# Patient Record
Sex: MALE | Race: WHITE | NOT HISPANIC OR LATINO | Employment: UNEMPLOYED
[De-identification: names, ages, dates, MRNs, and addresses within clinical notes are randomized per-mention and may not be internally consistent; named-entity substitution may affect disease eponyms.]

---

## 2018-05-23 ENCOUNTER — SURGERY (OUTPATIENT)
Age: 63
End: 2018-05-23

## 2018-05-23 ENCOUNTER — HOSPITAL ENCOUNTER (OUTPATIENT)
Facility: OTHER | Age: 63
Discharge: HOME | End: 2018-05-23
Attending: INTERNAL MEDICINE | Admitting: INTERNAL MEDICINE
Payer: MEDICAID

## 2018-05-23 VITALS
OXYGEN SATURATION: 98 % | SYSTOLIC BLOOD PRESSURE: 138 MMHG | HEIGHT: 66 IN | HEART RATE: 81 BPM | BODY MASS INDEX: 23.17 KG/M2 | WEIGHT: 144.19 LBS | DIASTOLIC BLOOD PRESSURE: 84 MMHG | RESPIRATION RATE: 16 BRPM

## 2018-05-23 DIAGNOSIS — Z99.2 ESRD (END STAGE RENAL DISEASE) ON DIALYSIS: ICD-10-CM

## 2018-05-23 DIAGNOSIS — N18.6 ESRD (END STAGE RENAL DISEASE) ON DIALYSIS: ICD-10-CM

## 2018-05-23 PROCEDURE — 36589 REMOVAL TUNNELED CV CATH: CPT | Performed by: INTERNAL MEDICINE

## 2018-05-23 PROCEDURE — 25000003 PHARM REV CODE 250

## 2018-05-23 RX ORDER — AMLODIPINE BESYLATE 10 MG/1
TABLET ORAL DAILY
COMMUNITY

## 2018-05-23 RX ORDER — CINACALCET 30 MG/1
30 TABLET, FILM COATED ORAL
COMMUNITY

## 2018-05-23 RX ORDER — SEVELAMER CARBONATE 800 MG/1
800 TABLET, FILM COATED ORAL
COMMUNITY

## 2018-05-23 NOTE — DISCHARGE INSTRUCTIONS
Home Care Instructions :    1. DIET:   You may resume your normal diet today.   2. BATHING:   No shower for 48 hours.  3. DRESSING:   You may remove dressing in 48 hours  4. ACTIVITY LEVEL:   No strenuous lifting  5. MEDICATIONS:   You may resume your normal medications today.   6. SPECIAL INSTRUCTIONS:   PLEASE CALL YOUR DOCTOR IF:  1. Redness or swelling around the injection site.  2. Fever of 101 degrees  3. Drainage (pus) from the injection site.  4. For any continuous bleeding (some dried blood over the incision is normal.)    FOR EMERGENCIES:   If any unusual problems or difficulties occur during clinic hours, call [phone] or [phone].

## 2018-05-23 NOTE — H&P
[facility]  History & Physical    Subjective:      Chief Complaint/Reason for Admission: Here for TDC removal    Flakito Mcginnis is a 63 y.o. male with ESRD (TTS at [facility] with Dr. Causey) who presents for R IJ TDC removal.  He has had his LUE brachiocephalic AVF accessed on 2 occasions without issue.    Past Medical History:   Diagnosis Date    Crohn's disease 1998    ESRD (end stage renal disease) on dialysis 10/2017    Hypertension     Obstructive uropathy      Past Surgical History:   Procedure Laterality Date    COLOSTOMY  2006    DIALYSIS FISTULA CREATION Left 02/2018    NEPHRECTOMY Right      Family History   Problem Relation Age of Onset    Hypertension Mother     Emphysema Father      Social History   Substance Use Topics    Smoking status: Former Smoker    Smokeless tobacco: Not on file    Alcohol use Not on file       PTA Medications   Medication Sig    amLODIPine (NORVASC) 10 MG tablet Take by mouth once daily.    cinacalcet (SENSIPAR) 30 MG Tab Take 30 mg by mouth daily with breakfast.    sevelamer carbonate (RENVELA) 800 mg Tab Take 800 mg by mouth 3 (three) times daily with meals.     Review of patient's allergies indicates:   Allergen Reactions    Aspirin      Has crohn's disease        Review of Systems   Constitutional: Negative.    Respiratory: Negative.    Cardiovascular: Negative.        Objective:      Vital Signs (Most Recent)  Pulse: 84 (05/23/18 1050)  Resp: 16 (05/23/18 1050)  BP: 127/71 (05/23/18 1050)  SpO2: 97 % (05/23/18 1050)    Vital Signs Range (Last 24H):  Pulse:  [82-86]   Resp:  [16]   BP: (127-138)/(71-84)   SpO2:  [96 %-97 %]     Physical Exam   Constitutional: He is oriented to person, place, and time. He appears well-developed and well-nourished. No distress.   HENT:   Head: Normocephalic and atraumatic.   Eyes: EOM are normal.   Neck: Neck supple.   Cardiovascular:   R IJ TDC with no surrounding erythema or drainage.    Pulmonary/Chest: Effort normal. No respiratory distress.   Musculoskeletal:   LUE AVF with good thrill, non-pulsatile.   Neurological: He is alert and oriented to person, place, and time.   Skin: Skin is warm and dry. He is not diaphoretic.   Psychiatric: He has a normal mood and affect. His behavior is normal.       Assessment:      There are no hospital problems to display for this patient.      Plan:      TDC removal today.  Risks explained, consent signed.

## 2018-05-23 NOTE — BRIEF OP NOTE
[facility]  Brief Operative Note     SUMMARY     Surgery Date: 5/23/2018     Surgeon(s) and Role:     * Parveen Ray MD - Primary    Assisting Surgeon: Ketan Pemberton MD    Pre-op Diagnosis:  ESRD (end stage renal disease) on dialysis [N18.6, Z99.2]    Post-op Diagnosis:  Post-Op Diagnosis Codes:     * ESRD (end stage renal disease) on dialysis [N18.6, Z99.2]    Procedure(s) (LRB):  PERMCATH REMOVAL-TUNNELED CVC REMOVAL (N/A)    Anesthesia: 1% lidocaine    Description of the findings of the procedure: Patient was prepped and draped in a sterile fashion.  This was a successful removal of his R IJ tunneled dialysis catheter.  Patient tolerated the procedure well and no immediate complications noted.    Findings/Key Components:  Patient was prepped and draped in a sterile fashion.  This was a successful removal of his R IJ tunneled dialysis catheter.  Patient tolerated the procedure well and no immediate complications noted.      Estimated Blood Loss: < 10 mL         Specimens:   Specimen (12h ago through future)    None          Discharge Note    SUMMARY     Admit Date: 5/23/2018    Discharge Date and Time:  05/23/2018 11:22 AM    Hospital Course (synopsis of major diagnoses, care, treatment, and services provided during the course of the hospital stay):  Patient was prepped and draped in a sterile fashion.  This was a successful removal of his R IJ tunneled dialysis catheter.  Patient tolerated the procedure well and no immediate complications noted.       Final Diagnosis: Post-Op Diagnosis Codes:     * ESRD (end stage renal disease) on dialysis [N18.6, Z99.2]    Disposition: Home or Self Care    Follow Up/Patient Instructions:     Medications:  Reconciled Home Medications:      Medication List      CONTINUE taking these medications    amLODIPine 10 MG tablet  Commonly known as:  NORVASC  Take by mouth once daily.     cinacalcet 30 MG Tab  Commonly known as:  SENSIPAR  Take 30 mg by mouth daily  with breakfast.     sevelamer carbonate 800 mg Tab  Commonly known as:  RENVELA  Take 800 mg by mouth 3 (three) times daily with meals.            Discharge Procedure Orders  Activity as tolerated     Lifting restrictions   Order Comments: No heavy lifting for 24 hours.     Sponge bath only until clinic visit   Order Comments: Can take a shower after 48-72 hours, once the dressing is removed (at the dialysis unit) and the wound is closed.     Call MD for:  temperature >100.4     Call MD for:  severe uncontrolled pain     Call MD for:  redness, tenderness, or signs of infection (pain, swelling, redness, odor or green/yellow discharge around incision site)     Call MD for:   Order Comments: Bleeding from the access site.       Follow-up Information     [facility]. Go in 1 day.    Specialty:  Dialysis Center  Contact information:  [address] [place] [id number] [phone]             [facility] In 1 day.    Specialty:  Dialysis Center  Why:  for dialysis  Contact information:  [address] [facility] [address] [phone]

## 2018-05-23 NOTE — PROCEDURES
[facility]     Tunneled Dialysis Catheter Removal Procedure Note     Date of Procedure:  05/23/2018 11:23 AM    Primary Surgeon: Parveen Ray MD  Assist: Ketan Pemberton MD    Procedure Performed:  1. Tunneled Dialysis Catheter Removal    Indication: Catheter not required; functioning AV Access     Location of catheter: R internal jugular vein and R chest wall     Patient was prepped and draped in a sterile fashion.  1% lidocaine was used for local anesthesia.  Blunt hemostat was used to dissect the exit site and fibrin sheath from the catheter cuff.  Once the cuff was freed, catheter was pulled and pressure was applied to the venotomy site for at least 5 minutes.  Hemostasis was achieved, tegaderm dressing applied.      Blood Loss: < 10 mL  Discharge instructions given verbally.    Parveen Ray MD  [phone]

## 2021-07-11 ENCOUNTER — IMMUNIZATION (OUTPATIENT)
Dept: PRIMARY CARE CLINIC | Facility: CLINIC | Age: 66
End: 2021-07-11
Payer: MEDICAID

## 2021-07-11 DIAGNOSIS — Z23 NEED FOR VACCINATION: Primary | ICD-10-CM

## 2021-07-11 PROCEDURE — 91300 COVID-19, MRNA, LNP-S, PF, 30 MCG/0.3 ML DOSE VACCINE: ICD-10-PCS | Mod: S$GLB,,, | Performed by: INTERNAL MEDICINE

## 2021-07-11 PROCEDURE — 0001A COVID-19, MRNA, LNP-S, PF, 30 MCG/0.3 ML DOSE VACCINE: CPT | Mod: CV19,S$GLB,, | Performed by: INTERNAL MEDICINE

## 2021-07-11 PROCEDURE — 0001A COVID-19, MRNA, LNP-S, PF, 30 MCG/0.3 ML DOSE VACCINE: ICD-10-PCS | Mod: CV19,S$GLB,, | Performed by: INTERNAL MEDICINE

## 2021-07-11 PROCEDURE — 91300 COVID-19, MRNA, LNP-S, PF, 30 MCG/0.3 ML DOSE VACCINE: CPT | Mod: S$GLB,,, | Performed by: INTERNAL MEDICINE

## 2021-08-01 ENCOUNTER — IMMUNIZATION (OUTPATIENT)
Dept: PRIMARY CARE CLINIC | Facility: CLINIC | Age: 66
End: 2021-08-01
Payer: MEDICAID

## 2021-08-01 DIAGNOSIS — Z23 NEED FOR VACCINATION: Primary | ICD-10-CM

## 2021-08-01 PROCEDURE — 91300 COVID-19, MRNA, LNP-S, PF, 30 MCG/0.3 ML DOSE VACCINE: CPT | Mod: S$GLB,,, | Performed by: INTERNAL MEDICINE

## 2021-08-01 PROCEDURE — 91300 COVID-19, MRNA, LNP-S, PF, 30 MCG/0.3 ML DOSE VACCINE: ICD-10-PCS | Mod: S$GLB,,, | Performed by: INTERNAL MEDICINE

## 2021-08-01 PROCEDURE — 0002A COVID-19, MRNA, LNP-S, PF, 30 MCG/0.3 ML DOSE VACCINE: ICD-10-PCS | Mod: CV19,S$GLB,, | Performed by: INTERNAL MEDICINE

## 2021-08-01 PROCEDURE — 0002A COVID-19, MRNA, LNP-S, PF, 30 MCG/0.3 ML DOSE VACCINE: CPT | Mod: CV19,S$GLB,, | Performed by: INTERNAL MEDICINE

## 2021-11-01 ENCOUNTER — TELEPHONE (OUTPATIENT)
Dept: GASTROENTEROLOGY | Facility: CLINIC | Age: 66
End: 2021-11-01
Payer: MEDICAID

## 2023-04-13 ENCOUNTER — OFFICE VISIT (OUTPATIENT)
Dept: URGENT CARE | Facility: CLINIC | Age: 68
End: 2023-04-13
Payer: MEDICARE

## 2023-04-13 VITALS
TEMPERATURE: 98 F | HEART RATE: 89 BPM | RESPIRATION RATE: 18 BRPM | SYSTOLIC BLOOD PRESSURE: 156 MMHG | DIASTOLIC BLOOD PRESSURE: 86 MMHG | WEIGHT: 144 LBS | HEIGHT: 66 IN | BODY MASS INDEX: 23.14 KG/M2

## 2023-04-13 DIAGNOSIS — S22.42XA CLOSED FRACTURE OF MULTIPLE RIBS OF LEFT SIDE, INITIAL ENCOUNTER: Primary | ICD-10-CM

## 2023-04-13 DIAGNOSIS — R52 PAIN: ICD-10-CM

## 2023-04-13 DIAGNOSIS — R07.81 RIB PAIN ON LEFT SIDE: ICD-10-CM
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
## 2023-04-13 PROCEDURE — 71100 XR RIBS 2 VIEW LEFT: ICD-10-PCS | Mod: FY,LT,S$GLB, | Performed by: RADIOLOGY

## 2023-04-13 PROCEDURE — 73030 X-RAY EXAM OF SHOULDER: CPT | Mod: FY,LT,S$GLB, | Performed by: RADIOLOGY

## 2023-04-13 PROCEDURE — 99204 OFFICE O/P NEW MOD 45 MIN: CPT | Mod: S$GLB,,, | Performed by: NURSE PRACTITIONER

## 2023-04-13 PROCEDURE — 99204 PR OFFICE/OUTPT VISIT, NEW, LEVL IV, 45-59 MIN: ICD-10-PCS | Mod: S$GLB,,, | Performed by: NURSE PRACTITIONER

## 2023-04-13 PROCEDURE — 71100 X-RAY EXAM RIBS UNI 2 VIEWS: CPT | Mod: FY,LT,S$GLB, | Performed by: RADIOLOGY

## 2023-04-13 PROCEDURE — 73030 XR SHOULDER COMPLETE 2 OR MORE VIEWS LEFT: ICD-10-PCS | Mod: FY,LT,S$GLB, | Performed by: RADIOLOGY

## 2023-04-13 RX ORDER — LISINOPRIL 20 MG/1
20 TABLET ORAL
COMMUNITY

## 2023-04-13 RX ORDER — CARVEDILOL 12.5 MG/1
12.5 TABLET ORAL 2 TIMES DAILY
COMMUNITY
Start: 2023-01-09

## 2023-04-13 RX ORDER — OXYCODONE AND ACETAMINOPHEN 5; 325 MG/1; MG/1
1 TABLET ORAL EVERY 6 HOURS PRN
COMMUNITY
End: 2023-08-25

## 2023-04-13 RX ORDER — HYDROCODONE BITARTRATE AND ACETAMINOPHEN 5; 325 MG/1; MG/1
1 TABLET ORAL EVERY 8 HOURS PRN
Qty: 15 TABLET | Refills: 0 | Status: SHIPPED | OUTPATIENT
Start: 2023-04-13 | End: 2023-08-25

## 2023-04-13 NOTE — PROGRESS NOTES
"Subjective:      Patient ID: Flakito Mcginnis is a 68 y.o. male.    Vitals:  height is 5' 6" (1.676 m) and weight is 65.3 kg (144 lb). His temperature is 97.8 °F (36.6 °C). His blood pressure is 156/86 (abnormal) and his pulse is 89. His respiration is 18.     Chief Complaint: Fall    Pt is a 69 yo male w/ complaints of 7 days of left rib and left shoulder pain from a fall. Pt states he gets SOB in the middle of the night and has to sit up for a while. 7 days ago, he fell asleep while sitting on the side of his bed and fell on his L side. Pt c/o SOB, weakness, and fatigue due to the pain. It is keeping him up at night. Certain movements and laying down make the symptoms worse. Took tylenol with no relief.     Fall  The accident occurred 5 to 7 days ago. The fall occurred from a bed. He fell from a height of 3 to 5 ft. He landed on Hard floor. The point of impact was the left shoulder. The pain is present in the left shoulder. The pain is at a severity of 8/10. The pain is mild. Pertinent negatives include no abdominal pain, bowel incontinence, fever, headaches, hearing loss, hematuria, loss of consciousness, nausea, numbness, tingling, visual change or vomiting. He has tried acetaminophen for the symptoms. The treatment provided no relief.     Constitution: Negative for fever.   Gastrointestinal:  Negative for abdominal pain, nausea, vomiting and bowel incontinence.   Genitourinary:  Negative for hematuria.   Neurological:  Negative for headaches, loss of consciousness and numbness.    Objective:     Physical Exam   Constitutional: He is oriented to person, place, and time. He appears well-developed. He is cooperative.   HENT:   Head: Normocephalic and atraumatic.   Ears:   Right Ear: Hearing and external ear normal.   Left Ear: Hearing and external ear normal.   Nose: Nose normal. No mucosal edema or nasal deformity. No epistaxis. Right sinus exhibits no maxillary sinus tenderness and no frontal sinus tenderness. " Left sinus exhibits no maxillary sinus tenderness and no frontal sinus tenderness.   Mouth/Throat: Uvula is midline, oropharynx is clear and moist and mucous membranes are normal. No trismus in the jaw. Normal dentition. No uvula swelling.   Eyes: Conjunctivae and lids are normal.   Neck: Trachea normal and phonation normal. Neck supple.   Cardiovascular: Normal rate, regular rhythm, normal heart sounds and normal pulses.   Pulmonary/Chest: Effort normal and breath sounds normal. He has no decreased breath sounds. He has no wheezes. He has no rales. He exhibits tenderness. He exhibits no crepitus and no swelling.       Abdominal: Normal appearance.   Musculoskeletal:      Thoracic back: He exhibits decreased range of motion and tenderness.        Back:    Neurological: He is alert and oriented to person, place, and time. He exhibits normal muscle tone.   Skin: Skin is warm, dry and intact.   Psychiatric: His speech is normal and behavior is normal. Judgment and thought content normal.   Nursing note and vitals reviewed.    X-Ray Ribs 2 View Left    Result Date: 4/13/2023  EXAMINATION: XR RIBS 2 VIEW LEFT CLINICAL HISTORY: Pain, unspecified. TECHNIQUE: Frontal view of the chest and two views of the left ribs were performed. COMPARISON: None. FINDINGS: Exam quality is limited by motion blur and bony demineralization. Cardiomediastinal silhouette is enlarged.  Central vascular congestion with coarse interstitial lung markings and possible pleural thickening.  Increased ground-glass attenuation in the left lung base.  No large pleural effusion.  No distinct pneumothorax.  Right-sided central venous catheter overlies the SVC.  Surgical clips overlie the lower neck. Multiple minimally displaced age-indeterminate left upper rib fractures likely involving the 2nd through 5th ribs.  CT follow-up could be considered if it would alter management.     Minimally displaced fractures of left upper ribs, noting detailed evaluation  is limited by motion blur and suboptimal positioning. Enlarged cardiac silhouette and coarse interstitial lung markings with possible mild left pleural thickening. Electronically signed by: Lorenzo Brannon MD Date:    04/13/2023 Time:    13:36    XR SHOULDER COMPLETE 2 OR MORE VIEWS LEFT    Result Date: 4/13/2023  EXAMINATION: XR SHOULDER COMPLETE 2 OR MORE VIEWS LEFT CLINICAL HISTORY: Pain, unspecified TECHNIQUE: Two or three views of the left shoulder were performed. COMPARISON: None FINDINGS: Three views left shoulder. There is osteopenia.  Allowing for positioning, the left humeral head maintains appropriate relationship with the glenoid.  The acromioclavicular joint is intact noting degenerative change.  Coarse interstitial attenuation is noted throughout the visualized lung zones. There are fractures involving the posterolateral aspects of left ribs 2, 3, 4 and 6.  Please note, given positioning, evaluation is limited, dedicated left rib series is advised.     This report was flagged in Epic as abnormal. 1. No acute displaced fracture or dislocation of the left shoulder. 2. Several upper rib fractures as described, dedicated left rib series is recommended. Electronically signed by: Michael Cleveland MD Date:    04/13/2023 Time:    13:17       Assessment:     1. Closed fracture of multiple ribs of left side, initial encounter    2. Pain    3. Fall, initial encounter    4. Rib pain on left side        Plan:     HERBERT SpO2 after multiple attempts. Pt states he has poor circulation and clinicians often have trouble obtaining a pulse ox, typically will use his ear. This type of pulse oximeter is not available in clinic. Pt breathing even and unlabored, NAD, BBS CTA    Strict ER precautions for new or worsening symptoms    Closed fracture of multiple ribs of left side, initial encounter  -     HYDROcodone-acetaminophen (NORCO) 5-325 mg per tablet; Take 1 tablet by mouth every 8 (eight) hours as needed for Pain.   Dispense: 15 tablet; Refill: 0    Pain  -     X-Ray Ribs 2 View Left; Future; Expected date: 04/13/2023  -     XR SHOULDER COMPLETE 2 OR MORE VIEWS LEFT; Future; Expected date: 04/13/2023    Fall, initial encounter    Rib pain on left side      Patient Instructions   - You must understand that you have received an Urgent Care treatment only and that you may be released before all of your medical problems are known or treated.   - You, the patient, will arrange for follow up care as instructed.   - If your condition worsens or fails to improve we recommend that you receive another evaluation at the ER immediately or contact your PCP to discuss your concerns.   - You can call [phone] or [phone] to help schedule an appointment with the appropriate provider.    Take OTC ibuprofen 400-800 mg 3 times per day. Max dose 3200 mg from all sources per day. Or Tylenol 500-1000 mg 3 times per day. Max 4000 mg from all sources per day.   Rest as much as possible  Alternate heat and ice. Apply heat for 20-30 minutes, perform gentle range of motion exercises, and then apply ice for 15 minutes. Do this 3-4 times per day.    If you received an injection of toradol in the clinic today, DO NOT take any anti-inflammatory medications today. These include: Advil/Motrin (ibuprofen), Aleve (naproxen), Mobic (meloxicam).

## 2023-04-13 NOTE — PATIENT INSTRUCTIONS
- You must understand that you have received an Urgent Care treatment only and that you may be released before all of your medical problems are known or treated.   - You, the patient, will arrange for follow up care as instructed.   - If your condition worsens or fails to improve we recommend that you receive another evaluation at the ER immediately or contact your PCP to discuss your concerns.   - You can call [phone] or [phone] to help schedule an appointment with the appropriate provider.    Take OTC ibuprofen 400-800 mg 3 times per day. Max dose 3200 mg from all sources per day. Or Tylenol 500-1000 mg 3 times per day. Max 4000 mg from all sources per day.   Rest as much as possible  Alternate heat and ice. Apply heat for 20-30 minutes, perform gentle range of motion exercises, and then apply ice for 15 minutes. Do this 3-4 times per day.    If you received an injection of toradol in the clinic today, DO NOT take any anti-inflammatory medications today. These include: Advil/Motrin (ibuprofen), Aleve (naproxen), Mobic (meloxicam).

## 2023-04-17 ENCOUNTER — TELEPHONE (OUTPATIENT)
Dept: URGENT CARE | Facility: CLINIC | Age: 68
End: 2023-04-17
Payer: MEDICAID

## 2023-04-17 NOTE — TELEPHONE ENCOUNTER
4-17-23 Spoke to patient regarding rx. Asked him to get in touch with his pcp. Patient stated he doesn't have a pcp at this time. Patient hung up on me. kmrtr

## 2023-07-27 ENCOUNTER — OFFICE VISIT (OUTPATIENT)
Dept: URGENT CARE | Facility: CLINIC | Age: 68
End: 2023-07-27
Payer: MEDICARE

## 2023-07-27 VITALS
TEMPERATURE: 98 F | OXYGEN SATURATION: 95 % | HEIGHT: 66 IN | HEART RATE: 89 BPM | BODY MASS INDEX: 23.14 KG/M2 | DIASTOLIC BLOOD PRESSURE: 73 MMHG | SYSTOLIC BLOOD PRESSURE: 115 MMHG | WEIGHT: 144 LBS | RESPIRATION RATE: 18 BRPM

## 2023-07-27 DIAGNOSIS — M79.604 RIGHT LEG PAIN: ICD-10-CM

## 2023-07-27 DIAGNOSIS — S86.911A MUSCLE STRAIN OF RIGHT LOWER LEG, INITIAL ENCOUNTER: ICD-10-CM
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
## 2023-07-27 PROCEDURE — 73552 XR FEMUR 2 VIEW RIGHT: ICD-10-PCS | Mod: FY,RT,S$GLB, | Performed by: RADIOLOGY

## 2023-07-27 PROCEDURE — 73590 X-RAY EXAM OF LOWER LEG: CPT | Mod: FY,RT,S$GLB, | Performed by: RADIOLOGY

## 2023-07-27 PROCEDURE — 99214 PR OFFICE/OUTPT VISIT, EST, LEVL IV, 30-39 MIN: ICD-10-PCS | Mod: S$GLB,,, | Performed by: PHYSICIAN ASSISTANT

## 2023-07-27 PROCEDURE — 73552 X-RAY EXAM OF FEMUR 2/>: CPT | Mod: FY,RT,S$GLB, | Performed by: RADIOLOGY

## 2023-07-27 PROCEDURE — 73590 XR TIBIA FIBULA 2 VIEW RIGHT: ICD-10-PCS | Mod: FY,RT,S$GLB, | Performed by: RADIOLOGY

## 2023-07-27 PROCEDURE — 99214 OFFICE O/P EST MOD 30 MIN: CPT | Mod: S$GLB,,, | Performed by: PHYSICIAN ASSISTANT

## 2023-07-27 RX ORDER — OXYCODONE AND ACETAMINOPHEN 5; 325 MG/1; MG/1
1 TABLET ORAL EVERY 12 HOURS PRN
Qty: 10 TABLET | Refills: 0 | Status: SHIPPED | OUTPATIENT
Start: 2023-07-27 | End: 2023-08-25

## 2023-07-27 NOTE — PROGRESS NOTES
"Subjective:      Patient ID: Flakito Mcginnis is a 68 y.o. male.    Vitals:  height is 5' 6" (1.676 m) and weight is 65.3 kg (144 lb). His oral temperature is 98 °F (36.7 °C). His blood pressure is 115/73 and his pulse is 89. His respiration is 18 and oxygen saturation is 95%.     Chief Complaint: Leg Pain (Right leg)    This is a 68 y.o. male who presents today with a chief complaint of right leg pain. Pt state he fell out of bed 2 weeks ago onto left side/leg and has had persistent pain since then.  Patient says the pain is more severe with weight bearing, walking, and inversion of ankle. Pt states that pain is most notable of right lateral lower leg, also extends up laterally past knee to distal lateral thigh. No swelling, rash, or skin change. Pt took tylenol this past 2 weeks to help with some temporary relief. No back pain. No paresthesia. No posterior or medial leg pain.     Leg Pain   The incident occurred more than 1 week ago. The injury mechanism was a fall. The pain is present in the right leg. The quality of the pain is described as aching and stabbing. The pain is at a severity of 6/10. The pain is mild. The pain has been Fluctuating since onset. Pertinent negatives include no inability to bear weight, loss of motion, loss of sensation, muscle weakness, numbness or tingling. He reports no foreign bodies present. The symptoms are aggravated by movement and weight bearing. He has tried acetaminophen for the symptoms. The treatment provided no relief.     Past Medical History:   Diagnosis Date    Crohn's disease 1998    ESRD (end stage renal disease) on dialysis 10/2017    Hypertension     Obstructive uropathy      Constitution: Negative for chills, sweating, fatigue and fever.   HENT:  Negative for ear pain, congestion and sore throat.    Neck: Negative for neck pain and neck stiffness.   Cardiovascular:  Negative for chest pain, leg swelling, palpitations and sob on exertion.   Eyes:  Negative for eye " itching, eye pain and eye redness.   Respiratory:  Negative for cough, sputum production and shortness of breath.    Gastrointestinal:  Negative for abdominal pain, nausea, vomiting and diarrhea.   Genitourinary:  Negative for dysuria, frequency, urgency, flank pain and hematuria.   Musculoskeletal:  Positive for pain, trauma and pain with walking. Negative for joint pain and abnormal ROM of joint.   Skin:  Negative for color change, rash, wound, erythema and bruising.   Neurological:  Negative for dizziness, passing out, loss of balance, headaches, disorientation, altered mental status and numbness.   Psychiatric/Behavioral:  Negative for altered mental status, disorientation and confusion.     Objective:     Physical Exam   Constitutional: He is oriented to person, place, and time. He appears well-developed.  Non-toxic appearance. He does not appear ill. No distress.   HENT:   Head: Normocephalic and atraumatic.   Ears:   Right Ear: External ear normal.   Left Ear: External ear normal.   Eyes: Conjunctivae are normal. Right eye exhibits no discharge. Left eye exhibits no discharge. No scleral icterus.   Pulmonary/Chest: Effort normal. No stridor. No respiratory distress. He has no wheezes.   Musculoskeletal:         General: Tenderness present.      Right ankle: No tenderness.      Left ankle: Normal.      Right lower leg: He exhibits tenderness. He exhibits no swelling, no deformity and no laceration. No edema.      Left lower leg: Normal.        Legs:       Comments: -Pain on the R side of leg from below the hip to above the ankle. Notes having pain when foot is inverted.   -XRAY of fib/tib, ankle, and hip are WNL.  + Trendelenburg gait of R hip. Pt walking with cane.   Neurological: no focal deficit. He is alert, oriented to person, place, and time and at baseline.   Skin: Skin is warm, dry and not diaphoretic. not right lower legNo bruising and No erythema   Psychiatric: His behavior is normal. Judgment and  thought content normal.   Nursing note and vitals reviewed.    Assessment:     1. Fall, initial encounter    2. Right leg pain    3. Muscle strain of right lower leg, initial encounter        Plan:   Prior labs reviewed. Pt on hemodialysis.   - Discussed ddx, home care, tx options, and given follow up precautions.  I have reviewed the patient's chart to view previous visits, labs, and imaging to assess PMH and look for any trends or previous treatments.  I have interpreted x-rays and reviewed with patient.  Negative for acute findings.  As discussed with patient, I have considered multiple differentials, at this time it is most consistent with muscular etiology.  There is no evidence of wound, infection, or DVT based on exam.  It is unlikely to be referred pain from sciatica.  No red flag S/S at this time.  NSAIDs contraindicated, pain control p.r.n., has tolerated Percocet in the past well.  Instructed monitor closely, follow up closely with PCP, seek medical attention sooner p.r.n. for new or worsening symptoms.  Patient expressed understanding, agrees with plan of care.  Fall, initial encounter  -     XR FEMUR 2 VIEW RIGHT; Future; Expected date: 07/27/2023  -     XR TIBIA FIBULA 2 VIEW RIGHT; Future; Expected date: 07/27/2023    Right leg pain  -     oxyCODONE-acetaminophen (PERCOCET) 5-325 mg per tablet; Take 1 tablet by mouth every 12 (twelve) hours as needed for Pain.  Dispense: 10 tablet; Refill: 0    Muscle strain of right lower leg, initial encounter      Patient Instructions   - Rest.    - Drink plenty of fluids.      - oxycodone-acetaminophen is a narcotic pain medication only to be taken as needed for severe pain.  This medication contains acetaminophen/Tylenol- do not take other OTC tylenol while on this medication.   - Please be aware as we discussed that narcotics can be addictive.   - I have given you a limited quantity to take as it is needed at this time. However take it sparingly and only when  needed.  - Do not operate machinery or drive on this medication.      - low heat or ice to affected area    - Follow up with your PCP or specialty clinic as directed in the next 1-2 weeks if not improved or as needed.  You can call [phone] to schedule an appointment with the appropriate provider.    - Go to the ER or seek medical attention immediately if you develop new or worsening symptoms.     - You must understand that you have received an Urgent Care treatment only and that you may be released before all of your medical problems are known or treated.   - You, the patient, will arrange for follow up care as instructed.   - If your condition worsens or fails to improve we recommend that you receive another evaluation at the ER immediately or contact your PCP to discuss your concerns or return here.

## 2023-07-27 NOTE — PATIENT INSTRUCTIONS
- Rest.    - Drink plenty of fluids.      - oxycodone-acetaminophen is a narcotic pain medication only to be taken as needed for severe pain.  This medication contains acetaminophen/Tylenol- do not take other OTC tylenol while on this medication.   - Please be aware as we discussed that narcotics can be addictive.   - I have given you a limited quantity to take as it is needed at this time. However take it sparingly and only when needed.  - Do not operate machinery or drive on this medication.      - low heat or ice to affected area    - Follow up with your PCP or specialty clinic as directed in the next 1-2 weeks if not improved or as needed.  You can call [phone] to schedule an appointment with the appropriate provider.    - Go to the ER or seek medical attention immediately if you develop new or worsening symptoms.     - You must understand that you have received an Urgent Care treatment only and that you may be released before all of your medical problems are known or treated.   - You, the patient, will arrange for follow up care as instructed.   - If your condition worsens or fails to improve we recommend that you receive another evaluation at the ER immediately or contact your PCP to discuss your concerns or return here.

## 2023-08-25 ENCOUNTER — OFFICE VISIT (OUTPATIENT)
Dept: URGENT CARE | Facility: CLINIC | Age: 68
End: 2023-08-25
Payer: MEDICARE

## 2023-08-25 VITALS
HEART RATE: 85 BPM | OXYGEN SATURATION: 97 % | BODY MASS INDEX: 23.14 KG/M2 | SYSTOLIC BLOOD PRESSURE: 145 MMHG | HEIGHT: 66 IN | TEMPERATURE: 98 F | RESPIRATION RATE: 18 BRPM | DIASTOLIC BLOOD PRESSURE: 80 MMHG | WEIGHT: 144 LBS

## 2023-08-25 DIAGNOSIS — M54.2 NECK PAIN: Primary | ICD-10-CM

## 2023-08-25 PROCEDURE — 99213 OFFICE O/P EST LOW 20 MIN: CPT | Mod: S$GLB,,,

## 2023-08-25 PROCEDURE — 72040 XR CERVICAL SPINE 2 OR 3 VIEWS: ICD-10-PCS | Mod: FY,S$GLB,, | Performed by: RADIOLOGY

## 2023-08-25 PROCEDURE — 99213 PR OFFICE/OUTPT VISIT, EST, LEVL III, 20-29 MIN: ICD-10-PCS | Mod: S$GLB,,,

## 2023-08-25 PROCEDURE — 72040 X-RAY EXAM NECK SPINE 2-3 VW: CPT | Mod: FY,S$GLB,, | Performed by: RADIOLOGY

## 2023-08-25 RX ORDER — TRAMADOL HYDROCHLORIDE 50 MG/1
50 TABLET ORAL EVERY 8 HOURS PRN
Qty: 9 TABLET | Refills: 0 | Status: SHIPPED | OUTPATIENT
Start: 2023-08-25 | End: 2023-08-28

## 2023-08-25 RX ORDER — METHOCARBAMOL 500 MG/1
500 TABLET, FILM COATED ORAL NIGHTLY PRN
Qty: 7 TABLET | Refills: 0 | Status: SHIPPED | OUTPATIENT
Start: 2023-08-25

## 2023-08-25 NOTE — PATIENT INSTRUCTIONS
- Tylenol for pain  - tramadol  is a narcotic pain medication. Take only as needed for severe pain.   - Please be aware as we discussed that narcotics can be addictive.   - I have given you a limited quantity to take as it is needed at this time. However take it sparingly and only when needed.    - Do not operate machinery or drive on this medication.     - You can take the muscle relaxer nightly for muscle spasms. This medication may make you drowsy. Do not take together with tramadol.    - Follow up with your PCP or specialty clinic as directed in the next 1-2 weeks if not improved or as needed.  You can call [phone] to schedule an appointment with the appropriate provider.    - Go to the ER or seek medical attention immediately if you develop new or worsening symptoms.    - You must understand that you have received an Urgent Care treatment only and that you may be released before all of your medical problems are known or treated.   - You, the patient, will arrange for follow up care as instructed.   - If your condition worsens or fails to improve we recommend that you receive another evaluation at the ER immediately or contact your PCP to discuss your concerns or return here.

## 2023-08-25 NOTE — PROGRESS NOTES
"Subjective:     Patient ID: Flakito Mcginnis is a 68 y.o. male.    Vitals:  height is 5' 6" (1.676 m) and weight is 65.3 kg (144 lb). His oral temperature is 97.8 °F (36.6 °C). His blood pressure is 145/80 (abnormal) and his pulse is 85. His respiration is 18 and oxygen saturation is 97%.     Chief Complaint: Neck Pain    Patient is a 68-year-old male with hx of ESRD, Crohn's who has presenting with acute right-sided neck pain that started this morning.  He denies any recent falls or injuries.  It is tender to touch in hurts when he tries to move his neck.  He has tried taking Tylenol without relief.  Pain does not radiate. He does note that over the past few months, he has noticed that his upper back and his neck has been slouching forward gradually. Denies any other symptoms including fever, chills, chest pain, SOB, nausea, vomiting, headaches, numbness or tingling, focal weakness or loss of sensation, back pain, dizziness.    Neck Pain   This is a new problem. The current episode started today. The problem has been gradually worsening. The symptoms are aggravated by bending and twisting. Pertinent negatives include no chest pain, fever, headaches or numbness.     Constitution: Negative for chills and fever.   HENT:  Negative for ear pain, congestion and sore throat.    Neck: Positive for neck pain and neck stiffness. Negative for neck swelling.   Cardiovascular:  Negative for chest pain.   Eyes:  Negative for vision loss and double vision.   Respiratory:  Negative for cough and shortness of breath.    Gastrointestinal:  Negative for abdominal pain, nausea, vomiting and diarrhea.   Musculoskeletal:  Positive for muscle ache.   Skin:  Negative for rash, erythema and bruising.   Allergic/Immunologic: Negative for itching.   Neurological:  Negative for dizziness, light-headedness, headaches, numbness and tingling.      Objective:     Physical Exam   Constitutional: He is oriented to person, place, and time. He appears " well-developed.   HENT:   Head: Normocephalic and atraumatic.   Ears:   Right Ear: External ear normal.   Left Ear: External ear normal.   Nose: Nose normal.   Mouth/Throat: Oropharynx is clear and moist. Mucous membranes are moist. Oropharynx is clear.   Eyes: Conjunctivae, EOM and lids are normal. Pupils are equal, round, and reactive to light. Extraocular movement intact   Neck: Trachea normal and phonation normal. Neck supple.     pain with movement present. No spinous process tenderness present. muscular tenderness present.   Musculoskeletal: Normal range of motion.         General: Normal range of motion.   Neurological: no focal deficit. He is alert and oriented to person, place, and time. No cranial nerve deficit.   Skin: Skin is warm, dry and intact. No erythema   Psychiatric: His speech is normal and behavior is normal. Judgment and thought content normal.   Nursing note and vitals reviewed.    XR Cervical Spine 2 or 3 Views    Result Date: 8/25/2023  EXAMINATION: XR CERVICAL SPINE 2 OR 3 VIEWS CLINICAL HISTORY: Cervicalgia TECHNIQUE: AP, lateral and open mouth views of the cervical spine were performed. COMPARISON: None. FINDINGS: Mild DJD.  The disc spaces are narrowed between C4 and C7 vertebral segments.  There is a 2 mm C4/C5 anterolisthesis.  No fracture or dislocation.  No bone destruction identified.  Postsurgical changes noted in the lower neck.     See above Electronically signed by: Jeffery Marcus MD Date:    08/25/2023 Time:    12:48    Assessment:     1. Neck pain      Plan:     Neck pain  -     XR Cervical Spine 2 or 3 Views; Future; Expected date: 08/25/2023  -     traMADoL (ULTRAM) 50 mg tablet; Take 1 tablet (50 mg total) by mouth every 8 (eight) hours as needed for Pain.  Dispense: 9 tablet; Refill: 0  -     methocarbamoL (ROBAXIN) 500 MG Tab; Take 1 tablet (500 mg total) by mouth nightly as needed (muscle spasm).  Dispense: 7 tablet; Refill: 0  -     Ambulatory referral/consult to [facility]              Patient Instructions   - Tylenol for pain  - tramadol  is a narcotic pain medication. Take only as needed for severe pain.   - Please be aware as we discussed that narcotics can be addictive.   - I have given you a limited quantity to take as it is needed at this time. However take it sparingly and only when needed.    - Do not operate machinery or drive on this medication.     - You can take the muscle relaxer nightly for muscle spasms. This medication may make you drowsy. Do not take together with tramadol.    - Follow up with your PCP or specialty clinic as directed in the next 1-2 weeks if not improved or as needed.  You can call [phone] to schedule an appointment with the appropriate provider.    - Go to the ER or seek medical attention immediately if you develop new or worsening symptoms.    - You must understand that you have received an Urgent Care treatment only and that you may be released before all of your medical problems are known or treated.   - You, the patient, will arrange for follow up care as instructed.   - If your condition worsens or fails to improve we recommend that you receive another evaluation at the ER immediately or contact your PCP to discuss your concerns or return here.

## 2023-12-07 NOTE — PROGRESS NOTES
[facility]  CLINIC NOTE    Patient Name: Flakito Mcginnis  YOB: 1955    PRESENTING HISTORY     History of Present Illness:  Mr. Flakito Mcginnis is a 68 y.o. male with HTN, Crohn's disease s/p colostomy, right nephrectomy, non-rheumatic MVR, HFrEF (30-35%), NICM, and ESRD on HD presenting to establish care. Changed health insurance so needed to find a new provider. Only concern today is headaches which started 2 weeks ago. Describes them as a dull ache, mainly above the left eye. No throbbing pain. No eye tearing or rhinorrhea. Eyes feel achy when has a headache but otherwise no pain. Normal EOM. No photophobia; has trouble focusing (although recently found out he has cataracts). Has been taking tylenol, 500-1000mg every 4-5 hours; helps for a couple of hours and then wears off. Not taking any other OTC meds. Denies fevers or chills. No ear pain/discharge. No new neck pain/stiffness. No numbness or tingling in hands or feet. No focal weakness. No dizziness/lightheadedness. No jaw claudication. No temple tenderness. No recent falls or head trauma. No previous headaches or FHx. Went to an optometrist last week, was told he has normal eye pressure. Has an appointment with an ophthalmologist this coming Friday.     All previous doctors at [facility] (nephrologist, cardiologist, etc). Would like to keep his nephrologist. Has a cardiology appointment tomorrow at [facility] and reports he's getting a repeat echo. He is open to establishing care with cardiology at [facility]. Has not seen a gastroenterologist in quite some time, needs to establish care. Also open to seeing a GI doctor at [facility].     Medications: coreg 12.5mg BID, atorvastatin 40mg daily, cholestyaramine, renvela 800mg TID, lokelma 5mg daily, multivitamin     Health maintenance:  Colonoscopy - last one with Dr. Anne at [facility] about 1 year ago    Review of Systems   Constitutional:  Negative for chills and fever.   HENT:  Negative  for congestion and sore throat.    Eyes:  Negative for discharge and redness.   Respiratory:  Negative for cough, shortness of breath and wheezing.    Cardiovascular:  Negative for chest pain, palpitations and leg swelling.   Gastrointestinal:  Negative for abdominal pain, nausea and vomiting.   Genitourinary:  Negative for dysuria and hematuria.   Musculoskeletal:  Negative for joint pain and myalgias.   Skin:  Negative for itching and rash.   Neurological:  Negative for dizziness, weakness and headaches.   Psychiatric/Behavioral:  Negative for depression. The patient is not nervous/anxious.      PAST HISTORY:     Past Medical History:   Diagnosis Date    Crohn's disease 1998    ESRD (end stage renal disease) on dialysis 10/2017    Hypertension     Obstructive uropathy      Past Surgical History:   Procedure Laterality Date    COLOSTOMY  2006    DIALYSIS FISTULA CREATION Left 02/2018    NEPHRECTOMY Right     REMOVAL OF TUNNELED CENTRAL VENOUS CATHETER (CVC) N/A 5/23/2018    Procedure: PERMCATH REMOVAL-TUNNELED CVC REMOVAL;  Surgeon: Parveen Ray MD;  Location: [facility] CATH LAB;  Service: Nephrology;  Laterality: N/A;  pt coming in @930     Family History   Problem Relation Age of Onset    Hypertension Mother     Emphysema Father      Social History     Socioeconomic History    Marital status: Single   Tobacco Use    Smoking status: Former     MEDICATIONS & ALLERGIES:     Current Outpatient Medications on File Prior to Visit   Medication Sig    amLODIPine (NORVASC) 10 MG tablet Take by mouth once daily.    carvediloL (COREG) 12.5 MG tablet Take 12.5 mg by mouth 2 (two) times daily.    cinacalcet (SENSIPAR) 30 MG Tab Take 30 mg by mouth daily with breakfast.    lisinopriL (PRINIVIL,ZESTRIL) 20 MG tablet Take 20 mg by mouth.    methocarbamoL (ROBAXIN) 500 MG Tab Take 1 tablet (500 mg total) by mouth nightly as needed (muscle spasm).    sevelamer carbonate (RENVELA) 800 mg Tab Take 800 mg by mouth 3 (three) times daily  "with meals.     No current facility-administered medications on file prior to visit.     Review of patient's allergies indicates:   Allergen Reactions    Vancomycin analogues Anaphylaxis, Other (See Comments), Shortness Of Breath and Swelling     Light headed, see's spots      Aspirin      Has crohn's disease     OBJECTIVE:   Vital Signs:  Vitals:    12/11/23 1034 12/11/23 1044   BP: (!) 140/86    Pulse: 70    SpO2: (!) 79% 98%   Weight: 59.8 kg (131 lb 13.4 oz)    Height: 5' 6" (1.676 m)      No results found for this or any previous visit (from the past 24 hour(s)).      Physical Exam  Vitals and nursing note reviewed.   Constitutional:       General: He is not in acute distress.     Appearance: Normal appearance. He is not ill-appearing.   HENT:      Head: Normocephalic and atraumatic.      Mouth/Throat:      Mouth: Mucous membranes are moist.   Eyes:      General: No scleral icterus.     Extraocular Movements: Extraocular movements intact.      Conjunctiva/sclera: Conjunctivae normal.      Pupils: Pupils are equal, round, and reactive to light.   Cardiovascular:      Rate and Rhythm: Normal rate and regular rhythm.      Pulses: Normal pulses.      Heart sounds: Murmur (MR) heard.   Pulmonary:      Effort: Pulmonary effort is normal.      Breath sounds: Normal breath sounds. No wheezing, rhonchi or rales.   Chest:          Comments: Tunneled dialysis catheter, no erythema or tenderness surrounding  Abdominal:      General: Bowel sounds are normal.      Palpations: Abdomen is soft.      Tenderness: There is no abdominal tenderness.      Hernia: A hernia (hernia lateral to laparotomy scar) is present.          Comments: Colostomy in situ, tissue is pink and healthy, functioning well   Laparotomy scar   Hernia left lateral to scar    Musculoskeletal:         General: No tenderness.      Right lower leg: No edema.      Left lower leg: No edema.   Skin:     General: Skin is warm and dry.      Capillary Refill: " Capillary refill takes less than 2 seconds.      Findings: No erythema or rash.   Neurological:      General: No focal deficit present.      Mental Status: He is alert and oriented to person, place, and time.   Psychiatric:         Mood and Affect: Mood normal.         Thought Content: Thought content normal.     ASSESSMENT & PLAN:     Flakito was seen today for establish care.  Diagnoses and all orders for this visit:    Annual physical exam  Abnormal finding of blood chemistry, unspecified  Hyperlipidemia  -     Lipid Panel; Future  -     Hepatitis C Antibody; Future  -     CBC W/ AUTO DIFFERENTIAL; Future  -     COMPREHENSIVE METABOLIC PANEL; Future  -     HEMOGLOBIN A1C; Future    History of nephrectomy  Renal stones  Hypertension, unspecified type  ESRD (end stage renal disease)  -     PHOSPHORUS; Future    Crohn's disease with complication, unspecified gastrointestinal tract location  -     Ambulatory referral/consult to Gastroenterology; Future    Acute intractable headache, unspecified headache type  -     CT Head Without Contrast; Future  -     butalbital-acetaminophen-caffeine -40 mg (FIORICET, ESGIC) -40 mg per tablet; Take 1 tablet by mouth every 4 (four) hours as needed for Headaches.    HFrEF (heart failure with reduced ejection fraction)  -     Ambulatory referral/consult to Cardiology; Future    Iron deficiency anemia, unspecified iron deficiency anemia type  -     CBC W/ AUTO DIFFERENTIAL; Future    Presenting to establish care as his insurance changed and his previous PCP left [facility]  Main concern is headache which has been ongoing for 2 weeks; tylenol helps reduce the pain but doesn't make it go away completely. Has some associated eye ache but no other associated symptoms. Saw an optometrist last week at an outside facility who told him he has normal eye pressure but has cataracts. Has an appointment with an ophthalmologist at [facility] this Friday. Neurologic exam normal. Will obtain CT  head without contrast to further assess his headaches given they are new and he's > 50 yrs old. Headaches and eye ache could be related to each other and his Crohn's as there are eye manifestations of Crohn's; could also be related to eye strain as he needs glasses vs something more sinister like a tumor or chronic subdural hematoma. Sent a prescription for Fioricet to his pharmacy and educated him about the max dose of tylenol per day being 3g so he will need to reduce the amount of tylenol he's taking per day. Also advised him to mention his Crohn's disease, headaches, and eye ache to the ophthalmologist this Friday   Will complete basic labs as well as HbA1c, lipid panel, phosphorus, and once-in-a-lifetime hepatitis C screening   Will refer to GI to establish care at [facility] for his Crohn's disease  Has an appointment with his cardiologist at [facility] tomorrow and will reportedly be having an echo; discussed referral to cardiology at [facility] to which Pt is open; will not ordered echo as Pt will be receiving one tomorrow    Discussed with Dr. Peacock  RTC in in 5-6 weeks     Naye Ventura MD  Internal Medicine PGY-3  [facility]

## 2023-12-11 ENCOUNTER — OFFICE VISIT (OUTPATIENT)
Dept: INTERNAL MEDICINE | Facility: CLINIC | Age: 68
End: 2023-12-11
Payer: MEDICARE

## 2023-12-11 VITALS
BODY MASS INDEX: 21.18 KG/M2 | HEIGHT: 66 IN | WEIGHT: 131.81 LBS | OXYGEN SATURATION: 98 % | SYSTOLIC BLOOD PRESSURE: 140 MMHG | HEART RATE: 70 BPM | DIASTOLIC BLOOD PRESSURE: 86 MMHG

## 2023-12-11 DIAGNOSIS — R79.9 ABNORMAL FINDING OF BLOOD CHEMISTRY, UNSPECIFIED: ICD-10-CM

## 2023-12-11 DIAGNOSIS — D50.9 IRON DEFICIENCY ANEMIA, UNSPECIFIED IRON DEFICIENCY ANEMIA TYPE: ICD-10-CM

## 2023-12-11 DIAGNOSIS — N18.6 ESRD (END STAGE RENAL DISEASE): ICD-10-CM

## 2023-12-11 DIAGNOSIS — I50.20 HFREF (HEART FAILURE WITH REDUCED EJECTION FRACTION): ICD-10-CM

## 2023-12-11 DIAGNOSIS — K50.919 CROHN'S DISEASE WITH COMPLICATION, UNSPECIFIED GASTROINTESTINAL TRACT LOCATION: ICD-10-CM

## 2023-12-11 DIAGNOSIS — R51.9 ACUTE INTRACTABLE HEADACHE, UNSPECIFIED HEADACHE TYPE: ICD-10-CM

## 2023-12-11 DIAGNOSIS — I10 HYPERTENSION, UNSPECIFIED TYPE: ICD-10-CM

## 2023-12-11 DIAGNOSIS — N20.0 RENAL STONES: ICD-10-CM

## 2023-12-11 DIAGNOSIS — Z90.5 HISTORY OF NEPHRECTOMY: ICD-10-CM

## 2023-12-11 DIAGNOSIS — Z00.00 ANNUAL PHYSICAL EXAM: Primary | ICD-10-CM

## 2023-12-11 PROBLEM — K50.90 CROHN'S DISEASE: Status: ACTIVE | Noted: 2023-12-11

## 2023-12-11 PROCEDURE — 1159F MED LIST DOCD IN RCRD: CPT | Mod: CPTII,GC,S$GLB,

## 2023-12-11 PROCEDURE — 99203 PR OFFICE/OUTPT VISIT, NEW, LEVL III, 30-44 MIN: ICD-10-PCS | Mod: GC,S$GLB,,

## 2023-12-11 PROCEDURE — 3008F BODY MASS INDEX DOCD: CPT | Mod: CPTII,GC,S$GLB,

## 2023-12-11 PROCEDURE — 99999 PR PBB SHADOW E&M-EST. PATIENT-LVL IV: ICD-10-PCS | Mod: PBBFAC,GC,,

## 2023-12-11 PROCEDURE — 1159F PR MEDICATION LIST DOCUMENTED IN MEDICAL RECORD: ICD-10-PCS | Mod: CPTII,GC,S$GLB,

## 2023-12-11 PROCEDURE — 1125F PR PAIN SEVERITY QUANTIFIED, PAIN PRESENT: ICD-10-PCS | Mod: CPTII,GC,S$GLB,

## 2023-12-11 PROCEDURE — 3077F SYST BP >= 140 MM HG: CPT | Mod: CPTII,GC,S$GLB,

## 2023-12-11 PROCEDURE — 3008F PR BODY MASS INDEX (BMI) DOCUMENTED: ICD-10-PCS | Mod: CPTII,GC,S$GLB,

## 2023-12-11 PROCEDURE — 3288F PR FALLS RISK ASSESSMENT DOCUMENTED: ICD-10-PCS | Mod: CPTII,GC,S$GLB,

## 2023-12-11 PROCEDURE — 3079F DIAST BP 80-89 MM HG: CPT | Mod: CPTII,GC,S$GLB,

## 2023-12-11 PROCEDURE — 3077F PR MOST RECENT SYSTOLIC BLOOD PRESSURE >= 140 MM HG: ICD-10-PCS | Mod: CPTII,GC,S$GLB,

## 2023-12-11 PROCEDURE — 99203 OFFICE O/P NEW LOW 30 MIN: CPT | Mod: GC,S$GLB,,

## 2023-12-11 PROCEDURE — 4010F PR ACE/ARB THEARPY RXD/TAKEN: ICD-10-PCS | Mod: CPTII,GC,S$GLB,

## 2023-12-11 PROCEDURE — 1101F PR PT FALLS ASSESS DOC 0-1 FALLS W/OUT INJ PAST YR: ICD-10-PCS | Mod: CPTII,GC,S$GLB,

## 2023-12-11 PROCEDURE — 4010F ACE/ARB THERAPY RXD/TAKEN: CPT | Mod: CPTII,GC,S$GLB,

## 2023-12-11 PROCEDURE — 3079F PR MOST RECENT DIASTOLIC BLOOD PRESSURE 80-89 MM HG: ICD-10-PCS | Mod: CPTII,GC,S$GLB,

## 2023-12-11 PROCEDURE — 1160F PR REVIEW ALL MEDS BY PRESCRIBER/CLIN PHARMACIST DOCUMENTED: ICD-10-PCS | Mod: CPTII,GC,S$GLB,

## 2023-12-11 PROCEDURE — 1160F RVW MEDS BY RX/DR IN RCRD: CPT | Mod: CPTII,GC,S$GLB,

## 2023-12-11 PROCEDURE — 1125F AMNT PAIN NOTED PAIN PRSNT: CPT | Mod: CPTII,GC,S$GLB,

## 2023-12-11 PROCEDURE — 3288F FALL RISK ASSESSMENT DOCD: CPT | Mod: CPTII,GC,S$GLB,

## 2023-12-11 PROCEDURE — 1101F PT FALLS ASSESS-DOCD LE1/YR: CPT | Mod: CPTII,GC,S$GLB,

## 2023-12-11 PROCEDURE — 99999 PR PBB SHADOW E&M-EST. PATIENT-LVL IV: CPT | Mod: PBBFAC,GC,,

## 2023-12-11 RX ORDER — CHOLESTYRAMINE 4 G/9G
4 POWDER, FOR SUSPENSION ORAL DAILY
COMMUNITY

## 2023-12-11 RX ORDER — ATORVASTATIN CALCIUM 40 MG/1
40 TABLET, FILM COATED ORAL DAILY
COMMUNITY
Start: 2023-05-24

## 2023-12-11 RX ORDER — BUTALBITAL, ACETAMINOPHEN AND CAFFEINE 50; 325; 40 MG/1; MG/1; MG/1
1 TABLET ORAL EVERY 4 HOURS PRN
Qty: 30 TABLET | Refills: 1 | Status: SHIPPED | OUTPATIENT
Start: 2023-12-11

## 2023-12-11 NOTE — PATIENT INSTRUCTIONS
Please have your labs completed when you're fasted. We will contact you with the results.  We have ordered a CT head to further evaluate your headaches. We will contact you with the results. Please also make sure to attend your outside eye appointment and make sure to mention your headaches; the headaches and eye ache may be related to each other and your Crohn's disease.  You should present to urgent care or the emergency department if your symptoms worsen or you develop new symptoms. We have sent a prescription for fiorcet to your pharmacy. Take this medication as needed for headache but do not take it with tylenol as it contains tylenol in it  We have referred you to gastroenterology and cardiology to establish care at [facility]  Follow-up in 5-6 weeks or sooner if needed

## 2023-12-13 NOTE — PROGRESS NOTES
I have reviewed the notes, assessments, and/or procedures performed by the resident, I concur with her/his documentation of Flakito Mcginnis.  Date of Service: 12/11/2023

## 2023-12-18 ENCOUNTER — HOSPITAL ENCOUNTER (OUTPATIENT)
Dept: RADIOLOGY | Facility: HOSPITAL | Age: 68
Discharge: HOME | End: 2023-12-18
Payer: MEDICARE

## 2023-12-18 ENCOUNTER — TELEPHONE (OUTPATIENT)
Dept: INTERNAL MEDICINE | Facility: CLINIC | Age: 68
End: 2023-12-18

## 2023-12-18 ENCOUNTER — TELEPHONE (OUTPATIENT)
Dept: INTERNAL MEDICINE | Facility: CLINIC | Age: 68
End: 2023-12-18
Payer: MEDICARE

## 2023-12-18 DIAGNOSIS — R51.9 ACUTE INTRACTABLE HEADACHE, UNSPECIFIED HEADACHE TYPE: ICD-10-CM

## 2023-12-18 PROCEDURE — 70450 CT HEAD/BRAIN W/O DYE: CPT | Mod: TC

## 2023-12-18 PROCEDURE — 70450 CT HEAD/BRAIN W/O DYE: CPT | Mod: 26,,, | Performed by: RADIOLOGY

## 2023-12-18 PROCEDURE — 70450 CT HEAD WITHOUT CONTRAST: ICD-10-PCS | Mod: 26,,, | Performed by: RADIOLOGY

## 2023-12-18 NOTE — TELEPHONE ENCOUNTER
"I spoke to patient "The doctor with Dr. Ventura just called. I told him it was normal for me. Could not get his Potassium levels down for a year."  Dialysis has been going on for a year. Thank you Dr. Ventura.   "

## 2023-12-18 NOTE — TELEPHONE ENCOUNTER
Received critical lab value of potassium 6.3 drawn today  Phone call to Pt - reports feeling well, he already spoke with Dr. Peacock and RN  Reports he's had issues keeping his potassium within range and that it's been high in the 6's previously over the past 1 year  He's prescribed lokelma 5g daily however reports he has missed a few doses over the past few days  Pt scheduled for dialysis tomorrow morning; discussed Pt presenting to the ED today for dialysis given his hyperkalemia however Pt feels well and prefers to wait until his scheduled session tomorrow   Discussed risks of hyperkalemia and delays in treatment including cardiac arrhythmia and death; Pt prefers to wait until tomorrow  Advised him to take 10g of lokelma now and an extra dose before bed  Pt knows to present to the ED should he start to feel bad or have a change in his clinical condition  Also discussed his CT head which was unremarkable and his other labs which were WNL apart from his phosphorus     Naye Ventura MD  Internal Medicine PGY-3

## 2023-12-22 ENCOUNTER — TELEPHONE (OUTPATIENT)
Dept: INTERNAL MEDICINE | Facility: CLINIC | Age: 68
End: 2023-12-22
Payer: MEDICARE

## 2023-12-22 NOTE — TELEPHONE ENCOUNTER
Spoke w/ pt and notified him that we are unclear if his insurance will cover the visits with Dr. Ventura. I then stated that he should try contacting his insurance company, and he states he did. He stated that they told him to contact the provider's office. I then notified him that once the insurance was active that he could try calling our office and updating the insurance with a phone staff person and then try to see if his appt could get scheduled with his new insurance. I assisted with providing the number for our billing department and help desk for further assistance. Pt understood.

## 2023-12-22 NOTE — TELEPHONE ENCOUNTER
----- Message from Nancie Balderas sent at 12/22/2023 11:04 AM CST -----  Type:  Patient Returning Call    Who Called:pt  Who Left Message for Patient:  Does the patient know what this is regarding?:ins  Would the patient rather a call back or a response via [facility]? Call   Best Call Back Number:[phone]  Additional Information: would like to know if provider accepts [employer] medicare hmo dual signature

## 2024-01-17 ENCOUNTER — OFFICE VISIT (OUTPATIENT)
Dept: URGENT CARE | Facility: CLINIC | Age: 69
End: 2024-01-17
Payer: MEDICARE

## 2024-01-17 VITALS
RESPIRATION RATE: 18 BRPM | WEIGHT: 131 LBS | SYSTOLIC BLOOD PRESSURE: 150 MMHG | HEIGHT: 66 IN | BODY MASS INDEX: 21.05 KG/M2 | TEMPERATURE: 99 F | OXYGEN SATURATION: 97 % | DIASTOLIC BLOOD PRESSURE: 81 MMHG | HEART RATE: 65 BPM

## 2024-01-17 DIAGNOSIS — N30.90 CATHETER CYSTITIS, INITIAL ENCOUNTER: Primary | ICD-10-CM

## 2024-01-17 DIAGNOSIS — R30.0 DYSURIA: ICD-10-CM

## 2024-01-17 DIAGNOSIS — T83.518A CATHETER CYSTITIS, INITIAL ENCOUNTER: Primary | ICD-10-CM

## 2024-01-17 PROCEDURE — 99213 OFFICE O/P EST LOW 20 MIN: CPT | Mod: S$GLB,,,

## 2024-01-17 RX ORDER — CIPROFLOXACIN 500 MG/1
500 TABLET ORAL EVERY 12 HOURS
Qty: 14 TABLET | Refills: 0 | Status: SHIPPED | OUTPATIENT
Start: 2024-01-17 | End: 2024-01-24

## 2024-01-17 NOTE — PATIENT INSTRUCTIONS
Ciprofloxacin, an antibiotic, has been prescribed. Please take as directed and to completion. Avoid heavy lifting for 1-2 weeks after completion of the antibiotic as these antibiotics have a rare complication of tendon rupture. Avoidance of heavy lifting or strenuous exercise reduces this risk.  Because we were unable to get a urine sample, you must follow up after completing antibiotics to ensure infection has resolved. I have placed referrals for both primary care and urology. You should receive a call from [facility] within the next 1-3 days to set up an appointment. If you do not receive a call, you may call our Referral Hotline at [phone] to make an appointment.  I have prescribed more catheters for you to use at home. Please follow up with PCP or urology for further management.  Please drink plenty of fluids.  Please get plenty of rest.  You can purchase Azo (phenazopyridine 99 mg) over the counter at drug stores for bladder discomfort.  If you smoke, please stop smoking.  If not allergic, take Tylenol (Acetaminophen) 650 mg to  1 g every 6 hours as needed for fever and/or Motrin (Ibuprofen) 600 to 800 mg every 6 hours as needed for fever as directed for control of pain and/or fever    Please remember that you have received care at an urgent care today. Urgent cares are not emergency rooms and are not equipped to handle life threatening emergencies and cannot rule in or out certain medical conditions and you may be released before all of your medical problems are known or treated. Please arrange follow up with your primary care physician or speciality clinic  within 2-5 days if your signs and symptoms have not resolved or worsen. Patient can call our Referral Hotline at [phone] to make an appointment.    Please return here or go to the Emergency Department for any concerns or worsening of condition.Patient was educated on signs/symptoms that would warrant emergent medical attention.   Signs of  infection. These include a fever of 100.4°F (38°C) or higher, chills, back pain, nausea, throwing up, or bloody urine.  Signs come back after treatment ends  You notice more blood in your urine.  Your signs get worse or do not improve within 24 hours of starting treatment.  You are not able to urinate for more than 8 hours.  Your signs come back after treatment has stopped.

## 2024-01-17 NOTE — PROGRESS NOTES
"Subjective:      Patient ID: Flakito Mcginnis is a 68 y.o. male.    Vitals:  height is 5' 6" (1.676 m) and weight is 59.4 kg (131 lb). His oral temperature is 98.6 °F (37 °C). His blood pressure is 150/81 (abnormal) and his pulse is 65. His respiration is 18 and oxygen saturation is 97%.     Chief Complaint: Urinary Tract Infection    This is a 68 y.o. male who presents today with a chief complaint of UTI. Patient's started having symptoms about a week ago. Patient uses catherers and noticed a change in his urine. Pt is having some leakage and irritation.    Provider note starts below:  Patient presents to clinic for evaluation of possible urinary tract infection. Patient has PMHx of ESRD on hemodialysis, nephrectomy, and renal stones. He uses Speedi catheters to empty his bladder daily at home. States he switched insurance at the beginning of the year and hasn't been able to see his primary care doctor or urologist. He is trying to establish care with new doctors but has not been able to be seen. In the meantime, patient ran out of catheters at home. He has been using the same one for over 1 week, which he believes is the reason he has a UTI. He reports a "mucus-like" discharge from the urethra and lower abdominal discomfort.           Urinary Tract Infection   This is a new problem. The current episode started 1 to 4 weeks ago. The problem has been gradually worsening. The quality of the pain is described as aching. The pain is at a severity of 0/10. There has been no fever. He is Not sexually active. There is No history of pyelonephritis. Pertinent negatives include no chills, flank pain, frequency, hematuria, nausea, urgency, vomiting or rash. He has tried nothing for the symptoms. The treatment provided no relief. His past medical history is significant for catheterization.       Constitution: Negative for chills and fever.   Neck: Negative for neck pain and neck stiffness.   Cardiovascular:  Negative for chest " "pain and palpitations.   Eyes:  Negative for eye discharge and eye itching.   Respiratory:  Negative for cough and shortness of breath.    Gastrointestinal:  Negative for nausea and vomiting.   Genitourinary:  Positive for dysuria, penile discharge and penile pain. Negative for frequency, urgency, urine decreased, flank pain, hematuria, penile swelling, scrotal swelling and testicular pain.   Musculoskeletal:  Negative for muscle cramps and muscle ache.   Skin:  Negative for pale and rash.   Neurological:  Negative for dizziness and headaches.      Objective:     Physical Exam   Constitutional: He is oriented to person, place, and time.   HENT:   Head: Normocephalic and atraumatic.   Eyes: Conjunctivae are normal. Extraocular movement intact   Neck: Neck supple.   Abdominal: Normal appearance. There is no left CVA tenderness and no right CVA tenderness.   Genitourinary:         Comments: deferred     Musculoskeletal: Normal range of motion.         General: Normal range of motion.   Neurological: He is alert, oriented to person, place, and time and at baseline.   Skin: Skin is warm and dry.   Psychiatric: His behavior is normal. Mood normal.   Nursing note and vitals reviewed.    Assessment:     1. Catheter cystitis, initial encounter    2. Dysuria        Plan:     Catheter cystitis, initial encounter  -     Discontinue: catheter 10-16 Fr-" Misc; 1 each by Misc.(Non-Drug; Combo Route) route daily as needed (for voiding).  Dispense: 30 each; Refill: 0  -     ciprofloxacin HCl (CIPRO) 500 MG tablet; Take 1 tablet (500 mg total) by mouth every 12 (twelve) hours. for 7 days  Dispense: 14 tablet; Refill: 0  -     Ambulatory referral/consult to Internal Medicine  -     Ambulatory referral/consult to Urology  -     catheter 10-16 Fr-" Misc; 1 each by Misc.(Non-Drug; Combo Route) route daily as needed (for voiding).  Dispense: 7 each; Refill: 0    Dysuria      Patient Instructions   Ciprofloxacin, an antibiotic, has been " prescribed. Please take as directed and to completion. Avoid heavy lifting for 1-2 weeks after completion of the antibiotic as these antibiotics have a rare complication of tendon rupture. Avoidance of heavy lifting or strenuous exercise reduces this risk.  Because we were unable to get a urine sample, you must follow up after completing antibiotics to ensure infection has resolved. I have placed referrals for both primary care and urology. You should receive a call from [facility] within the next 1-3 days to set up an appointment. If you do not receive a call, you may call our Referral Hotline at [phone] to make an appointment.  I have prescribed more catheters for you to use at home. Please follow up with PCP or urology for further management.  Please drink plenty of fluids.  Please get plenty of rest.  You can purchase Azo (phenazopyridine 99 mg) over the counter at drug stores for bladder discomfort.  If you smoke, please stop smoking.  If not allergic, take Tylenol (Acetaminophen) 650 mg to  1 g every 6 hours as needed for fever and/or Motrin (Ibuprofen) 600 to 800 mg every 6 hours as needed for fever as directed for control of pain and/or fever    Please remember that you have received care at an urgent care today. Urgent cares are not emergency rooms and are not equipped to handle life threatening emergencies and cannot rule in or out certain medical conditions and you may be released before all of your medical problems are known or treated. Please arrange follow up with your primary care physician or speciality clinic  within 2-5 days if your signs and symptoms have not resolved or worsen. Patient can call our Referral Hotline at [phone] to make an appointment.    Please return here or go to the Emergency Department for any concerns or worsening of condition.Patient was educated on signs/symptoms that would warrant emergent medical attention.   Signs of infection. These include a fever of 100.4°F  (38°C) or higher, chills, back pain, nausea, throwing up, or bloody urine.  Signs come back after treatment ends  You notice more blood in your urine.  Your signs get worse or do not improve within 24 hours of starting treatment.  You are not able to urinate for more than 8 hours.  Your signs come back after treatment has stopped.          Medical Decision Making:   Urgent Care Management:  Unable to obtain urine sample today. Patient uses catheters at home and does not have one with him.   Patient has been out of catheters for over a week and has been using the same one.  I have prescribed more catheters until patient can be seen by urology.  Antibiotics have been prescribed for likely UTI due to catheter use.

## 2024-01-24 ENCOUNTER — TELEPHONE (OUTPATIENT)
Dept: URGENT CARE | Facility: CLINIC | Age: 69
End: 2024-01-24
Payer: MEDICARE

## 2024-01-24 ENCOUNTER — OFFICE VISIT (OUTPATIENT)
Dept: UROLOGY | Facility: CLINIC | Age: 69
End: 2024-01-24
Payer: MEDICARE

## 2024-01-24 VITALS
BODY MASS INDEX: 20.97 KG/M2 | DIASTOLIC BLOOD PRESSURE: 78 MMHG | WEIGHT: 130.5 LBS | SYSTOLIC BLOOD PRESSURE: 129 MMHG | HEART RATE: 92 BPM | HEIGHT: 66 IN

## 2024-01-24 DIAGNOSIS — K60.2 ANAL FISSURE, UNSPECIFIED: Primary | ICD-10-CM

## 2024-01-24 PROCEDURE — 99999 PR PBB SHADOW E&M-EST. PATIENT-LVL III: CPT | Mod: PBBFAC,,, | Performed by: UROLOGY

## 2024-01-24 PROCEDURE — 3008F BODY MASS INDEX DOCD: CPT | Mod: CPTII,S$GLB,, | Performed by: UROLOGY

## 2024-01-24 PROCEDURE — 1159F MED LIST DOCD IN RCRD: CPT | Mod: CPTII,S$GLB,, | Performed by: UROLOGY

## 2024-01-24 PROCEDURE — 3288F FALL RISK ASSESSMENT DOCD: CPT | Mod: CPTII,S$GLB,, | Performed by: UROLOGY

## 2024-01-24 PROCEDURE — 1126F AMNT PAIN NOTED NONE PRSNT: CPT | Mod: CPTII,S$GLB,, | Performed by: UROLOGY

## 2024-01-24 PROCEDURE — 1101F PT FALLS ASSESS-DOCD LE1/YR: CPT | Mod: CPTII,S$GLB,, | Performed by: UROLOGY

## 2024-01-24 PROCEDURE — 99204 OFFICE O/P NEW MOD 45 MIN: CPT | Mod: S$GLB,,, | Performed by: UROLOGY

## 2024-01-24 PROCEDURE — 3078F DIAST BP <80 MM HG: CPT | Mod: CPTII,S$GLB,, | Performed by: UROLOGY

## 2024-01-24 PROCEDURE — 3074F SYST BP LT 130 MM HG: CPT | Mod: CPTII,S$GLB,, | Performed by: UROLOGY

## 2024-01-24 NOTE — TELEPHONE ENCOUNTER
I spoke with the patient and he no longer needs assistance with this.    ----- Message from José Miguel Tesfaye MA sent at 1/22/2024  1:56 PM CST -----  Contact: [phone] pt    ----- Message -----  From: Nely Bush  Sent: 1/22/2024   1:33 PM CST  To: #    [email or web address]     Patient is calling for Medical Advice regarding:Pt is requesting a call back to discuss [facility]    How long has patient had these symptoms:    Pharmacy name and phone#:    Would like response via [employer]:  call back    Comments:Please call and advise

## 2024-01-24 NOTE — PROGRESS NOTES
[facility]      New Urinary Retention Note    1/24/2024    Referred by:  Lu White PA-C    HPI: Flakito Mcginnis is a very pleasant 68 y.o. male referred for evaluation of chronic urinary retention of 6-8 years duration. He currently performs clean intermittent catheterization 3x per day. He has ESRD on HD but still makes sufficient urine to require CISC 2-3x daily.  The underlying etiology is thought to be most likely neurogenic bladder.  He no longer has a current prescription for catheters.     He has a recent history of sUTI. 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 No recent fecaluria or pneumaturia.     A voiding trial was not performed today.     Relevant Medications:  No current medications that would be anticipated to impair voiding    Prior Therapies:  CISC    He does report prior urodynamic evaluation. He does reports prior cystoscopic evaluation.     A review of 10+ systems was conducted with pertinent positive and negative findings documented in HPI with all other systems reviewed and negative.    Past medical, family, surgical and social history reviewed as documented in chart with pertinent positive medical, family, surgical and social history detailed in HPI.    Exam Findings:    Const: no acute distress, conversant and alert  Eyes: anicteric, extraocular muscles intact  ENMT: normocephalic, Nl oral membranes  Cardio: no cyanosis, nl cap refill  Pulm: no tachypnea; no resp distress  Abd: soft, no tenderness  Musc: no laceration, no tenderness  Neuro: alert; oriented x 3  Skin: warm, dry; no petichiae  Psych: no anxiety; normal speech          Assessment/Plan:    Chronic Urinary Retention (new, addt'l workup): The patient has evidence of chronic urinary retention requiring catheterization.  This is most likely associated at least partially with neurogenic bladder  diagnosed elsewhere and reported to be stable. CT last year shows prior right nephrectomy (which patient did not mention) and no hydronephrosis). The need for catheterization was assessed and the patient is likely to benefit from a regimen of clean intermittent catheterization. The patient was instructed in clean intermittent catheterization today and appears comfortable with performing this alone. The etiology for urinary retention in this patient is thought to be neurogenic bladder. The anticipated duration of need is estimated to be indefinite. The patient will not require insertion supplies. The patient will not require a coude-tip catheter. The patient will need to catheterize 3x daily and will require a total of 90 catheters per month.     Patient has been prescribed a regimen of clean intermittent catheterization as above    Urinary Tract Infection: Given his history of prior fistula, I am recommending CT Cystogram to evaluate for recurrent enterovesical fistula.

## 2024-01-26 ENCOUNTER — OFFICE VISIT (OUTPATIENT)
Dept: INTERNAL MEDICINE | Facility: CLINIC | Age: 69
End: 2024-01-26
Payer: MEDICARE

## 2024-01-26 VITALS
SYSTOLIC BLOOD PRESSURE: 126 MMHG | HEIGHT: 66 IN | BODY MASS INDEX: 21.36 KG/M2 | WEIGHT: 132.94 LBS | OXYGEN SATURATION: 100 % | HEART RATE: 74 BPM | DIASTOLIC BLOOD PRESSURE: 74 MMHG

## 2024-01-26 DIAGNOSIS — K50.919 CROHN'S DISEASE WITH COMPLICATION, UNSPECIFIED GASTROINTESTINAL TRACT LOCATION: ICD-10-CM

## 2024-01-26 DIAGNOSIS — N18.6 ESRD ON HEMODIALYSIS: Primary | ICD-10-CM

## 2024-01-26 DIAGNOSIS — Z99.2 ESRD ON HEMODIALYSIS: Primary | ICD-10-CM

## 2024-01-26 DIAGNOSIS — I10 HYPERTENSION, UNSPECIFIED TYPE: ICD-10-CM

## 2024-01-26 PROCEDURE — 3074F SYST BP LT 130 MM HG: CPT | Mod: CPTII,GC,S$GLB,

## 2024-01-26 PROCEDURE — 1160F RVW MEDS BY RX/DR IN RCRD: CPT | Mod: CPTII,GC,S$GLB,

## 2024-01-26 PROCEDURE — 1126F AMNT PAIN NOTED NONE PRSNT: CPT | Mod: CPTII,GC,S$GLB,

## 2024-01-26 PROCEDURE — 1159F MED LIST DOCD IN RCRD: CPT | Mod: CPTII,GC,S$GLB,

## 2024-01-26 PROCEDURE — 99213 OFFICE O/P EST LOW 20 MIN: CPT | Mod: GC,S$GLB,,

## 2024-01-26 PROCEDURE — 3288F FALL RISK ASSESSMENT DOCD: CPT | Mod: CPTII,GC,S$GLB,

## 2024-01-26 PROCEDURE — 1101F PT FALLS ASSESS-DOCD LE1/YR: CPT | Mod: CPTII,GC,S$GLB,

## 2024-01-26 PROCEDURE — 3008F BODY MASS INDEX DOCD: CPT | Mod: CPTII,GC,S$GLB,

## 2024-01-26 PROCEDURE — 3078F DIAST BP <80 MM HG: CPT | Mod: CPTII,GC,S$GLB,

## 2024-01-26 PROCEDURE — 99999 PR PBB SHADOW E&M-EST. PATIENT-LVL III: CPT | Mod: PBBFAC,GC,,

## 2024-01-26 NOTE — PROGRESS NOTES
[facility]  CLINIC NOTE    Patient Name: Flakito Mcginnis  YOB: 1955    PRESENTING HISTORY       History of Present Illness:  Mr. Flakito Mcginnis is a 68 y.o. male w/ HTN, Crohn's disease s/p colostomy, right nephrectomy, non-rheumatic MVR, HFrEF (30-35%), NICM, and ESRD on HD (T,Th, Sat via right chest wall port) who presents to try to establish care with a PCP covered by his insurance.    He was a patient of Dr. Ventura. He had his annual physical exam in December.    He was seen by urgent care 1/17/2024 for concern for UTI and was prescribed a 7 day course of PO ciprofloxacin.  The patient was seen by urology two days ago who recommended CT cystogram to evaluate for recurrent colovesicular fistula leading to frequent UTI's. He is planning to obtain the imaging in the coming days.    Patient declining vaccinations at this time.    Review of Systems   Constitutional:  Negative for chills and fever.   HENT:  Negative for congestion and sore throat.    Eyes:  Negative for discharge and redness.   Respiratory:  Negative for cough, shortness of breath and wheezing.    Cardiovascular:  Negative for chest pain, palpitations and leg swelling.   Gastrointestinal:  Negative for abdominal pain, nausea and vomiting.   Genitourinary:  Negative for dysuria and hematuria.   Musculoskeletal:  Negative for joint pain and myalgias.   Skin:  Negative for itching and rash.   Neurological:  Negative for dizziness, weakness and headaches.   Psychiatric/Behavioral:  Negative for depression. The patient is not nervous/anxious.          PAST HISTORY:     Past Medical History:   Diagnosis Date    Crohn's disease 1998    ESRD (end stage renal disease) on dialysis 10/2017    Hypertension     Obstructive uropathy        Past Surgical History:   Procedure Laterality Date    COLOSTOMY  2006    DIALYSIS FISTULA CREATION Left 02/2018    NEPHRECTOMY Right     REMOVAL OF TUNNELED CENTRAL VENOUS CATHETER (CVC) N/A  "5/23/2018    Procedure: PERMCATH REMOVAL-TUNNELED CVC REMOVAL;  Surgeon: Parveen Ray MD;  Location[facility] C[facility];  Service: Nephrology;  Laterality: N/A;  pt coming in @930       Family History   Problem Relation Age of Onset    Hypertension Mother     Emphysema Father        Social History     Socioeconomic History    Marital status: Single   Tobacco Use    Smoking status: Former    Smokeless tobacco: Never       MEDICATIONS & ALLERGIES:     Current Outpatient Medications on File Prior to Visit   Medication Sig    amLODIPine (NORVASC) 10 MG tablet Take by mouth once daily.    atorvastatin (LIPITOR) 40 MG tablet Take 40 mg by mouth once daily.    butalbital-acetaminophen-caffeine -40 mg (FIORICET, ESGIC) -40 mg per tablet Take 1 tablet by mouth every 4 (four) hours as needed for Headaches.    carvediloL (COREG) 12.5 MG tablet Take 12.5 mg by mouth 2 (two) times daily.    catheter 10-16 Fr-" Misc 1 each by Misc.(Non-Drug; Combo Route) route daily as needed (for voiding).    cholestyramine (QUESTRAN) 4 gram packet Take 4 g by mouth once daily.    cinacalcet (SENSIPAR) 30 MG Tab Take 30 mg by mouth daily with breakfast.    lisinopriL (PRINIVIL,ZESTRIL) 20 MG tablet Take 20 mg by mouth.    methocarbamoL (ROBAXIN) 500 MG Tab Take 1 tablet (500 mg total) by mouth nightly as needed (muscle spasm). (Patient not taking: Reported on 1/17/2024)    sevelamer carbonate (RENVELA) 800 mg Tab Take 800 mg by mouth 3 (three) times daily with meals.    sodium zirconium cyclosilicate (LOKELMA) 5 gram packet Take 5 g by mouth once daily.     No current facility-administered medications on file prior to visit.       Review of patient's allergies indicates:   Allergen Reactions    Vancomycin analogues Anaphylaxis, Other (See Comments), Shortness Of Breath and Swelling     Light headed, see's spots      Aspirin      Has crohn's disease       OBJECTIVE:   Vital Signs:  Vitals:    01/26/24 1045   BP: 126/74   Pulse: 74 " "  SpO2: 100%   Weight: 60.3 kg (132 lb 15 oz)   Height: 5' 6" (1.676 m)        Physical Exam  Constitutional:       General: He is not in acute distress.     Appearance: Normal appearance. He is not ill-appearing.   HENT:      Head: Normocephalic and atraumatic.      Mouth/Throat:      Mouth: Mucous membranes are moist.      Pharynx: Oropharynx is clear.   Eyes:      General: No scleral icterus.     Extraocular Movements: Extraocular movements intact.      Conjunctiva/sclera: Conjunctivae normal.   Cardiovascular:      Rate and Rhythm: Normal rate and regular rhythm.      Heart sounds: No murmur heard.  Pulmonary:      Effort: Pulmonary effort is normal. No respiratory distress.      Breath sounds: Normal breath sounds. No wheezing or rales.   Abdominal:      General: Abdomen is flat. Bowel sounds are normal. There is no distension.      Tenderness: There is no abdominal tenderness. There is no guarding.      Comments: Ostomy intact   Musculoskeletal:      Right lower leg: No edema.      Left lower leg: No edema.   Skin:     General: Skin is warm.      Coloration: Skin is not jaundiced.   Neurological:      General: No focal deficit present.      Mental Status: He is alert and oriented to person, place, and time.   Psychiatric:         Mood and Affect: Mood normal.           ASSESSMENT & PLAN:     Flakito was seen today for follow-up.  Diagnoses and all orders for this visit:    ESRD on hemodialysis  Patient does HD T,Th, Saturday. No present concerns.     -- Continue scheduled HD    Crohn's disease with complication, unspecified gastrointestinal tract location  S/p colectomy. Patient reporting high ostomy output. Currently being evaluated with urology for concern for colovesicular fistula.     -- CT cystogram as scheduled with urology    Hypertension, unspecified type  BP well controlled in office today.     -- Continue present amlodipine, coreg, and lisinopril    Pre-Surgical Evaluation  Patient reports he has a " form to complete prior to proposed cataract surgery. Instructed patient to upload for to [facility] or to bring in. Will complete form once it arrives.      Discussed with Dr. Quiroga    RTC 6 months for follow-up      Jordin Robbins MD  Internal Medicine PGY-2  [facility]  [address] [place] [address]

## 2024-01-29 ENCOUNTER — TELEPHONE (OUTPATIENT)
Dept: UROLOGY | Facility: CLINIC | Age: 69
End: 2024-01-29
Payer: MEDICARE

## 2024-01-29 DIAGNOSIS — K86.89 PANCREATIC MASS: Primary | ICD-10-CM

## 2024-01-29 NOTE — TELEPHONE ENCOUNTER
Called pt in regards to message in portal. Pt needed to know the time of his scheduled CT scan on 1/30/24. I informed pt that it is for 1:00 pm at the [facility] on the first floor. Pt voiced understanding.

## 2024-01-30 ENCOUNTER — HOSPITAL ENCOUNTER (OUTPATIENT)
Dept: RADIOLOGY | Facility: HOSPITAL | Age: 69
Discharge: HOME | End: 2024-01-30
Attending: UROLOGY
Payer: MEDICARE

## 2024-01-30 DIAGNOSIS — K60.2 ANAL FISSURE, UNSPECIFIED: ICD-10-CM

## 2024-01-30 PROCEDURE — 72194 CT PELVIS W/O & W/DYE: CPT | Mod: TC

## 2024-01-30 PROCEDURE — 25500020 PHARM REV CODE 255: Performed by: UROLOGY

## 2024-01-30 PROCEDURE — 72194 CT PELVIS W/O & W/DYE: CPT | Mod: 26,,, | Performed by: RADIOLOGY

## 2024-01-30 RX ADMIN — IOHEXOL 20 ML: 350 INJECTION, SOLUTION INTRAVENOUS at 01:01

## 2024-02-07 ENCOUNTER — TELEPHONE (OUTPATIENT)
Dept: GASTROENTEROLOGY | Facility: CLINIC | Age: 69
End: 2024-02-07
Payer: MEDICARE

## 2024-02-07 NOTE — TELEPHONE ENCOUNTER
----- Message from Phylicia Fraga sent at 2/6/2024  4:08 PM CST -----  Type:  Patient Returning Call    Who Called: pt   Who Left Message for Patient: pt   Does the patient know what this is regarding?: pt want to se if he can get a later time appt he have Dialysis on that day   any time after 12 noon   Would the patient rather a call back or a response via [employer]?  Call   Best Call Back Number:[phone]  Additional Information:  Dialysis [facility],SAT

## 2024-02-07 NOTE — TELEPHONE ENCOUNTER
Ma called and talked to patient and scheduled patient for a different day patient verbalize understanding

## 2024-03-04 ENCOUNTER — TELEPHONE (OUTPATIENT)
Dept: INTERNAL MEDICINE | Facility: CLINIC | Age: 69
End: 2024-03-04
Payer: MEDICARE

## 2024-03-04 NOTE — TELEPHONE ENCOUNTER
Spoke w/ pt and notified him that the provider to add on his insurance is Dr. Dann Quiroga. Pt understood.

## 2024-03-04 NOTE — TELEPHONE ENCOUNTER
----- Message from Rola Bernstein sent at 3/4/2024  2:47 PM CST -----  Contact: [phone]  Cc:  Jordin Robbins MD  Patient called, requested a courtesy call from Dr Robbins's nurse in regards having some questions about who he is under, and insurance information. Thank you.

## 2024-03-07 ENCOUNTER — TELEPHONE (OUTPATIENT)
Dept: INTERNAL MEDICINE | Facility: CLINIC | Age: 69
End: 2024-03-07
Payer: MEDICARE

## 2024-03-07 NOTE — TELEPHONE ENCOUNTER
"Called patient via telephone on 3/7/2024 regarding his request for additional imaging for hernia.    Reviewed chart, patient has multiple hernias which were noted on his CT cystogram on 1/30/2024:    "There is a right lower quadrant ostomy with a small stomal hernia. There is also a bowel containing ventral abdominal hernia as well as a fat containing inguinal hernia. The visualized portions of the small and large bowel show no acute obstruction. No free air or ascites. There is a fat containing left upper quadrant rectus hernia prior hernia repair."    The patient states that the hernias are occasionally painful but that he is able to reduce them. They are bothersome and he would like to talk to a surgeon about whether or not surgery may be an option. Additionally we discussed the incidentally noted pancreatic lesion seen on the CT cystogram: "Ill-defined hypodensity within the region of the uncinate process of the pancreas. Recommend either correlation with outside imaging or further evaluation with either CT or MRI pancreas mass protocol."     Unfortunately the patient is have significant challenges regarding insurance coverage. He has spent hours on the phone with [facility] and his insurance company. The patient is still unsure of whether or not resident physicians are covered by [employer] insurance. He has reportedly been able to schedule the follow-up pancreas MRI with a different facility (possibly [facility]) as well as establish with a new PCP who is covered by his insurance. He reports that he has been told that he can't have the MRI done until evaluated by the new PCP.    I encouraged the patient to call his insurance to see if [facility] is covered by his insurance.    Instructed patient to call back if unable to establish with new PCP to further discuss options regarding obtaining necessary imaging.    Jordin Robbins MD  Department of Internal Medicine PGY 2  10:57 AM   03/07/2024         ----- " Message from Robert Pineda MA sent at 3/6/2024  4:30 PM CST -----  Regarding: requesting orders  Good evening,    Pt is requesting an x-ray and CT for possible hernia.     Thank you

## 2024-03-12 ENCOUNTER — TELEPHONE (OUTPATIENT)
Dept: UROLOGY | Facility: CLINIC | Age: 69
End: 2024-03-12
Payer: MEDICARE

## 2024-03-12 NOTE — TELEPHONE ENCOUNTER
Informed pt he did not have an abx ordered by Dr. Cabrera on 01/24/2024. Pt stated he thinks he has a UTI since he can see it in his catheter. He was hoping an abx would be called in for him. Informed pt that he would need an appt for an abx. Pt stated he would call back as his ride for dialysis was there to pick him up.    ----- Message from Pardeep Cabrera MD sent at 3/12/2024  9:07 AM CDT -----  Regarding: RE: Medication Refill  Contact: Patient  I did not.   CG  ----- Message -----  From: Polly Sheikh MA  Sent: 3/11/2024   3:13 PM CDT  To: Pardeep Cabrera MD  Subject: FW: Medication Refill                            Did you prescribe an abx for this pt? I don't see it in his chart.    ----- Message -----  From: Mary Grace Pretty  Sent: 3/11/2024   3:06 PM CDT  To: [facility] Roberto Staff  Subject: Medication Refill                                Patient is requesting a call back to get a medication refill  Patient stated he was given a prescription for an antibiotic during last visit on 01/24/2024   Medication was not listed on chart   Please Assist     Patient can be reached at  [phone]

## 2024-03-13 ENCOUNTER — TELEPHONE (OUTPATIENT)
Dept: ORTHOPEDICS | Facility: CLINIC | Age: 69
End: 2024-03-13
Payer: MEDICARE

## 2024-03-13 NOTE — TELEPHONE ENCOUNTER
Called number provided, no answer LVM with call back number ----- Message -----  From: Berenice Hearn  Sent: 3/13/2024   8:50 AM CDT  To: [facility] Clinical Support  Subject: CT orders                                        Abby calling from [facility] faxed order for CT scan of abdomen and pelvis to [phone]. She stated the authorization # [id number]. Please call Abby at [phone] ext [phone]

## 2024-10-19 ENCOUNTER — HOSPITAL ENCOUNTER (EMERGENCY)
Facility: HOSPITAL | Age: 69
Discharge: HOME | End: 2024-10-19
Attending: STUDENT IN AN ORGANIZED HEALTH CARE EDUCATION/TRAINING PROGRAM
Payer: MEDICARE

## 2024-10-19 VITALS
OXYGEN SATURATION: 95 % | RESPIRATION RATE: 18 BRPM | WEIGHT: 132 LBS | DIASTOLIC BLOOD PRESSURE: 77 MMHG | HEART RATE: 104 BPM | HEIGHT: 66 IN | BODY MASS INDEX: 21.21 KG/M2 | TEMPERATURE: 99 F | SYSTOLIC BLOOD PRESSURE: 133 MMHG

## 2024-10-19 DIAGNOSIS — M25.412 SHOULDER EFFUSION, LEFT: ICD-10-CM

## 2024-10-19 DIAGNOSIS — R06.02 SOB (SHORTNESS OF BREATH): ICD-10-CM

## 2024-10-19 DIAGNOSIS — S62.114A CLOSED NONDISPLACED FRACTURE OF TRIQUETRUM OF RIGHT WRIST, INITIAL ENCOUNTER: Primary | ICD-10-CM

## 2024-10-19 DIAGNOSIS — S43.002A: ICD-10-CM
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
## 2024-10-19 DIAGNOSIS — N18.6 ESRD (END STAGE RENAL DISEASE): ICD-10-CM

## 2024-10-19 DIAGNOSIS — S42.292A HUMERAL HEAD FRACTURE, LEFT, CLOSED, INITIAL ENCOUNTER: ICD-10-CM

## 2024-10-19 PROCEDURE — 25000003 PHARM REV CODE 250: Performed by: STUDENT IN AN ORGANIZED HEALTH CARE EDUCATION/TRAINING PROGRAM

## 2024-10-19 PROCEDURE — 99285 EMERGENCY DEPT VISIT HI MDM: CPT | Mod: 25

## 2024-10-19 RX ORDER — OXYCODONE AND ACETAMINOPHEN 5; 325 MG/1; MG/1
1 TABLET ORAL EVERY 6 HOURS PRN
Qty: 12 TABLET | Refills: 0 | Status: SHIPPED | OUTPATIENT
Start: 2024-10-19

## 2024-10-19 RX ORDER — OXYCODONE HYDROCHLORIDE 5 MG/1
5 TABLET ORAL
Status: COMPLETED | OUTPATIENT
Start: 2024-10-19 | End: 2024-10-19

## 2024-10-19 RX ORDER — ACETAMINOPHEN 500 MG
1000 TABLET ORAL
Status: COMPLETED | OUTPATIENT
Start: 2024-10-19 | End: 2024-10-19

## 2024-10-19 RX ADMIN — ACETAMINOPHEN 1000 MG: 500 TABLET ORAL at 01:10

## 2024-10-19 RX ADMIN — OXYCODONE 5 MG: 5 TABLET ORAL at 01:10

## 2024-10-19 NOTE — ED NOTES
Patient preferred not to wait for arm sling, had to catch his ride before 7pm. Patient verbalized understanding of not leaving with sling.

## 2024-10-19 NOTE — DISCHARGE INSTRUCTIONS
Please wear your sling and brace as much as you can tolerate.  This will help your healing.  I have placed a referral for Orthopedics, please follow up as soon as possible further evaluation.

## 2024-10-19 NOTE — ED PROVIDER NOTES
"Chief Complaint   Fall (Pt presents to ED via EMS from Urgent Care w/ c/o fall that happened around ~3 am this morning. Pt endorsing right wrist pain from fall and "wants an xray." Of note, pt also endorsing left shoulder pain from an injury that happened three weeks ago. "Wants an XRAY and to be done with it.")      History Of Present Illness   Flakito Mcginnis is a 69 y.o. male with a PMHx including ESRD on HD (last had HD today pta)  presenting with wrist pain.  Patient states that he went to sit almost bed last night and missed the end of the bed.  States that he fell to the ground and tried to break his fall with his right hand.  Since then he was reported pain and swelling to his right wrist.  He reports no numbness states he was he was still able to move around his hand but with the pain.  States he did not hit his head and did not pass out.  He reports no other pain from the fall.    Patient additionally notes left shoulder pain and thinks that it is dislocated.  States that he had a left proximal humeral fracture or bleed months ago.  In the chart, patient was diagnosed with this on 08/19.  He was states that for the past 2 or 3 weeks he thinks it has been dislocated after someone helped lift him up out of a chair.  States that he has not sought medical care since then.  He reports no weakness, or numbness but states he was difficulty with full range of his left shoulder.    Independent Historian: Yes  Other Historian or Collateral: Chart review  Interpretor: No      Review of patient's allergies indicates:   Allergen Reactions    Vancomycin analogues Anaphylaxis, Other (See Comments), Shortness Of Breath and Swelling     Light headed, see's spots      Aspirin      Has crohn's disease       No current facility-administered medications on file prior to encounter.     Current Outpatient Medications on File Prior to Encounter   Medication Sig Dispense Refill    amLODIPine (NORVASC) 10 MG tablet Take by mouth " Script printed, signed, and placed at the pharmacy for refill.   "once daily.      atorvastatin (LIPITOR) 40 MG tablet Take 40 mg by mouth once daily.      butalbital-acetaminophen-caffeine -40 mg (FIORICET, ESGIC) -40 mg per tablet Take 1 tablet by mouth every 4 (four) hours as needed for Headaches. 30 tablet 1    carvediloL (COREG) 12.5 MG tablet Take 12.5 mg by mouth 2 (two) times daily.      catheter 10-16 Fr-" Misc 1 each by Misc.(Non-Drug; Combo Route) route daily as needed (for voiding). 7 each 0    cholestyramine (QUESTRAN) 4 gram packet Take 4 g by mouth once daily.      cinacalcet (SENSIPAR) 30 MG Tab Take 30 mg by mouth daily with breakfast.      lisinopriL (PRINIVIL,ZESTRIL) 20 MG tablet Take 20 mg by mouth.      methocarbamoL (ROBAXIN) 500 MG Tab Take 1 tablet (500 mg total) by mouth nightly as needed (muscle spasm). (Patient not taking: Reported on 1/17/2024) 7 tablet 0    sevelamer carbonate (RENVELA) 800 mg Tab Take 800 mg by mouth 3 (three) times daily with meals.      sodium zirconium cyclosilicate (LOKELMA) 5 gram packet Take 5 g by mouth once daily.         Past History   As per HPI and below:  Past Medical History:   Diagnosis Date    Crohn's disease 1998    ESRD (end stage renal disease) on dialysis 10/2017    Hypertension     Obstructive uropathy      Past Surgical History:   Procedure Laterality Date    COLOSTOMY  2006    DIALYSIS FISTULA CREATION Left 02/2018    NEPHRECTOMY Right     REMOVAL OF TUNNELED CENTRAL VENOUS CATHETER (CVC) N/A 5/23/2018    Procedure: PERMCATH REMOVAL-TUNNELED CVC REMOVAL;  Surgeon: Parveen Ray MD;  Location: [facility] CATH LAB;  Service: Nephrology;  Laterality: N/A;  pt coming in @930       Social History     Socioeconomic History    Marital status: Single   Tobacco Use    Smoking status: Former    Smokeless tobacco: Never   Substance and Sexual Activity    Alcohol use: Not Currently    Drug use: Never    Sexual activity: Not Currently     Social Drivers of Health     Food Insecurity: No Food Insecurity (8/19/2024)    " Received from [facility]    Hunger Vital Sign     Worried About Running Out of Food in the Last Year: Never true     Ran Out of Food in the Last Year: Never true   Transportation Needs: No Transportation Needs (8/19/2024)    Received from [facility]    PRAPARE - Transportation     Lack of Transportation (Medical): No     Lack of Transportation (Non-Medical): No   Housing Stability: Low Risk  (8/19/2024)    Received from [facility]    Housing Stability Vital Sign     Unable to Pay for Housing in the Last Year: No     Number of Places Lived in the Last Year: 1     Unstable Housing in the Last Year: No       Family History   Problem Relation Name Age of Onset    Hypertension Mother      Emphysema Father         Physical Exam     Vitals:    10/19/24 1501 10/19/24 1513 10/19/24 1543 10/19/24 1620   BP:       Pulse:   107 104   Resp:       Temp:       TempSrc:       SpO2: (!) 93% (!) 94% (!) 93% 95%   Weight:       Height:           Physical Exam  Constitutional:       General: He is not in acute distress.     Appearance: He is well-developed. He is not toxic-appearing or diaphoretic.   HENT:      Head: Normocephalic and atraumatic.      Nose: No congestion.      Mouth/Throat:      Mouth: Mucous membranes are moist.   Eyes:      Extraocular Movements: Extraocular movements intact.   Cardiovascular:      Rate and Rhythm: Normal rate and regular rhythm.   Pulmonary:      Effort: No respiratory distress.      Breath sounds: No wheezing or rhonchi.   Abdominal:      General: There is no distension.   Musculoskeletal:        Arms:       Cervical back: No rigidity.      Comments: Mild swelling and tenderness to right wrist, more so on the radial side.  No open wounds, lacerations, or abrasions.  No tenderness to palpation of metacarpals or fingers.  No forearm tenderness, elbow tenderness, or right shoulder done in his.    Left shoulder deformity, with humeral head palpated anteriorly.  Minimal tenderness but difficulty with  full range of motion of the left shoulder.   Skin:     General: Skin is warm and dry.      Capillary Refill: Capillary refill takes less than 2 seconds.   Neurological:      General: No focal deficit present.      Mental Status: He is alert and oriented to person, place, and time.             Results     Labs Reviewed   HIV 1 / 2 ANTIBODY   CBC W/ AUTO DIFFERENTIAL   COMPREHENSIVE METABOLIC PANEL   TROPONIN I   B-TYPE NATRIURETIC PEPTIDE       Imaging Results              CT Shoulder Without Contrast Left (In process)                      X-Ray Wrist Complete Right (Final result)  Result time 10/19/24 15:11:35      Final result by Gualberto Payne MD (10/19/24 15:11:35)                   Impression:      Findings concerning for acute triquetral fracture.      Electronically signed by: Gualberto Payne MD  Date:    10/19/2024  Time:    15:11               Narrative:    EXAMINATION:  XR WRIST COMPLETE 3 VIEWS RIGHT    CLINICAL HISTORY:  Unspecified fall, initial encounter    TECHNIQUE:  PA, lateral, and oblique views of the right wrist were performed.    COMPARISON:  None    FINDINGS:  Generalized osteopenia.  Overall alignment is within normal limits.  Small ossific fragment projected along the dorsal aspect of the wrist with associated overlying dorsal soft tissue swelling concerning for acute triquetral fracture.  No dislocation or destructive osseous process.  Baseline minimal DJD about the wrist and imaged hand.  No subcutaneous emphysema or radiopaque foreign body.  Scattered advanced atherosclerotic vascular calcifications noted.                                       X-Ray Shoulder 2 or More Views Left (Final result)  Result time 10/19/24 15:21:56      Final result by Gualberto Payne MD (10/19/24 15:21:56)                   Impression:      1. Findings may reflect mild pulmonary edema/CHF pattern.  Interstitial type pneumonia and/or chronic interstitial lung changes not excluded.  No consolidative process.  2.  Abnormal positioning of the proximal humerus with respect to the glenoid suggesting at the very least subluxation as further discussed above, allowing for suboptimal positioning and overlapping of osseous structures.  3. Concern for new loose body at the glenohumeral joint.  No definite donor site or acute displaced fracture identified, allowing for limitations.  4. Several remote/healed upper rib fractures.  5. Other incidental/Nonemergent findings in the body of the report.      Electronically signed by: Gualberto Payne MD  Date:    10/19/2024  Time:    15:21               Narrative:    EXAMINATION:  XR SHOULDER COMPLETE 2 OR MORE VIEWS LEFT; XR CHEST PA AND LATERAL    CLINICAL HISTORY:  recent fracture, dislocated?;Shortness of breath    TECHNIQUE:  Two or three views of the left shoulder were performed.    COMPARISON:  Left rib series and left shoulder series 04/13/2023    FINDINGS:  Left shoulder: There is suboptimal patient positioning presumably related to patient pain/discomfort during maneuver.  Study is somewhat limited by suboptimal positioning as well as overlapping of osseous structures.    Approximate 1.5 cm os ossific density projected over the inferior aspect of the glenohumeral joint best seen on the frontal views, new from previous imaging.  No donor site identified, but is concerning for a loose body.  Generalized osteopenia.  There is abnormal positioning of the humeral head with respect to the glenoid, with somewhat cephalad and lateral positioning of the proximal humerus best demonstrated on the frontal view.  Difficult to confirm true dislocation, noting the humeral head appears somewhat more position medially, and at the very least appears subluxed.  This may be related to recent or remote trauma versus joint laxity or sequela of an underlying shoulder joint effusion displacing the humerus.  This is new from previous imaging.  Several remote/healed upper left-sided rib fractures noted.  Mild  degenerative change at the AC joint is intact.  No subcutaneous emphysema.    Chest: Right IJ double lumen CVC with distal tip terminating at the cavoatrial junction region.  Upper mediastinal surgical clips noted.  Cardiomediastinal silhouette is midline and prominent with calcification and tortuosity of the aorta and enlarged cardiac silhouette.  Pulmonary vasculature and hilar contours are grossly within normal limits.  Mild nonspecific elevation of the right hemidiaphragm.  Trachea remains patent.  Mild biapical pleuroparenchymal scarring.  Scattered linear opacities throughout the lungs consistent with platelike scarring versus atelectasis.  Bilateral mild diffuse nonspecific interstitial coarsening may reflect pulmonary edema, interstitial type pneumonia or chronic interstitial lung changes.  The lungs are otherwise well expanded without consolidation, pleural effusion or pneumothorax.                                       X-Ray Chest PA And Lateral (Final result)  Result time 10/19/24 15:21:56      Final result by Gualberto Payne MD (10/19/24 15:21:56)                   Impression:      1. Findings may reflect mild pulmonary edema/CHF pattern.  Interstitial type pneumonia and/or chronic interstitial lung changes not excluded.  No consolidative process.  2. Abnormal positioning of the proximal humerus with respect to the glenoid suggesting at the very least subluxation as further discussed above, allowing for suboptimal positioning and overlapping of osseous structures.  3. Concern for new loose body at the glenohumeral joint.  No definite donor site or acute displaced fracture identified, allowing for limitations.  4. Several remote/healed upper rib fractures.  5. Other incidental/Nonemergent findings in the body of the report.      Electronically signed by: Gualberto Payne MD  Date:    10/19/2024  Time:    15:21               Narrative:    EXAMINATION:  XR SHOULDER COMPLETE 2 OR MORE VIEWS LEFT; XR CHEST PA  AND LATERAL    CLINICAL HISTORY:  recent fracture, dislocated?;Shortness of breath    TECHNIQUE:  Two or three views of the left shoulder were performed.    COMPARISON:  Left rib series and left shoulder series 04/13/2023    FINDINGS:  Left shoulder: There is suboptimal patient positioning presumably related to patient pain/discomfort during maneuver.  Study is somewhat limited by suboptimal positioning as well as overlapping of osseous structures.    Approximate 1.5 cm os ossific density projected over the inferior aspect of the glenohumeral joint best seen on the frontal views, new from previous imaging.  No donor site identified, but is concerning for a loose body.  Generalized osteopenia.  There is abnormal positioning of the humeral head with respect to the glenoid, with somewhat cephalad and lateral positioning of the proximal humerus best demonstrated on the frontal view.  Difficult to confirm true dislocation, noting the humeral head appears somewhat more position medially, and at the very least appears subluxed.  This may be related to recent or remote trauma versus joint laxity or sequela of an underlying shoulder joint effusion displacing the humerus.  This is new from previous imaging.  Several remote/healed upper left-sided rib fractures noted.  Mild degenerative change at the AC joint is intact.  No subcutaneous emphysema.    Chest: Right IJ double lumen CVC with distal tip terminating at the cavoatrial junction region.  Upper mediastinal surgical clips noted.  Cardiomediastinal silhouette is midline and prominent with calcification and tortuosity of the aorta and enlarged cardiac silhouette.  Pulmonary vasculature and hilar contours are grossly within normal limits.  Mild nonspecific elevation of the right hemidiaphragm.  Trachea remains patent.  Mild biapical pleuroparenchymal scarring.  Scattered linear opacities throughout the lungs consistent with platelike scarring versus atelectasis.  Bilateral  mild diffuse nonspecific interstitial coarsening may reflect pulmonary edema, interstitial type pneumonia or chronic interstitial lung changes.  The lungs are otherwise well expanded without consolidation, pleural effusion or pneumothorax.                                        Initial MDM   Medical Decision Making  Patient is a 69-year-old male presenting with a right wrist pain after a fall.  Most concerning for sprain versus fracture.  Will get x-ray to further evaluate.  Patient additionally here with left shoulder deformity for the past 2-3 weeks.  Exam concerning for dislocation.  Patient also had recent proximal humeral head fracture in the same shoulder.  Will get x-ray to further evaluate.    Amount and/or Complexity of Data Reviewed  Radiology: ordered.    Risk  OTC drugs.  Prescription drug management.                  Medications Given / Interventions     Medications   acetaminophen tablet 1,000 mg (1,000 mg Oral Given 10/19/24 1359)   oxyCODONE immediate release tablet 5 mg (5 mg Oral Given 10/19/24 1358)       Procedures     ED POCUS Performed: No    Reassessment and ED Course     ED Course as of 10/19/24 1708   Sat Oct 19, 2024   1609 Wrist x-ray consistent with triquetrum fracture.  Discussed splinting with the patient which he refused at this time.  Discussed poor fracture healing, need for further surgery, chronic pain with the patient but he had continues to declined at this time and will think about splinting pending his further workup [CH]   1610 Patient with low oxygen saturations in the ED likely secondary to ESRD and pulmonary congestion.  X-ray consistent with the same.  Patient did get dialysis this morning but states that he only got an hour and a half as compared to his usual 4 hours.  States that he only got an hour and a half because he came here secondary to the pain in his wrist.  Discussed further workup and likely need for dialysis with the patient but he refused any further lab  work and refused to stay in the hospital for dialysis if needed.  Discussed complications including his low oxygen level, possible electrolyte derangements including hyperkalemia, possible arrhythmias.  Patient voiced understanding and continues to refuse any further workup for this.  Patient does have medical decision-making capacity at this time based on exam and discussion.  [CH]   1611 Shoulder and chest x-ray concerning for possible subluxation of his shoulder.  Unclear if it is dislocated or fractured.  Will get CT shoulder to further evaluate.     Discussed plan and suspicion of subluxation versus dislocation with the patient at length.  Discussed that given his medical problems, low oxygen level, and he was refusing further workup for this or to received dialysis, Will likely not be safe for sedation if needed for dislocation procedure.  Patient voiced understanding at this time [CH]   1641 Discussed with social work who will evaluate the patient.  [CH]      ED Course User Index  [CH] Maritza Espino MD     Patient was signed out to oncoming provider pending results of shoulder CT, social work evaluation, and splinting.  Based on discussions with patient, anticipate the patient will likely leave the emergency room against medical advice and with orthopedic follow up.       Final diagnoses:  [W19.XXXA] Fall  [R06.02] SOB (shortness of breath)  [N18.6] ESRD (end stage renal disease)  [S62.114A] Closed nondisplaced fracture of triquetrum of right wrist, initial encounter (Primary)                 Dispo                        Maritza Espino MD  10/19/24 8104

## 2024-10-19 NOTE — ED NOTES
Patient provided with wrist splint and educated on proper use. States he'd rather put it on at home himself.

## 2024-10-19 NOTE — ED NOTES
Patient refused to have labwork done, be placed in a gown, or hooked up to monitoring. States he is only here for his wrist pain. Patient okay with being hooked on O2 sat monitoring, currently satting in upper 80s to lower 90s on 4L NC. Pt states he uses O2 at home PRN when he is SOB. MD notified and will speak with patient.   - - -

## 2024-10-19 NOTE — PROVIDER PROGRESS NOTES - EMERGENCY DEPT.
Encounter Date: 10/19/2024    ED Physician Progress Notes        Physician Note:   5:00 PM  Patient received in sign-out from Dr. Espino pending Left shoulder CT.    Briefly, 69-year-old male presenting today with acute right wrist pain after a fall that occurred around 3:00 a.m. this morning.  He was trying to sit on the bed, missed and used his right hand to break his fall.  He also reports left shoulder pain the past 2-3 weeks.  He had 1.5 hours of dialysis today, ended his session early due to his wrist pain.  Went to urgent care then referred to emergency department.    Patient has declined all labs, EKG.  I evaluated him at bedside, also discussed importance of checking his labs for significant electrolyte derangements given his abbreviated dialysis session.  He continues declined.  He also declined a plaster splint.  I have ordered a Velcro wrist splint which he was currently agreed to.      Pending CT of the shoulder.    Impression:     1. Findings concerning for recent/acute fracture of the humeral head/greater tuberosity with associated moderate to large complex shoulder joint effusion and abnormal alignment of the humeral head with respect to the glenohumeral joint consistent with marked subluxation versus borderline dislocation, as further discussed above.  2. Other incidental/nonemergent chronic findings in the body of the report.  This report was flagged in Epic as abnormal.        Electronically signed by:Gualberto Payne MD  Date:                                            10/19/2024  Time:                                           17:52         CT shoulder reviewed, there is a recent/acute fracture of the humeral head with an associated complex joint effusion with abnormal alignment of the humeral head.  Given incident evolve 2-3 weeks ago, no indication for emergent orthopedic consultation.  Will place in a sling.  Have patient follow up with ortho for both his wrist in his shoulder.  He expressed  understanding.    Again, offered labs which she declined.  He called his ride it would like to leave.    SLOAN Mendosa MD  Staff ED Physician  10/19/2024 6:23 PM

## 2024-10-19 NOTE — ED NOTES
Assumed care of patient at this time. Pt refused to be placed in hospital gown and currently lying in stretcher. Vital signs are stable besides O2 sats, pt satting around upper 80s to lower 90s on 4L NC.  Provided pt with warm blanket. Pt denied restroom use. No other complaints from pt at this time. Care ongoing.

## 2024-10-19 NOTE — ED NOTES
Provider requesting ER bed - charge nurse informed. MD aware of current pulse ox. Room air. Pt placed on 3l/nc

## 2024-10-19 NOTE — ED TRIAGE NOTES
"Flakito Mcginnis, a 69 y.o. male presents to the ED w/ complaint of right wrist pain from fall this morning, and left shoulder pain from injury a few weeks ago, wants Xray    Triage note:  Chief Complaint   Patient presents with    Fall     Pt presents to ED via EMS from Urgent Care w/ c/o fall that happened around ~3 am this morning. Pt endorsing right wrist pain from fall and "wants an xray." Of note, pt also endorsing left shoulder pain from an injury that happened three weeks ago. "Wants an XRAY and to be done with it."     Review of patient's allergies indicates:   Allergen Reactions    Vancomycin analogues Anaphylaxis, Other (See Comments), Shortness Of Breath and Swelling     Light headed, see's spots      Aspirin      Has crohn's disease     Past Medical History:   Diagnosis Date    Crohn's disease 1998    ESRD (end stage renal disease) on dialysis 10/2017    Hypertension     Obstructive uropathy          APPEARANCE: awake and alert in NAD. PAIN  8/10  SKIN: warm, dry and intact. No breakdown or bruising.  MUSCULOSKELETAL: Patient moving all extremities spontaneously, no obvious swelling or deformities noted. Ambulates independently.  RESPIRATORY: Denies shortness of breath.Respirations unlabored.   CARDIAC: Denies CP, 2+ distal pulses; no peripheral edema  ABDOMEN: S/ND/NT, Denies nausea  : voids spontaneously, denies difficulty  Neurologic: AAO x 4; follows commands equal strength in all extremities; denies numbness/tingling. Denies dizziness    "

## 2024-11-15 ENCOUNTER — TELEPHONE (OUTPATIENT)
Dept: ORTHOPEDICS | Facility: CLINIC | Age: 69
End: 2024-11-15
Payer: MEDICARE

## 2024-12-11 ENCOUNTER — OCHSNER VIRTUAL EMERGENCY DEPARTMENT (OUTPATIENT)
Facility: CLINIC | Age: 69
End: 2024-12-11
Payer: MEDICARE

## 2024-12-11 ENCOUNTER — OFFICE VISIT (OUTPATIENT)
Dept: URGENT CARE | Facility: CLINIC | Age: 69
End: 2024-12-11
Payer: MEDICARE

## 2024-12-11 VITALS
DIASTOLIC BLOOD PRESSURE: 36 MMHG | TEMPERATURE: 98 F | SYSTOLIC BLOOD PRESSURE: 84 MMHG | BODY MASS INDEX: 21.21 KG/M2 | WEIGHT: 132 LBS | OXYGEN SATURATION: 92 % | HEART RATE: 101 BPM | HEIGHT: 66 IN | RESPIRATION RATE: 14 BRPM

## 2024-12-11 DIAGNOSIS — R55 SYNCOPE, UNSPECIFIED SYNCOPE TYPE: ICD-10-CM

## 2024-12-11 DIAGNOSIS — Z90.5 SINGLE KIDNEY: ICD-10-CM

## 2024-12-11 DIAGNOSIS — I95.9 HYPOTENSION, UNSPECIFIED HYPOTENSION TYPE: Primary | ICD-10-CM

## 2024-12-11 PROBLEM — Z99.2 DEPENDENCE ON RENAL DIALYSIS: Status: ACTIVE | Noted: 2024-12-11

## 2024-12-11 PROBLEM — Z99.2 ESRD (END STAGE RENAL DISEASE) ON DIALYSIS: Status: ACTIVE | Noted: 2017-09-06

## 2024-12-11 PROBLEM — N18.9 CHRONIC KIDNEY DISEASE: Status: ACTIVE | Noted: 2017-08-23

## 2024-12-11 PROBLEM — I50.20 HFREF (HEART FAILURE WITH REDUCED EJECTION FRACTION): Status: ACTIVE | Noted: 2024-12-11

## 2024-12-11 PROBLEM — I10 BENIGN ESSENTIAL HYPERTENSION: Status: ACTIVE | Noted: 2018-09-26

## 2024-12-11 PROBLEM — R94.30 CARDIAC LV EJECTION FRACTION 30-35%: Status: ACTIVE | Noted: 2024-12-11

## 2024-12-11 PROBLEM — E78.00 HYPERCHOLESTEROLEMIA: Status: ACTIVE | Noted: 2024-12-11

## 2024-12-11 PROBLEM — R93.1 CARDIAC LV EJECTION FRACTION 30-35%: Status: ACTIVE | Noted: 2024-12-11

## 2024-12-11 PROBLEM — K50.119 CROHN'S DISEASE OF LARGE INTESTINE WITH COMPLICATION: Status: ACTIVE | Noted: 2024-12-11

## 2024-12-11 PROBLEM — N18.6 ESRD (END STAGE RENAL DISEASE) ON DIALYSIS: Status: ACTIVE | Noted: 2017-09-06

## 2024-12-11 PROCEDURE — 99214 OFFICE O/P EST MOD 30 MIN: CPT | Mod: S$GLB,,,

## 2024-12-11 NOTE — PROGRESS NOTES
"Subjective:     Patient ID: Flakito Mcginnis is a 69 y.o. male.    Vitals:  height is 5' 6" (1.676 m) and weight is 59.9 kg (132 lb). His oral temperature is 97.8 °F (36.6 °C). His blood pressure is 84/36 (abnormal) and his pulse is 101. His respiration is 14 and oxygen saturation is 92% (abnormal).     Chief Complaint: Back Pain    This is a 69 y.o. male who presents today with a chief complaint of back pain onset this week.   Pt states he went for dialysis, was extremely cold and that's when back pain started. Pt states he became incoherent and states he doesn't remember anything after coming back from dialysis, states he suspects falling on back. Family called EMS, pt refused to go by EMS, but does not remember any of this.  Pain is in upper back and shoulders.    Patient has a history of HTN, HF, ESRD, Single kidney. Patient presents to the urgent care due to back pain. Patient believes that he could has passed out after dialysis. Patient states that during dialysis he noticed significant chills. Patient states that when he went home, his relatives noticed his status and called EMS (which he refused) for he seemed "not his normal self". Patient states that he is unaware of any of these events after dialysis and was informed by his relative. Patient is unsure if he fell and is unsure if he had a syncopal episode to cause his back pain. Patient also states that he is still having persistent back pain, constant sob, dizziness (when walking). He denies current fever, chills, cp, numbness, tingling.    Back Pain  This is a new problem. The current episode started in the past 7 days. The problem occurs constantly. The problem is unchanged. The quality of the pain is described as aching. The pain does not radiate. The pain is at a severity of 10/10. The pain is severe. The pain is The same all the time. The symptoms are aggravated by position. Associated symptoms include headaches. Pertinent negatives include no leg " pain, numbness, tingling or weakness.     Musculoskeletal:  Positive for back pain.   Neurological:  Positive for headaches. Negative for numbness.     Objective:     Physical Exam   Constitutional: He is oriented to person, place, and time.  Non-toxic appearance. He does not appear ill. No distress.      Comments:Patient is in no acute distress, patient is non-toxic appearing, patient is ox3, patient is answering question appropriately.     Eyes: Extraocular movement intact   Cardiovascular: Normal rate, regular rhythm and normal heart sounds.   No murmur heard.Exam reveals no gallop and no friction rub.   Pulmonary/Chest: Effort normal and breath sounds normal. No stridor. No respiratory distress. He has no wheezes. He has no rhonchi. He has no rales. He exhibits no tenderness.         Comments: Patient is in no respiratory distress. Breath sounds even, unlabored, and clear to auscultation bilaterally. No accessory muscle usage. Patient able to speak in complete sentences with ease.    Abdominal: Normal appearance. He exhibits no distension and no mass. There is abdominal tenderness. There is left CVA tenderness. There is no rebound, no guarding and no right CVA tenderness. No hernia.   Neurological: no focal deficit. He is alert, oriented to person, place, and time and at baseline. He has normal sensation and intact cranial nerves (2-12). He displays facial symmetry. GCS eye subscore is 4. GCS verbal subscore is 5. GCS motor subscore is 6.   Skin: Skin is not diaphoretic.   Nursing note and vitals reviewed.    Assessment:     1. Hypotension, unspecified hypotension type    2. Syncope, unspecified syncope type    3. Single kidney      Plan:   Previous notes reviewed.  Vital signs reviewed.  Discussed hypotension, syncopal episode, single kidney with ESRD, home care, tx options, and given follow up precautions.  Patient discussed with Dr. Yaakov Harrell in clinic, Dr. Harrell agrees to the plan.   Patient  "discussed with Dr. Don Cordero MD via [facility] secure chat, Dr. Cordero agrees to the plan.     Patient informed that due to his significantly low blood pressure and symptom onset being after dialysis that his symptoms of his back pain is likely due to a fall from a syncopal episode that he may have experienced due to too much fluid being taken during dialysis, patient informed that with his history of a single kidney and ESRD, that he needs to obtain IV fluids, STAT labs and further workup in the ER. Patient states his concerns for insurance coveraged and reassured that [facility] takes his insurance. Patient then brings his concerns for his appointment for dialysis on tomorrow, patient informed that if dialysis is indicated in the ER, it will be taken care of at that time during his encounter in the ER. Patient states that he would rather "go home, make some calls, then decide". Patient informed that the provider does not believe that he should delay his care, provider recommends that the patient go to the ER now via EMS for further workup for his symptoms could be deadly if they progress, patient verbalizes understanding and is going against medical advice.    Hypotension, unspecified hypotension type  -     Refer to Emergency Dept.    Syncope, unspecified syncope type  -     Refer to Emergency Dept.    Single kidney  -     Refer to Emergency Dept.      John Sosa PA-C        "

## 2024-12-11 NOTE — PLAN OF CARE-OVED
[facility] Emergency Department Plan of Care Note    Referral source: Urgent Care      Reason for consult:  Hypotension, syncope, chills      Additional History:  Patient has history of end-stage renal disease on hemodialysis      Disposition recommended: Emergency Department

## 2024-12-12 ENCOUNTER — TELEPHONE (OUTPATIENT)
Facility: CLINIC | Age: 69
End: 2024-12-12
Payer: MEDICARE

## 2024-12-12 NOTE — TELEPHONE ENCOUNTER
"[facility] with complaint of: "Patient has a history of HTN, HF, ESRD, Single kidney. Patient presents to the urgent care due to back pain. Concern is that patient went to dialysis on yesterday and during dialysis he noticed significant chills, went home and his relative noticed that he was "not himself", relative called EMS due to patient's status but patient refused. He presents to clinic today for back pain but I believe that the back pain is secondary to a syncopal episode from his dialysis on yesterday. BP is 84/36 with HR being 101 and SpO2 being 92 on RA. I believe that this patient needs IV fluids and additional labs due to history."    Dr. Sosa consulted with [facility]ari provider, Dr. Cordero, and disposition recommended was: emergency department.  No emergency room encounter noted.    Patient outreached for [facility] virtual care follow.  Unable to reach patient, left voicemail.      "

## 2024-12-19 ENCOUNTER — HOSPITAL ENCOUNTER (INPATIENT)
Facility: HOSPITAL | Age: 69
LOS: 26 days | Discharge: NURSING, REHAB OR LONG-TERM CARE | DRG: 280 | End: 2025-01-14
Attending: STUDENT IN AN ORGANIZED HEALTH CARE EDUCATION/TRAINING PROGRAM | Admitting: INTERNAL MEDICINE
Payer: MEDICARE

## 2024-12-19 DIAGNOSIS — R53.81 DEBILITY: ICD-10-CM

## 2024-12-19 DIAGNOSIS — N18.5 ANEMIA OF CHRONIC RENAL FAILURE, STAGE 5: ICD-10-CM

## 2024-12-19 DIAGNOSIS — R57.9 SHOCK: ICD-10-CM

## 2024-12-19 DIAGNOSIS — R57.9 SHOCK, UNSPECIFIED: Primary | ICD-10-CM

## 2024-12-19 DIAGNOSIS — I39 ENDOCARDITIS DUE TO OTHER ORGANISM, UNSPECIFIED CHRONICITY: ICD-10-CM

## 2024-12-19 DIAGNOSIS — R78.81 BACTEREMIA DUE TO STAPHYLOCOCCUS: ICD-10-CM

## 2024-12-19 DIAGNOSIS — Z51.5 PALLIATIVE CARE ENCOUNTER: ICD-10-CM

## 2024-12-19 DIAGNOSIS — Z71.89 ACP (ADVANCE CARE PLANNING): ICD-10-CM

## 2024-12-19 DIAGNOSIS — E87.5 HYPERKALEMIA: ICD-10-CM

## 2024-12-19 DIAGNOSIS — M25.519 SHOULDER PAIN: ICD-10-CM

## 2024-12-19 DIAGNOSIS — D63.1 ANEMIA OF CHRONIC RENAL FAILURE, STAGE 5: ICD-10-CM

## 2024-12-19 DIAGNOSIS — N18.6 ESRD ON HEMODIALYSIS: ICD-10-CM

## 2024-12-19 DIAGNOSIS — Z13.6 SCREENING FOR CARDIOVASCULAR CONDITION: ICD-10-CM

## 2024-12-19 DIAGNOSIS — Z99.2 ESRD ON HEMODIALYSIS: ICD-10-CM

## 2024-12-19 DIAGNOSIS — R06.02 SOB (SHORTNESS OF BREATH): ICD-10-CM

## 2024-12-19 DIAGNOSIS — A41.9 SEPTIC SHOCK: ICD-10-CM

## 2024-12-19 DIAGNOSIS — R65.21 SEPTIC SHOCK: ICD-10-CM

## 2024-12-19 DIAGNOSIS — R06.00 DYSPNEA: ICD-10-CM

## 2024-12-19 DIAGNOSIS — B95.8 BACTEREMIA DUE TO STAPHYLOCOCCUS: ICD-10-CM

## 2024-12-19 PROBLEM — I95.9 HYPOTENSION: Status: ACTIVE | Noted: 2024-12-19

## 2024-12-19 PROBLEM — R79.89 ELEVATED TROPONIN: Status: ACTIVE | Noted: 2024-12-19

## 2024-12-19 LAB
ALBUMIN SERPL BCP-MCNC: 2.8 G/DL (ref 3.5–5.2)
ALLENS TEST: ABNORMAL
ALLENS TEST: NORMAL
ALP SERPL-CCNC: 259 U/L (ref 40–150)
ALT SERPL W/O P-5'-P-CCNC: 11 U/L (ref 10–44)
ANION GAP SERPL CALC-SCNC: 10 MMOL/L (ref 8–16)
AST SERPL-CCNC: 11 U/L (ref 10–40)
BASOPHILS # BLD AUTO: 0.03 K/UL (ref 0–0.2)
BASOPHILS NFR BLD: 0.6 % (ref 0–1.9)
BILIRUB SERPL-MCNC: 0.5 MG/DL (ref 0.1–1)
BNP SERPL-MCNC: >4900 PG/ML (ref 0–99)
BUN SERPL-MCNC: 34 MG/DL (ref 6–30)
BUN SERPL-MCNC: 36 MG/DL (ref 8–23)
CALCIUM SERPL-MCNC: 9 MG/DL (ref 8.7–10.5)
CHLORIDE SERPL-SCNC: 92 MMOL/L (ref 95–110)
CHLORIDE SERPL-SCNC: 95 MMOL/L (ref 95–110)
CO2 SERPL-SCNC: 29 MMOL/L (ref 23–29)
CREAT SERPL-MCNC: 4.8 MG/DL (ref 0.5–1.4)
CREAT SERPL-MCNC: 5.4 MG/DL (ref 0.5–1.4)
DIFFERENTIAL METHOD BLD: ABNORMAL
EOSINOPHIL # BLD AUTO: 0 K/UL (ref 0–0.5)
EOSINOPHIL NFR BLD: 0.8 % (ref 0–8)
ERYTHROCYTE [DISTWIDTH] IN BLOOD BY AUTOMATED COUNT: 16.6 % (ref 11.5–14.5)
EST. GFR  (NO RACE VARIABLE): 12.4 ML/MIN/1.73 M^2
GLUCOSE SERPL-MCNC: 93 MG/DL (ref 70–110)
GLUCOSE SERPL-MCNC: 95 MG/DL (ref 70–110)
HCO3 UR-SCNC: 31 MMOL/L (ref 24–28)
HCT VFR BLD AUTO: 25.9 % (ref 40–54)
HCT VFR BLD CALC: 24 %PCV (ref 36–54)
HGB BLD-MCNC: 7.9 G/DL (ref 14–18)
IMM GRANULOCYTES # BLD AUTO: 0.02 K/UL (ref 0–0.04)
IMM GRANULOCYTES NFR BLD AUTO: 0.4 % (ref 0–0.5)
INFLUENZA A, MOLECULAR: NEGATIVE
INFLUENZA B, MOLECULAR: NEGATIVE
LACTATE SERPL-SCNC: 2.4 MMOL/L (ref 0.5–2.2)
LDH SERPL L TO P-CCNC: 1.01 MMOL/L (ref 0.5–2.2)
LYMPHOCYTES # BLD AUTO: 0.5 K/UL (ref 1–4.8)
LYMPHOCYTES NFR BLD: 10.2 % (ref 18–48)
MAGNESIUM SERPL-MCNC: 1.9 MG/DL (ref 1.6–2.6)
MCH RBC QN AUTO: 29.9 PG (ref 27–31)
MCHC RBC AUTO-ENTMCNC: 30.5 G/DL (ref 32–36)
MCV RBC AUTO: 98 FL (ref 82–98)
MONOCYTES # BLD AUTO: 0.4 K/UL (ref 0.3–1)
MONOCYTES NFR BLD: 9 % (ref 4–15)
NEUTROPHILS # BLD AUTO: 3.8 K/UL (ref 1.8–7.7)
NEUTROPHILS NFR BLD: 79 % (ref 38–73)
NRBC BLD-RTO: 0 /100 WBC
OHS QRS DURATION: 114 MS
OHS QTC CALCULATION: 560 MS
PCO2 BLDA: 44.1 MMHG (ref 35–45)
PH SMN: 7.45 [PH] (ref 7.35–7.45)
PLATELET # BLD AUTO: 162 K/UL (ref 150–450)
PMV BLD AUTO: 12 FL (ref 9.2–12.9)
PO2 BLDA: 34 MMHG (ref 40–60)
POC BE: 7 MMOL/L
POC IONIZED CALCIUM: 1.15 MMOL/L (ref 1.06–1.42)
POC SATURATED O2: 69 % (ref 95–100)
POC TCO2 (MEASURED): 29 MMOL/L (ref 23–29)
POC TCO2: 32 MMOL/L (ref 24–29)
POTASSIUM BLD-SCNC: 4.5 MMOL/L (ref 3.5–5.1)
POTASSIUM SERPL-SCNC: 4.6 MMOL/L (ref 3.5–5.1)
PROT SERPL-MCNC: 6.6 G/DL (ref 6–8.4)
RBC # BLD AUTO: 2.64 M/UL (ref 4.6–6.2)
SAMPLE: ABNORMAL
SAMPLE: ABNORMAL
SAMPLE: NORMAL
SARS-COV-2 RDRP RESP QL NAA+PROBE: NEGATIVE
SITE: ABNORMAL
SITE: NORMAL
SODIUM BLD-SCNC: 133 MMOL/L (ref 136–145)
SODIUM SERPL-SCNC: 134 MMOL/L (ref 136–145)
SPECIMEN SOURCE: NORMAL
TROPONIN I SERPL DL<=0.01 NG/ML-MCNC: 535 NG/L (ref 0–35)
TROPONIN I SERPL DL<=0.01 NG/ML-MCNC: 923 NG/L (ref 0–35)
WBC # BLD AUTO: 4.79 K/UL (ref 3.9–12.7)

## 2024-12-19 PROCEDURE — 87150 DNA/RNA AMPLIFIED PROBE: CPT | Mod: 59

## 2024-12-19 PROCEDURE — 87077 CULTURE AEROBIC IDENTIFY: CPT

## 2024-12-19 PROCEDURE — 63600175 PHARM REV CODE 636 W HCPCS: Performed by: STUDENT IN AN ORGANIZED HEALTH CARE EDUCATION/TRAINING PROGRAM

## 2024-12-19 PROCEDURE — 87186 SC STD MICRODIL/AGAR DIL: CPT

## 2024-12-19 PROCEDURE — 85025 COMPLETE CBC W/AUTO DIFF WBC: CPT

## 2024-12-19 PROCEDURE — 87502 INFLUENZA DNA AMP PROBE: CPT

## 2024-12-19 PROCEDURE — 83605 ASSAY OF LACTIC ACID: CPT

## 2024-12-19 PROCEDURE — 27000221 HC OXYGEN, UP TO 24 HOURS

## 2024-12-19 PROCEDURE — 87040 BLOOD CULTURE FOR BACTERIA: CPT

## 2024-12-19 PROCEDURE — 99285 EMERGENCY DEPT VISIT HI MDM: CPT | Mod: 25

## 2024-12-19 PROCEDURE — 80053 COMPREHEN METABOLIC PANEL: CPT

## 2024-12-19 PROCEDURE — 83735 ASSAY OF MAGNESIUM: CPT

## 2024-12-19 PROCEDURE — 25000003 PHARM REV CODE 250

## 2024-12-19 PROCEDURE — 93010 ELECTROCARDIOGRAM REPORT: CPT | Mod: ,,, | Performed by: INTERNAL MEDICINE

## 2024-12-19 PROCEDURE — 87635 SARS-COV-2 COVID-19 AMP PRB: CPT

## 2024-12-19 PROCEDURE — 99900035 HC TECH TIME PER 15 MIN (STAT)

## 2024-12-19 PROCEDURE — 20000000 HC ICU ROOM

## 2024-12-19 PROCEDURE — 93005 ELECTROCARDIOGRAM TRACING: CPT

## 2024-12-19 PROCEDURE — 94761 N-INVAS EAR/PLS OXIMETRY MLT: CPT | Mod: XB

## 2024-12-19 PROCEDURE — 63600175 PHARM REV CODE 636 W HCPCS

## 2024-12-19 PROCEDURE — 84484 ASSAY OF TROPONIN QUANT: CPT

## 2024-12-19 PROCEDURE — 82803 BLOOD GASES ANY COMBINATION: CPT

## 2024-12-19 PROCEDURE — 83880 ASSAY OF NATRIURETIC PEPTIDE: CPT

## 2024-12-19 PROCEDURE — 84484 ASSAY OF TROPONIN QUANT: CPT | Mod: 91

## 2024-12-19 PROCEDURE — 99291 CRITICAL CARE FIRST HOUR: CPT | Mod: ,,,

## 2024-12-19 RX ORDER — IBUPROFEN 200 MG
16 TABLET ORAL
Status: DISCONTINUED | OUTPATIENT
Start: 2024-12-19 | End: 2024-12-28

## 2024-12-19 RX ORDER — ATORVASTATIN CALCIUM 40 MG/1
40 TABLET, FILM COATED ORAL DAILY
Status: DISCONTINUED | OUTPATIENT
Start: 2024-12-20 | End: 2025-01-14 | Stop reason: HOSPADM

## 2024-12-19 RX ORDER — SEVELAMER CARBONATE 800 MG/1
800 TABLET, FILM COATED ORAL
Status: DISCONTINUED | OUTPATIENT
Start: 2024-12-20 | End: 2025-01-14 | Stop reason: HOSPADM

## 2024-12-19 RX ORDER — HEPARIN SODIUM 5000 [USP'U]/ML
5000 INJECTION, SOLUTION INTRAVENOUS; SUBCUTANEOUS EVERY 8 HOURS
Status: DISCONTINUED | OUTPATIENT
Start: 2024-12-19 | End: 2024-12-23

## 2024-12-19 RX ORDER — SODIUM CHLORIDE 0.9 % (FLUSH) 0.9 %
10 SYRINGE (ML) INJECTION
Status: DISCONTINUED | OUTPATIENT
Start: 2024-12-19 | End: 2025-01-14 | Stop reason: HOSPADM

## 2024-12-19 RX ORDER — NOREPINEPHRINE BITARTRATE/D5W 4MG/250ML
0-.2 PLASTIC BAG, INJECTION (ML) INTRAVENOUS CONTINUOUS
Status: DISCONTINUED | OUTPATIENT
Start: 2024-12-19 | End: 2024-12-19

## 2024-12-19 RX ORDER — GLUCAGON 1 MG
1 KIT INJECTION
Status: DISCONTINUED | OUTPATIENT
Start: 2024-12-19 | End: 2024-12-28

## 2024-12-19 RX ORDER — OXYCODONE AND ACETAMINOPHEN 5; 325 MG/1; MG/1
1 TABLET ORAL EVERY 4 HOURS PRN
Status: DISCONTINUED | OUTPATIENT
Start: 2024-12-20 | End: 2024-12-20

## 2024-12-19 RX ORDER — INSULIN ASPART 100 [IU]/ML
0-5 INJECTION, SOLUTION INTRAVENOUS; SUBCUTANEOUS
Status: DISCONTINUED | OUTPATIENT
Start: 2024-12-19 | End: 2024-12-28

## 2024-12-19 RX ORDER — FUROSEMIDE 10 MG/ML
80 INJECTION INTRAMUSCULAR; INTRAVENOUS
Status: DISCONTINUED | OUTPATIENT
Start: 2024-12-19 | End: 2024-12-19

## 2024-12-19 RX ORDER — IBUPROFEN 200 MG
24 TABLET ORAL
Status: DISCONTINUED | OUTPATIENT
Start: 2024-12-19 | End: 2024-12-28

## 2024-12-19 RX ORDER — NOREPINEPHRINE BITARTRATE 0.02 MG/ML
0-.2 INJECTION, SOLUTION INTRAVENOUS CONTINUOUS
Status: DISPENSED | OUTPATIENT
Start: 2024-12-19 | End: 2024-12-21

## 2024-12-19 RX ORDER — NOREPINEPHRINE BITARTRATE 0.02 MG/ML
0-3 INJECTION, SOLUTION INTRAVENOUS CONTINUOUS
Status: DISCONTINUED | OUTPATIENT
Start: 2024-12-19 | End: 2024-12-19

## 2024-12-19 RX ADMIN — SODIUM CHLORIDE, POTASSIUM CHLORIDE, SODIUM LACTATE AND CALCIUM CHLORIDE 500 ML: 600; 310; 30; 20 INJECTION, SOLUTION INTRAVENOUS at 10:12

## 2024-12-19 RX ADMIN — PIPERACILLIN SODIUM AND TAZOBACTAM SODIUM 3.38 G: 3; .375 INJECTION, POWDER, FOR SOLUTION INTRAVENOUS at 07:12

## 2024-12-19 RX ADMIN — OXYCODONE HYDROCHLORIDE AND ACETAMINOPHEN 1 TABLET: 5; 325 TABLET ORAL at 11:12

## 2024-12-19 RX ADMIN — HEPARIN SODIUM 5000 UNITS: 5000 INJECTION INTRAVENOUS; SUBCUTANEOUS at 11:12

## 2024-12-19 NOTE — ED NOTES
I-STAT Chem-8+ Results:   Value Reference Range   Sodium 133 136-145 mmol/L   Potassium  4.5 3.5-5.1 mmol/L   Chloride 92  mmol/L   Ionized Calcium 1.15 1.06-1.42 mmol/L   CO2 (measured) 29 23-29 mmol/L   Glucose 95  mg/dL   BUN 34 6-30 mg/dL   Creatinine 5.4 0.5-1.4 mg/dL   Hematocrit 24 36-54%

## 2024-12-19 NOTE — HPI
Mr. Flakito Mcginnis is a 69 y.o. man w/ HFrEF (30% 8/2024 from 20-25% 6/2024), NICM, MR, ESRD on HD, Crohn's s/p colostomy, h/o R nephrectomy.  He presented to [facility] ED from his HD center on 12/19 due to hypotension and ongoing shortness of breath.  He usually receives his dialysis on T, R, S and went on Wednesday for an extra session for volume removal.  He arrived on Thursday for his usually scheduled dialysis session and was directed to the ED due to hypotension.  On arrival in the ED his blood pressure was 78/51.  He was found to have a BNP >4900 and CXR concerning for volume overload.  High sensitivity troponin 923.  Critical care medicine was consulted for hypotension and admitted to MICU.

## 2024-12-19 NOTE — HPI
Mr. Flakito Mcginnis is a 68 y.o. male w/ HFrEF (30% 8/2024 from 20-25% 6/2024), NICM, MR, ESRD on HD, Crohn's s/p colostomy, h/o R nephrectomy sent to ED by HD center on 12/19/24 for hypotension and ongoing SOB. BP on arrival 78/51 with tachycardia 103. Follows with Cardiology - Dr. Treadwell at [facility] (LOV 11/6/24), no med changes.

## 2024-12-19 NOTE — CONSULTS
Consult received. Patient to be admitted to the MICU. Full H&P to follow.    Alicia Galloway NP  Critical Care Medicine  12/19/2024   5:35 PM

## 2024-12-19 NOTE — ED PROVIDER NOTES
Encounter Date: 12/19/2024       History     Chief Complaint   Patient presents with    Shortness of Breath    Hypotension     Pt arrived via EMS with c/o SOB and hypotension. Dialysis center could not do dialysis d/t low BP. Pt states has been SOB x1 wk. Denies fevers.     69-year-old male with past history Crohn's disease, hypertension, ESRD on Tuesday Thursday Saturday dialysis presenting for evaluation of hypotension.  Patient received a full dialysis session yesterday even though it was not his usual day, but patient is unable to verbalize why.  Patient went to dialysis center today for another session where he was found to have a systolic blood pressure of 80 and so immediately sent by EMS to emergency department for further evaluation.  In emergency department, patient endorses shortness of breath.  He denies chest pain, nausea/vomiting, abdominal pain, fevers/chills.  Patient reports occasional white discharge with his urine, but states that he has had this discharge for some time and does not think he has an infection.  Patient also endorses a cough that he states is positional and only occurs when he lies down.  This extend the patient's complaints today.    The history is provided by the patient and the EMS personnel. No  was used.     Review of patient's allergies indicates:   Allergen Reactions    Vancomycin analogues Anaphylaxis, Other (See Comments), Shortness Of Breath and Swelling     Light headed, see's spots      Aspirin Nausea And Vomiting and Other (See Comments)     Has crohn's disease    Other reaction(s): FLARES UP CROHNS      Has crohn's disease     Past Medical History:   Diagnosis Date    Crohn's disease 1998    ESRD (end stage renal disease) on dialysis 10/2017    Hypertension     Obstructive uropathy      Past Surgical History:   Procedure Laterality Date    COLOSTOMY  2006    DIALYSIS FISTULA CREATION Left 02/2018    NEPHRECTOMY Right     REMOVAL OF TUNNELED CENTRAL  VENOUS CATHETER (CVC) N/A 5/23/2018    Procedure: PERMCATH REMOVAL-TUNNELED CVC REMOVAL;  Surgeon: Parveen Ray MD;  Location: [facility] CATH LAB;  Service: Nephrology;  Laterality: N/A;  pt coming in @930     Family History   Problem Relation Name Age of Onset    Hypertension Mother      Emphysema Father       Social History     Tobacco Use    Smoking status: Former     Passive exposure: Never    Smokeless tobacco: Never   Substance Use Topics    Alcohol use: Not Currently    Drug use: Never         Physical Exam     Initial Vitals [12/19/24 1404]   BP Pulse Resp Temp SpO2   (!) 78/51 96 (!) 30 97.7 °F (36.5 °C) 98 %      MAP       --         Physical Exam    Nursing note and vitals reviewed.  Constitutional:   Chronically ill-appearing   Eyes: Conjunctivae and EOM are normal. Pupils are equal, round, and reactive to light.   Cardiovascular:  Regular rhythm.   Tachycardia present.         Pulmonary/Chest: Tachypnea noted. He has rales in the right lower field and the left lower field.   Abdominal: Abdomen is soft. There is no abdominal tenderness.   Ostomy in place over right abdomen with stool output; no signs of infection around ostomy   Musculoskeletal:      Comments: Obvious left shoulder deformity  2+ pitting edema to knees bilaterally     Neurological: He is alert and oriented to person, place, and time.         ED Course   Procedures  Labs Reviewed   CBC W/ AUTO DIFFERENTIAL - Abnormal       Result Value    WBC 4.79      RBC 2.64 (*)     Hemoglobin 7.9 (*)     Hematocrit 25.9 (*)     MCV 98      MCH 29.9      MCHC 30.5 (*)     RDW 16.6 (*)     Platelets 162      MPV 12.0      Immature Granulocytes 0.4      Gran # (ANC) 3.8      Immature Grans (Abs) 0.02      Lymph # 0.5 (*)     Mono # 0.4      Eos # 0.0      Baso # 0.03      nRBC 0      Gran % 79.0 (*)     Lymph % 10.2 (*)     Mono % 9.0      Eosinophil % 0.8      Basophil % 0.6      Differential Method Automated     COMPREHENSIVE METABOLIC PANEL - Abnormal     Sodium 134 (*)     Potassium 4.6      Chloride 95      CO2 29      Glucose 93      BUN 36 (*)     Creatinine 4.8 (*)     Calcium 9.0      Total Protein 6.6      Albumin 2.8 (*)     Total Bilirubin 0.5      Alkaline Phosphatase 259 (*)     AST 11      ALT 11      eGFR 12.4 (*)     Anion Gap 10      Narrative:     ADD ON HS TROP PER JAQUELINE L CATES, DO ORDER# [id number] @  12/19/2024    15:07    TROPONIN I HIGH SENSITIVITY - Abnormal    Troponin I High Sensitivity 923 (*)     Narrative:     ADD ON HS TROP PER JAQUELINE L CATES, DO ORDER# [id number] @  12/19/2024    15:07    B-TYPE NATRIURETIC PEPTIDE - Abnormal    BNP >4,900 (*)     Narrative:     ADD ON HS TROP PER JAQUELINE L CATES, DO ORDER# [id number] @  12/19/2024    15:07   ADD ON BRAIN NATRIURETIC PEPTIDE [id number] PER CATDAVID CHAPAIS L., DO   16:48  12/19/2024    TROPONIN I HIGH SENSITIVITY - Abnormal    Troponin I High Sensitivity 535 (*)     Narrative:     ADD ON MAGNESIUM PER DR LYLA NGUYEN/ORDER# [id number] @ 21:45   LACTIC ACID, PLASMA - Abnormal    Lactate (Lactic Acid) 2.4 (*)    ISTAT PROCEDURE - Abnormal    POC PH 7.454 (*)     POC PCO2 44.1      POC PO2 34 (*)     POC HCO3 31.0 (*)     POC BE 7 (*)     POC SATURATED O2 69      POC TCO2 32 (*)     Sample VENOUS      Site Other      Allens Test N/A     ISTAT PROCEDURE - Abnormal    POC Glucose 95      POC BUN 34 (*)     POC Creatinine 5.4 (*)     POC Sodium 133 (*)     POC Potassium 4.5      POC Chloride 92 (*)     POC TCO2 (MEASURED) 29      POC Ionized Calcium 1.15      POC Hematocrit 24 (*)     Sample CASPER     CULTURE, BLOOD    Blood Culture, Routine No Growth to date      Narrative:     Aerobic and anaerobic   INFLUENZA A & B BY MOLECULAR    Influenza A, Molecular Negative      Influenza B, Molecular Negative      Flu A & B Source NP     CULTURE, BLOOD   CLOSTRIDIUM DIFFICILE   MRSA SCREEN BY PCR   SARS-COV-2 RNA AMPLIFICATION, QUAL    SARS-CoV-2 RNA, Amplification, Qual Negative     B-TYPE NATRIURETIC  PEPTIDE   TROPONIN I HIGH SENSITIVITY   MAGNESIUM   MAGNESIUM    Magnesium 1.9      Narrative:     ADD ON MAGNESIUM PER DR LYLA NGUYEN/ORDER# [id number] @ 21:45   URINALYSIS, REFLEX TO URINE CULTURE   TROPONIN I HIGH SENSITIVITY   URINALYSIS, REFLEX TO URINE CULTURE   ISTAT LACTATE    POC Lactate 1.01      Sample VENOUS      Site Other      Allens Test N/A     ISTAT CHEM8   POCT GLUCOSE MONITORING CONTINUOUS     EKG Readings: (Independently Interpreted)   Rhythm: Sinus Tachycardia. Heart Rate: 102. Conduction: LAFB and 1st Degree AV Block. ST Segment Depression: V5 and V6.   No previous EKG available for comparison.     ECG Results              EKG 12-lead (Final result)        Collection Time Result Time QRS Duration OHS QTC Calculation    12/19/24 14:29:52 12/19/24 14:32:53 114 560                     Final result by Interface, Lab In [facility] (12/19/24 14:32:59)                   Narrative:    Test Reason : Z13.6,    Vent. Rate : 102 BPM     Atrial Rate : 102 BPM     P-R Int : 226 ms          QRS Dur : 114 ms      QT Int : 430 ms       P-R-T Axes :  39 -55 -85 degrees    QTcB Int : 560 ms    Sinus tachycardia with 1st degree A-V block  Left anterior fascicular block  Abnormal R wave progression in the precordial leads - Possible Anterior  infarct ,age undetermined  ST and T wave abnormality, consider lateral ischemia  Prolonged QT  Abnormal ECG  No previous ECGs available  Confirmed by Franck Aguilar ([id number]) on 12/19/2024 2:32:50 PM    Referred By: AAAREFERRAL SELF           Confirmed By: Franck Aguilar                                  Imaging Results              X-Ray Shoulder Trauma Left (Final result)  Result time 12/19/24 17:08:17      Final result by Chris Taylor MD (12/19/24 17:08:17)                   Impression:      1.  Healing fracture of the left proximal humerus as above.    2.  Superior subluxation of the left shoulder.      Electronically signed by: Chris Taylor MD  Date:    12/19/2024  Time:    17:08                Narrative:    EXAMINATION:  XR SHOULDER TRAUMA 3 VIEW LEFT    CLINICAL HISTORY:  Pain in unspecified shoulder    TECHNIQUE:  Three views of the left shoulder were performed.    COMPARISON  CT scan of the left shoulder dated 10/19/2024.    X-ray of the left shoulder dated 10/19/2024.    FINDINGS:  There is diffuse demineralization of the osseous structures.  There is a healing fracture of the left proximal humerus with associated periostitis.  There is no interval fracture.    There is mild superior subluxation of the humeral head relative to the labrum.  There is no bridger dislocation.    There are chronic left-sided rib deformities.  There is a right-sided chest port in place.  There are calcifications of the aortic knob.                                       X-Ray Chest AP Portable (Final result)  Result time 12/19/24 16:02:11      Final result by Mikel Antony MD (12/19/24 16:02:11)                   Impression:      As above      Electronically signed by: Mikel Antony MD  Date:    12/19/2024  Time:    16:02               Narrative:    EXAMINATION:  XR CHEST AP PORTABLE    CLINICAL HISTORY:  Sepsis;    TECHNIQUE:  Single views of the chest were performed.    COMPARISON:  Chest radiograph dated October 19, 2024    FINDINGS:  Right IJ tunnel hemodialysis catheter is unchanged in position.  The trachea and cardiomediastinal silhouette remains stable.  Previously described right-sided effusion appears to have resolved.  Otherwise, there is no significant change in the cardiopulmonary status of the patient.  Osseous structures demonstrate no evidence for acute fractures or dislocations.                                       ED US Echo (Final result)  Result time 12/19/24 17:54:22      Final result by [facility], Lab In [facility] (12/19/24 17:54:22)                   Narrative:    Exam : Neda Antoine_Cardiac:    Exam Information:  Exam category:  Diagnostic  Consent obtained?:   Yes    Indication(s) for Exam:  Hypotension    Views Obtained:  Parasternal long-axis, Parasternal short-axis, Subxiphoid, Apical 4-chamber, IVC view    Findings:  Left Ventricle Ejection Fraction:  Severely depressed (< 30% EF)  Quantitative LVEF (%):  21.18  Pericardial Effusion:  Absent  Gross [place] (RV:LV):  Abnormal  Signs of right heart strain:  RV dilatation  IVC diameter:  > 2 cm  IVC Diameter (cm):  2.01  IVC collapsibility:  Low collapsibility (<50%)  Other IVC findings::  minimal collapsibility    Interpretation:  Diminished LV function, Right heart strain  Other:  Patient has significantly reduced EF. RV appears dilated as is near equal size to LV. All four chambers appear dilated. IVC is 2.01cm, but minimal collapsibility. No pericardial effusion.    Electronically signed by Neda Antoine on Thursday, December 19, 2024 at 4:12 PM  Electronically signed by Rodríguez Zhu on Thursday, December 19, 2024 at 5:54 PM                                     Medications   sodium chloride 0.9% flush 10 mL (has no administration in time range)   glucose chewable tablet 16 g (has no administration in time range)   glucose chewable tablet 24 g (has no administration in time range)   dextrose 50% injection 12.5 g (has no administration in time range)   dextrose 50% injection 25 g (has no administration in time range)   glucagon (human recombinant) injection 1 mg (has no administration in time range)   insulin aspart U-100 pen 0-5 Units (has no administration in time range)   atorvastatin tablet 40 mg (has no administration in time range)   sevelamer carbonate tablet 800 mg (has no administration in time range)   NORepinephrine 4 mg in sodium chloride 0.9% 250 mL infusion (premix) (has no administration in time range)   piperacillin-tazobactam (ZOSYN) 3.375 g in D5W 100 mL IVPB (MB+) (0 g Intravenous Stopped 12/19/24 8883)   lactated ringers bolus 500 mL (500 mLs Intravenous New Bag 12/19/24 7043)   heparin  (porcine) injection 5,000 Units (5,000 Units Subcutaneous Given 12/19/24 2340)   oxyCODONE-acetaminophen 5-325 mg per tablet 1 tablet (1 tablet Oral Given 12/19/24 2340)     Medical Decision Making  69-year-old male with past history Crohn's disease, hypertension, ESRD on Tuesday Thursday Saturday dialysis presenting for evaluation of hypotension.    Differential diagnosis includes, but is not limited to, cardiogenic shock, septic shock, hypotension secondary to ESRD despite volume overload.    On arrival in emergency department, patient hypotensive.  Tachypneic, but satting well on 2 L nasal cannula.  Rales in lung bases bilaterally as well as pitting edema concerning for volume overload despite hypotension.  Bedside ultrasound showing severely reduced EF of approximately 10-15%, decreased from baseline of 35-40%.  IVC 2 cm and not collapsible, so deferred fluid bolus in this patient.  EKG with depressions in V5 and V6 without reciprocal elevations.  Patient without chest pain at this time.  However, patient's initial high sensitivity troponin 923, concerning for possible ACS. BNP greater than 4900.  Discussed patient with Cardiology, who evaluated patient and performed bedside echo.  Cardiology does not recommend starting ACS at this time and believes patient's symptoms are primarily due to volume overload.    Recommending dialysis and diuresis.  Given patient's low blood pressures, Levophed ordered to maintain MAP greater than 65.  Discussed patient with Nephrology, who agreed to evaluate patient for dialysis versus CRRT.  Discussed patient with critical Care Medicine, who evaluated patient and agreed to admit to their service.  Patient in critical condition at time of admission.    While septic workup initiated given hypotension and tachycardia, no evidence of infection found during ED stay. Lactate within normal limits, patient afebrile, no leukocytosis.  Antibiotics and IV fluids deferred at this time in light  of these findings.    Critical Care  Date: 12/19/2024  Performed by: Dao Knight MD   Authorized by: Dao Knight MD    Total critical care time (exclusive of procedural time) : 40 minutes  Critical care was necessary to treat or prevent imminent or life-threatening deterioration of the following conditions:  hypotension      Amount and/or Complexity of Data Reviewed  Labs: ordered. Decision-making details documented in ED Course.     Details: BNP greater than 4900, concerning for CHF versus volume overload secondary to ESRD  High sensitivity troponin 923, concerning for NSTEMI, unclear if type 1 or type to due to decreased clearance  Point of care lactate 1.01, lowering concern for infection for cardiogenic shock  Anemia with a hemoglobin of 7.9, most recent 9.5  Creatinine elevated, consistent with a history of ESRD on dialysis  Radiology: ordered.  ECG/medicine tests: ordered and independent interpretation performed. Decision-making details documented in ED Course.    Risk  Decision regarding hospitalization.  Risk Details: Admitted patient to critical care unit due to hypotension, need for dialysis.               ED Course as of 12/19/24 2348   u Dec 19, 2024   1457 EKG 12-lead  Sinus rhythm, ST depressions V5, , V6 with T-wave inversions, none for comparison available [AC]   1523 POC Lactate: 1.01 [AC]   1543 Troponin I High Sensitivity(!): 923 [AC]      ED Course User Index  [AC] Rodríguez Zhu DO                           Clinical Impression:  Final diagnoses:  [R06.02] SOB (shortness of breath)  [Z13.6] Screening for cardiovascular condition  [R06.00] Dyspnea  [M25.519] Shoulder pain          ED Disposition Condition    Admit                 Dao Knight MD  Resident  12/19/24 8679

## 2024-12-19 NOTE — SUBJECTIVE & OBJECTIVE
Past Medical History:   Diagnosis Date    Crohn's disease 1998    ESRD (end stage renal disease) on dialysis 10/2017    Hypertension     Obstructive uropathy        Past Surgical History:   Procedure Laterality Date    COLOSTOMY  2006    DIALYSIS FISTULA CREATION Left 02/2018    NEPHRECTOMY Right     REMOVAL OF TUNNELED CENTRAL VENOUS CATHETER (CVC) N/A 5/23/2018    Procedure: PERMCATH REMOVAL-TUNNELED CVC REMOVAL;  Surgeon: Parveen Ray MD;  Location: [facility] CATH LAB;  Service: Nephrology;  Laterality: N/A;  pt coming in @930       Review of patient's allergies indicates:   Allergen Reactions    Vancomycin analogues Anaphylaxis, Other (See Comments), Shortness Of Breath and Swelling     Light headed, see's spots      Aspirin Nausea And Vomiting and Other (See Comments)     Has crohn's disease    Other reaction(s): FLARES UP CROHNS      Has crohn's disease       Family History       Problem Relation (Age of Onset)    Emphysema Father    Hypertension Mother          Tobacco Use    Smoking status: Former     Passive exposure: Never    Smokeless tobacco: Never   Substance and Sexual Activity    Alcohol use: Not Currently    Drug use: Never    Sexual activity: Not Currently      Review of Systems   Respiratory:  Positive for shortness of breath. Negative for wheezing.    Cardiovascular:  Negative for chest pain.   Gastrointestinal:  Positive for diarrhea. Negative for abdominal pain, nausea and vomiting.     Objective:     Vital Signs (Most Recent):  Temp: 97.7 °F (36.5 °C) (12/19/24 1404)  Pulse: 103 (12/19/24 1530)  Resp: 20 (12/19/24 1530)  BP: (!) 87/57 (12/19/24 1530)  SpO2: 96 % (12/19/24 1530) Vital Signs (24h Range):  Temp:  [97.7 °F (36.5 °C)] 97.7 °F (36.5 °C)  Pulse:  [] 103  Resp:  [20-30] 20  SpO2:  [96 %-98 %] 96 %  BP: (78-92)/(51-62) 87/57   Weight: 54 kg (119 lb 0.8 oz)  Body mass index is 19.21 kg/m².    No intake or output data in the 24 hours ending 12/19/24 1754       Physical Exam  Vitals and  nursing note reviewed.   Constitutional:       General: He is not in acute distress.     Appearance: He is ill-appearing.      Comments: Chronically ill appearing   HENT:      Mouth/Throat:      Mouth: Mucous membranes are moist.   Eyes:      Pupils: Pupils are equal, round, and reactive to light.   Cardiovascular:      Rate and Rhythm: Normal rate.      Pulses: Normal pulses.   Pulmonary:      Effort: Respiratory distress present.      Breath sounds: Rales present. No wheezing.   Abdominal:      Palpations: Abdomen is soft.   Musculoskeletal:      Right lower leg: Edema present.      Left lower leg: Edema present.   Skin:     General: Skin is warm.      Capillary Refill: Capillary refill takes less than 2 seconds.   Neurological:      General: No focal deficit present.      Mental Status: He is alert. Mental status is at baseline.            Vents:     Lines/Drains/Airways       Peripheral Intravenous Line  Duration                  Peripheral IV - Single Lumen 12/19/24 1428 20 G Anterior;Right Forearm <1 day         Peripheral IV - Single Lumen 12/19/24 1429 20 G Left;Posterior Hand <1 day                  Significant Labs:    CBC/Anemia Profile:  Recent Labs   Lab 12/19/24  1430 12/19/24  1436   WBC 4.79  --    HGB 7.9*  --    HCT 25.9* 24*     --    MCV 98  --    RDW 16.6*  --         Chemistries:  Recent Labs   Lab 12/19/24  1430   *   K 4.6   CL 95   CO2 29   BUN 36*   CREATININE 4.8*   CALCIUM 9.0   ALBUMIN 2.8*   PROT 6.6   BILITOT 0.5   ALKPHOS 259*   ALT 11   AST 11       All pertinent labs within the past 24 hours have been reviewed.    Significant Imaging: I have reviewed all pertinent imaging results/findings within the past 24 hours.

## 2024-12-19 NOTE — CONSULTS
"Consult Note  [facility]  Cardiology      Flakito Mcginnis  YOB: 1955  Medical Record Number:  [id number]  Attending Physician:  Rodríguez Zhu DO   Date of Admission: 12/19/2024       Hospital Day:  0  Current Principal Problem:  hypotension in dialysis     HISTORY:     This is a pleasant 69 years old getleman with PMH significant for ESRD on HD, Dilated cardiomyopathy likely non-ischemic with EF 15% on my echo today (don't find ischemic w/u in the chart), Mitral regurgitation and tricuspid regurgitation and Crohn's s/p colostomy, h/o R nephrectomy.     He went to HD center yesterday and was found to have hypotension with SBP early 70s and was sent to the ED. Upon my eval, he is sitting on edge of the bed at 90 degrees due to SOB. He reports orthopnea, SOB, leg edema and declining functional capacity which got worse over the past 1 month. No chest pain today or recently.     Bedside echo shows LVIDD 6.4 cm, EF 15%, MR moderate to severe, mod TR, CVP 15 and BiV dilatation with Bi-A enlargement. When I laid her flat for echo, SpO2 fell to 92% from 96% on 4 lpm oxygen and he wanted to sit back up which I did after my brief echo exam.     Medications - Outpatient  Prior to Admission medications    Medication Sig Start Date End Date Taking? Authorizing Provider   amLODIPine (NORVASC) 10 MG tablet Take by mouth once daily.    Provider, Historical   atorvastatin (LIPITOR) 40 MG tablet Take 40 mg by mouth once daily. 5/24/23   Provider, Historical   butalbital-acetaminophen-caffeine -40 mg (FIORICET, ESGIC) -40 mg per tablet Take 1 tablet by mouth every 4 (four) hours as needed for Headaches. 12/11/23   Naye Ventura MD   carvediloL (COREG) 12.5 MG tablet Take 12.5 mg by mouth 2 (two) times daily. 1/9/23   Provider, Historical   catheter 10-16 Fr-" Misc 1 each by Misc.(Non-Drug; Combo Route) route daily as needed (for voiding). 1/17/24   Lu White, CARMEN   cholestyramine " (QUESTRAN) 4 gram packet Take 4 g by mouth once daily.    Provider, Historical   cinacalcet (SENSIPAR) 30 MG Tab Take 30 mg by mouth daily with breakfast.    Provider, Historical   lisinopriL (PRINIVIL,ZESTRIL) 20 MG tablet Take 20 mg by mouth.    Provider, Historical   methocarbamoL (ROBAXIN) 500 MG Tab Take 1 tablet (500 mg total) by mouth nightly as needed (muscle spasm).  Patient not taking: Reported on 12/11/2024 8/25/23   Charlie Valencia PAKimberlyC   oxyCODONE-acetaminophen (PERCOCET) 5-325 mg per tablet Take 1 tablet by mouth every 6 (six) hours as needed for Pain. 10/19/24   Kaylyn Mendosa MD   sevelamer carbonate (RENVELA) 800 mg Tab Take 800 mg by mouth 3 (three) times daily with meals.    Provider, Historical   sodium zirconium cyclosilicate (LOKELMA) 5 gram packet Take 5 g by mouth once daily.    Provider, Historical         Medications - Current  Scheduled Meds:   furosemide (LASIX) injection  80 mg Intravenous ED 1 Time     Continuous Infusions:   NORepinephrine bitartrate-D5W  0-3 mcg/kg/min Intravenous Continuous         PRN Meds:.      Allergies  Review of patient's allergies indicates:   Allergen Reactions    Vancomycin analogues Anaphylaxis, Other (See Comments), Shortness Of Breath and Swelling     Light headed, see's spots      Aspirin Nausea And Vomiting and Other (See Comments)     Has crohn's disease    Other reaction(s): FLARES UP CROHNS      Has crohn's disease         Past Medical History  Past Medical History:   Diagnosis Date    Crohn's disease 1998    ESRD (end stage renal disease) on dialysis 10/2017    Hypertension     Obstructive uropathy          Past Surgical History  Past Surgical History:   Procedure Laterality Date    COLOSTOMY  2006    DIALYSIS FISTULA CREATION Left 02/2018    NEPHRECTOMY Right     REMOVAL OF TUNNELED CENTRAL VENOUS CATHETER (CVC) N/A 5/23/2018    Procedure: PERMCATH REMOVAL-TUNNELED CVC REMOVAL;  Surgeon: Parveen Ray MD;  Location: [facility] CATH LAB;  Service:  Nephrology;  Laterality: N/A;  pt coming in @930         Social History  Social History     Socioeconomic History    Marital status: Single   Tobacco Use    Smoking status: Former     Passive exposure: Never    Smokeless tobacco: Never   Substance and Sexual Activity    Alcohol use: Not Currently    Drug use: Never    Sexual activity: Not Currently     Social Drivers of Health     Food Insecurity: No Food Insecurity (8/19/2024)    Received from [facility]    Hunger Vital Sign     Worried About Running Out of Food in the Last Year: Never true     Ran Out of Food in the Last Year: Never true   Transportation Needs: No Transportation Needs (8/19/2024)    Received from [facility]    PRAPARE - Transportation     Lack of Transportation (Medical): No     Lack of Transportation (Non-Medical): No   Housing Stability: Low Risk  (8/19/2024)    Received from [facility]    Housing Stability Vital Sign     Unable to Pay for Housing in the Last Year: No     Number of Places Lived in the Last Year: 1     Unstable Housing in the Last Year: No         ROS:     Per HPI    PHYSICAL EXAM:     Vital Signs  Vitals  Temp: 97.7 °F (36.5 °C)  Temp Source: Oral  Pulse: 103  Heart Rate Source: Monitor  Resp: 20  SpO2: 96 %  Pulse Oximetry Type: Continuous  Flow (L/min) (Oxygen Therapy): 2  BP: (!) 87/57  MAP (mmHg): 67  BP Location: Right arm  BP Method: Automatic  Patient Position: Lying          24 Hour VS Range    Temp:  [97.7 °F (36.5 °C)]   Pulse:  []   Resp:  [20-30]   BP: (78-92)/(51-62)   SpO2:  [96 %-98 %]   No intake or output data in the 24 hours ending 12/19/24 0998      Constitutional:       General: He is not in acute distress.     Appearance: He is ill-appearing.      Comments: Chronically ill appearing   HENT:      Mouth/Throat:      Mouth: Mucous membranes are moist.   Eyes:      Pupils: Pupils are equal, round, and reactive to light.   Cardiovascular:      Rate and Rhythm: Normal rate.      Pulses: Normal pulses.  "  Pulmonary:      Effort: Respiratory distress present.      Breath sounds: Rales present. No wheezing.   Abdominal:      Palpations: Abdomen is soft.   Musculoskeletal:      Right lower leg: Edema present.      Left lower leg: Edema present.   Skin:     General: Skin is warm.      Capillary Refill: Capillary refill takes less than 2 seconds.   Neurological:      General: No focal deficit present.      Mental Status: He is alert. Mental status is at baseline      DATA:       Recent Labs   Lab 12/19/24  1430 12/19/24  1436   WBC 4.79  --    HGB 7.9*  --    HCT 25.9* 24*     --         No results for input(s): "PROTIME", "INR" in the last 168 hours.     Recent Labs   Lab 12/14/24  0000 12/19/24  1430   NA  --  134*   K  --  4.6   CL  --  95   CO2  --  29   BUN 41* 36*   CREATININE  --  4.8*   ANIONGAP  --  10   CALCIUM  --  9.0        Recent Labs   Lab 12/19/24  1430   PROT 6.6   ALBUMIN 2.8*   BILITOT 0.5   ALKPHOS 259*   AST 11   ALT 11        No results for input(s): "TROPONINI" in the last 168 hours.     No results found for: "BNP"    No results for input(s): "LABBLOO" in the last 168 hours.       ASSESSMENT/PLAN:     #Acute on chronic hypoxic respiratory failure  #ESRD on HD, limited with low BP  #Acute HFrEF  #Dilated cardiomyopathy  #MR, likely primarily secondary from LV dilation  #Tricuspid Regurgitation    -Volume up based on symptoms, physical exam and echo, recommend ultrafiltration as deemed appropriate by nephrology team  -IV lasix as continues to make some urine  -Unfortunately limited with GDMT with hypotension issues, however, would recommend stopping current home amlodipine 10 mg and uptitrating home lisinopriol 20 mg is possilbe, continue home coreg 12.5 mg bid upon discharge if BP allows  -F/up with cardiology outpatient, will discuss about ischemic eval outpatient, however, never had anginal symptoms  -GOC discussion, unfortunately, guarded prognosis with multi organ failure (renal, heart " and chronic resp failure and frail condition)    Please secure chat with any questions      Signed:  Mickey Olsen  Cardiology Fellow PGY-6  12/19/2024  4:48 PM   [facility], [place]

## 2024-12-19 NOTE — CARE UPDATE
#Acute hypoxic respiratory failuyre  #Acute HFrEF - severely volume overload with high CVP, orthopnea, leg edema and SOB  #Type 2 MI - HFrEF and anemia both are likely contributing  #Dilated Cardiomyopathy, likely non ischemic, LVIDD 6.4 cm, EF 15%    Recs:  -need urgent ultrafiltration if possible as ESRD/volume overload is the cause of his respiratory failure  -if makes urine, high dose lasix bolus  -goals of care discussion, prognosis appears to be guarded unfortunately     Full note to follow

## 2024-12-19 NOTE — PROGRESS NOTES
Pharmacist Renal Dose Adjustment Note    Flakito Mcginnis is a 69 y.o. male being treated with the medication Zosyn for sepsis.     Patient Data:    Vital Signs (Most Recent):  Temp: 97.7 °F (36.5 °C) (12/19/24 1404)  Pulse: 103 (12/19/24 1530)  Resp: 20 (12/19/24 1530)  BP: (!) 87/57 (12/19/24 1530)  SpO2: 96 % (12/19/24 1530) Vital Signs (72h Range):  Temp:  [97.7 °F (36.5 °C)]   Pulse:  []   Resp:  [20-30]   BP: (78-92)/(51-62)   SpO2:  [96 %-98 %]      Recent Labs   Lab 12/19/24  1430   CREATININE 4.8*     Serum creatinine: 4.8 mg/dL (H) 12/19/24 1430  Estimated creatinine clearance: 11.1 mL/min (A)    Medication:Zosyn dose: 4.5 g frequency 12 hours will be changed to medication:Zosyn dose:3.375 frequency:12 hours    Pharmacist's Name: Gunjan Woodward PharmD  Pharmacist's Extension: [phone]

## 2024-12-20 PROBLEM — R65.21 SEPTIC SHOCK: Status: ACTIVE | Noted: 2024-12-19

## 2024-12-20 PROBLEM — A41.9 SEPTIC SHOCK: Status: ACTIVE | Noted: 2024-12-19

## 2024-12-20 PROBLEM — I38 ENDOCARDITIS: Status: ACTIVE | Noted: 2024-12-20

## 2024-12-20 LAB
ALBUMIN SERPL BCP-MCNC: 2.3 G/DL (ref 3.5–5.2)
ANION GAP SERPL CALC-SCNC: 12 MMOL/L (ref 8–16)
ANION GAP SERPL CALC-SCNC: 7 MMOL/L (ref 8–16)
ASCENDING AORTA: 3.37 CM
AV AREA BY CONTINUOUS VTI: 1.1 CM2
AV INDEX (PROSTH): 0.37
AV LVOT MEAN GRADIENT: 2 MMHG
AV LVOT PEAK GRADIENT: 4 MMHG
AV MEAN GRADIENT: 16.5 MMHG
AV PEAK GRADIENT: 27 MMHG
AV REGURGITATION PRESSURE HALF TIME: 341.47 MS
AV VALVE AREA BY VELOCITY RATIO: 1.2 CM²
AV VALVE AREA: 1.2 CM2
AV VELOCITY RATIO: 0.38
BASOPHILS # BLD AUTO: 0.03 K/UL (ref 0–0.2)
BASOPHILS NFR BLD: 0.6 % (ref 0–1.9)
BUN SERPL-MCNC: 25 MG/DL (ref 8–23)
BUN SERPL-MCNC: 43 MG/DL (ref 8–23)
CALCIUM SERPL-MCNC: 8.4 MG/DL (ref 8.7–10.5)
CALCIUM SERPL-MCNC: 8.6 MG/DL (ref 8.7–10.5)
CHLORIDE SERPL-SCNC: 104 MMOL/L (ref 95–110)
CHLORIDE SERPL-SCNC: 96 MMOL/L (ref 95–110)
CO2 SERPL-SCNC: 24 MMOL/L (ref 23–29)
CO2 SERPL-SCNC: 25 MMOL/L (ref 23–29)
CREAT SERPL-MCNC: 3.1 MG/DL (ref 0.5–1.4)
CREAT SERPL-MCNC: 5.6 MG/DL (ref 0.5–1.4)
CV ECHO LV RWT: 0.3 CM
DIFFERENTIAL METHOD BLD: ABNORMAL
DOP CALC AO PEAK VEL: 2.6 M/S
DOP CALC AO VTI: 43.4 CM
DOP CALC LVOT AREA: 3.1 CM2
DOP CALC LVOT DIAMETER: 2 CM
DOP CALC LVOT PEAK VEL: 1 M/S
DOP CALC LVOT STROKE VOLUME: 49.9 CM3
DOP CALCLVOT PEAK VEL VTI: 15.9 CM
E/E' RATIO: 13.65 M/S
ECHO EF ESTIMATED: 24 %
ECHO LV POSTERIOR WALL: 0.9 CM (ref 0.6–1.1)
EJECTION FRACTION: 20 %
EOSINOPHIL # BLD AUTO: 0.1 K/UL (ref 0–0.5)
EOSINOPHIL NFR BLD: 1.3 % (ref 0–8)
ERYTHROCYTE [DISTWIDTH] IN BLOOD BY AUTOMATED COUNT: 16.1 % (ref 11.5–14.5)
EST. GFR  (NO RACE VARIABLE): 10.3 ML/MIN/1.73 M^2
EST. GFR  (NO RACE VARIABLE): 21 ML/MIN/1.73 M^2
FERRITIN SERPL-MCNC: 2808 NG/ML (ref 20–300)
FOLATE SERPL-MCNC: 8 NG/ML (ref 4–24)
FRACTIONAL SHORTENING: 11.7 % (ref 28–44)
GLUCOSE SERPL-MCNC: 105 MG/DL (ref 70–110)
GLUCOSE SERPL-MCNC: 91 MG/DL (ref 70–110)
HCT VFR BLD AUTO: 24.6 % (ref 40–54)
HGB BLD-MCNC: 7.7 G/DL (ref 14–18)
IMM GRANULOCYTES # BLD AUTO: 0.02 K/UL (ref 0–0.04)
IMM GRANULOCYTES NFR BLD AUTO: 0.4 % (ref 0–0.5)
INTERVENTRICULAR SEPTUM: 0.9 CM (ref 0.6–1.1)
IRON SERPL-MCNC: 26 UG/DL (ref 45–160)
IVC DIAMETER: 2.04 CM
LA MAJOR: 6.25 CM
LA MINOR: 6.51 CM
LA WIDTH: 4.4 CM
LEFT ATRIUM SIZE: 4.49 CM
LEFT ATRIUM VOLUME INDEX MOD: 85.9 ML/M2
LEFT ATRIUM VOLUME INDEX: 66.1 ML/M2
LEFT ATRIUM VOLUME MOD: 139.08 ML
LEFT ATRIUM VOLUME: 107.09 CM3
LEFT INTERNAL DIMENSION IN SYSTOLE: 5.3 CM (ref 2.1–4)
LEFT VENTRICLE DIASTOLIC VOLUME INDEX: 109.78 ML/M2
LEFT VENTRICLE DIASTOLIC VOLUME: 177.84 ML
LEFT VENTRICLE MASS INDEX: 133.2 G/M2
LEFT VENTRICLE SYSTOLIC VOLUME INDEX: 83.4 ML/M2
LEFT VENTRICLE SYSTOLIC VOLUME: 135.1 ML
LEFT VENTRICULAR INTERNAL DIMENSION IN DIASTOLE: 6 CM (ref 3.5–6)
LEFT VENTRICULAR MASS: 215.7 G
LV LATERAL E/E' RATIO: 8.29 M/S
LV SEPTAL E/E' RATIO: 38.67 M/S
LYMPHOCYTES # BLD AUTO: 0.5 K/UL (ref 1–4.8)
LYMPHOCYTES NFR BLD: 9.7 % (ref 18–48)
MAGNESIUM SERPL-MCNC: 1.9 MG/DL (ref 1.6–2.6)
MAGNESIUM SERPL-MCNC: 2 MG/DL (ref 1.6–2.6)
MCH RBC QN AUTO: 30.2 PG (ref 27–31)
MCHC RBC AUTO-ENTMCNC: 31.3 G/DL (ref 32–36)
MCV RBC AUTO: 97 FL (ref 82–98)
MONOCYTES # BLD AUTO: 0.5 K/UL (ref 0.3–1)
MONOCYTES NFR BLD: 9.9 % (ref 4–15)
MRSA ID BY PCR: NEGATIVE
MV PEAK E VEL: 1.16 M/S
MV PEAK GRADIENT: 3 MMHG
MV VALVE AREA BY PLANIMETRY: 3.79 CM2
NEUTROPHILS # BLD AUTO: 3.6 K/UL (ref 1.8–7.7)
NEUTROPHILS NFR BLD: 78.1 % (ref 38–73)
NRBC BLD-RTO: 0 /100 WBC
OHS CV RV/LV RATIO: 0.75 CM
OHS LV EJECTION FRACTION SIMPSONS BIPLANE MOD: 25 %
OHS QRS DURATION: 112 MS
OHS QTC CALCULATION: 512 MS
PHOSPHATE SERPL-MCNC: 3.1 MG/DL (ref 2.7–4.5)
PHOSPHATE SERPL-MCNC: 5.4 MG/DL (ref 2.7–4.5)
PISA TR MAX VEL: 2.89 M/S
PLATELET # BLD AUTO: 147 K/UL (ref 150–450)
PMV BLD AUTO: 12 FL (ref 9.2–12.9)
POCT GLUCOSE: 112 MG/DL (ref 70–110)
POCT GLUCOSE: 122 MG/DL (ref 70–110)
POCT GLUCOSE: 137 MG/DL (ref 70–110)
POCT GLUCOSE: 78 MG/DL (ref 70–110)
POTASSIUM SERPL-SCNC: 4.7 MMOL/L (ref 3.5–5.1)
POTASSIUM SERPL-SCNC: 5 MMOL/L (ref 3.5–5.1)
PTH-INTACT SERPL-MCNC: 545.8 PG/ML (ref 9–77)
RA MAJOR: 5.93 CM
RA PRESSURE ESTIMATED: 8 MMHG
RA WIDTH: 5.22 CM
RBC # BLD AUTO: 2.55 M/UL (ref 4.6–6.2)
RIGHT ATRIAL AREA: 28 CM2
RIGHT VENTRICLE DIASTOLIC BASEL DIMENSION: 4.5 CM
RV TB RVSP: 11 MMHG
RV TISSUE DOPPLER FREE WALL SYSTOLIC VELOCITY 1 (APICAL 4 CHAMBER VIEW): 7.57 CM/S
SATURATED IRON: 12 % (ref 20–50)
SINUS: 3.4 CM
SODIUM SERPL-SCNC: 133 MMOL/L (ref 136–145)
SODIUM SERPL-SCNC: 135 MMOL/L (ref 136–145)
STAPH AUREUS ID BY PCR: NEGATIVE
STJ: 3.13 CM
TDI LATERAL: 0.14 M/S
TDI SEPTAL: 0.03 M/S
TDI: 0.09 M/S
TOTAL IRON BINDING CAPACITY: 209 UG/DL (ref 250–450)
TR MAX PG: 33 MMHG
TRANSFERRIN SERPL-MCNC: 141 MG/DL (ref 200–375)
TRICUSPID ANNULAR PLANE SYSTOLIC EXCURSION: 1.59 CM
TV PEAK GRADIENT: 33 MMHG
TV REST PULMONARY ARTERY PRESSURE: 41 MMHG
VIT B12 SERPL-MCNC: 770 PG/ML (ref 210–950)
WBC # BLD AUTO: 4.66 K/UL (ref 3.9–12.7)
Z-SCORE OF LEFT VENTRICULAR DIMENSION IN END DIASTOLE: 2.69
Z-SCORE OF LEFT VENTRICULAR DIMENSION IN END SYSTOLE: 4.93

## 2024-12-20 PROCEDURE — 63600175 PHARM REV CODE 636 W HCPCS: Mod: JZ,JG | Performed by: STUDENT IN AN ORGANIZED HEALTH CARE EDUCATION/TRAINING PROGRAM

## 2024-12-20 PROCEDURE — 63600175 PHARM REV CODE 636 W HCPCS: Performed by: STUDENT IN AN ORGANIZED HEALTH CARE EDUCATION/TRAINING PROGRAM

## 2024-12-20 PROCEDURE — 25000003 PHARM REV CODE 250

## 2024-12-20 PROCEDURE — 84100 ASSAY OF PHOSPHORUS: CPT

## 2024-12-20 PROCEDURE — 82728 ASSAY OF FERRITIN: CPT | Performed by: STUDENT IN AN ORGANIZED HEALTH CARE EDUCATION/TRAINING PROGRAM

## 2024-12-20 PROCEDURE — 83970 ASSAY OF PARATHORMONE: CPT | Performed by: STUDENT IN AN ORGANIZED HEALTH CARE EDUCATION/TRAINING PROGRAM

## 2024-12-20 PROCEDURE — 5A1D90Z PERFORMANCE OF URINARY FILTRATION, CONTINUOUS, GREATER THAN 18 HOURS PER DAY: ICD-10-PCS | Performed by: HOSPITALIST

## 2024-12-20 PROCEDURE — 83735 ASSAY OF MAGNESIUM: CPT

## 2024-12-20 PROCEDURE — 82746 ASSAY OF FOLIC ACID SERUM: CPT | Performed by: STUDENT IN AN ORGANIZED HEALTH CARE EDUCATION/TRAINING PROGRAM

## 2024-12-20 PROCEDURE — 94761 N-INVAS EAR/PLS OXIMETRY MLT: CPT

## 2024-12-20 PROCEDURE — 84466 ASSAY OF TRANSFERRIN: CPT | Performed by: STUDENT IN AN ORGANIZED HEALTH CARE EDUCATION/TRAINING PROGRAM

## 2024-12-20 PROCEDURE — 99900035 HC TECH TIME PER 15 MIN (STAT)

## 2024-12-20 PROCEDURE — 83735 ASSAY OF MAGNESIUM: CPT | Mod: 91 | Performed by: STUDENT IN AN ORGANIZED HEALTH CARE EDUCATION/TRAINING PROGRAM

## 2024-12-20 PROCEDURE — 80069 RENAL FUNCTION PANEL: CPT | Performed by: STUDENT IN AN ORGANIZED HEALTH CARE EDUCATION/TRAINING PROGRAM

## 2024-12-20 PROCEDURE — 82607 VITAMIN B-12: CPT | Performed by: STUDENT IN AN ORGANIZED HEALTH CARE EDUCATION/TRAINING PROGRAM

## 2024-12-20 PROCEDURE — 99291 CRITICAL CARE FIRST HOUR: CPT | Mod: ,,, | Performed by: INTERNAL MEDICINE

## 2024-12-20 PROCEDURE — 90945 DIALYSIS ONE EVALUATION: CPT

## 2024-12-20 PROCEDURE — 85025 COMPLETE CBC W/AUTO DIFF WBC: CPT

## 2024-12-20 PROCEDURE — 20000000 HC ICU ROOM

## 2024-12-20 PROCEDURE — 99223 1ST HOSP IP/OBS HIGH 75: CPT | Mod: GC,,, | Performed by: STUDENT IN AN ORGANIZED HEALTH CARE EDUCATION/TRAINING PROGRAM

## 2024-12-20 PROCEDURE — 99233 SBSQ HOSP IP/OBS HIGH 50: CPT | Mod: GC,,, | Performed by: INTERNAL MEDICINE

## 2024-12-20 PROCEDURE — 25000003 PHARM REV CODE 250: Performed by: INTERNAL MEDICINE

## 2024-12-20 PROCEDURE — 25000003 PHARM REV CODE 250: Performed by: NURSE PRACTITIONER

## 2024-12-20 PROCEDURE — 80048 BASIC METABOLIC PNL TOTAL CA: CPT

## 2024-12-20 PROCEDURE — 63600175 PHARM REV CODE 636 W HCPCS: Performed by: INTERNAL MEDICINE

## 2024-12-20 PROCEDURE — 25000003 PHARM REV CODE 250: Performed by: STUDENT IN AN ORGANIZED HEALTH CARE EDUCATION/TRAINING PROGRAM

## 2024-12-20 PROCEDURE — 25000003 PHARM REV CODE 250: Performed by: GENERAL ACUTE CARE HOSPITAL

## 2024-12-20 PROCEDURE — 27100171 HC OXYGEN HIGH FLOW UP TO 24 HOURS

## 2024-12-20 PROCEDURE — 27000221 HC OXYGEN, UP TO 24 HOURS

## 2024-12-20 RX ORDER — DAPTOMYCIN 50 MG/ML
500 INJECTION, POWDER, LYOPHILIZED, FOR SOLUTION INTRAVENOUS
Status: DISCONTINUED | OUTPATIENT
Start: 2024-12-20 | End: 2024-12-20

## 2024-12-20 RX ORDER — MAGNESIUM SULFATE HEPTAHYDRATE 40 MG/ML
2 INJECTION, SOLUTION INTRAVENOUS
Status: ACTIVE | OUTPATIENT
Start: 2024-12-20 | End: 2024-12-21

## 2024-12-20 RX ORDER — HYDROCODONE BITARTRATE AND ACETAMINOPHEN 500; 5 MG/1; MG/1
TABLET ORAL CONTINUOUS
Status: ACTIVE | OUTPATIENT
Start: 2024-12-20 | End: 2024-12-21

## 2024-12-20 RX ORDER — ACETAMINOPHEN 325 MG/1
650 TABLET ORAL EVERY 6 HOURS PRN
Status: DISCONTINUED | OUTPATIENT
Start: 2024-12-20 | End: 2024-12-21

## 2024-12-20 RX ORDER — ACETAMINOPHEN 500 MG
500 TABLET ORAL EVERY 4 HOURS PRN
Status: DISCONTINUED | OUTPATIENT
Start: 2024-12-20 | End: 2025-01-14 | Stop reason: HOSPADM

## 2024-12-20 RX ORDER — OXYCODONE HYDROCHLORIDE 5 MG/1
5 TABLET ORAL EVERY 4 HOURS PRN
Status: DISCONTINUED | OUTPATIENT
Start: 2024-12-20 | End: 2024-12-25

## 2024-12-20 RX ORDER — CEFEPIME HYDROCHLORIDE 1 G/1
1 INJECTION, POWDER, FOR SOLUTION INTRAMUSCULAR; INTRAVENOUS
Status: DISCONTINUED | OUTPATIENT
Start: 2024-12-20 | End: 2024-12-21

## 2024-12-20 RX ADMIN — SEVELAMER CARBONATE 800 MG: 800 TABLET, FILM COATED ORAL at 12:12

## 2024-12-20 RX ADMIN — PIPERACILLIN SODIUM AND TAZOBACTAM SODIUM 4.5 G: 4; .5 INJECTION, POWDER, FOR SOLUTION INTRAVENOUS at 08:12

## 2024-12-20 RX ADMIN — HEPARIN SODIUM 5000 UNITS: 5000 INJECTION INTRAVENOUS; SUBCUTANEOUS at 06:12

## 2024-12-20 RX ADMIN — CEFEPIME 1 G: 1 INJECTION, POWDER, FOR SOLUTION INTRAMUSCULAR; INTRAVENOUS at 12:12

## 2024-12-20 RX ADMIN — SEVELAMER CARBONATE 800 MG: 800 TABLET, FILM COATED ORAL at 05:12

## 2024-12-20 RX ADMIN — OXYCODONE HYDROCHLORIDE AND ACETAMINOPHEN 1 TABLET: 5; 325 TABLET ORAL at 12:12

## 2024-12-20 RX ADMIN — HEPARIN SODIUM 5000 UNITS: 5000 INJECTION INTRAVENOUS; SUBCUTANEOUS at 10:12

## 2024-12-20 RX ADMIN — SODIUM CHLORIDE: 9 INJECTION, SOLUTION INTRAVENOUS at 05:12

## 2024-12-20 RX ADMIN — SEVELAMER CARBONATE 800 MG: 800 TABLET, FILM COATED ORAL at 08:12

## 2024-12-20 RX ADMIN — ATORVASTATIN CALCIUM 40 MG: 40 TABLET, FILM COATED ORAL at 08:12

## 2024-12-20 RX ADMIN — DAPTOMYCIN 555 MG: 350 INJECTION, POWDER, LYOPHILIZED, FOR SOLUTION INTRAVENOUS at 01:12

## 2024-12-20 RX ADMIN — ACETAMINOPHEN 500 MG: 500 TABLET ORAL at 02:12

## 2024-12-20 RX ADMIN — OXYCODONE 5 MG: 5 TABLET ORAL at 06:12

## 2024-12-20 RX ADMIN — NOREPINEPHRINE BITARTRATE 0.02 MCG/KG/MIN: 16 SOLUTION INTRAVENOUS at 02:12

## 2024-12-20 RX ADMIN — EPOETIN ALFA-EPBX 2490 UNITS: 3000 INJECTION, SOLUTION INTRAVENOUS; SUBCUTANEOUS at 03:12

## 2024-12-20 RX ADMIN — SODIUM CHLORIDE: 9 INJECTION, SOLUTION INTRAVENOUS at 10:12

## 2024-12-20 RX ADMIN — OXYCODONE 5 MG: 5 TABLET ORAL at 10:12

## 2024-12-20 RX ADMIN — HEPARIN SODIUM 5000 UNITS: 5000 INJECTION INTRAVENOUS; SUBCUTANEOUS at 02:12

## 2024-12-20 RX ADMIN — NOREPINEPHRINE BITARTRATE 0.1 MCG/KG/MIN: 16 SOLUTION INTRAVENOUS at 07:12

## 2024-12-20 NOTE — HPI
Patient is a pleasant 69 year old with ESRD on HD [facility] who presents to the ER at the request of his HD unit for evaluation of hypotension. Patient completed his a session on Tuesday 12/17/24 and went back for a second session on 12/18/24. Review of his outpatient dialysis notes show that his post BUN dialysis values on 12/17 were 10 with a pre-HD BUN of 41 indicating a urea reduction percentage of 76% suggesting that he received DH on 12/17/24. There are also post HD treatment vital signs recorded on 12/18/24 at 1035 am which also show a pre-HD weight of 56.3 kg and post HD weight of 54.6 kg suggesting that he received an HD session yesterday as well and left at his estimated dry weight. He reported for his routine dialysis but was sent into the ER when he was found to be hypotensive in the HD unit. He reports feeling well with no signs or symptoms of hypotension prior to arrival, however he does report increased loose stool output since his HD session yesterday. He denies any increase in salt or fluid intake and tries to adhere to a low salt and 1L fluid restricting per day diet.    Nephrology has been consulted for management of his dialysis while he is admitted to the hospital. Labs on arrival showed stable electrolytes, venous blood gas showed a metabolic alkalosis with a pCO2 of 44, BNP elevated at >4,900 with no prior values to compare to, and troponin elevated at 923. Weight in the ER was 54 kg. Chest xray was obtained and showed no significant change in the cardiopulmonary status of the patient when compared to previous chest xray. Patient reports oxygen use of 4L at home and reports resolution of his dyspnea once he was placed on his home oxygen dose.     Outpatient HD Information:  -Dialysis modality: Hemodialysis  -Outpatient HD unit: [facility]  -Nephrologist: Dr. Strickland  -HD TX days: Tuesday/Thursday/Saturday  -Last HD TX prior to hospital admission: 12/18/2024  -Residual urine:  Anuric   -EDW: 54.5 kg

## 2024-12-20 NOTE — CONSULTS
"[facility]  Infectious Disease  Consult Note    Patient Name: Flakito Mcginnis  MRN: [id number]  Admission Date: 12/19/2024  Hospital Length of Stay: 1 days  Attending Physician: Jose Mendieta MD  Primary Care Provider: Jordin Robbins MD     Isolation Status: No active isolations    Patient information was obtained from patient and ER records.      Inpatient consult to Infectious Diseases  Consult performed by: Norma Teran DO  Consult ordered by: Blayne Gabriel PA-C        Assessment/Plan:     Cardiac/Vascular  Endocarditis  69M presented after being hypotensive during outpatient HD, found to have mobile mass at LVOT on TTE. 12/19 BCX NGTD. Reported vancomycin allergy, he states the reaction was feeling hot and dizzy, but that he may have had trouble breathing too. Unclear if true vancomycin allergy or just infusion reaction. States he will think about it and consider doing a trial of vancomycin while hospitalized and under close observation.     Recommendations  Continue daptomycin  De-escalate zosyn to cefepime 1g daily (HD dosing)  Follow up 12/19 BCX  Obtain DAVID        The above plan was discussed with the primary team.           Thank you for your consult. I will follow-up with patient. Please contact us if you have any additional questions.    Norma Teran DO  Infectious Disease  [facility]    Subjective:     Principal Problem: Shock, unspecified    HPI: Mr. Mcginnis is a 69M with PMH of HFrEF, ESRD on HD, Crohns s/p colostomy, previous R nephrectomy, presented after being hypotensive during outpatient HD. Infectious disease consulted for "Suspected vegetation in LVOT. Severe vancomycin allergy. MRSA coverage".     He is afebrile, no leukocytosis. TTE shows mobile mass at the LVOT. 12/19 BCX NGTD. Reports that his reaction to vancomycin in the past was feeling hot and dizzy, but that he may have had trouble breathing too.         Past Medical History:   Diagnosis Date    Crohn's " "disease 1998    ESRD (end stage renal disease) on dialysis 10/2017    Hypertension     Obstructive uropathy        Past Surgical History:   Procedure Laterality Date    COLOSTOMY  2006    DIALYSIS FISTULA CREATION Left 02/2018    NEPHRECTOMY Right     REMOVAL OF TUNNELED CENTRAL VENOUS CATHETER (CVC) N/A 5/23/2018    Procedure: PERMCATH REMOVAL-TUNNELED CVC REMOVAL;  Surgeon: Parveen Ray MD;  Location: [facility] CATH LAB;  Service: Nephrology;  Laterality: N/A;  pt coming in @930       Review of patient's allergies indicates:   Allergen Reactions    Vancomycin analogues Anaphylaxis, Other (See Comments), Shortness Of Breath and Swelling     Light headed, see's spots      Aspirin Nausea And Vomiting and Other (See Comments)     Has crohn's disease    Other reaction(s): FLARES UP CROHNS      Has crohn's disease       Medications:  Medications Prior to Admission   Medication Sig    amLODIPine (NORVASC) 10 MG tablet Take by mouth once daily.    atorvastatin (LIPITOR) 40 MG tablet Take 40 mg by mouth once daily.    butalbital-acetaminophen-caffeine -40 mg (FIORICET, ESGIC) -40 mg per tablet Take 1 tablet by mouth every 4 (four) hours as needed for Headaches.    carvediloL (COREG) 12.5 MG tablet Take 12.5 mg by mouth 2 (two) times daily.    catheter 10-16 Fr-" Misc 1 each by Misc.(Non-Drug; Combo Route) route daily as needed (for voiding).    cholestyramine (QUESTRAN) 4 gram packet Take 4 g by mouth once daily.    cinacalcet (SENSIPAR) 30 MG Tab Take 30 mg by mouth daily with breakfast.    lisinopriL (PRINIVIL,ZESTRIL) 20 MG tablet Take 20 mg by mouth.    methocarbamoL (ROBAXIN) 500 MG Tab Take 1 tablet (500 mg total) by mouth nightly as needed (muscle spasm). (Patient not taking: Reported on 12/11/2024)    oxyCODONE-acetaminophen (PERCOCET) 5-325 mg per tablet Take 1 tablet by mouth every 6 (six) hours as needed for Pain.    sevelamer carbonate (RENVELA) 800 mg Tab Take 800 mg by mouth 3 (three) times daily " with meals.    sodium zirconium cyclosilicate (LOKELMA) 5 gram packet Take 5 g by mouth once daily.     Antibiotics (From admission, onward)      Start     Stop Route Frequency Ordered    12/20/24 1245  ceFEPIme injection 1 g         -- IV Every 24 hours (non-standard times) 12/20/24 1144    12/20/24 1145  DAPTOmycin (CUBICIN) 555 mg in 0.9% NaCl SolP 50 mL IVPB         -- IV Every 48 hours (non-standard times) 12/20/24 1049          Antifungals (From admission, onward)      None          Antivirals (From admission, onward)      None             Immunization History   Administered Date(s) Administered    COVID-19, MRNA, LN-S, PF (Pfizer) (Purple Cap) 07/11/2021, 08/01/2021    Hepatitis B, Adult 11/13/2018, 12/11/2018, 01/15/2019, 05/14/2019    Influenza 11/11/2008, 10/12/2023    Influenza - Quadrivalent - High Dose - PF (65 years and older) 10/05/2021, 10/04/2022    Influenza - Trivalent - Fluzone High Dose - PF (65 years and older) 09/29/2020    PPD Test 09/07/2017    Pneumococcal Conjugate - 20 Valent 05/18/2023    Pneumococcal Conjugate - 7 Valent 11/11/2008    Td (ADULT) 11/11/2008       Family History       Problem Relation (Age of Onset)    Emphysema Father    Hypertension Mother          Social History     Socioeconomic History    Marital status: Single   Tobacco Use    Smoking status: Former     Passive exposure: Never    Smokeless tobacco: Never   Substance and Sexual Activity    Alcohol use: Not Currently    Drug use: Never    Sexual activity: Not Currently     Social Drivers of Health     Food Insecurity: No Food Insecurity (8/19/2024)    Received from [facility]    Hunger Vital Sign     Worried About Running Out of Food in the Last Year: Never true     Ran Out of Food in the Last Year: Never true   Transportation Needs: No Transportation Needs (8/19/2024)    Received from [facility]    PRAPARE - Transportation     Lack of Transportation (Medical): No     Lack of Transportation (Non-Medical): No    Housing Stability: Low Risk  (8/19/2024)    Received from [facility]    Housing Stability Vital Sign     Unable to Pay for Housing in the Last Year: No     Number of Places Lived in the Last Year: 1     Unstable Housing in the Last Year: No     Review of Systems   Constitutional:  Negative for chills and fever.   Respiratory:  Negative for cough and shortness of breath.    Cardiovascular:  Negative for chest pain.   Gastrointestinal:  Negative for abdominal pain, constipation, diarrhea, nausea and vomiting.   Musculoskeletal:  Negative for arthralgias and myalgias.   Skin:  Negative for rash.   Neurological:  Negative for headaches.     Objective:     Vital Signs (Most Recent):  Temp: 98.6 °F (37 °C) (12/20/24 1100)  Pulse: 105 (12/20/24 1200)  Resp: 16 (12/20/24 1207)  BP: (!) 91/59 (12/20/24 1200)  SpO2: (!) 91 % (12/20/24 1200) Vital Signs (24h Range):  Temp:  [97.7 °F (36.5 °C)-99.2 °F (37.3 °C)] 98.6 °F (37 °C)  Pulse:  [] 105  Resp:  [7-37] 16  SpO2:  [85 %-100 %] 91 %  BP: (75-99)/(48-70) 91/59     Weight: 55.3 kg (121 lb 14.6 oz)  Body mass index is 19.68 kg/m².    Estimated Creatinine Clearance: 9.7 mL/min (A) (based on SCr of 5.6 mg/dL (H)).     Physical Exam  Vitals reviewed.   Constitutional:       General: He is not in acute distress.     Appearance: Normal appearance. He is not ill-appearing.   HENT:      Head: Normocephalic and atraumatic.   Eyes:      Extraocular Movements: Extraocular movements intact.      Conjunctiva/sclera: Conjunctivae normal.   Cardiovascular:      Rate and Rhythm: Normal rate and regular rhythm.      Heart sounds: No murmur heard.  Pulmonary:      Effort: Pulmonary effort is normal. No respiratory distress.      Breath sounds: Normal breath sounds.   Abdominal:      General: Abdomen is flat. Bowel sounds are normal.      Palpations: Abdomen is soft.      Tenderness: There is no abdominal tenderness.   Musculoskeletal:      Cervical back: Normal range of motion.    Skin:     General: Skin is warm and dry.   Neurological:      General: No focal deficit present.      Mental Status: He is alert.   Psychiatric:         Mood and Affect: Mood normal.         Behavior: Behavior normal.         Thought Content: Thought content normal.          Significant Labs: All pertinent labs within the past 24 hours have been reviewed.  Recent Lab Results  (Last 5 results in the past 24 hours)        12/20/24  1157   12/20/24  1003   12/20/24  1001   12/20/24  0813   12/20/24  0351        Anion Gap         12       Ascending aorta     3.37           Ao peak rosa elena     2.6           Ao VTI     43.4           AV valve area     1.2           WU by Velocity Ratio     1.2           AV area by cont VTI     1.1           AV mean gradient     16.5           AV index (prosthetic)     0.37           LVOT mn grad     2           AV LVOT peak gradient     4           AV peak gradient     27.0           AV regurgitation pressure 1/2 time     341.47           AV Velocity Ratio     0.38           Baso #         0.03       Basophil %         0.6       BUN         43       Calcium         8.6       Chloride         96       CO2         25       Creatinine         5.6       Left Ventricle Relative Wall Thickness     0.30           Differential Method         Automated       Echo EF Estimated     24           E/E' ratio     13.65           eGFR         10.3       EF     20           Eos #         0.1       Eos %         1.3       Ferritin         2,808       Folate         8.0       FS     11.7           Glucose         91       Gran # (ANC)         3.6       Gran %         78.1       Hematocrit         24.6       Hemoglobin         7.7       Immature Grans (Abs)         0.02  Comment: Mild elevation in immature granulocytes is non specific and   can be seen in a variety of conditions including stress response,   acute inflammation, trauma and pregnancy. Correlation with other   laboratory and clinical findings is  essential.         Immature Granulocytes         0.4       Iron         26       IVC diameter     2.04           IVSd     0.9           LA WIDTH     4.40           Left Atrium Major Axis     6.25           Left Atrium Minor Axis     6.51           LA size     4.49           LA Vol     107.09                139.08           LA vol index     66.1           ADALBERTO (MOD)     85.9           LVOT area     3.1           LV LATERAL E/E' RATIO     8.29           LV SEPTAL E/E' RATIO     38.67           LV EDV BP     177.84           LV Diastolic Volume Index     109.78           LVIDd     6.0           LVIDs     5.3           LV mass     215.7           LV Mass Index     133.2           LVOT diameter     2.0           LVOT peak rosa elena     1.0           LVOT stroke volume     49.9           LVOT peak VTI     15.9           LV ESV BP     135.10           LV Systolic Volume Index     83.4           Lymph #         0.5       Lymph %         9.7       Magnesium          2.0       MCH         30.2       MCHC         31.3       MCV         97       Mean e'     0.09           Mono #         0.5       Mono %         9.9       MPV         12.0       MV valve area by planimetry     3.79           MV peak gradient     3           MV Peak E Rosa Elena     1.16           nRBC         0       Del Angel's Biplane MOD Ejection Fraction     25           Phosphorus Level         5.4       Platelet Count         147       POCT Glucose 122       78         Potassium         5.0       PTH   545.8             PW     0.9           RA Area     28.0           RA Major Axis     5.93           Est. RA pres     8           RA Width     5.22           RBC         2.55       RDW         16.1       RV S'     7.57           RV TB RVSP     11           RV/LV Ratio     0.75           RV- bass basal diam     4.5           Saturated Iron         12       Sinus     3.40           Sodium         133       STJ     3.13           TAPSE     1.59           TDI SEPTAL     0.03            TDI LATERAL     0.14           TIBC         209       Transferrin         141       Triscuspid Valve Regurgitation Peak Gradient     33           TR Max Wilberto     2.89           TV PG     33           TV resting pulmonary artery pressure     41           Vitamin B12         770       WBC         4.66       ZLVIDD     2.69           ZLVIDS     4.93                                  Significant Imaging: I have reviewed all pertinent imaging results/findings within the past 24 hours.

## 2024-12-20 NOTE — HPI
"Mr. Mcginnis is a 69M with PMH of HFrEF, ESRD on HD, Crohns s/p colostomy, previous R nephrectomy, presented after being hypotensive during outpatient HD. Infectious disease consulted for "Suspected vegetation in LVOT. Severe vancomycin allergy. MRSA coverage".     He is afebrile, no leukocytosis. TTE shows mobile mass at the LVOT. 12/19 BCX NGTD. Reports that his reaction to vancomycin in the past was feeling hot and dizzy, but that he may have had trouble breathing too.       "

## 2024-12-20 NOTE — ASSESSMENT & PLAN NOTE
After looking through his dialysis notes from yesterday, it does appear as if he completed two back-to-back sessions and left dialysis yesterday at his dry weight (54.6 kg) and reports high volume stool output since his HD session. He reports that he is on 4L chronic oxygen use at home, and is currently on that dose in the ER, and reporting resolution of his dyspnea once he was placed back on his home oxygen. Chest xray appears unchanged from prior studies.       Outpatient HD Information:  -Dialysis modality: Hemodialysis  -Outpatient HD unit: [facility]  -Nephrologist: Dr. Strickland  -HD TX days: Tuesday/Thursday/Saturday  -Last HD TX prior to hospital admission: 12/18/2024  -Residual urine: Anuric   -EDW: 54.5 kg    With patient completing two HD sessions over the past two days, being below his dry weight (54 kg recorded in the ER), completing dialysis yesterday at his dry weight, ongoing hypotension, resolution of his dyspnea when placed back on his home oxygen, and chest xray that does not show any change over previous studies, I feel as if it is unlikely that he is intravascularly volume overloaded, and more likely that he has low effective circulating volume which is accounting for his hypotension (likely as a result of outpatient ultrafiltration from dialysis the past two days and from large volume stool output). Patient's BNP is elevated at greater than 4,900, however there are no previous values to compare to, and the utility of BNP levels decrease in ESRD patients.     Recommendations  -Due to the reasons mentioned above, we will hold off on dialysis for tonight and re-evaluate in the morning.   -renal diet when not NPO.   -Dialysis consent was obtained and placed with the patient's chart in the emergency room. If patient's respiratory status worsens, please reach out to nephrology for re-evaluation of CRRT needs.

## 2024-12-20 NOTE — SUBJECTIVE & OBJECTIVE
"Past Medical History:   Diagnosis Date    Crohn's disease 1998    ESRD (end stage renal disease) on dialysis 10/2017    Hypertension     Obstructive uropathy        Past Surgical History:   Procedure Laterality Date    COLOSTOMY  2006    DIALYSIS FISTULA CREATION Left 02/2018    NEPHRECTOMY Right     REMOVAL OF TUNNELED CENTRAL VENOUS CATHETER (CVC) N/A 5/23/2018    Procedure: PERMCATH REMOVAL-TUNNELED CVC REMOVAL;  Surgeon: Parveen Ray MD;  Location: [facility] CATH LAB;  Service: Nephrology;  Laterality: N/A;  pt coming in @930       Review of patient's allergies indicates:   Allergen Reactions    Vancomycin analogues Anaphylaxis, Other (See Comments), Shortness Of Breath and Swelling     Light headed, see's spots      Aspirin Nausea And Vomiting and Other (See Comments)     Has crohn's disease    Other reaction(s): FLARES UP CROHNS      Has crohn's disease     Current Facility-Administered Medications   Medication Frequency    [START ON 12/20/2024] atorvastatin tablet 40 mg Daily    dextrose 50% injection 12.5 g PRN    dextrose 50% injection 25 g PRN    furosemide injection 80 mg ED 1 Time    glucagon (human recombinant) injection 1 mg PRN    glucose chewable tablet 16 g PRN    glucose chewable tablet 24 g PRN    insulin aspart U-100 pen 0-5 Units QID (AC + HS) PRN    NORepinephrine 4 mg in sodium chloride 0.9% 250 mL infusion (premix) Continuous    piperacillin-tazobactam (ZOSYN) 3.375 g in D5W 100 mL IVPB (MB+) Q12H    [START ON 12/20/2024] sevelamer carbonate tablet 800 mg TID WM    sodium chloride 0.9% flush 10 mL PRN     Current Outpatient Medications   Medication    amLODIPine (NORVASC) 10 MG tablet    atorvastatin (LIPITOR) 40 MG tablet    butalbital-acetaminophen-caffeine -40 mg (FIORICET, ESGIC) -40 mg per tablet    carvediloL (COREG) 12.5 MG tablet    catheter 10-16 Fr-" Misc    cholestyramine (QUESTRAN) 4 gram packet    cinacalcet (SENSIPAR) 30 MG Tab    lisinopriL (PRINIVIL,ZESTRIL) 20 MG " tablet    methocarbamoL (ROBAXIN) 500 MG Tab    oxyCODONE-acetaminophen (PERCOCET) 5-325 mg per tablet    sevelamer carbonate (RENVELA) 800 mg Tab    sodium zirconium cyclosilicate (LOKELMA) 5 gram packet     Family History       Problem Relation (Age of Onset)    Emphysema Father    Hypertension Mother          Tobacco Use    Smoking status: Former     Passive exposure: Never    Smokeless tobacco: Never   Substance and Sexual Activity    Alcohol use: Not Currently    Drug use: Never    Sexual activity: Not Currently     Review of Systems   Constitutional:  Negative for activity change, fatigue and fever.   HENT:  Negative for congestion.    Eyes:  Negative for visual disturbance.   Respiratory:  Negative for apnea, shortness of breath and wheezing.    Cardiovascular:  Negative for chest pain, palpitations and leg swelling.   Gastrointestinal:  Negative for abdominal distention.   Endocrine: Negative for polyuria.   Genitourinary:  Positive for difficulty urinating (anuric at baseline).   Allergic/Immunologic: Negative for immunocompromised state.     Objective:     Vital Signs (Most Recent):  Temp: 97.7 °F (36.5 °C) (12/19/24 1404)  Pulse: 109 (12/19/24 1908)  Resp: 20 (12/19/24 1530)  BP: 90/70 (12/19/24 1912)  SpO2: 96 % (12/19/24 1530) Vital Signs (24h Range):  Temp:  [97.7 °F (36.5 °C)] 97.7 °F (36.5 °C)  Pulse:  [] 109  Resp:  [20-30] 20  SpO2:  [96 %-98 %] 96 %  BP: (78-92)/(51-70) 90/70     Weight: 54 kg (119 lb 0.8 oz) (12/19/24 1423)  Body mass index is 19.21 kg/m².  Body surface area is 1.59 meters squared.    No intake/output data recorded.     Physical Exam  Constitutional:       General: He is not in acute distress.     Appearance: Normal appearance. He is not ill-appearing or toxic-appearing.   HENT:      Head: Normocephalic and atraumatic.      Right Ear: External ear normal.      Left Ear: External ear normal.      Nose: Nose normal.      Mouth/Throat:      Mouth: Mucous membranes are moist.    Eyes:      Extraocular Movements: Extraocular movements intact.   Cardiovascular:      Rate and Rhythm: Normal rate and regular rhythm.   Pulmonary:      Effort: Pulmonary effort is normal.      Comments: Fine crackles throughout.   Abdominal:      General: Abdomen is flat.      Palpations: Abdomen is soft.   Musculoskeletal:         General: Normal range of motion.      Right lower leg: Edema (2+) present.      Left lower leg: Edema (2+) present.   Skin:     Capillary Refill: Capillary refill takes less than 2 seconds.   Neurological:      General: No focal deficit present.      Mental Status: He is alert and oriented to person, place, and time.   Psychiatric:         Mood and Affect: Mood normal.         Behavior: Behavior normal.         Thought Content: Thought content normal.         Judgment: Judgment normal.          Significant Labs:  CBC:   Recent Labs   Lab 12/19/24  1430 12/19/24  1436   WBC 4.79  --    RBC 2.64*  --    HGB 7.9*  --    HCT 25.9* 24*     --    MCV 98  --    MCH 29.9  --    MCHC 30.5*  --      CMP:   Recent Labs   Lab 12/19/24  1430   GLU 93   CALCIUM 9.0   ALBUMIN 2.8*   PROT 6.6   *   K 4.6   CO2 29   CL 95   BUN 36*   CREATININE 4.8*   ALKPHOS 259*   ALT 11   AST 11   BILITOT 0.5     All labs within the past 24 hours have been reviewed.    Significant Imaging:  X-Ray: Reviewed

## 2024-12-20 NOTE — ASSESSMENT & PLAN NOTE
ESRD on HD. Went for scheduled HD session after additional session on 12/18 and was unable to receive dialysis due to hypotension.      --Nephrology consulted, appreciate assistance  --BMP daily  --Continue phos binders with meals  --Still makes urine, bladder scan and in and out cath as needed  --Renally dose medications, avoid nephrotoxins

## 2024-12-20 NOTE — SUBJECTIVE & OBJECTIVE
Interval History/Significant Events:  Increasing oxygen requirements overnight.  Vasopressor needs increasing.  Infectious Disease consulted for vegetation on aortic valve found on echocardiogram.    Review of Systems   Respiratory:  Positive for shortness of breath. Negative for chest tightness.    Cardiovascular:  Negative for chest pain.   All other systems reviewed and are negative.    Objective:     Vital Signs (Most Recent):  Temp: 98.3 °F (36.8 °C) (12/20/24 0300)  Pulse: 96 (12/20/24 0600)  Resp: (!) 23 (12/20/24 0600)  BP: (!) 85/57 (12/20/24 0600)  SpO2: 96 % (12/20/24 0600) Vital Signs (24h Range):  Temp:  [97.7 °F (36.5 °C)-99.2 °F (37.3 °C)] 98.3 °F (36.8 °C)  Pulse:  [] 96  Resp:  [12-30] 23  SpO2:  [91 %-100 %] 96 %  BP: (75-94)/(51-70) 85/57   Weight: 55.3 kg (121 lb 14.6 oz)  Body mass index is 19.68 kg/m².      Intake/Output Summary (Last 24 hours) at 12/20/2024 0814  Last data filed at 12/20/2024 0500  Gross per 24 hour   Intake 480 ml   Output 100 ml   Net 380 ml          Physical Exam  Vitals and nursing note reviewed.   Constitutional:       General: He is not in acute distress.     Appearance: Normal appearance. He is normal weight. He is not ill-appearing, toxic-appearing or diaphoretic.   HENT:      Head: Normocephalic and atraumatic.      Right Ear: External ear normal.      Left Ear: External ear normal.      Nose: Nose normal.   Cardiovascular:      Rate and Rhythm: Regular rhythm. Tachycardia present.      Pulses: Normal pulses.      Heart sounds: Normal heart sounds. No murmur heard.     No friction rub. No gallop.      Comments: No JVD or peripheral edema  Pulmonary:      Comments: Mildly increased respiratory effort with bibasilar crackles  Abdominal:      General: Abdomen is flat. Bowel sounds are normal. There is no distension.      Palpations: Abdomen is soft.      Tenderness: There is no abdominal tenderness. There is no guarding or rebound.   Musculoskeletal:          General: No swelling or tenderness. Normal range of motion.   Skin:     General: Skin is warm and dry.      Coloration: Skin is not jaundiced or pale.   Neurological:      General: No focal deficit present.      Mental Status: He is alert and oriented to person, place, and time. Mental status is at baseline.   Psychiatric:         Mood and Affect: Mood normal.         Behavior: Behavior normal.         Thought Content: Thought content normal.         Judgment: Judgment normal.            Vents:     Lines/Drains/Airways       Central Venous Catheter Line  Duration                  Hemodialysis Catheter right subclavian -- days              Drain  Duration                  Colostomy 12/19/24 2225 RLQ <1 day              Peripheral Intravenous Line  Duration                  Peripheral IV - Single Lumen 12/19/24 1428 20 G Anterior;Right Forearm <1 day         Peripheral IV - Single Lumen 12/19/24 1429 20 G Left;Posterior Hand <1 day                  Significant Labs:    CBC/Anemia Profile:  Recent Labs   Lab 12/19/24  1430 12/19/24  1436 12/20/24  0351   WBC 4.79  --  4.66   HGB 7.9*  --  7.7*   HCT 25.9* 24* 24.6*     --  147*   MCV 98  --  97   RDW 16.6*  --  16.1*   IRON  --   --  26*   FERRITIN  --   --  2,808*   FOLATE  --   --  8.0   [id number]  --   --  770        Chemistries:  Recent Labs   Lab 12/19/24  1430 12/19/24  2106 12/20/24  0351   *  --  133*   K 4.6  --  5.0   CL 95  --  96   CO2 29  --  25   BUN 36*  --  43*   CREATININE 4.8*  --  5.6*   CALCIUM 9.0  --  8.6*   ALBUMIN 2.8*  --   --    PROT 6.6  --   --    BILITOT 0.5  --   --    ALKPHOS 259*  --   --    ALT 11  --   --    AST 11  --   --    MG  --  1.9 2.0   PHOS  --   --  5.4*       All pertinent labs within the past 24 hours have been reviewed.    Significant Imaging:  I have reviewed all pertinent imaging results/findings within the past 24 hours.
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
## 2024-12-20 NOTE — PROGRESS NOTES
12/20/24 1738   Treatment   Treatment Type SLED   Treatment Status New start   Dialysis Machine Number [id number]   Dialyzer Time (hours) 0   BVP (Liters) 0 L   Solutions Labeled and Current  Yes   Access Tunneled Cath;Right;IJ   Catheter Dressing Intact  Yes   Alarms Engaged Yes   CRRT Comments sled initiated   Prescription   Time (Hours) 6   Dialysate K + (mEq/L) 4   Dialysate CA + (mEq/L) 2.25   Dialysate HCO3 - (Bicarb) (mEq/L) 30   Dialysate Na + (mEq/L) 140   Cartridge Type Other  (r300)   Dialysate Flow Rate (mL/min) 200   UF Goal Rate 375 mL/hr   CRRT Hourly Documentation   Blood Flow (mL/min) 150   UF Rate 200 cc/hr   Arterial Pressure (mmHg) -30 mmHg   Venous Pressure (mmHg) 20 mmHg   Effluent Pressure (EP) (mmHg) 10 mmHg   Total UF (Hourly Cleared) (mL) 0     SLED initiated as ordered. RIJ PC aspirated, flushed, and accessed using aseptic technique. Lines connected and secured.

## 2024-12-20 NOTE — ASSESSMENT & PLAN NOTE
69M presented after being hypotensive during outpatient HD, found to have mobile mass at LVOT on TTE. 12/19 BCX NGTD. Reported vancomycin allergy, he states the reaction was feeling hot and dizzy, but that he may have had trouble breathing too. Unclear if true vancomycin allergy or just infusion reaction. States he will think about it and consider doing a trial of vancomycin while hospitalized and under close observation.     Recommendations  Continue daptomycin  De-escalate zosyn to cefepime 1g daily (HD dosing)  Follow up 12/19 BCX  Obtain DAVID        The above plan was discussed with the primary team.

## 2024-12-20 NOTE — ASSESSMENT & PLAN NOTE
HFrEF (30% 8/2024 from 20-25% 6/2024) per cardiology notes.  Suspect volume overload with history HF and ESRD.    --Dialysis for volume removal, given 1 time dose lasix in ED still makes urine  --Formal echo ordered  --Cardiology following  --Trend troponin  --Continuous cardiac monitoring   --EKG PRN

## 2024-12-20 NOTE — ED NOTES
Patient identifiers for Flakito Mcginnis 69 y.o. male checked and correct.  Chief Complaint   Patient presents with    Shortness of Breath    Hypotension     Pt arrived via EMS with c/o SOB and hypotension. Dialysis center could not do dialysis d/t low BP. Pt states has been SOB x1 wk. Denies fevers.     Past Medical History:   Diagnosis Date    Crohn's disease 1998    ESRD (end stage renal disease) on dialysis 10/2017    Hypertension     Obstructive uropathy      Allergies reported:   Review of patient's allergies indicates:   Allergen Reactions    Vancomycin analogues Anaphylaxis, Other (See Comments), Shortness Of Breath and Swelling     Light headed, see's spots      Aspirin Nausea And Vomiting and Other (See Comments)     Has crohn's disease    Other reaction(s): FLARES UP CROHNS      Has crohn's disease   /  Pt arrived from dialysis center via EMS, dialysis NOT completed due to pt's having recurrent episodes of hypotension, latest BP 73./34 per EMS    LOC: Patient is awake, alert, and aware of environment with an anxious affect. Patient is oriented x 4 and speaking appropriately despite obvious respiratory distress.   APPEARANCE: Patient in acute respiratory distress requiring supplemental oxygen beyond baseline requirements of 2L PRN.   HEENT: WDL  SKIN: The skin is warm and dry. Patient has normal skin turgor and moist mucus membranes. Dialysis permacath access to right upper chest.   MUSCULOSKELETAL: Patient is moving all extremities well, no obvious deformities noted. Pulses intact.   RESPIRATORY: Airway is open, pt endorses shortness of breath upon assessment. Pt arrives on 4L nasal canula with O2 saturation of 94%. Respirations are spontaneous and labored with increased effort and rate, tachypnea, 30-35 bpm.  CARDIAC: Patient tachycardic, 108 sinus tach on cardiac monitor. Significant peripheral edema noted in bilateral lower extremities. Denies chest pain at at time of assessment.   ABDOMEN: No distention  noted. Soft and non-tender upon palpation. Hernia of some nature noted, previous surgical scars noted.   NEUROLOGICAL: pupils 3.mm, PERRL. Facial expression is symmetrical. Hand grasps are equal bilaterally. Normal sensation in all extremities when touched with finger.

## 2024-12-20 NOTE — ED NOTES
Cardiology MD at bedside.     Pt wishes to remain sitting on the side of the bed supported by bedside table, direct vision of pt and pt's vitals maintained by this RN and Charge RN to ensure safety and comfort of pt.

## 2024-12-20 NOTE — PROGRESS NOTES
[facility] - Medical ICU  Nephrology  Progress Note    Patient Name: Flakito Mcginnis  MRN: [id number]  Admission Date: 12/19/2024  Hospital Length of Stay: 1 days  Attending Provider: Jose Mendieta MD   Primary Care Physician: Jordin Robbins MD  Principal Problem:Septic shock    Subjective:     HPI: Patient is a pleasant 69 year old with ESRD on HD TThS who presents to the ER at the request of his HD unit for evaluation of hypotension. Patient completed his a session on Tuesday 12/17/24 and went back for a second session on 12/18/24. Review of his outpatient dialysis notes show that his post BUN dialysis values on 12/17 were 10 with a pre-HD BUN of 41 indicating a urea reduction percentage of 76% suggesting that he received DH on 12/17/24. There are also post HD treatment vital signs recorded on 12/18/24 at 1035 am which also show a pre-HD weight of 56.3 kg and post HD weight of 54.6 kg suggesting that he received an HD session yesterday as well and left at his estimated dry weight. He reported for his routine dialysis but was sent into the ER when he was found to be hypotensive in the HD unit. He reports feeling well with no signs or symptoms of hypotension prior to arrival, however he does report increased loose stool output since his HD session yesterday. He denies any increase in salt or fluid intake and tries to adhere to a low salt and 1L fluid restricting per day diet.    Nephrology has been consulted for management of his dialysis while he is admitted to the hospital. Labs on arrival showed stable electrolytes, venous blood gas showed a metabolic alkalosis with a pCO2 of 44, BNP elevated at >4,900 with no prior values to compare to, and troponin elevated at 923. Weight in the ER was 54 kg. Chest xray was obtained and showed no significant change in the cardiopulmonary status of the patient when compared to previous chest xray. Patient reports oxygen use of 4L at home and reports resolution of his dyspnea once  he was placed on his home oxygen dose.     Outpatient HD Information:  -Dialysis modality: Hemodialysis  -Outpatient HD unit: [facility]  -Nephrologist: Dr. Strickland  -HD TX days: Tuesday/Thursday/Saturday  -Last HD TX prior to hospital admission: 12/18/2024  -Residual urine: Anuric   -EDW: 54.5 kg      Interval hx: Flakito is laying in bed this morning, wearing his nasal cannula, no major complaints at this time. BP borderline hypotensive.     Objective:     Vital Signs (Most Recent):  Temp: 98.6 °F (37 °C) (12/20/24 1100)  Pulse: 105 (12/20/24 1200)  Resp: 16 (12/20/24 1207)  BP: (!) 91/59 (12/20/24 1200)  SpO2: (!) 91 % (12/20/24 1200) Vital Signs (24h Range):  Temp:  [97.9 °F (36.6 °C)-99.2 °F (37.3 °C)] 98.6 °F (37 °C)  Pulse:  [] 105  Resp:  [7-37] 16  SpO2:  [85 %-100 %] 91 %  BP: (75-99)/(48-70) 91/59     Weight: 55.3 kg (121 lb 14.6 oz) (12/19/24 2215)  Body mass index is 19.68 kg/m².  Body surface area is 1.6 meters squared.    I/O last 3 completed shifts:  In: 1115.3 [P.O.:480; I.V.:37.1; IV Piggyback:598.2]  Out: 100 [Stool:100]     Physical Exam  Constitutional:       General: He is not in acute distress.     Appearance: Normal appearance. He is not ill-appearing or toxic-appearing.   HENT:      Head: Normocephalic and atraumatic.      Right Ear: External ear normal.      Left Ear: External ear normal.      Nose: Nose normal.      Mouth/Throat:      Mouth: Mucous membranes are moist.   Eyes:      Extraocular Movements: Extraocular movements intact.   Cardiovascular:      Rate and Rhythm: Normal rate and regular rhythm.   Pulmonary:      Effort: Pulmonary effort is normal.      Comments: Fine crackles throughout.   Abdominal:      General: Abdomen is flat.      Palpations: Abdomen is soft.   Musculoskeletal:         General: Normal range of motion.      Right lower leg: Edema (2+) present.      Left lower leg: Edema (2+) present.   Skin:     Capillary Refill: Capillary refill  takes less than 2 seconds.   Neurological:      General: No focal deficit present.      Mental Status: He is alert and oriented to person, place, and time.   Psychiatric:         Mood and Affect: Mood normal.         Behavior: Behavior normal.         Thought Content: Thought content normal.         Judgment: Judgment normal.          Significant Labs:  CBC:   Recent Labs   Lab 12/20/24  0351   WBC 4.66   RBC 2.55*   HGB 7.7*   HCT 24.6*   *   MCV 97   MCH 30.2   MCHC 31.3*     CMP:   Recent Labs   Lab 12/19/24  1430 12/20/24  0351   GLU 93 91   CALCIUM 9.0 8.6*   ALBUMIN 2.8*  --    PROT 6.6  --    * 133*   K 4.6 5.0   CO2 29 25   CL 95 96   BUN 36* 43*   CREATININE 4.8* 5.6*   ALKPHOS 259*  --    ALT 11  --    AST 11  --    BILITOT 0.5  --      All labs within the past 24 hours have been reviewed.    Significant Imaging:  X-Ray: Reviewed    Assessment/Plan:   Cardiac/Vascular  Shock  Management per primary team.      Renal/  ESRD on hemodialysis  Most recent dialysis sessions 12/17 and 12/18, did not get dialyzed 12/19 d/t hypotension. Below dry weight on admission. Chronic 4 LPM NC.      Outpatient HD Information:  -Dialysis modality: Hemodialysis  -Outpatient HD unit: [facility]  -Nephrologist: Dr. Strickland  -HD TX days: Tuesday/Thursday/Saturday  -Last HD TX prior to hospital admission: 12/18/2024  -Residual urine: Anuric   -EDW: 54.5 kg    -Plan to resume HD TThS schedule while in hospital, SLED 6 hour session this afternoon/evening. Will aim for UF 1 L (250-375 cc/hr), however may be limited given his hypotension.   -currently requiring more O2 than home O2 (4 LPM)  -uncertain etiology of hypotension, on daptomycin cefepime, renally dosed appreciate pharmacy, has a known hx HFrEF, so cardiogenic versus septic, infectious workup per primary team.   -Phosphorus borderline elevated, on sevelamer  -renal diet when not NPO     Oncology  Anemia of chronic renal failure, stage  5  Checking iron studies.   -EPO with dialysis 2500 units    Thank you for your consult. I will follow-up with patient. Please contact us if you have any additional questions.    Karson White MD  Nephrology  [facility] - Medical ICU

## 2024-12-20 NOTE — SUBJECTIVE & OBJECTIVE
Interval hx: Flakito is laying in bed this morning, wearing his nasal cannula, no major complaints at this time. BP borderline hypotensive.     Objective:     Vital Signs (Most Recent):  Temp: 98.6 °F (37 °C) (12/20/24 1100)  Pulse: 105 (12/20/24 1200)  Resp: 16 (12/20/24 1207)  BP: (!) 91/59 (12/20/24 1200)  SpO2: (!) 91 % (12/20/24 1200) Vital Signs (24h Range):  Temp:  [97.9 °F (36.6 °C)-99.2 °F (37.3 °C)] 98.6 °F (37 °C)  Pulse:  [] 105  Resp:  [7-37] 16  SpO2:  [85 %-100 %] 91 %  BP: (75-99)/(48-70) 91/59     Weight: 55.3 kg (121 lb 14.6 oz) (12/19/24 2215)  Body mass index is 19.68 kg/m².  Body surface area is 1.6 meters squared.    I/O last 3 completed shifts:  In: 1115.3 [P.O.:480; I.V.:37.1; IV Piggyback:598.2]  Out: 100 [Stool:100]     Physical Exam  Constitutional:       General: He is not in acute distress.     Appearance: Normal appearance. He is not ill-appearing or toxic-appearing.   HENT:      Head: Normocephalic and atraumatic.      Right Ear: External ear normal.      Left Ear: External ear normal.      Nose: Nose normal.      Mouth/Throat:      Mouth: Mucous membranes are moist.   Eyes:      Extraocular Movements: Extraocular movements intact.   Cardiovascular:      Rate and Rhythm: Normal rate and regular rhythm.   Pulmonary:      Effort: Pulmonary effort is normal.      Comments: Fine crackles throughout.   Abdominal:      General: Abdomen is flat.      Palpations: Abdomen is soft.   Musculoskeletal:         General: Normal range of motion.      Right lower leg: Edema (2+) present.      Left lower leg: Edema (2+) present.   Skin:     Capillary Refill: Capillary refill takes less than 2 seconds.   Neurological:      General: No focal deficit present.      Mental Status: He is alert and oriented to person, place, and time.   Psychiatric:         Mood and Affect: Mood normal.         Behavior: Behavior normal.         Thought Content: Thought content normal.         Judgment: Judgment normal.           Significant Labs:  CBC:   Recent Labs   Lab 12/20/24  0351   WBC 4.66   RBC 2.55*   HGB 7.7*   HCT 24.6*   *   MCV 97   MCH 30.2   MCHC 31.3*     CMP:   Recent Labs   Lab 12/19/24  1430 12/20/24  0351   GLU 93 91   CALCIUM 9.0 8.6*   ALBUMIN 2.8*  --    PROT 6.6  --    * 133*   K 4.6 5.0   CO2 29 25   CL 95 96   BUN 36* 43*   CREATININE 4.8* 5.6*   ALKPHOS 259*  --    ALT 11  --    AST 11  --    BILITOT 0.5  --      All labs within the past 24 hours have been reviewed.    Significant Imaging:  X-Ray: Reviewed

## 2024-12-20 NOTE — ASSESSMENT & PLAN NOTE
Presented with hypotension unable to receive iHD.  SBP 70s on arrival to ED.  On exam patient with MAP 65-70 not on vasopressor support, but suspect will need vasopressors for dialysis. No obvious signs of infection on exam, tunneled cath dry/intact. Reports no cough, urinary symptoms.  WBC normal.  Suspect shock likely cardiogenic in setting of HF and ESRD with volume overload.      --Infectious workup sent, UA and blood culture  --Vegetation found on aortic valve on echocardiogram concerning for endocarditis  --Infectious diseases consult--appreciate input   --Started on cefepime and daptomycin given reported vancomycin allergy

## 2024-12-20 NOTE — PLAN OF CARE
[facility] - Medical ICU  Initial Discharge Assessment       Primary Care Provider: Jordin Robbins MD    Admission Diagnosis: Shock, unspecified [R57.9]  Shock [R57.9]  Screening for cardiovascular condition [Z13.6]  Dyspnea [R06.00]  SOB (shortness of breath) [R06.02]  Shoulder pain [M25.519]    Admission Date: 12/19/2024  Expected Discharge Date: 12/27/2024    Transition of Care Barriers: None    Payor: [employer]ET[employer] MANAGED MEDICARE / Plan: AETNA MEDICARE DUAL DSNP / Product Type: Medicare Advantage /     Extended Emergency Contact Information  Primary Emergency Contact: Sara Da Silva   [place]  Mobile Phone: [phone]  Relation: Sister    Discharge Plan A: Home with family  Discharge Plan B: Home with family      [id number] - [place] - [address] [facility] [address] [facility]  [address] [place] [address]  Phone: [phone] Fax: [phone]      Initial Assessment (most recent)       Adult Discharge Assessment - 12/20/24 1716          Discharge Assessment    Assessment Type Discharge Planning Assessment     Confirmed/corrected address, phone number and insurance Yes     Confirmed Demographics Correct on Facesheet     Source of Information patient     When was your last doctors appointment? 12/11/24     Does patient/caregiver understand observation status Yes     Communicated LLUVIA with patient/caregiver Date not available/Unable to determine     Reason For Admission Septic Shock     People in Home sibling(s);other relative(s)     Facility Arrived From: [facility]     Do you expect to return to your current living situation? Yes     Do you have help at home or someone to help you manage your care at home? Yes     Who are your caregiver(s) and their phone number(s)? Sara Da Silva,  # [phone]     Prior to hospitilization cognitive status: Alert/Oriented     Current cognitive status:  Alert/Oriented     Walking or Climbing Stairs Difficulty yes     Walking or Climbing Stairs ambulation difficulty, requires equipment     Mobility Management cane, rollator, w/c, and oxygen     Dressing/Bathing Difficulty no     Home Layout Able to live on 1st floor     Equipment Currently Used at Home wheelchair;rollator;cane, straight;oxygen     Readmission within 30 days? No     Patient currently being followed by outpatient case management? No     Do you currently have service(s) that help you manage your care at home? No     Do you take prescription medications? Yes     Do you have prescription coverage? Yes     Coverage [employer] Managed Medicare and Medicaid of [facility]     Do you have any problems affording any of your prescribed medications? No     Is the patient taking medications as prescribed? yes     Who is going to help you get home at discharge? will need a ride home     How do you get to doctors appointments? family or friend will provide;health plan transportation     Are you on dialysis? Yes     Dialysis Name and Scheduled days [facility] ([facility])     Do you take coumadin? No     Discharge Plan A Home with family     Discharge Plan B Home with family     DME Needed Upon Discharge  none     Discharge Plan discussed with: Patient     Transition of Care Barriers None        Physical Activity    On average, how many days per week do you engage in moderate to strenuous exercise (like a brisk walk)? 0 days     On average, how many minutes do you engage in exercise at this level? 10 min        Financial Resource Strain    How hard is it for you to pay for the very basics like food, housing, medical care, and heating? Not very hard        Housing Stability    In the last 12 months, was there a time when you were not able to pay the mortgage or rent on time? No     At any time in the past 12 months, were you homeless or living in a shelter (including now)? No        Transportation Needs    Has the lack of  transportation kept you from medical appointments, meetings, work or from getting things needed for daily living? No        Food Insecurity    Within the past 12 months, you worried that your food would run out before you got the money to buy more. Never true     Within the past 12 months, the food you bought just didn't last and you didn't have money to get more. Never true        Stress    Do you feel stress - tense, restless, nervous, or anxious, or unable to sleep at night because your mind is troubled all the time - these days? Rather much        Social Isolation    How often do you feel lonely or isolated from those around you?  Never        Alcohol Use    Q1: How often do you have a drink containing alcohol? Never     Q2: How many drinks containing alcohol do you have on a typical day when you are drinking? Patient does not drink     Q3: How often do you have six or more drinks on one occasion? Never        Utilities    In the past 12 months has the electric, gas, oil, or water company threatened to shut off services in your home? No        Health Literacy    How often do you need to have someone help you when you read instructions, pamphlets, or other written material from your doctor or pharmacy? Never        OTHER    Name(s) of People in Home Sara Da Silva, sister and Kirby Da Silva, brother in law                   SW met with patient at the bedside to complete DPA. Patient AAO x's 3 and able to verify demographics on face sheet are correct. Patient transferred [facility] to [facility] for hypotension. SW name and contact number listed on the patient's white board.  SW provided explanation of discharge plan process. Patient expressed understanding. CM team remains available for any further patient needs or concerns.     Discharge Plan A and Plan B have been determined by review of patient's clinical status, future medical and therapeutic needs, and coverage/benefits for  post-acute care in coordination with multidisciplinary team members.    Estelle Retana LMSW  [facility]  [phone]

## 2024-12-20 NOTE — SUBJECTIVE & OBJECTIVE
"Past Medical History:   Diagnosis Date    Crohn's disease 1998    ESRD (end stage renal disease) on dialysis 10/2017    Hypertension     Obstructive uropathy        Past Surgical History:   Procedure Laterality Date    COLOSTOMY  2006    DIALYSIS FISTULA CREATION Left 02/2018    NEPHRECTOMY Right     REMOVAL OF TUNNELED CENTRAL VENOUS CATHETER (CVC) N/A 5/23/2018    Procedure: PERMCATH REMOVAL-TUNNELED CVC REMOVAL;  Surgeon: Parveen Ray MD;  Location: [facility] CATH LAB;  Service: Nephrology;  Laterality: N/A;  pt coming in @930       Review of patient's allergies indicates:   Allergen Reactions    Vancomycin analogues Anaphylaxis, Other (See Comments), Shortness Of Breath and Swelling     Light headed, see's spots      Aspirin Nausea And Vomiting and Other (See Comments)     Has crohn's disease    Other reaction(s): FLARES UP CROHNS      Has crohn's disease       Medications:  Medications Prior to Admission   Medication Sig    amLODIPine (NORVASC) 10 MG tablet Take by mouth once daily.    atorvastatin (LIPITOR) 40 MG tablet Take 40 mg by mouth once daily.    butalbital-acetaminophen-caffeine -40 mg (FIORICET, ESGIC) -40 mg per tablet Take 1 tablet by mouth every 4 (four) hours as needed for Headaches.    carvediloL (COREG) 12.5 MG tablet Take 12.5 mg by mouth 2 (two) times daily.    catheter 10-16 Fr-" Misc 1 each by Misc.(Non-Drug; Combo Route) route daily as needed (for voiding).    cholestyramine (QUESTRAN) 4 gram packet Take 4 g by mouth once daily.    cinacalcet (SENSIPAR) 30 MG Tab Take 30 mg by mouth daily with breakfast.    lisinopriL (PRINIVIL,ZESTRIL) 20 MG tablet Take 20 mg by mouth.    methocarbamoL (ROBAXIN) 500 MG Tab Take 1 tablet (500 mg total) by mouth nightly as needed (muscle spasm). (Patient not taking: Reported on 12/11/2024)    oxyCODONE-acetaminophen (PERCOCET) 5-325 mg per tablet Take 1 tablet by mouth every 6 (six) hours as needed for Pain.    sevelamer carbonate (RENVELA) 800 mg " Tab Take 800 mg by mouth 3 (three) times daily with meals.    sodium zirconium cyclosilicate (LOKELMA) 5 gram packet Take 5 g by mouth once daily.     Antibiotics (From admission, onward)      Start     Stop Route Frequency Ordered    12/20/24 1245  ceFEPIme injection 1 g         -- IV Every 24 hours (non-standard times) 12/20/24 1144    12/20/24 1145  DAPTOmycin (CUBICIN) 555 mg in 0.9% NaCl SolP 50 mL IVPB         -- IV Every 48 hours (non-standard times) 12/20/24 1049          Antifungals (From admission, onward)      None          Antivirals (From admission, onward)      None             Immunization History   Administered Date(s) Administered    COVID-19, MRNA, LN-S, PF (Pfizer) (Purple Cap) 07/11/2021, 08/01/2021    Hepatitis B, Adult 11/13/2018, 12/11/2018, 01/15/2019, 05/14/2019    Influenza 11/11/2008, 10/12/2023    Influenza - Quadrivalent - High Dose - PF (65 years and older) 10/05/2021, 10/04/2022    Influenza - Trivalent - Fluzone High Dose - PF (65 years and older) 09/29/2020    PPD Test 09/07/2017    Pneumococcal Conjugate - 20 Valent 05/18/2023    Pneumococcal Conjugate - 7 Valent 11/11/2008    Td (ADULT) 11/11/2008       Family History       Problem Relation (Age of Onset)    Emphysema Father    Hypertension Mother          Social History     Socioeconomic History    Marital status: Single   Tobacco Use    Smoking status: Former     Passive exposure: Never    Smokeless tobacco: Never   Substance and Sexual Activity    Alcohol use: Not Currently    Drug use: Never    Sexual activity: Not Currently     Social Drivers of Health     Food Insecurity: No Food Insecurity (8/19/2024)    Received from [facility]    Hunger Vital Sign     Worried About Running Out of Food in the Last Year: Never true     Ran Out of Food in the Last Year: Never true   Transportation Needs: No Transportation Needs (8/19/2024)    Received [facility]    PRAPARE - Transportation     Lack of Transportation (Medical): No      Lack of Transportation (Non-Medical): No   Housing Stability: Low Risk  (8/19/2024)    Received from [facility]    Housing Stability Vital Sign     Unable to Pay for Housing in the Last Year: No     Number of Places Lived in the Last Year: 1     Unstable Housing in the Last Year: No     Review of Systems   Constitutional:  Negative for chills and fever.   Respiratory:  Negative for cough and shortness of breath.    Cardiovascular:  Negative for chest pain.   Gastrointestinal:  Negative for abdominal pain, constipation, diarrhea, nausea and vomiting.   Musculoskeletal:  Negative for arthralgias and myalgias.   Skin:  Negative for rash.   Neurological:  Negative for headaches.     Objective:     Vital Signs (Most Recent):  Temp: 98.6 °F (37 °C) (12/20/24 1100)  Pulse: 105 (12/20/24 1200)  Resp: 16 (12/20/24 1207)  BP: (!) 91/59 (12/20/24 1200)  SpO2: (!) 91 % (12/20/24 1200) Vital Signs (24h Range):  Temp:  [97.7 °F (36.5 °C)-99.2 °F (37.3 °C)] 98.6 °F (37 °C)  Pulse:  [] 105  Resp:  [7-37] 16  SpO2:  [85 %-100 %] 91 %  BP: (75-99)/(48-70) 91/59     Weight: 55.3 kg (121 lb 14.6 oz)  Body mass index is 19.68 kg/m².    Estimated Creatinine Clearance: 9.7 mL/min (A) (based on SCr of 5.6 mg/dL (H)).     Physical Exam  Vitals reviewed.   Constitutional:       General: He is not in acute distress.     Appearance: Normal appearance. He is not ill-appearing.   HENT:      Head: Normocephalic and atraumatic.   Eyes:      Extraocular Movements: Extraocular movements intact.      Conjunctiva/sclera: Conjunctivae normal.   Cardiovascular:      Rate and Rhythm: Normal rate and regular rhythm.      Heart sounds: No murmur heard.  Pulmonary:      Effort: Pulmonary effort is normal. No respiratory distress.      Breath sounds: Normal breath sounds.   Abdominal:      General: Abdomen is flat. Bowel sounds are normal.      Palpations: Abdomen is soft.      Tenderness: There is no abdominal tenderness.   Musculoskeletal:       Cervical back: Normal range of motion.   Skin:     General: Skin is warm and dry.   Neurological:      General: No focal deficit present.      Mental Status: He is alert.   Psychiatric:         Mood and Affect: Mood normal.         Behavior: Behavior normal.         Thought Content: Thought content normal.          Significant Labs: All pertinent labs within the past 24 hours have been reviewed.  Recent Lab Results  (Last 5 results in the past 24 hours)        12/20/24  1157   12/20/24  1003   12/20/24  1001   12/20/24  0813   12/20/24  0351        Anion Gap         12       Ascending aorta     3.37           Ao peak rosa elena     2.6           Ao VTI     43.4           AV valve area     1.2           WU by Velocity Ratio     1.2           AV area by cont VTI     1.1           AV mean gradient     16.5           AV index (prosthetic)     0.37           LVOT mn grad     2           AV LVOT peak gradient     4           AV peak gradient     27.0           AV regurgitation pressure 1/2 time     341.47           AV Velocity Ratio     0.38           Baso #         0.03       Basophil %         0.6       BUN         43       Calcium         8.6       Chloride         96       CO2         25       Creatinine         5.6       Left Ventricle Relative Wall Thickness     0.30           Differential Method         Automated       Echo EF Estimated     24           E/E' ratio     13.65           eGFR         10.3       EF     20           Eos #         0.1       Eos %         1.3       Ferritin         2,808       Folate         8.0       FS     11.7           Glucose         91       Gran # (ANC)         3.6       Gran %         78.1       Hematocrit         24.6       Hemoglobin         7.7       Immature Grans (Abs)         0.02  Comment: Mild elevation in immature granulocytes is non specific and   can be seen in a variety of conditions including stress response,   acute inflammation, trauma and pregnancy. Correlation with other    laboratory and clinical findings is essential.         Immature Granulocytes         0.4       Iron         26       IVC diameter     2.04           IVSd     0.9           LA WIDTH     4.40           Left Atrium Major Axis     6.25           Left Atrium Minor Axis     6.51           LA size     4.49           LA Vol     107.09                139.08           LA vol index     66.1           ADALBERTO (MOD)     85.9           LVOT area     3.1           LV LATERAL E/E' RATIO     8.29           LV SEPTAL E/E' RATIO     38.67           LV EDV BP     177.84           LV Diastolic Volume Index     109.78           LVIDd     6.0           LVIDs     5.3           LV mass     215.7           LV Mass Index     133.2           LVOT diameter     2.0           LVOT peak rosa elena     1.0           LVOT stroke volume     49.9           LVOT peak VTI     15.9           LV ESV BP     135.10           LV Systolic Volume Index     83.4           Lymph #         0.5       Lymph %         9.7       Magnesium          2.0       MCH         30.2       MCHC         31.3       MCV         97       Mean e'     0.09           Mono #         0.5       Mono %         9.9       MPV         12.0       MV valve area by planimetry     3.79           MV peak gradient     3           MV Peak E Rosa Elena     1.16           nRBC         0       Del Angel's Biplane MOD Ejection Fraction     25           Phosphorus Level         5.4       Platelet Count         147       POCT Glucose 122       78         Potassium         5.0       PTH   545.8             PW     0.9           RA Area     28.0           RA Major Axis     5.93           Est. RA pres     8           RA Width     5.22           RBC         2.55       RDW         16.1       RV S'     7.57           RV TB RVSP     11           RV/LV Ratio     0.75           RV- bass basal diam     4.5           Saturated Iron         12       Sinus     3.40           Sodium         133       STJ     3.13           TAPSE      1.59           TDI SEPTAL     0.03           TDI LATERAL     0.14           TIBC         209       Transferrin         141       Triscuspid Valve Regurgitation Peak Gradient     33           TR Max Wilberto     2.89           TV PG     33           TV resting pulmonary artery pressure     41           Vitamin B12         770       WBC         4.66       ZLVIDD     2.69           ZLVIDS     4.93                                  Significant Imaging: I have reviewed all pertinent imaging results/findings within the past 24 hours.

## 2024-12-20 NOTE — HOSPITAL COURSE
Flakito Mcginnis is a 69 y.o. 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 ID consulted and recommending 6 weeks of IV cefazolin. CTS evaluated patient and recommending medical management at this time due to multiple co-morbidities. Tunneled catheter removed 12/22. Repeat cultures from 12/23 with NGTD. Temporary RIJ trialysis line placed 12/24. Course further complicated by acute hypoxemic respiratory failure likely volume overload requiring HFNC. Oxygenation improved with volume removal with dialysis. Patient weaned down to NC. Patient stable to step down to hospital medicine.

## 2024-12-20 NOTE — PLAN OF CARE
MICU DAILY GOALS     Family/Goals of care/Code Status   Code Status: Full Code    24H Vital Sign Range  Temp:  [97.7 °F (36.5 °C)-99.2 °F (37.3 °C)]   Pulse:  []   Resp:  [12-30]   BP: (75-94)/(51-70)   SpO2:  [91 %-100 %]      Shift Events (include procedures and significant events)   Started on levo, oxygen requirements increasing    AWAKE RASS: Goal -    Actual -      Restraint necessity: Not necessary   BREATHE SBT: NA    Coordinate A & B, analgesics/sedatives Pain:  chronic    SAT: NA   Delirium CAM-ICU:     Early(intubated/ Progressive (non-intubated) Mobility MOVE Screen (INTUBATED ONLY): NA    Activity: Activity Management: Rolling - L1   Feeding/Nutrition Diet order: Diet/Nutrition Received: restrict fluids, Specialty Diet/Nutrition Received: renal diet   Thrombus DVT prophylaxis: VTE Core Measure: Pharmacological prophylaxis initiated/maintained   HOB Elevation Head of Bed (HOB) Positioning: HOB at 30 degrees   Ulcer Prophylaxis GI: yes   Glucose control managed     Skin Skin assessment:     Sacrum intact/not altered? Yes  Heels intact/not altered? Yes  Surgical wound? No    CHECK ONE!   (no altered skin or altered skin) and sub boxes:  [x] No Altered Skin Integrity Present    [x]Prevention Measures Documented    [] Altered Skin Integrity Present or Discovered   [] LDA present in EPIC, daily doc completed              [] LDA added if not in EPIC (describe wound).                    When describing wound, do not stage, use descriptive words only.    [] Wound Image Taken (required on admit,                   transfer/discharge and every Tuesday)    Wound Care Consulted? No   Bowel Function colostomy     Indwelling Catheter Necessity           Hemodialysis Catheter right subclavian-Line Necessity Review: CRRT/HD     De-escalation Antibiotics No        VS and assessment per flow sheet, patient progressing towards goals as tolerated, plan of care reviewed with  patient , all concerns addressed, will  continue to monitor.

## 2024-12-20 NOTE — CONSULTS
[facility]  Nephrology  Consult Note    Patient Name: Flakito Mcginnis  MRN: [id number]  Admission Date: 12/19/2024  Hospital Length of Stay: 0 days  Attending Provider: Jose Mendieta MD   Primary Care Physician: Jordin Robbins MD  Principal Problem:Shock, unspecified    Inpatient consult to Nephrology  Consult performed by: Isaac Cabezas MD  Consult ordered by: Dao Knight MD  Reason for consult: ESRD        Subjective:     HPI: Patient is a pleasant 69 year old with ESRD on HD TThS who presents to the ER at the request of his HD unit for evaluation of hypotension. Patient completed his a session on Tuesday 12/17/24 and went back for a second session on 12/18/24. Review of his outpatient dialysis notes show that his post BUN dialysis values on 12/17 were 10 with a pre-HD BUN of 41 indicating a urea reduction percentage of 76% suggesting that he received DH on 12/17/24. There are also post HD treatment vital signs recorded on 12/18/24 at 1035 am which also show a pre-HD weight of 56.3 kg and post HD weight of 54.6 kg suggesting that he received an HD session yesterday as well and left at his estimated dry weight. He reported for his routine dialysis but was sent into the ER when he was found to be hypotensive in the HD unit. He reports feeling well with no signs or symptoms of hypotension prior to arrival, however he does report increased loose stool output since his HD session yesterday. He denies any increase in salt or fluid intake and tries to adhere to a low salt and 1L fluid restricting per day diet.    Nephrology has been consulted for management of his dialysis while he is admitted to the hospital. Labs on arrival showed stable electrolytes, venous blood gas showed a metabolic alkalosis with a pCO2 of 44, BNP elevated at >4,900 with no prior values to compare to, and troponin elevated at 923. Weight in the ER was 54 kg. Chest xray was obtained and showed no significant change in the  cardiopulmonary status of the patient when compared to previous chest xray. Patient reports oxygen use of 4L at home and reports resolution of his dyspnea once he was placed on his home oxygen dose.     Outpatient HD Information:  -Dialysis modality: Hemodialysis  -Outpatient HD unit: [facility]  -Nephrologist: Dr. Strickland  -HD TX days: Tuesday/Thursday/Saturday  -Last HD TX prior to hospital admission: 12/18/2024  -Residual urine: Anuric   -EDW: 54.5 kg      Past Medical History:   Diagnosis Date    Crohn's disease 1998    ESRD (end stage renal disease) on dialysis 10/2017    Hypertension     Obstructive uropathy        Past Surgical History:   Procedure Laterality Date    COLOSTOMY  2006    DIALYSIS FISTULA CREATION Left 02/2018    NEPHRECTOMY Right     REMOVAL OF TUNNELED CENTRAL VENOUS CATHETER (CVC) N/A 5/23/2018    Procedure: PERMCATH REMOVAL-TUNNELED CVC REMOVAL;  Surgeon: Parveen Ray MD;  Location: [facility] CATH LAB;  Service: Nephrology;  Laterality: N/A;  pt coming in @930       Review of patient's allergies indicates:   Allergen Reactions    Vancomycin analogues Anaphylaxis, Other (See Comments), Shortness Of Breath and Swelling     Light headed, see's spots      Aspirin Nausea And Vomiting and Other (See Comments)     Has crohn's disease    Other reaction(s): FLARES UP CROHNS      Has crohn's disease     Current Facility-Administered Medications   Medication Frequency    [START ON 12/20/2024] atorvastatin tablet 40 mg Daily    dextrose 50% injection 12.5 g PRN    dextrose 50% injection 25 g PRN    furosemide injection 80 mg ED 1 Time    glucagon (human recombinant) injection 1 mg PRN    glucose chewable tablet 16 g PRN    glucose chewable tablet 24 g PRN    insulin aspart U-100 pen 0-5 Units QID (AC + HS) PRN    NORepinephrine 4 mg in sodium chloride 0.9% 250 mL infusion (premix) Continuous    piperacillin-tazobactam (ZOSYN) 3.375 g in D5W 100 mL IVPB (MB+) Q12H    [START ON 12/20/2024]  "sevelamer carbonate tablet 800 mg TID WM    sodium chloride 0.9% flush 10 mL PRN     Current Outpatient Medications   Medication    amLODIPine (NORVASC) 10 MG tablet    atorvastatin (LIPITOR) 40 MG tablet    butalbital-acetaminophen-caffeine -40 mg (FIORICET, ESGIC) -40 mg per tablet    carvediloL (COREG) 12.5 MG tablet    catheter 10-16 Fr-" Misc    cholestyramine (QUESTRAN) 4 gram packet    cinacalcet (SENSIPAR) 30 MG Tab    lisinopriL (PRINIVIL,ZESTRIL) 20 MG tablet    methocarbamoL (ROBAXIN) 500 MG Tab    oxyCODONE-acetaminophen (PERCOCET) 5-325 mg per tablet    sevelamer carbonate (RENVELA) 800 mg Tab    sodium zirconium cyclosilicate (LOKELMA) 5 gram packet     Family History       Problem Relation (Age of Onset)    Emphysema Father    Hypertension Mother          Tobacco Use    Smoking status: Former     Passive exposure: Never    Smokeless tobacco: Never   Substance and Sexual Activity    Alcohol use: Not Currently    Drug use: Never    Sexual activity: Not Currently     Review of Systems   Constitutional:  Negative for activity change, fatigue and fever.   HENT:  Negative for congestion.    Eyes:  Negative for visual disturbance.   Respiratory:  Negative for apnea, shortness of breath and wheezing.    Cardiovascular:  Negative for chest pain, palpitations and leg swelling.   Gastrointestinal:  Negative for abdominal distention.   Endocrine: Negative for polyuria.   Genitourinary:  Positive for difficulty urinating (anuric at baseline).   Allergic/Immunologic: Negative for immunocompromised state.     Objective:     Vital Signs (Most Recent):  Temp: 97.7 °F (36.5 °C) (12/19/24 1404)  Pulse: 109 (12/19/24 1908)  Resp: 20 (12/19/24 1530)  BP: 90/70 (12/19/24 1912)  SpO2: 96 % (12/19/24 1530) Vital Signs (24h Range):  Temp:  [97.7 °F (36.5 °C)] 97.7 °F (36.5 °C)  Pulse:  [] 109  Resp:  [20-30] 20  SpO2:  [96 %-98 %] 96 %  BP: (78-92)/(51-70) 90/70     Weight: 54 kg (119 lb 0.8 oz) (12/19/24 " 1423)  Body mass index is 19.21 kg/m².  Body surface area is 1.59 meters squared.    No intake/output data recorded.     Physical Exam  Constitutional:       General: He is not in acute distress.     Appearance: Normal appearance. He is not ill-appearing or toxic-appearing.   HENT:      Head: Normocephalic and atraumatic.      Right Ear: External ear normal.      Left Ear: External ear normal.      Nose: Nose normal.      Mouth/Throat:      Mouth: Mucous membranes are moist.   Eyes:      Extraocular Movements: Extraocular movements intact.   Cardiovascular:      Rate and Rhythm: Normal rate and regular rhythm.   Pulmonary:      Effort: Pulmonary effort is normal.      Comments: Fine crackles throughout.   Abdominal:      General: Abdomen is flat.      Palpations: Abdomen is soft.   Musculoskeletal:         General: Normal range of motion.      Right lower leg: Edema (2+) present.      Left lower leg: Edema (2+) present.   Skin:     Capillary Refill: Capillary refill takes less than 2 seconds.   Neurological:      General: No focal deficit present.      Mental Status: He is alert and oriented to person, place, and time.   Psychiatric:         Mood and Affect: Mood normal.         Behavior: Behavior normal.         Thought Content: Thought content normal.         Judgment: Judgment normal.          Significant Labs:  CBC:   Recent Labs   Lab 12/19/24  1430 12/19/24  1436   WBC 4.79  --    RBC 2.64*  --    HGB 7.9*  --    HCT 25.9* 24*     --    MCV 98  --    MCH 29.9  --    MCHC 30.5*  --      CMP:   Recent Labs   Lab 12/19/24  1430   GLU 93   CALCIUM 9.0   ALBUMIN 2.8*   PROT 6.6   *   K 4.6   CO2 29   CL 95   BUN 36*   CREATININE 4.8*   ALKPHOS 259*   ALT 11   AST 11   BILITOT 0.5     All labs within the past 24 hours have been reviewed.    Significant Imaging:  X-Ray: Reviewed    Assessment/Plan:     Cardiac/Vascular  Hypotension  Management per primary team.      Elevated troponin  Management per  primary team.      Renal/  ESRD on hemodialysis  After looking through his dialysis notes from yesterday, it does appear as if he completed two back-to-back sessions and left dialysis yesterday at his dry weight (54.6 kg) and reports high volume stool output since his HD session. He reports that he is on 4L chronic oxygen use at home, and is currently on that dose in the ER, and reporting resolution of his dyspnea once he was placed back on his home oxygen. Chest xray appears unchanged from prior studies.       Outpatient HD Information:  -Dialysis modality: Hemodialysis  -Outpatient HD unit: [facility]  -Nephrologist: Dr. Strickland  -HD TX days: Tuesday/Thursday/Saturday  -Last HD TX prior to hospital admission: 12/18/2024  -Residual urine: Anuric   -EDW: 54.5 kg    With patient completing two HD sessions over the past two days, being below his dry weight (54 kg recorded in the ER), completing dialysis yesterday at his dry weight, ongoing hypotension, resolution of his dyspnea when placed back on his home oxygen, and chest xray that does not show any change over previous studies, I feel as if it is unlikely that he is intravascularly volume overloaded, and more likely that he has low effective circulating volume which is accounting for his hypotension (likely as a result of outpatient ultrafiltration from dialysis the past two days and from large volume stool output). Patient's BNP is elevated at greater than 4,900, however there are no previous values to compare to, and the utility of BNP levels decrease in ESRD patients.     Recommendations  -Due to the reasons mentioned above, we will hold off on dialysis for tonight and re-evaluate in the morning.   -renal diet when not NPO.   -Dialysis consent was obtained and placed with the patient's chart in the emergency room. If patient's respiratory status worsens, please reach out to nephrology for re-evaluation of CRRT needs.     Oncology  Anemia of  chronic renal failure, stage 5  Checking iron studies.   -EPO with dialysis         Thank you for your consult. I will follow-up with patient. Please contact us if you have any additional questions.    Isaac Cabezas MD  Nephrology  [facility]

## 2024-12-20 NOTE — H&P
[facility]  Critical Care Medicine  History & Physical    Patient Name: Flakito Mcginnis  MRN: [id number]  Admission Date: 12/19/2024  Hospital Length of Stay: 0 days  Code Status: Full Code  Attending Physician: Jose Mendieta MD   Primary Care Provider: Jordin Robbins MD   Principal Problem: Shock, unspecified    Subjective:     HPI:  Mr. Flakito Mcginnis is a 69 y.o. man w/ HFrEF (30% 8/2024 from 20-25% 6/2024), NICM, MR, ESRD on HD, Crohn's s/p colostomy, h/o R nephrectomy.  He presented to [facility] ED from his HD center on 12/19 due to hypotension and ongoing shortness of breath.  He usually receives his dialysis on T, R, S and went on Wednesday for an extra session for volume removal.  He arrived on Thursday for his usually scheduled dialysis session and was directed to the ED due to hypotension.  On arrival in the ED his blood pressure was 78/51.  He was found to have a BNP >4900 and CXR concerning for volume overload.  High sensitivity troponin 923.  Critical care medicine was consulted for hypotension and admitted to MICU.      Hospital/ICU Course:  No notes on file     Past Medical History:   Diagnosis Date    Crohn's disease 1998    ESRD (end stage renal disease) on dialysis 10/2017    Hypertension     Obstructive uropathy        Past Surgical History:   Procedure Laterality Date    COLOSTOMY  2006    DIALYSIS FISTULA CREATION Left 02/2018    NEPHRECTOMY Right     REMOVAL OF TUNNELED CENTRAL VENOUS CATHETER (CVC) N/A 5/23/2018    Procedure: PERMCATH REMOVAL-TUNNELED CVC REMOVAL;  Surgeon: Parveen Ray MD;  Location: [facility] CATH LAB;  Service: Nephrology;  Laterality: N/A;  pt coming in @930       Review of patient's allergies indicates:   Allergen Reactions    Vancomycin analogues Anaphylaxis, Other (See Comments), Shortness Of Breath and Swelling     Light headed, see's spots      Aspirin Nausea And Vomiting and Other (See Comments)     Has crohn's disease    Other reaction(s): FLARES UP CROHNS       Has crohn's disease       Family History       Problem Relation (Age of Onset)    Emphysema Father    Hypertension Mother          Tobacco Use    Smoking status: Former     Passive exposure: Never    Smokeless tobacco: Never   Substance and Sexual Activity    Alcohol use: Not Currently    Drug use: Never    Sexual activity: Not Currently      Review of Systems   Respiratory:  Positive for shortness of breath. Negative for wheezing.    Cardiovascular:  Negative for chest pain.   Gastrointestinal:  Positive for diarrhea. Negative for abdominal pain, nausea and vomiting.     Objective:     Vital Signs (Most Recent):  Temp: 97.7 °F (36.5 °C) (12/19/24 1404)  Pulse: 103 (12/19/24 1530)  Resp: 20 (12/19/24 1530)  BP: (!) 87/57 (12/19/24 1530)  SpO2: 96 % (12/19/24 1530) Vital Signs (24h Range):  Temp:  [97.7 °F (36.5 °C)] 97.7 °F (36.5 °C)  Pulse:  [] 103  Resp:  [20-30] 20  SpO2:  [96 %-98 %] 96 %  BP: (78-92)/(51-62) 87/57   Weight: 54 kg (119 lb 0.8 oz)  Body mass index is 19.21 kg/m².    No intake or output data in the 24 hours ending 12/19/24 1754       Physical Exam  Vitals and nursing note reviewed.   Constitutional:       General: He is not in acute distress.     Appearance: He is ill-appearing.      Comments: Chronically ill appearing   HENT:      Mouth/Throat:      Mouth: Mucous membranes are moist.   Eyes:      Pupils: Pupils are equal, round, and reactive to light.   Cardiovascular:      Rate and Rhythm: Normal rate.      Pulses: Normal pulses.   Pulmonary:      Effort: Respiratory distress present.      Breath sounds: Rales present. No wheezing.   Abdominal:      Palpations: Abdomen is soft.   Musculoskeletal:      Right lower leg: Edema present.      Left lower leg: Edema present.   Skin:     General: Skin is warm.      Capillary Refill: Capillary refill takes less than 2 seconds.   Neurological:      General: No focal deficit present.      Mental Status: He is alert. Mental status is at baseline.             Vents:     Lines/Drains/Airways       Peripheral Intravenous Line  Duration                  Peripheral IV - Single Lumen 12/19/24 1428 20 G Anterior;Right Forearm <1 day         Peripheral IV - Single Lumen 12/19/24 1429 20 G Left;Posterior Hand <1 day                  Significant Labs:    CBC/Anemia Profile:  Recent Labs   Lab 12/19/24  1430 12/19/24  1436   WBC 4.79  --    HGB 7.9*  --    HCT 25.9* 24*     --    MCV 98  --    RDW 16.6*  --         Chemistries:  Recent Labs   Lab 12/19/24  1430   *   K 4.6   CL 95   CO2 29   BUN 36*   CREATININE 4.8*   CALCIUM 9.0   ALBUMIN 2.8*   PROT 6.6   BILITOT 0.5   ALKPHOS 259*   ALT 11   AST 11       All pertinent labs within the past 24 hours have been reviewed.    Significant Imaging: I have reviewed all pertinent imaging results/findings within the past 24 hours.  Assessment/Plan:     Cardiac/Vascular  * Shock, unspecified  Presented with hypotension unable to receive iHD.  SBP 70s on arrival to ED.  On exam patient with MAP 65-70 not on vasopressor support, but suspect will need vasopressors for dialysis. No obvious signs of infection on exam, tunneled cath dry/intact. Reports no cough, urinary symptoms.  WBC normal.  Suspect shock likely cardiogenic in setting of HF and ESRD with volume overload.      --Infectious workup sent, UA and blood culture  --Zosyn, allergy to vanc will send MRSA PCR  --Vasopressors for MAP goal >65  --Echo ordered  --Consider CT if suspicion for infection increases  --Trend LA    Elevated troponin  High sensitivity troponin troponin 923.  EKG with t wave inversions.  No complaints of chest pain.      --Cardiology following  --Trend trop  --Continuous cardiac monitoring  --EKG PRN     HFrEF (heart failure with reduced ejection fraction)  HFrEF (30% 8/2024 from 20-25% 6/2024) per cardiology notes.  Suspect volume overload with history HF and ESRD.    --Dialysis for volume removal, given 1 time dose lasix in ED still  makes urine  --Formal echo ordered  --Cardiology following  --Trend troponin  --Continuous cardiac monitoring   --EKG PRN     Hypertension  Holding antihypertensives in setting of shock.      Renal/  ESRD (end stage renal disease) on dialysis  ESRD on HD. Went for scheduled HD session after additional session on 12/18 and was unable to receive dialysis due to hypotension.      --Nephrology consulted, appreciate assistance  --BMP daily  --Continue phos binders with meals  --Still makes urine, bladder scan and in and out cath as needed  --Renally dose medications, avoid nephrotoxins    History of nephrectomy  Noted. See ESRD.        Critical Care Time: 50 minutes  Critical secondary to Patient has a condition that poses threat to life and bodily function: Shock    Plan discussed with PCCM fellow.      Critical care was time spent personally by me on the following activities: development of treatment plan with patient or surrogate and bedside caregivers, discussions with consultants, evaluation of patient's response to treatment, examination of patient, ordering and performing treatments and interventions, ordering and review of laboratory studies, ordering and review of radiographic studies, pulse oximetry, re-evaluation of patient's condition. This critical care time did not overlap with that of any other provider or involve time for any procedures.     Alicia Galloway NP  Critical Care Medicine  [facility]

## 2024-12-20 NOTE — PROGRESS NOTES
[facility] - Medical [facility]  Critical Care Medicine  Progress Note    Patient Name: Flakito Mcginnis  MRN: [id number]  Admission Date: 12/19/2024  Hospital Length of Stay: 1 days  Code Status: Full Code  Attending Provider: Jose Mendieta MD  Primary Care Provider: Jordin Robbins MD   Principal Problem: Septic shock    Subjective:     HPI:  Mr. Flakito Mcginnis is a 69 y.o. man w/ HFrEF (30% 8/2024 from 20-25% 6/2024), NICM, MR, ESRD on HD, Crohn's s/p colostomy, h/o R nephrectomy.  [facility] from his HD center on 12/19 due to hypotension and ongoing shortness of breath.  He usually receives his dialysis on T, R, S and went on Wednesday for an extra session for volume removal.  He arrived on Thursday for his usually scheduled dialysis session and was directed to the ED due to hypotension.  On arrival in the ED his blood pressure was 78/51.  He was found to have a BNP >4900 and CXR concerning for volume overload.  High sensitivity troponin 923.  Critical care medicine was consulted for hypotension and admitted to MICU.      Hospital/ICU Course:  12/20:  On low-dose vasopressor with norepinephrine.  Patient has had increasing oxygen requirements overnight.  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  Started on empiric antibiotic therapy with cefepime and daptomycin.  Infectious Diseases consulted.    Interval History/Significant Events:  Increasing oxygen requirements overnight.  Vasopressor needs increasing.  Infectious Disease consulted for vegetation on aortic valve found on echocardiogram.    Review of Systems   Respiratory:  Positive for shortness of breath. Negative for chest tightness.    Cardiovascular:  Negative for chest pain.   All other systems reviewed and are negative.    Objective:     Vital Signs (Most Recent):  Temp: 98.3 °F (36.8 °C) (12/20/24 0300)  Pulse: 96 (12/20/24 0600)  Resp: (!) 23 (12/20/24 0600)  BP: (!) 85/57 (12/20/24 0600)  SpO2: 96 % (12/20/24  0600) Vital Signs (24h Range):  Temp:  [97.7 °F (36.5 °C)-99.2 °F (37.3 °C)] 98.3 °F (36.8 °C)  Pulse:  [] 96  Resp:  [12-30] 23  SpO2:  [91 %-100 %] 96 %  BP: (75-94)/(51-70) 85/57   Weight: 55.3 kg (121 lb 14.6 oz)  Body mass index is 19.68 kg/m².      Intake/Output Summary (Last 24 hours) at 12/20/2024 0814  Last data filed at 12/20/2024 0500  Gross per 24 hour   Intake 480 ml   Output 100 ml   Net 380 ml          Physical Exam  Vitals and nursing note reviewed.   Constitutional:       General: He is not in acute distress.     Appearance: Normal appearance. He is normal weight. He is not ill-appearing, toxic-appearing or diaphoretic.   HENT:      Head: Normocephalic and atraumatic.      Right Ear: External ear normal.      Left Ear: External ear normal.      Nose: Nose normal.   Cardiovascular:      Rate and Rhythm: Regular rhythm. Tachycardia present.      Pulses: Normal pulses.      Heart sounds: Normal heart sounds. No murmur heard.     No friction rub. No gallop.      Comments: No JVD or peripheral edema  Pulmonary:      Comments: Mildly increased respiratory effort with bibasilar crackles  Abdominal:      General: Abdomen is flat. Bowel sounds are normal. There is no distension.      Palpations: Abdomen is soft.      Tenderness: There is no abdominal tenderness. There is no guarding or rebound.   Musculoskeletal:         General: No swelling or tenderness. Normal range of motion.   Skin:     General: Skin is warm and dry.      Coloration: Skin is not jaundiced or pale.   Neurological:      General: No focal deficit present.      Mental Status: He is alert and oriented to person, place, and time. Mental status is at baseline.   Psychiatric:         Mood and Affect: Mood normal.         Behavior: Behavior normal.         Thought Content: Thought content normal.         Judgment: Judgment normal.            Vents:     Lines/Drains/Airways       Central Venous Catheter Line  Duration                   "Hemodialysis Catheter right subclavian -- days              Drain  Duration                  Colostomy 12/19/24 2225 RLQ <1 day              Peripheral Intravenous Line  Duration                  Peripheral IV - Single Lumen 12/19/24 1428 20 G Anterior;Right Forearm <1 day         Peripheral IV - Single Lumen 12/19/24 1429 20 G Left;Posterior Hand <1 day                  Significant Labs:    CBC/Anemia Profile:  Recent Labs   Lab 12/19/24  1430 12/19/24  1436 12/20/24  0351   WBC 4.79  --  4.66   HGB 7.9*  --  7.7*   HCT 25.9* 24* 24.6*     --  147*   MCV 98  --  97   RDW 16.6*  --  16.1*   IRON  --   --  26*   FERRITIN  --   --  2,808*   FOLATE  --   --  8.0   [id number]  --   --  770        Chemistries:  Recent Labs   Lab 12/19/24  1430 12/19/24  2106 12/20/24  0351   *  --  133*   K 4.6  --  5.0   CL 95  --  96   CO2 29  --  25   BUN 36*  --  43*   CREATININE 4.8*  --  5.6*   CALCIUM 9.0  --  8.6*   ALBUMIN 2.8*  --   --    PROT 6.6  --   --    BILITOT 0.5  --   --    ALKPHOS 259*  --   --    ALT 11  --   --    AST 11  --   --    MG  --  1.9 2.0   PHOS  --   --  5.4*       All pertinent labs within the past 24 hours have been reviewed.    Significant Imaging:  I have reviewed all pertinent imaging results/findings within the past 24 hours.    ABG  Recent Labs   Lab 12/19/24  1433   PH 7.454*   PO2 34*   PCO2 44.1   HCO3 31.0*   BE 7*     Assessment/Plan:     Cardiac/Vascular  Endocarditis  See "septic shock"    Elevated troponin  High sensitivity troponin troponin 923.  EKG with t wave inversions.  No complaints of chest pain.      --Cardiology following  --Trend trop  --Continuous cardiac monitoring  --EKG PRN     HFrEF (heart failure with reduced ejection fraction)  HFrEF (30% 8/2024 from 20-25% 6/2024) per cardiology notes.  Suspect volume overload with history HF and ESRD.    --Dialysis for volume removal, given 1 time dose lasix in ED still makes urine  --Formal echo ordered  --Cardiology " following  --Trend troponin  --Continuous cardiac monitoring   --EKG PRN     Hypertension  Holding antihypertensives in setting of shock.      Renal/  History of nephrectomy  Noted. See ESRD.     ESRD on hemodialysis  --Nephrology consulted--appreciate input   --Awaiting renal replacement therapy recommendations    ID  * Septic shock  Presented with hypotension unable to receive iHD.  SBP 70s on arrival to ED.  On exam patient with MAP 65-70 not on vasopressor support, but suspect will need vasopressors for dialysis. No obvious signs of infection on exam, tunneled cath dry/intact. Reports no cough, urinary symptoms.  WBC normal.  Suspect shock likely cardiogenic in setting of HF and ESRD with volume overload.      --Infectious workup sent, UA and blood culture  --Vegetation found on aortic valve on echocardiogram concerning for endocarditis  --Infectious diseases consult--appreciate input   --Started on cefepime and daptomycin given reported vancomycin allergy                Critical Care Time: 45 minutes  Critical secondary to Patient has a condition that poses threat to life and bodily function: Severe Respiratory Distress      Critical care was time spent personally by me on the following activities: development of treatment plan with patient or surrogate and bedside caregivers, discussions with consultants, evaluation of patient's response to treatment, examination of patient, ordering and performing treatments and interventions, ordering and review of laboratory studies, ordering and review of radiographic studies, pulse oximetry, re-evaluation of patient's condition. This critical care time did not overlap with that of any other provider or involve time for any procedures.         Jose Mendieta M.D.  Rapid Response/Critical Care  Department of Pulmonary Medicine  [facility]        N.B.: Portions of this note was dictated using M*Modal Fluency Direct--there may be voice recognition  errors occasionally missed on review.

## 2024-12-20 NOTE — ASSESSMENT & PLAN NOTE
High sensitivity troponin troponin 923.  EKG with t wave inversions.  No complaints of chest pain.      --Cardiology following  --Trend trop  --Continuous cardiac monitoring  --EKG PRN

## 2024-12-20 NOTE — ASSESSMENT & PLAN NOTE
Presented with hypotension unable to receive iHD.  SBP 70s on arrival to ED.  On exam patient with MAP 65-70 not on vasopressor support, but suspect will need vasopressors for dialysis. No obvious signs of infection on exam, tunneled cath dry/intact. Reports no cough, urinary symptoms.  WBC normal.  Suspect shock likely cardiogenic in setting of HF and ESRD with volume overload.      --Infectious workup sent, UA and blood culture  --Zosyn, allergy to vanc will send MRSA PCR  --Vasopressors for MAP goal >65  --Echo ordered  --Consider CT if suspicion for infection increases  --Trend LA

## 2024-12-21 PROBLEM — I21.4 NSTEMI (NON-ST ELEVATED MYOCARDIAL INFARCTION): Status: ACTIVE | Noted: 2024-12-19

## 2024-12-21 LAB
ALBUMIN SERPL BCP-MCNC: 2.3 G/DL (ref 3.5–5.2)
ALBUMIN SERPL BCP-MCNC: 2.3 G/DL (ref 3.5–5.2)
ALBUMIN SERPL BCP-MCNC: 2.4 G/DL (ref 3.5–5.2)
ANION GAP SERPL CALC-SCNC: 6 MMOL/L (ref 8–16)
ANION GAP SERPL CALC-SCNC: 7 MMOL/L (ref 8–16)
ANION GAP SERPL CALC-SCNC: 7 MMOL/L (ref 8–16)
ANION GAP SERPL CALC-SCNC: 9 MMOL/L (ref 8–16)
BASOPHILS # BLD AUTO: 0.03 K/UL (ref 0–0.2)
BASOPHILS NFR BLD: 0.6 % (ref 0–1.9)
BUN SERPL-MCNC: 16 MG/DL (ref 8–23)
BUN SERPL-MCNC: 17 MG/DL (ref 8–23)
BUN SERPL-MCNC: 17 MG/DL (ref 8–23)
BUN SERPL-MCNC: 23 MG/DL (ref 8–23)
CA-I BLDV-SCNC: 1.17 MMOL/L (ref 1.06–1.42)
CALCIUM SERPL-MCNC: 8.2 MG/DL (ref 8.7–10.5)
CALCIUM SERPL-MCNC: 8.2 MG/DL (ref 8.7–10.5)
CALCIUM SERPL-MCNC: 8.7 MG/DL (ref 8.7–10.5)
CALCIUM SERPL-MCNC: 8.8 MG/DL (ref 8.7–10.5)
CHLORIDE SERPL-SCNC: 103 MMOL/L (ref 95–110)
CHLORIDE SERPL-SCNC: 106 MMOL/L (ref 95–110)
CHLORIDE SERPL-SCNC: 106 MMOL/L (ref 95–110)
CHLORIDE SERPL-SCNC: 107 MMOL/L (ref 95–110)
CK SERPL-CCNC: <7 U/L (ref 20–200)
CO2 SERPL-SCNC: 22 MMOL/L (ref 23–29)
CO2 SERPL-SCNC: 23 MMOL/L (ref 23–29)
CO2 SERPL-SCNC: 25 MMOL/L (ref 23–29)
CO2 SERPL-SCNC: 25 MMOL/L (ref 23–29)
CREAT SERPL-MCNC: 2.7 MG/DL (ref 0.5–1.4)
CREAT SERPL-MCNC: 3.8 MG/DL (ref 0.5–1.4)
DIFFERENTIAL METHOD BLD: ABNORMAL
EOSINOPHIL # BLD AUTO: 0.1 K/UL (ref 0–0.5)
EOSINOPHIL NFR BLD: 2.4 % (ref 0–8)
ERYTHROCYTE [DISTWIDTH] IN BLOOD BY AUTOMATED COUNT: 16.1 % (ref 11.5–14.5)
EST. GFR  (NO RACE VARIABLE): 16.4 ML/MIN/1.73 M^2
EST. GFR  (NO RACE VARIABLE): 24.7 ML/MIN/1.73 M^2
GLUCOSE SERPL-MCNC: 100 MG/DL (ref 70–110)
GLUCOSE SERPL-MCNC: 93 MG/DL (ref 70–110)
GLUCOSE SERPL-MCNC: 93 MG/DL (ref 70–110)
GLUCOSE SERPL-MCNC: 95 MG/DL (ref 70–110)
HCT VFR BLD AUTO: 25.5 % (ref 40–54)
HGB BLD-MCNC: 7.8 G/DL (ref 14–18)
IMM GRANULOCYTES # BLD AUTO: 0.05 K/UL (ref 0–0.04)
IMM GRANULOCYTES NFR BLD AUTO: 1 % (ref 0–0.5)
LYMPHOCYTES # BLD AUTO: 0.5 K/UL (ref 1–4.8)
LYMPHOCYTES NFR BLD: 9.7 % (ref 18–48)
MAGNESIUM SERPL-MCNC: 1.9 MG/DL (ref 1.6–2.6)
MAGNESIUM SERPL-MCNC: 2 MG/DL (ref 1.6–2.6)
MCH RBC QN AUTO: 30.1 PG (ref 27–31)
MCHC RBC AUTO-ENTMCNC: 30.6 G/DL (ref 32–36)
MCV RBC AUTO: 99 FL (ref 82–98)
MONOCYTES # BLD AUTO: 0.6 K/UL (ref 0.3–1)
MONOCYTES NFR BLD: 12.3 % (ref 4–15)
NEUTROPHILS # BLD AUTO: 3.7 K/UL (ref 1.8–7.7)
NEUTROPHILS NFR BLD: 74 % (ref 38–73)
NRBC BLD-RTO: 0 /100 WBC
PHOSPHATE SERPL-MCNC: 2.7 MG/DL (ref 2.7–4.5)
PHOSPHATE SERPL-MCNC: 2.7 MG/DL (ref 2.7–4.5)
PHOSPHATE SERPL-MCNC: 3.1 MG/DL (ref 2.7–4.5)
PHOSPHATE SERPL-MCNC: 4.1 MG/DL (ref 2.7–4.5)
PLATELET # BLD AUTO: 161 K/UL (ref 150–450)
PMV BLD AUTO: 11.6 FL (ref 9.2–12.9)
POCT GLUCOSE: 107 MG/DL (ref 70–110)
POCT GLUCOSE: 81 MG/DL (ref 70–110)
POCT GLUCOSE: 98 MG/DL (ref 70–110)
POTASSIUM SERPL-SCNC: 4.9 MMOL/L (ref 3.5–5.1)
POTASSIUM SERPL-SCNC: 4.9 MMOL/L (ref 3.5–5.1)
POTASSIUM SERPL-SCNC: 5 MMOL/L (ref 3.5–5.1)
POTASSIUM SERPL-SCNC: 5.2 MMOL/L (ref 3.5–5.1)
RBC # BLD AUTO: 2.59 M/UL (ref 4.6–6.2)
SODIUM SERPL-SCNC: 134 MMOL/L (ref 136–145)
SODIUM SERPL-SCNC: 136 MMOL/L (ref 136–145)
SODIUM SERPL-SCNC: 138 MMOL/L (ref 136–145)
SODIUM SERPL-SCNC: 138 MMOL/L (ref 136–145)
WBC # BLD AUTO: 5.04 K/UL (ref 3.9–12.7)

## 2024-12-21 PROCEDURE — 80069 RENAL FUNCTION PANEL: CPT | Mod: 91 | Performed by: INTERNAL MEDICINE

## 2024-12-21 PROCEDURE — 90945 DIALYSIS ONE EVALUATION: CPT

## 2024-12-21 PROCEDURE — 82330 ASSAY OF CALCIUM: CPT | Performed by: STUDENT IN AN ORGANIZED HEALTH CARE EDUCATION/TRAINING PROGRAM

## 2024-12-21 PROCEDURE — 94761 N-INVAS EAR/PLS OXIMETRY MLT: CPT

## 2024-12-21 PROCEDURE — 99900035 HC TECH TIME PER 15 MIN (STAT)

## 2024-12-21 PROCEDURE — 90945 DIALYSIS ONE EVALUATION: CPT | Mod: ,,, | Performed by: INTERNAL MEDICINE

## 2024-12-21 PROCEDURE — 85025 COMPLETE CBC W/AUTO DIFF WBC: CPT

## 2024-12-21 PROCEDURE — 83735 ASSAY OF MAGNESIUM: CPT | Mod: 91 | Performed by: INTERNAL MEDICINE

## 2024-12-21 PROCEDURE — 87040 BLOOD CULTURE FOR BACTERIA: CPT | Mod: 59 | Performed by: GENERAL ACUTE CARE HOSPITAL

## 2024-12-21 PROCEDURE — 63600175 PHARM REV CODE 636 W HCPCS: Performed by: INTERNAL MEDICINE

## 2024-12-21 PROCEDURE — 87077 CULTURE AEROBIC IDENTIFY: CPT | Mod: 59 | Performed by: GENERAL ACUTE CARE HOSPITAL

## 2024-12-21 PROCEDURE — 99291 CRITICAL CARE FIRST HOUR: CPT | Mod: ,,, | Performed by: INTERNAL MEDICINE

## 2024-12-21 PROCEDURE — 27100171 HC OXYGEN HIGH FLOW UP TO 24 HOURS

## 2024-12-21 PROCEDURE — 87186 SC STD MICRODIL/AGAR DIL: CPT | Performed by: GENERAL ACUTE CARE HOSPITAL

## 2024-12-21 PROCEDURE — 25000003 PHARM REV CODE 250: Performed by: GENERAL ACUTE CARE HOSPITAL

## 2024-12-21 PROCEDURE — 63600175 PHARM REV CODE 636 W HCPCS: Performed by: STUDENT IN AN ORGANIZED HEALTH CARE EDUCATION/TRAINING PROGRAM

## 2024-12-21 PROCEDURE — 99233 SBSQ HOSP IP/OBS HIGH 50: CPT | Mod: GC,,, | Performed by: STUDENT IN AN ORGANIZED HEALTH CARE EDUCATION/TRAINING PROGRAM

## 2024-12-21 PROCEDURE — 83735 ASSAY OF MAGNESIUM: CPT

## 2024-12-21 PROCEDURE — 25000003 PHARM REV CODE 250: Performed by: INTERNAL MEDICINE

## 2024-12-21 PROCEDURE — 25000003 PHARM REV CODE 250

## 2024-12-21 PROCEDURE — 82550 ASSAY OF CK (CPK): CPT | Performed by: INTERNAL MEDICINE

## 2024-12-21 PROCEDURE — 80069 RENAL FUNCTION PANEL: CPT | Performed by: STUDENT IN AN ORGANIZED HEALTH CARE EDUCATION/TRAINING PROGRAM

## 2024-12-21 PROCEDURE — 20000000 HC ICU ROOM

## 2024-12-21 PROCEDURE — 99292 CRITICAL CARE ADDL 30 MIN: CPT | Mod: ,,, | Performed by: INTERNAL MEDICINE

## 2024-12-21 RX ORDER — NOREPINEPHRINE BITARTRATE 0.02 MG/ML
0-.2 INJECTION, SOLUTION INTRAVENOUS CONTINUOUS
Status: DISCONTINUED | OUTPATIENT
Start: 2024-12-21 | End: 2024-12-22

## 2024-12-21 RX ORDER — HYDROCODONE BITARTRATE AND ACETAMINOPHEN 500; 5 MG/1; MG/1
TABLET ORAL CONTINUOUS
Status: ACTIVE | OUTPATIENT
Start: 2024-12-21 | End: 2024-12-22

## 2024-12-21 RX ORDER — HEPARIN SODIUM 1000 [USP'U]/ML
2000 INJECTION, SOLUTION INTRAVENOUS; SUBCUTANEOUS ONCE
Status: COMPLETED | OUTPATIENT
Start: 2024-12-21 | End: 2024-12-21

## 2024-12-21 RX ORDER — MAGNESIUM SULFATE HEPTAHYDRATE 40 MG/ML
2 INJECTION, SOLUTION INTRAVENOUS
Status: DISPENSED | OUTPATIENT
Start: 2024-12-21 | End: 2024-12-22

## 2024-12-21 RX ADMIN — OXYCODONE 5 MG: 5 TABLET ORAL at 09:12

## 2024-12-21 RX ADMIN — ACETAMINOPHEN 500 MG: 500 TABLET ORAL at 11:12

## 2024-12-21 RX ADMIN — SEVELAMER CARBONATE 800 MG: 800 TABLET, FILM COATED ORAL at 12:12

## 2024-12-21 RX ADMIN — SEVELAMER CARBONATE 800 MG: 800 TABLET, FILM COATED ORAL at 08:12

## 2024-12-21 RX ADMIN — OXYCODONE 5 MG: 5 TABLET ORAL at 04:12

## 2024-12-21 RX ADMIN — OXYCODONE 5 MG: 5 TABLET ORAL at 05:12

## 2024-12-21 RX ADMIN — SODIUM CHLORIDE: 9 INJECTION, SOLUTION INTRAVENOUS at 05:12

## 2024-12-21 RX ADMIN — OXYCODONE 5 MG: 5 TABLET ORAL at 10:12

## 2024-12-21 RX ADMIN — CEFEPIME 1 G: 1 INJECTION, POWDER, FOR SOLUTION INTRAMUSCULAR; INTRAVENOUS at 06:12

## 2024-12-21 RX ADMIN — HEPARIN SODIUM 5000 UNITS: 5000 INJECTION INTRAVENOUS; SUBCUTANEOUS at 05:12

## 2024-12-21 RX ADMIN — HEPARIN SODIUM 2000 UNITS: 1000 INJECTION, SOLUTION INTRAVENOUS; SUBCUTANEOUS at 05:12

## 2024-12-21 RX ADMIN — SEVELAMER CARBONATE 800 MG: 800 TABLET, FILM COATED ORAL at 05:12

## 2024-12-21 RX ADMIN — HEPARIN SODIUM 5000 UNITS: 5000 INJECTION INTRAVENOUS; SUBCUTANEOUS at 01:12

## 2024-12-21 RX ADMIN — HEPARIN SODIUM 5000 UNITS: 5000 INJECTION INTRAVENOUS; SUBCUTANEOUS at 09:12

## 2024-12-21 RX ADMIN — ATORVASTATIN CALCIUM 40 MG: 40 TABLET, FILM COATED ORAL at 08:12

## 2024-12-21 RX ADMIN — DAPTOMYCIN 555 MG: 350 INJECTION, POWDER, LYOPHILIZED, FOR SOLUTION INTRAVENOUS at 10:12

## 2024-12-21 NOTE — ASSESSMENT & PLAN NOTE
High sensitivity troponin troponin 923.  EKG with t wave inversions.  No complaints of chest pain.      --Cardiology following  -- Troponin down trending  --Continuous cardiac monitoring  --EKG PRN

## 2024-12-21 NOTE — PROGRESS NOTES
12/21/24 1737   Treatment   Treatment Type SLED   Treatment Status Restart   Dialysis Machine Number K30   Dialyzer Time (hours) 0   BVP (Liters) 0 L   Solutions Labeled and Current  Yes   Access Tunneled Cath;Right;IJ   Catheter Dressing Intact  Yes   Alarms Engaged Yes   CRRT Comments sled restarted   Prescription   Time (Hours) Other  (5)   Dialysate K + (mEq/L) 4   Dialysate CA + (mEq/L) 2.25   Dialysate HCO3 - (Bicarb) (mEq/L) 30   Dialysate Na + (mEq/L) 140   Cartridge Type Other  (r300)   Dialysate Flow Rate (mL/min) 200   UF Goal Rate 500 mL/hr   CRRT Hourly Documentation   Blood Flow (mL/min) 150   UF Rate 250 cc/hr   Arterial Pressure (mmHg) -170 mmHg   Venous Pressure (mmHg) 90 mmHg   Effluent Pressure (EP) (mmHg) 10 mmHg   Total UF (Hourly Cleared) (mL) 0     SLED restarted as ordered with 2,000 unit heparin bolus at start. RIJ PC aspirated, flushed, and accessed using aseptic technique- sluggish aspiration from arterial port, unable to aspirate from venous port- both flush without difficulty. Lines connected and secured. Unable to achieve 200mL/min BFR d/t arterial pressure.

## 2024-12-21 NOTE — SUBJECTIVE & OBJECTIVE
Interval History/Significant Events: improving oxygen requirements with RRT. Planning for UF today per Nephrology. Improving pressor requirements.    Review of Systems    Objective:     Vital Signs (Most Recent):  Temp: 99 °F (37.2 °C) (12/21/24 0325)  Pulse: (!) 121 (12/21/24 0900)  Resp: (!) 22 (12/21/24 1002)  BP: (!) 94/58 (12/21/24 0900)  SpO2: (!) 94 % (12/21/24 0900) Vital Signs (24h Range):  Temp:  [97.6 °F (36.4 °C)-99 °F (37.2 °C)] 99 °F (37.2 °C)  Pulse:  [] 121  Resp:  [7-114] 22  SpO2:  [80 %-98 %] 94 %  BP: ()/(52-75) 94/58   Weight: 56 kg (123 lb 7.3 oz)  Body mass index is 19.93 kg/m².      Intake/Output Summary (Last 24 hours) at 12/21/2024 1012  Last data filed at 12/21/2024 0900  Gross per 24 hour   Intake 2044.72 ml   Output 2071 ml   Net -26.28 ml          Physical Exam  Vitals and nursing note reviewed.   Constitutional:       General: He is not in acute distress.     Appearance: Normal appearance. He is normal weight. He is ill-appearing. He is not toxic-appearing or diaphoretic.   HENT:      Head: Normocephalic and atraumatic.      Right Ear: External ear normal.      Left Ear: External ear normal.      Nose: Nose normal.      Mouth/Throat:      Mouth: Mucous membranes are moist.   Eyes:      General: No scleral icterus.     Extraocular Movements: Extraocular movements intact.      Pupils: Pupils are equal, round, and reactive to light.   Cardiovascular:      Rate and Rhythm: Regular rhythm. Tachycardia present.      Pulses: Normal pulses.      Heart sounds: Murmur heard.      No friction rub. No gallop.      Comments: No JVD or peripheral edema  Pulmonary:      Comments: Mildly increased respiratory effort with bibasilar crackles  Abdominal:      General: Abdomen is flat. Bowel sounds are normal. There is no distension.      Palpations: Abdomen is soft.      Tenderness: There is no abdominal tenderness. There is no guarding or rebound.   Musculoskeletal:         General: No  tenderness. Normal range of motion.      Right lower leg: Edema (trace) present.      Left lower leg: Edema (trace) present.      Comments: Kyphosis.    Skin:     General: Skin is warm and dry.      Coloration: Skin is not jaundiced or pale.   Neurological:      General: No focal deficit present.      Mental Status: He is alert and oriented to person, place, and time. Mental status is at baseline.   Psychiatric:         Mood and Affect: Mood normal.         Behavior: Behavior normal.         Thought Content: Thought content normal.         Judgment: Judgment normal.            Vents:  Oxygen Concentration (%): 50 (12/21/24 0831)  Lines/Drains/Airways       Central Venous Catheter Line  Duration                  Hemodialysis Catheter right subclavian -- days              Drain  Duration                  Colostomy 12/19/24 2225 RLQ 1 day              Peripheral Intravenous Line  Duration                  Peripheral IV - Single Lumen 12/19/24 1428 20 G Anterior;Right Forearm 1 day         Peripheral IV - Single Lumen 12/19/24 1429 20 G Left;Posterior Hand 1 day                  Significant Labs:    CBC/Anemia Profile:  Recent Labs   Lab 12/19/24  1430 12/19/24  1436 12/20/24  0351 12/21/24  0117   WBC 4.79  --  4.66 5.04   HGB 7.9*  --  7.7* 7.8*   HCT 25.9* 24* 24.6* 25.5*     --  147* 161   MCV 98  --  97 99*   RDW 16.6*  --  16.1* 16.1*   IRON  --   --  26*  --    FERRITIN  --   --  2,808*  --    FOLATE  --   --  8.0  --    [id number]  --   --  770  --         Chemistries:  Recent Labs   Lab 12/19/24  1430 12/19/24  2106 12/20/24  0351 12/20/24  2213 12/21/24  0117   *  --  133* 135* 138  138   K 4.6  --  5.0 4.7 4.9  4.9   CL 95  --  96 104 106  106   CO2 29  --  25 24 25  25   BUN 36*  --  43* 25* 17  17   CREATININE 4.8*  --  5.6* 3.1* 2.7*  2.7*   CALCIUM 9.0  --  8.6* 8.4* 8.2*  8.2*   ALBUMIN 2.8*  --   --  2.3* 2.3*   PROT 6.6  --   --   --   --    BILITOT 0.5  --   --   --   --    ALKPHOS  259*  --   --   --   --    ALT 11  --   --   --   --    AST 11  --   --   --   --    MG  --    < > 2.0 1.9 1.9  1.9   PHOS  --   --  5.4* 3.1 2.7  2.7    < > = values in this interval not displayed.       Blood Culture:   Recent Labs   Lab 12/19/24  1430 12/19/24  1434 12/21/24  0115   LABBLOO Gram stain aer bottle: Gram positive cocci in clusters resembling Staph  Results called to and read back by:Lu Robles RN 12/20/2024  16:30  Gram stain benja bottle: Gram positive cocci in clusters resembling Staph Gram stain aer bottle: Gram positive cocci in clusters resembling Staph  Positive results previously called 12/20/2024  Gram stain benja bottle: Gram positive cocci in clusters resembling Staph  12/21/2024  01:37 No Growth to date  No Growth to date     All pertinent labs within the past 24 hours have been reviewed.    Significant Imaging:  I have reviewed all pertinent imaging results/findings within the past 24 hours.

## 2024-12-21 NOTE — ASSESSMENT & PLAN NOTE
69M presented after being hypotensive during outpatient HD, found to have mobile mass at LVOT on TTE. 12/19 BCX with GPCs (staph aureus PCR negative). Reported vancomycin allergy, he states the reaction was feeling hot and dizzy, but that he may have had trouble breathing too. Unclear if true vancomycin allergy or just infusion reaction. Still unsure whether or not he wants to trial a dose of vancomycin while on close observation in the hospital.     Recommendations  Continue daptomycin  Stop cefepime  Follow up 12/19 and 12/21 BCX  Remove tunneled dialysis line, will need line holiday until bacteremia is cleared, would coordinate with dialysis / IR      The above plan was discussed with the primary team.

## 2024-12-21 NOTE — PROGRESS NOTES
12/20/24 2100 12/20/24 2141   Treatment   Treatment Type SLED SLED   Treatment Status Clotting;Blood lost Restart   Dialysis Machine Number K30 K30   Dialyzer Time (hours) 2.43 0   BVP (Liters)  --  0 L   Solutions Labeled and Current   --  Yes   Access Tunneled Cath;Right;IJ Tunneled Cath;Right;IJ   Catheter Dressing Intact   --  Yes   Alarms Engaged  --  Yes   CRRT Comments lines clotted SLED restarted     SLED restarted per MD orders post clotting. BFR increased to 200. Lines reversed. Report given to primary nurse.

## 2024-12-21 NOTE — PLAN OF CARE
MICU DAILY GOALS     Family/Goals of care/Code Status   Code Status: Full Code    24H Vital Sign Range  Temp:  [97.6 °F (36.4 °C)-99 °F (37.2 °C)]   Pulse:  []   Resp:  [7-114]   BP: ()/(48-75)   SpO2:  [80 %-100 %]      Shift Events (include procedures and significant events)   Tolerated 6 hr SLED dialysis treatment    AWAKE RASS: Goal -    Actual - RASS (Sam Agitation-Sedation Scale): alert and calm    Restraint necessity: Not necessary   BREATHE SBT: Not intubated    Coordinate A & B, analgesics/sedatives Pain: managed   SAT: Not intubated   Delirium CAM-ICU:     Early(intubated/ Progressive (non-intubated) Mobility MOVE Screen (INTUBATED ONLY): Not intubated    Activity: Activity Management: Up in chair - L3   Feeding/Nutrition Diet order: Diet/Nutrition Received: restrict fluids, consistent carb/diabetic diet, Specialty Diet/Nutrition Received: renal diet   Thrombus DVT prophylaxis: VTE Core Measure: Pharmacological prophylaxis initiated/maintained   HOB Elevation Head of Bed (HOB) Positioning: HOB at 20-30 degrees   Ulcer Prophylaxis GI: no   Glucose control managed Glycemic Management: blood glucose monitored   Skin Skin assessment:     Sacrum intact/not altered? Yes  Heels intact/not altered? Yes  Surgical wound? No    CHECK ONE!   (no altered skin or altered skin) and sub boxes:  [x] No Altered Skin Integrity Present    [x]Prevention Measures Documented    [] Altered Skin Integrity Present or Discovered   [] LDA present in EPIC, daily doc completed              [] LDA added if not in EPIC (describe wound).                    When describing wound, do not stage, use descriptive words only.    [] Wound Image Taken (required on admit,                   transfer/discharge and every Tuesday)    Wound Care Consulted? No   Bowel Function no issues    Indwelling Catheter Necessity           Hemodialysis Catheter right subclavian-Line Necessity Review: CRRT/HD     De-escalation Antibiotics Yes         VS and assessment per flow sheet, patient progressing towards goals as tolerated, plan of care reviewed with Flakito Mcginnis, all concerns addressed, will continue to monitor.

## 2024-12-21 NOTE — PROGRESS NOTES
"[facility]  Infectious Disease  Progress Note    Patient Name: Flakito Mcginnis  MRN: [id number]  Admission Date: 12/19/2024  Length of Stay: 2 days  Attending Physician: Nain Betts MD  Primary Care Provider: Jordin Robbins MD    Isolation Status: No active isolations  Assessment/Plan:      Cardiac/Vascular  Endocarditis  69M presented after being hypotensive during outpatient HD, found to have mobile mass at LVOT on TTE. 12/19 BCX with GPCs (staph aureus PCR negative). Reported vancomycin allergy, he states the reaction was feeling hot and dizzy, but that he may have had trouble breathing too. Unclear if true vancomycin allergy or just infusion reaction. Still unsure whether or not he wants to trial a dose of vancomycin while on close observation in the hospital.     Recommendations  Continue daptomycin  Stop cefepime  Follow up 12/19 and 12/21 BCX  Remove tunneled dialysis line, will need line holiday until bacteremia is cleared, would coordinate with dialysis / IR      The above plan was discussed with the primary team.           Anticipated Disposition: TBD    Thank you for your consult. I will follow-up with patient. Please contact us if you have any additional questions.    Norma Teran,   Infectious Disease  [facility]    Subjective:     Principal Problem:Septic shock    HPI: Mr. Mcginnis is a 69M with PMH of HFrEF, ESRD on HD, Crohns s/p colostomy, previous R nephrectomy, presented after being hypotensive during outpatient HD. Infectious disease consulted for "Suspected vegetation in LVOT. Severe vancomycin allergy. MRSA coverage".     He is afebrile, no leukocytosis. TTE shows mobile mass at the LVOT. 12/19 BCX NGTD. Reports that his reaction to vancomycin in the past was feeling hot and dizzy, but that he may have had trouble breathing too.       Interval History: feeling well today, denies any new complaints    Review of Systems   Constitutional:  Negative for chills and " fever.   Respiratory:  Negative for cough and shortness of breath.    Cardiovascular:  Negative for chest pain.   Gastrointestinal:  Negative for abdominal pain, constipation, diarrhea, nausea and vomiting.   Musculoskeletal:  Negative for arthralgias and myalgias.   Skin:  Negative for rash.   Neurological:  Negative for headaches.     Objective:     Vital Signs (Most Recent):  Temp: 99 °F (37.2 °C) (12/21/24 0325)  Pulse: 109 (12/21/24 1200)  Resp: (!) 27 (12/21/24 1200)  BP: 104/67 (12/21/24 1200)  SpO2: (!) 92 % (12/21/24 1200) Vital Signs (24h Range):  Temp:  [97.6 °F (36.4 °C)-99 °F (37.2 °C)] 99 °F (37.2 °C)  Pulse:  [] 109  Resp:  [] 27  SpO2:  [80 %-97 %] 92 %  BP: ()/(54-75) 104/67     Weight: 56 kg (123 lb 7.3 oz)  Body mass index is 19.93 kg/m².    Estimated Creatinine Clearance: 20.5 mL/min (A) (based on SCr of 2.7 mg/dL (H)).     Physical Exam  Vitals reviewed.   Constitutional:       General: He is not in acute distress.     Appearance: Normal appearance. He is not ill-appearing.   HENT:      Head: Normocephalic and atraumatic.   Eyes:      Extraocular Movements: Extraocular movements intact.      Conjunctiva/sclera: Conjunctivae normal.   Cardiovascular:      Rate and Rhythm: Normal rate and regular rhythm.   Pulmonary:      Effort: Pulmonary effort is normal. No respiratory distress.      Breath sounds: Normal breath sounds.   Abdominal:      General: Abdomen is flat. Bowel sounds are normal.      Palpations: Abdomen is soft.      Tenderness: There is no abdominal tenderness.   Musculoskeletal:      Cervical back: Normal range of motion.   Skin:     General: Skin is warm and dry.   Neurological:      General: No focal deficit present.      Mental Status: He is alert and oriented to person, place, and time.   Psychiatric:         Mood and Affect: Mood normal.         Behavior: Behavior normal.         Thought Content: Thought content normal.          Significant Labs: All pertinent  labs within the past 24 hours have been reviewed.  Recent Lab Results  (Last 5 results in the past 24 hours)        12/21/24  1214   12/21/24  0808   12/21/24  0117   12/21/24  0115   12/20/24  2213        Albumin     2.3     2.3       Anion Gap     7     7            7           Baso #     0.03           Basophil %     0.6           Blood Culture, Routine       No Growth to date  [P]                No Growth to date  [P]         BUN     17     25            17           Calcium     8.2     8.4            8.2           Chloride     106     104            106           CO2     25     24            25           CPK     <7           Creatinine     2.7     3.1            2.7           Differential Method     Automated           eGFR     24.7     21.0            24.7           Eos #     0.1           Eos %     2.4           Glucose     93     105            93           Gran # (ANC)     3.7           Gran %     74.0           Hematocrit     25.5           Hemoglobin     7.8           Immature Grans (Abs)     0.05  Comment: Mild elevation in immature granulocytes is non specific and   can be seen in a variety of conditions including stress response,   acute inflammation, trauma and pregnancy. Correlation with other   laboratory and clinical findings is essential.             Immature Granulocytes     1.0           Lymph #     0.5           Lymph %     9.7           Magnesium      1.9     1.9            1.9           MCH     30.1           MCHC     30.6           MCV     99           Mono #     0.6           Mono %     12.3           MPV     11.6           nRBC     0           Phosphorus Level     2.7     3.1            2.7           Platelet Count     161           POCT Glucose 107   81             Potassium     4.9     4.7            4.9           RBC     2.59           RDW     16.1           Sodium     138     135            138           WBC     5.04                                   [P] - Preliminary Result                Significant Imaging: I have reviewed all pertinent imaging results/findings within the past 24 hours.

## 2024-12-21 NOTE — ASSESSMENT & PLAN NOTE
"See "septic shock"  - despite size, patient would be a poor surgical candidate for valve replacement in setting of Age and comorbidities. Increased risk of stroke based on size and location.   "

## 2024-12-21 NOTE — ASSESSMENT & PLAN NOTE
HFrEF (30% 8/2024 from 20-25% 6/2024) per cardiology notes.  Suspect volume overload with history HF and ESRD.    --Dialysis for volume removal, given 1 time dose lasix in ED still makes urine  --Echo showing EF of 20-25% with large AV vegetation partially occluding LVOT with moderate AV regurgitation.   --Cardiology following  --troponin down trending   --Continuous cardiac monitoring   --EKG PRN

## 2024-12-21 NOTE — PROGRESS NOTES
[facility] - Medical ICU  Critical Care Medicine  Progress Note    Patient Name: Flakito Mcginnis  MRN: [id number]  Admission Date: 12/19/2024  Hospital Length of Stay: 2 days  Code Status: Full Code  Attending Provider: Nain Betts MD  Primary Care Provider: Jordin Robbins MD   Principal Problem: Septic shock    Subjective:     HPI:  Mr. Flakito Mcginnis is a 69 y.o. man w/ HFrEF (30% 8/2024 from 20-25% 6/2024), NICM, MR, ESRD on HD, Crohn's s/p colostomy, h/o R nephrectomy.  He presented to [facility] ED from his HD center on 12/19 due to hypotension and ongoing shortness of breath.  He usually receives his dialysis on T, R, S and went on Wednesday for an extra session for volume removal.  He arrived on Thursday for his usually scheduled dialysis session and was directed to the ED due to hypotension.  On arrival in the ED his blood pressure was 78/51.  He was found to have a BNP >4900 and CXR concerning for volume overload.  High sensitivity troponin 923.  Critical care medicine was consulted for hypotension and admitted to MICU.      Hospital/ICU Course:  12/20:  On low-dose vasopressor with norepinephrine.  Patient has had increasing oxygen requirements overnight.  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  Started on empiric antibiotic therapy with cefepime and daptomycin.  Infectious Diseases consulted.    Interval History/Significant Events: improving oxygen requirements with RRT. Planning for UF today per Nephrology. Improving pressor requirements.    Review of Systems    Objective:     Vital Signs (Most Recent):  Temp: 99 °F (37.2 °C) (12/21/24 0325)  Pulse: (!) 121 (12/21/24 0900)  Resp: (!) 22 (12/21/24 1002)  BP: (!) 94/58 (12/21/24 0900)  SpO2: (!) 94 % (12/21/24 0900) Vital Signs (24h Range):  Temp:  [97.6 °F (36.4 °C)-99 °F (37.2 °C)] 99 °F (37.2 °C)  Pulse:  [] 121  Resp:  [7-114] 22  SpO2:  [80 %-98 %] 94 %  BP: ()/(52-75) 94/58   Weight: 56 kg (123 lb 7.3  oz)  Body mass index is 19.93 kg/m².      Intake/Output Summary (Last 24 hours) at 12/21/2024 1012  Last data filed at 12/21/2024 0900  Gross per 24 hour   Intake 2044.72 ml   Output 2071 ml   Net -26.28 ml          Physical Exam  Vitals and nursing note reviewed.   Constitutional:       General: He is not in acute distress.     Appearance: Normal appearance. He is normal weight. He is ill-appearing. He is not toxic-appearing or diaphoretic.   HENT:      Head: Normocephalic and atraumatic.      Right Ear: External ear normal.      Left Ear: External ear normal.      Nose: Nose normal.      Mouth/Throat:      Mouth: Mucous membranes are moist.   Eyes:      General: No scleral icterus.     Extraocular Movements: Extraocular movements intact.      Pupils: Pupils are equal, round, and reactive to light.   Cardiovascular:      Rate and Rhythm: Regular rhythm. Tachycardia present.      Pulses: Normal pulses.      Heart sounds: Murmur heard.      No friction rub. No gallop.      Comments: No JVD or peripheral edema  Pulmonary:      Comments: Mildly increased respiratory effort with bibasilar crackles  Abdominal:      General: Abdomen is flat. Bowel sounds are normal. There is no distension.      Palpations: Abdomen is soft.      Tenderness: There is no abdominal tenderness. There is no guarding or rebound.   Musculoskeletal:         General: No tenderness. Normal range of motion.      Right lower leg: Edema (trace) present.      Left lower leg: Edema (trace) present.      Comments: Kyphosis.    Skin:     General: Skin is warm and dry.      Coloration: Skin is not jaundiced or pale.   Neurological:      General: No focal deficit present.      Mental Status: He is alert and oriented to person, place, and time. Mental status is at baseline.   Psychiatric:         Mood and Affect: Mood normal.         Behavior: Behavior normal.         Thought Content: Thought content normal.         Judgment: Judgment normal.             Vents:  Oxygen Concentration (%): 50 (12/21/24 0831)  Lines/Drains/Airways       Central Venous Catheter Line  Duration                  Hemodialysis Catheter right subclavian -- days              Drain  Duration                  Colostomy 12/19/24 2225 RLQ 1 day              Peripheral Intravenous Line  Duration                  Peripheral IV - Single Lumen 12/19/24 1428 20 G Anterior;Right Forearm 1 day         Peripheral IV - Single Lumen 12/19/24 1429 20 G Left;Posterior Hand 1 day                  Significant Labs:    CBC/Anemia Profile:  Recent Labs   Lab 12/19/24  1430 12/19/24  1436 12/20/24  0351 12/21/24  0117   WBC 4.79  --  4.66 5.04   HGB 7.9*  --  7.7* 7.8*   HCT 25.9* 24* 24.6* 25.5*     --  147* 161   MCV 98  --  97 99*   RDW 16.6*  --  16.1* 16.1*   IRON  --   --  26*  --    FERRITIN  --   --  2,808*  --    FOLATE  --   --  8.0  --    [id number]  --   --  770  --         Chemistries:  Recent Labs   Lab 12/19/24  1430 12/19/24  2106 12/20/24  0351 12/20/24  2213 12/21/24  0117   *  --  133* 135* 138  138   K 4.6  --  5.0 4.7 4.9  4.9   CL 95  --  96 104 106  106   CO2 29  --  25 24 25  25   BUN 36*  --  43* 25* 17  17   CREATININE 4.8*  --  5.6* 3.1* 2.7*  2.7*   CALCIUM 9.0  --  8.6* 8.4* 8.2*  8.2*   ALBUMIN 2.8*  --   --  2.3* 2.3*   PROT 6.6  --   --   --   --    BILITOT 0.5  --   --   --   --    ALKPHOS 259*  --   --   --   --    ALT 11  --   --   --   --    AST 11  --   --   --   --    MG  --    < > 2.0 1.9 1.9  1.9   PHOS  --   --  5.4* 3.1 2.7  2.7    < > = values in this interval not displayed.       Blood Culture:   Recent Labs   Lab 12/19/24  1430 12/19/24  1434 12/21/24  0115   LABBLOO Gram stain aer bottle: Gram positive cocci in clusters resembling Staph  Results called to and read back by:Lu Robles RN 12/20/2024  16:30  Gram stain benja bottle: Gram positive cocci in clusters resembling Staph Gram stain aer bottle: Gram positive cocci in clusters resembling  "Staph  Positive results previously called 12/20/2024  Gram stain benja bottle: Gram positive cocci in clusters resembling Staph  12/21/2024  01:37 No Growth to date  No Growth to date     All pertinent labs within the past 24 hours have been reviewed.    Significant Imaging:  I have reviewed all pertinent imaging results/findings within the past 24 hours.    ABG  Recent Labs   Lab 12/19/24  1433   PH 7.454*   PO2 34*   PCO2 44.1   HCO3 31.0*   BE 7*     Assessment/Plan:     Cardiac/Vascular  Endocarditis  See "septic shock"  - despite size, patient would be a poor surgical candidate for valve replacement in setting of Age and comorbidities. Increased risk of stroke based on size and location.     NSTEMI (non-ST elevated myocardial infarction)  High sensitivity troponin troponin 923.  EKG with t wave inversions.  No complaints of chest pain.      --Cardiology following  -- Troponin down trending  --Continuous cardiac monitoring  --EKG PRN     HFrEF (heart failure with reduced ejection fraction)  HFrEF (30% 8/2024 from 20-25% 6/2024) per cardiology notes.  Suspect volume overload with history HF and ESRD.    --Dialysis for volume removal, given 1 time dose lasix in ED still makes urine  --Echo showing EF of 20-25% with large AV vegetation partially occluding LVOT with moderate AV regurgitation.   --Cardiology following  --troponin down trending   --Continuous cardiac monitoring   --EKG PRN     Hypertension  Holding antihypertensives in setting of shock.      Renal/  History of nephrectomy  Noted. See ESRD.     ESRD on hemodialysis  --Nephrology consulted--appreciate input   --6 hours SLED yesterday. Planning for UF today per note.     ID  * Septic shock  Presented with hypotension unable to receive iHD.  SBP 70s on arrival to ED.  On exam patient with MAP 65-70 not on vasopressor support, but suspect will need vasopressors for dialysis. No obvious signs of infection on exam, tunneled cath dry/intact. Reports no " cough, urinary symptoms.  WBC normal.  Suspect shock likely cardiogenic in setting of HF and ESRD with volume overload.      -- blood cultures + for staph   --Vegetation found on aortic valve on echocardiogram concerning for endocarditis  --Infectious diseases consult--appreciate input   --Started on cefepime and daptomycin given reported vancomycin allergy       Critical Care Daily Checklist:    A: Awake: RASS Goal/Actual Goal:    Actual:     B: Spontaneous Breathing Trial Performed?     C: SAT & SBT Coordinated?  NA                      D: Delirium: CAM-ICU     E: Early Mobility Performed? Yes   F: Feeding Goal:    Status:     Current Diet Order   Procedures    Diet Renal Fluid - 1000mL     Order Specific Question:   Fluid restriction:     Answer:   Fluid - 1000mL      AS: Analgesia/Sedation NA   T: Thromboembolic Prophylaxis SqHq8h   H: HOB > 300 No   U: Stress Ulcer Prophylaxis (if needed) No   G: Glucose Control At goal   B: Bowel Function  BM 12/21   I: Indwelling Catheter (Lines & Alberts) Necessity RIJ tunneled HD catheter   D: De-escalation of Antimicrobials/Pharmacotherapies Following susceptibilities.     Plan for the day/ETD Continue abx, titrate levo and oxygen. UF per nephrology.     Code Status:  Family/Goals of Care: Full Code  Plan to discuss with patient and family today.      Critical Care Time: 80 minutes  Critical secondary to Patient has a condition that poses threat to life and bodily function: Septic shock.       Critical care was time spent personally by me on the following activities: development of treatment plan with patient or surrogate and bedside caregivers, discussions with consultants, evaluation of patient's response to treatment, examination of patient, ordering and performing treatments and interventions, ordering and review of laboratory studies, ordering and review of radiographic studies, pulse oximetry, re-evaluation of patient's condition. This critical care time did not  overlap with that of any other provider or involve time for any procedures.     Nain Betts MD  Critical Care Medicine  [facility] - Medical ICU

## 2024-12-21 NOTE — SUBJECTIVE & OBJECTIVE
Interval History: feeling well today, denies any new complaints    Review of Systems   Constitutional:  Negative for chills and fever.   Respiratory:  Negative for cough and shortness of breath.    Cardiovascular:  Negative for chest pain.   Gastrointestinal:  Negative for abdominal pain, constipation, diarrhea, nausea and vomiting.   Musculoskeletal:  Negative for arthralgias and myalgias.   Skin:  Negative for rash.   Neurological:  Negative for headaches.     Objective:     Vital Signs (Most Recent):  Temp: 99 °F (37.2 °C) (12/21/24 0325)  Pulse: 109 (12/21/24 1200)  Resp: (!) 27 (12/21/24 1200)  BP: 104/67 (12/21/24 1200)  SpO2: (!) 92 % (12/21/24 1200) Vital Signs (24h Range):  Temp:  [97.6 °F (36.4 °C)-99 °F (37.2 °C)] 99 °F (37.2 °C)  Pulse:  [] 109  Resp:  [] 27  SpO2:  [80 %-97 %] 92 %  BP: ()/(54-75) 104/67     Weight: 56 kg (123 lb 7.3 oz)  Body mass index is 19.93 kg/m².    Estimated Creatinine Clearance: 20.5 mL/min (A) (based on SCr of 2.7 mg/dL (H)).     Physical Exam  Vitals reviewed.   Constitutional:       General: He is not in acute distress.     Appearance: Normal appearance. He is not ill-appearing.   HENT:      Head: Normocephalic and atraumatic.   Eyes:      Extraocular Movements: Extraocular movements intact.      Conjunctiva/sclera: Conjunctivae normal.   Cardiovascular:      Rate and Rhythm: Normal rate and regular rhythm.   Pulmonary:      Effort: Pulmonary effort is normal. No respiratory distress.      Breath sounds: Normal breath sounds.   Abdominal:      General: Abdomen is flat. Bowel sounds are normal.      Palpations: Abdomen is soft.      Tenderness: There is no abdominal tenderness.   Musculoskeletal:      Cervical back: Normal range of motion.   Skin:     General: Skin is warm and dry.   Neurological:      General: No focal deficit present.      Mental Status: He is alert and oriented to person, place, and time.   Psychiatric:         Mood and Affect: Mood  normal.         Behavior: Behavior normal.         Thought Content: Thought content normal.          Significant Labs: All pertinent labs within the past 24 hours have been reviewed.  Recent Lab Results  (Last 5 results in the past 24 hours)        12/21/24  1214   12/21/24  0808   12/21/24  0117   12/21/24  0115   12/20/24  2213        Albumin     2.3     2.3       Anion Gap     7     7            7           Baso #     0.03           Basophil %     0.6           Blood Culture, Routine       No Growth to date  [P]                No Growth to date  [P]         BUN     17     25            17           Calcium     8.2     8.4            8.2           Chloride     106     104            106           CO2     25     24            25           CPK     <7           Creatinine     2.7     3.1            2.7           Differential Method     Automated           eGFR     24.7     21.0            24.7           Eos #     0.1           Eos %     2.4           Glucose     93     105            93           Gran # (ANC)     3.7           Gran %     74.0           Hematocrit     25.5           Hemoglobin     7.8           Immature Grans (Abs)     0.05  Comment: Mild elevation in immature granulocytes is non specific and   can be seen in a variety of conditions including stress response,   acute inflammation, trauma and pregnancy. Correlation with other   laboratory and clinical findings is essential.             Immature Granulocytes     1.0           Lymph #     0.5           Lymph %     9.7           Magnesium      1.9     1.9            1.9           MCH     30.1           MCHC     30.6           MCV     99           Mono #     0.6           Mono %     12.3           MPV     11.6           nRBC     0           Phosphorus Level     2.7     3.1            2.7           Platelet Count     161           POCT Glucose 107   81             Potassium     4.9     4.7            4.9           RBC     2.59           RDW     16.1            Sodium     138     135            138           WBC     5.04                                   [P] - Preliminary Result               Significant Imaging: I have reviewed all pertinent imaging results/findings within the past 24 hours.

## 2024-12-21 NOTE — ASSESSMENT & PLAN NOTE
--Nephrology consulted--appreciate input   --6 hours SLED yesterday. Planning for UF today per note.

## 2024-12-21 NOTE — PROGRESS NOTES
12/21/24 0105   Treatment   Treatment Type SLED   Treatment Status Blood returned;Discontinued treatment   Dialysis Machine Number [id number]   Dialyzer Time (hours) 3.22   BVP (Liters) 39.9 L   Access Tunneled Cath;Right;IJ   CRRT Comments SLED completed     SLED for 6 hours completed. Blood returned. Lines disconnected aseptically.R tunneled CVC flushed with saline, Lumens capped. Machine disinfected.

## 2024-12-21 NOTE — ASSESSMENT & PLAN NOTE
Presented with hypotension unable to receive iHD.  SBP 70s on arrival to ED.  On exam patient with MAP 65-70 not on vasopressor support, but suspect will need vasopressors for dialysis. No obvious signs of infection on exam, tunneled cath dry/intact. Reports no cough, urinary symptoms.  WBC normal.  Suspect shock likely cardiogenic in setting of HF and ESRD with volume overload.      -- blood cultures + for staph   --Vegetation found on aortic valve on echocardiogram concerning for endocarditis  --Infectious diseases consult--appreciate input   --Started on cefepime and daptomycin given reported vancomycin allergy

## 2024-12-22 LAB
ALBUMIN SERPL BCP-MCNC: 2.3 G/DL (ref 3.5–5.2)
ALBUMIN SERPL BCP-MCNC: 2.4 G/DL (ref 3.5–5.2)
ANION GAP SERPL CALC-SCNC: 7 MMOL/L (ref 8–16)
ANION GAP SERPL CALC-SCNC: 8 MMOL/L (ref 8–16)
BASOPHILS # BLD AUTO: 0.05 K/UL (ref 0–0.2)
BASOPHILS NFR BLD: 1.2 % (ref 0–1.9)
BUN SERPL-MCNC: 17 MG/DL (ref 8–23)
BUN SERPL-MCNC: 21 MG/DL (ref 8–23)
CA-I BLDV-SCNC: 1.2 MMOL/L (ref 1.06–1.42)
CA-I BLDV-SCNC: 1.25 MMOL/L (ref 1.06–1.42)
CA-I BLDV-SCNC: 1.28 MMOL/L (ref 1.06–1.42)
CALCIUM SERPL-MCNC: 8.9 MG/DL (ref 8.7–10.5)
CALCIUM SERPL-MCNC: 9.5 MG/DL (ref 8.7–10.5)
CHLORIDE SERPL-SCNC: 104 MMOL/L (ref 95–110)
CHLORIDE SERPL-SCNC: 106 MMOL/L (ref 95–110)
CO2 SERPL-SCNC: 22 MMOL/L (ref 23–29)
CO2 SERPL-SCNC: 24 MMOL/L (ref 23–29)
CREAT SERPL-MCNC: 3.1 MG/DL (ref 0.5–1.4)
CREAT SERPL-MCNC: 4 MG/DL (ref 0.5–1.4)
DIFFERENTIAL METHOD BLD: ABNORMAL
EOSINOPHIL # BLD AUTO: 0.1 K/UL (ref 0–0.5)
EOSINOPHIL NFR BLD: 3.3 % (ref 0–8)
ERYTHROCYTE [DISTWIDTH] IN BLOOD BY AUTOMATED COUNT: 16.2 % (ref 11.5–14.5)
EST. GFR  (NO RACE VARIABLE): 15.4 ML/MIN/1.73 M^2
EST. GFR  (NO RACE VARIABLE): 21 ML/MIN/1.73 M^2
GLUCOSE SERPL-MCNC: 87 MG/DL (ref 70–110)
GLUCOSE SERPL-MCNC: 94 MG/DL (ref 70–110)
HCT VFR BLD AUTO: 25.2 % (ref 40–54)
HGB BLD-MCNC: 7.5 G/DL (ref 14–18)
IMM GRANULOCYTES # BLD AUTO: 0.04 K/UL (ref 0–0.04)
IMM GRANULOCYTES NFR BLD AUTO: 1 % (ref 0–0.5)
LYMPHOCYTES # BLD AUTO: 0.6 K/UL (ref 1–4.8)
LYMPHOCYTES NFR BLD: 14.3 % (ref 18–48)
MAGNESIUM SERPL-MCNC: 1.9 MG/DL (ref 1.6–2.6)
MAGNESIUM SERPL-MCNC: 2.4 MG/DL (ref 1.6–2.6)
MCH RBC QN AUTO: 29.8 PG (ref 27–31)
MCHC RBC AUTO-ENTMCNC: 29.8 G/DL (ref 32–36)
MCV RBC AUTO: 100 FL (ref 82–98)
MONOCYTES # BLD AUTO: 0.6 K/UL (ref 0.3–1)
MONOCYTES NFR BLD: 13.8 % (ref 4–15)
NEUTROPHILS # BLD AUTO: 2.8 K/UL (ref 1.8–7.7)
NEUTROPHILS NFR BLD: 66.4 % (ref 38–73)
NRBC BLD-RTO: 0 /100 WBC
PHOSPHATE SERPL-MCNC: 3.7 MG/DL (ref 2.7–4.5)
PHOSPHATE SERPL-MCNC: 3.7 MG/DL (ref 2.7–4.5)
PHOSPHATE SERPL-MCNC: 4.2 MG/DL (ref 2.7–4.5)
PLATELET # BLD AUTO: 160 K/UL (ref 150–450)
PMV BLD AUTO: 10.9 FL (ref 9.2–12.9)
POCT GLUCOSE: 121 MG/DL (ref 70–110)
POCT GLUCOSE: 90 MG/DL (ref 70–110)
POCT GLUCOSE: 92 MG/DL (ref 70–110)
POCT GLUCOSE: 95 MG/DL (ref 70–110)
POTASSIUM SERPL-SCNC: 5.2 MMOL/L (ref 3.5–5.1)
POTASSIUM SERPL-SCNC: 5.2 MMOL/L (ref 3.5–5.1)
RBC # BLD AUTO: 2.52 M/UL (ref 4.6–6.2)
SODIUM SERPL-SCNC: 135 MMOL/L (ref 136–145)
SODIUM SERPL-SCNC: 136 MMOL/L (ref 136–145)
WBC # BLD AUTO: 4.2 K/UL (ref 3.9–12.7)

## 2024-12-22 PROCEDURE — 82330 ASSAY OF CALCIUM: CPT | Performed by: STUDENT IN AN ORGANIZED HEALTH CARE EDUCATION/TRAINING PROGRAM

## 2024-12-22 PROCEDURE — 99291 CRITICAL CARE FIRST HOUR: CPT | Mod: ,,, | Performed by: INTERNAL MEDICINE

## 2024-12-22 PROCEDURE — 85025 COMPLETE CBC W/AUTO DIFF WBC: CPT

## 2024-12-22 PROCEDURE — 82330 ASSAY OF CALCIUM: CPT | Mod: 91 | Performed by: STUDENT IN AN ORGANIZED HEALTH CARE EDUCATION/TRAINING PROGRAM

## 2024-12-22 PROCEDURE — 25000003 PHARM REV CODE 250: Performed by: GENERAL ACUTE CARE HOSPITAL

## 2024-12-22 PROCEDURE — 94761 N-INVAS EAR/PLS OXIMETRY MLT: CPT

## 2024-12-22 PROCEDURE — 99232 SBSQ HOSP IP/OBS MODERATE 35: CPT | Mod: GC,,, | Performed by: INTERNAL MEDICINE

## 2024-12-22 PROCEDURE — 63600175 PHARM REV CODE 636 W HCPCS: Performed by: INTERNAL MEDICINE

## 2024-12-22 PROCEDURE — 63600175 PHARM REV CODE 636 W HCPCS: Performed by: STUDENT IN AN ORGANIZED HEALTH CARE EDUCATION/TRAINING PROGRAM

## 2024-12-22 PROCEDURE — 20000000 HC ICU ROOM

## 2024-12-22 PROCEDURE — 99900035 HC TECH TIME PER 15 MIN (STAT)

## 2024-12-22 PROCEDURE — 25000003 PHARM REV CODE 250: Performed by: INTERNAL MEDICINE

## 2024-12-22 PROCEDURE — 63600175 PHARM REV CODE 636 W HCPCS: Performed by: RADIOLOGY

## 2024-12-22 PROCEDURE — 05PY33Z REMOVAL OF INFUSION DEVICE FROM UPPER VEIN, PERCUTANEOUS APPROACH: ICD-10-PCS | Performed by: INTERNAL MEDICINE

## 2024-12-22 PROCEDURE — 99233 SBSQ HOSP IP/OBS HIGH 50: CPT | Mod: ,,, | Performed by: STUDENT IN AN ORGANIZED HEALTH CARE EDUCATION/TRAINING PROGRAM

## 2024-12-22 PROCEDURE — 25000003 PHARM REV CODE 250

## 2024-12-22 PROCEDURE — 27100171 HC OXYGEN HIGH FLOW UP TO 24 HOURS

## 2024-12-22 PROCEDURE — 94799 UNLISTED PULMONARY SVC/PX: CPT

## 2024-12-22 PROCEDURE — 80069 RENAL FUNCTION PANEL: CPT | Mod: 91 | Performed by: INTERNAL MEDICINE

## 2024-12-22 PROCEDURE — 25000003 PHARM REV CODE 250: Performed by: PHYSICIAN ASSISTANT

## 2024-12-22 PROCEDURE — 0JPT3XZ REMOVAL OF TUNNELED VASCULAR ACCESS DEVICE FROM TRUNK SUBCUTANEOUS TISSUE AND FASCIA, PERCUTANEOUS APPROACH: ICD-10-PCS | Performed by: INTERNAL MEDICINE

## 2024-12-22 PROCEDURE — 83735 ASSAY OF MAGNESIUM: CPT | Performed by: INTERNAL MEDICINE

## 2024-12-22 PROCEDURE — 27000221 HC OXYGEN, UP TO 24 HOURS

## 2024-12-22 RX ORDER — LIDOCAINE HYDROCHLORIDE 10 MG/ML
INJECTION, SOLUTION INFILTRATION; PERINEURAL
Status: COMPLETED | OUTPATIENT
Start: 2024-12-22 | End: 2024-12-22

## 2024-12-22 RX ORDER — MUPIROCIN 20 MG/G
OINTMENT TOPICAL 2 TIMES DAILY
Status: DISPENSED | OUTPATIENT
Start: 2024-12-22 | End: 2024-12-27

## 2024-12-22 RX ADMIN — SODIUM ZIRCONIUM CYCLOSILICATE 10 G: 5 POWDER, FOR SUSPENSION ORAL at 06:12

## 2024-12-22 RX ADMIN — MAGNESIUM SULFATE HEPTAHYDRATE 2 G: 40 INJECTION, SOLUTION INTRAVENOUS at 05:12

## 2024-12-22 RX ADMIN — MUPIROCIN: 20 OINTMENT TOPICAL at 08:12

## 2024-12-22 RX ADMIN — MUPIROCIN: 20 OINTMENT TOPICAL at 09:12

## 2024-12-22 RX ADMIN — NOREPINEPHRINE BITARTRATE 0.06 MCG/KG/MIN: 16 SOLUTION INTRAVENOUS at 05:12

## 2024-12-22 RX ADMIN — OXYCODONE 5 MG: 5 TABLET ORAL at 05:12

## 2024-12-22 RX ADMIN — ATORVASTATIN CALCIUM 40 MG: 40 TABLET, FILM COATED ORAL at 08:12

## 2024-12-22 RX ADMIN — HEPARIN SODIUM 5000 UNITS: 5000 INJECTION INTRAVENOUS; SUBCUTANEOUS at 09:12

## 2024-12-22 RX ADMIN — SEVELAMER CARBONATE 800 MG: 800 TABLET, FILM COATED ORAL at 04:12

## 2024-12-22 RX ADMIN — OXYCODONE 5 MG: 5 TABLET ORAL at 07:12

## 2024-12-22 RX ADMIN — LIDOCAINE HYDROCHLORIDE 5 ML: 10 INJECTION, SOLUTION INFILTRATION; PERINEURAL at 12:12

## 2024-12-22 RX ADMIN — HEPARIN SODIUM 5000 UNITS: 5000 INJECTION INTRAVENOUS; SUBCUTANEOUS at 01:12

## 2024-12-22 RX ADMIN — SEVELAMER CARBONATE 800 MG: 800 TABLET, FILM COATED ORAL at 01:12

## 2024-12-22 NOTE — ASSESSMENT & PLAN NOTE
I independently reviewed patient's lab work and images as documented. 69M with ESRD on HD, prior R nephrectomy, and crohns s/p colostomy admitted for shock, found to have mobile mass at LVOT on TTE and GPC bacteremia suspected 2/2 to chronic HD line. Plan for line removal today, 12/22. Blood cx as of 12/19 and 12/21 positive (MRSA/staph aureus negative on BCID).     Recommendations  Continue renally dosed daptomycin   Weekly CK, recent CK WNL  Blood cx ordered for 12/23  Agree with line removal, tentatively planned for 12/22

## 2024-12-22 NOTE — CONSULTS
Radiology Consult    Flakito Mcginnis is a 69 y.o. male with a history of ESRD on HD via tunneled HD catheter presents with bacteremia, tunneled line removal requested.  Past Medical History:   Diagnosis Date    Crohn's disease 1998    ESRD (end stage renal disease) on dialysis 10/2017    Hypertension     Obstructive uropathy      Past Surgical History:   Procedure Laterality Date    COLOSTOMY  2006    DIALYSIS FISTULA CREATION Left 02/2018    NEPHRECTOMY Right     REMOVAL OF TUNNELED CENTRAL VENOUS CATHETER (CVC) N/A 5/23/2018    Procedure: PERMCATH REMOVAL-TUNNELED CVC REMOVAL;  Surgeon: Parveen Ray MD;  Location: [facility] CATH LAB;  Service: Nephrology;  Laterality: N/A;  pt coming in @930       Discussed with primary team, Dr. Betts.      Scheduled Meds:    atorvastatin  40 mg Oral Daily    DAPTOmycin (CUBICIN) IV (PEDS and ADULTS)  10 mg/kg Intravenous Q48H    heparin (porcine)  5,000 Units Subcutaneous Q8H    mupirocin   Nasal BID    sevelamer carbonate  800 mg Oral TID WM    [START ON 12/23/2024] sodium zirconium cyclosilicate  10 g Oral Daily     Continuous Infusions:    sodium chloride 0.9%   Intravenous Continuous   Stopped at 12/21/24 2143     PRN Meds:  Current Facility-Administered Medications:     acetaminophen, 500 mg, Oral, Q4H PRN    dextrose 50%, 12.5 g, Intravenous, PRN    dextrose 50%, 25 g, Intravenous, PRN    glucagon (human recombinant), 1 mg, Intramuscular, PRN    glucose, 16 g, Oral, PRN    glucose, 24 g, Oral, PRN    insulin aspart U-100, 0-5 Units, Subcutaneous, QID (AC + HS) PRN    oxyCODONE, 5 mg, Oral, Q4H PRN    sodium chloride 0.9%, 10 mL, Intravenous, PRN    Allergies:   Review of patient's allergies indicates:   Allergen Reactions    Vancomycin analogues Anaphylaxis, Other (See Comments), Shortness Of Breath and Swelling     Light headed, see's spots      Aspirin Nausea And Vomiting and Other (See Comments)     Has crohn's disease    Other reaction(s): FLARES UP CROHNS      Has  "crohn's disease       Labs:  No results for input(s): "INR", "PT", "PTT" in the last 168 hours.    Recent Labs   Lab 12/22/24  0302   WBC 4.20   HGB 7.5*   HCT 25.2*   *         Recent Labs   Lab 12/19/24  1430 12/19/24  2106 12/22/24  0302   GLU 93   < > 87   *   < > 136   K 4.6   < > 5.2*   CL 95   < > 106   CO2 29   < > 22*   BUN 36*   < > 17   CREATININE 4.8*   < > 3.1*   CALCIUM 9.0   < > 8.9   MG  --    < > 1.9   ALT 11  --   --    AST 11  --   --    ALBUMIN 2.8*   < > 2.3*   BILITOT 0.5  --   --     < > = values in this interval not displayed.         Vitals (Most Recent):  Temp: 97.3 °F (36.3 °C) (12/22/24 0705)  Pulse: 96 (12/22/24 1200)  Resp: (!) 23 (12/22/24 1200)  BP: 103/61 (12/22/24 1200)  SpO2: 95 % (12/22/24 1200)    Plan:   1. Plan for tunneled HD catheter removal today. No need to make patient NPO. Please re-consult IR for catheter replacement when blood cultures are clear if needed.     Marcus Galvan MD  Interventional Radiology  Department of Radiology    "

## 2024-12-22 NOTE — PLAN OF CARE
MICU DAILY GOALS     Family/Goals of care/Code Status   Code Status: Full Code    24H Vital Sign Range  Temp:  [97.3 °F (36.3 °C)-98.7 °F (37.1 °C)]   Pulse:  []   Resp:  [14-33]   BP: ()/(57-72)   SpO2:  [86 %-100 %]      Shift Events (include procedures and significant events)   HD cath removed today by IR. Pt tolerated well. Levo no longer infusing.    AWAKE RASS: Goal -    Actual - RASS (Sam Agitation-Sedation Scale): alert and calm    Restraint necessity: Not necessary   BREATHE SBT: Not intubated    Coordinate A & B, analgesics/sedatives Pain: managed   SAT: Not intubated   Delirium CAM-ICU:     Early(intubated/ Progressive (non-intubated) Mobility MOVE Screen (INTUBATED ONLY): Not intubated    Activity: Activity Management: Up in chair - L3   Feeding/Nutrition Diet order: Diet/Nutrition Received: restrict fluids, Specialty Diet/Nutrition Received: renal diet   Thrombus DVT prophylaxis: VTE Core Measure: Pharmacological prophylaxis initiated/maintained   HOB Elevation Head of Bed (HOB) Positioning: HOB at 30-45 degrees   Ulcer Prophylaxis GI: yes   Glucose control managed Glycemic Management: blood glucose monitored   Skin Skin assessment:     Sacrum intact/not altered? Yes  Heels intact/not altered? Yes  Surgical wound? No    CHECK ONE!   (no altered skin or altered skin) and sub boxes:  [] No Altered Skin Integrity Present    []Prevention Measures Documented    [x] Altered Skin Integrity Present or Discovered   [x] LDA present in EPIC, daily doc completed              [] LDA added if not in EPIC (describe wound).                    When describing wound, do not stage, use descriptive words only.    [] Wound Image Taken (required on admit,                   transfer/discharge and every Tuesday)    Wound Care Consulted? No   Bowel Function no issues    Indwelling Catheter Necessity      [REMOVED]      Hemodialysis Catheter right subclavian-Line Necessity Review: CRRT/HD     De-escalation  Antibiotics No        VS and assessment per flow sheet, patient progressing towards goals as tolerated, plan of care reviewed with  patient/family , all concerns addressed, will continue to monitor.

## 2024-12-22 NOTE — PROGRESS NOTES
[facility] - [facility]  Nephrology  Progress Note    Patient Name: Flakito Mcginnsi  MRN: [id number]  Admission Date: 12/19/2024  Hospital Length of Stay: 3 days  Attending Provider: Nain Betts MD   Primary Care Physician: Jordin Robbins MD  Principal Problem:Septic shock    Subjective:     HPI: Patient is a pleasant 69 year old with ESRD on HD TThS who presents to the ER at the request of his HD unit for evaluation of hypotension. Patient completed his a session on Tuesday 12/17/24 and went back for a second session on 12/18/24. Review of his outpatient dialysis notes show that his post BUN dialysis values on 12/17 were 10 with a pre-HD BUN of 41 indicating a urea reduction percentage of 76% suggesting that he received DH on 12/17/24. There are also post HD treatment vital signs recorded on 12/18/24 at 1035 am which also show a pre-HD weight of 56.3 kg and post HD weight of 54.6 kg suggesting that he received an HD session yesterday as well and left at his estimated dry weight. He reported for his routine dialysis but was sent into the ER when he was found to be hypotensive in the HD unit. He reports feeling well with no signs or symptoms of hypotension prior to arrival, however he does report increased loose stool output since his HD session yesterday. He denies any increase in salt or fluid intake and tries to adhere to a low salt and 1L fluid restricting per day diet.    Nephrology has been consulted for management of his dialysis while he is admitted to the hospital. Labs on arrival showed stable electrolytes, venous blood gas showed a metabolic alkalosis with a pCO2 of 44, BNP elevated at >4,900 with no prior values to compare to, and troponin elevated at 923. Weight in the ER was 54 kg. Chest xray was obtained and showed no significant change in the cardiopulmonary status of the patient when compared to previous chest xray. Patient reports oxygen use of 4L at home and reports resolution of his dyspnea  once he was placed on his home oxygen dose.     Outpatient HD Information:  -Dialysis modality: Hemodialysis  -Outpatient HD unit: [facility]  -Nephrologist: Dr. Strickland  -HD TX days: Tuesday/Thursday/Saturday  -Last HD TX prior to hospital admission: 12/18/2024  -Residual urine: Anuric   -EDW: 54.5 kg      Interval hx:    No acute events throughout shift; SLED yesterday evening stopped early due to clot. Levophed gtt continued.      Objective:     Vital Signs (Most Recent):  Temp: 97.3 °F (36.3 °C) (12/22/24 0705)  Pulse: 101 (12/22/24 1105)  Resp: (!) 21 (12/22/24 1105)  BP: 102/67 (12/22/24 1105)  SpO2: 100 % (12/22/24 1105) Vital Signs (24h Range):  Temp:  [97.3 °F (36.3 °C)-98.8 °F (37.1 °C)] 97.3 °F (36.3 °C)  Pulse:  [] 101  Resp:  [14-34] 21  SpO2:  [84 %-100 %] 100 %  BP: ()/(57-72) 102/67     Weight: 56 kg (123 lb 7.3 oz) (12/21/24 0641)  Body mass index is 19.93 kg/m².  Body surface area is 1.61 meters squared.    I/O last 3 completed shifts:  In: 3381.2 [P.O.:750; I.V.:2582.6; IV Piggyback:48.5]  Out: 2993 [Other:2143; Stool:850]     Physical Exam  Constitutional:       General: He is not in acute distress.     Appearance: Normal appearance. He is not ill-appearing or toxic-appearing.   HENT:      Head: Normocephalic and atraumatic.      Right Ear: External ear normal.      Left Ear: External ear normal.      Nose: Nose normal.      Mouth/Throat:      Mouth: Mucous membranes are moist.   Eyes:      Extraocular Movements: Extraocular movements intact.   Cardiovascular:      Rate and Rhythm: Normal rate and regular rhythm.   Pulmonary:      Effort: Pulmonary effort is normal.      Comments: Fine crackles throughout.   Abdominal:      General: Abdomen is flat.      Palpations: Abdomen is soft.   Musculoskeletal:         General: Normal range of motion.      Right lower leg: Edema (2+) present.      Left lower leg: Edema (2+) present.   Skin:     Capillary Refill: Capillary  refill takes less than 2 seconds.   Neurological:      General: No focal deficit present.      Mental Status: He is alert and oriented to person, place, and time.   Psychiatric:         Mood and Affect: Mood normal.         Behavior: Behavior normal.         Thought Content: Thought content normal.         Judgment: Judgment normal.          Significant Labs:  CBC:   Recent Labs   Lab 12/22/24  0302   WBC 4.20   RBC 2.52*   HGB 7.5*   HCT 25.2*      *   MCH 29.8   MCHC 29.8*     CMP:   Recent Labs   Lab 12/19/24  1430 12/20/24  0351 12/22/24  0302   GLU 93   < > 87   CALCIUM 9.0   < > 8.9   ALBUMIN 2.8*   < > 2.3*   PROT 6.6  --   --    *   < > 136   K 4.6   < > 5.2*   CO2 29   < > 22*   CL 95   < > 106   BUN 36*   < > 17   CREATININE 4.8*   < > 3.1*   ALKPHOS 259*  --   --    ALT 11  --   --    AST 11  --   --    BILITOT 0.5  --   --     < > = values in this interval not displayed.     All labs within the past 24 hours have been reviewed.    Significant Imaging:  X-Ray: Reviewed    Assessment/Plan:     Renal/  ESRD on hemodialysis  After looking through his dialysis notes from yesterday, it does appear as if he completed two back-to-back sessions and left dialysis yesterday at his dry weight (54.6 kg) and reports high volume stool output since his HD session. He reports that he is on 4L chronic oxygen use at home, and is currently on that dose in the ER, and reporting resolution of his dyspnea once he was placed back on his home oxygen. Chest xray appears unchanged from prior studies.       Outpatient HD Information:  -Dialysis modality: Hemodialysis  -Outpatient HD unit: [facility]  -Nephrologist: Dr. Strickland  -HD TX days: Tuesday/Thursday/Saturday  -Last HD TX prior to hospital admission: 12/18/2024  -Residual urine: Anuric   -EDW: 54.5 kg    With patient completing two HD sessions over the past two days, being below his dry weight (54 kg recorded in the ER), completing  dialysis yesterday at his dry weight, ongoing hypotension, resolution of his dyspnea when placed back on his home oxygen, and chest xray that does not show any change over previous studies, I feel as if it is unlikely that he is intravascularly volume overloaded, and more likely that he has low effective circulating volume which is accounting for his hypotension (likely as a result of outpatient ultrafiltration from dialysis the past two days and from large volume stool output). Patient's BNP is elevated at greater than 4,900, however there are no previous values to compare to, and the utility of BNP levels decrease in ESRD patients.     Recommendations  Tolerated SLED yesterday, electrolyte stable no dialysis needed today    -Will recommend to do remove line , given infection hx  - okay to continue Lokelma  -Dialysis consent was obtained and placed with the patient's chart in the emergency room. If patient's respiratory status worsens, please reach out to nephrology for re-evaluation of CRRT needs.         Thank you for your consult. I will follow-up with patient. Please contact us if you have any additional questions.    Roxanna Carrillo MD  Nephrology  [facility] - Medical ICU

## 2024-12-22 NOTE — PROGRESS NOTES
[facility] - Medical ICU  Critical Care Medicine  Progress Note    Patient Name: Flakito Mcginnis  MRN: [id number]  Admission Date: 12/19/2024  Hospital Length of Stay: 3 days  Code Status: Full Code  Attending Provider: Nain Betts MD  Primary Care Provider: Jordin Robbins MD   Principal Problem: Septic shock    Subjective:     HPI:  Mr. Flakito Mcginnis is a 69 y.o. man w/ HFrEF (30% 8/2024 from 20-25% 6/2024), NICM, MR, ESRD on HD, Crohn's s/p colostomy, h/o R nephrectomy.  [place] [facility] [place] center on 12/19 due to hypotension and ongoing shortness of breath.  He usually receives his dialysis on T, R, S and went on Wednesday for an extra session for volume removal.  He arrived on Thursday for his usually scheduled dialysis session and was directed to the ED due to hypotension.  On arrival in the ED his blood pressure was 78/51.  He was found to have a BNP >4900 and CXR concerning for volume overload.  High sensitivity troponin 923.  Critical care medicine was consulted for hypotension and admitted to MICU.      Hospital/ICU Course:  12/20:  On low-dose vasopressor with norepinephrine.  Patient has had increasing oxygen requirements overnight.  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  Started on empiric antibiotic therapy with cefepime and daptomycin.  Infectious Diseases consulted.    Interval History/Significant Events:   Blood cultures continue to be positive. To IR for removal of tunneled catheter today. SCUF yesterday, clotted off after approximately 3.5 hours.     Review of Systems  Objective:     Vital Signs (Most Recent):  Temp: 97.3 °F (36.3 °C) (12/22/24 0705)  Pulse: 98 (12/22/24 1100)  Resp: 19 (12/22/24 1100)  BP: 102/67 (12/22/24 1100)  SpO2: 100 % (12/22/24 1100) Vital Signs (24h Range):  Temp:  [97.3 °F (36.3 °C)-98.9 °F (37.2 °C)] 97.3 °F (36.3 °C)  Pulse:  [] 98  Resp:  [14-34] 19  SpO2:  [84 %-100 %] 100 %  BP: ()/(57-72) 102/67    Weight: 56 kg (123 lb 7.3 oz)  Body mass index is 19.93 kg/m².      Intake/Output Summary (Last 24 hours) at 12/22/2024 1104  Last data filed at 12/22/2024 1100  Gross per 24 hour   Intake 1901.42 ml   Output 1603 ml   Net 298.42 ml          Physical Exam  Vitals and nursing note reviewed.   Constitutional:       General: He is not in acute distress.     Appearance: Normal appearance. He is normal weight. He is not ill-appearing, toxic-appearing or diaphoretic.   HENT:      Head: Normocephalic and atraumatic.      Right Ear: External ear normal.      Left Ear: External ear normal.      Nose: Nose normal.      Mouth/Throat:      Mouth: Mucous membranes are moist.   Eyes:      General: No scleral icterus.     Extraocular Movements: Extraocular movements intact.      Conjunctiva/sclera: Conjunctivae normal.      Pupils: Pupils are equal, round, and reactive to light.   Cardiovascular:      Rate and Rhythm: Normal rate and regular rhythm.      Pulses: Normal pulses.      Heart sounds: Murmur heard.      No friction rub. No gallop.      Comments: No JVD or peripheral edema  Pulmonary:      Effort: Pulmonary effort is normal. No respiratory distress.      Breath sounds: Normal breath sounds.      Comments: Mildly increased respiratory effort with bibasilar crackles  Abdominal:      General: Abdomen is flat. Bowel sounds are normal. There is no distension.      Palpations: Abdomen is soft.      Tenderness: There is no abdominal tenderness. There is no guarding or rebound.   Musculoskeletal:         General: No tenderness. Normal range of motion.      Cervical back: Normal range of motion.      Right lower leg: No edema (trace).      Left lower leg: No edema (trace).      Comments: Kyphosis.    Skin:     General: Skin is warm and dry.      Coloration: Skin is not jaundiced or pale.   Neurological:      General: No focal deficit present.      Mental Status: He is alert and oriented to person, place, and time. Mental status  "is at baseline.   Psychiatric:         Mood and Affect: Mood normal.         Behavior: Behavior normal.         Thought Content: Thought content normal.         Judgment: Judgment normal.            Vents:  Oxygen Concentration (%): 50 (12/21/24 0831)  Lines/Drains/Airways       Central Venous Catheter Line  Duration                  Hemodialysis Catheter right subclavian -- days              Drain  Duration                  Colostomy 12/19/24 2225 RLQ 2 days              Peripheral Intravenous Line  Duration                  Peripheral IV - Single Lumen 12/19/24 1428 20 G Anterior;Right Forearm 2 days         Peripheral IV - Single Lumen 12/19/24 1429 20 G Left;Posterior Hand 2 days                  Significant Labs:    CBC/Anemia Profile:  Recent Labs   Lab 12/21/24  0117 12/22/24  0302   WBC 5.04 4.20   HGB 7.8* 7.5*   HCT 25.5* 25.2*    160   MCV 99* 100*   RDW 16.1* 16.2*        Chemistries:  Recent Labs   Lab 12/21/24  1516 12/21/24  2201 12/22/24  0302   * 136 136   K 5.2* 5.0 5.2*    107 106   CO2 22* 23 22*   BUN 23 16 17   CREATININE 3.8* 2.7* 3.1*   CALCIUM 8.8 8.7 8.9   ALBUMIN 2.3* 2.4* 2.3*   MG 2.0 1.9 1.9   PHOS 4.1 3.1 3.7  3.7       All pertinent labs within the past 24 hours have been reviewed.    Significant Imaging:  I have reviewed all pertinent imaging results/findings within the past 24 hours.    ABG  Recent Labs   Lab 12/19/24  1433   PH 7.454*   PO2 34*   PCO2 44.1   HCO3 31.0*   BE 7*     Assessment/Plan:     Cardiac/Vascular  Endocarditis  See "septic shock"  - despite size, patient would be a poor surgical candidate for valve replacement in setting of Age and comorbidities. Increased risk of stroke based on size and location.     NSTEMI (non-ST elevated myocardial infarction)  High sensitivity troponin troponin 923.  EKG with t wave inversions.  No complaints of chest pain.      --Cardiology following  -- Troponin down trending  --Continuous cardiac monitoring  --EKG " PRN     HFrEF (heart failure with reduced ejection fraction)  HFrEF (30% 8/2024 from 20-25% 6/2024) per cardiology notes.  Suspect volume overload with history HF and ESRD.    --Dialysis for volume removal, given 1 time dose lasix in ED still makes urine  --Echo showing EF of 20-25% with large AV vegetation partially occluding LVOT with moderate AV regurgitation.   --Cardiology following  --troponin down trending   --Continuous cardiac monitoring   --EKG PRN     Hypertension  Holding antihypertensives in setting of shock.      Renal/  History of nephrectomy  Noted. See ESRD.     ESRD on hemodialysis  --Nephrology consulted--appreciate input   --SCUF yesterday.   - plan for tunneled catheter removal today.     ID  * Septic shock  Presented with hypotension unable to receive iHD.  SBP 70s on arrival to ED.  On exam patient with MAP 65-70 not on vasopressor support, but suspect will need vasopressors for dialysis. No obvious signs of infection on exam, tunneled cath dry/intact. Reports no cough, urinary symptoms.  WBC normal.  Suspect shock likely cardiogenic in setting of HF and ESRD with volume overload.      -- blood cultures + for staph, most recent cultures remain positive with plan for tunneled catheter line removal today. Repeat blood cultures tomorrow AM.   --Vegetation found on aortic valve on echocardiogram concerning for endocarditis  --Infectious diseases consult--appreciate input   --Started on cefepime and daptomycin given reported vancomycin allergy; discontinue cefepime 12/22. Continue daptomycin.         Critical Care Daily Checklist:     A: Awake: RASS Goal/Actual Goal:    Actual:     B: Spontaneous Breathing Trial Performed?    C: SAT & SBT Coordinated?  NA                      D: Delirium: CAM-ICU    E: Early Mobility Performed? Yes   F: Feeding Goal:    Status:           Current Diet Order   Procedures    Diet Renal Fluid - 1000mL       Order Specific Question:   Fluid restriction:       Answer:    Fluid - 1000mL      AS: Analgesia/Sedation NA   T: Thromboembolic Prophylaxis SqHq8h   H: HOB > 300 No   U: Stress Ulcer Prophylaxis (if needed) No   G: Glucose Control At goal   B: Bowel Function  BM 12/21   I: Indwelling Catheter (Lines & Alberts) Necessity RIJ tunneled HD catheter   D: De-escalation of Antimicrobials/Pharmacotherapies Deescalate to dapto only.      Plan for the day/ETD Continue abx, titrate levo and oxygen. UF per nephrology.      Code Status:  Family/Goals of Care: Full Code  Plan to discuss with patient and family today.       Critical Care Time: 60 minutes  Critical secondary to Patient has a condition that poses threat to life and bodily function: Septic shock      Critical care was time spent personally by me on the following activities: development of treatment plan with patient or surrogate and bedside caregivers, discussions with consultants, evaluation of patient's response to treatment, examination of patient, ordering and performing treatments and interventions, ordering and review of laboratory studies, ordering and review of radiographic studies, pulse oximetry, re-evaluation of patient's condition. This critical care time did not overlap with that of any other provider or involve time for any procedures.     Nain Betts MD  Critical Care Medicine  [facility] - Medical ICU

## 2024-12-22 NOTE — SUBJECTIVE & OBJECTIVE
Interval History: No fevers documented overnight. Off pressors.   Blood cx remain positive. Line removal planned for today. Pt reports tolerating dapto without issues.     Review of Systems   Constitutional:  Negative for chills and fever.   Gastrointestinal:  Negative for constipation and diarrhea.   Musculoskeletal:  Negative for myalgias.     Objective:     Vital Signs (Most Recent):  Temp: 97.3 °F (36.3 °C) (12/22/24 0705)  Pulse: 101 (12/22/24 1105)  Resp: (!) 21 (12/22/24 1105)  BP: 102/67 (12/22/24 1105)  SpO2: 100 % (12/22/24 1105) Vital Signs (24h Range):  Temp:  [97.3 °F (36.3 °C)-98.8 °F (37.1 °C)] 97.3 °F (36.3 °C)  Pulse:  [] 101  Resp:  [14-34] 21  SpO2:  [84 %-100 %] 100 %  BP: ()/(57-72) 102/67     Weight: 56 kg (123 lb 7.3 oz)  Body mass index is 19.93 kg/m².    Estimated Creatinine Clearance: 17.8 mL/min (A) (based on SCr of 3.1 mg/dL (H)).     Physical Exam  HENT:      Head: Normocephalic and atraumatic.      Nose:      Comments: NC  Eyes:      General:         Right eye: No discharge.         Left eye: No discharge.   Pulmonary:      Effort: Pulmonary effort is normal. No respiratory distress.   Abdominal:      General: There is no distension.      Palpations: Abdomen is soft.      Comments: Colostomy with liquid stool   Skin:     General: Skin is warm and dry.      Comments: R tunneled HD line   Neurological:      Mental Status: He is alert and oriented to person, place, and time.          Significant Labs:   Microbiology Results (last 7 days)       Procedure Component Value Units Date/Time    Blood culture x two cultures. Draw prior to antibiotics. [[id number]] Collected: 12/19/24 0500    Order Status: Completed Specimen: Blood from Peripheral, Forearm, Right Updated: 12/22/24 0752     Blood Culture, Routine Gram stain aer bottle: Gram positive cocci in clusters resembling Staph      Positive results previously called 12/20/2024      Gram stain benja bottle: Gram positive cocci in  clusters resembling Staph      12/21/2024  01:37    Narrative:      Aerobic and anaerobic    Blood culture x two cultures. Draw prior to antibiotics. [[id number]] Collected: 12/19/24 1430    Order Status: Completed Specimen: Blood from Peripheral, Hand, Left Updated: 12/22/24 0751     Blood Culture, Routine Gram stain aer bottle: Gram positive cocci in clusters resembling Staph      Results called to and read back by:Lu Robles RN 12/20/2024  16:30      Gram stain benja bottle: Gram positive cocci in clusters resembling Staph    Narrative:      Aerobic and anaerobic    Blood culture [[id number]] Collected: 12/21/24 0115    Order Status: Completed Specimen: Blood from Peripheral, Forearm, Right Updated: 12/22/24 0606     Blood Culture, Routine Gram stain aer bottle: Gram positive cocci in clusters resembling Staph      Results called to and read back by: KAYLENE Padgett. 12/22/2024  06:04    Narrative:      Blood cultures from 2 different sites. 4 bottles total.  Please draw before starting antibiotics.    Blood culture [[id number]] Collected: 12/21/24 0115    Order Status: Completed Specimen: Blood from Peripheral, Forearm, Right Updated: 12/22/24 0408     Blood Culture, Routine Gram stain aer bottle: Gram positive cocci in clusters resembling Staph      Results called to and read back by: KAYLENE Padgett. 12/22/2024  04:06    Narrative:      Blood cultures x 2 different sites. 4 bottles total. Please  draw cultures before administering antibiotics.    MRSA/SA Rapid ID by PCR from Blood culture [[id number]] Collected: 12/19/24 1430    Order Status: Completed Updated: 12/20/24 1754     Staph aureus ID by PCR Negative     Methicillin Resistant ID by PCR Negative    Narrative:      Aerobic and anaerobic    MRSA Screen by PCR [[id number]]     Order Status: No result Specimen: Nasal Swab     Clostridium difficile EIA [[id number]]     Order Status: Canceled Specimen: Stool     Influenza A & B by Molecular [[id number]] Collected:  12/19/24 1451    Order Status: Completed Specimen: Nasopharyngeal Swab Updated: 12/19/24 1519     Influenza A, Molecular Negative     Influenza B, Molecular Negative     Flu A & B Source NP            Significant Imaging: I have reviewed all pertinent imaging results/findings within the past 24 hours.

## 2024-12-22 NOTE — SUBJECTIVE & OBJECTIVE
Interval hx:    No acute events throughout shift; SLED yesterday evening stopped early due to clot. Levophed gtt continued.      Objective:     Vital Signs (Most Recent):  Temp: 97.3 °F (36.3 °C) (12/22/24 0705)  Pulse: 101 (12/22/24 1105)  Resp: (!) 21 (12/22/24 1105)  BP: 102/67 (12/22/24 1105)  SpO2: 100 % (12/22/24 1105) Vital Signs (24h Range):  Temp:  [97.3 °F (36.3 °C)-98.8 °F (37.1 °C)] 97.3 °F (36.3 °C)  Pulse:  [] 101  Resp:  [14-34] 21  SpO2:  [84 %-100 %] 100 %  BP: ()/(57-72) 102/67     Weight: 56 kg (123 lb 7.3 oz) (12/21/24 0641)  Body mass index is 19.93 kg/m².  Body surface area is 1.61 meters squared.    I/O last 3 completed shifts:  In: 3381.2 [P.O.:750; I.V.:2582.6; IV Piggyback:48.5]  Out: 2993 [Other:2143; Stool:850]     Physical Exam  Constitutional:       General: He is not in acute distress.     Appearance: Normal appearance. He is not ill-appearing or toxic-appearing.   HENT:      Head: Normocephalic and atraumatic.      Right Ear: External ear normal.      Left Ear: External ear normal.      Nose: Nose normal.      Mouth/Throat:      Mouth: Mucous membranes are moist.   Eyes:      Extraocular Movements: Extraocular movements intact.   Cardiovascular:      Rate and Rhythm: Normal rate and regular rhythm.   Pulmonary:      Effort: Pulmonary effort is normal.      Comments: Fine crackles throughout.   Abdominal:      General: Abdomen is flat.      Palpations: Abdomen is soft.   Musculoskeletal:         General: Normal range of motion.      Right lower leg: Edema (2+) present.      Left lower leg: Edema (2+) present.   Skin:     Capillary Refill: Capillary refill takes less than 2 seconds.   Neurological:      General: No focal deficit present.      Mental Status: He is alert and oriented to person, place, and time.   Psychiatric:         Mood and Affect: Mood normal.         Behavior: Behavior normal.         Thought Content: Thought content normal.         Judgment: Judgment  normal.          Significant Labs:  CBC:   Recent Labs   Lab 12/22/24  0302   WBC 4.20   RBC 2.52*   HGB 7.5*   HCT 25.2*      *   MCH 29.8   MCHC 29.8*     CMP:   Recent Labs   Lab 12/19/24  1430 12/20/24  0351 12/22/24  0302   GLU 93   < > 87   CALCIUM 9.0   < > 8.9   ALBUMIN 2.8*   < > 2.3*   PROT 6.6  --   --    *   < > 136   K 4.6   < > 5.2*   CO2 29   < > 22*   CL 95   < > 106   BUN 36*   < > 17   CREATININE 4.8*   < > 3.1*   ALKPHOS 259*  --   --    ALT 11  --   --    AST 11  --   --    BILITOT 0.5  --   --     < > = values in this interval not displayed.     All labs within the past 24 hours have been reviewed.    Significant Imaging:  X-Ray: Reviewed

## 2024-12-22 NOTE — SUBJECTIVE & OBJECTIVE
Interval History/Significant Events:   Blood cultures continue to be positive. To IR for removal of tunneled catheter today. SCUF yesterday, clotted off after approximately 3.5 hours.     Review of Systems  Objective:     Vital Signs (Most Recent):  Temp: 97.3 °F (36.3 °C) (12/22/24 0705)  Pulse: 98 (12/22/24 1100)  Resp: 19 (12/22/24 1100)  BP: 102/67 (12/22/24 1100)  SpO2: 100 % (12/22/24 1100) Vital Signs (24h Range):  Temp:  [97.3 °F (36.3 °C)-98.9 °F (37.2 °C)] 97.3 °F (36.3 °C)  Pulse:  [] 98  Resp:  [14-34] 19  SpO2:  [84 %-100 %] 100 %  BP: ()/(57-72) 102/67   Weight: 56 kg (123 lb 7.3 oz)  Body mass index is 19.93 kg/m².      Intake/Output Summary (Last 24 hours) at 12/22/2024 1104  Last data filed at 12/22/2024 1100  Gross per 24 hour   Intake 1901.42 ml   Output 1603 ml   Net 298.42 ml          Physical Exam  Vitals and nursing note reviewed.   Constitutional:       General: He is not in acute distress.     Appearance: Normal appearance. He is normal weight. He is not ill-appearing, toxic-appearing or diaphoretic.   HENT:      Head: Normocephalic and atraumatic.      Right Ear: External ear normal.      Left Ear: External ear normal.      Nose: Nose normal.      Mouth/Throat:      Mouth: Mucous membranes are moist.   Eyes:      General: No scleral icterus.     Extraocular Movements: Extraocular movements intact.      Conjunctiva/sclera: Conjunctivae normal.      Pupils: Pupils are equal, round, and reactive to light.   Cardiovascular:      Rate and Rhythm: Normal rate and regular rhythm.      Pulses: Normal pulses.      Heart sounds: Murmur heard.      No friction rub. No gallop.      Comments: No JVD or peripheral edema  Pulmonary:      Effort: Pulmonary effort is normal. No respiratory distress.      Breath sounds: Normal breath sounds.      Comments: Mildly increased respiratory effort with bibasilar crackles  Abdominal:      General: Abdomen is flat. Bowel sounds are normal. There is no  distension.      Palpations: Abdomen is soft.      Tenderness: There is no abdominal tenderness. There is no guarding or rebound.   Musculoskeletal:         General: No tenderness. Normal range of motion.      Cervical back: Normal range of motion.      Right lower leg: No edema (trace).      Left lower leg: No edema (trace).      Comments: Kyphosis.    Skin:     General: Skin is warm and dry.      Coloration: Skin is not jaundiced or pale.   Neurological:      General: No focal deficit present.      Mental Status: He is alert and oriented to person, place, and time. Mental status is at baseline.   Psychiatric:         Mood and Affect: Mood normal.         Behavior: Behavior normal.         Thought Content: Thought content normal.         Judgment: Judgment normal.            Vents:  Oxygen Concentration (%): 50 (12/21/24 0831)  Lines/Drains/Airways       Central Venous Catheter Line  Duration                  Hemodialysis Catheter right subclavian -- days              Drain  Duration                  Colostomy 12/19/24 2225 RLQ 2 days              Peripheral Intravenous Line  Duration                  Peripheral IV - Single Lumen 12/19/24 1428 20 G Anterior;Right Forearm 2 days         Peripheral IV - Single Lumen 12/19/24 1429 20 G Left;Posterior Hand 2 days                  Significant Labs:    CBC/Anemia Profile:  Recent Labs   Lab 12/21/24  0117 12/22/24  0302   WBC 5.04 4.20   HGB 7.8* 7.5*   HCT 25.5* 25.2*    160   MCV 99* 100*   RDW 16.1* 16.2*        Chemistries:  Recent Labs   Lab 12/21/24  1516 12/21/24  2201 12/22/24  0302   * 136 136   K 5.2* 5.0 5.2*    107 106   CO2 22* 23 22*   BUN 23 16 17   CREATININE 3.8* 2.7* 3.1*   CALCIUM 8.8 8.7 8.9   ALBUMIN 2.3* 2.4* 2.3*   MG 2.0 1.9 1.9   PHOS 4.1 3.1 3.7  3.7       All pertinent labs within the past 24 hours have been reviewed.    Significant Imaging:  I have reviewed all pertinent imaging results/findings within the past 24 hours.

## 2024-12-22 NOTE — PROGRESS NOTES
I personally saw and examined the patient on SLED which he is currently on for the removal of uremic toxins and volume control.  The patient is tolerating the treatment, see SLED flow sheet for vitals and assessemts. I also reviewed the chart, flow sheets and current medication.   The UFR, BFR, DFR and dialysis bath was adjusted accordingly.    Patient with mal-functioning catheter. Has endocarditis with gram positive cocci, scheduled for tunneled catheter removal tomorrow in am. Will hav 2 day line holiday, will monitor elytes and volume status closely. Might need temporal line on Monday/Tuesday.

## 2024-12-22 NOTE — PLAN OF CARE
MICU DAILY GOALS     Family/Goals of care/Code Status   Code Status: Full Code    24H Vital Sign Range  Temp:  [97.6 °F (36.4 °C)-99 °F (37.2 °C)]   Pulse:  []   Resp:  [18-38]   BP: ()/(54-75)   SpO2:  [80 %-98 %]      Shift Events (include procedures and significant events)   No acute events throughout shift. 5hr SLED initiated this afternoon. Plan for IR procedure tomorrow.    AWAKE RASS: Goal -    Actual - RASS (Sam Agitation-Sedation Scale): alert and calm    Restraint necessity: Not necessary   BREATHE SBT: Not intubated    Coordinate A & B, analgesics/sedatives Pain: managed   SAT: Not intubated   Delirium CAM-ICU:     Early(intubated/ Progressive (non-intubated) Mobility MOVE Screen (INTUBATED ONLY): Not intubated    Activity: Activity Management: Sitting at edge of bed - L2   Feeding/Nutrition Diet order: Diet/Nutrition Received: restrict fluids, Specialty Diet/Nutrition Received: renal diet   Thrombus DVT prophylaxis: VTE Core Measure: Pharmacological prophylaxis initiated/maintained   HOB Elevation Head of Bed (HOB) Positioning: HOB at 30-45 degrees   Ulcer Prophylaxis GI: yes   Glucose control managed Glycemic Management: blood glucose monitored   Skin Skin assessment:     Sacrum intact/not altered? Yes  Heels intact/not altered? Yes  Surgical wound? No    CHECK ONE!   (no altered skin or altered skin) and sub boxes:  [] No Altered Skin Integrity Present    []Prevention Measures Documented    [x] Altered Skin Integrity Present or Discovered   [x] LDA present in EPIC, daily doc completed              [] LDA added if not in EPIC (describe wound).                    When describing wound, do not stage, use descriptive words only.    [] Wound Image Taken (required on admit,                   transfer/discharge and every Tuesday)    Wound Care Consulted? No   Bowel Function no issues    Indwelling Catheter Necessity           Hemodialysis Catheter right subclavian-Line Necessity Review:  CRRT/HD     De-escalation Antibiotics No        VS and assessment per flow sheet, patient progressing towards goals as tolerated, plan of care reviewed with  patient , all concerns addressed, will continue to monitor.

## 2024-12-22 NOTE — ASSESSMENT & PLAN NOTE
After looking through his dialysis notes from yesterday, it does appear as if he completed two back-to-back sessions and left dialysis yesterday at his dry weight (54.6 kg) and reports high volume stool output since his HD session. He reports that he is on 4L chronic oxygen use at home, and is currently on that dose in the ER, and reporting resolution of his dyspnea once he was placed back on his home oxygen. Chest xray appears unchanged from prior studies.       Outpatient HD Information:  -Dialysis modality: Hemodialysis  -Outpatient HD unit: [facility]  -Nephrologist: Dr. Strickland  -HD TX days: Tuesday/Thursday/Saturday  -Last HD TX prior to hospital admission: 12/18/2024  -Residual urine: Anuric   -EDW: 54.5 kg    With patient completing two HD sessions over the past two days, being below his dry weight (54 kg recorded in the ER), completing dialysis yesterday at his dry weight, ongoing hypotension, resolution of his dyspnea when placed back on his home oxygen, and chest xray that does not show any change over previous studies, I feel as if it is unlikely that he is intravascularly volume overloaded, and more likely that he has low effective circulating volume which is accounting for his hypotension (likely as a result of outpatient ultrafiltration from dialysis the past two days and from large volume stool output). Patient's BNP is elevated at greater than 4,900, however there are no previous values to compare to, and the utility of BNP levels decrease in ESRD patients.     Recommendations  Tolerated SLED yesterday, electrolyte stable no dialysis needed today    -Will recommend to do remove line , given infection hx  - okay to continue [facility]  -Dialysis consent was obtained and placed with the patient's chart in the emergency room. If patient's respiratory status worsens, please reach out to nephrology for re-evaluation of CRRT needs.

## 2024-12-22 NOTE — ASSESSMENT & PLAN NOTE
--Nephrology consulted--appreciate input   --SCUF yesterday.   - plan for tunneled catheter removal today.

## 2024-12-22 NOTE — ASSESSMENT & PLAN NOTE
Presented with hypotension unable to receive iHD.  SBP 70s on arrival to ED.  On exam patient with MAP 65-70 not on vasopressor support, but suspect will need vasopressors for dialysis. No obvious signs of infection on exam, tunneled cath dry/intact. Reports no cough, urinary symptoms.  WBC normal.  Suspect shock likely cardiogenic in setting of HF and ESRD with volume overload.      -- blood cultures + for staph, most recent cultures remain positive with plan for tunneled catheter line removal today. Repeat blood cultures tomorrow AM.   --Vegetation found on aortic valve on echocardiogram concerning for endocarditis  --Infectious diseases consult--appreciate input   --Started on cefepime and daptomycin given reported vancomycin allergy; discontinue cefepime 12/22. Continue daptomycin.

## 2024-12-22 NOTE — PROGRESS NOTES
"[facility]  Infectious Disease  Progress Note    Patient Name: Flakito Mcginnis  MRN: [id number]  Admission Date: 12/19/2024  Length of Stay: 3 days  Attending Physician: Nain Betts MD  Primary Care Provider: Jordin Robbins MD    Isolation Status: No active isolations  Assessment/Plan:      GPC Bacteremia  Endocarditis  I independently reviewed patient's lab work and images as documented. 69M with ESRD on HD, prior R nephrectomy, and crohns s/p colostomy admitted for shock, found to have mobile mass at LVOT on TTE and GPC bacteremia suspected 2/2 to chronic HD line. Plan for line removal today, 12/22. Blood cx as of 12/19 and 12/21 positive (MRSA/staph aureus negative on BCID).     Recommendations  Continue renally dosed daptomycin   Weekly CK, recent CK WNL  Blood cx ordered for 12/23  Agree with line removal, tentatively planned for 12/22                Thank you for your consult. ID will follow-up with patient. Please contact us if you have any additional questions. Above d/w primary team.       Abby Swan MD  Infectious Disease  [facility]      50 minutes of total time spent on the encounter, which includes face to face time and non-face to face time preparing to see the patient (eg, review of tests), obtaining and/or reviewing separately obtained history, documenting clinical information in the electronic or other health record, independently interpreting results (not separately reported) and communicating results to the patient/family/caregiver, or care coordination (not separately reported).       Subjective:     Principal Problem:Septic shock    HPI: Mr. Mcginnis is a 69M with PMH of HFrEF, ESRD on HD, Crohns s/p colostomy, previous R nephrectomy, presented after being hypotensive during outpatient HD. Infectious disease consulted for "Suspected vegetation in LVOT. Severe vancomycin allergy. MRSA coverage".     He is afebrile, no leukocytosis. TTE shows mobile mass at the " LVOT. 12/19 BCX NGTD. Reports that his reaction to vancomycin in the past was feeling hot and dizzy, but that he may have had trouble breathing too.       Interval History: No fevers documented overnight. Off pressors.   Blood cx remain positive. Line removal planned for today. Pt reports tolerating dapto without issues.     Review of Systems   Constitutional:  Negative for chills and fever.   Gastrointestinal:  Negative for constipation and diarrhea.   Musculoskeletal:  Negative for myalgias.     Objective:     Vital Signs (Most Recent):  Temp: 97.3 °F (36.3 °C) (12/22/24 0705)  Pulse: 101 (12/22/24 1105)  Resp: (!) 21 (12/22/24 1105)  BP: 102/67 (12/22/24 1105)  SpO2: 100 % (12/22/24 1105) Vital Signs (24h Range):  Temp:  [97.3 °F (36.3 °C)-98.8 °F (37.1 °C)] 97.3 °F (36.3 °C)  Pulse:  [] 101  Resp:  [14-34] 21  SpO2:  [84 %-100 %] 100 %  BP: ()/(57-72) 102/67     Weight: 56 kg (123 lb 7.3 oz)  Body mass index is 19.93 kg/m².    Estimated Creatinine Clearance: 17.8 mL/min (A) (based on SCr of 3.1 mg/dL (H)).     Physical Exam  HENT:      Head: Normocephalic and atraumatic.      Nose:      Comments: NC  Eyes:      General:         Right eye: No discharge.         Left eye: No discharge.   Pulmonary:      Effort: Pulmonary effort is normal. No respiratory distress.   Abdominal:      General: There is no distension.      Palpations: Abdomen is soft.      Comments: Colostomy with liquid stool   Skin:     General: Skin is warm and dry.      Comments: R tunneled HD line   Neurological:      Mental Status: He is alert and oriented to person, place, and time.          Significant Labs:   Microbiology Results (last 7 days)       Procedure Component Value Units Date/Time    Blood culture x two cultures. Draw prior to antibiotics. [[id number]] Collected: 12/19/24 0253    Order Status: Completed Specimen: Blood from Peripheral, Forearm, Right Updated: 12/22/24 0752     Blood Culture, Routine Gram stain aer bottle:  Gram positive cocci in clusters resembling Staph      Positive results previously called 12/20/2024      Gram stain benja bottle: Gram positive cocci in clusters resembling Staph      12/21/2024  01:37    Narrative:      Aerobic and anaerobic    Blood culture x two cultures. Draw prior to antibiotics. [[id number]] Collected: 12/19/24 1430    Order Status: Completed Specimen: Blood from Peripheral, Hand, Left Updated: 12/22/24 0751     Blood Culture, Routine Gram stain aer bottle: Gram positive cocci in clusters resembling Staph      Results called to and read back by:Lu Robles RN 12/20/2024  16:30      Gram stain benja bottle: Gram positive cocci in clusters resembling Staph    Narrative:      Aerobic and anaerobic    Blood culture [[id number]] Collected: 12/21/24 0115    Order Status: Completed Specimen: Blood from Peripheral, Forearm, Right Updated: 12/22/24 0606     Blood Culture, Routine Gram stain aer bottle: Gram positive cocci in clusters resembling Staph      Results called to and read back by: KAYLENE Padgett. 12/22/2024  06:04    Narrative:      Blood cultures from 2 different sites. 4 bottles total.  Please draw before starting antibiotics.    Blood culture [[id number]] Collected: 12/21/24 0115    Order Status: Completed Specimen: Blood from Peripheral, Forearm, Right Updated: 12/22/24 0408     Blood Culture, Routine Gram stain aer bottle: Gram positive cocci in clusters resembling Staph      Results called to and read back by: KAYLENE Padgett. 12/22/2024  04:06    Narrative:      Blood cultures x 2 different sites. 4 bottles total. Please  draw cultures before administering antibiotics.    MRSA/SA Rapid ID by PCR from Blood culture [[id number]] Collected: 12/19/24 1430    Order Status: Completed Updated: 12/20/24 1754     Staph aureus ID by PCR Negative     Methicillin Resistant ID by PCR Negative    Narrative:      Aerobic and anaerobic    MRSA Screen by PCR [[id number]]     Order Status: No result Specimen: Nasal  Swab     Clostridium difficile EIA [[id number]]     Order Status: Canceled Specimen: Stool     Influenza A & B by Molecular [[id number]] Collected: 12/19/24 1451    Order Status: Completed Specimen: Nasopharyngeal Swab Updated: 12/19/24 1519     Influenza A, Molecular Negative     Influenza B, Molecular Negative     Flu A & B Source NP            Significant Imaging: I have reviewed all pertinent imaging results/findings within the past 24 hours.

## 2024-12-22 NOTE — PLAN OF CARE
MICU DAILY GOALS     Family/Goals of care/Code Status   Code Status: Full Code    24H Vital Sign Range  Temp:  [97.4 °F (36.3 °C)-98.9 °F (37.2 °C)]   Pulse:  []   Resp:  [18-34]   BP: ()/(55-72)   SpO2:  [84 %-98 %]      Shift Events (include procedures and significant events)   No acute events throughout shift; SLED yesterday evening stopped early due to clot. Levophed gtt continued. NPO at midnight for IR this morning.    AWAKE RASS: Goal -    Actual - RASS (Sam Agitation-Sedation Scale): alert and calm    Restraint necessity: Not necessary   BREATHE SBT: Not intubated    Coordinate A & B, analgesics/sedatives Pain: managed   SAT: Not intubated   Delirium CAM-ICU:     Early(intubated/ Progressive (non-intubated) Mobility MOVE Screen (INTUBATED ONLY): Not intubated    Activity: Activity Management: Rolling - L1   Feeding/Nutrition Diet order: Diet/Nutrition Received: NPO, Specialty Diet/Nutrition Received: renal diet   Thrombus DVT prophylaxis: VTE Core Measure: Pharmacological prophylaxis initiated/maintained   HOB Elevation Head of Bed (HOB) Positioning: HOB at 30-45 degrees   Ulcer Prophylaxis GI: yes   Glucose control managed Glycemic Management: blood glucose monitored   Skin Skin assessment:     Sacrum intact/not altered? Yes  Heels intact/not altered? No  Surgical wound? Yes    CHECK ONE!   (no altered skin or altered skin) and sub boxes:  [] No Altered Skin Integrity Present    []Prevention Measures Documented    [x] Altered Skin Integrity Present or Discovered   [x] LDA present in EPIC, daily doc completed              [] LDA added if not in EPIC (describe wound).                    When describing wound, do not stage, use descriptive words only.    [] Wound Image Taken (required on admit,                   transfer/discharge and every Tuesday)    Wound Care Consulted? No   Bowel Function no issues    Indwelling Catheter Necessity           Hemodialysis Catheter right subclavian-Line  Necessity Review: CRRT/HD      De-escalation Antibiotics Yes        VS and assessment per flow sheet, patient progressing towards goals as tolerated, plan of care reviewed with [unfilled] and family, all concerns addressed, will continue to monitor.

## 2024-12-23 LAB
ALBUMIN SERPL BCP-MCNC: 2.4 G/DL (ref 3.5–5.2)
ALBUMIN SERPL BCP-MCNC: 2.4 G/DL (ref 3.5–5.2)
ANION GAP SERPL CALC-SCNC: 5 MMOL/L (ref 8–16)
ANION GAP SERPL CALC-SCNC: 6 MMOL/L (ref 8–16)
ANION GAP SERPL CALC-SCNC: 8 MMOL/L (ref 8–16)
ANION GAP SERPL CALC-SCNC: 8 MMOL/L (ref 8–16)
BASOPHILS # BLD AUTO: 0.06 K/UL (ref 0–0.2)
BASOPHILS NFR BLD: 1.1 % (ref 0–1.9)
BUN SERPL-MCNC: 24 MG/DL (ref 8–23)
BUN SERPL-MCNC: 26 MG/DL (ref 8–23)
BUN SERPL-MCNC: 29 MG/DL (ref 8–23)
BUN SERPL-MCNC: 29 MG/DL (ref 8–23)
CA-I BLDV-SCNC: 1.25 MMOL/L (ref 1.06–1.42)
CA-I BLDV-SCNC: 1.26 MMOL/L (ref 1.06–1.42)
CA-I BLDV-SCNC: 1.31 MMOL/L (ref 1.06–1.42)
CALCIUM SERPL-MCNC: 9.1 MG/DL (ref 8.7–10.5)
CALCIUM SERPL-MCNC: 9.1 MG/DL (ref 8.7–10.5)
CALCIUM SERPL-MCNC: 9.4 MG/DL (ref 8.7–10.5)
CALCIUM SERPL-MCNC: 9.4 MG/DL (ref 8.7–10.5)
CHLORIDE SERPL-SCNC: 103 MMOL/L (ref 95–110)
CHLORIDE SERPL-SCNC: 104 MMOL/L (ref 95–110)
CO2 SERPL-SCNC: 19 MMOL/L (ref 23–29)
CO2 SERPL-SCNC: 20 MMOL/L (ref 23–29)
CO2 SERPL-SCNC: 21 MMOL/L (ref 23–29)
CO2 SERPL-SCNC: 22 MMOL/L (ref 23–29)
CREAT SERPL-MCNC: 4.4 MG/DL (ref 0.5–1.4)
CREAT SERPL-MCNC: 4.6 MG/DL (ref 0.5–1.4)
CREAT SERPL-MCNC: 5 MG/DL (ref 0.5–1.4)
CREAT SERPL-MCNC: 5.2 MG/DL (ref 0.5–1.4)
DIFFERENTIAL METHOD BLD: ABNORMAL
EOSINOPHIL # BLD AUTO: 0.2 K/UL (ref 0–0.5)
EOSINOPHIL NFR BLD: 3.6 % (ref 0–8)
ERYTHROCYTE [DISTWIDTH] IN BLOOD BY AUTOMATED COUNT: 16.3 % (ref 11.5–14.5)
EST. GFR  (NO RACE VARIABLE): 11.3 ML/MIN/1.73 M^2
EST. GFR  (NO RACE VARIABLE): 11.8 ML/MIN/1.73 M^2
EST. GFR  (NO RACE VARIABLE): 13.1 ML/MIN/1.73 M^2
EST. GFR  (NO RACE VARIABLE): 13.8 ML/MIN/1.73 M^2
GLUCOSE SERPL-MCNC: 79 MG/DL (ref 70–110)
GLUCOSE SERPL-MCNC: 84 MG/DL (ref 70–110)
GLUCOSE SERPL-MCNC: 92 MG/DL (ref 70–110)
GLUCOSE SERPL-MCNC: 93 MG/DL (ref 70–110)
HCT VFR BLD AUTO: 27.5 % (ref 40–54)
HGB BLD-MCNC: 8.2 G/DL (ref 14–18)
IMM GRANULOCYTES # BLD AUTO: 0.02 K/UL (ref 0–0.04)
IMM GRANULOCYTES NFR BLD AUTO: 0.4 % (ref 0–0.5)
LYMPHOCYTES # BLD AUTO: 0.8 K/UL (ref 1–4.8)
LYMPHOCYTES NFR BLD: 14.8 % (ref 18–48)
MAGNESIUM SERPL-MCNC: 2.4 MG/DL (ref 1.6–2.6)
MAGNESIUM SERPL-MCNC: 2.4 MG/DL (ref 1.6–2.6)
MCH RBC QN AUTO: 29.9 PG (ref 27–31)
MCHC RBC AUTO-ENTMCNC: 29.8 G/DL (ref 32–36)
MCV RBC AUTO: 100 FL (ref 82–98)
MONOCYTES # BLD AUTO: 0.5 K/UL (ref 0.3–1)
MONOCYTES NFR BLD: 9.6 % (ref 4–15)
NEUTROPHILS # BLD AUTO: 3.9 K/UL (ref 1.8–7.7)
NEUTROPHILS NFR BLD: 70.5 % (ref 38–73)
NRBC BLD-RTO: 0 /100 WBC
PHOSPHATE SERPL-MCNC: 4.4 MG/DL (ref 2.7–4.5)
PHOSPHATE SERPL-MCNC: 4.4 MG/DL (ref 2.7–4.5)
PHOSPHATE SERPL-MCNC: 4.9 MG/DL (ref 2.7–4.5)
PLATELET # BLD AUTO: 184 K/UL (ref 150–450)
PMV BLD AUTO: 11.3 FL (ref 9.2–12.9)
POCT GLUCOSE: 139 MG/DL (ref 70–110)
POCT GLUCOSE: 170 MG/DL (ref 70–110)
POCT GLUCOSE: 87 MG/DL (ref 70–110)
POCT GLUCOSE: 91 MG/DL (ref 70–110)
POTASSIUM SERPL-SCNC: 5.2 MMOL/L (ref 3.5–5.1)
POTASSIUM SERPL-SCNC: 5.6 MMOL/L (ref 3.5–5.1)
POTASSIUM SERPL-SCNC: 5.6 MMOL/L (ref 3.5–5.1)
POTASSIUM SERPL-SCNC: 5.9 MMOL/L (ref 3.5–5.1)
RBC # BLD AUTO: 2.74 M/UL (ref 4.6–6.2)
SODIUM SERPL-SCNC: 129 MMOL/L (ref 136–145)
SODIUM SERPL-SCNC: 131 MMOL/L (ref 136–145)
SODIUM SERPL-SCNC: 132 MMOL/L (ref 136–145)
SODIUM SERPL-SCNC: 132 MMOL/L (ref 136–145)
WBC # BLD AUTO: 5.54 K/UL (ref 3.9–12.7)

## 2024-12-23 PROCEDURE — 80069 RENAL FUNCTION PANEL: CPT | Performed by: INTERNAL MEDICINE

## 2024-12-23 PROCEDURE — 25000003 PHARM REV CODE 250

## 2024-12-23 PROCEDURE — 94761 N-INVAS EAR/PLS OXIMETRY MLT: CPT

## 2024-12-23 PROCEDURE — 63600175 PHARM REV CODE 636 W HCPCS: Performed by: STUDENT IN AN ORGANIZED HEALTH CARE EDUCATION/TRAINING PROGRAM

## 2024-12-23 PROCEDURE — 99900035 HC TECH TIME PER 15 MIN (STAT)

## 2024-12-23 PROCEDURE — 83735 ASSAY OF MAGNESIUM: CPT | Mod: 91 | Performed by: INTERNAL MEDICINE

## 2024-12-23 PROCEDURE — 20000000 HC ICU ROOM

## 2024-12-23 PROCEDURE — 80069 RENAL FUNCTION PANEL: CPT | Mod: 91 | Performed by: STUDENT IN AN ORGANIZED HEALTH CARE EDUCATION/TRAINING PROGRAM

## 2024-12-23 PROCEDURE — 85025 COMPLETE CBC W/AUTO DIFF WBC: CPT

## 2024-12-23 PROCEDURE — 97161 PT EVAL LOW COMPLEX 20 MIN: CPT

## 2024-12-23 PROCEDURE — 97530 THERAPEUTIC ACTIVITIES: CPT

## 2024-12-23 PROCEDURE — 97165 OT EVAL LOW COMPLEX 30 MIN: CPT

## 2024-12-23 PROCEDURE — 25000003 PHARM REV CODE 250: Performed by: INTERNAL MEDICINE

## 2024-12-23 PROCEDURE — 80048 BASIC METABOLIC PNL TOTAL CA: CPT | Mod: 91 | Performed by: INTERNAL MEDICINE

## 2024-12-23 PROCEDURE — 63600175 PHARM REV CODE 636 W HCPCS: Performed by: INTERNAL MEDICINE

## 2024-12-23 PROCEDURE — 63600175 PHARM REV CODE 636 W HCPCS

## 2024-12-23 PROCEDURE — 82330 ASSAY OF CALCIUM: CPT | Mod: 91 | Performed by: STUDENT IN AN ORGANIZED HEALTH CARE EDUCATION/TRAINING PROGRAM

## 2024-12-23 PROCEDURE — 87040 BLOOD CULTURE FOR BACTERIA: CPT | Mod: 59 | Performed by: STUDENT IN AN ORGANIZED HEALTH CARE EDUCATION/TRAINING PROGRAM

## 2024-12-23 PROCEDURE — 99291 CRITICAL CARE FIRST HOUR: CPT | Mod: ,,, | Performed by: INTERNAL MEDICINE

## 2024-12-23 PROCEDURE — 99233 SBSQ HOSP IP/OBS HIGH 50: CPT | Mod: GC,,, | Performed by: INTERNAL MEDICINE

## 2024-12-23 PROCEDURE — 27100171 HC OXYGEN HIGH FLOW UP TO 24 HOURS

## 2024-12-23 PROCEDURE — 25000003 PHARM REV CODE 250: Performed by: GENERAL ACUTE CARE HOSPITAL

## 2024-12-23 RX ORDER — ENOXAPARIN SODIUM 100 MG/ML
30 INJECTION SUBCUTANEOUS EVERY 24 HOURS
Status: DISCONTINUED | OUTPATIENT
Start: 2024-12-23 | End: 2024-12-29

## 2024-12-23 RX ORDER — HYDROCODONE BITARTRATE AND ACETAMINOPHEN 500; 5 MG/1; MG/1
TABLET ORAL CONTINUOUS
Status: ACTIVE | OUTPATIENT
Start: 2024-12-23 | End: 2024-12-25

## 2024-12-23 RX ORDER — MAGNESIUM SULFATE HEPTAHYDRATE 40 MG/ML
2 INJECTION, SOLUTION INTRAVENOUS
Status: ACTIVE | OUTPATIENT
Start: 2024-12-23 | End: 2024-12-24

## 2024-12-23 RX ADMIN — OXYCODONE 5 MG: 5 TABLET ORAL at 07:12

## 2024-12-23 RX ADMIN — SODIUM ZIRCONIUM CYCLOSILICATE 10 G: 5 POWDER, FOR SUSPENSION ORAL at 05:12

## 2024-12-23 RX ADMIN — OXYCODONE 5 MG: 5 TABLET ORAL at 12:12

## 2024-12-23 RX ADMIN — MUPIROCIN: 20 OINTMENT TOPICAL at 09:12

## 2024-12-23 RX ADMIN — SEVELAMER CARBONATE 800 MG: 800 TABLET, FILM COATED ORAL at 12:12

## 2024-12-23 RX ADMIN — SODIUM ZIRCONIUM CYCLOSILICATE 10 G: 5 POWDER, FOR SUSPENSION ORAL at 09:12

## 2024-12-23 RX ADMIN — HEPARIN SODIUM 5000 UNITS: 5000 INJECTION INTRAVENOUS; SUBCUTANEOUS at 06:12

## 2024-12-23 RX ADMIN — MUPIROCIN: 20 OINTMENT TOPICAL at 08:12

## 2024-12-23 RX ADMIN — DAPTOMYCIN 555 MG: 350 INJECTION, POWDER, LYOPHILIZED, FOR SOLUTION INTRAVENOUS at 04:12

## 2024-12-23 RX ADMIN — SEVELAMER CARBONATE 800 MG: 800 TABLET, FILM COATED ORAL at 08:12

## 2024-12-23 RX ADMIN — ENOXAPARIN SODIUM 30 MG: 30 INJECTION SUBCUTANEOUS at 05:12

## 2024-12-23 RX ADMIN — HUMAN INSULIN 5.6 UNITS: 100 INJECTION, SOLUTION SUBCUTANEOUS at 05:12

## 2024-12-23 RX ADMIN — DEXTROSE MONOHYDRATE 50 G: 25 INJECTION, SOLUTION INTRAVENOUS at 04:12

## 2024-12-23 RX ADMIN — ATORVASTATIN CALCIUM 40 MG: 40 TABLET, FILM COATED ORAL at 08:12

## 2024-12-23 RX ADMIN — SEVELAMER CARBONATE 800 MG: 800 TABLET, FILM COATED ORAL at 05:12

## 2024-12-23 RX ADMIN — ACETAMINOPHEN 500 MG: 500 TABLET ORAL at 04:12

## 2024-12-23 RX ADMIN — SODIUM ZIRCONIUM CYCLOSILICATE 10 G: 5 POWDER, FOR SUSPENSION ORAL at 08:12

## 2024-12-23 NOTE — SUBJECTIVE & OBJECTIVE
Interval History: continued CHRISTEN shoulder pain, tolerating daptomycin, no new symptoms    Review of Systems   Constitutional:  Negative for chills and fever.   Respiratory:  Negative for cough and shortness of breath.    Cardiovascular:  Negative for chest pain.   Gastrointestinal:  Negative for abdominal pain, constipation, diarrhea, nausea and vomiting.   Musculoskeletal:  Positive for arthralgias (CHRISTEN shoulder). Negative for myalgias.   Skin:  Negative for rash.   Neurological:  Negative for weakness and headaches.     Objective:     Vital Signs (Most Recent):  Temp: 97.8 °F (36.6 °C) (12/23/24 0701)  Pulse: 100 (12/23/24 1001)  Resp: (!) 27 (12/23/24 1001)  BP: (!) 95/59 (12/23/24 1001)  SpO2: (!) 92 % (12/23/24 0901) Vital Signs (24h Range):  Temp:  [97.5 °F (36.4 °C)-98.7 °F (37.1 °C)] 97.8 °F (36.6 °C)  Pulse:  [] 100  Resp:  [18-65] 27  SpO2:  [77 %-100 %] 92 %  BP: ()/(49-82) 95/59     Weight: 56 kg (123 lb 7.3 oz)  Body mass index is 19.93 kg/m².    Estimated Creatinine Clearance: 12 mL/min (A) (based on SCr of 4.6 mg/dL (H)).     Physical Exam  Vitals reviewed.   Constitutional:       General: He is not in acute distress.     Appearance: Normal appearance. He is not ill-appearing.   HENT:      Head: Normocephalic and atraumatic.   Eyes:      Extraocular Movements: Extraocular movements intact.      Conjunctiva/sclera: Conjunctivae normal.   Cardiovascular:      Rate and Rhythm: Normal rate and regular rhythm.   Pulmonary:      Effort: Pulmonary effort is normal. No respiratory distress.      Breath sounds: Normal breath sounds.   Abdominal:      General: Abdomen is flat. Bowel sounds are normal.      Palpations: Abdomen is soft.      Tenderness: There is no abdominal tenderness.   Musculoskeletal:      Cervical back: Normal range of motion.   Skin:     General: Skin is warm and dry.   Neurological:      General: No focal deficit present.      Mental Status: He is alert and oriented to person,  place, and time.   Psychiatric:         Mood and Affect: Mood normal.         Behavior: Behavior normal.         Thought Content: Thought content normal.          Significant Labs: All pertinent labs within the past 24 hours have been reviewed.  Recent Lab Results  (Last 5 results in the past 24 hours)        12/23/24  0749   12/23/24  0746   12/23/24  0411   12/22/24  2353   12/22/24  2119        Albumin     2.4           Anion Gap 5     8           Baso #     0.06           Basophil %     1.1           BUN 26     24           Calcium 9.4     9.4           Calcium Level Ionized 1.26       1.25         Chloride 103     104           CO2 21     19           Creatinine 4.6     4.4           Differential Method     Automated           eGFR 13.1     13.8           Eos #     0.2           Eos %     3.6           Glucose 79     93           Gran # (ANC)     3.9           Gran %     70.5           Hematocrit     27.5           Hemoglobin     8.2           Immature Grans (Abs)     0.02  Comment: Mild elevation in immature granulocytes is non specific and   can be seen in a variety of conditions including stress response,   acute inflammation, trauma and pregnancy. Correlation with other   laboratory and clinical findings is essential.             Immature Granulocytes     0.4           Lymph #     0.8           Lymph %     14.8           Magnesium      2.4           MCH     29.9           MCHC     29.8           MCV     100           Mono #     0.5           Mono %     9.6           MPV     11.3           nRBC     0           Phosphorus Level     4.4                4.4           Platelet Count     184           POCT Glucose   87       92       Potassium 5.6  Comment: *No Visible Hemolysis     5.6  Comment: *No Visible Hemolysis           RBC     2.74           RDW     16.3           Sodium 129     131           WBC     5.54                                  Significant Imaging: I have reviewed all pertinent imaging  results/findings within the past 24 hours.

## 2024-12-23 NOTE — ASSESSMENT & PLAN NOTE
69M with ESRD on HD, prior R nephrectomy, and crohns s/p colostomy admitted for shock, found to have mobile mass at LVOT on TTE and Staph capitis bacteremia suspected 2/2 to chronic HD line, was removed 12/22. 12/21 BCX still positive, repeats sent today 12/23.     Recommendations  Continue daptomycin, renal dosing  Weekly CK while on daptomycin  Follow up 12/23 BCX  Follow up Staph capitis susceptibilities  CTS evaluation

## 2024-12-23 NOTE — PLAN OF CARE
[facility]  Discharge Reassessment    Primary Care Provider: Jordin Robbins MD    Expected Discharge Date: 12/24/2024    Reassessment (most recent)       Discharge Reassessment - 12/23/24 1646          Discharge Reassessment    Assessment Type Discharge Planning Reassessment (P)      Did the patient's condition or plan change since previous assessment? No (P)      Discharge Plan discussed with: Patient (P)      Communicated LLUVIA with patient/caregiver Date not available/Unable to determine (P)      Discharge Plan A Home with family (P)      Discharge Plan B Home (P)      DME Needed Upon Discharge  none (P)      Transition of Care Barriers None (P)      Why the patient remains in the hospital Requires continued medical care (P)         Post-Acute Status    Coverage [employer]aged Medicare (P)                    Per medical team, cultures pending. Low intensity for therapy. Plan is to Stepdown from ICU with plan for Permacath. Discharge plan is home with family. Outpatient Dialysis at [facility] (p) [phone] (TTS) Schedule.     Discharge Plan A and Plan B have been determined by review of patient's clinical status, future medical and therapeutic needs, and coverage/benefits for post-acute care in coordination with multidisciplinary team members.     Fausto Greer LMSW

## 2024-12-23 NOTE — PT/OT/SLP EVAL
Occupational Therapy   Evaluation and Co-Treatment     Name: Flakito Mcginnis  MRN: [id number]  Admitting Diagnosis: Septic shock  Recent Surgery: * No surgery found *      Recommendations:     Discharge Recommendations: Low Intensity Therapy  Discharge Equipment Recommendations:  bath bench  Barriers to discharge:       Assessment:     Flakito Mcginnis is a 69 y.o. male with a medical diagnosis of Septic shock.  Pt presents with decreased endurance and impaired mobility performance as limited by cardiovascular status and generalized weakness. Pt found in chair and agreeable for therapy. Session focused on 2 laps in room today using RW Pt limited today 2/2 poor endurance and reported fatigue after gait while on 15L 02. At this time, pt is a high fall risk and not at their baseline. Prior to hospitalizations, pt was mod I for ADLs/mobility using a rollator/ SPC for mobility. Patient currently demonstrates a need for low intensity therapy on a scheduled basis secondary to a decline in functional status due to illness. Performance deficits affecting function: weakness, gait instability, impaired balance, impaired endurance, impaired functional mobility, impaired self care skills, pain, decreased lower extremity function, decreased upper extremity function.      Rehab Prognosis: Good; patient would benefit from acute skilled OT services to address these deficits and reach maximum level of function.       Plan:     Patient to be seen 3 x/week to address the above listed problems via self-care/home management, therapeutic activities, therapeutic exercises  Plan of Care Expires: 01/23/25  Plan of Care Reviewed with: patient    Subjective     Chief Complaint: 8/10 chronic shoulder pain  Patient/Family Comments/goals: to get better     Occupational Profile:  Living Environment: Pt lives with sister and brother in law in a [facility] with no CANDICE. Pt has a walk in tub for bathing.  Previous level of function: Mod I for ADLs; pt states  increased time needed for getting in/out of tub. Pt uses a rollator primarily in home for gait  Roles and Routines: Pt home dwelling; retired . Enjoys tv  Equipment Used at Home: wheelchair, rollator, cane, straight, oxygen  Assistance upon Discharge: sister and brother in law can A when not working     Pain/Comfort:  Pain Rating 1: 8/10 (chronic shoulder pain)  Location - Orientation 1: generalized  Location 1: shoulder  Pain Addressed 2: Nurse notified, Cessation of Activity, Distraction    Patients cultural, spiritual, Caodaism conflicts given the current situation: no    Objective:     Communicated with: RN prior to session.  Patient found up in chair with pulse ox (continuous), telemetry, blood pressure cuff, oxygen upon OT entry to room.    General Precautions: Standard, fall  Orthopedic Precautions: N/A  Braces: N/A  Respiratory Status: Nasal cannula, flow 15 L/min    Occupational Performance:    Functional Mobility/Transfers:  Patient completed Sit <> Stand Transfer with contact guard assistance  with  rolling walker   Functional Mobility: Pt mobilized with CGA, 2 lap in room with RW. Pt with decreased michaela needing increased time, kyphotic at baseline.    Activities of Daily Living:  Grooming: politely declined .  Upper Body Dressing: minimum assistance donning gown around back in chair    Cognitive/Visual Perceptual:  Cognitive/Psychosocial Skills:     -       Oriented to: Person, Place, Time, and Situation   -       Follows Commands/attention:Follows multistep  commands  -       Communication: clear/fluent  -       Memory: No Deficits noted  -       Safety awareness/insight to disability: intact   -       Mood/Affect/Coping skills/emotional control: Appropriate to situation    Physical Exam:  Balance:    -       demo good sitting and standing balance using a RW   Upper Extremity Range of Motion:     -       Right Upper Extremity: Deficits: limited shoulder ROM 2/2 pain  -       Left Upper  Extremity: Deficits: same as RUE, pt reporting baseline unhealed fx  Upper Extremity Strength:    -       Right Upper Extremity: WFL except shoulders NT  -       Left Upper Extremity: same as RUE   Strength:    -       Right Upper Extremity: WFL  -       Left Upper Extremity: WFL    AMPAC 6 Click ADL:  AMPAC Total Score: 14    Treatment & Education:  Pt educated on role of occupational therapy, POC, and safety during ADLs and functional mobility. Pt and OT discussed importance of safe, continued mobility to optimize daily living skills. Pt verbalized understanding.     White board updated during session. Pt given instruction to call for medical staff/nurse for assistance.       Patient left up in chair with all lines intact, call button in reach, and RN notified    GOALS:   Multidisciplinary Problems       Occupational Therapy Goals          Problem: Occupational Therapy    Goal Priority Disciplines Outcome Interventions   Occupational Therapy Goal     OT, PT/OT Progressing    Description: Goals to be met by: 1/23/25 (1 mo)     Patient will increase functional independence with ADLs by performing:    UE Dressing with [place].  LE Dressing with [place].  Grooming while standing at sink with [place].  Toileting from toilet with [place] for hygiene and clothing management.   Rolling to Bilateral with [place].   Supine to sit with [place].  Step transfer with [place]  Toilet transfer to toilet with [place].                         History:     Past Medical History:   Diagnosis Date    Crohn's disease 1998    ESRD (end stage renal disease) on dialysis 10/2017    Hypertension     Obstructive uropathy          Past Surgical History:   Procedure Laterality Date    COLOSTOMY  2006    DIALYSIS FISTULA CREATION Left 02/2018    NEPHRECTOMY Right     REMOVAL OF TUNNELED CENTRAL VENOUS CATHETER (CVC) N/A 5/23/2018    Procedure: PERMCATH REMOVAL-TUNNELED CVC REMOVAL;  Surgeon: Parveen  MD Flor;  Location: [facility] CATH LAB;  Service: Nephrology;  Laterality: N/A;  pt coming in @930       Time Tracking:     OT Date of Treatment: 12/23/24  OT Start Time: 0912  OT Stop Time: 0930  OT Total Time (min): 18 min    Billable Minutes:Evaluation 9 min  Therapeutic Activity 9 min    12/23/2024

## 2024-12-23 NOTE — PROGRESS NOTES
[facility]  Critical Care Medicine  Progress Note    Patient Name: Flakito Mcginnis  MRN: [id number]  Admission Date: 12/19/2024  Hospital Length of Stay: 4 days  Code Status: Full Code  Attending Provider: Nain Betts MD  Primary Care Provider: Jordin Robbins MD   Principal Problem: Septic shock    Subjective:     HPI:  Mr. Flakito Mcginnis is a 69 y.o. man w/ HFrEF (30% 8/2024 from 20-25% 6/2024), NICM, MR, ESRD on HD, Crohn's s/p colostomy, h/o R nephrectomy.  He presented to [facility] ED from his HD center on 12/19 due to hypotension and ongoing shortness of breath.  He usually receives his dialysis on T, R, S and went on Wednesday for an extra session for volume removal.  He arrived on Thursday for his usually scheduled dialysis session and was directed to the ED due to hypotension.  On arrival in the ED his blood pressure was 78/51.  He was found to have a BNP >4900 and CXR concerning for volume overload.  High sensitivity troponin 923.  Critical care medicine was consulted for hypotension and admitted to MICU.      Hospital/ICU Course:  12/20:  On low-dose vasopressor with norepinephrine.  Patient has had increasing oxygen requirements overnight.  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  Started on empiric antibiotic therapy with cefepime and daptomycin.  Infectious Diseases consulted.    Interval History/Significant Events:   NAEON. Tunneled catheter removed yesterday. Improving oxygen requirements.     Review of Systems  Objective:     Vital Signs (Most Recent):  Temp: 97.8 °F (36.6 °C) (12/23/24 0701)  Pulse: 100 (12/23/24 1001)  Resp: (!) 27 (12/23/24 1001)  BP: (!) 95/59 (12/23/24 1001)  SpO2: (!) 92 % (12/23/24 0901) Vital Signs (24h Range):  Temp:  [97.6 °F (36.4 °C)-98.7 °F (37.1 °C)] 97.8 °F (36.6 °C)  Pulse:  [] 100  Resp:  [18-65] 27  SpO2:  [77 %-100 %] 92 %  BP: ()/(49-82) 95/59   Weight: 56 kg (123 lb 7.3 oz)  Body mass index is 19.93  kg/m².      Intake/Output Summary (Last 24 hours) at 12/23/2024 1209  Last data filed at 12/23/2024 0901  Gross per 24 hour   Intake 1440 ml   Output 600 ml   Net 840 ml          Physical Exam  Vitals and nursing note reviewed.   Constitutional:       General: He is not in acute distress.     Appearance: Normal appearance. He is normal weight. He is not ill-appearing, toxic-appearing or diaphoretic.   HENT:      Head: Normocephalic and atraumatic.      Right Ear: External ear normal.      Left Ear: External ear normal.      Nose: Nose normal.      Mouth/Throat:      Mouth: Mucous membranes are moist.   Eyes:      General: No scleral icterus.     Extraocular Movements: Extraocular movements intact.      Conjunctiva/sclera: Conjunctivae normal.      Pupils: Pupils are equal, round, and reactive to light.   Cardiovascular:      Rate and Rhythm: Normal rate and regular rhythm.      Pulses: Normal pulses.      Heart sounds: Murmur heard.      No friction rub. No gallop.      Comments: No JVD or peripheral edema  Pulmonary:      Effort: Pulmonary effort is normal. No respiratory distress.      Breath sounds: Normal breath sounds.      Comments: Mildly increased respiratory effort with bibasilar crackles  Abdominal:      General: Abdomen is flat. Bowel sounds are normal. There is no distension.      Palpations: Abdomen is soft.      Tenderness: There is no abdominal tenderness. There is no guarding or rebound.   Musculoskeletal:         General: No tenderness. Normal range of motion.      Cervical back: Normal range of motion.      Right lower leg: No edema (trace).      Left lower leg: No edema (trace).      Comments: Kyphosis.    Skin:     General: Skin is warm and dry.      Coloration: Skin is not jaundiced or pale.   Neurological:      General: No focal deficit present.      Mental Status: He is alert and oriented to person, place, and time. Mental status is at baseline.   Psychiatric:         Mood and Affect: Mood  "normal.         Behavior: Behavior normal.         Thought Content: Thought content normal.         Judgment: Judgment normal.            Vents:  Oxygen Concentration (%): 50 (12/22/24 2347)  Lines/Drains/Airways       Drain  Duration                  Colostomy 12/19/24 2225 RLQ 3 days              Peripheral Intravenous Line  Duration                  Peripheral IV - Single Lumen 12/19/24 1428 20 G Anterior;Right Forearm 3 days         Peripheral IV - Single Lumen 12/22/24 1611 20 G Anterior;Right;Medial Upper Arm <1 day                  Significant Labs:    CBC/Anemia Profile:  Recent Labs   Lab 12/22/24  0302 12/23/24  0411   WBC 4.20 5.54   HGB 7.5* 8.2*   HCT 25.2* 27.5*    184   * 100*   RDW 16.2* 16.3*     Chemistries:  Recent Labs   Lab 12/22/24  0302 12/22/24  1542 12/23/24  0411 12/23/24  0749    135* 131* 129*   K 5.2* 5.2* 5.6* 5.6*    104 104 103   CO2 22* 24 19* 21*   BUN 17 21 24* 26*   CREATININE 3.1* 4.0* 4.4* 4.6*   CALCIUM 8.9 9.5 9.4 9.4   ALBUMIN 2.3* 2.4* 2.4*  --    MG 1.9 2.4 2.4  --    PHOS 3.7  3.7 4.2 4.4  4.4  --      All pertinent labs within the past 24 hours have been reviewed.    Significant Imaging:  I have reviewed all pertinent imaging results/findings within the past 24 hours.    ABG  Recent Labs   Lab 12/19/24  1433   PH 7.454*   PO2 34*   PCO2 44.1   HCO3 31.0*   BE 7*     Assessment/Plan:     Cardiac/Vascular  Endocarditis  See "septic shock"  - despite size, patient would be a poor surgical candidate for valve replacement in setting of Age and comorbidities. Increased risk of stroke based on size and location.     NSTEMI (non-ST elevated myocardial infarction)  High sensitivity troponin troponin 923.  EKG with t wave inversions.  No complaints of chest pain.      --Cardiology following  -- Troponin down trending  --Continuous cardiac monitoring  --EKG PRN     HFrEF (heart failure with reduced ejection fraction)  HFrEF (30% 8/2024 from 20-25% 6/2024) " per cardiology notes.  Suspect volume overload with history HF and ESRD.    --line holiday, will replace tomorrow morning for HD  --Echo showing EF of 20-25% with large AV vegetation partially occluding LVOT with moderate AV regurgitation.   --Cardiology following  --troponin down trending   --Continuous cardiac monitoring   --EKG PRN     Hypertension  Holding antihypertensives in setting of shock.      Renal/  History of nephrectomy  Noted. See ESRD.     ESRD on hemodialysis  --Nephrology consulted--appreciate input   --SCUF yesterday.   - tunneled catheter removed 12/22.     ID  * Septic shock  Presented with hypotension unable to receive iHD.  SBP 70s on arrival to ED.  On exam patient with MAP 65-70 not on vasopressor support, but suspect will need vasopressors for dialysis. No obvious signs of infection on exam, tunneled cath dry/intact. Reports no cough, urinary symptoms.  WBC normal.  Suspect shock likely cardiogenic in setting of HF and ESRD with volume overload.      -- blood cultures + for staph, most recent cultures remain positive with plan for tunneled catheter line removal today. Repeat blood cultures tomorrow AM.   --Vegetation found on aortic valve on echocardiogram concerning for endocarditis  --Infectious diseases consult--appreciate input   --Started on cefepime and daptomycin given reported vancomycin allergy; discontinue cefepime 12/22. Continue daptomycin.         Critical Care Daily Checklist:     A: Awake: RASS Goal/Actual Goal:    Actual:     B: Spontaneous Breathing Trial Performed?     C: SAT & SBT Coordinated?  NA                      D: Delirium: CAM-ICU     E: Early Mobility Performed? Yes   F: Feeding Goal:    Status:               Current Diet Order   Procedures    Diet Renal Fluid - 1000mL       Order Specific Question:   Fluid restriction:       Answer:   Fluid - 1000mL      AS: Analgesia/Sedation NA   T: Thromboembolic Prophylaxis SqHq8h   H: HOB > 300 No   U: Stress Ulcer  Prophylaxis (if needed) No   G: Glucose Control At goal   B: Bowel Function  BM 12/21   I: Indwelling Catheter (Lines & Alberts) Necessity RIJ tunneled HD catheter   D: De-escalation of Antimicrobials/Pharmacotherapies Deescalate to dapto only.      Plan for the day/ETD Continue abx, titrate levo and oxygen. UF per nephrology.      Code Status:  Family/Goals of Care: Full Code  Plan to discuss with patient and family today.       Critical Care Time: 45 minutes  Critical secondary to Patient has a condition that poses threat to life and bodily function: Septic shock      Critical care was time spent personally by me on the following activities: development of treatment plan with patient or surrogate and bedside caregivers, discussions with consultants, evaluation of patient's response to treatment, examination of patient, ordering and performing treatments and interventions, ordering and review of laboratory studies, ordering and review of radiographic studies, pulse oximetry, re-evaluation of patient's condition. This critical care time did not overlap with that of any other provider or involve time for any procedures.     Nain Betts MD  Critical Care Medicine  [facility] - Medical ICU

## 2024-12-23 NOTE — SUBJECTIVE & OBJECTIVE
Interval hx: Flakito is sitting up in his chair this morning, no major complaints at this time. On his BL supplemental O2. Eating breakfast. He would like to go home.     Objective:     Vital Signs (Most Recent):  Temp: 97.8 °F (36.6 °C) (12/23/24 0701)  Pulse: (!) 117 (12/23/24 0901)  Resp: (!) 65 (12/23/24 0901)  BP: 100/65 (12/23/24 0901)  SpO2: (!) 92 % (12/23/24 0901) Vital Signs (24h Range):  Temp:  [97.5 °F (36.4 °C)-98.7 °F (37.1 °C)] 97.8 °F (36.6 °C)  Pulse:  [] 117  Resp:  [18-65] 65  SpO2:  [77 %-100 %] 92 %  BP: ()/(49-82) 100/65     Weight: 56 kg (123 lb 7.3 oz) (12/21/24 0641)  Body mass index is 19.93 kg/m².  Body surface area is 1.61 meters squared.    I/O last 3 completed shifts:  In: 2194.2 [P.O.:1200; I.V.:945.7; IV Piggyback:48.5]  Out: 1550 [Other:675; Stool:875]     Physical Exam  Constitutional:       General: He is not in acute distress.     Appearance: Normal appearance. He is not ill-appearing or toxic-appearing.   HENT:      Head: Normocephalic and atraumatic.      Right Ear: External ear normal.      Left Ear: External ear normal.      Nose: Nose normal.      Mouth/Throat:      Mouth: Mucous membranes are moist.   Eyes:      Extraocular Movements: Extraocular movements intact.   Cardiovascular:      Rate and Rhythm: Normal rate and regular rhythm.   Pulmonary:      Effort: Pulmonary effort is normal.      Comments: Fine crackles throughout.   Abdominal:      General: Abdomen is flat.      Palpations: Abdomen is soft.   Musculoskeletal:         General: Normal range of motion.      Right lower leg: Edema (2+) present.      Left lower leg: Edema (2+) present.   Skin:     Capillary Refill: Capillary refill takes less than 2 seconds.   Neurological:      General: No focal deficit present.      Mental Status: He is alert and oriented to person, place, and time.   Psychiatric:         Mood and Affect: Mood normal.         Behavior: Behavior normal.         Thought Content: Thought  content normal.         Judgment: Judgment normal.          Significant Labs:  CBC:   Recent Labs   Lab 12/23/24  0411   WBC 5.54   RBC 2.74*   HGB 8.2*   HCT 27.5*      *   MCH 29.9   MCHC 29.8*     CMP:   Recent Labs   Lab 12/19/24  1430 12/20/24  0351 12/23/24  0411 12/23/24  0749   GLU 93   < > 93 79   CALCIUM 9.0   < > 9.4 9.4   ALBUMIN 2.8*   < > 2.4*  --    PROT 6.6  --   --   --    *   < > 131* 129*   K 4.6   < > 5.6* 5.6*   CO2 29   < > 19* 21*   CL 95   < > 104 103   BUN 36*   < > 24* 26*   CREATININE 4.8*   < > 4.4* 4.6*   ALKPHOS 259*  --   --   --    ALT 11  --   --   --    AST 11  --   --   --    BILITOT 0.5  --   --   --     < > = values in this interval not displayed.     All labs within the past 24 hours have been reviewed.    Significant Imaging:  X-Ray: Reviewed

## 2024-12-23 NOTE — PROGRESS NOTES
"[facility]  Infectious Disease  Progress Note    Patient Name: Flakito Mcginnis  MRN: [id number]  Admission Date: 12/19/2024  Length of Stay: 4 days  Attending Physician: Nain Betts MD  Primary Care Provider: Jordin Robbins MD    Isolation Status: No active isolations  Assessment/Plan:      Cardiac/Vascular  Endocarditis  69M with ESRD on HD, prior R nephrectomy, and crohns s/p colostomy admitted for shock, found to have mobile mass at LVOT on TTE and Staph capitis bacteremia suspected 2/2 to chronic HD line, was removed 12/22. 12/21 BCX still positive, repeats sent today 12/23.     Recommendations  Continue daptomycin, renal dosing  Weekly CK while on daptomycin  Follow up 12/23 BCX  Follow up Staph capitis susceptibilities  CTS evaluation            Anticipated Disposition: TBD    Thank you for your consult. I will follow-up with patient. Please contact us if you have any additional questions.    Norma Teran DO  Infectious Disease  [facility]    Subjective:     Principal Problem:Septic shock    HPI: Mr. Mcginnis is a 69M with PMH of HFrEF, ESRD on HD, Crohns s/p colostomy, previous R nephrectomy, presented after being hypotensive during outpatient HD. Infectious disease consulted for "Suspected vegetation in LVOT. Severe vancomycin allergy. MRSA coverage".     He is afebrile, no leukocytosis. TTE shows mobile mass at the LVOT. 12/19 BCX NGTD. Reports that his reaction to vancomycin in the past was feeling hot and dizzy, but that he may have had trouble breathing too.       Interval History: continued CHRISTEN shoulder pain, tolerating daptomycin, no new symptoms    Review of Systems   Constitutional:  Negative for chills and fever.   Respiratory:  Negative for cough and shortness of breath.    Cardiovascular:  Negative for chest pain.   Gastrointestinal:  Negative for abdominal pain, constipation, diarrhea, nausea and vomiting.   Musculoskeletal:  Positive for arthralgias (CHRISTEN shoulder). " Negative for myalgias.   Skin:  Negative for rash.   Neurological:  Negative for weakness and headaches.     Objective:     Vital Signs (Most Recent):  Temp: 97.8 °F (36.6 °C) (12/23/24 0701)  Pulse: 100 (12/23/24 1001)  Resp: (!) 27 (12/23/24 1001)  BP: (!) 95/59 (12/23/24 1001)  SpO2: (!) 92 % (12/23/24 0901) Vital Signs (24h Range):  Temp:  [97.5 °F (36.4 °C)-98.7 °F (37.1 °C)] 97.8 °F (36.6 °C)  Pulse:  [] 100  Resp:  [18-65] 27  SpO2:  [77 %-100 %] 92 %  BP: ()/(49-82) 95/59     Weight: 56 kg (123 lb 7.3 oz)  Body mass index is 19.93 kg/m².    Estimated Creatinine Clearance: 12 mL/min (A) (based on SCr of 4.6 mg/dL (H)).     Physical Exam  Vitals reviewed.   Constitutional:       General: He is not in acute distress.     Appearance: Normal appearance. He is not ill-appearing.   HENT:      Head: Normocephalic and atraumatic.   Eyes:      Extraocular Movements: Extraocular movements intact.      Conjunctiva/sclera: Conjunctivae normal.   Cardiovascular:      Rate and Rhythm: Normal rate and regular rhythm.   Pulmonary:      Effort: Pulmonary effort is normal. No respiratory distress.      Breath sounds: Normal breath sounds.   Abdominal:      General: Abdomen is flat. Bowel sounds are normal.      Palpations: Abdomen is soft.      Tenderness: There is no abdominal tenderness.   Musculoskeletal:      Cervical back: Normal range of motion.   Skin:     General: Skin is warm and dry.   Neurological:      General: No focal deficit present.      Mental Status: He is alert and oriented to person, place, and time.   Psychiatric:         Mood and Affect: Mood normal.         Behavior: Behavior normal.         Thought Content: Thought content normal.          Significant Labs: All pertinent labs within the past 24 hours have been reviewed.  Recent Lab Results  (Last 5 results in the past 24 hours)        12/23/24  0749   12/23/24  0746   12/23/24  0411   12/22/24  2353   12/22/24  2119        Albumin     2.4            Anion Gap 5     8           Baso #     0.06           Basophil %     1.1           BUN 26     24           Calcium 9.4     9.4           Calcium Level Ionized 1.26       1.25         Chloride 103     104           CO2 21     19           Creatinine 4.6     4.4           Differential Method     Automated           eGFR 13.1     13.8           Eos #     0.2           Eos %     3.6           Glucose 79     93           Gran # (ANC)     3.9           Gran %     70.5           Hematocrit     27.5           Hemoglobin     8.2           Immature Grans (Abs)     0.02  Comment: Mild elevation in immature granulocytes is non specific and   can be seen in a variety of conditions including stress response,   acute inflammation, trauma and pregnancy. Correlation with other   laboratory and clinical findings is essential.             Immature Granulocytes     0.4           Lymph #     0.8           Lymph %     14.8           Magnesium      2.4           MCH     29.9           MCHC     29.8           MCV     100           Mono #     0.5           Mono %     9.6           MPV     11.3           nRBC     0           Phosphorus Level     4.4                4.4           Platelet Count     184           POCT Glucose   87       92       Potassium 5.6  Comment: *No Visible Hemolysis     5.6  Comment: *No Visible Hemolysis           RBC     2.74           RDW     16.3           Sodium 129     131           WBC     5.54                                  Significant Imaging: I have reviewed all pertinent imaging results/findings within the past 24 hours.

## 2024-12-23 NOTE — PLAN OF CARE
Problem: Occupational Therapy  Goal: Occupational Therapy Goal  Description: Goals to be met by: 1/23/25 (1 mo)     Patient will increase functional independence with ADLs by performing:    UE Dressing with [place].  LE Dressing with [place].  Grooming while standing at sink with [place].  Toileting from toilet with [place] for hygiene and clothing management.   Rolling to Bilateral with [place].   Supine to sit with [place].  Step transfer with [place]  Toilet transfer to toilet with [place].    Evaluated pt and established OT POC. Continue OT as tolerated.  Sunitha Botello OT  12/23/2024    Outcome: Progressing

## 2024-12-23 NOTE — PLAN OF CARE
MICU DAILY GOALS     Family/Goals of care/Code Status   Code Status: Full Code    24H Vital Sign Range  Temp:  [97.3 °F (36.3 °C)-98.7 °F (37.1 °C)]   Pulse:  []   Resp:  [14-41]   BP: ()/(49-82)   SpO2:  [77 %-100 %]      Shift Events (include procedures and significant events)   No acute events throughout shift    AWAKE RASS: Goal -    Actual - RASS (Sam Agitation-Sedation Scale): alert and calm    Restraint necessity: Not necessary   BREATHE SBT: Not intubated    Coordinate A & B, analgesics/sedatives Pain: managed   SAT: Not intubated   Delirium CAM-ICU:     Early(intubated/ Progressive (non-intubated) Mobility MOVE Screen (INTUBATED ONLY): Not intubated    Activity: Activity Management: Rolling - L1   Feeding/Nutrition Diet order: Diet/Nutrition Received: restrict fluids, Specialty Diet/Nutrition Received: renal diet   Thrombus DVT prophylaxis: VTE Core Measure: Pharmacological prophylaxis initiated/maintained   HOB Elevation Head of Bed (HOB) Positioning: HOB at 30 degrees   Ulcer Prophylaxis GI: yes   Glucose control managed Glycemic Management: blood glucose monitored   Skin Skin assessment:     Sacrum intact/not altered? Yes  Heels intact/not altered? Yes  Surgical wound? No    CHECK ONE!   (no altered skin or altered skin) and sub boxes:  [x] No Altered Skin Integrity Present    [x]Prevention Measures Documented    [] Altered Skin Integrity Present or Discovered   [] LDA present in EPIC, daily doc completed              [] LDA added if not in EPIC (describe wound).                    When describing wound, do not stage, use descriptive words only.    [] Wound Image Taken (required on admit,                   transfer/discharge and every Tuesday)    Wound Care Consulted? No   Bowel Function no issues    Indwelling Catheter Necessity      [REMOVED]      Hemodialysis Catheter right subclavian-Line Necessity Review: CRRT/HD     De-escalation Antibiotics Yes        VS and assessment per flow  sheet, patient progressing towards goals as tolerated, plan of care reviewed with family, all concerns addressed, will continue to monitor.

## 2024-12-23 NOTE — PLAN OF CARE
Post-Acute Therapy Recommendation: low intensity     Evaluation completed today. PT goals appropriate.    Please continue Progressive Mobility Protocol as appropriate.    Appropriate transfer level with nursing staff: bed <>chair and in-room ambulation with assist x 1    2024    Problem: Physical Therapy  Goal: Physical Therapy Goal  Description: Goals to be met by: 2025     Patient will increase functional independence with mobility by performin. Sit to stand transfer with Modified [place]  2. Bed to chair transfer with Supervision using LRAD  3. Gait  x 250 feet with Supervision using LRAD.   4. Stand for 5 minutes with Supervision using LRAD while performing dynamic balance tasks  5. Lower extremity exercise program x30 reps per handout, with independence    Outcome: Progressing

## 2024-12-23 NOTE — PROGRESS NOTES
[facility] - [facility]  Nephrology  Progress Note    Patient Name: Flakito Mcginnis  MRN: [id number]  Admission Date: 12/19/2024  Hospital Length of Stay: 4 days  Attending Provider: Nain Betts MD   Primary Care Physician: Jordin Robbins MD  Principal Problem:Septic shock    Subjective:     HPI: Patient is a pleasant 69 year old with ESRD on HD TThS who presents to the ER at the request of his HD unit for evaluation of hypotension. Patient completed his a session on Tuesday 12/17/24 and went back for a second session on 12/18/24. Review of his outpatient dialysis notes show that his post BUN dialysis values on 12/17 were 10 with a pre-HD BUN of 41 indicating a urea reduction percentage of 76% suggesting that he received DH on 12/17/24. There are also post HD treatment vital signs recorded on 12/18/24 at 1035 am which also show a pre-HD weight of 56.3 kg and post HD weight of 54.6 kg suggesting that he received an HD session yesterday as well and left at his estimated dry weight. He reported for his routine dialysis but was sent into the ER when he was found to be hypotensive in the HD unit. He reports feeling well with no signs or symptoms of hypotension prior to arrival, however he does report increased loose stool output since his HD session yesterday. He denies any increase in salt or fluid intake and tries to adhere to a low salt and 1L fluid restricting per day diet.    Nephrology has been consulted for management of his dialysis while he is admitted to the hospital. Labs on arrival showed stable electrolytes, venous blood gas showed a metabolic alkalosis with a pCO2 of 44, BNP elevated at >4,900 with no prior values to compare to, and troponin elevated at 923. Weight in the ER was 54 kg. Chest xray was obtained and showed no significant change in the cardiopulmonary status of the patient when compared to previous chest xray. Patient reports oxygen use of 4L at home and reports resolution of his dyspnea  once he was placed on his home oxygen dose.     Outpatient HD Information:  -Dialysis modality: Hemodialysis  -Outpatient HD unit: [facility]  -Nephrologist: Dr. Strickland  -HD TX days: Tuesday/Thursday/Saturday  -Last HD TX prior to hospital admission: 12/18/2024  -Residual urine: Anuric   -EDW: 54.5 kg      Interval hx: Flakito is sitting up in his chair this morning, no major complaints at this time. On his BL supplemental O2. Eating breakfast. He would like to go home.     Objective:     Vital Signs (Most Recent):  Temp: 97.8 °F (36.6 °C) (12/23/24 0701)  Pulse: (!) 117 (12/23/24 0901)  Resp: (!) 65 (12/23/24 0901)  BP: 100/65 (12/23/24 0901)  SpO2: (!) 92 % (12/23/24 0901) Vital Signs (24h Range):  Temp:  [97.5 °F (36.4 °C)-98.7 °F (37.1 °C)] 97.8 °F (36.6 °C)  Pulse:  [] 117  Resp:  [18-65] 65  SpO2:  [77 %-100 %] 92 %  BP: ()/(49-82) 100/65     Weight: 56 kg (123 lb 7.3 oz) (12/21/24 0641)  Body mass index is 19.93 kg/m².  Body surface area is 1.61 meters squared.    I/O last 3 completed shifts:  In: 2194.2 [P.O.:1200; I.V.:945.7; IV Piggyback:48.5]  Out: 1550 [Other:675; Stool:875]     Physical Exam  Constitutional:       General: He is not in acute distress.     Appearance: Normal appearance. He is not ill-appearing or toxic-appearing.   HENT:      Head: Normocephalic and atraumatic.      Right Ear: External ear normal.      Left Ear: External ear normal.      Nose: Nose normal.      Mouth/Throat:      Mouth: Mucous membranes are moist.   Eyes:      Extraocular Movements: Extraocular movements intact.   Cardiovascular:      Rate and Rhythm: Normal rate and regular rhythm.   Pulmonary:      Effort: Pulmonary effort is normal.      Comments: Fine crackles throughout.   Abdominal:      General: Abdomen is flat.      Palpations: Abdomen is soft.   Musculoskeletal:         General: Normal range of motion.      Right lower leg: Edema (2+) present.      Left lower leg: Edema (2+)  present.   Skin:     Capillary Refill: Capillary refill takes less than 2 seconds.   Neurological:      General: No focal deficit present.      Mental Status: He is alert and oriented to person, place, and time.   Psychiatric:         Mood and Affect: Mood normal.         Behavior: Behavior normal.         Thought Content: Thought content normal.         Judgment: Judgment normal.          Significant Labs:  CBC:   Recent Labs   Lab 12/23/24  0411   WBC 5.54   RBC 2.74*   HGB 8.2*   HCT 27.5*      *   MCH 29.9   MCHC 29.8*     CMP:   Recent Labs   Lab 12/19/24  1430 12/20/24  0351 12/23/24  0411 12/23/24  0749   GLU 93   < > 93 79   CALCIUM 9.0   < > 9.4 9.4   ALBUMIN 2.8*   < > 2.4*  --    PROT 6.6  --   --   --    *   < > 131* 129*   K 4.6   < > 5.6* 5.6*   CO2 29   < > 19* 21*   CL 95   < > 104 103   BUN 36*   < > 24* 26*   CREATININE 4.8*   < > 4.4* 4.6*   ALKPHOS 259*  --   --   --    ALT 11  --   --   --    AST 11  --   --   --    BILITOT 0.5  --   --   --     < > = values in this interval not displayed.     All labs within the past 24 hours have been reviewed.    Significant Imaging:  X-Ray: Reviewed    Assessment/Plan:   Cardiac/Vascular  Shock  Management per primary team.      Renal/  ESRD on hemodialysis  Most recent dialysis sessions 12/17 and 12/18, did not get dialyzed 12/19 d/t hypotension. Below dry weight on admission. Chronic 4 LPM NC.      Outpatient HD Information:  -Dialysis modality: Hemodialysis  -Outpatient HD unit: [facility]  -Nephrologist: Dr. Strickland  -HD TX days: Tuesday/Thursday/Saturday  -Last HD TX prior to hospital admission: 12/18/2024  -Residual urine: Anuric   -EDW: 54.5 kg     -Last RRT session was 12/21. No access at this time. Given active bacteremia unlikely he can get tunnel catheter so he will likely need trialysis today for dialysis for worsened hyperkalemia.   -on BL supplemental O2  -on sevelamer  -renal diet when not NPO      Oncology  Anemia of chronic renal failure, stage 5  Checking iron studies.   -EPO with dialysis 2500 units    Thank you for your consult. I will follow-up with patient. Please contact us if you have any additional questions.    Karson White MD  Nephrology  [facility] - Medical ICU

## 2024-12-23 NOTE — ASSESSMENT & PLAN NOTE
HFrEF (30% 8/2024 from 20-25% 6/2024) per cardiology notes.  Suspect volume overload with history HF and ESRD.    --line holiday, will replace tomorrow morning for HD  --Echo showing EF of 20-25% with large AV vegetation partially occluding LVOT with moderate AV regurgitation.   --Cardiology following  --troponin down trending   --Continuous cardiac monitoring   --EKG PRN

## 2024-12-23 NOTE — ASSESSMENT & PLAN NOTE
--Nephrology consulted--appreciate input   --SCUF yesterday.   - tunneled catheter removed 12/22.

## 2024-12-23 NOTE — PT/OT/SLP EVAL
Physical Therapy Co-Evaluation and Co-Treatment    Patient Name:  Flakito Mcginnis   MRN:  [id number]  Admit Date: 12/19/2024  Admitting Diagnosis:  Septic shock   Length of Stay: 4 days  Recent Surgery: * No surgery found *      Recommendations:     Discharge Recommendations: Low Intensity Therapy  Discharge Equipment Recommendations: bath bench   Barriers to discharge: None    Appropriate transfer level with nursing staff: bed <> chair and in-room ambulation with assist x 1    Plan:     During this hospitalization, patient to be seen 3 x/week to address the identified rehab impairments via gait training, therapeutic activities, therapeutic exercises, neuromuscular re-education and progress towards the established goals.  Plan of Care Expires:  01/22/25  Plan of Care Reviewed with: patient    Assessment     Flakito Mcginnis is a 69 y.o. male admitted with a medical diagnosis of Septic shock. Pt tolerated evaluation well today. [place] [facility] [place] due to hypotension and SOB while at dialysis. He was found to have septic shock 2/2 HF and ESRD as well as + staph blood cultures. He demo'ed excellent strength upon formal assessment today and was able to transfer without assist from therapist. He was able to ambulate household distances today with use of RW without desaturations or LOB showing fair endurance. However pt unable to ambulate further today as he had SOB and c/o LE fatigue after ambulating 64 ft and req'd seated rest break. This demo's pt's impaired cardiopulmonary endurance and activity tolerance which is consistent with his HF dx. Pt is still well below their baseline level of function and presents with deficits affecting mobility as per problem list below. Based on clinical presentation, co-morbidities, and today's performance, patient would benefit from skilled physical therapy services to progress functional transfers, improve musculoskeletal strength, as well as increase pt's cardiopulmonary  endurance to maximize pt's independence and return to PLOF.    Problem List: impaired endurance, impaired self care skills, impaired functional mobility, gait instability, impaired balance, pain, impaired cardiopulmonary response to activity.  Rehab Prognosis: Good; patient would benefit from acute skilled PT services to address these deficits and reach maximum level of function.      Goals:   Multidisciplinary Problems       Physical Therapy Goals          Problem: Physical Therapy    Goal Priority Disciplines Outcome Interventions   Physical Therapy Goal     PT, PT/OT Progressing    Description: Goals to be met by: 2025     Patient will increase functional independence with mobility by performin. Sit to stand transfer with Modified Outlook  2. Bed to chair transfer with Supervision using LRAD  3. Gait  x 250 feet with Supervision using LRAD.   4. Stand for 5 minutes with Supervision using LRAD while performing dynamic balance tasks  5. Lower extremity exercise program x30 reps per handout, with independence                         Subjective     RN notified prior to session. No family/friends present upon PT entrance into room. Patient agreeable to PT evaluation.    Chief Complaint: Shortness of Breath and Hypotension (Pt arrived via EMS with c/o SOB and hypotension. Dialysis center could not do dialysis d/t low BP. Pt states has been SOB x1 wk. Denies fevers.)  Patient/Family Comments/goals: none stated; reporting 2 falls in the last 3 months with one resulting in prior Lt shoulder fx  Pain/Comfort:  Pain Rating 1: 8/10  Location - Side 1: Right  Location 1: shoulder  Pain Addressed 1: Reposition, Distraction  Pain Rating Post-Intervention 1: 8/10    Social History:  Residence: Patient lives with their sister and brother in law  in a single story house with number of outside stair(s): 0 CANDICE . Pt's bathroom has a tub/shower combo.  Equipment Owned (not using): wheelchair, cane, straight,  "rollator  Equipment Used: rollator  Prior level of function:  Prior to admission, patient was modified independent with rollator  Work: Retired.   Drive: no.   Managing Medicines/Managing Home: yes.   Hobbies: watchign tv  Assistance Upon Discharge: family    Objective:     Additional staff present: OT; OT for co-evaluation due to suspected patient need for two skilled therapists to appropriately assess patient's functional deficits as well as ensure patient safety, accommodate for limited activity tolerance, and provide appropriate, skilled assistance to maximize functional potential during evaluation.    Patient found up in chair with: telemetry, blood pressure cuff, pulse ox (continuous), oxygen     General Precautions: Standard, Cardiac fall   Orthopedic Precautions:N/A   Braces: N/A   Body mass index is 19.93 kg/m².  Oxygen Device: Room Air  Vitals: BP 95/61 (BP Location: Left arm, Patient Position: Lying)   Pulse 100   Temp 98 °F (36.7 °C) (Oral)   Resp (!) 31   Ht 5' 6" (1.676 m)   Wt 56 kg (123 lb 7.3 oz)   SpO2 (!) 92%   BMI 19.93 kg/m²     Exams:  Cognition:   Alert and Cooperative   Patient is oriented to Person, Place, Time, Situation  Command following: Follows multistep verbal commands  Fluency: clear/fluent  Hearing: Intact  Vision:  Intact  Skin Integrity: Visible skin intact  Postural Assessment: slouched posture and rounded shoulders  Physical Exam:    Left UE Left LE Right UE Right LE   Edema absent absent absent absent   ROM AROM WFL AROM WFL AROM WFL AROM WFL   Strength adequate ROM, adequate strength Grossly 4/5 adequate ROM, adequate strength Grossly 4/5   Sensation intact to light touch intact to light touch intact to light touch intact to light touch   Coordination normal normal normal normal     Outcome Measures:  AM-PAC 6 CLICK MOBILITY  Turning over in bed (including adjusting bedclothes, sheets and blankets)?: 3  Sitting down on and standing up from a chair with arms (e.g., " wheelchair, bedside commode, etc.): 3  Moving from lying on back to sitting on the side of the bed?: 3  Moving to and from a bed to a chair (including a wheelchair)?: 3  Need to walk in hospital room?: 3  Climbing 3-5 steps with a railing?: 2  Basic Mobility Total Score: 17     Functional Mobility:    Bed Mobility:   Pt found/returned to bedside chair    Transfers:   Sit <> Stand Transfer: independence with rolling walker. [id number] from bedside chair    Standing Balance:  Static Standing Balance: Fair- : requires minimal assistance or UE support in order to stand without LOB  Dynamic Standing Balance: Poor+ : able to stand with minimal assistance and reach ipsilaterally. Unable to weight shift.  Assistance Level Required: Contact Guard Assistance  Patient used: rolling walker   Comments: no dizziness in standing      Gait:   Patient ambulated: 64 ft   Patient required: contact guard assistance  Patient used:  rolling walker   Gait Pattern observed: reciprocal gait  Gait Deviation(s): decreased step length, decreased weight shift, decreased foot clearance, flexed posture, decreased michaela, and shuffle gait  Impairments due to: pain, decreased strength, and decreased endurance  all lines remained intact throughout ambulation trial  Comments: Patient required cues for stride length, upright posture, and RW mgmt  to increase independence and safety. Patient required cues ~ 25% of the time. No c/o dizziness throughout. Noted increased SOB at end of gait trial with pt endorsing fatigue so he was returned to sitting. His VSS with SPO2 > 90%.     Education:  Time provided for education, counseling and discussion of health disposition in regards to patient's current status  All questions answered within PT scope of practice and to patient's satisfaction  PT role in POC to address current functional deficits  Pt educated on proper body mechanics, safety techniques, and energy conservation with PT facilitation and cueing  throughout session  Call nursing/pct to transfer to chair/use bathroom. Pt stated understanding.  Whiteboard updated with therapist name and pt's current mobility status documented above  Safe to perform step transfer to/from chair/bedside commode assist x 1 and RW w/ nursing/PCT present    Patient left up in chair with all lines intact, call button in reach, and RN notified.    History:     Past Medical History:   Diagnosis Date    Crohn's disease 1998    ESRD (end stage renal disease) on dialysis 10/2017    Hypertension     Obstructive uropathy        Past Surgical History:   Procedure Laterality Date    COLOSTOMY  2006    DIALYSIS FISTULA CREATION Left 02/2018    NEPHRECTOMY Right     REMOVAL OF TUNNELED CENTRAL VENOUS CATHETER (CVC) N/A 5/23/2018    Procedure: PERMCATH REMOVAL-TUNNELED CVC REMOVAL;  Surgeon: Parveen Ray MD;  Location: [facility] CATH LAB;  Service: Nephrology;  Laterality: N/A;  pt coming in @930       Family History   Problem Relation Name Age of Onset    Hypertension Mother      Emphysema Father         Social History     Socioeconomic History    Marital status: Single   Tobacco Use    Smoking status: Former     Passive exposure: Never    Smokeless tobacco: Never   Substance and Sexual Activity    Alcohol use: Not Currently    Drug use: Never    Sexual activity: Not Currently     Social Drivers of Health     Financial Resource Strain: Low Risk  (12/20/2024)    Overall Financial Resource Strain (CARDIA)     Difficulty of Paying Living Expenses: Not very hard   Food Insecurity: No Food Insecurity (12/20/2024)    Hunger Vital Sign     Worried About Running Out of Food in the Last Year: Never true     Ran Out of Food in the Last Year: Never true   Transportation Needs: No Transportation Needs (12/20/2024)    TRANSPORTATION NEEDS     Transportation : No   Physical Activity: Inactive (12/20/2024)    Exercise Vital Sign     Days of Exercise per Week: 0 days     Minutes of Exercise per Session: 10 min    Stress: Stress Concern Present (12/20/2024)    [employer] - Occupational Stress Questionnaire     Feeling of Stress : Rather much   Housing Stability: Low Risk  (12/20/2024)    Housing Stability Vital Sign     Unable to Pay for Housing in the Last Year: No     Homeless in the Last Year: No       Time Tracking:     PT Received On: 12/23/24  PT Start Time: 0912     PT Stop Time: 0930  PT Total Time (min): 18 min     Billable Minutes: Evaluation 8 minutes and Therapeutic Activity 10 minutes    12/23/2024

## 2024-12-23 NOTE — SUBJECTIVE & OBJECTIVE
Interval History/Significant Events:   NAEON. Tunneled catheter removed yesterday. Improving oxygen requirements.     Review of Systems  Objective:     Vital Signs (Most Recent):  Temp: 97.8 °F (36.6 °C) (12/23/24 0701)  Pulse: 100 (12/23/24 1001)  Resp: (!) 27 (12/23/24 1001)  BP: (!) 95/59 (12/23/24 1001)  SpO2: (!) 92 % (12/23/24 0901) Vital Signs (24h Range):  Temp:  [97.6 °F (36.4 °C)-98.7 °F (37.1 °C)] 97.8 °F (36.6 °C)  Pulse:  [] 100  Resp:  [18-65] 27  SpO2:  [77 %-100 %] 92 %  BP: ()/(49-82) 95/59   Weight: 56 kg (123 lb 7.3 oz)  Body mass index is 19.93 kg/m².      Intake/Output Summary (Last 24 hours) at 12/23/2024 1209  Last data filed at 12/23/2024 0901  Gross per 24 hour   Intake 1440 ml   Output 600 ml   Net 840 ml          Physical Exam  Vitals and nursing note reviewed.   Constitutional:       General: He is not in acute distress.     Appearance: Normal appearance. He is normal weight. He is not ill-appearing, toxic-appearing or diaphoretic.   HENT:      Head: Normocephalic and atraumatic.      Right Ear: External ear normal.      Left Ear: External ear normal.      Nose: Nose normal.      Mouth/Throat:      Mouth: Mucous membranes are moist.   Eyes:      General: No scleral icterus.     Extraocular Movements: Extraocular movements intact.      Conjunctiva/sclera: Conjunctivae normal.      Pupils: Pupils are equal, round, and reactive to light.   Cardiovascular:      Rate and Rhythm: Normal rate and regular rhythm.      Pulses: Normal pulses.      Heart sounds: Murmur heard.      No friction rub. No gallop.      Comments: No JVD or peripheral edema  Pulmonary:      Effort: Pulmonary effort is normal. No respiratory distress.      Breath sounds: Normal breath sounds.      Comments: Mildly increased respiratory effort with bibasilar crackles  Abdominal:      General: Abdomen is flat. Bowel sounds are normal. There is no distension.      Palpations: Abdomen is soft.      Tenderness: There  is no abdominal tenderness. There is no guarding or rebound.   Musculoskeletal:         General: No tenderness. Normal range of motion.      Cervical back: Normal range of motion.      Right lower leg: No edema (trace).      Left lower leg: No edema (trace).      Comments: Kyphosis.    Skin:     General: Skin is warm and dry.      Coloration: Skin is not jaundiced or pale.   Neurological:      General: No focal deficit present.      Mental Status: He is alert and oriented to person, place, and time. Mental status is at baseline.   Psychiatric:         Mood and Affect: Mood normal.         Behavior: Behavior normal.         Thought Content: Thought content normal.         Judgment: Judgment normal.            Vents:  Oxygen Concentration (%): 50 (12/22/24 2347)  Lines/Drains/Airways       Drain  Duration                  Colostomy 12/19/24 2225 RLQ 3 days              Peripheral Intravenous Line  Duration                  Peripheral IV - Single Lumen 12/19/24 1428 20 G Anterior;Right Forearm 3 days         Peripheral IV - Single Lumen 12/22/24 1611 20 G Anterior;Right;Medial Upper Arm <1 day                  Significant Labs:    CBC/Anemia Profile:  Recent Labs   Lab 12/22/24  0302 12/23/24  0411   WBC 4.20 5.54   HGB 7.5* 8.2*   HCT 25.2* 27.5*    184   * 100*   RDW 16.2* 16.3*     Chemistries:  Recent Labs   Lab 12/22/24  0302 12/22/24  1542 12/23/24  0411 12/23/24  0749    135* 131* 129*   K 5.2* 5.2* 5.6* 5.6*    104 104 103   CO2 22* 24 19* 21*   BUN 17 21 24* 26*   CREATININE 3.1* 4.0* 4.4* 4.6*   CALCIUM 8.9 9.5 9.4 9.4   ALBUMIN 2.3* 2.4* 2.4*  --    MG 1.9 2.4 2.4  --    PHOS 3.7  3.7 4.2 4.4  4.4  --      All pertinent labs within the past 24 hours have been reviewed.    Significant Imaging:  I have reviewed all pertinent imaging results/findings within the past 24 hours.

## 2024-12-24 PROBLEM — R78.81 COAGULASE NEGATIVE STAPHYLOCOCCUS BACTEREMIA: Status: ACTIVE | Noted: 2024-12-24

## 2024-12-24 PROBLEM — E87.5 HYPERKALEMIA: Status: ACTIVE | Noted: 2024-12-24

## 2024-12-24 PROBLEM — B95.7 COAGULASE NEGATIVE STAPHYLOCOCCUS BACTEREMIA: Status: ACTIVE | Noted: 2024-12-24

## 2024-12-24 LAB
ALBUMIN SERPL BCP-MCNC: 2 G/DL (ref 3.5–5.2)
ALBUMIN SERPL BCP-MCNC: 2 G/DL (ref 3.5–5.2)
ALBUMIN SERPL BCP-MCNC: 2.4 G/DL (ref 3.5–5.2)
ALBUMIN SERPL BCP-MCNC: 2.4 G/DL (ref 3.5–5.2)
ANION GAP SERPL CALC-SCNC: 10 MMOL/L (ref 8–16)
ANION GAP SERPL CALC-SCNC: 10 MMOL/L (ref 8–16)
ANION GAP SERPL CALC-SCNC: 7 MMOL/L (ref 8–16)
ANION GAP SERPL CALC-SCNC: 7 MMOL/L (ref 8–16)
BACTERIA BLD CULT: ABNORMAL
BASOPHILS # BLD AUTO: 0.07 K/UL (ref 0–0.2)
BASOPHILS NFR BLD: 0.8 % (ref 0–1.9)
BUN SERPL-MCNC: 27 MG/DL (ref 8–23)
BUN SERPL-MCNC: 27 MG/DL (ref 8–23)
BUN SERPL-MCNC: 31 MG/DL (ref 8–23)
BUN SERPL-MCNC: 31 MG/DL (ref 8–23)
CA-I BLDV-SCNC: 1.19 MMOL/L (ref 1.06–1.42)
CA-I BLDV-SCNC: 1.27 MMOL/L (ref 1.06–1.42)
CA-I BLDV-SCNC: 1.28 MMOL/L (ref 1.06–1.42)
CALCIUM SERPL-MCNC: 8.4 MG/DL (ref 8.7–10.5)
CALCIUM SERPL-MCNC: 8.4 MG/DL (ref 8.7–10.5)
CALCIUM SERPL-MCNC: 9.4 MG/DL (ref 8.7–10.5)
CALCIUM SERPL-MCNC: 9.4 MG/DL (ref 8.7–10.5)
CHLORIDE SERPL-SCNC: 103 MMOL/L (ref 95–110)
CO2 SERPL-SCNC: 18 MMOL/L (ref 23–29)
CO2 SERPL-SCNC: 18 MMOL/L (ref 23–29)
CO2 SERPL-SCNC: 21 MMOL/L (ref 23–29)
CO2 SERPL-SCNC: 21 MMOL/L (ref 23–29)
CREAT SERPL-MCNC: 4.7 MG/DL (ref 0.5–1.4)
CREAT SERPL-MCNC: 4.7 MG/DL (ref 0.5–1.4)
CREAT SERPL-MCNC: 5.8 MG/DL (ref 0.5–1.4)
CREAT SERPL-MCNC: 5.8 MG/DL (ref 0.5–1.4)
DIFFERENTIAL METHOD BLD: ABNORMAL
EOSINOPHIL # BLD AUTO: 0.2 K/UL (ref 0–0.5)
EOSINOPHIL NFR BLD: 2.7 % (ref 0–8)
ERYTHROCYTE [DISTWIDTH] IN BLOOD BY AUTOMATED COUNT: 16.1 % (ref 11.5–14.5)
EST. GFR  (NO RACE VARIABLE): 12.7 ML/MIN/1.73 M^2
EST. GFR  (NO RACE VARIABLE): 12.7 ML/MIN/1.73 M^2
EST. GFR  (NO RACE VARIABLE): 9.9 ML/MIN/1.73 M^2
EST. GFR  (NO RACE VARIABLE): 9.9 ML/MIN/1.73 M^2
GLUCOSE SERPL-MCNC: 88 MG/DL (ref 70–110)
GLUCOSE SERPL-MCNC: 88 MG/DL (ref 70–110)
GLUCOSE SERPL-MCNC: 95 MG/DL (ref 70–110)
GLUCOSE SERPL-MCNC: 95 MG/DL (ref 70–110)
HCT VFR BLD AUTO: 29.1 % (ref 40–54)
HGB BLD-MCNC: 8.7 G/DL (ref 14–18)
IMM GRANULOCYTES # BLD AUTO: 0.03 K/UL (ref 0–0.04)
IMM GRANULOCYTES NFR BLD AUTO: 0.4 % (ref 0–0.5)
LYMPHOCYTES # BLD AUTO: 1 K/UL (ref 1–4.8)
LYMPHOCYTES NFR BLD: 11.5 % (ref 18–48)
MAGNESIUM SERPL-MCNC: 2.1 MG/DL (ref 1.6–2.6)
MAGNESIUM SERPL-MCNC: 2.1 MG/DL (ref 1.6–2.6)
MAGNESIUM SERPL-MCNC: 2.4 MG/DL (ref 1.6–2.6)
MAGNESIUM SERPL-MCNC: 2.4 MG/DL (ref 1.6–2.6)
MCH RBC QN AUTO: 29.8 PG (ref 27–31)
MCHC RBC AUTO-ENTMCNC: 29.9 G/DL (ref 32–36)
MCV RBC AUTO: 100 FL (ref 82–98)
MONOCYTES # BLD AUTO: 0.8 K/UL (ref 0.3–1)
MONOCYTES NFR BLD: 9.5 % (ref 4–15)
NEUTROPHILS # BLD AUTO: 6.3 K/UL (ref 1.8–7.7)
NEUTROPHILS NFR BLD: 75.1 % (ref 38–73)
NRBC BLD-RTO: 0 /100 WBC
OHS QRS DURATION: 114 MS
OHS QTC CALCULATION: 466 MS
PHOSPHATE SERPL-MCNC: 3.8 MG/DL (ref 2.7–4.5)
PHOSPHATE SERPL-MCNC: 3.8 MG/DL (ref 2.7–4.5)
PHOSPHATE SERPL-MCNC: 5 MG/DL (ref 2.7–4.5)
PLATELET # BLD AUTO: 250 K/UL (ref 150–450)
PMV BLD AUTO: 11 FL (ref 9.2–12.9)
POCT GLUCOSE: 48 MG/DL (ref 70–110)
POCT GLUCOSE: 82 MG/DL (ref 70–110)
POCT GLUCOSE: 83 MG/DL (ref 70–110)
POCT GLUCOSE: 87 MG/DL (ref 70–110)
POCT GLUCOSE: 94 MG/DL (ref 70–110)
POTASSIUM SERPL-SCNC: 4.9 MMOL/L (ref 3.5–5.1)
POTASSIUM SERPL-SCNC: 4.9 MMOL/L (ref 3.5–5.1)
POTASSIUM SERPL-SCNC: 6.2 MMOL/L (ref 3.5–5.1)
POTASSIUM SERPL-SCNC: 6.2 MMOL/L (ref 3.5–5.1)
RBC # BLD AUTO: 2.92 M/UL (ref 4.6–6.2)
SODIUM SERPL-SCNC: 131 MMOL/L (ref 136–145)
WBC # BLD AUTO: 8.44 K/UL (ref 3.9–12.7)

## 2024-12-24 PROCEDURE — 93005 ELECTROCARDIOGRAM TRACING: CPT

## 2024-12-24 PROCEDURE — 90945 DIALYSIS ONE EVALUATION: CPT

## 2024-12-24 PROCEDURE — 99233 SBSQ HOSP IP/OBS HIGH 50: CPT | Mod: GC,,, | Performed by: INTERNAL MEDICINE

## 2024-12-24 PROCEDURE — 99291 CRITICAL CARE FIRST HOUR: CPT | Mod: ,,, | Performed by: INTERNAL MEDICINE

## 2024-12-24 PROCEDURE — 63600175 PHARM REV CODE 636 W HCPCS: Performed by: STUDENT IN AN ORGANIZED HEALTH CARE EDUCATION/TRAINING PROGRAM

## 2024-12-24 PROCEDURE — 25000003 PHARM REV CODE 250

## 2024-12-24 PROCEDURE — 93010 ELECTROCARDIOGRAM REPORT: CPT | Mod: ,,, | Performed by: INTERNAL MEDICINE

## 2024-12-24 PROCEDURE — 99900035 HC TECH TIME PER 15 MIN (STAT)

## 2024-12-24 PROCEDURE — 25000003 PHARM REV CODE 250: Performed by: GENERAL ACUTE CARE HOSPITAL

## 2024-12-24 PROCEDURE — 80069 RENAL FUNCTION PANEL: CPT | Mod: 91 | Performed by: INTERNAL MEDICINE

## 2024-12-24 PROCEDURE — 27000923 HC TRIALYSIS CATHETER, ANY SIZE

## 2024-12-24 PROCEDURE — 25000003 PHARM REV CODE 250: Mod: JZ,JG

## 2024-12-24 PROCEDURE — 83735 ASSAY OF MAGNESIUM: CPT | Performed by: INTERNAL MEDICINE

## 2024-12-24 PROCEDURE — 94761 N-INVAS EAR/PLS OXIMETRY MLT: CPT

## 2024-12-24 PROCEDURE — 27000221 HC OXYGEN, UP TO 24 HOURS

## 2024-12-24 PROCEDURE — 82330 ASSAY OF CALCIUM: CPT | Mod: 91 | Performed by: STUDENT IN AN ORGANIZED HEALTH CARE EDUCATION/TRAINING PROGRAM

## 2024-12-24 PROCEDURE — 25000003 PHARM REV CODE 250: Performed by: STUDENT IN AN ORGANIZED HEALTH CARE EDUCATION/TRAINING PROGRAM

## 2024-12-24 PROCEDURE — 82330 ASSAY OF CALCIUM: CPT | Performed by: STUDENT IN AN ORGANIZED HEALTH CARE EDUCATION/TRAINING PROGRAM

## 2024-12-24 PROCEDURE — 63600175 PHARM REV CODE 636 W HCPCS: Performed by: INTERNAL MEDICINE

## 2024-12-24 PROCEDURE — 83735 ASSAY OF MAGNESIUM: CPT | Mod: 91 | Performed by: STUDENT IN AN ORGANIZED HEALTH CARE EDUCATION/TRAINING PROGRAM

## 2024-12-24 PROCEDURE — 02HV33Z INSERTION OF INFUSION DEVICE INTO SUPERIOR VENA CAVA, PERCUTANEOUS APPROACH: ICD-10-PCS | Performed by: HOSPITALIST

## 2024-12-24 PROCEDURE — 85025 COMPLETE CBC W/AUTO DIFF WBC: CPT

## 2024-12-24 PROCEDURE — 20000000 HC ICU ROOM

## 2024-12-24 PROCEDURE — 99233 SBSQ HOSP IP/OBS HIGH 50: CPT | Mod: ,,, | Performed by: INTERNAL MEDICINE

## 2024-12-24 PROCEDURE — 27100171 HC OXYGEN HIGH FLOW UP TO 24 HOURS

## 2024-12-24 PROCEDURE — 63600175 PHARM REV CODE 636 W HCPCS

## 2024-12-24 RX ORDER — HYDROMORPHONE HYDROCHLORIDE 1 MG/ML
0.5 INJECTION, SOLUTION INTRAMUSCULAR; INTRAVENOUS; SUBCUTANEOUS ONCE
Status: COMPLETED | OUTPATIENT
Start: 2024-12-24 | End: 2024-12-24

## 2024-12-24 RX ORDER — NOREPINEPHRINE BITARTRATE 0.02 MG/ML
INJECTION, SOLUTION INTRAVENOUS
Status: DISPENSED
Start: 2024-12-24 | End: 2024-12-25

## 2024-12-24 RX ORDER — CEFAZOLIN 2 G/1
2 INJECTION, POWDER, FOR SOLUTION INTRAMUSCULAR; INTRAVENOUS
Status: DISCONTINUED | OUTPATIENT
Start: 2024-12-24 | End: 2024-12-27

## 2024-12-24 RX ORDER — CALCIUM GLUCONATE 20 MG/ML
1 INJECTION, SOLUTION INTRAVENOUS ONCE
Status: COMPLETED | OUTPATIENT
Start: 2024-12-24 | End: 2024-12-24

## 2024-12-24 RX ORDER — DIPHENHYDRAMINE HYDROCHLORIDE 50 MG/ML
12.5 INJECTION INTRAMUSCULAR; INTRAVENOUS ONCE
Status: COMPLETED | OUTPATIENT
Start: 2024-12-24 | End: 2024-12-24

## 2024-12-24 RX ADMIN — DIPHENHYDRAMINE HYDROCHLORIDE 12.5 MG: 50 INJECTION, SOLUTION INTRAMUSCULAR; INTRAVENOUS at 06:12

## 2024-12-24 RX ADMIN — HYDROMORPHONE HYDROCHLORIDE 0.5 MG: 1 INJECTION, SOLUTION INTRAMUSCULAR; INTRAVENOUS; SUBCUTANEOUS at 10:12

## 2024-12-24 RX ADMIN — DEXTROSE MONOHYDRATE 25 G: 25 INJECTION, SOLUTION INTRAVENOUS at 09:12

## 2024-12-24 RX ADMIN — CEFAZOLIN 2 G: 2 INJECTION, POWDER, FOR SOLUTION INTRAMUSCULAR; INTRAVENOUS at 03:12

## 2024-12-24 RX ADMIN — OXYCODONE 5 MG: 5 TABLET ORAL at 08:12

## 2024-12-24 RX ADMIN — HYDROMORPHONE HYDROCHLORIDE 0.5 MG: 1 INJECTION, SOLUTION INTRAMUSCULAR; INTRAVENOUS; SUBCUTANEOUS at 09:12

## 2024-12-24 RX ADMIN — SODIUM ZIRCONIUM CYCLOSILICATE 10 G: 5 POWDER, FOR SUSPENSION ORAL at 09:12

## 2024-12-24 RX ADMIN — DEXTROSE MONOHYDRATE 50 G: 25 INJECTION, SOLUTION INTRAVENOUS at 06:12

## 2024-12-24 RX ADMIN — OXYCODONE 5 MG: 5 TABLET ORAL at 04:12

## 2024-12-24 RX ADMIN — MUPIROCIN: 20 OINTMENT TOPICAL at 09:12

## 2024-12-24 RX ADMIN — SEVELAMER CARBONATE 800 MG: 800 TABLET, FILM COATED ORAL at 08:12

## 2024-12-24 RX ADMIN — SEVELAMER CARBONATE 800 MG: 800 TABLET, FILM COATED ORAL at 05:12

## 2024-12-24 RX ADMIN — SODIUM CHLORIDE: 9 INJECTION, SOLUTION INTRAVENOUS at 01:12

## 2024-12-24 RX ADMIN — SODIUM ZIRCONIUM CYCLOSILICATE 10 G: 5 POWDER, FOR SUSPENSION ORAL at 03:12

## 2024-12-24 RX ADMIN — ENOXAPARIN SODIUM 30 MG: 30 INJECTION SUBCUTANEOUS at 04:12

## 2024-12-24 RX ADMIN — OXYCODONE 5 MG: 5 TABLET ORAL at 01:12

## 2024-12-24 RX ADMIN — CALCIUM GLUCONATE 1 G: 20 INJECTION, SOLUTION INTRAVENOUS at 06:12

## 2024-12-24 RX ADMIN — SEVELAMER CARBONATE 800 MG: 800 TABLET, FILM COATED ORAL at 12:12

## 2024-12-24 RX ADMIN — MUPIROCIN: 20 OINTMENT TOPICAL at 08:12

## 2024-12-24 RX ADMIN — HUMAN INSULIN 5.6 UNITS: 100 INJECTION, SOLUTION SUBCUTANEOUS at 06:12

## 2024-12-24 RX ADMIN — ATORVASTATIN CALCIUM 40 MG: 40 TABLET, FILM COATED ORAL at 08:12

## 2024-12-24 RX ADMIN — SODIUM ZIRCONIUM CYCLOSILICATE 10 G: 5 POWDER, FOR SUSPENSION ORAL at 08:12

## 2024-12-24 NOTE — PROGRESS NOTES
12/24/24 1337   Treatment   Treatment Type SLED   Treatment Status New start   Dialysis Machine Number [id number]   Solutions Labeled and Current  Yes   Access Temporary Cath;Right;IJ   Catheter Dressing Intact  Yes   Alarms Engaged Yes   CRRT Comments 8 hours Sled Tx initiated     Report given to primary nurse.

## 2024-12-24 NOTE — PROCEDURES
"Flakito Mcginnis is a 69 y.o. male patient.    Temp: 98.2 °F (36.8 °C) (12/24/24 0800)  Pulse: 105 (12/24/24 0952)  Resp: 16 (12/24/24 1004)  BP: (!) 143/65 (12/24/24 0900)  SpO2: 96 % (12/24/24 0952)  Weight: 56 kg (123 lb 7.3 oz) (12/21/24 0641)  Height: 5' 6" (167.6 cm) (12/19/24 1404)       Trialysis Central Line    Date/Time: 12/24/2024 10:58 AM    Performed by: Kaila Steinberg MD  Authorized by: Nain Betts MD    Location procedure was performed:  [facility]  Pre-operative diagnosis:  Renal failure, hyperkalemia  Post-operative diagnosis:  Renal failure, hyperkalemia  Consent Done ?:  Yes  Time out complete?: Verified correct patient, procedure, equipment, staff, and site/side    Indications:  Hemodialysis  Anesthesia:  Local infiltration  Local anesthetic:  Lidocaine 1% without epinephrine  Preparation:  Skin prepped with ChloraPrep  Skin prep agent dried: Skin prep agent completely dried prior to procedure    Sterile barriers: All five maximal sterile barriers used - gloves, gown, cap, mask and large sterile sheet    Hand hygiene: Hand hygiene performed immediately prior to central venous catheter insertion    Location:  Right internal jugular  Catheter type:  Trialysis  Catheter size:  13 Fr  Inserted Catheter Length (cm):  15  Ultrasound guidance: Yes    Vessel Caliber:  Large  Comprressibility:  Normal  Needle advanced into vessel with real time ultrasound guidance.    Guidewire confirmed in vessel.    Steril sheath on probe.    Sterile gel used.  Manometry: Yes    Number of attempts:  1  Securement:  Line sutured, chlorhexidine patch, sterile dressing applied and blood return through all ports  Estimated blood loss (mL):  2  Guidewire: guidewire removed intact, verified with nurse    Adverse Events:  None      12/24/2024    "

## 2024-12-24 NOTE — PROGRESS NOTES
[facility] - Medical [facility]  Critical Care Medicine  Progress Note    Patient Name: Flakito Mcginnis  MRN: [id number]  Admission Date: 12/19/2024  Hospital Length of Stay: 5 days  Code Status: Full Code  Attending Provider: Nain Betts MD  Primary Care Provider: Jordin Robbins MD   Principal Problem: Septic shock    Subjective:     HPI:  Mr. Flakito Mcginnis is a 69 y.o. man w/ HFrEF (30% 8/2024 from 20-25% 6/2024), NICM, MR, ESRD on HD, Crohn's s/p colostomy, h/o R nephrectomy.  He presented to [facility] ED from his HD center on 12/19 due to hypotension and ongoing shortness of breath.  He usually receives his dialysis on T, R, S and went on Wednesday for an extra session for volume removal.  He arrived on Thursday for his usually scheduled dialysis session and was directed to the ED due to hypotension.  On arrival in the ED his blood pressure was 78/51.  He was found to have a BNP >4900 and CXR concerning for volume overload.  High sensitivity troponin 923.  Critical care medicine was consulted for hypotension and admitted to MICU.      Hospital/ICU Course:  12/20:  On low-dose vasopressor with norepinephrine.  Patient has had increasing oxygen requirements overnight.  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  Started on empiric antibiotic therapy with cefepime and daptomycin.  Infectious Diseases consulted.    Interval History/Significant Events:   Mild hyperkalmia this AM. Temp line placed and currently on SLED.     Review of Systems  Objective:     Vital Signs (Most Recent):  Temp: 98.2 °F (36.8 °C) (12/24/24 1200)  Pulse: 101 (12/24/24 1506)  Resp: 18 (12/24/24 1506)  BP: (!) 117/56 (12/24/24 1506)  SpO2: (!) 90 % (12/24/24 1506) Vital Signs (24h Range):  Temp:  [97 °F (36.1 °C)-98.2 °F (36.8 °C)] 98.2 °F (36.8 °C)  Pulse:  [] 101  Resp:  [16-30] 18  SpO2:  [71 %-97 %] 90 %  BP: ()/(39-71) 117/56   Weight: 56 kg (123 lb 7.3 oz)  Body mass index is 19.93  kg/m².      Intake/Output Summary (Last 24 hours) at 12/24/2024 1530  Last data filed at 12/24/2024 1339  Gross per 24 hour   Intake 553.13 ml   Output 450 ml   Net 103.13 ml          Physical Exam  Vitals and nursing note reviewed.   Constitutional:       General: He is not in acute distress.     Appearance: Normal appearance. He is normal weight. He is not ill-appearing, toxic-appearing or diaphoretic.   HENT:      Head: Normocephalic and atraumatic.      Right Ear: External ear normal.      Left Ear: External ear normal.      Nose: Nose normal.      Mouth/Throat:      Mouth: Mucous membranes are moist.   Eyes:      General: No scleral icterus.     Extraocular Movements: Extraocular movements intact.      Conjunctiva/sclera: Conjunctivae normal.      Pupils: Pupils are equal, round, and reactive to light.   Cardiovascular:      Rate and Rhythm: Normal rate and regular rhythm.      Pulses: Normal pulses.      Heart sounds: Murmur heard.      No friction rub. No gallop.      Comments: No JVD or peripheral edema  Pulmonary:      Effort: Pulmonary effort is normal. No respiratory distress.      Breath sounds: Normal breath sounds.      Comments: Mildly increased respiratory effort with bibasilar crackles  Abdominal:      General: Abdomen is flat. Bowel sounds are normal. There is no distension.      Palpations: Abdomen is soft.      Tenderness: There is no abdominal tenderness. There is no guarding or rebound.   Musculoskeletal:         General: No tenderness. Normal range of motion.      Cervical back: Normal range of motion.      Right lower leg: No edema (trace).      Left lower leg: No edema (trace).      Comments: Kyphosis.    Skin:     General: Skin is warm and dry.      Coloration: Skin is not jaundiced or pale.   Neurological:      General: No focal deficit present.      Mental Status: He is alert and oriented to person, place, and time. Mental status is at baseline.   Psychiatric:         Mood and Affect:  "Mood normal.         Behavior: Behavior normal.         Thought Content: Thought content normal.         Judgment: Judgment normal.            Vents:  Oxygen Concentration (%): 50 (12/22/24 2347)  Lines/Drains/Airways       Central Venous Catheter Line  Duration             Trialysis (Dialysis) Catheter 12/24/24 1012 right internal jugular <1 day              Drain  Duration                  Colostomy 12/19/24 2225 RLQ 4 days              Peripheral Intravenous Line  Duration                  Peripheral IV - Single Lumen 12/19/24 1428 20 G Anterior;Right Forearm 5 days         Peripheral IV - Single Lumen 12/22/24 1611 20 G Anterior;Right;Medial Upper Arm 1 day                  Significant Labs:    CBC/Anemia Profile:  Recent Labs   Lab 12/23/24  0411 12/24/24  0351   WBC 5.54 8.44   HGB 8.2* 8.7*   HCT 27.5* 29.1*    250   * 100*   RDW 16.3* 16.1*        Chemistries:  Recent Labs   Lab 12/23/24  1519 12/23/24  1946 12/24/24  0351 12/24/24  1351   * 132* 131*  131* 131*  131*   K 5.9* 5.2* 6.2*  6.2* 4.9  4.9    104 103  103 103  103   CO2 20* 22* 18*  18* 21*  21*   BUN 29* 29* 31*  31* 27*  27*   CREATININE 5.0* 5.2* 5.8*  5.8* 4.7*  4.7*   CALCIUM 9.1 9.1 9.4  9.4 8.4*  8.4*   ALBUMIN 2.4*  --  2.4*  2.4* 2.0*  2.0*   MG 2.4  --  2.4  2.4 2.1  2.1   PHOS 4.9*  --  5.0*  5.0*  5.0* 3.8  3.8       All pertinent labs within the past 24 hours have been reviewed.    Significant Imaging:  I have reviewed all pertinent imaging results/findings within the past 24 hours.    ABG  Recent Labs   Lab 12/19/24  1433   PH 7.454*   PO2 34*   PCO2 44.1   HCO3 31.0*   BE 7*     Assessment/Plan:     Cardiac/Vascular  Endocarditis  See "septic shock"  - despite size, patient would be a poor surgical candidate for valve replacement in setting of Age and comorbidities. Increased risk of stroke based on size and location.     NSTEMI (non-ST elevated myocardial infarction)  High sensitivity " troponin troponin 923.  EKG with t wave inversions.  No complaints of chest pain.      --Cardiology following  -- Troponin down trending  --Continuous cardiac monitoring  --EKG PRN     HFrEF (heart failure with reduced ejection fraction)  HFrEF (30% 8/2024 from 20-25% 6/2024) per cardiology notes.  Suspect volume overload with history HF and ESRD.    --line holiday, temp HD catheter placed 12/24. SLED today.   --Echo showing EF of 20-25% with large AV vegetation partially occluding LVOT with moderate AV regurgitation.   --Cardiology following  --troponin down trending   --Continuous cardiac monitoring   --EKG PRN     Hypertension  Will start to add home medications tomorrow.     Renal/  Hyperkalemia  Due to ESRD. SLED for 8 hours today.     History of nephrectomy  Noted. See ESRD.     ESRD on hemodialysis  --Nephrology consulted--appreciate input   --SCUF yesterday.   - tunneled catheter removed 12/22. RIJ temp HD catheter placed 12/24. Currently on SLED for 8 hours.     ID  * Septic shock  Presented with hypotension unable to receive iHD.  SBP 70s on arrival to ED.  On exam patient with MAP 65-70 not on vasopressor support, but suspect will need vasopressors for dialysis. No obvious signs of infection on exam, tunneled cath dry/intact. Reports no cough, urinary symptoms.  WBC normal.  Suspect shock likely cardiogenic in setting of HF and ESRD with volume overload.      -- blood cultures + for staph, most recent cultures remain positive with plan for tunneled catheter line removal today. Repeat blood cultures pending.   --Vegetation found on aortic valve on echocardiogram. Consult cardiothoracic surgery.   --Infectious diseases consult--appreciate input   --Started on cefepime and daptomycin given reported vancomycin allergy; discontinue cefepime 12/22. Deescalate daptomycin to cefazolin.           Critical Care Daily Checklist:     A: Awake: RASS Goal/Actual Goal:    Actual:     B: Spontaneous Breathing Trial  Performed?     C: SAT & SBT Coordinated?  NA                      D: Delirium: CAM-ICU     E: Early Mobility Performed? Yes   F: Feeding Goal:    Status:               Current Diet Order   Procedures    Diet Renal Fluid - 1000mL       Order Specific Question:   Fluid restriction:       Answer:   Fluid - 1000mL      AS: Analgesia/Sedation NA   T: Thromboembolic Prophylaxis SqHq8h   H: HOB > 300 No   U: Stress Ulcer Prophylaxis (if needed) No   G: Glucose Control At goal   B: Bowel Function  BM 12/21   I: Indwelling Catheter (Lines & Alberts) Necessity RIJ temp HD catheter   D: De-escalation of Antimicrobials/Pharmacotherapies Deescalate dapto to cefazolin.      Plan for the day/ETD Continue abx, titrate oxygen. SLED per nephrology.      Code Status:  Family/Goals of Care: Full Code  Plan to discuss with patient and family today.       Critical Care Time: 45 minutes  Critical secondary to Patient has a condition that poses threat to life and bodily function: Septic shock      Critical care was time spent personally by me on the following activities: development of treatment plan with patient or surrogate and bedside caregivers, discussions with consultants, evaluation of patient's response to treatment, examination of patient, ordering and performing treatments and interventions, ordering and review of laboratory studies, ordering and review of radiographic studies, pulse oximetry, re-evaluation of patient's condition. This critical care time did not overlap with that of any other provider or involve time for any procedures.     Nain Betts MD  Critical Care Medicine  [facility] Medical ICU

## 2024-12-24 NOTE — PROGRESS NOTES
[facility]  Nephrology  Progress Note    Patient Name: Flakito Mcginnis  MRN: [id number]  Admission Date: 12/19/2024  Hospital Length of Stay: 5 days  Attending Provider: Nain Betts MD   Primary Care Physician: Jordin Robbins MD  Principal Problem:Septic shock    Subjective:     HPI: Patient is a pleasant 69 year old with ESRD on HD TThS who presents to the ER at the request of his HD unit for evaluation of hypotension. Patient completed his a session on Tuesday 12/17/24 and went back for a second session on 12/18/24. Review of his outpatient dialysis notes show that his post BUN dialysis values on 12/17 were 10 with a pre-HD BUN of 41 indicating a urea reduction percentage of 76% suggesting that he received DH on 12/17/24. There are also post HD treatment vital signs recorded on 12/18/24 at 1035 am which also show a pre-HD weight of 56.3 kg and post HD weight of 54.6 kg suggesting that he received an HD session yesterday as well and left at his estimated dry weight. He reported for his routine dialysis but was sent into the ER when he was found to be hypotensive in the HD unit. He reports feeling well with no signs or symptoms of hypotension prior to arrival, however he does report increased loose stool output since his HD session yesterday. He denies any increase in salt or fluid intake and tries to adhere to a low salt and 1L fluid restricting per day diet.    Nephrology has been consulted for management of his dialysis while he is admitted to the hospital. Labs on arrival showed stable electrolytes, venous blood gas showed a metabolic alkalosis with a pCO2 of 44, BNP elevated at >4,900 with no prior values to compare to, and troponin elevated at 923. Weight in the ER was 54 kg. Chest xray was obtained and showed no significant change in the cardiopulmonary status of the patient when compared to previous chest xray. Patient reports oxygen use of 4L at home and reports resolution of his dyspnea  once he was placed on his home oxygen dose.     Outpatient HD Information:  -Dialysis modality: Hemodialysis  -Outpatient HD unit: [facility]  -Nephrologist: Dr. Strickland  -HD TX days: Tuesday/Thursday/Saturday  -Last HD TX prior to hospital admission: 12/18/2024  -Residual urine: Anuric   -EDW: 54.5 kg      Interval hx: Flakito sitting in chair, seen during breakfast, he wants more splenda packets for his oatmeal. K was high overnight. He endorses some mild DALEY, none while at rest.     Objective:     Vital Signs (Most Recent):  Temp: 98 °F (36.7 °C) (12/24/24 0415)  Pulse: 105 (12/24/24 0800)  Resp: (!) 30 (12/24/24 0800)  BP: 97/66 (12/24/24 0800)  SpO2: (!) 90 % (12/24/24 0803) Vital Signs (24h Range):  Temp:  [97 °F (36.1 °C)-98 °F (36.7 °C)] 98 °F (36.7 °C)  Pulse:  [] 105  Resp:  [16-65] 30  SpO2:  [68 %-97 %] 90 %  BP: ()/(53-67) 97/66     Weight: 56 kg (123 lb 7.3 oz) (12/21/24 0641)  Body mass index is 19.93 kg/m².  Body surface area is 1.61 meters squared.    I/O last 3 completed shifts:  In: 863.1 [P.O.:800; IV Piggyback:63.1]  Out: 750 [Stool:750]     Physical Exam  Constitutional:       General: He is not in acute distress.     Appearance: Normal appearance. He is not ill-appearing or toxic-appearing.   HENT:      Head: Normocephalic and atraumatic.      Right Ear: External ear normal.      Left Ear: External ear normal.      Nose: Nose normal.      Mouth/Throat:      Mouth: Mucous membranes are moist.   Eyes:      Extraocular Movements: Extraocular movements intact.   Cardiovascular:      Rate and Rhythm: Normal rate and regular rhythm.   Pulmonary:      Effort: Pulmonary effort is normal.      Comments: Fine crackles throughout.   Abdominal:      General: Abdomen is flat.      Palpations: Abdomen is soft.   Musculoskeletal:         General: Normal range of motion.      Right lower leg: Edema (2+) present.      Left lower leg: Edema (2+) present.   Skin:     Capillary  Refill: Capillary refill takes less than 2 seconds.   Neurological:      General: No focal deficit present.      Mental Status: He is alert and oriented to person, place, and time.   Psychiatric:         Mood and Affect: Mood normal.         Behavior: Behavior normal.         Thought Content: Thought content normal.         Judgment: Judgment normal.          Significant Labs:  CBC:   Recent Labs   Lab 12/24/24  0351   WBC 8.44   RBC 2.92*   HGB 8.7*   HCT 29.1*      *   MCH 29.8   MCHC 29.9*     CMP:   Recent Labs   Lab 12/19/24  1430 12/20/24  0351 12/24/24  0351   GLU 93   < > 88  88   CALCIUM 9.0   < > 9.4  9.4   ALBUMIN 2.8*   < > 2.4*  2.4*   PROT 6.6  --   --    *   < > 131*  131*   K 4.6   < > 6.2*  6.2*   CO2 29   < > 18*  18*   CL 95   < > 103  103   BUN 36*   < > 31*  31*   CREATININE 4.8*   < > 5.8*  5.8*   ALKPHOS 259*  --   --    ALT 11  --   --    AST 11  --   --    BILITOT 0.5  --   --     < > = values in this interval not displayed.     All labs within the past 24 hours have been reviewed.    Significant Imaging:  X-Ray: Reviewed    Assessment/Plan:   Cardiac/Vascular  Shock  Management per primary team.      Renal/  ESRD on hemodialysis  Most recent dialysis sessions 12/17 and 12/18, did not get dialyzed 12/19 d/t hypotension. Below dry weight on admission. Chronic 4 LPM NC.      Outpatient HD Information:  -Dialysis modality: Hemodialysis  -Outpatient HD unit: [facility]  -Nephrologist: Dr. Strickland  -HD TX days: Tuesday/Thursday/Saturday  -Last HD TX prior to hospital admission: 12/18/2024  -Residual urine: Anuric   -EDW: 54.5 kg     -Last RRT session was 12/21. ICU team planning on getting access this am w trialysis. SLED for 6 hrs after placed.   -on BL supplemental O2  -on sevelamer  -renal diet when not NPO     Oncology  Anemia of chronic renal failure, stage 5  Checking iron studies.   -EPO with dialysis 2500 units    Thank you for your  consult. I will follow-up with patient. Please contact us if you have any additional questions.    Karson White MD  Nephrology  [facility] - Medical ICU

## 2024-12-24 NOTE — CONSULTS
Consult Note  Cardiothoracic Surgery    Inpatient consult to Cardiothoracic Surgery  Consult performed by: Gualberto Stewart DO  Consult ordered by: Alicia Galloway NP        SUBJECTIVE:     History of Present Illness:  Mr. Flakito Mcginnis is a 69 y.o. man w/ HFrEF (30% 8/2024 from 20-25% 6/2024), NICM, MR, ESRD on HD, Crohn's s/p colostomy, h/o R nephrectomy. [place] [facility] [place] HD center on 12/19 due to hypotension and ongoing shortness of breath. He usually receives his dialysis on T, R, S and went on Wednesday for an extra session for volume removal. He arrived on Thursday for his usually scheduled dialysis session and was directed to the ED due to hypotension. On arrival in the ED his blood pressure was 78/51. He was found to have a BNP >4900 and CXR concerning for volume overload. High sensitivity troponin 923. Critical care medicine was consulted for hypotension and admitted to MICU. Ongoing workup revealed staph capitus bacteremia and a 1 cm aortic valve vegetation on echo. Cardiac surgery consulted to evaluate aortic valve endocarditis. Upon evaluation, patient reports feeling very weak and has not been ambulate (with cane/rolling walker) for any considerable distance without becoming severely short of breath. Denies any chest pain.     Scheduled Meds:   atorvastatin  40 mg Oral Daily    ceFAZolin (Ancef) IV (PEDS and ADULTS)  2 g Intravenous Q12H    enoxparin  30 mg Subcutaneous Q24H (prophylaxis, 1700)    mupirocin   Nasal BID    sevelamer carbonate  800 mg Oral TID WM    sodium zirconium cyclosilicate  10 g Oral TID     Infusions/Drips:   sodium chloride 0.9%   Intravenous Continuous 200 mL/hr at 12/24/24 1331 New Bag at 12/24/24 1331     PRN Meds:  Current Facility-Administered Medications:     acetaminophen, 500 mg, Oral, Q4H PRN    dextrose 50%, 12.5 g, Intravenous, PRN    dextrose 50%, 25 g, Intravenous, PRN    [COMPLETED] dextrose 50%, 50 g, Intravenous, Once **AND** dextrose  50%, 25 g, Intravenous, PRN **AND** [COMPLETED] insulin regular, 0.1 Units/kg, Intravenous, Once    [COMPLETED] dextrose 50%, 50 g, Intravenous, Once **AND** dextrose 50%, 25 g, Intravenous, PRN **AND** [COMPLETED] insulin regular, 0.1 Units/kg, Intravenous, Once    glucagon (human recombinant), 1 mg, Intramuscular, PRN    glucose, 16 g, Oral, PRN    glucose, 24 g, Oral, PRN    insulin aspart U-100, 0-5 Units, Subcutaneous, QID (AC + HS) PRN    oxyCODONE, 5 mg, Oral, Q4H PRN    sodium chloride 0.9%, 10 mL, Intravenous, PRN    Review of patient's allergies indicates:   Allergen Reactions    Vancomycin analogues Anaphylaxis, Other (See Comments), Shortness Of Breath and Swelling     Light headed, see's spots      Aspirin Nausea And Vomiting and Other (See Comments)     Has crohn's disease    Other reaction(s): FLARES UP CROHNS      Has crohn's disease       Past Medical History:   Diagnosis Date    Crohn's disease 1998    ESRD (end stage renal disease) on dialysis 10/2017    Hypertension     Obstructive uropathy      Past Surgical History:   Procedure Laterality Date    COLOSTOMY  2006    DIALYSIS FISTULA CREATION Left 02/2018    NEPHRECTOMY Right     REMOVAL OF TUNNELED CENTRAL VENOUS CATHETER (CVC) N/A 5/23/2018    Procedure: PERMCATH REMOVAL-TUNNELED CVC REMOVAL;  Surgeon: Parveen Ray MD;  Location: [facility] CATH LAB;  Service: Nephrology;  Laterality: N/A;  pt coming in @930     Family History   Problem Relation Name Age of Onset    Hypertension Mother      Emphysema Father       Social History     Tobacco Use    Smoking status: Former     Passive exposure: Never    Smokeless tobacco: Never   Substance Use Topics    Alcohol use: Not Currently    Drug use: Never        Review of Systems:  A 12-point ROS was obtained and noted to be negative except for that listed in the HPI.      OBJECTIVE:     Vital Signs (Most Recent)  Temp: 98.2 °F (36.8 °C) (12/24/24 1200)  Pulse: 101 (12/24/24 1200)  Resp: (!) 26 (12/24/24  "1200)  BP: 93/71 (12/24/24 1200)  SpO2: (!) 92 % (12/24/24 1200)    Admission Weight: 54 kg (119 lb 0.8 oz) (12/19/24 1423)   Most Recent Weight: 56 kg (123 lb 7.3 oz) (12/21/24 0641)    Vital Signs Range (Last 24H):  Temp:  [97 °F (36.1 °C)-98.2 °F (36.8 °C)]   Pulse:  []   Resp:  [16-30]   BP: ()/(53-71)   SpO2:  [71 %-97 %]     Physical Exam:  Vitals and nursing note reviewed.   Constitutional:       General: He is not in acute distress.     Appearance: He is ill-appearing.      Comments: Chronically ill appearing   HENT:      Mouth/Throat:      Mouth: Mucous membranes are moist.   Eyes:      Pupils: Pupils are equal, round, and reactive to light.   Cardiovascular:      Rate and Rhythm: Normal rate.      Pulses: Normal pulses.   Pulmonary:      Effort: No distress  Abdominal:      Palpations: Abdomen is soft.   Musculoskeletal:      Right lower leg: Edema present.      Left lower leg: Edema present.   Skin:     General: Skin is warm.      Capillary Refill: Capillary refill takes less than 2 seconds.   Neurological:      General: No focal deficit present.      Mental Status: He is alert. Mental status is at baseline.     Laboratory:  CBC:   Recent Labs   Lab 12/24/24  0351   WBC 8.44   RBC 2.92*   HGB 8.7*   HCT 29.1*      *   MCH 29.8   MCHC 29.9*     CMP:   Recent Labs   Lab 12/19/24  1430 12/20/24  0351 12/24/24  1351   GLU 93   < > 95  95   CALCIUM 9.0   < > 8.4*  8.4*   ALBUMIN 2.8*   < > 2.0*  2.0*   PROT 6.6  --   --    *   < > 131*  131*   K 4.6   < > 4.9  4.9   CO2 29   < > 21*  21*   CL 95   < > 103  103   BUN 36*   < > 27*  27*   CREATININE 4.8*   < > 4.7*  4.7*   ALKPHOS 259*  --   --    ALT 11  --   --    AST 11  --   --    BILITOT 0.5  --   --     < > = values in this interval not displayed.     Coagulation: No results for input(s): "LABPROT", "INR", "APTT" in the last 168 hours.  Cardiac markers: No results for input(s): "CKMB", "CPKMB", "TROPONINT", " ""TROPONINI", "MYOGLOBIN" in the last 168 hours.  ABGs:   Recent Labs   Lab 12/19/24  1433   PH 7.454*   PCO2 44.1   PO2 34*   HCO3 31.0*   POCSATURATED 69   BE 7*       Diagnostic Results:  Echo: Reviewed    ASSESSMENT/PLAN:     Mr. Flakito Mcginnis is a 69 y.o. man w/ HFrEF (30% 8/2024 from 20-25% 6/2024), NICM, MR, ESRD on HD, Crohn's s/p colostomy, h/o R nephrectomy, admitted on 12/19/24 in septic shock secondary to strep capitus bacteremia and concern for aortic valve infective endocarditis. Cultures have since cleared and he is no longer requiring vasopressor support. TTE on 12/20 revealed a 1 cm vegetation in the LVOT with moderate AI/AS by valve area, but only gradient of 16.5 likely due to low flow. Also has moderate-severe TR and mild MR.     Unfortunately, the Mr. Mcginnis's combination of medical comorbidities and frailty greatly increase his risk of perioperative morbidity and mortality and are prohibitive to his surgical candidacy at this time. Recent CT imaging demonstrates generalized muscle atrophy as well. Recommend ongoing medical management. Could potentially consider percutaneous implantation options at the discretion of the interventional cardiologists, although may be limited by the location of the mobile vegetation in the LVOT. Final recommendations per Dr. Pack.      Gualberto Stewart, DO  Cardiothoracic Surgery Fellow      "

## 2024-12-24 NOTE — SUBJECTIVE & OBJECTIVE
Interval History/Significant Events:   Mild hyperkalmia this AM. Temp line placed and currently on SLED.     Review of Systems  Objective:     Vital Signs (Most Recent):  Temp: 98.2 °F (36.8 °C) (12/24/24 1200)  Pulse: 101 (12/24/24 1506)  Resp: 18 (12/24/24 1506)  BP: (!) 117/56 (12/24/24 1506)  SpO2: (!) 90 % (12/24/24 1506) Vital Signs (24h Range):  Temp:  [97 °F (36.1 °C)-98.2 °F (36.8 °C)] 98.2 °F (36.8 °C)  Pulse:  [] 101  Resp:  [16-30] 18  SpO2:  [71 %-97 %] 90 %  BP: ()/(39-71) 117/56   Weight: 56 kg (123 lb 7.3 oz)  Body mass index is 19.93 kg/m².      Intake/Output Summary (Last 24 hours) at 12/24/2024 1530  Last data filed at 12/24/2024 1339  Gross per 24 hour   Intake 553.13 ml   Output 450 ml   Net 103.13 ml          Physical Exam  Vitals and nursing note reviewed.   Constitutional:       General: He is not in acute distress.     Appearance: Normal appearance. He is normal weight. He is not ill-appearing, toxic-appearing or diaphoretic.   HENT:      Head: Normocephalic and atraumatic.      Right Ear: External ear normal.      Left Ear: External ear normal.      Nose: Nose normal.      Mouth/Throat:      Mouth: Mucous membranes are moist.   Eyes:      General: No scleral icterus.     Extraocular Movements: Extraocular movements intact.      Conjunctiva/sclera: Conjunctivae normal.      Pupils: Pupils are equal, round, and reactive to light.   Cardiovascular:      Rate and Rhythm: Normal rate and regular rhythm.      Pulses: Normal pulses.      Heart sounds: Murmur heard.      No friction rub. No gallop.      Comments: No JVD or peripheral edema  Pulmonary:      Effort: Pulmonary effort is normal. No respiratory distress.      Breath sounds: Normal breath sounds.      Comments: Mildly increased respiratory effort with bibasilar crackles  Abdominal:      General: Abdomen is flat. Bowel sounds are normal. There is no distension.      Palpations: Abdomen is soft.      Tenderness: There is no  [FreeTextEntry1] : After discussing various treatment options with the patient including but not limited to oral medications, physical therapy, exercise, modalities as well as interventional spinal injections, we have decided with the following plan:  1) I would recommend a trial of neuropathic medication as patient presents with signs of nerve irritation. (ie burning, paresthesias etc) Goals of therapy would be to improve pain and overall QOL. Side effects reviewed with patient. Patient will call or stop medication if given side effects occur.   restart low dose gabapentin.   2) Will refer to a new neurologist  3) The risks, benefits, contents and alternatives to injection were explained in full to the patient.  Risks outlined include but are not limited to infection, sepsis, bleeding, scarring, skin discoloration, temporary increase in pain, syncopal episode, failure to resolve symptoms, allergic reaction, flare reaction, permanent white skin discoloration, symptom recurrence, and elevation of blood sugar in diabetics.  Patient understood the risks.  All questions were answered.  After discussion of options, patient requested an injection.  Oral informed consent was obtained and sterile prep was done of the injection site.  Sterile technique was used to introduce the mixture. The mixture consisted of 2 cc 1% lidocaine, 2cc 0.25% marcaine, and 20mg of kenalog.  Patient tolerated the procedure well.  Patient advised to ice the injection site this evening.  Signs and symptoms of infection reviewed and patient advised to call immediately for redness, fevers, and/or chills.  abdominal tenderness. There is no guarding or rebound.   Musculoskeletal:         General: No tenderness. Normal range of motion.      Cervical back: Normal range of motion.      Right lower leg: No edema (trace).      Left lower leg: No edema (trace).      Comments: Kyphosis.    Skin:     General: Skin is warm and dry.      Coloration: Skin is not jaundiced or pale.   Neurological:      General: No focal deficit present.      Mental Status: He is alert and oriented to person, place, and time. Mental status is at baseline.   Psychiatric:         Mood and Affect: Mood normal.         Behavior: Behavior normal.         Thought Content: Thought content normal.         Judgment: Judgment normal.            Vents:  Oxygen Concentration (%): 50 (12/22/24 2347)  Lines/Drains/Airways       Central Venous Catheter Line  Duration             Trialysis (Dialysis) Catheter 12/24/24 1012 right internal jugular <1 day              Drain  Duration                  Colostomy 12/19/24 2225 RLQ 4 days              Peripheral Intravenous Line  Duration                  Peripheral IV - Single Lumen 12/19/24 1428 20 G Anterior;Right Forearm 5 days         Peripheral IV - Single Lumen 12/22/24 1611 20 G Anterior;Right;Medial Upper Arm 1 day                  Significant Labs:    CBC/Anemia Profile:  Recent Labs   Lab 12/23/24  0411 12/24/24  0351   WBC 5.54 8.44   HGB 8.2* 8.7*   HCT 27.5* 29.1*    250   * 100*   RDW 16.3* 16.1*        Chemistries:  Recent Labs   Lab 12/23/24  1519 12/23/24  1946 12/24/24  0351 12/24/24  1351   * 132* 131*  131* 131*  131*   K 5.9* 5.2* 6.2*  6.2* 4.9  4.9    104 103  103 103  103   CO2 20* 22* 18*  18* 21*  21*   BUN 29* 29* 31*  31* 27*  27*   CREATININE 5.0* 5.2* 5.8*  5.8* 4.7*  4.7*   CALCIUM 9.1 9.1 9.4  9.4 8.4*  8.4*   ALBUMIN 2.4*  --  2.4*  2.4* 2.0*  2.0*   MG 2.4  --  2.4  2.4 2.1  2.1   PHOS 4.9*  --  5.0*  5.0*  5.0* 3.8  3.8       All pertinent  labs within the past 24 hours have been reviewed.    Significant Imaging:  I have reviewed all pertinent imaging results/findings within the past 24 hours.

## 2024-12-24 NOTE — ASSESSMENT & PLAN NOTE
I have reviewed hospital notes from  MICU service and other specialty providers. I have also reviewed CBC, CMP/BMP,  cultures and imaging with my interpretation as documented.     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 Shock resolved. Large mobile vegetation noted on TTE.   Continue daptomycin.  Weekly CK levels.  CTS consult to evaluate surgical candidacy.  High risk for infectious complications including but not limited to stroke if vegetation fragments.   Discussed management plan with the staff and/or members from MICU service.

## 2024-12-24 NOTE — ASSESSMENT & PLAN NOTE
HFrEF (30% 8/2024 from 20-25% 6/2024) per cardiology notes.  Suspect volume overload with history HF and ESRD.    --line holiday, temp HD catheter placed 12/24. SLED today.   --Echo showing EF of 20-25% with large AV vegetation partially occluding LVOT with moderate AV regurgitation.   --Cardiology following  --troponin down trending   --Continuous cardiac monitoring   --EKG PRN

## 2024-12-24 NOTE — SUBJECTIVE & OBJECTIVE
Interval History: ". No acute overnight events. S capitis bacteremia and endocarditis with large vegetation.  Had CVC removal on 12/22. Undergoing CVC replacement on 12/24. Blood cultures 12/23 are NGTD.     Review of Systems   Constitutional:  Positive for fatigue.   Respiratory:  Positive for shortness of breath (with exertion).    All other systems reviewed and are negative.    Objective:     Vital Signs (Most Recent):  Temp: 98.2 °F (36.8 °C) (12/24/24 0800)  Pulse: 105 (12/24/24 0952)  Resp: 16 (12/24/24 1004)  BP: (!) 143/65 (12/24/24 0900)  SpO2: 96 % (12/24/24 0952) Vital Signs (24h Range):  Temp:  [97 °F (36.1 °C)-98.2 °F (36.8 °C)] 98.2 °F (36.8 °C)  Pulse:  [] 105  Resp:  [16-34] 16  SpO2:  [68 %-97 %] 96 %  BP: ()/(53-67) 143/65     Weight: 56 kg (123 lb 7.3 oz)  Body mass index is 19.93 kg/m².    Estimated Creatinine Clearance: 9.5 mL/min (A) (based on SCr of 5.8 mg/dL (H)).     Physical Exam  Vitals and nursing note reviewed.   Constitutional:       Appearance: He is well-developed.   HENT:      Head: Normocephalic and atraumatic.   Eyes:      General: No scleral icterus.        Right eye: No discharge.         Left eye: No discharge.   Pulmonary:      Effort: Pulmonary effort is normal.   Musculoskeletal:         General: Normal range of motion.   Skin:     General: Skin is warm and dry.   Neurological:      Mental Status: He is alert and oriented to person, place, and time.   Psychiatric:         Behavior: Behavior normal.         Thought Content: Thought content normal.         Judgment: Judgment normal.          Significant Labs: CBC:   Recent Labs   Lab 12/23/24  0411 12/24/24  0351   WBC 5.54 8.44   HGB 8.2* 8.7*   HCT 27.5* 29.1*    250     CMP:   Recent Labs   Lab 12/23/24  0411 12/23/24  0749 12/23/24  1519 12/23/24  1946 12/24/24  0351   *   < > 132* 132* 131*  131*   K 5.6*   < > 5.9* 5.2* 6.2*  6.2*      < > 104 104 103  103   CO2 19*   < > 20* 22* 18*   18*   GLU 93   < > 84 92 88  88   BUN 24*   < > 29* 29* 31*  31*   CREATININE 4.4*   < > 5.0* 5.2* 5.8*  5.8*   CALCIUM 9.4   < > 9.1 9.1 9.4  9.4   ALBUMIN 2.4*  --  2.4*  --  2.4*  2.4*   ANIONGAP 8   < > 8 6* 10  10    < > = values in this interval not displayed.     Microbiology Results (last 7 days)       Procedure Component Value Units Date/Time    Blood culture [[id number]] Collected: 12/23/24 1135    Order Status: Completed Specimen: Blood from Peripheral, Lower Arm, Right Updated: 12/24/24 0115     Blood Culture, Routine No Growth to date    Blood culture [[id number]] Collected: 12/23/24 1136    Order Status: Completed Specimen: Blood from Peripheral, Upper Arm, Right Updated: 12/24/24 0115     Blood Culture, Routine No Growth to date    Blood culture [[id number]]  (Abnormal) Collected: 12/21/24 0115    Order Status: Completed Specimen: Blood from Peripheral, Forearm, Right Updated: 12/23/24 1450     Blood Culture, Routine Gram stain aer bottle: Gram positive cocci in clusters resembling Staph      Results called to and read back by: KAYLENE Padgett. 12/22/2024  04:06      STAPHYLOCOCCUS CAPITIS  Susceptibility pending      Narrative:      Blood cultures x 2 different sites. 4 bottles total. Please  draw cultures before administering antibiotics.    Blood culture [[id number]]  (Abnormal) Collected: 12/21/24 0115    Order Status: Completed Specimen: Blood from Peripheral, Forearm, Right Updated: 12/23/24 1450     Blood Culture, Routine Gram stain aer bottle: Gram positive cocci in clusters resembling Staph      Results called to and read back by: KAYLENE Padgett. 12/22/2024  06:04      STAPHYLOCOCCUS CAPITIS  Susceptibility pending      Narrative:      Blood cultures from 2 different sites. 4 bottles total.  Please draw before starting antibiotics.    Blood culture x two cultures. Draw prior to antibiotics. [[id number]]  (Abnormal) Collected: 12/19/24 1430    Order Status: Completed Specimen: Blood from  Peripheral, Hand, Left Updated: 12/23/24 1129     Blood Culture, Routine Gram stain aer bottle: Gram positive cocci in clusters resembling Staph      Results called to and read back by:Lu Robles RN 12/20/2024  16:30      Gram stain benja bottle: Gram positive cocci in clusters resembling Staph      STAPHYLOCOCCUS CAPITIS  Susceptibility pending      Narrative:      Aerobic and anaerobic    Blood culture x two cultures. Draw prior to antibiotics. [[id number]]  (Abnormal) Collected: 12/19/24 1434    Order Status: Completed Specimen: Blood from Peripheral, Forearm, Right Updated: 12/23/24 1128     Blood Culture, Routine Gram stain aer bottle: Gram positive cocci in clusters resembling Staph      Positive results previously called 12/20/2024      Gram stain benja bottle: Gram positive cocci in clusters resembling Staph      12/21/2024  01:37      STAPHYLOCOCCUS CAPITIS  Susceptibility pending      Narrative:      Aerobic and anaerobic    MRSA/SA Rapid ID by PCR from Blood culture [[id number]] Collected: 12/19/24 1430    Order Status: Completed Updated: 12/20/24 1754     Staph aureus ID by PCR Negative     Methicillin Resistant ID by PCR Negative    Narrative:      Aerobic and anaerobic    MRSA Screen by PCR [[id number]]     Order Status: No result Specimen: Nasal Swab     Clostridium difficile EIA [[id number]]     Order Status: Canceled Specimen: Stool     Influenza A & B by Molecular [[id number]] Collected: 12/19/24 1451    Order Status: Completed Specimen: Nasopharyngeal Swab Updated: 12/19/24 1519     Influenza A, Molecular Negative     Influenza B, Molecular Negative     Flu A & B Source NP            Significant Imaging: I have reviewed all pertinent imaging results/findings within the past 24 hours.

## 2024-12-24 NOTE — PROGRESS NOTES
[facility]  Infectious Disease  Progress Note    Patient Name: Flakito Mcginnis  MRN: [id number]  Admission Date: 12/19/2024  Length of Stay: 5 days  Attending Physician: Nain Betts MD  Primary Care Provider: Jordin Robbins MD    Isolation Status: No active isolations  Assessment/Plan:      Cardiac/Vascular  Endocarditis  I have reviewed hospital notes from  MICU service and other specialty providers. I have also reviewed CBC, CMP/BMP,  cultures and imaging with my interpretation as documented.     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 Shock resolved. Large mobile vegetation noted on TTE.   Continue daptomycin.  Weekly CK levels.  CTS consult to evaluate surgical candidacy.  High risk for infectious complications including but not limited to stroke if vegetation fragments.   Discussed management plan with the staff and/or members from MICU service.              ID  Coagulase negative Staphylococcus bacteremia  S capitis bacteremia on admission. Had HD (CVC) line removal on 12/22. Repeat blood cultures 12/23 are NGTD.  Continue daptomycin.  Will follow up cultures from 12/23.           Anticipated Disposition: per primary    Thank you for your consult. I will follow-up with patient. Please contact us if you have any additional questions.    Marialuisa Ray MD  Infectious Disease  [facility]    50 minutes of total time spent on the encounter, which includes face to face time and non-face to face time preparing to see the patient (eg, review of tests), obtaining and/or reviewing separately obtained history, documenting clinical information in the electronic or other health record, independently interpreting results (not separately reported) and communicating results to the patient/family/caregiver, or care coordination (not separately reported).     Subjective:     Principal Problem:Septic shock    HPI: Mr. Mcginnis is a 69M  "with PMH of HFrEF, ESRD on HD, Crohns s/p colostomy, previous R nephrectomy, presented after being hypotensive during outpatient HD. Infectious disease consulted for "Suspected vegetation in LVOT. Severe vancomycin allergy. MRSA coverage".     He is afebrile, no leukocytosis. TTE shows mobile mass at the LVOT. 12/19 BCX NGTD. Reports that his reaction to vancomycin in the past was feeling hot and dizzy, but that he may have had trouble breathing too.       Interval History: "". No acute overnight events. S capitis bacteremia and endocarditis with large vegetation.  Had CVC removal on 12/22. Undergoing CVC replacement on 12/24. Blood cultures 12/23 are NGTD.     Review of Systems   Constitutional:  Positive for fatigue.   Respiratory:  Positive for shortness of breath (with exertion).    All other systems reviewed and are negative.    Objective:     Vital Signs (Most Recent):  Temp: 98.2 °F (36.8 °C) (12/24/24 0800)  Pulse: 105 (12/24/24 0952)  Resp: 16 (12/24/24 1004)  BP: (!) 143/65 (12/24/24 0900)  SpO2: 96 % (12/24/24 0952) Vital Signs (24h Range):  Temp:  [97 °F (36.1 °C)-98.2 °F (36.8 °C)] 98.2 °F (36.8 °C)  Pulse:  [] 105  Resp:  [16-34] 16  SpO2:  [68 %-97 %] 96 %  BP: ()/(53-67) 143/65     Weight: 56 kg (123 lb 7.3 oz)  Body mass index is 19.93 kg/m².    Estimated Creatinine Clearance: 9.5 mL/min (A) (based on SCr of 5.8 mg/dL (H)).     Physical Exam  Vitals and nursing note reviewed.   Constitutional:       Appearance: He is well-developed.   HENT:      Head: Normocephalic and atraumatic.   Eyes:      General: No scleral icterus.        Right eye: No discharge.         Left eye: No discharge.   Pulmonary:      Effort: Pulmonary effort is normal.   Musculoskeletal:         General: Normal range of motion.   Skin:     General: Skin is warm and dry.   Neurological:      Mental Status: He is alert and oriented to person, place, and time.   Psychiatric:         Behavior: Behavior normal.         " Thought Content: Thought content normal.         Judgment: Judgment normal.          Significant Labs: CBC:   Recent Labs   Lab 12/23/24  0411 12/24/24  0351   WBC 5.54 8.44   HGB 8.2* 8.7*   HCT 27.5* 29.1*    250     CMP:   Recent Labs   Lab 12/23/24  0411 12/23/24  0749 12/23/24  1519 12/23/24  1946 12/24/24  0351   *   < > 132* 132* 131*  131*   K 5.6*   < > 5.9* 5.2* 6.2*  6.2*      < > 104 104 103  103   CO2 19*   < > 20* 22* 18*  18*   GLU 93   < > 84 92 88  88   BUN 24*   < > 29* 29* 31*  31*   CREATININE 4.4*   < > 5.0* 5.2* 5.8*  5.8*   CALCIUM 9.4   < > 9.1 9.1 9.4  9.4   ALBUMIN 2.4*  --  2.4*  --  2.4*  2.4*   ANIONGAP 8   < > 8 6* 10  10    < > = values in this interval not displayed.     Microbiology Results (last 7 days)       Procedure Component Value Units Date/Time    Blood culture [[id number]] Collected: 12/23/24 1135    Order Status: Completed Specimen: Blood from Peripheral, Lower Arm, Right Updated: 12/24/24 0115     Blood Culture, Routine No Growth to date    Blood culture [[id number]] Collected: 12/23/24 1136    Order Status: Completed Specimen: Blood from Peripheral, Upper Arm, Right Updated: 12/24/24 0115     Blood Culture, Routine No Growth to date    Blood culture [[id number]]  (Abnormal) Collected: 12/21/24 0115    Order Status: Completed Specimen: Blood from Peripheral, Forearm, Right Updated: 12/23/24 1450     Blood Culture, Routine Gram stain aer bottle: Gram positive cocci in clusters resembling Staph      Results called to and read back by: KAYLENE Padgett. 12/22/2024  04:06      STAPHYLOCOCCUS CAPITIS  Susceptibility pending      Narrative:      Blood cultures x 2 different sites. 4 bottles total. Please  draw cultures before administering antibiotics.    Blood culture [[id number]]  (Abnormal) Collected: 12/21/24 0115    Order Status: Completed Specimen: Blood from Peripheral, Forearm, Right Updated: 12/23/24 1450     Blood Culture, Routine Gram stain aer  bottle: Gram positive cocci in clusters resembling Staph      Results called to and read back by: KAYLENE Padgett. 12/22/2024  06:04      STAPHYLOCOCCUS CAPITIS  Susceptibility pending      Narrative:      Blood cultures from 2 different sites. 4 bottles total.  Please draw before starting antibiotics.    Blood culture x two cultures. Draw prior to antibiotics. [[id number]]  (Abnormal) Collected: 12/19/24 1430    Order Status: Completed Specimen: Blood from Peripheral, Hand, Left Updated: 12/23/24 1129     Blood Culture, Routine Gram stain aer bottle: Gram positive cocci in clusters resembling Staph      Results called to and read back by:Lu Robles RN 12/20/2024  16:30      Gram stain benja bottle: Gram positive cocci in clusters resembling Staph      STAPHYLOCOCCUS CAPITIS  Susceptibility pending      Narrative:      Aerobic and anaerobic    Blood culture x two cultures. Draw prior to antibiotics. [[id number]]  (Abnormal) Collected: 12/19/24 1434    Order Status: Completed Specimen: Blood from Peripheral, Forearm, Right Updated: 12/23/24 1128     Blood Culture, Routine Gram stain aer bottle: Gram positive cocci in clusters resembling Staph      Positive results previously called 12/20/2024      Gram stain benja bottle: Gram positive cocci in clusters resembling Staph      12/21/2024  01:37      STAPHYLOCOCCUS CAPITIS  Susceptibility pending      Narrative:      Aerobic and anaerobic    MRSA/SA Rapid ID by PCR from Blood culture [[id number]] Collected: 12/19/24 1430    Order Status: Completed Updated: 12/20/24 1754     Staph aureus ID by PCR Negative     Methicillin Resistant ID by PCR Negative    Narrative:      Aerobic and anaerobic    MRSA Screen by PCR [[id number]]     Order Status: No result Specimen: Nasal Swab     Clostridium difficile EIA [[id number]]     Order Status: Canceled Specimen: Stool     Influenza A & B by Molecular [[id number]] Collected: 12/19/24 1451    Order Status: Completed Specimen: Nasopharyngeal  Swab Updated: 12/19/24 1519     Influenza A, Molecular Negative     Influenza B, Molecular Negative     Flu A & B Source NP            Significant Imaging: I have reviewed all pertinent imaging results/findings within the past 24 hours.

## 2024-12-24 NOTE — SUBJECTIVE & OBJECTIVE
Interval hx: Flakito sitting in chair, seen during breakfast, he wants more splenda packets for his oatmeal. K was high overnight. He endorses some mild DALEY, none while at rest.     Objective:     Vital Signs (Most Recent):  Temp: 98 °F (36.7 °C) (12/24/24 0415)  Pulse: 105 (12/24/24 0800)  Resp: (!) 30 (12/24/24 0800)  BP: 97/66 (12/24/24 0800)  SpO2: (!) 90 % (12/24/24 0803) Vital Signs (24h Range):  Temp:  [97 °F (36.1 °C)-98 °F (36.7 °C)] 98 °F (36.7 °C)  Pulse:  [] 105  Resp:  [16-65] 30  SpO2:  [68 %-97 %] 90 %  BP: ()/(53-67) 97/66     Weight: 56 kg (123 lb 7.3 oz) (12/21/24 0641)  Body mass index is 19.93 kg/m².  Body surface area is 1.61 meters squared.    I/O last 3 completed shifts:  In: 863.1 [P.O.:800; IV Piggyback:63.1]  Out: 750 [Stool:750]     Physical Exam  Constitutional:       General: He is not in acute distress.     Appearance: Normal appearance. He is not ill-appearing or toxic-appearing.   HENT:      Head: Normocephalic and atraumatic.      Right Ear: External ear normal.      Left Ear: External ear normal.      Nose: Nose normal.      Mouth/Throat:      Mouth: Mucous membranes are moist.   Eyes:      Extraocular Movements: Extraocular movements intact.   Cardiovascular:      Rate and Rhythm: Normal rate and regular rhythm.   Pulmonary:      Effort: Pulmonary effort is normal.      Comments: Fine crackles throughout.   Abdominal:      General: Abdomen is flat.      Palpations: Abdomen is soft.   Musculoskeletal:         General: Normal range of motion.      Right lower leg: Edema (2+) present.      Left lower leg: Edema (2+) present.   Skin:     Capillary Refill: Capillary refill takes less than 2 seconds.   Neurological:      General: No focal deficit present.      Mental Status: He is alert and oriented to person, place, and time.   Psychiatric:         Mood and Affect: Mood normal.         Behavior: Behavior normal.         Thought Content: Thought content normal.          Judgment: Judgment normal.          Significant Labs:  CBC:   Recent Labs   Lab 12/24/24  0351   WBC 8.44   RBC 2.92*   HGB 8.7*   HCT 29.1*      *   MCH 29.8   MCHC 29.9*     CMP:   Recent Labs   Lab 12/19/24  1430 12/20/24  0351 12/24/24  0351   GLU 93   < > 88  88   CALCIUM 9.0   < > 9.4  9.4   ALBUMIN 2.8*   < > 2.4*  2.4*   PROT 6.6  --   --    *   < > 131*  131*   K 4.6   < > 6.2*  6.2*   CO2 29   < > 18*  18*   CL 95   < > 103  103   BUN 36*   < > 31*  31*   CREATININE 4.8*   < > 5.8*  5.8*   ALKPHOS 259*  --   --    ALT 11  --   --    AST 11  --   --    BILITOT 0.5  --   --     < > = values in this interval not displayed.     All labs within the past 24 hours have been reviewed.    Significant Imaging:  X-Ray: Reviewed

## 2024-12-24 NOTE — ASSESSMENT & PLAN NOTE
Presented with hypotension unable to receive iHD.  SBP 70s on arrival to ED.  On exam patient with MAP 65-70 not on vasopressor support, but suspect will need vasopressors for dialysis. No obvious signs of infection on exam, tunneled cath dry/intact. Reports no cough, urinary symptoms.  WBC normal.  Suspect shock likely cardiogenic in setting of HF and ESRD with volume overload.      -- blood cultures + for staph, most recent cultures remain positive with plan for tunneled catheter line removal today. Repeat blood cultures pending.   --Vegetation found on aortic valve on echocardiogram. Consult cardiothoracic surgery.   --Infectious diseases consult--appreciate input   --Started on cefepime and daptomycin given reported vancomycin allergy; discontinue cefepime 12/22. Deescalate daptomycin to cefazolin.

## 2024-12-24 NOTE — ASSESSMENT & PLAN NOTE
--Nephrology consulted--appreciate input   --SCUF yesterday.   - tunneled catheter removed 12/22. [facility] temp HD catheter placed 12/24. Currently on SLED for 8 hours.

## 2024-12-24 NOTE — ASSESSMENT & PLAN NOTE
S capitis bacteremia on admission. Had HD (CVC) line removal on 12/22. Repeat blood cultures 12/23 are NGTD.  Continue daptomycin.  Will follow up cultures from 12/23.

## 2024-12-25 LAB
ALBUMIN SERPL BCP-MCNC: 2.2 G/DL (ref 3.5–5.2)
ALBUMIN SERPL BCP-MCNC: 2.3 G/DL (ref 3.5–5.2)
ALBUMIN SERPL BCP-MCNC: 2.3 G/DL (ref 3.5–5.2)
ALBUMIN SERPL BCP-MCNC: 2.4 G/DL (ref 3.5–5.2)
ALBUMIN SERPL BCP-MCNC: 2.4 G/DL (ref 3.5–5.2)
ANION GAP SERPL CALC-SCNC: 4 MMOL/L (ref 8–16)
ANION GAP SERPL CALC-SCNC: 5 MMOL/L (ref 8–16)
ANION GAP SERPL CALC-SCNC: 5 MMOL/L (ref 8–16)
ANION GAP SERPL CALC-SCNC: 8 MMOL/L (ref 8–16)
ANION GAP SERPL CALC-SCNC: 8 MMOL/L (ref 8–16)
ANION GAP SERPL CALC-SCNC: 9 MMOL/L (ref 8–16)
ANION GAP SERPL CALC-SCNC: 9 MMOL/L (ref 8–16)
BACTERIA BLD CULT: ABNORMAL
BASOPHILS # BLD AUTO: 0.05 K/UL (ref 0–0.2)
BASOPHILS # BLD AUTO: 0.05 K/UL (ref 0–0.2)
BASOPHILS NFR BLD: 1 % (ref 0–1.9)
BASOPHILS NFR BLD: 1.1 % (ref 0–1.9)
BUN SERPL-MCNC: 11 MG/DL (ref 8–23)
BUN SERPL-MCNC: 12 MG/DL (ref 8–23)
BUN SERPL-MCNC: 12 MG/DL (ref 8–23)
BUN SERPL-MCNC: 15 MG/DL (ref 8–23)
BUN SERPL-MCNC: 15 MG/DL (ref 8–23)
BUN SERPL-MCNC: 17 MG/DL (ref 8–23)
BUN SERPL-MCNC: 17 MG/DL (ref 8–23)
CA-I BLDV-SCNC: 1.21 MMOL/L (ref 1.06–1.42)
CA-I BLDV-SCNC: 1.22 MMOL/L (ref 1.06–1.42)
CA-I BLDV-SCNC: 1.23 MMOL/L (ref 1.06–1.42)
CALCIUM SERPL-MCNC: 8.4 MG/DL (ref 8.7–10.5)
CALCIUM SERPL-MCNC: 8.6 MG/DL (ref 8.7–10.5)
CALCIUM SERPL-MCNC: 8.6 MG/DL (ref 8.7–10.5)
CALCIUM SERPL-MCNC: 8.8 MG/DL (ref 8.7–10.5)
CALCIUM SERPL-MCNC: 8.8 MG/DL (ref 8.7–10.5)
CALCIUM SERPL-MCNC: 9.2 MG/DL (ref 8.7–10.5)
CALCIUM SERPL-MCNC: 9.2 MG/DL (ref 8.7–10.5)
CHLORIDE SERPL-SCNC: 101 MMOL/L (ref 95–110)
CHLORIDE SERPL-SCNC: 101 MMOL/L (ref 95–110)
CHLORIDE SERPL-SCNC: 102 MMOL/L (ref 95–110)
CHLORIDE SERPL-SCNC: 102 MMOL/L (ref 95–110)
CHLORIDE SERPL-SCNC: 103 MMOL/L (ref 95–110)
CO2 SERPL-SCNC: 20 MMOL/L (ref 23–29)
CO2 SERPL-SCNC: 20 MMOL/L (ref 23–29)
CO2 SERPL-SCNC: 22 MMOL/L (ref 23–29)
CO2 SERPL-SCNC: 22 MMOL/L (ref 23–29)
CO2 SERPL-SCNC: 23 MMOL/L (ref 23–29)
CO2 SERPL-SCNC: 23 MMOL/L (ref 23–29)
CO2 SERPL-SCNC: 25 MMOL/L (ref 23–29)
CREAT SERPL-MCNC: 2.3 MG/DL (ref 0.5–1.4)
CREAT SERPL-MCNC: 3 MG/DL (ref 0.5–1.4)
CREAT SERPL-MCNC: 3 MG/DL (ref 0.5–1.4)
CREAT SERPL-MCNC: 3.7 MG/DL (ref 0.5–1.4)
CREAT SERPL-MCNC: 3.7 MG/DL (ref 0.5–1.4)
CREAT SERPL-MCNC: 4.1 MG/DL (ref 0.5–1.4)
CREAT SERPL-MCNC: 4.1 MG/DL (ref 0.5–1.4)
DIFFERENTIAL METHOD BLD: ABNORMAL
DIFFERENTIAL METHOD BLD: ABNORMAL
EOSINOPHIL # BLD AUTO: 0.1 K/UL (ref 0–0.5)
EOSINOPHIL # BLD AUTO: 0.1 K/UL (ref 0–0.5)
EOSINOPHIL NFR BLD: 2.5 % (ref 0–8)
EOSINOPHIL NFR BLD: 2.5 % (ref 0–8)
ERYTHROCYTE [DISTWIDTH] IN BLOOD BY AUTOMATED COUNT: 16 % (ref 11.5–14.5)
ERYTHROCYTE [DISTWIDTH] IN BLOOD BY AUTOMATED COUNT: 16 % (ref 11.5–14.5)
EST. GFR  (NO RACE VARIABLE): 15 ML/MIN/1.73 M^2
EST. GFR  (NO RACE VARIABLE): 15 ML/MIN/1.73 M^2
EST. GFR  (NO RACE VARIABLE): 16.9 ML/MIN/1.73 M^2
EST. GFR  (NO RACE VARIABLE): 16.9 ML/MIN/1.73 M^2
EST. GFR  (NO RACE VARIABLE): 21.8 ML/MIN/1.73 M^2
EST. GFR  (NO RACE VARIABLE): 21.8 ML/MIN/1.73 M^2
EST. GFR  (NO RACE VARIABLE): 30 ML/MIN/1.73 M^2
GLUCOSE SERPL-MCNC: 114 MG/DL (ref 70–110)
GLUCOSE SERPL-MCNC: 114 MG/DL (ref 70–110)
GLUCOSE SERPL-MCNC: 74 MG/DL (ref 70–110)
GLUCOSE SERPL-MCNC: 76 MG/DL (ref 70–110)
GLUCOSE SERPL-MCNC: 76 MG/DL (ref 70–110)
GLUCOSE SERPL-MCNC: 89 MG/DL (ref 70–110)
GLUCOSE SERPL-MCNC: 89 MG/DL (ref 70–110)
HCT VFR BLD AUTO: 22.1 % (ref 40–54)
HCT VFR BLD AUTO: 22.7 % (ref 40–54)
HGB BLD-MCNC: 6.9 G/DL (ref 14–18)
HGB BLD-MCNC: 7.1 G/DL (ref 14–18)
IMM GRANULOCYTES # BLD AUTO: 0.01 K/UL (ref 0–0.04)
IMM GRANULOCYTES # BLD AUTO: 0.05 K/UL (ref 0–0.04)
IMM GRANULOCYTES NFR BLD AUTO: 0.2 % (ref 0–0.5)
IMM GRANULOCYTES NFR BLD AUTO: 1 % (ref 0–0.5)
LYMPHOCYTES # BLD AUTO: 0.4 K/UL (ref 1–4.8)
LYMPHOCYTES # BLD AUTO: 0.5 K/UL (ref 1–4.8)
LYMPHOCYTES NFR BLD: 9.9 % (ref 18–48)
LYMPHOCYTES NFR BLD: 9.9 % (ref 18–48)
MAGNESIUM SERPL-MCNC: 1.9 MG/DL (ref 1.6–2.6)
MAGNESIUM SERPL-MCNC: 2.1 MG/DL (ref 1.6–2.6)
MAGNESIUM SERPL-MCNC: 2.1 MG/DL (ref 1.6–2.6)
MAGNESIUM SERPL-MCNC: 2.2 MG/DL (ref 1.6–2.6)
MAGNESIUM SERPL-MCNC: 2.2 MG/DL (ref 1.6–2.6)
MCH RBC QN AUTO: 30 PG (ref 27–31)
MCH RBC QN AUTO: 30.1 PG (ref 27–31)
MCHC RBC AUTO-ENTMCNC: 31.2 G/DL (ref 32–36)
MCHC RBC AUTO-ENTMCNC: 31.3 G/DL (ref 32–36)
MCV RBC AUTO: 96 FL (ref 82–98)
MCV RBC AUTO: 96 FL (ref 82–98)
MONOCYTES # BLD AUTO: 0.3 K/UL (ref 0.3–1)
MONOCYTES # BLD AUTO: 0.4 K/UL (ref 0.3–1)
MONOCYTES NFR BLD: 7.6 % (ref 4–15)
MONOCYTES NFR BLD: 8 % (ref 4–15)
NEUTROPHILS # BLD AUTO: 3.5 K/UL (ref 1.8–7.7)
NEUTROPHILS # BLD AUTO: 3.8 K/UL (ref 1.8–7.7)
NEUTROPHILS NFR BLD: 77.6 % (ref 38–73)
NEUTROPHILS NFR BLD: 78.7 % (ref 38–73)
NRBC BLD-RTO: 0 /100 WBC
NRBC BLD-RTO: 0 /100 WBC
PHOSPHATE SERPL-MCNC: 2.8 MG/DL (ref 2.7–4.5)
PHOSPHATE SERPL-MCNC: 3.4 MG/DL (ref 2.7–4.5)
PHOSPHATE SERPL-MCNC: 4 MG/DL (ref 2.7–4.5)
PHOSPHATE SERPL-MCNC: 4 MG/DL (ref 2.7–4.5)
PHOSPHATE SERPL-MCNC: 4.4 MG/DL (ref 2.7–4.5)
PHOSPHATE SERPL-MCNC: 4.4 MG/DL (ref 2.7–4.5)
PLATELET # BLD AUTO: 155 K/UL (ref 150–450)
PLATELET # BLD AUTO: 160 K/UL (ref 150–450)
PMV BLD AUTO: 11 FL (ref 9.2–12.9)
PMV BLD AUTO: 11.5 FL (ref 9.2–12.9)
POCT GLUCOSE: 99 MG/DL (ref 70–110)
POTASSIUM SERPL-SCNC: 4.6 MMOL/L (ref 3.5–5.1)
POTASSIUM SERPL-SCNC: 4.7 MMOL/L (ref 3.5–5.1)
POTASSIUM SERPL-SCNC: 4.7 MMOL/L (ref 3.5–5.1)
POTASSIUM SERPL-SCNC: 5.1 MMOL/L (ref 3.5–5.1)
POTASSIUM SERPL-SCNC: 5.1 MMOL/L (ref 3.5–5.1)
POTASSIUM SERPL-SCNC: 5.2 MMOL/L (ref 3.5–5.1)
POTASSIUM SERPL-SCNC: 5.2 MMOL/L (ref 3.5–5.1)
RBC # BLD AUTO: 2.3 M/UL (ref 4.6–6.2)
RBC # BLD AUTO: 2.36 M/UL (ref 4.6–6.2)
SODIUM SERPL-SCNC: 131 MMOL/L (ref 136–145)
SODIUM SERPL-SCNC: 132 MMOL/L (ref 136–145)
WBC # BLD AUTO: 4.45 K/UL (ref 3.9–12.7)
WBC # BLD AUTO: 4.86 K/UL (ref 3.9–12.7)

## 2024-12-25 PROCEDURE — 94761 N-INVAS EAR/PLS OXIMETRY MLT: CPT

## 2024-12-25 PROCEDURE — 82330 ASSAY OF CALCIUM: CPT | Performed by: STUDENT IN AN ORGANIZED HEALTH CARE EDUCATION/TRAINING PROGRAM

## 2024-12-25 PROCEDURE — 99291 CRITICAL CARE FIRST HOUR: CPT | Mod: ,,, | Performed by: INTERNAL MEDICINE

## 2024-12-25 PROCEDURE — 85025 COMPLETE CBC W/AUTO DIFF WBC: CPT

## 2024-12-25 PROCEDURE — 20000000 HC ICU ROOM

## 2024-12-25 PROCEDURE — 94799 UNLISTED PULMONARY SVC/PX: CPT

## 2024-12-25 PROCEDURE — 25000003 PHARM REV CODE 250

## 2024-12-25 PROCEDURE — 99233 SBSQ HOSP IP/OBS HIGH 50: CPT | Mod: ,,, | Performed by: INTERNAL MEDICINE

## 2024-12-25 PROCEDURE — 25000003 PHARM REV CODE 250: Performed by: INTERNAL MEDICINE

## 2024-12-25 PROCEDURE — 99900035 HC TECH TIME PER 15 MIN (STAT)

## 2024-12-25 PROCEDURE — 83735 ASSAY OF MAGNESIUM: CPT | Performed by: INTERNAL MEDICINE

## 2024-12-25 PROCEDURE — 25000003 PHARM REV CODE 250: Performed by: GENERAL ACUTE CARE HOSPITAL

## 2024-12-25 PROCEDURE — 27100171 HC OXYGEN HIGH FLOW UP TO 24 HOURS

## 2024-12-25 PROCEDURE — 80069 RENAL FUNCTION PANEL: CPT | Performed by: INTERNAL MEDICINE

## 2024-12-25 PROCEDURE — 63600175 PHARM REV CODE 636 W HCPCS: Performed by: INTERNAL MEDICINE

## 2024-12-25 PROCEDURE — 27000923 HC TRIALYSIS CATHETER, ANY SIZE

## 2024-12-25 PROCEDURE — 85025 COMPLETE CBC W/AUTO DIFF WBC: CPT | Mod: 91 | Performed by: INTERNAL MEDICINE

## 2024-12-25 RX ORDER — DEXMEDETOMIDINE HYDROCHLORIDE 4 UG/ML
0-1.4 INJECTION, SOLUTION INTRAVENOUS CONTINUOUS
Status: DISCONTINUED | OUTPATIENT
Start: 2024-12-25 | End: 2024-12-25

## 2024-12-25 RX ORDER — HALOPERIDOL 5 MG/ML
5 INJECTION INTRAMUSCULAR EVERY 6 HOURS PRN
Status: DISCONTINUED | OUTPATIENT
Start: 2024-12-25 | End: 2024-12-28

## 2024-12-25 RX ORDER — OXYCODONE HYDROCHLORIDE 5 MG/1
5 TABLET ORAL EVERY 4 HOURS PRN
Status: DISCONTINUED | OUTPATIENT
Start: 2024-12-25 | End: 2025-01-14 | Stop reason: HOSPADM

## 2024-12-25 RX ORDER — OXYCODONE HYDROCHLORIDE 10 MG/1
10 TABLET ORAL EVERY 6 HOURS PRN
Status: DISCONTINUED | OUTPATIENT
Start: 2024-12-25 | End: 2025-01-14 | Stop reason: HOSPADM

## 2024-12-25 RX ADMIN — MUPIROCIN: 20 OINTMENT TOPICAL at 08:12

## 2024-12-25 RX ADMIN — ACETAMINOPHEN 500 MG: 500 TABLET ORAL at 08:12

## 2024-12-25 RX ADMIN — DEXMEDETOMIDINE HYDROCHLORIDE 0.2 MCG/KG/HR: 4 INJECTION INTRAVENOUS at 01:12

## 2024-12-25 RX ADMIN — OXYCODONE 5 MG: 5 TABLET ORAL at 08:12

## 2024-12-25 RX ADMIN — SEVELAMER CARBONATE 800 MG: 800 TABLET, FILM COATED ORAL at 12:12

## 2024-12-25 RX ADMIN — ATORVASTATIN CALCIUM 40 MG: 40 TABLET, FILM COATED ORAL at 08:12

## 2024-12-25 RX ADMIN — CEFAZOLIN 2 G: 2 INJECTION, POWDER, FOR SOLUTION INTRAMUSCULAR; INTRAVENOUS at 05:12

## 2024-12-25 RX ADMIN — SODIUM ZIRCONIUM CYCLOSILICATE 10 G: 5 POWDER, FOR SUSPENSION ORAL at 08:12

## 2024-12-25 RX ADMIN — SEVELAMER CARBONATE 800 MG: 800 TABLET, FILM COATED ORAL at 05:12

## 2024-12-25 RX ADMIN — OXYCODONE 5 MG: 5 TABLET ORAL at 02:12

## 2024-12-25 RX ADMIN — SODIUM ZIRCONIUM CYCLOSILICATE 10 G: 5 POWDER, FOR SUSPENSION ORAL at 03:12

## 2024-12-25 RX ADMIN — OXYCODONE HYDROCHLORIDE 10 MG: 10 TABLET ORAL at 08:12

## 2024-12-25 RX ADMIN — SEVELAMER CARBONATE 800 MG: 800 TABLET, FILM COATED ORAL at 08:12

## 2024-12-25 RX ADMIN — OXYCODONE HYDROCHLORIDE 10 MG: 10 TABLET ORAL at 12:12

## 2024-12-25 RX ADMIN — HALOPERIDOL LACTATE 5 MG: 5 INJECTION, SOLUTION INTRAMUSCULAR at 08:12

## 2024-12-25 RX ADMIN — ENOXAPARIN SODIUM 30 MG: 30 INJECTION SUBCUTANEOUS at 05:12

## 2024-12-25 RX ADMIN — CEFAZOLIN 2 G: 2 INJECTION, POWDER, FOR SOLUTION INTRAMUSCULAR; INTRAVENOUS at 03:12

## 2024-12-25 NOTE — ASSESSMENT & PLAN NOTE
After looking through his dialysis notes from yesterday, it does appear as if he completed two back-to-back sessions and left dialysis yesterday at his dry weight (54.6 kg) and reports high volume stool output since his HD session. He reports that he is on 4L chronic oxygen use at home, and is currently on that dose in the ER, and reporting resolution of his dyspnea once he was placed back on his home oxygen. Chest xray appears unchanged from prior studies.       Outpatient HD Information:  -Dialysis modality: Hemodialysis  -Outpatient HD unit: [facility]  -Nephrologist: Dr. Strickland  -HD TX days: Tuesday/Thursday/Saturday  -Last HD TX prior to hospital admission: 12/18/2024  -Residual urine: Anuric   -EDW: 54.5 kg      Recommendations    -SLED today for metabolic clearance and volume removal.

## 2024-12-25 NOTE — ASSESSMENT & PLAN NOTE
HFrEF (30% 8/2024 from 20-25% 6/2024) per cardiology notes.  Suspect volume overload with history HF and ESRD.    --line holiday, temp HD catheter placed 12/24. SLED with UF 12/24, awaiting HD trial. Fluid restriction.    --Echo showing EF of 20-25% with large AV vegetation partially occluding LVOT with moderate AV regurgitation.   --Cardiology following  --troponin down trending   --Continuous cardiac monitoring   --EKG PRN

## 2024-12-25 NOTE — ASSESSMENT & PLAN NOTE
--Nephrology consulted--appreciate input   --SLED yesterday.   - tunneled catheter removed 12/22. [facility] temp HD catheter placed 12/24. Awaiting HD trial prior to step down.   - will need HD access prior to discharge.

## 2024-12-25 NOTE — ASSESSMENT & PLAN NOTE
S capitis bacteremia on admission. Had HD (CVC) line removal on 12/22. Repeat blood cultures 12/23 are NGTD.  Continue cefazolin.  Will follow up cultures from 12/23.

## 2024-12-25 NOTE — PROGRESS NOTES
12/24/24 2158   Treatment   Treatment Type SLED   Treatment Status Blood returned   Dialyzer Time (hours) 2.36   BVP (Liters) 31.5 L   CRRT Comments Tx completed; blood returned

## 2024-12-25 NOTE — PROGRESS NOTES
[facility] - Medical ICU  Infectious Disease  Progress Note    Patient Name: Flakito Mcginnis  MRN: [id number]  Admission Date: 12/19/2024  Length of Stay: 6 days  Attending Physician: Nain Betts MD  Primary Care Provider: Jordin Robbins MD    Isolation Status: No active isolations  Assessment/Plan:      Cardiac/Vascular  Endocarditis  I have reviewed hospital notes from  MICU service and other specialty providers. I have also reviewed CBC, CMP/BMP,  cultures and imaging with my interpretation as documented.     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 Shock resolved. Large mobile vegetation noted on TTE. Transitioned from daptomycin to cefazolin based on susceptibilities. Evaluated by CTS and deemed a non surgical candidate due to risk/comorbidities.  Continue cefazolin.  High risk for infectious complications including but not limited to stroke if vegetation fragments.  Will need 6 weeks of therapy from first negative blood culture. Anticipated end date: 2/2/25.  Anticipate outpatient antibiotics with HD.  Discussed management plan with the staff and/or members from MICU service.              ID  Coagulase negative Staphylococcus bacteremia  S capitis bacteremia on admission. Had HD (CVC) line removal on 12/22. Repeat blood cultures 12/23 are NGTD.  Continue cefazolin.  Will follow up cultures from 12/23.           Anticipated Disposition: per primary    Thank you for your consult. I will follow-up with patient. Please contact us if you have any additional questions.    Marialuisa Ray MD  Infectious Disease  [facility] - Medical ICU    50 minutes of total time spent on the encounter, which includes face to face time and non-face to face time preparing to see the patient (eg, review of tests), obtaining and/or reviewing separately obtained history, documenting clinical information in the electronic or other health record, independently interpreting  "results (not separately reported) and communicating results to the patient/family/caregiver, or care coordination (not separately reported).     Subjective:     Principal Problem:Septic shock    HPI: Mr. Mcginnis is a 69M with PMH of HFrEF, ESRD on HD, Crohns s/p colostomy, previous R nephrectomy, presented after being hypotensive during outpatient HD. Infectious disease consulted for "Suspected vegetation in LVOT. Severe vancomycin allergy. MRSA coverage".     He is afebrile, no leukocytosis. TTE shows mobile mass at the LVOT. 12/19 BCX NGTD. Reports that his reaction to vancomycin in the past was feeling hot and dizzy, but that he may have had trouble breathing too.       Interval History: "not great. I have not been able to get comfortable to sleep". No acute overnight events. S capitis bacteremia and endocarditis with large vegetation.  Had CVC removal on 12/22. Undergoing CVC replacement on 12/24. Blood cultures 12/23 are NGTD.     Review of Systems   Constitutional:  Positive for fatigue.   Respiratory:  Positive for shortness of breath.    Psychiatric/Behavioral:  Positive for sleep disturbance.    All other systems reviewed and are negative.    Objective:     Vital Signs (Most Recent):  Temp: 98.8 °F (37.1 °C) (12/25/24 1100)  Pulse: 110 (12/25/24 1120)  Resp: (!) 30 (12/25/24 1120)  BP: (!) 118/59 (12/25/24 1100)  SpO2: 95 % (12/25/24 1120) Vital Signs (24h Range):  Temp:  [98 °F (36.7 °C)-99.1 °F (37.3 °C)] 98.8 °F (37.1 °C)  Pulse:  [] 110  Resp:  [16-38] 30  SpO2:  [81 %-96 %] 95 %  BP: ()/(39-90) 118/59     Weight: 56 kg (123 lb 7.3 oz)  Body mass index is 19.93 kg/m².    Estimated Creatinine Clearance: 18.4 mL/min (A) (based on SCr of 3 mg/dL (H)).     Physical Exam  Vitals and nursing note reviewed.   Constitutional:       Appearance: He is well-developed.   HENT:      Head: Normocephalic and atraumatic.   Eyes:      General: No scleral icterus.        Right eye: No discharge.         Left " eye: No discharge.   Cardiovascular:      Heart sounds: Murmur heard.   Pulmonary:      Effort: Pulmonary effort is normal.      Breath sounds: Examination of the right-lower field reveals decreased breath sounds. Examination of the left-lower field reveals decreased breath sounds. Decreased breath sounds present.   Musculoskeletal:         General: Swelling (left arm) present. Normal range of motion.   Skin:     General: Skin is warm and dry.   Neurological:      Mental Status: He is alert and oriented to person, place, and time.   Psychiatric:         Behavior: Behavior normal.         Thought Content: Thought content normal.         Judgment: Judgment normal.          Significant Labs: CBC:   Recent Labs   Lab 12/24/24  0351 12/25/24  0421 12/25/24  0559   WBC 8.44 4.86 4.45   HGB 8.7* 6.9* 7.1*   HCT 29.1* 22.1* 22.7*    155 160     CMP:   Recent Labs   Lab 12/24/24  1351 12/24/24  2319 12/25/24  0421   *  131* 132* 131*  131*   K 4.9  4.9 4.6 4.7  4.7     103 103 103  103   CO2 21*  21* 25 23  23   GLU 95  95 74 76  76   BUN 27*  27* 11 12  12   CREATININE 4.7*  4.7* 2.3* 3.0*  3.0*   CALCIUM 8.4*  8.4* 8.4* 8.6*  8.6*   ALBUMIN 2.0*  2.0* 2.2* 2.2*  2.2*   ANIONGAP 7*  7* 4* 5*  5*     Microbiology Results (last 7 days)       Procedure Component Value Units Date/Time    Blood culture [[id number]]  (Abnormal)  (Susceptibility) Collected: 12/21/24 0115    Order Status: Completed Specimen: Blood from Peripheral, Forearm, Right Updated: 12/25/24 1050     Blood Culture, Routine Gram stain aer bottle: Gram positive cocci in clusters resembling Staph      Results called to and read back by: KAYLENE Padgett. 12/22/2024  04:06      STAPHYLOCOCCUS CAPITIS    Narrative:      Blood cultures x 2 different sites. 4 bottles total. Please  draw cultures before administering antibiotics.    Blood culture [[id number]] Collected: 12/23/24 1135    Order Status: Completed Specimen: Blood from  Peripheral, Lower Arm, Right Updated: 12/24/24 1813     Blood Culture, Routine No Growth to date      No Growth to date    Blood culture [[id number]] Collected: 12/23/24 1136    Order Status: Completed Specimen: Blood from Peripheral, Upper Arm, Right Updated: 12/24/24 1813     Blood Culture, Routine No Growth to date      No Growth to date    Blood culture x two cultures. Draw prior to antibiotics. [[id number]]  (Abnormal)  (Susceptibility) Collected: 12/19/24 1434    Order Status: Completed Specimen: Blood from Peripheral, Forearm, Right Updated: 12/24/24 1101     Blood Culture, Routine Gram stain aer bottle: Gram positive cocci in clusters resembling Staph      Positive results previously called 12/20/2024      Gram stain benja bottle: Gram positive cocci in clusters resembling Staph      12/21/2024  01:37      STAPHYLOCOCCUS CAPITIS SUBSPECIES CAPITIS    Narrative:      Aerobic and anaerobic    Blood culture [[id number]]  (Abnormal) Collected: 12/21/24 0115    Order Status: Completed Specimen: Blood from Peripheral, Forearm, Right Updated: 12/24/24 1043     Blood Culture, Routine Gram stain aer bottle: Gram positive cocci in clusters resembling Staph      Results called to and read back by: KAYLENE Padgett. 12/22/2024  06:04      STAPHYLOCOCCUS CAPITIS  Susceptibility pending      Narrative:      Blood cultures from 2 different sites. 4 bottles total.  Please draw before starting antibiotics.    Blood culture x two cultures. Draw prior to antibiotics. [[id number]]  (Abnormal) Collected: 12/19/24 1430    Order Status: Completed Specimen: Blood from Peripheral, Hand, Left Updated: 12/23/24 1129     Blood Culture, Routine Gram stain aer bottle: Gram positive cocci in clusters resembling Staph      Results called to and read back by:Lu Robles RN 12/20/2024  16:30      Gram stain benja bottle: Gram positive cocci in clusters resembling Staph      STAPHYLOCOCCUS CAPITIS  Susceptibility pending      Narrative:      Aerobic and  anaerobic    MRSA/SA Rapid ID by PCR from Blood culture [[id number]] Collected: 12/19/24 1430    Order Status: Completed Updated: 12/20/24 1754     Staph aureus ID by PCR Negative     Methicillin Resistant ID by PCR Negative    Narrative:      Aerobic and anaerobic    MRSA Screen by PCR [[id number]]     Order Status: No result Specimen: Nasal Swab     Clostridium difficile EIA [[id number]]     Order Status: Canceled Specimen: Stool     Influenza A & B by Molecular [[id number]] Collected: 12/19/24 1451    Order Status: Completed Specimen: Nasopharyngeal Swab Updated: 12/19/24 1519     Influenza A, Molecular Negative     Influenza B, Molecular Negative     Flu A & B Source NP            Significant Imaging: I have reviewed all pertinent imaging results/findings within the past 24 hours.

## 2024-12-25 NOTE — SUBJECTIVE & OBJECTIVE
Interval History/Significant Events:   Hyperactive delerium overnight, started on precedex. D/C this AM and PRN haldol placed. Delerium precautions in place.     Review of Systems  Objective:     Vital Signs (Most Recent):  Temp: 99.1 °F (37.3 °C) (12/25/24 0700)  Pulse: 102 (12/25/24 0901)  Resp: (!) 24 (12/25/24 0901)  BP: 119/79 (12/25/24 0900)  SpO2: 96 % (12/25/24 0901) Vital Signs (24h Range):  Temp:  [98 °F (36.7 °C)-99.1 °F (37.3 °C)] 99.1 °F (37.3 °C)  Pulse:  [] 102  Resp:  [16-38] 24  SpO2:  [81 %-96 %] 96 %  BP: ()/(39-90) 119/79   Weight: 56 kg (123 lb 7.3 oz)  Body mass index is 19.93 kg/m².      Intake/Output Summary (Last 24 hours) at 12/25/2024 1105  Last data filed at 12/25/2024 0659  Gross per 24 hour   Intake 2386.51 ml   Output 1450 ml   Net 936.51 ml          Physical Exam  Vitals and nursing note reviewed.   Constitutional:       General: He is not in acute distress.     Appearance: Normal appearance. He is normal weight. He is ill-appearing. He is not toxic-appearing or diaphoretic.   HENT:      Head: Normocephalic and atraumatic.      Right Ear: External ear normal.      Left Ear: External ear normal.      Nose: Nose normal.      Mouth/Throat:      Mouth: Mucous membranes are moist.   Eyes:      General: No scleral icterus.     Extraocular Movements: Extraocular movements intact.      Conjunctiva/sclera: Conjunctivae normal.      Pupils: Pupils are equal, round, and reactive to light.   Cardiovascular:      Rate and Rhythm: Normal rate and regular rhythm.      Pulses: Normal pulses.      Heart sounds: Murmur heard.      No friction rub. No gallop.      Comments: No JVD or peripheral edema  Pulmonary:      Effort: Pulmonary effort is normal. No respiratory distress.      Breath sounds: Normal breath sounds.      Comments: Mildly increased respiratory effort with bibasilar crackles  Abdominal:      General: Abdomen is flat. Bowel sounds are normal. There is no distension.       Palpations: Abdomen is soft.      Tenderness: There is no abdominal tenderness. There is no guarding or rebound.   Musculoskeletal:         General: Tenderness (left shoulder) and deformity (left shoulder) present. Normal range of motion.      Cervical back: Normal range of motion.      Right lower leg: No edema (trace).      Left lower leg: No edema (trace).      Comments: Kyphosis.    Skin:     General: Skin is warm and dry.      Coloration: Skin is not jaundiced or pale.   Neurological:      General: No focal deficit present.      Mental Status: He is alert and oriented to person, place, and time. Mental status is at baseline.   Psychiatric:         Mood and Affect: Mood normal.         Behavior: Behavior normal.         Thought Content: Thought content normal.         Judgment: Judgment normal.            Vents:  Oxygen Concentration (%): 67 (12/25/24 0758)  Lines/Drains/Airways       Central Venous Catheter Line  Duration             Trialysis (Dialysis) Catheter 12/24/24 1012 right internal jugular 1 day              Drain  Duration                  Colostomy 12/19/24 2225 RLQ 5 days              Peripheral Intravenous Line  Duration                  Peripheral IV - Single Lumen 12/19/24 1428 20 G Anterior;Right Forearm 5 days         Peripheral IV - Single Lumen 12/22/24 1611 20 G Anterior;Right;Medial Upper Arm 2 days                  Significant Labs:    CBC/Anemia Profile:  Recent Labs   Lab 12/24/24  0351 12/25/24  0421 12/25/24  0559   WBC 8.44 4.86 4.45   HGB 8.7* 6.9* 7.1*   HCT 29.1* 22.1* 22.7*    155 160   * 96 96   RDW 16.1* 16.0* 16.0*        Chemistries:  Recent Labs   Lab 12/24/24  1351 12/24/24  2319 12/25/24  0421   *  131* 132* 131*  131*   K 4.9  4.9 4.6 4.7  4.7     103 103 103  103   CO2 21*  21* 25 23  23   BUN 27*  27* 11 12  12   CREATININE 4.7*  4.7* 2.3* 3.0*  3.0*   CALCIUM 8.4*  8.4* 8.4* 8.6*  8.6*   ALBUMIN 2.0*  2.0* 2.2* 2.2*  2.2*   MG  2.1  2.1 1.9 1.9  1.9   PHOS 3.8  3.8 2.8 3.4  3.4  3.4       All pertinent labs within the past 24 hours have been reviewed.    Significant Imaging:  I have reviewed all pertinent imaging results/findings within the past 24 hours.

## 2024-12-25 NOTE — ASSESSMENT & PLAN NOTE
I have reviewed hospital notes from  MICU service and other specialty providers. I have also reviewed CBC, CMP/BMP,  cultures and imaging with my interpretation as documented.     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 Shock resolved. Large mobile vegetation noted on TTE. Transitioned from daptomycin to cefazolin based on susceptibilities. Evaluated by CTS and deemed a non surgical candidate due to risk/comorbidities.  Continue cefazolin.  High risk for infectious complications including but not limited to stroke if vegetation fragments.  Will need 6 weeks of therapy from first negative blood culture. Anticipated end date: 2/2/25.  Anticipate outpatient antibiotics with HD.  Discussed management plan with the staff and/or members from MICU service.

## 2024-12-25 NOTE — ASSESSMENT & PLAN NOTE
resolved  Presented with hypotension unable to receive iHD.  SBP 70s on arrival to ED.  On exam patient with MAP 65-70 not on vasopressor support, but suspect will need vasopressors for dialysis. No obvious signs of infection on exam, tunneled cath dry/intact. Reports no cough, urinary symptoms.  WBC normal.  Suspect shock likely cardiogenic in setting of HF and ESRD with volume overload.      -- blood cultures + for staph, most recent cultures remain positive with plan for tunneled catheter line removal today. Repeat blood cultures pending.   --Vegetation found on aortic valve on echocardiogram. Consult cardiothoracic surgery- appreciate recs, not a surgical candidate.   --Infectious diseases consult--appreciate input   --Started on cefepime and daptomycin given reported vancomycin allergy; discontinue cefepime 12/22. Deescalate daptomycin to cefazolin.

## 2024-12-25 NOTE — PROGRESS NOTES
[facility]  Nephrology  Progress Note    Patient Name: Flakito Mcginnis  MRN: [id number]  Admission Date: 12/19/2024  Hospital Length of Stay: 6 days  Attending Provider: Nain Betts MD   Primary Care Physician: Jordin Robbins MD  Principal Problem:Septic shock    Subjective:     HPI: Patient is a pleasant 69 year old with ESRD on HD TThS who presents to the ER at the request of his HD unit for evaluation of hypotension. Patient completed his a session on Tuesday 12/17/24 and went back for a second session on 12/18/24. Review of his outpatient dialysis notes show that his post BUN dialysis values on 12/17 were 10 with a pre-HD BUN of 41 indicating a urea reduction percentage of 76% suggesting that he received DH on 12/17/24. There are also post HD treatment vital signs recorded on 12/18/24 at 1035 am which also show a pre-HD weight of 56.3 kg and post HD weight of 54.6 kg suggesting that he received an HD session yesterday as well and left at his estimated dry weight. He reported for his routine dialysis but was sent into the ER when he was found to be hypotensive in the HD unit. He reports feeling well with no signs or symptoms of hypotension prior to arrival, however he does report increased loose stool output since his HD session yesterday. He denies any increase in salt or fluid intake and tries to adhere to a low salt and 1L fluid restricting per day diet.    Nephrology has been consulted for management of his dialysis while he is admitted to the hospital. Labs on arrival showed stable electrolytes, venous blood gas showed a metabolic alkalosis with a pCO2 of 44, BNP elevated at >4,900 with no prior values to compare to, and troponin elevated at 923. Weight in the ER was 54 kg. Chest xray was obtained and showed no significant change in the cardiopulmonary status of the patient when compared to previous chest xray. Patient reports oxygen use of 4L at home and reports resolution of his dyspnea  once he was placed on his home oxygen dose.     Outpatient HD Information:  -Dialysis modality: Hemodialysis  -Outpatient HD unit: [facility]  -Nephrologist: Dr. Strickland  -HD TX days: Tuesday/Thursday/Saturday  -Last HD TX prior to hospital admission: 12/18/2024  -Residual urine: Anuric   -EDW: 54.5 kg      Interval hx: Continues to have large fluctuation in his blood pressures with readings in the 80s systolic.     Objective:     Vital Signs (Most Recent):  Temp: 98.8 °F (37.1 °C) (12/25/24 1100)  Pulse: 110 (12/25/24 1120)  Resp: (!) 30 (12/25/24 1120)  BP: (!) 118/59 (12/25/24 1100)  SpO2: 95 % (12/25/24 1120) Vital Signs (24h Range):  Temp:  [98 °F (36.7 °C)-99.1 °F (37.3 °C)] 98.8 °F (37.1 °C)  Pulse:  [] 110  Resp:  [16-38] 30  SpO2:  [81 %-96 %] 95 %  BP: ()/(39-90) 118/59     Weight: 56 kg (123 lb 7.3 oz) (12/21/24 0641)  Body mass index is 19.93 kg/m².  Body surface area is 1.61 meters squared.    I/O last 3 completed shifts:  In: 2449.6 [P.O.:500; I.V.:1886.5; IV Piggyback:63.1]  Out: 1900 [Other:950; Stool:950]     Physical Exam  Constitutional:       General: He is not in acute distress.     Appearance: Normal appearance. He is not ill-appearing or toxic-appearing.   HENT:      Head: Normocephalic and atraumatic.      Right Ear: External ear normal.      Left Ear: External ear normal.      Nose: Nose normal.      Mouth/Throat:      Mouth: Mucous membranes are moist.   Eyes:      Extraocular Movements: Extraocular movements intact.   Cardiovascular:      Rate and Rhythm: Normal rate and regular rhythm.   Pulmonary:      Effort: Pulmonary effort is normal.      Comments: Fine crackles throughout.   Abdominal:      General: Abdomen is flat.      Palpations: Abdomen is soft.   Musculoskeletal:         General: Normal range of motion.      Right lower leg: Edema (2+) present.      Left lower leg: Edema (2+) present.   Skin:     Capillary Refill: Capillary refill takes less than  2 seconds.   Neurological:      General: No focal deficit present.      Mental Status: He is alert and oriented to person, place, and time.   Psychiatric:         Mood and Affect: Mood normal.         Behavior: Behavior normal.         Thought Content: Thought content normal.         Judgment: Judgment normal.          Significant Labs:  CBC:   Recent Labs   Lab 12/25/24  0559   WBC 4.45   RBC 2.36*   HGB 7.1*   HCT 22.7*      MCV 96   MCH 30.1   MCHC 31.3*     CMP:   Recent Labs   Lab 12/19/24  1430 12/20/24  0351 12/25/24  0421   GLU 93   < > 76  76   CALCIUM 9.0   < > 8.6*  8.6*   ALBUMIN 2.8*   < > 2.2*  2.2*   PROT 6.6  --   --    *   < > 131*  131*   K 4.6   < > 4.7  4.7   CO2 29   < > 23  23   CL 95   < > 103  103   BUN 36*   < > 12  12   CREATININE 4.8*   < > 3.0*  3.0*   ALKPHOS 259*  --   --    ALT 11  --   --    AST 11  --   --    BILITOT 0.5  --   --     < > = values in this interval not displayed.     All labs within the past 24 hours have been reviewed.    Significant Imaging:  X-Ray: Reviewed    Assessment/Plan:     Renal/  ESRD on hemodialysis  After looking through his dialysis notes from yesterday, it does appear as if he completed two back-to-back sessions and left dialysis yesterday at his dry weight (54.6 kg) and reports high volume stool output since his HD session. He reports that he is on 4L chronic oxygen use at home, and is currently on that dose in the ER, and reporting resolution of his dyspnea once he was placed back on his home oxygen. Chest xray appears unchanged from prior studies.       Outpatient HD Information:  -Dialysis modality: Hemodialysis  -Outpatient HD unit: [facility] [place]  -Nephrologist: Dr. Strickland  -HD TX days: Tuesday/Thursday/Saturday  -Last HD TX prior to hospital admission: 12/18/2024  -Residual urine: Anuric   -EDW: 54.5 kg      Recommendations    -SLED today for metabolic clearance and volume removal.         Thank you for  your consult. I will follow-up with patient. Please contact us if you have any additional questions.    Isaac Cabezas MD  Nephrology  [facility] - Medical ICU

## 2024-12-25 NOTE — PROGRESS NOTES
[facility]  Critical Care Medicine  Progress Note    Patient Name: Flakito Mcginnis  MRN: [id number]  Admission Date: 12/19/2024  Hospital Length of Stay: 6 days  Code Status: Full Code  Attending Provider: Nain Betts MD  Primary Care Provider: Jordin Robbins MD   Principal Problem: Septic shock    Subjective:     HPI:  Mr. Flakito Mcginnis is a 69 y.o. man w/ HFrEF (30% 8/2024 from 20-25% 6/2024), NICM, MR, ESRD on HD, Crohn's s/p colostomy, h/o R nephrectomy.  He presented to [facility] ED from his HD center on 12/19 due to hypotension and ongoing shortness of breath.  He usually receives his dialysis on T, R, S and went on Wednesday for an extra session for volume removal.  He arrived on Thursday for his usually scheduled dialysis session and was directed to the ED due to hypotension.  On arrival in the ED his blood pressure was 78/51.  He was found to have a BNP >4900 and CXR concerning for volume overload.  High sensitivity troponin 923.  Critical care medicine was consulted for hypotension and admitted to MICU.      Hospital/ICU Course:  12/20:  On low-dose vasopressor with norepinephrine.  Patient has had increasing oxygen requirements overnight.  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  Started on empiric antibiotic therapy with cefepime and daptomycin.  Infectious Diseases consulted.    Interval History/Significant Events:   Hyperactive delerium overnight, started on precedex. D/C this AM and PRN haldol placed. Delerium precautions in place.     Review of Systems  Objective:     Vital Signs (Most Recent):  Temp: 99.1 °F (37.3 °C) (12/25/24 0700)  Pulse: 102 (12/25/24 0901)  Resp: (!) 24 (12/25/24 0901)  BP: 119/79 (12/25/24 0900)  SpO2: 96 % (12/25/24 0901) Vital Signs (24h Range):  Temp:  [98 °F (36.7 °C)-99.1 °F (37.3 °C)] 99.1 °F (37.3 °C)  Pulse:  [] 102  Resp:  [16-38] 24  SpO2:  [81 %-96 %] 96 %  BP: ()/(39-90) 119/79   Weight: 56 kg (123 lb  7.3 oz)  Body mass index is 19.93 kg/m².      Intake/Output Summary (Last 24 hours) at 12/25/2024 1105  Last data filed at 12/25/2024 0659  Gross per 24 hour   Intake 2386.51 ml   Output 1450 ml   Net 936.51 ml          Physical Exam  Vitals and nursing note reviewed.   Constitutional:       General: He is not in acute distress.     Appearance: Normal appearance. He is normal weight. He is ill-appearing. He is not toxic-appearing or diaphoretic.   HENT:      Head: Normocephalic and atraumatic.      Right Ear: External ear normal.      Left Ear: External ear normal.      Nose: Nose normal.      Mouth/Throat:      Mouth: Mucous membranes are moist.   Eyes:      General: No scleral icterus.     Extraocular Movements: Extraocular movements intact.      Conjunctiva/sclera: Conjunctivae normal.      Pupils: Pupils are equal, round, and reactive to light.   Cardiovascular:      Rate and Rhythm: Normal rate and regular rhythm.      Pulses: Normal pulses.      Heart sounds: Murmur heard.      No friction rub. No gallop.      Comments: No JVD or peripheral edema  Pulmonary:      Effort: Pulmonary effort is normal. No respiratory distress.      Breath sounds: Normal breath sounds.      Comments: Mildly increased respiratory effort with bibasilar crackles  Abdominal:      General: Abdomen is flat. Bowel sounds are normal. There is no distension.      Palpations: Abdomen is soft.      Tenderness: There is no abdominal tenderness. There is no guarding or rebound.   Musculoskeletal:         General: Tenderness (left shoulder) and deformity (left shoulder) present. Normal range of motion.      Cervical back: Normal range of motion.      Right lower leg: No edema (trace).      Left lower leg: No edema (trace).      Comments: Kyphosis.    Skin:     General: Skin is warm and dry.      Coloration: Skin is not jaundiced or pale.   Neurological:      General: No focal deficit present.      Mental Status: He is alert and oriented to  "person, place, and time. Mental status is at baseline.   Psychiatric:         Mood and Affect: Mood normal.         Behavior: Behavior normal.         Thought Content: Thought content normal.         Judgment: Judgment normal.            Vents:  Oxygen Concentration (%): 67 (12/25/24 0758)  Lines/Drains/Airways       Central Venous Catheter Line  Duration             Trialysis (Dialysis) Catheter 12/24/24 1012 right internal jugular 1 day              Drain  Duration                  Colostomy 12/19/24 2225 RLQ 5 days              Peripheral Intravenous Line  Duration                  Peripheral IV - Single Lumen 12/19/24 1428 20 G Anterior;Right Forearm 5 days         Peripheral IV - Single Lumen 12/22/24 1611 20 G Anterior;Right;Medial Upper Arm 2 days                  Significant Labs:    CBC/Anemia Profile:  Recent Labs   Lab 12/24/24  0351 12/25/24  0421 12/25/24  0559   WBC 8.44 4.86 4.45   HGB 8.7* 6.9* 7.1*   HCT 29.1* 22.1* 22.7*    155 160   * 96 96   RDW 16.1* 16.0* 16.0*        Chemistries:  Recent Labs   Lab 12/24/24  1351 12/24/24  2319 12/25/24  0421   *  131* 132* 131*  131*   K 4.9  4.9 4.6 4.7  4.7     103 103 103  103   CO2 21*  21* 25 23  23   BUN 27*  27* 11 12  12   CREATININE 4.7*  4.7* 2.3* 3.0*  3.0*   CALCIUM 8.4*  8.4* 8.4* 8.6*  8.6*   ALBUMIN 2.0*  2.0* 2.2* 2.2*  2.2*   MG 2.1  2.1 1.9 1.9  1.9   PHOS 3.8  3.8 2.8 3.4  3.4  3.4       All pertinent labs within the past 24 hours have been reviewed.    Significant Imaging:  I have reviewed all pertinent imaging results/findings within the past 24 hours.    ABG  Recent Labs   Lab 12/19/24  1433   PH 7.454*   PO2 34*   PCO2 44.1   HCO3 31.0*   BE 7*     Assessment/Plan:     Cardiac/Vascular  Endocarditis  See "septic shock"  - despite size, patient would be a poor surgical candidate for valve replacement in setting of Age and comorbidities. Increased risk of stroke based on size and location. "     NSTEMI (non-ST elevated myocardial infarction)  High sensitivity troponin troponin 923.  EKG with t wave inversions.  No complaints of chest pain.      --Cardiology following  -- Troponin down trending  --Continuous cardiac monitoring  --EKG PRN     HFrEF (heart failure with reduced ejection fraction)  HFrEF (30% 8/2024 from 20-25% 6/2024) per cardiology notes.  Suspect volume overload with history HF and ESRD.    --line holiday, temp HD catheter placed 12/24. SLED with UF 12/24, awaiting HD trial. Fluid restriction.    --Echo showing EF of 20-25% with large AV vegetation partially occluding LVOT with moderate AV regurgitation.   --Cardiology following  --troponin down trending   --Continuous cardiac monitoring   --EKG PRN     Hypertension  Holding until ensure toleration of HD.     Renal/  Hyperkalemia  Due to ESRD. SLED for 8 hours today.     History of nephrectomy  Noted. See ESRD.     ESRD on hemodialysis  --Nephrology consulted--appreciate input   --SLED yesterday.   - tunneled catheter removed 12/22. RIJ temp HD catheter placed 12/24. Awaiting HD trial prior to step down.   - will need HD access prior to discharge.     ID  * Septic shock  resolved  Presented with hypotension unable to receive iHD.  SBP 70s on arrival to ED.  On exam patient with MAP 65-70 not on vasopressor support, but suspect will need vasopressors for dialysis. No obvious signs of infection on exam, tunneled cath dry/intact. Reports no cough, urinary symptoms.  WBC normal.  Suspect shock likely cardiogenic in setting of HF and ESRD with volume overload.      -- blood cultures + for staph, most recent cultures remain positive with plan for tunneled catheter line removal today. Repeat blood cultures pending.   --Vegetation found on aortic valve on echocardiogram. Consult cardiothoracic surgery- appreciate recs, not a surgical candidate.   --Infectious diseases consult--appreciate input   --Started on cefepime and daptomycin given  reported vancomycin allergy; discontinue cefepime 12/22. Deescalate daptomycin to cefazolin.     Coagulase negative Staphylococcus bacteremia  Deescalated from daptomycin to cefazolin 12/24. 6 weeks treatment post last negative Bcx (12/23)        Critical Care Daily Checklist:     A: Awake: RASS Goal/Actual Goal:    Actual:     B: Spontaneous Breathing Trial Performed?     C: SAT & SBT Coordinated?  NA                      D: Delirium: CAM-ICU     E: Early Mobility Performed? Yes   F: Feeding Goal:    Status:               Current Diet Order   Procedures    Diet Renal Fluid - 1000mL       Order Specific Question:   Fluid restriction:       Answer:   Fluid - 1000mL      AS: Analgesia/Sedation NA   T: Thromboembolic Prophylaxis Reduced dose lovenox.    H: HOB > 300 No   U: Stress Ulcer Prophylaxis (if needed) No   G: Glucose Control At goal   B: Bowel Function  Adequate. Adding miralax   I: Indwelling Catheter (Lines & Alberts) Necessity RIJ temp HD catheter   D: De-escalation of Antimicrobials/Pharmacotherapies Deescalated dapto to cefazolin 12/24. 6 weeks abx      Plan for the day/ETD Continue abx, titrate oxygen. RRT per nephrology.      Code Status:  Family/Goals of Care: Full Code        Critical Care Time: 45 minutes  Critical secondary to Patient has a condition that poses threat to life and bodily function: Septic shock      Critical care was time spent personally by me on the following activities: development of treatment plan with patient or surrogate and bedside caregivers, discussions with consultants, evaluation of patient's response to treatment, examination of patient, ordering and performing treatments and interventions, ordering and review of laboratory studies, ordering and review of radiographic studies, pulse oximetry, re-evaluation of patient's condition. This critical care time did not overlap with that of any other provider or involve time for any procedures.     Nain Betts MD  Critical Care  Medicine  [facility]

## 2024-12-25 NOTE — SUBJECTIVE & OBJECTIVE
"Interval History: "not great. I have not been able to get comfortable to sleep". No acute overnight events. S capitis bacteremia and endocarditis with large vegetation.  Had CVC removal on 12/22. Undergoing CVC replacement on 12/24. Blood cultures 12/23 are NGTD.     Review of Systems   Constitutional:  Positive for fatigue.   Respiratory:  Positive for shortness of breath.    Psychiatric/Behavioral:  Positive for sleep disturbance.    All other systems reviewed and are negative.    Objective:     Vital Signs (Most Recent):  Temp: 98.8 °F (37.1 °C) (12/25/24 1100)  Pulse: 110 (12/25/24 1120)  Resp: (!) 30 (12/25/24 1120)  BP: (!) 118/59 (12/25/24 1100)  SpO2: 95 % (12/25/24 1120) Vital Signs (24h Range):  Temp:  [98 °F (36.7 °C)-99.1 °F (37.3 °C)] 98.8 °F (37.1 °C)  Pulse:  [] 110  Resp:  [16-38] 30  SpO2:  [81 %-96 %] 95 %  BP: ()/(39-90) 118/59     Weight: 56 kg (123 lb 7.3 oz)  Body mass index is 19.93 kg/m².    Estimated Creatinine Clearance: 18.4 mL/min (A) (based on SCr of 3 mg/dL (H)).     Physical Exam  Vitals and nursing note reviewed.   Constitutional:       Appearance: He is well-developed.   HENT:      Head: Normocephalic and atraumatic.   Eyes:      General: No scleral icterus.        Right eye: No discharge.         Left eye: No discharge.   Cardiovascular:      Heart sounds: Murmur heard.   Pulmonary:      Effort: Pulmonary effort is normal.      Breath sounds: Examination of the right-lower field reveals decreased breath sounds. Examination of the left-lower field reveals decreased breath sounds. Decreased breath sounds present.   Musculoskeletal:         General: Swelling (left arm) present. Normal range of motion.   Skin:     General: Skin is warm and dry.   Neurological:      Mental Status: He is alert and oriented to person, place, and time.   Psychiatric:         Behavior: Behavior normal.         Thought Content: Thought content normal.         Judgment: Judgment normal.        "   Significant Labs: CBC:   Recent Labs   Lab 12/24/24  0351 12/25/24  0421 12/25/24  0559   WBC 8.44 4.86 4.45   HGB 8.7* 6.9* 7.1*   HCT 29.1* 22.1* 22.7*    155 160     CMP:   Recent Labs   Lab 12/24/24  1351 12/24/24  2319 12/25/24  0421   *  131* 132* 131*  131*   K 4.9  4.9 4.6 4.7  4.7     103 103 103  103   CO2 21*  21* 25 23  23   GLU 95  95 74 76  76   BUN 27*  27* 11 12  12   CREATININE 4.7*  4.7* 2.3* 3.0*  3.0*   CALCIUM 8.4*  8.4* 8.4* 8.6*  8.6*   ALBUMIN 2.0*  2.0* 2.2* 2.2*  2.2*   ANIONGAP 7*  7* 4* 5*  5*     Microbiology Results (last 7 days)       Procedure Component Value Units Date/Time    Blood culture [[id number]]  (Abnormal)  (Susceptibility) Collected: 12/21/24 0115    Order Status: Completed Specimen: Blood from Peripheral, Forearm, Right Updated: 12/25/24 1050     Blood Culture, Routine Gram stain aer bottle: Gram positive cocci in clusters resembling Staph      Results called to and read back by: KAYLENE Padgett. 12/22/2024  04:06      STAPHYLOCOCCUS CAPITIS    Narrative:      Blood cultures x 2 different sites. 4 bottles total. Please  draw cultures before administering antibiotics.    Blood culture [[id number]] Collected: 12/23/24 1135    Order Status: Completed Specimen: Blood from Peripheral, Lower Arm, Right Updated: 12/24/24 1813     Blood Culture, Routine No Growth to date      No Growth to date    Blood culture [[id number]] Collected: 12/23/24 1136    Order Status: Completed Specimen: Blood from Peripheral, Upper Arm, Right Updated: 12/24/24 1813     Blood Culture, Routine No Growth to date      No Growth to date    Blood culture x two cultures. Draw prior to antibiotics. [[id number]]  (Abnormal)  (Susceptibility) Collected: 12/19/24 1434    Order Status: Completed Specimen: Blood from Peripheral, Forearm, Right Updated: 12/24/24 1101     Blood Culture, Routine Gram stain aer bottle: Gram positive cocci in clusters resembling Staph      Positive  results previously called 12/20/2024      Gram stain benja bottle: Gram positive cocci in clusters resembling Staph      12/21/2024  01:37      STAPHYLOCOCCUS CAPITIS SUBSPECIES CAPITIS    Narrative:      Aerobic and anaerobic    Blood culture [[id number]]  (Abnormal) Collected: 12/21/24 0115    Order Status: Completed Specimen: Blood from Peripheral, Forearm, Right Updated: 12/24/24 1043     Blood Culture, Routine Gram stain aer bottle: Gram positive cocci in clusters resembling Staph      Results called to and read back by: KAYLENE Padgett. 12/22/2024  06:04      STAPHYLOCOCCUS CAPITIS  Susceptibility pending      Narrative:      Blood cultures from 2 different sites. 4 bottles total.  Please draw before starting antibiotics.    Blood culture x two cultures. Draw prior to antibiotics. [[id number]]  (Abnormal) Collected: 12/19/24 1430    Order Status: Completed Specimen: Blood from Peripheral, Hand, Left Updated: 12/23/24 1129     Blood Culture, Routine Gram stain aer bottle: Gram positive cocci in clusters resembling Staph      Results called to and read back by:Lu Robles RN 12/20/2024  16:30      Gram stain benja bottle: Gram positive cocci in clusters resembling Staph      STAPHYLOCOCCUS CAPITIS  Susceptibility pending      Narrative:      Aerobic and anaerobic    MRSA/SA Rapid ID by PCR from Blood culture [[id number]] Collected: 12/19/24 1430    Order Status: Completed Updated: 12/20/24 1754     Staph aureus ID by PCR Negative     Methicillin Resistant ID by PCR Negative    Narrative:      Aerobic and anaerobic    MRSA Screen by PCR [[id number]]     Order Status: No result Specimen: Nasal Swab     Clostridium difficile EIA [[id number]]     Order Status: Canceled Specimen: Stool     Influenza A & B by Molecular [[id number]] Collected: 12/19/24 1451    Order Status: Completed Specimen: Nasopharyngeal Swab Updated: 12/19/24 1519     Influenza A, Molecular Negative     Influenza B, Molecular Negative     Flu A & B Source  NP            Significant Imaging: I have reviewed all pertinent imaging results/findings within the past 24 hours.

## 2024-12-25 NOTE — PROGRESS NOTES
12/24/24 1900 12/24/24 1922   Treatment   Treatment Type SLED SLED   Treatment Status Clotting Restart   Dialysis Machine Number [id number]   Dialyzer Time (hours) 5.28 0   BVP (Liters) 50.2 L 0 L   Solutions Labeled and Current  Yes Yes   Access Temporary Cath;Right Temporary Cath;Right;IJ   Catheter Dressing Intact   --  Yes   Alarms Engaged  --  Yes   CRRT Comments blood returned; dialyzer and venous chamber clotting SLED restarted; pt has 2.5H of SLED tx left

## 2024-12-25 NOTE — SUBJECTIVE & OBJECTIVE
Interval hx: Continues to have large fluctuation in his blood pressures with readings in the 80s systolic.     Objective:     Vital Signs (Most Recent):  Temp: 98.8 °F (37.1 °C) (12/25/24 1100)  Pulse: 110 (12/25/24 1120)  Resp: (!) 30 (12/25/24 1120)  BP: (!) 118/59 (12/25/24 1100)  SpO2: 95 % (12/25/24 1120) Vital Signs (24h Range):  Temp:  [98 °F (36.7 °C)-99.1 °F (37.3 °C)] 98.8 °F (37.1 °C)  Pulse:  [] 110  Resp:  [16-38] 30  SpO2:  [81 %-96 %] 95 %  BP: ()/(39-90) 118/59     Weight: 56 kg (123 lb 7.3 oz) (12/21/24 0641)  Body mass index is 19.93 kg/m².  Body surface area is 1.61 meters squared.    I/O last 3 completed shifts:  In: 2449.6 [P.O.:500; I.V.:1886.5; IV Piggyback:63.1]  Out: 1900 [Other:950; Stool:950]     Physical Exam  Constitutional:       General: He is not in acute distress.     Appearance: Normal appearance. He is not ill-appearing or toxic-appearing.   HENT:      Head: Normocephalic and atraumatic.      Right Ear: External ear normal.      Left Ear: External ear normal.      Nose: Nose normal.      Mouth/Throat:      Mouth: Mucous membranes are moist.   Eyes:      Extraocular Movements: Extraocular movements intact.   Cardiovascular:      Rate and Rhythm: Normal rate and regular rhythm.   Pulmonary:      Effort: Pulmonary effort is normal.      Comments: Fine crackles throughout.   Abdominal:      General: Abdomen is flat.      Palpations: Abdomen is soft.   Musculoskeletal:         General: Normal range of motion.      Right lower leg: Edema (2+) present.      Left lower leg: Edema (2+) present.   Skin:     Capillary Refill: Capillary refill takes less than 2 seconds.   Neurological:      General: No focal deficit present.      Mental Status: He is alert and oriented to person, place, and time.   Psychiatric:         Mood and Affect: Mood normal.         Behavior: Behavior normal.         Thought Content: Thought content normal.         Judgment: Judgment normal.           Significant Labs:  CBC:   Recent Labs   Lab 12/25/24  0559   WBC 4.45   RBC 2.36*   HGB 7.1*   HCT 22.7*      MCV 96   MCH 30.1   MCHC 31.3*     CMP:   Recent Labs   Lab 12/19/24  1430 12/20/24  0351 12/25/24  0421   GLU 93   < > 76  76   CALCIUM 9.0   < > 8.6*  8.6*   ALBUMIN 2.8*   < > 2.2*  2.2*   PROT 6.6  --   --    *   < > 131*  131*   K 4.6   < > 4.7  4.7   CO2 29   < > 23  23   CL 95   < > 103  103   BUN 36*   < > 12  12   CREATININE 4.8*   < > 3.0*  3.0*   ALKPHOS 259*  --   --    ALT 11  --   --    AST 11  --   --    BILITOT 0.5  --   --     < > = values in this interval not displayed.     All labs within the past 24 hours have been reviewed.    Significant Imaging:  X-Ray: Reviewed

## 2024-12-26 PROBLEM — J96.01 ACUTE RESPIRATORY FAILURE WITH HYPOXIA: Status: ACTIVE | Noted: 2024-12-26

## 2024-12-26 LAB
ALBUMIN SERPL BCP-MCNC: 2.4 G/DL (ref 3.5–5.2)
ALBUMIN SERPL BCP-MCNC: 2.4 G/DL (ref 3.5–5.2)
ANION GAP SERPL CALC-SCNC: 7 MMOL/L (ref 8–16)
ANION GAP SERPL CALC-SCNC: 7 MMOL/L (ref 8–16)
BACTERIA BLD CULT: ABNORMAL
BASOPHILS # BLD AUTO: 0 K/UL (ref 0–0.2)
BASOPHILS NFR BLD: 0 % (ref 0–1.9)
BUN SERPL-MCNC: 20 MG/DL (ref 8–23)
BUN SERPL-MCNC: 20 MG/DL (ref 8–23)
CALCIUM SERPL-MCNC: 9 MG/DL (ref 8.7–10.5)
CALCIUM SERPL-MCNC: 9 MG/DL (ref 8.7–10.5)
CHLORIDE SERPL-SCNC: 101 MMOL/L (ref 95–110)
CHLORIDE SERPL-SCNC: 101 MMOL/L (ref 95–110)
CO2 SERPL-SCNC: 23 MMOL/L (ref 23–29)
CO2 SERPL-SCNC: 23 MMOL/L (ref 23–29)
CREAT SERPL-MCNC: 4.1 MG/DL (ref 0.5–1.4)
CREAT SERPL-MCNC: 4.1 MG/DL (ref 0.5–1.4)
DIFFERENTIAL METHOD BLD: ABNORMAL
EOSINOPHIL # BLD AUTO: 0.1 K/UL (ref 0–0.5)
EOSINOPHIL NFR BLD: 2.2 % (ref 0–8)
ERYTHROCYTE [DISTWIDTH] IN BLOOD BY AUTOMATED COUNT: 16.1 % (ref 11.5–14.5)
EST. GFR  (NO RACE VARIABLE): 15 ML/MIN/1.73 M^2
EST. GFR  (NO RACE VARIABLE): 15 ML/MIN/1.73 M^2
GLUCOSE SERPL-MCNC: 101 MG/DL (ref 70–110)
GLUCOSE SERPL-MCNC: 101 MG/DL (ref 70–110)
HCT VFR BLD AUTO: 23.3 % (ref 40–54)
HGB BLD-MCNC: 7.3 G/DL (ref 14–18)
IMM GRANULOCYTES # BLD AUTO: 0.02 K/UL (ref 0–0.04)
IMM GRANULOCYTES NFR BLD AUTO: 0.4 % (ref 0–0.5)
LYMPHOCYTES # BLD AUTO: 0.5 K/UL (ref 1–4.8)
LYMPHOCYTES NFR BLD: 12.1 % (ref 18–48)
MAGNESIUM SERPL-MCNC: 2.2 MG/DL (ref 1.6–2.6)
MAGNESIUM SERPL-MCNC: 2.2 MG/DL (ref 1.6–2.6)
MCH RBC QN AUTO: 29.9 PG (ref 27–31)
MCHC RBC AUTO-ENTMCNC: 31.3 G/DL (ref 32–36)
MCV RBC AUTO: 96 FL (ref 82–98)
MONOCYTES # BLD AUTO: 0.5 K/UL (ref 0.3–1)
MONOCYTES NFR BLD: 11.7 % (ref 4–15)
NEUTROPHILS # BLD AUTO: 3.3 K/UL (ref 1.8–7.7)
NEUTROPHILS NFR BLD: 73.6 % (ref 38–73)
NRBC BLD-RTO: 0 /100 WBC
PHOSPHATE SERPL-MCNC: 4.5 MG/DL (ref 2.7–4.5)
PLATELET # BLD AUTO: 143 K/UL (ref 150–450)
PMV BLD AUTO: 10.8 FL (ref 9.2–12.9)
POCT GLUCOSE: 119 MG/DL (ref 70–110)
POCT GLUCOSE: 130 MG/DL (ref 70–110)
POCT GLUCOSE: 133 MG/DL (ref 70–110)
POTASSIUM SERPL-SCNC: 4.8 MMOL/L (ref 3.5–5.1)
POTASSIUM SERPL-SCNC: 4.8 MMOL/L (ref 3.5–5.1)
RBC # BLD AUTO: 2.44 M/UL (ref 4.6–6.2)
SODIUM SERPL-SCNC: 131 MMOL/L (ref 136–145)
SODIUM SERPL-SCNC: 131 MMOL/L (ref 136–145)
WBC # BLD AUTO: 4.45 K/UL (ref 3.9–12.7)

## 2024-12-26 PROCEDURE — 97116 GAIT TRAINING THERAPY: CPT

## 2024-12-26 PROCEDURE — 25000003 PHARM REV CODE 250

## 2024-12-26 PROCEDURE — 25000242 PHARM REV CODE 250 ALT 637 W/ HCPCS: Performed by: INTERNAL MEDICINE

## 2024-12-26 PROCEDURE — 99233 SBSQ HOSP IP/OBS HIGH 50: CPT | Mod: GC,,, | Performed by: INTERNAL MEDICINE

## 2024-12-26 PROCEDURE — 94761 N-INVAS EAR/PLS OXIMETRY MLT: CPT

## 2024-12-26 PROCEDURE — 85025 COMPLETE CBC W/AUTO DIFF WBC: CPT

## 2024-12-26 PROCEDURE — 25000003 PHARM REV CODE 250: Performed by: GENERAL ACUTE CARE HOSPITAL

## 2024-12-26 PROCEDURE — 27100171 HC OXYGEN HIGH FLOW UP TO 24 HOURS

## 2024-12-26 PROCEDURE — 97535 SELF CARE MNGMENT TRAINING: CPT

## 2024-12-26 PROCEDURE — 97530 THERAPEUTIC ACTIVITIES: CPT

## 2024-12-26 PROCEDURE — 63600175 PHARM REV CODE 636 W HCPCS: Performed by: INTERNAL MEDICINE

## 2024-12-26 PROCEDURE — 27000221 HC OXYGEN, UP TO 24 HOURS

## 2024-12-26 PROCEDURE — 99900035 HC TECH TIME PER 15 MIN (STAT)

## 2024-12-26 PROCEDURE — 20000000 HC ICU ROOM

## 2024-12-26 PROCEDURE — 83735 ASSAY OF MAGNESIUM: CPT | Performed by: INTERNAL MEDICINE

## 2024-12-26 PROCEDURE — 80069 RENAL FUNCTION PANEL: CPT | Performed by: INTERNAL MEDICINE

## 2024-12-26 PROCEDURE — 94640 AIRWAY INHALATION TREATMENT: CPT

## 2024-12-26 PROCEDURE — 99291 CRITICAL CARE FIRST HOUR: CPT | Mod: ,,, | Performed by: INTERNAL MEDICINE

## 2024-12-26 PROCEDURE — 25000003 PHARM REV CODE 250: Performed by: INTERNAL MEDICINE

## 2024-12-26 RX ORDER — TALC
6 POWDER (GRAM) TOPICAL NIGHTLY
Status: DISCONTINUED | OUTPATIENT
Start: 2024-12-26 | End: 2025-01-14 | Stop reason: HOSPADM

## 2024-12-26 RX ORDER — SODIUM CHLORIDE FOR INHALATION 3 %
4 VIAL, NEBULIZER (ML) INHALATION ONCE
Status: COMPLETED | OUTPATIENT
Start: 2024-12-26 | End: 2024-12-26

## 2024-12-26 RX ORDER — IPRATROPIUM BROMIDE AND ALBUTEROL SULFATE 2.5; .5 MG/3ML; MG/3ML
3 SOLUTION RESPIRATORY (INHALATION) EVERY 8 HOURS
Status: DISCONTINUED | OUTPATIENT
Start: 2024-12-26 | End: 2025-01-08

## 2024-12-26 RX ADMIN — MUPIROCIN: 20 OINTMENT TOPICAL at 08:12

## 2024-12-26 RX ADMIN — IPRATROPIUM BROMIDE AND ALBUTEROL SULFATE 3 ML: 2.5; .5 SOLUTION RESPIRATORY (INHALATION) at 05:12

## 2024-12-26 RX ADMIN — HALOPERIDOL LACTATE 5 MG: 5 INJECTION, SOLUTION INTRAMUSCULAR at 02:12

## 2024-12-26 RX ADMIN — ENOXAPARIN SODIUM 30 MG: 30 INJECTION SUBCUTANEOUS at 04:12

## 2024-12-26 RX ADMIN — SODIUM ZIRCONIUM CYCLOSILICATE 10 G: 5 POWDER, FOR SUSPENSION ORAL at 08:12

## 2024-12-26 RX ADMIN — OXYCODONE HYDROCHLORIDE 10 MG: 10 TABLET ORAL at 02:12

## 2024-12-26 RX ADMIN — OXYCODONE HYDROCHLORIDE 10 MG: 10 TABLET ORAL at 08:12

## 2024-12-26 RX ADMIN — CEFAZOLIN 2 G: 2 INJECTION, POWDER, FOR SOLUTION INTRAMUSCULAR; INTRAVENOUS at 04:12

## 2024-12-26 RX ADMIN — SODIUM ZIRCONIUM CYCLOSILICATE 10 G: 5 POWDER, FOR SUSPENSION ORAL at 09:12

## 2024-12-26 RX ADMIN — OXYCODONE HYDROCHLORIDE 10 MG: 10 TABLET ORAL at 09:12

## 2024-12-26 RX ADMIN — OXYCODONE 5 MG: 5 TABLET ORAL at 01:12

## 2024-12-26 RX ADMIN — SEVELAMER CARBONATE 800 MG: 800 TABLET, FILM COATED ORAL at 07:12

## 2024-12-26 RX ADMIN — ATORVASTATIN CALCIUM 40 MG: 40 TABLET, FILM COATED ORAL at 07:12

## 2024-12-26 RX ADMIN — SEVELAMER CARBONATE 800 MG: 800 TABLET, FILM COATED ORAL at 04:12

## 2024-12-26 RX ADMIN — Medication 6 MG: at 09:12

## 2024-12-26 RX ADMIN — SEVELAMER CARBONATE 800 MG: 800 TABLET, FILM COATED ORAL at 11:12

## 2024-12-26 RX ADMIN — CEFAZOLIN 2 G: 2 INJECTION, POWDER, FOR SOLUTION INTRAMUSCULAR; INTRAVENOUS at 03:12

## 2024-12-26 RX ADMIN — HALOPERIDOL LACTATE 5 MG: 5 INJECTION, SOLUTION INTRAMUSCULAR at 08:12

## 2024-12-26 RX ADMIN — ACETAMINOPHEN 500 MG: 500 TABLET ORAL at 01:12

## 2024-12-26 RX ADMIN — SODIUM ZIRCONIUM CYCLOSILICATE 10 G: 5 POWDER, FOR SUSPENSION ORAL at 03:12

## 2024-12-26 RX ADMIN — SODIUM CHLORIDE SOLN NEBU 3% 4 ML: 3 NEBU SOLN at 05:12

## 2024-12-26 NOTE — SUBJECTIVE & OBJECTIVE
Interval hx: Flakito sitting up in bed this morning. He was on precedex overnight for agitation.     Objective:     Vital Signs (Most Recent):  Temp: 97 °F (36.1 °C) (12/26/24 0701)  Pulse: 91 (12/26/24 1000)  Resp: (!) 25 (12/26/24 1000)  BP: (!) 155/74 (12/26/24 0900)  SpO2: 100 % (12/26/24 1000) Vital Signs (24h Range):  Temp:  [97 °F (36.1 °C)-99 °F (37.2 °C)] 97 °F (36.1 °C)  Pulse:  [] 91  Resp:  [10-42] 25  SpO2:  [76 %-100 %] 100 %  BP: ()/(50-93) 155/74     Weight: 56 kg (123 lb 7.3 oz) (12/21/24 0641)  Body mass index is 19.93 kg/m².  Body surface area is 1.61 meters squared.    I/O last 3 completed shifts:  In: 2384.6 [P.O.:740; I.V.:1644.6]  Out: 675 [Stool:675]     Physical Exam  Constitutional:       General: He is not in acute distress.     Appearance: Normal appearance. He is not ill-appearing or toxic-appearing.   HENT:      Head: Normocephalic and atraumatic.      Right Ear: External ear normal.      Left Ear: External ear normal.      Nose: Nose normal.      Mouth/Throat:      Mouth: Mucous membranes are moist.   Eyes:      Extraocular Movements: Extraocular movements intact.   Cardiovascular:      Rate and Rhythm: Normal rate and regular rhythm.   Pulmonary:      Effort: Pulmonary effort is normal.      Comments: Fine crackles throughout.   Abdominal:      General: Abdomen is flat.      Palpations: Abdomen is soft.   Musculoskeletal:         General: Normal range of motion.      Right lower leg: Edema (2+) present.      Left lower leg: Edema (2+) present.   Skin:     Capillary Refill: Capillary refill takes less than 2 seconds.   Neurological:      General: No focal deficit present.      Mental Status: He is alert and oriented to person, place, and time.   Psychiatric:         Mood and Affect: Mood normal.         Behavior: Behavior normal.         Thought Content: Thought content normal.         Judgment: Judgment normal.          Significant Labs:  CBC:   Recent Labs   Lab  12/26/24 0228   WBC 4.45   RBC 2.44*   HGB 7.3*   HCT 23.3*   *   MCV 96   MCH 29.9   MCHC 31.3*     CMP:   Recent Labs   Lab 12/19/24  1430 12/20/24  0351 12/26/24 0228   GLU 93   < > 101  101   CALCIUM 9.0   < > 9.0  9.0   ALBUMIN 2.8*   < > 2.4*  2.4*   PROT 6.6  --   --    *   < > 131*  131*   K 4.6   < > 4.8  4.8   CO2 29   < > 23  23   CL 95   < > 101  101   BUN 36*   < > 20  20   CREATININE 4.8*   < > 4.1*  4.1*   ALKPHOS 259*  --   --    ALT 11  --   --    AST 11  --   --    BILITOT 0.5  --   --     < > = values in this interval not displayed.     All labs within the past 24 hours have been reviewed.    Significant Imaging:  X-Ray: Reviewed

## 2024-12-26 NOTE — PLAN OF CARE
MICU DAILY GOALS     Family/Goals of care/Code Status   Code Status: Full Code    24H Vital Sign Range  Temp:  [97.3 °F (36.3 °C)-99.1 °F (37.3 °C)]   Pulse:  []   Resp:  [11-40]   BP: ()/(50-93)   SpO2:  [76 %-100 %]      Shift Events (include procedures and significant events)   No acute events throughout shift, patient given 2 doses of haloperidol for continued episodes of anxiety, agitation and restlessness.    AWAKE RASS: Goal -    Actual - RASS (Sam Agitation-Sedation Scale): drowsy    Restraint necessity: Not necessary   BREATHE SBT: NA    Coordinate A & B, analgesics/sedatives Pain: managed   SAT: NA   Delirium CAM-ICU:     Early(intubated/ Progressive (non-intubated) Mobility MOVE Screen (INTUBATED ONLY): NA    Activity: Activity Management: Rolling - L1   Feeding/Nutrition Diet order: Diet/Nutrition Received: restrict fluids, Specialty Diet/Nutrition Received: renal diet   Thrombus DVT prophylaxis: VTE Core Measure: Pharmacological prophylaxis initiated/maintained   HOB Elevation Head of Bed (HOB) Positioning: HOB at 30-45 degrees   Ulcer Prophylaxis GI: yes   Glucose control managed Glycemic Management: blood glucose monitored   Skin Skin assessment:     Sacrum intact/not altered? Yes  Heels intact/not altered? Yes  Surgical wound? Yes    CHECK ONE!   (no altered skin or altered skin) and sub boxes:  [] No Altered Skin Integrity Present    []Prevention Measures Documented    [x] Altered Skin Integrity Present or Discovered   [x] LDA present in EPIC, daily doc completed              [] LDA added if not in EPIC (describe wound).                    When describing wound, do not stage, use descriptive words only.    [] Wound Image Taken (required on admit,                   transfer/discharge and every Tuesday)    Wound Care Consulted? Yes   Bowel Function no issues    Indwelling Catheter Necessity      Trialysis (Dialysis) Catheter 12/24/24 1012 right internal jugular-Line Necessity Review:  CRRT/HD  [REMOVED]      Hemodialysis Catheter right subclavian-Line Necessity Review: CRRT/HD     De-escalation Antibiotics No        VS and assessment per flow sheet, patient progressing towards goals as tolerated, plan of care reviewed with  patient and provider .  Problem: Adult Inpatient Plan of Care  Goal: Plan of Care Review  Outcome: Progressing  Goal: Patient-Specific Goal (Individualized)  Outcome: Progressing  Goal: Absence of Hospital-Acquired Illness or Injury  Outcome: Progressing  Goal: Optimal Comfort and Wellbeing  Outcome: Progressing  Goal: Readiness for Transition of Care  Outcome: Progressing     Problem: Infection  Goal: Absence of Infection Signs and Symptoms  Outcome: Progressing     Problem: Skin Injury Risk Increased  Goal: Skin Health and Integrity  Outcome: Progressing     Problem: Sepsis/Septic Shock  Goal: Optimal Coping  Outcome: Progressing  Goal: Absence of Bleeding  Outcome: Progressing  Goal: Blood Glucose Level Within Targeted Range  Outcome: Progressing  Goal: Absence of Infection Signs and Symptoms  Outcome: Progressing  Goal: Optimal Nutrition Intake  Outcome: Progressing     Problem: CRRT (Continuous Renal Replacement Therapy)  Goal: Safe, Effective Therapy Delivery  Outcome: Progressing  Goal: Hemodynamic Stability  Outcome: Progressing  Goal: Body Temperature Maintained in Desired Range  Outcome: Progressing  Goal: Absence of Infection Signs and Symptoms  Outcome: Progressing     Problem: Fall Injury Risk  Goal: Absence of Fall and Fall-Related Injury  Outcome: Progressing

## 2024-12-26 NOTE — SUBJECTIVE & OBJECTIVE
"Interval History/Significant Events:   Anxiety and poor sleep. Receive haldol for "agitation." Placed on aervo overnight for hypoxia.     Review of Systems  Objective:     Vital Signs (Most Recent):  Temp: 96.8 °F (36 °C) (12/26/24 1100)  Pulse: 93 (12/26/24 1200)  Resp: (!) 26 (12/26/24 1200)  BP: (!) 148/82 (12/26/24 1200)  SpO2: 95 % (12/26/24 1200) Vital Signs (24h Range):  Temp:  [96.8 °F (36 °C)-99 °F (37.2 °C)] 96.8 °F (36 °C)  Pulse:  [] 93  Resp:  [10-40] 26  SpO2:  [76 %-100 %] 95 %  BP: ()/(50-93) 148/82   Weight: 56 kg (123 lb 7.3 oz)  Body mass index is 19.93 kg/m².      Intake/Output Summary (Last 24 hours) at 12/26/2024 1258  Last data filed at 12/26/2024 0126  Gross per 24 hour   Intake 248.06 ml   Output 175 ml   Net 73.06 ml      Physical Exam  Vitals and nursing note reviewed.   Constitutional:       General: He is not in acute distress.     Appearance: Normal appearance. He is normal weight. He is ill-appearing. He is not toxic-appearing or diaphoretic.   HENT:      Head: Normocephalic and atraumatic.      Right Ear: External ear normal.      Left Ear: External ear normal.      Nose: Nose normal.      Mouth/Throat:      Mouth: Mucous membranes are moist.   Eyes:      General: No scleral icterus.     Extraocular Movements: Extraocular movements intact.      Conjunctiva/sclera: Conjunctivae normal.      Pupils: Pupils are equal, round, and reactive to light.   Cardiovascular:      Rate and Rhythm: Normal rate and regular rhythm.      Pulses: Normal pulses.      Heart sounds: Murmur heard.      No friction rub. No gallop.      Comments: No JVD or peripheral edema  Pulmonary:      Effort: Pulmonary effort is normal. No respiratory distress.      Breath sounds: Normal breath sounds.      Comments: Mildly increased respiratory effort with bibasilar crackles  Abdominal:      General: Abdomen is flat. Bowel sounds are normal. There is no distension.      Palpations: Abdomen is soft.      " Tenderness: There is no abdominal tenderness. There is no guarding or rebound.   Musculoskeletal:         General: Tenderness (left shoulder) and deformity (left shoulder) present. Normal range of motion.      Cervical back: Normal range of motion.      Right lower leg: No edema (trace).      Left lower leg: No edema (trace).      Comments: Kyphosis.    Skin:     General: Skin is warm and dry.      Coloration: Skin is not jaundiced or pale.   Neurological:      General: No focal deficit present.      Mental Status: He is alert and oriented to person, place, and time. Mental status is at baseline.   Psychiatric:         Mood and Affect: Mood normal.         Behavior: Behavior normal.         Thought Content: Thought content normal.         Judgment: Judgment normal.        Vents:  Oxygen Concentration (%): 70 (12/26/24 0900)  Lines/Drains/Airways       Central Venous Catheter Line  Duration             Trialysis (Dialysis) Catheter 12/24/24 1012 right internal jugular 2 days              Drain  Duration                  Colostomy 12/19/24 2225 RLQ 6 days              Peripheral Intravenous Line  Duration                  Peripheral IV - Single Lumen 12/19/24 1428 20 G Anterior;Right Forearm 6 days         Peripheral IV - Single Lumen 12/22/24 1611 20 G Anterior;Right;Medial Upper Arm 3 days                  Significant Labs:    CBC/Anemia Profile:  Recent Labs   Lab 12/25/24  0421 12/25/24  0559 12/26/24  0228   WBC 4.86 4.45 4.45   HGB 6.9* 7.1* 7.3*   HCT 22.1* 22.7* 23.3*    160 143*   MCV 96 96 96   RDW 16.0* 16.0* 16.1*      Chemistries:  Recent Labs   Lab 12/25/24  1519 12/25/24  2048 12/26/24  0228   *  131* 131*  131* 131*  131*   K 5.2*  5.2* 5.1  5.1 4.8  4.8     102 101  101 101  101   CO2 20*  20* 22*  22* 23  23   BUN 15  15 17  17 20  20   CREATININE 3.7*  3.7* 4.1*  4.1* 4.1*  4.1*   CALCIUM 9.2  9.2 8.8  8.8 9.0  9.0   ALBUMIN 2.4*  2.4* 2.3*  2.3* 2.4*  2.4*    MG 2.2  2.2 2.1  2.1 2.2  2.2   PHOS 4.0  4.0 4.4  4.4 4.5  4.5  4.5     All pertinent labs within the past 24 hours have been reviewed.    Significant Imaging:  I have reviewed all pertinent imaging results/findings within the past 24 hours.

## 2024-12-26 NOTE — PLAN OF CARE
[facility] - Medical ICU  Infectious Disease  Plan of Care Note    Patient Name: Flakito Mcginnis  MRN: [id number]  Admission Date: 12/19/2024  Length of Stay: 7 days  Attending Physician: Nain Betts MD  Primary Care Provider: Jordin Robbins MD    Isolation Status: No active isolations        Chart has been reviewed.  Patient is afebrile and without leukocytosis. Blood cultures 12/23 are NGTD.     Recommendations:  Continue cefazolin.  Once ready for discharge would continue cefazolin 2 gm IV with HD .  Treat for a total of 6 weeks. End date: 2/2/25.  No need for OPAT note as patient will need antibiotics with HD. Please ensure HD unit and HD physician aware of need for therapy.    Anticipated Disposition: per primary    Thank you for your consult. I will sign off. Please contact us if you have any additional questions.    Marialuisa Ray MD  Infectious Disease  [facility] - Medical ICU

## 2024-12-26 NOTE — ASSESSMENT & PLAN NOTE
--Nephrology consulted--appreciate input   --SLED yesterday.   - tunneled catheter removed 12/22. [facility] temp HD catheter placed 12/24. Awaiting HD trial prior to step down.   - will need HD access prior to discharge.

## 2024-12-26 NOTE — PROGRESS NOTES
[facility] - Medical ICU  Nephrology  Progress Note    Patient Name: Flakito Mcginnis  MRN: [id number]  Admission Date: 12/19/2024  Hospital Length of Stay: 7 days  Attending Provider: Nain Betts MD   Primary Care Physician: Jordin Robbins MD  Principal Problem:Septic shock    Subjective:     HPI: Patient is a pleasant 69 year old with ESRD on HD TThS who presents to the ER at the request of his HD unit for evaluation of hypotension. Patient completed his a session on Tuesday 12/17/24 and went back for a second session on 12/18/24. Review of his outpatient dialysis notes show that his post BUN dialysis values on 12/17 were 10 with a pre-HD BUN of 41 indicating a urea reduction percentage of 76% suggesting that he received DH on 12/17/24. There are also post HD treatment vital signs recorded on 12/18/24 at 1035 am which also show a pre-HD weight of 56.3 kg and post HD weight of 54.6 kg suggesting that he received an HD session yesterday as well and left at his estimated dry weight. He reported for his routine dialysis but was sent into the ER when he was found to be hypotensive in the HD unit. He reports feeling well with no signs or symptoms of hypotension prior to arrival, however he does report increased loose stool output since his HD session yesterday. He denies any increase in salt or fluid intake and tries to adhere to a low salt and 1L fluid restricting per day diet.    Nephrology has been consulted for management of his dialysis while he is admitted to the hospital. Labs on arrival showed stable electrolytes, venous blood gas showed a metabolic alkalosis with a pCO2 of 44, BNP elevated at >4,900 with no prior values to compare to, and troponin elevated at 923. Weight in the ER was 54 kg. Chest xray was obtained and showed no significant change in the cardiopulmonary status of the patient when compared to previous chest xray. Patient reports oxygen use of 4L at home and reports resolution of his dyspnea  once he was placed on his home oxygen dose.     Outpatient HD Information:  -Dialysis modality: Hemodialysis  -Outpatient HD unit: [facility]  -Nephrologist: Dr. Strickland  -HD TX days: Tuesday/Thursday/Saturday  -Last HD TX prior to hospital admission: 12/18/2024  -Residual urine: Anuric   -EDW: 54.5 kg      Interval hx: Flakito sitting up in bed this morning. He was on precedex overnight for agitation.     Objective:     Vital Signs (Most Recent):  Temp: 97 °F (36.1 °C) (12/26/24 0701)  Pulse: 91 (12/26/24 1000)  Resp: (!) 25 (12/26/24 1000)  BP: (!) 155/74 (12/26/24 0900)  SpO2: 100 % (12/26/24 1000) Vital Signs (24h Range):  Temp:  [97 °F (36.1 °C)-99 °F (37.2 °C)] 97 °F (36.1 °C)  Pulse:  [] 91  Resp:  [10-42] 25  SpO2:  [76 %-100 %] 100 %  BP: ()/(50-93) 155/74     Weight: 56 kg (123 lb 7.3 oz) (12/21/24 0641)  Body mass index is 19.93 kg/m².  Body surface area is 1.61 meters squared.    I/O last 3 completed shifts:  In: 2384.6 [P.O.:740; I.V.:1644.6]  Out: 675 [Stool:675]     Physical Exam  Constitutional:       General: He is not in acute distress.     Appearance: Normal appearance. He is not ill-appearing or toxic-appearing.   HENT:      Head: Normocephalic and atraumatic.      Right Ear: External ear normal.      Left Ear: External ear normal.      Nose: Nose normal.      Mouth/Throat:      Mouth: Mucous membranes are moist.   Eyes:      Extraocular Movements: Extraocular movements intact.   Cardiovascular:      Rate and Rhythm: Normal rate and regular rhythm.   Pulmonary:      Effort: Pulmonary effort is normal.      Comments: Fine crackles throughout.   Abdominal:      General: Abdomen is flat.      Palpations: Abdomen is soft.   Musculoskeletal:         General: Normal range of motion.      Right lower leg: Edema (2+) present.      Left lower leg: Edema (2+) present.   Skin:     Capillary Refill: Capillary refill takes less than 2 seconds.   Neurological:      General: No  focal deficit present.      Mental Status: He is alert and oriented to person, place, and time.   Psychiatric:         Mood and Affect: Mood normal.         Behavior: Behavior normal.         Thought Content: Thought content normal.         Judgment: Judgment normal.          Significant Labs:  CBC:   Recent Labs   Lab 12/26/24  0228   WBC 4.45   RBC 2.44*   HGB 7.3*   HCT 23.3*   *   MCV 96   MCH 29.9   MCHC 31.3*     CMP:   Recent Labs   Lab 12/19/24  1430 12/20/24  0351 12/26/24  0228   GLU 93   < > 101  101   CALCIUM 9.0   < > 9.0  9.0   ALBUMIN 2.8*   < > 2.4*  2.4*   PROT 6.6  --   --    *   < > 131*  131*   K 4.6   < > 4.8  4.8   CO2 29   < > 23  23   CL 95   < > 101  101   BUN 36*   < > 20  20   CREATININE 4.8*   < > 4.1*  4.1*   ALKPHOS 259*  --   --    ALT 11  --   --    AST 11  --   --    BILITOT 0.5  --   --     < > = values in this interval not displayed.     All labs within the past 24 hours have been reviewed.    Significant Imaging:  X-Ray: Reviewed    Assessment/Plan:   Renal/  ESRD on hemodialysis  After looking through his dialysis notes from yesterday, it does appear as if he completed two back-to-back sessions and left dialysis yesterday at his dry weight (54.6 kg) and reports high volume stool output since his HD session. He reports that he is on 4L chronic oxygen use at home, and is currently on that dose in the ER, and reporting resolution of his dyspnea once he was placed back on his home oxygen. Chest xray appears unchanged from prior studies.         Outpatient HD Information:  -Dialysis modality: Hemodialysis  -Outpatient HD unit: [facility] [place]  -Nephrologist: Dr. Strickland  -HD TX days: Tuesday/Thursday/Saturday  -Last HD TX prior to hospital admission: 12/18/2024  -Residual urine: Anuric   -EDW: 54.5 kg     -SLED 12/24 and 12/25. Overnight intermittently on hi flow O2. Net positive IO. Will check CXR, if increased pulm congestion may have to plan  for HD session today, otherwise next session will be tomorrow.     Thank you for your consult. I will follow-up with patient. Please contact us if you have any additional questions.    Karson White MD  Nephrology  [facility]

## 2024-12-26 NOTE — PT/OT/SLP PROGRESS
Physical Therapy Co-Treatment    Patient Name:  Flakito Mcginnis   MRN:  [id number]  Admitting Diagnosis:  Septic shock   Recent Surgery: * No surgery found *    Admit Date: 12/19/2024  Length of Stay: 7 days    Recommendations:     Discharge Recommendations: Low Intensity Therapy  Discharge Equipment Recommendations: bath bench   Barriers to discharge: None    Appropriate transfer level with nursing staff: bed <> chair and in-room ambulation with assist x 1 and RW    Plan:     During this hospitalization, patient to be seen 3 x/week to address the identified rehab impairments via gait training, therapeutic activities, therapeutic exercises, neuromuscular re-education and progress towards the established goals.  Plan of Care Expires:  01/22/25  Plan of Care Reviewed with: patient    Assessment     Flakito Mcginnis is a 69 y.o. male admitted with a medical diagnosis of Septic shock. Pt tolerated session well today. Goals of session were to increase functional strength and progress static/dynamic balance via standing trials. Pt reporting increased fatigue on this date but was agreeable to performing static/dynamic standing trials for strengthening this date. He was able to tolerate standing for 10 minutes with VSS and no LOB demonstrating improving cardiopulmonary endurance to tolerate in-home mobility. At end of session pt with sudden wave of nausea and episode of emesis. RN came to room to address. Pt is still well below their baseline level of function and presents with deficits affecting mobility as per problem list below. Based on clinical presentation, co-morbidities, and today's performance, patient would benefit from skilled physical therapy services to progress functional transfers, improve musculoskeletal strength, as well as increase pt's cardiopulmonary endurance to maximize pt's independence and return to PLOF.    Problem List: weakness, impaired endurance, impaired functional mobility, impaired self care  "skills, gait instability, impaired balance, decreased lower extremity function, decreased upper extremity function, pain, impaired cardiopulmonary response to activity, decreased ROM.  Rehab Prognosis: Good; patient would benefit from acute skilled PT services to address these deficits and reach maximum level of function.      Goals:   Multidisciplinary Problems       Physical Therapy Goals          Problem: Physical Therapy    Goal Priority Disciplines Outcome Interventions   Physical Therapy Goal     PT, PT/OT Progressing    Description: Goals to be met by: 2025     Patient will increase functional independence with mobility by performin. Sit to stand transfer with Modified [place]  2. Bed to chair transfer with Supervision using LRAD  3. Gait  x 250 feet with Supervision using LRAD.   4. Stand for 5 minutes with Supervision using LRAD while performing dynamic balance tasks  5. Lower extremity exercise program x30 reps per handout, with independence                         Subjective     RN notified prior to session. No family/friends present upon PT entrance into room. Patient agreeable to PT evaluation.    Chief Complaint: "I get really sleepy after I eat, I don't know how much I can walk"  Patient/Family Comments/goals: get stronger  Pain/Comfort:  Pain Rating 1: 0/10  Pain Rating Post-Intervention 1: 0/10    Objective:     Additional staff present: OT; OT for cotx due to pt's multiple medical comorbidities and functional deficits req'ing two skilled therapists to appropriately progress pt's musculoskeletal strength, neuromuscular control, and endurance while taking into consideration severe medical acuity in the ICU    Patient found up in chair with: telemetry, blood pressure cuff, pulse ox (continuous), oxygen   Cognition:   Alert and Cooperative  Patient is oriented to Person, Place, Time, Situation  Command following: Follows multistep verbal commands  Fluency: clear/fluent  General " "Precautions: Standard, Cardiac fall   Orthopedic Precautions:N/A   Braces: N/A   Body mass index is 19.93 kg/m².  Oxygen Device: Nasal Cannula 9L  Vitals: BP 98/67 (BP Location: Left arm, Patient Position: Lying)   Pulse 89   Temp 96.8 °F (36 °C) (Oral)   Resp 14   Ht 5' 6" (1.676 m)   Wt 56 kg (123 lb 7.3 oz)   SpO2 98%   BMI 19.93 kg/m²     Outcome Measures:  AM-PAC 6 CLICK MOBILITY  Turning over in bed (including adjusting bedclothes, sheets and blankets)?: 3  Sitting down on and standing up from a chair with arms (e.g., wheelchair, bedside commode, etc.): 3  Moving from lying on back to sitting on the side of the bed?: 3  Moving to and from a bed to a chair (including a wheelchair)?: 3  Need to walk in hospital room?: 3  Climbing 3-5 steps with a railing?: 2  Basic Mobility Total Score: 17     Functional Mobility:    Bed Mobility:   Pt found/returned to bedside chair    Transfers:   Sit <> Stand Transfer: contact guard assistance with rolling walker. [id number] from bedside chair    Standing Balance:  Static Standing Balance: Fair : able to stand unsupported without UE support and without LOB for 1 minute  Dynamic Standing Balance: Fair : stand independently unsupported, weight shift, and reach ipsilaterally. LOB noted when crossing midline.  Assistance Level Required: Contact Guard Assistance  Patient used: intermittent use of 1 UE support   Time: 10 minutes  Postural deviations noted: slouched posture, rounded shoulders, forward head, and increased kyphosis  Comments: Time in unsupported standing focused on cardiopulmonary tolerance to unsupported standing as well as strength/endurance to perform dynamic balance activity safely. Intermittent use of 1 UE support needed throughout and pt able to complete balance challenges with anterior reaches inside FANY with no LOB while performing standing balance task at sink. PT facilitating appropriate balance response including reaches anteriorly inside and outside " FANY as OT assisted with ADLs.      Gait:   Patient ambulated: 8 ft + standing task + 8 ft   Patient required: contact guard assistance  Patient used:  rolling walker   Gait Pattern observed: reciprocal gait  Gait Deviation(s): decreased step length, narrow base of support, decreased weight shift, decreased foot clearance, flexed posture, decreased michaela, and shuffle gait  Impairments due to: impaired balance, pain, decreased strength, and decreased endurance  Portable Supplemental O2 10L utilized  all lines remained intact throughout ambulation trial  Comments: Patient required cues for position in walker, stride length, and upright posture to increase independence and safety. Patient required cues ~ 50% of the time. Pt req'ing frequent vc's for upright posture, stride length throughout. Much improved gait quality and speed when able to maintain cues from PT. After conclusion of gait trial pt with c/o worsening nausea and had an episode of emesis. RN arrived to room to address.    Education:  Time provided for education, counseling and discussion of health disposition in regards to patient's current status  All questions answered within PT scope of practice and to patient's satisfaction  PT role in POC to address current functional deficits  Pt educated on proper body mechanics, safety techniques, and energy conservation with PT facilitation and cueing throughout session  Call nursing/pct to transfer to chair/use bathroom. Pt stated understanding.  RW ordered for in room use with nursing staff  Whiteboard updated with therapist name and pt's current mobility status documented above  Safe to perform step transfer to/from chair/bedside commode assist x 1 and RW w/ nursing/PCT present  Importance of OOB tolerance prn hrs/day to improve lung ventilation and expansion as well as strengthen postural musculature    Patient left up in chair with all lines intact, call button in reach, and RN present.    Time Tracking:      PT Received On: 12/26/24  PT Start Time: 1128     PT Stop Time: 1156  PT Total Time (min): 28 min     Billable Minutes:   Gait Training 10 minutes and Therapeutic Activity 18 minutes    Treatment Type: Treatment  PT/PTA: PT       12/26/2024

## 2024-12-26 NOTE — ASSESSMENT & PLAN NOTE
Patient with Hypoxic Respiratory failure which is Acute.  he is not on home oxygen. Supplemental oxygen was provided and noted- Oxygen Concentration (%):  [70-73] 70    .   Signs/symptoms of respiratory failure include- tachypnea, increased work of breathing, respiratory distress, and use of accessory muscles. Contributing diagnoses includes - CHF Labs and images were reviewed. Patient Has recent ABG, which has been reviewed. Will treat underlying causes and adjust management of respiratory failure as follows-   - continue to wean oxygen to goal SaO2 of 90%  - aggressive pulmonary toilet in setting of atelectasis of left lower lung field.

## 2024-12-26 NOTE — PROGRESS NOTES
"[facility] - Medical ICU  Critical Care Medicine  Progress Note    Patient Name: Flakito Mcginnis  MRN: [id number]  Admission Date: 12/19/2024  Hospital Length of Stay: 7 days  Code Status: Full Code  Attending Provider: Nain Betts MD  Primary Care Provider: Jordin Robbins MD   Principal Problem: Septic shock    Subjective:     HPI:  Mr. Flakito Mcginnis is a 69 y.o. man w/ HFrEF (30% 8/2024 from 20-25% 6/2024), NICM, MR, ESRD on HD, Crohn's s/p colostomy, h/o R nephrectomy.  He presented to [facility] ED from his HD center on 12/19 due to hypotension and ongoing shortness of breath.  He usually receives his dialysis on T, R, S and went on Wednesday for an extra session for volume removal.  He arrived on Thursday for his usually scheduled dialysis session and was directed to the ED due to hypotension.  On arrival in the ED his blood pressure was 78/51.  He was found to have a BNP >4900 and CXR concerning for volume overload.  High sensitivity troponin 923.  Critical care medicine was consulted for hypotension and admitted to MICU.      Hospital/ICU Course:  12/20:  On low-dose vasopressor with norepinephrine.  Patient has had increasing oxygen requirements overnight.  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  Started on empiric antibiotic therapy with cefepime and daptomycin.  Infectious Diseases consulted.    Interval History/Significant Events:   Anxiety and poor sleep. Receive haldol for "agitation." Placed on aervo overnight for hypoxia.     Review of Systems  Objective:     Vital Signs (Most Recent):  Temp: 96.8 °F (36 °C) (12/26/24 1100)  Pulse: 93 (12/26/24 1200)  Resp: (!) 26 (12/26/24 1200)  BP: (!) 148/82 (12/26/24 1200)  SpO2: 95 % (12/26/24 1200) Vital Signs (24h Range):  Temp:  [96.8 °F (36 °C)-99 °F (37.2 °C)] 96.8 °F (36 °C)  Pulse:  [] 93  Resp:  [10-40] 26  SpO2:  [76 %-100 %] 95 %  BP: ()/(50-93) 148/82   Weight: 56 kg (123 lb 7.3 oz)  Body mass index is " 19.93 kg/m².      Intake/Output Summary (Last 24 hours) at 12/26/2024 1258  Last data filed at 12/26/2024 0126  Gross per 24 hour   Intake 248.06 ml   Output 175 ml   Net 73.06 ml      Physical Exam  Vitals and nursing note reviewed.   Constitutional:       General: He is not in acute distress.     Appearance: Normal appearance. He is normal weight. He is ill-appearing. He is not toxic-appearing or diaphoretic.   HENT:      Head: Normocephalic and atraumatic.      Right Ear: External ear normal.      Left Ear: External ear normal.      Nose: Nose normal.      Mouth/Throat:      Mouth: Mucous membranes are moist.   Eyes:      General: No scleral icterus.     Extraocular Movements: Extraocular movements intact.      Conjunctiva/sclera: Conjunctivae normal.      Pupils: Pupils are equal, round, and reactive to light.   Cardiovascular:      Rate and Rhythm: Normal rate and regular rhythm.      Pulses: Normal pulses.      Heart sounds: Murmur heard.      No friction rub. No gallop.      Comments: No JVD or peripheral edema  Pulmonary:      Effort: Pulmonary effort is normal. No respiratory distress.      Breath sounds: Normal breath sounds.      Comments: Mildly increased respiratory effort with bibasilar crackles  Abdominal:      General: Abdomen is flat. Bowel sounds are normal. There is no distension.      Palpations: Abdomen is soft.      Tenderness: There is no abdominal tenderness. There is no guarding or rebound.   Musculoskeletal:         General: Tenderness (left shoulder) and deformity (left shoulder) present. Normal range of motion.      Cervical back: Normal range of motion.      Right lower leg: No edema (trace).      Left lower leg: No edema (trace).      Comments: Kyphosis.    Skin:     General: Skin is warm and dry.      Coloration: Skin is not jaundiced or pale.   Neurological:      General: No focal deficit present.      Mental Status: He is alert and oriented to person, place, and time. Mental status  is at baseline.   Psychiatric:         Mood and Affect: Mood normal.         Behavior: Behavior normal.         Thought Content: Thought content normal.         Judgment: Judgment normal.        Vents:  Oxygen Concentration (%): 70 (12/26/24 0900)  Lines/Drains/Airways       Central Venous Catheter Line  Duration             Trialysis (Dialysis) Catheter 12/24/24 1012 right internal jugular 2 days              Drain  Duration                  Colostomy 12/19/24 2225 RLQ 6 days              Peripheral Intravenous Line  Duration                  Peripheral IV - Single Lumen 12/19/24 1428 20 G Anterior;Right Forearm 6 days         Peripheral IV - Single Lumen 12/22/24 1611 20 G Anterior;Right;Medial Upper Arm 3 days                  Significant Labs:    CBC/Anemia Profile:  Recent Labs   Lab 12/25/24  0421 12/25/24  0559 12/26/24  0228   WBC 4.86 4.45 4.45   HGB 6.9* 7.1* 7.3*   HCT 22.1* 22.7* 23.3*    160 143*   MCV 96 96 96   RDW 16.0* 16.0* 16.1*      Chemistries:  Recent Labs   Lab 12/25/24  1519 12/25/24  2048 12/26/24 0228   *  131* 131*  131* 131*  131*   K 5.2*  5.2* 5.1  5.1 4.8  4.8     102 101  101 101  101   CO2 20*  20* 22*  22* 23  23   BUN 15  15 17  17 20  20   CREATININE 3.7*  3.7* 4.1*  4.1* 4.1*  4.1*   CALCIUM 9.2  9.2 8.8  8.8 9.0  9.0   ALBUMIN 2.4*  2.4* 2.3*  2.3* 2.4*  2.4*   MG 2.2  2.2 2.1  2.1 2.2  2.2   PHOS 4.0  4.0 4.4  4.4 4.5  4.5  4.5     All pertinent labs within the past 24 hours have been reviewed.    Significant Imaging:  I have reviewed all pertinent imaging results/findings within the past 24 hours.    ABG  Recent Labs   Lab 12/19/24  1433   PH 7.454*   PO2 34*   PCO2 44.1   HCO3 31.0*   BE 7*     Assessment/Plan:     Pulmonary  Acute respiratory failure with hypoxia  Patient with Hypoxic Respiratory failure which is Acute.  he is not on home oxygen. Supplemental oxygen was provided and noted- Oxygen Concentration (%):  [70-73]  "70    .   Signs/symptoms of respiratory failure include- tachypnea, increased work of breathing, respiratory distress, and use of accessory muscles. Contributing diagnoses includes - CHF Labs and images were reviewed. Patient Has recent ABG, which has been reviewed. Will treat underlying causes and adjust management of respiratory failure as follows-   - continue to wean oxygen to goal SaO2 of 90%  - aggressive pulmonary toilet in setting of atelectasis of left lower lung field.     Cardiac/Vascular  Endocarditis  See "septic shock"  - despite size, patient would be a poor surgical candidate for valve replacement in setting of Age and comorbidities. Increased risk of stroke based on size and location.     NSTEMI (non-ST elevated myocardial infarction)  High sensitivity troponin troponin 923.  EKG with t wave inversions.  No complaints of chest pain.      --Cardiology following  -- Troponin down trending  --Continuous cardiac monitoring  --EKG PRN     HFrEF (heart failure with reduced ejection fraction)  HFrEF (30% 8/2024 from 20-25% 6/2024) per cardiology notes.  Suspect volume overload with history HF and ESRD.    --line holiday, temp HD catheter placed 12/24. SLED with UF 12/24, awaiting HD trial. Fluid restriction.    --Echo showing EF of 20-25% with large AV vegetation partially occluding LVOT with moderate AV regurgitation.   --Cardiology following  --troponin down trending   --Continuous cardiac monitoring   --EKG PRN     Hypertension  Holding until ensure toleration of HD.     Renal/  Hyperkalemia  Due to ESRD. SLED for 8 hours today.     History of nephrectomy  Noted. See ESRD.     ESRD on hemodialysis  --Nephrology consulted--appreciate input   --SLED yesterday.   - tunneled catheter removed 12/22. RI temp HD catheter placed 12/24. Awaiting HD trial prior to step down.   - will need HD access prior to discharge.     ID  * Septic shock  resolved  Presented with hypotension unable to receive iHD.  SBP 70s " on arrival to ED.  On exam patient with MAP 65-70 not on vasopressor support, but suspect will need vasopressors for dialysis. No obvious signs of infection on exam, tunneled cath dry/intact. Reports no cough, urinary symptoms.  WBC normal.  Suspect shock likely cardiogenic in setting of HF and ESRD with volume overload.      -- blood cultures + for staph, most recent cultures remain positive with plan for tunneled catheter line removal today. Repeat blood cultures pending.   --Vegetation found on aortic valve on echocardiogram. Consult cardiothoracic surgery- appreciate recs, not a surgical candidate.   --Infectious diseases consult--appreciate input   --Started on cefepime and daptomycin given reported vancomycin allergy; discontinue cefepime 12/22. Deescalate daptomycin to cefazolin.     Coagulase negative Staphylococcus bacteremia  Deescalated from daptomycin to cefazolin 12/24. 6 weeks treatment post last negative Bcx (12/23)        Critical Care Daily Checklist:     A: Awake: RASS Goal/Actual Goal:    Actual:     B: Spontaneous Breathing Trial Performed?     C: SAT & SBT Coordinated?  NA                      D: Delirium: CAM-ICU     E: Early Mobility Performed? Yes   F: Feeding Goal:    Status:               Current Diet Order   Procedures    Diet Renal Fluid - 1000mL       Order Specific Question:   Fluid restriction:       Answer:   Fluid - 1000mL      AS: Analgesia/Sedation NA   T: Thromboembolic Prophylaxis Reduced dose lovenox.    H: HOB > 300 No   U: Stress Ulcer Prophylaxis (if needed) No   G: Glucose Control At goal   B: Bowel Function  Adequate.   I: Indwelling Catheter (Lines & Alberts) Necessity RIJ temp HD catheter   D: De-escalation of Antimicrobials/Pharmacotherapies Deescalated dapto to cefazolin 12/24. 6 weeks abx      Plan for the day/ETD Continue abx, titrate oxygen. RRT per nephrology.      Code Status:  Family/Goals of Care: Full Code         Critical Care Time: 45 minutes  Critical secondary  to Patient has a condition that poses threat to life and bodily function: acute respiratory failure with hypoxia.      Critical care was time spent personally by me on the following activities: development of treatment plan with patient or surrogate and bedside caregivers, discussions with consultants, evaluation of patient's response to treatment, examination of patient, ordering and performing treatments and interventions, ordering and review of laboratory studies, ordering and review of radiographic studies, pulse oximetry, re-evaluation of patient's condition. This critical care time did not overlap with that of any other provider or involve time for any procedures.     Nain Betts MD  Critical Care Medicine  [facility]

## 2024-12-26 NOTE — PT/OT/SLP PROGRESS
Occupational Therapy   Treatment    Name: Flakito Mcginnis  MRN: [id number]  Admitting Diagnosis:  Septic shock       Recommendations:     Discharge Recommendations: Low Intensity Therapy  Discharge Equipment Recommendations:  bath bench  Barriers to discharge:  None    Assessment:     Flakito Mcginnis is a 69 y.o. male with a medical diagnosis of Septic shock. Performance deficits affecting function are weakness, impaired endurance, impaired self care skills, impaired functional mobility, gait instability, impaired balance, impaired cardiopulmonary response to activity, pain. Patient cleared for therapy and ambulated with RW to sink for ADLs. After patient returned to chair, patient began to have an episode of vomiting. Nurse arrived to room and aware. Patient would benefit from continued skilled acute OT 3x/wk to improve functional mobility, increase independence with ADLs, and address established goals. Recommending low intensity therapy once medically appropriate for discharge to increase maximal independence, reduce burden of care, and ensure safety.     Rehab Prognosis:  Good; patient would benefit from acute skilled OT services to address these deficits and reach maximum level of function.       Plan:     Patient to be seen 3 x/week to address the above listed problems via self-care/home management, therapeutic activities, therapeutic exercises  Plan of Care Expires: 01/23/25  Plan of Care Reviewed with: patient    Subjective     Chief Complaint: fatigue  Patient/Family Comments/goals: patient agreed to therapy  Pain/Comfort:  Location - Side 1: Right  Location - Orientation 1: generalized  Location 1: shoulder  Pain Addressed 1: Reposition, Distraction    Objective:     Communicated with: JOSH prior to session.  Patient found HOB elevated with telemetry, blood pressure cuff, pulse ox (continuous), oxygen upon OT entry to room.    General Precautions: Standard, fall    Orthopedic Precautions:N/A  Braces:  N/A  Respiratory Status: Nasal cannula, flow 9 L/min     Occupational Performance:     Functional Mobility/Transfers:  Patient completed Sit <> Stand Transfer with contact guard assistance  with  rolling walker   Functional Mobility: Patient ambulated bedside chair<>sink in room (not bathroom) with RW and CGA.     Activities of Daily Living:  Grooming: contact guard assistance<>SBA for balance as patient stood at sink for oral care and washing face. Patient also wiped hands at sink with wet wipe. Patient then needed to wipe face when seated in bedside chair after patient had an episode of vomiting to clean his mouth.        [facility] 6 Click ADL: 14    Treatment & Education:  Role of OT and POC  ADL retraining  Functional mobility training  Safety  Importance EOB/OOB activity    Co-treatment performed due to patient's multiple deficits requiring two skilled therapists to appropriately and safely assess patient's strength and endurance while facilitating functional tasks in addition to accommodating for patient's activity tolerance.     Patient left up in chair with all lines intact, call button in reach, nurse present and notified, and all needs met.     GOALS:   Multidisciplinary Problems       Occupational Therapy Goals          Problem: Occupational Therapy    Goal Priority Disciplines Outcome Interventions   Occupational Therapy Goal     OT, PT/OT Progressing    Description: Goals to be met by: 1/23/25 (1 mo)     Patient will increase functional independence with ADLs by performing:    UE Dressing with Union.  LE Dressing with Union.  Grooming while standing at sink with Union.  Toileting from toilet with Union for hygiene and clothing management.   Rolling to Bilateral with Union.   Supine to sit with Union.  Step transfer with Union  Toilet transfer to toilet with Union.                         Time Tracking:     OT Date of Treatment: 12/26/24  OT Start Time:  1129  OT Stop Time: 1157  OT Total Time (min): 28 min    Billable Minutes:Self Care/Home Management 28 12/26/2024

## 2024-12-27 PROBLEM — R06.02 SOB (SHORTNESS OF BREATH): Status: ACTIVE | Noted: 2024-12-27

## 2024-12-27 PROBLEM — Z51.5 PALLIATIVE CARE ENCOUNTER: Status: ACTIVE | Noted: 2024-12-27

## 2024-12-27 PROBLEM — R53.81 DEBILITY: Status: ACTIVE | Noted: 2024-12-27

## 2024-12-27 PROBLEM — Z71.89 ACP (ADVANCE CARE PLANNING): Status: ACTIVE | Noted: 2024-12-27

## 2024-12-27 LAB
ALBUMIN SERPL BCP-MCNC: 2.2 G/DL (ref 3.5–5.2)
ALBUMIN SERPL BCP-MCNC: 2.3 G/DL (ref 3.5–5.2)
ANION GAP SERPL CALC-SCNC: 8 MMOL/L (ref 8–16)
ANION GAP SERPL CALC-SCNC: 9 MMOL/L (ref 8–16)
BASOPHILS # BLD AUTO: 0 K/UL (ref 0–0.2)
BASOPHILS NFR BLD: 0 % (ref 0–1.9)
BUN SERPL-MCNC: 19 MG/DL (ref 8–23)
BUN SERPL-MCNC: 24 MG/DL (ref 8–23)
CALCIUM SERPL-MCNC: 8.7 MG/DL (ref 8.7–10.5)
CALCIUM SERPL-MCNC: 8.8 MG/DL (ref 8.7–10.5)
CHLORIDE SERPL-SCNC: 101 MMOL/L (ref 95–110)
CHLORIDE SERPL-SCNC: 103 MMOL/L (ref 95–110)
CO2 SERPL-SCNC: 22 MMOL/L (ref 23–29)
CO2 SERPL-SCNC: 23 MMOL/L (ref 23–29)
CREAT SERPL-MCNC: 4 MG/DL (ref 0.5–1.4)
CREAT SERPL-MCNC: 5.1 MG/DL (ref 0.5–1.4)
DIFFERENTIAL METHOD BLD: ABNORMAL
EOSINOPHIL # BLD AUTO: 0.1 K/UL (ref 0–0.5)
EOSINOPHIL NFR BLD: 2.7 % (ref 0–8)
ERYTHROCYTE [DISTWIDTH] IN BLOOD BY AUTOMATED COUNT: 15.9 % (ref 11.5–14.5)
EST. GFR  (NO RACE VARIABLE): 11.5 ML/MIN/1.73 M^2
EST. GFR  (NO RACE VARIABLE): 15.4 ML/MIN/1.73 M^2
GLUCOSE SERPL-MCNC: 104 MG/DL (ref 70–110)
GLUCOSE SERPL-MCNC: 87 MG/DL (ref 70–110)
HCT VFR BLD AUTO: 22.9 % (ref 40–54)
HGB BLD-MCNC: 7.2 G/DL (ref 14–18)
IMM GRANULOCYTES # BLD AUTO: 0.03 K/UL (ref 0–0.04)
IMM GRANULOCYTES NFR BLD AUTO: 0.7 % (ref 0–0.5)
LYMPHOCYTES # BLD AUTO: 0.3 K/UL (ref 1–4.8)
LYMPHOCYTES NFR BLD: 7.5 % (ref 18–48)
MAGNESIUM SERPL-MCNC: 2 MG/DL (ref 1.6–2.6)
MAGNESIUM SERPL-MCNC: 2.2 MG/DL (ref 1.6–2.6)
MCH RBC QN AUTO: 30.1 PG (ref 27–31)
MCHC RBC AUTO-ENTMCNC: 31.4 G/DL (ref 32–36)
MCV RBC AUTO: 96 FL (ref 82–98)
MONOCYTES # BLD AUTO: 0.4 K/UL (ref 0.3–1)
MONOCYTES NFR BLD: 9.6 % (ref 4–15)
NEUTROPHILS # BLD AUTO: 3.5 K/UL (ref 1.8–7.7)
NEUTROPHILS NFR BLD: 79.5 % (ref 38–73)
NRBC BLD-RTO: 0 /100 WBC
PHOSPHATE SERPL-MCNC: 4.2 MG/DL (ref 2.7–4.5)
PHOSPHATE SERPL-MCNC: 5.6 MG/DL (ref 2.7–4.5)
PLATELET # BLD AUTO: 150 K/UL (ref 150–450)
PMV BLD AUTO: 11 FL (ref 9.2–12.9)
POCT GLUCOSE: 111 MG/DL (ref 70–110)
POTASSIUM SERPL-SCNC: 4.1 MMOL/L (ref 3.5–5.1)
POTASSIUM SERPL-SCNC: 4.9 MMOL/L (ref 3.5–5.1)
RBC # BLD AUTO: 2.39 M/UL (ref 4.6–6.2)
SODIUM SERPL-SCNC: 132 MMOL/L (ref 136–145)
SODIUM SERPL-SCNC: 134 MMOL/L (ref 136–145)
WBC # BLD AUTO: 4.38 K/UL (ref 3.9–12.7)

## 2024-12-27 PROCEDURE — 83735 ASSAY OF MAGNESIUM: CPT | Performed by: STUDENT IN AN ORGANIZED HEALTH CARE EDUCATION/TRAINING PROGRAM

## 2024-12-27 PROCEDURE — 99233 SBSQ HOSP IP/OBS HIGH 50: CPT | Mod: GC,,, | Performed by: INTERNAL MEDICINE

## 2024-12-27 PROCEDURE — 83735 ASSAY OF MAGNESIUM: CPT | Mod: 91 | Performed by: STUDENT IN AN ORGANIZED HEALTH CARE EDUCATION/TRAINING PROGRAM

## 2024-12-27 PROCEDURE — 94761 N-INVAS EAR/PLS OXIMETRY MLT: CPT

## 2024-12-27 PROCEDURE — 99497 ADVNCD CARE PLAN 30 MIN: CPT | Mod: 25,,,

## 2024-12-27 PROCEDURE — 25000003 PHARM REV CODE 250: Performed by: INTERNAL MEDICINE

## 2024-12-27 PROCEDURE — 80069 RENAL FUNCTION PANEL: CPT | Mod: 91 | Performed by: STUDENT IN AN ORGANIZED HEALTH CARE EDUCATION/TRAINING PROGRAM

## 2024-12-27 PROCEDURE — 25000003 PHARM REV CODE 250

## 2024-12-27 PROCEDURE — 94640 AIRWAY INHALATION TREATMENT: CPT

## 2024-12-27 PROCEDURE — 99223 1ST HOSP IP/OBS HIGH 75: CPT | Mod: ,,,

## 2024-12-27 PROCEDURE — 99900035 HC TECH TIME PER 15 MIN (STAT)

## 2024-12-27 PROCEDURE — 63600175 PHARM REV CODE 636 W HCPCS: Mod: JZ,JG | Performed by: STUDENT IN AN ORGANIZED HEALTH CARE EDUCATION/TRAINING PROGRAM

## 2024-12-27 PROCEDURE — 80100014 HC HEMODIALYSIS 1:1

## 2024-12-27 PROCEDURE — 97530 THERAPEUTIC ACTIVITIES: CPT

## 2024-12-27 PROCEDURE — 80069 RENAL FUNCTION PANEL: CPT | Performed by: STUDENT IN AN ORGANIZED HEALTH CARE EDUCATION/TRAINING PROGRAM

## 2024-12-27 PROCEDURE — 85025 COMPLETE CBC W/AUTO DIFF WBC: CPT

## 2024-12-27 PROCEDURE — 27000221 HC OXYGEN, UP TO 24 HOURS

## 2024-12-27 PROCEDURE — 25000242 PHARM REV CODE 250 ALT 637 W/ HCPCS: Performed by: INTERNAL MEDICINE

## 2024-12-27 PROCEDURE — 63600175 PHARM REV CODE 636 W HCPCS: Performed by: INTERNAL MEDICINE

## 2024-12-27 PROCEDURE — 99291 CRITICAL CARE FIRST HOUR: CPT | Mod: ,,, | Performed by: INTERNAL MEDICINE

## 2024-12-27 PROCEDURE — 5A1D70Z PERFORMANCE OF URINARY FILTRATION, INTERMITTENT, LESS THAN 6 HOURS PER DAY: ICD-10-PCS | Performed by: HOSPITALIST

## 2024-12-27 PROCEDURE — 20000000 HC ICU ROOM

## 2024-12-27 PROCEDURE — 27100171 HC OXYGEN HIGH FLOW UP TO 24 HOURS

## 2024-12-27 RX ORDER — SODIUM CHLORIDE 9 MG/ML
INJECTION, SOLUTION INTRAVENOUS
Status: CANCELLED | OUTPATIENT
Start: 2024-12-27

## 2024-12-27 RX ORDER — SODIUM CHLORIDE 9 MG/ML
INJECTION, SOLUTION INTRAVENOUS ONCE
Status: CANCELLED | OUTPATIENT
Start: 2024-12-27 | End: 2024-12-27

## 2024-12-27 RX ORDER — HYDROCODONE BITARTRATE AND ACETAMINOPHEN 500; 5 MG/1; MG/1
TABLET ORAL CONTINUOUS
Status: DISCONTINUED | OUTPATIENT
Start: 2024-12-27 | End: 2024-12-27

## 2024-12-27 RX ORDER — MAGNESIUM SULFATE HEPTAHYDRATE 40 MG/ML
2 INJECTION, SOLUTION INTRAVENOUS
Status: DISCONTINUED | OUTPATIENT
Start: 2024-12-27 | End: 2024-12-27

## 2024-12-27 RX ORDER — CEFAZOLIN SODIUM 1 G/3ML
1 INJECTION, POWDER, FOR SOLUTION INTRAMUSCULAR; INTRAVENOUS
Status: DISCONTINUED | OUTPATIENT
Start: 2024-12-27 | End: 2025-01-14 | Stop reason: HOSPADM

## 2024-12-27 RX ADMIN — OXYCODONE HYDROCHLORIDE 10 MG: 10 TABLET ORAL at 04:12

## 2024-12-27 RX ADMIN — SEVELAMER CARBONATE 800 MG: 800 TABLET, FILM COATED ORAL at 07:12

## 2024-12-27 RX ADMIN — HALOPERIDOL LACTATE 5 MG: 5 INJECTION, SOLUTION INTRAMUSCULAR at 12:12

## 2024-12-27 RX ADMIN — Medication 6 MG: at 08:12

## 2024-12-27 RX ADMIN — IPRATROPIUM BROMIDE AND ALBUTEROL SULFATE 3 ML: 2.5; .5 SOLUTION RESPIRATORY (INHALATION) at 07:12

## 2024-12-27 RX ADMIN — ENOXAPARIN SODIUM 30 MG: 30 INJECTION SUBCUTANEOUS at 04:12

## 2024-12-27 RX ADMIN — CEFAZOLIN 1 G: 1 INJECTION, POWDER, FOR SOLUTION INTRAMUSCULAR; INTRAVENOUS at 08:12

## 2024-12-27 RX ADMIN — CEFAZOLIN 2 G: 2 INJECTION, POWDER, FOR SOLUTION INTRAMUSCULAR; INTRAVENOUS at 04:12

## 2024-12-27 RX ADMIN — OXYCODONE 5 MG: 5 TABLET ORAL at 12:12

## 2024-12-27 RX ADMIN — ATORVASTATIN CALCIUM 40 MG: 40 TABLET, FILM COATED ORAL at 08:12

## 2024-12-27 RX ADMIN — SODIUM ZIRCONIUM CYCLOSILICATE 10 G: 5 POWDER, FOR SUSPENSION ORAL at 08:12

## 2024-12-27 RX ADMIN — IPRATROPIUM BROMIDE AND ALBUTEROL SULFATE 3 ML: 2.5; .5 SOLUTION RESPIRATORY (INHALATION) at 12:12

## 2024-12-27 RX ADMIN — ALTEPLASE 2 MG: 2.2 INJECTION, POWDER, LYOPHILIZED, FOR SOLUTION INTRAVENOUS at 04:12

## 2024-12-27 RX ADMIN — SEVELAMER CARBONATE 800 MG: 800 TABLET, FILM COATED ORAL at 06:12

## 2024-12-27 RX ADMIN — IPRATROPIUM BROMIDE AND ALBUTEROL SULFATE 3 ML: 2.5; .5 SOLUTION RESPIRATORY (INHALATION) at 04:12

## 2024-12-27 RX ADMIN — ALTEPLASE 2 MG: 2.2 INJECTION, POWDER, LYOPHILIZED, FOR SOLUTION INTRAVENOUS at 03:12

## 2024-12-27 RX ADMIN — SODIUM ZIRCONIUM CYCLOSILICATE 10 G: 5 POWDER, FOR SUSPENSION ORAL at 03:12

## 2024-12-27 RX ADMIN — OXYCODONE HYDROCHLORIDE 10 MG: 10 TABLET ORAL at 08:12

## 2024-12-27 RX ADMIN — HALOPERIDOL LACTATE 5 MG: 5 INJECTION, SOLUTION INTRAMUSCULAR at 11:12

## 2024-12-27 RX ADMIN — OXYCODONE 5 MG: 5 TABLET ORAL at 01:12

## 2024-12-27 RX ADMIN — SEVELAMER CARBONATE 800 MG: 800 TABLET, FILM COATED ORAL at 12:12

## 2024-12-27 NOTE — ASSESSMENT & PLAN NOTE
Patient with Hypoxic Respiratory failure which is Acute.  he is not on home oxygen. Supplemental oxygen was provided and noted- Oxygen Concentration (%):  [40-60] 60    .   Signs/symptoms of respiratory failure include- tachypnea, increased work of breathing, respiratory distress, and use of accessory muscles. Contributing diagnoses includes - CHF Labs and images were reviewed. Patient Has recent ABG, which has been reviewed. Will treat underlying causes and adjust management of respiratory failure as follows-   - continue to wean oxygen to goal SaO2 of 90%  - aggressive pulmonary toilet in setting of atelectasis of left lower lung field.

## 2024-12-27 NOTE — PROGRESS NOTES
[facility]  Critical Care Medicine  Progress Note    Patient Name: Flakito Mcginnis  MRN: [id number]  Admission Date: 12/19/2024  Hospital Length of Stay: 8 days  Code Status: Full Code  Attending Provider: Nain Betts MD  Primary Care Provider: Jordin Robbins MD   Principal Problem: Septic shock    Subjective:     HPI:  Mr. Flakito Mcginnis is a 69 y.o. man w/ HFrEF (30% 8/2024 from 20-25% 6/2024), NICM, MR, ESRD on HD, Crohn's s/p colostomy, h/o R nephrectomy.  He presented to [facility] ED from his HD center on 12/19 due to hypotension and ongoing shortness of breath.  He usually receives his dialysis on T, R, S and went on Wednesday for an extra session for volume removal.  He arrived on Thursday for his usually scheduled dialysis session and was directed to the ED due to hypotension.  On arrival in the ED his blood pressure was 78/51.  He was found to have a BNP >4900 and CXR concerning for volume overload.  High sensitivity troponin 923.  Critical care medicine was consulted for hypotension and admitted to MICU.      Hospital/ICU Course:  12/20:  On low-dose vasopressor with norepinephrine.  Patient has had increasing oxygen requirements overnight.  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  Started on empiric antibiotic therapy with cefepime and daptomycin.  Infectious Diseases consulted.    Interval History/Significant Events:   NAEON. On bubble flow this AM. HD today.     Review of Systems  Objective:     Vital Signs (Most Recent):  Temp: 97.5 °F (36.4 °C) (12/27/24 1100)  Pulse: 88 (12/27/24 1200)  Resp: 19 (12/27/24 1200)  BP: 114/73 (12/27/24 1200)  SpO2: 100 % (12/27/24 1200) Vital Signs (24h Range):  Temp:  [97.5 °F (36.4 °C)-98.3 °F (36.8 °C)] 97.5 °F (36.4 °C)  Pulse:  [] 88  Resp:  [9-30] 19  SpO2:  [87 %-100 %] 100 %  BP: ()/(52-73) 114/73   Weight: 56 kg (123 lb 7.3 oz)  Body mass index is 19.93 kg/m².      Intake/Output Summary (Last 24  hours) at 12/27/2024 1303  Last data filed at 12/26/2024 1811  Gross per 24 hour   Intake --   Output 200 ml   Net -200 ml          Physical Exam  Vitals and nursing note reviewed.   Constitutional:       General: He is not in acute distress.     Appearance: Normal appearance. He is normal weight. He is ill-appearing. He is not toxic-appearing or diaphoretic.   HENT:      Head: Normocephalic and atraumatic.      Right Ear: External ear normal.      Left Ear: External ear normal.      Nose: Nose normal.      Mouth/Throat:      Mouth: Mucous membranes are moist.   Eyes:      General: No scleral icterus.     Extraocular Movements: Extraocular movements intact.      Conjunctiva/sclera: Conjunctivae normal.      Pupils: Pupils are equal, round, and reactive to light.   Cardiovascular:      Rate and Rhythm: Normal rate and regular rhythm.      Pulses: Normal pulses.      Heart sounds: Murmur heard.      No friction rub. No gallop.      Comments: No JVD or peripheral edema  Pulmonary:      Effort: Pulmonary effort is normal. No respiratory distress.      Breath sounds: Normal breath sounds.      Comments: Mildly increased respiratory effort with bibasilar crackles  Abdominal:      General: Abdomen is flat. Bowel sounds are normal. There is no distension.      Palpations: Abdomen is soft.      Tenderness: There is no abdominal tenderness. There is no guarding or rebound.   Musculoskeletal:         General: Tenderness (left shoulder) and deformity (left shoulder) present. Normal range of motion.      Cervical back: Normal range of motion.      Right lower leg: No edema (trace).      Left lower leg: No edema (trace).      Comments: Kyphosis.    Skin:     General: Skin is warm and dry.      Coloration: Skin is not jaundiced or pale.   Neurological:      General: No focal deficit present.      Mental Status: He is alert and oriented to person, place, and time. Mental status is at baseline.   Psychiatric:         Mood and  "Affect: Mood normal.         Behavior: Behavior normal.         Thought Content: Thought content normal.         Judgment: Judgment normal.            Vents:  Oxygen Concentration (%): 60 (12/27/24 0424)  Lines/Drains/Airways       Central Venous Catheter Line  Duration             Trialysis (Dialysis) Catheter 12/24/24 1012 right internal jugular 3 days              Drain  Duration                  Colostomy 12/19/24 2225 RLQ 7 days              Peripheral Intravenous Line  Duration                  Peripheral IV - Single Lumen 12/22/24 1611 20 G Anterior;Right;Medial Upper Arm 4 days                  Significant Labs:    CBC/Anemia Profile:  Recent Labs   Lab 12/26/24 0228 12/27/24  0332   WBC 4.45 4.38   HGB 7.3* 7.2*   HCT 23.3* 22.9*   * 150   MCV 96 96   RDW 16.1* 15.9*        Chemistries:  Recent Labs   Lab 12/25/24 2048 12/26/24 0228 12/27/24  0332   *  131* 131*  131* 132*  132*  132*  132*   K 5.1  5.1 4.8  4.8 4.9  4.9  4.9  4.9     101 101  101 101  101  101  101   CO2 22*  22* 23  23 22*  22*  22*  22*   BUN 17  17 20  20 24*  24*  24*  24*   CREATININE 4.1*  4.1* 4.1*  4.1* 5.1*  5.1*  5.1*  5.1*   CALCIUM 8.8  8.8 9.0  9.0 8.8  8.8  8.8  8.8   ALBUMIN 2.3*  2.3* 2.4*  2.4* 2.2*  2.2*  2.2*  2.2*   MG 2.1  2.1 2.2  2.2 2.2  2.2  2.2   PHOS 4.4  4.4 4.5  4.5  4.5 5.6*  5.6*  5.6*  5.6*       All pertinent labs within the past 24 hours have been reviewed.    Significant Imaging:  I have reviewed all pertinent imaging results/findings within the past 24 hours.    ABG  No results for input(s): "PH", "PO2", "PCO2", "HCO3", "BE" in the last 168 hours.  Assessment/Plan:     Pulmonary  Acute respiratory failure with hypoxia  Patient with Hypoxic Respiratory failure which is Acute.  he is not on home oxygen. Supplemental oxygen was provided and noted- Oxygen Concentration (%):  [40-60] 60    .   Signs/symptoms of respiratory failure " "include- tachypnea, increased work of breathing, respiratory distress, and use of accessory muscles. Contributing diagnoses includes - CHF Labs and images were reviewed. Patient Has recent ABG, which has been reviewed. Will treat underlying causes and adjust management of respiratory failure as follows-   - continue to wean oxygen to goal SaO2 of 90%  - aggressive pulmonary toilet in setting of atelectasis of left lower lung field.     Cardiac/Vascular  Endocarditis  See "septic shock"  - despite size, patient would be a poor surgical candidate for valve replacement in setting of Age and comorbidities. Increased risk of stroke based on size and location.     NSTEMI (non-ST elevated myocardial infarction)  High sensitivity troponin troponin 923.  EKG with t wave inversions.  No complaints of chest pain.      --Cardiology following  -- Troponin down trending  --Continuous cardiac monitoring  --EKG PRN     HFrEF (heart failure with reduced ejection fraction)  HFrEF (30% 8/2024 from 20-25% 6/2024) per cardiology notes.  Suspect volume overload with history HF and ESRD.    -- s/p line holiday, temp HD catheter placed 12/24. SLED with UF 12/24, HD trial today. Fluid restriction.    --Echo showing EF of 20-25% with large AV vegetation partially occluding LVOT with moderate AV regurgitation.   --Cardiology following  --troponin down trending   --Continuous cardiac monitoring   --EKG PRN     Hypertension  Holding until ensure toleration of HD.     Renal/  Hyperkalemia  Due to ESRD. SLED for 8 hours today.     History of nephrectomy  Noted. See ESRD.     ESRD on hemodialysis  --Nephrology consulted--appreciate input   --HD today  - tunneled catheter removed 12/22. [facility] temp HD catheter placed 12/24. Awaiting HD trial prior to step down.   - will need HD access prior to discharge.     ID  * Septic shock  resolved  Presented with hypotension unable to receive iHD.  SBP 70s on arrival to ED.  On exam patient with MAP 65-70 not " on vasopressor support, but suspect will need vasopressors for dialysis. No obvious signs of infection on exam, tunneled cath dry/intact. Reports no cough, urinary symptoms.  WBC normal.  Suspect shock likely cardiogenic in setting of HF and ESRD with volume overload.      -- blood cultures + for staph, most recent cultures remain positive with plan for tunneled catheter line removal 12/22. Repeat blood cultures NGTD.   --Vegetation found on aortic valve on echocardiogram. Consult cardiothoracic surgery- appreciate recs, not a surgical candidate.   --Infectious diseases consult--appreciate input   --Started on cefepime and daptomycin given reported vancomycin allergy; discontinue cefepime 12/22. Deescalate daptomycin to cefazolin for 6 weeks.     Coagulase negative Staphylococcus bacteremia  Deescalated from daptomycin to cefazolin 12/24. 6 weeks treatment post last negative Bcx (12/23)        Critical Care Daily Checklist:     A: Awake: RASS Goal/Actual Goal:    Actual:     B: Spontaneous Breathing Trial Performed?     C: SAT & SBT Coordinated?  NA                      D: Delirium: CAM-ICU     E: Early Mobility Performed? Yes   F: Feeding Goal:    Status:               Current Diet Order   Procedures    Diet Renal Fluid - 1000mL       Order Specific Question:   Fluid restriction:       Answer:   Fluid - 1000mL      AS: Analgesia/Sedation NA   T: Thromboembolic Prophylaxis Reduced dose lovenox.    H: HOB > 300 No   U: Stress Ulcer Prophylaxis (if needed) No   G: Glucose Control At goal   B: Bowel Function  Adequate.   I: Indwelling Catheter (Lines & Alberts) Necessity RIJ temp HD catheter   D: De-escalation of Antimicrobials/Pharmacotherapies Deescalated dapto to cefazolin 12/24. 6 weeks abx      Plan for the day/ETD Continue abx, titrate oxygen. RRT per nephrology.      Code Status:  Family/Goals of Care: Full Code         Critical Care Time: 45 minutes  Critical secondary to Patient has a condition that poses threat  to life and bodily function: acute respiratory failure with hypoxia.      Critical care was time spent personally by me on the following activities: development of treatment plan with patient or surrogate and bedside caregivers, discussions with consultants, evaluation of patient's response to treatment, examination of patient, ordering and performing treatments and interventions, ordering and review of laboratory studies, ordering and review of radiographic studies, pulse oximetry, re-evaluation of patient's condition. This critical care time did not overlap with that of any other provider or involve time for any procedures.     Nain Betts MD  Critical Care Medicine  [facility]

## 2024-12-27 NOTE — PROGRESS NOTES
[facility]  Nephrology  Progress Note    Patient Name: Flakito Mcginnis  MRN: [id number]  Admission Date: 12/19/2024  Hospital Length of Stay: 8 days  Attending Provider: Nain Betts MD   Primary Care Physician: Jordin Robbins MD  Principal Problem:Septic shock    Subjective:     HPI: Patient is a pleasant 69 year old with ESRD on HD TThS who presents to the ER at the request of his HD unit for evaluation of hypotension. Patient completed his a session on Tuesday 12/17/24 and went back for a second session on 12/18/24. Review of his outpatient dialysis notes show that his post BUN dialysis values on 12/17 were 10 with a pre-HD BUN of 41 indicating a urea reduction percentage of 76% suggesting that he received DH on 12/17/24. There are also post HD treatment vital signs recorded on 12/18/24 at 1035 am which also show a pre-HD weight of 56.3 kg and post HD weight of 54.6 kg suggesting that he received an HD session yesterday as well and left at his estimated dry weight. He reported for his routine dialysis but was sent into the ER when he was found to be hypotensive in the HD unit. He reports feeling well with no signs or symptoms of hypotension prior to arrival, however he does report increased loose stool output since his HD session yesterday. He denies any increase in salt or fluid intake and tries to adhere to a low salt and 1L fluid restricting per day diet.    Nephrology has been consulted for management of his dialysis while he is admitted to the hospital. Labs on arrival showed stable electrolytes, venous blood gas showed a metabolic alkalosis with a pCO2 of 44, BNP elevated at >4,900 with no prior values to compare to, and troponin elevated at 923. Weight in the ER was 54 kg. Chest xray was obtained and showed no significant change in the cardiopulmonary status of the patient when compared to previous chest xray. Patient reports oxygen use of 4L at home and reports resolution of his dyspnea  once he was placed on his home oxygen dose.     Outpatient HD Information:  -Dialysis modality: Hemodialysis  -Outpatient HD unit: [facility]  -Nephrologist: Dr. Strickland  -HD TX days: Tuesday/Thursday/Saturday  -Last HD TX prior to hospital admission: 12/18/2024  -Residual urine: Anuric   -EDW: 54.5 kg      Interval hx: Flakito sitting up in bed this morning. He is feeling a bit more short of breath compared to yesterday.     Objective:     Vital Signs (Most Recent):  Temp: 97.5 °F (36.4 °C) (12/27/24 0701)  Pulse: 95 (12/27/24 1000)  Resp: 14 (12/27/24 1000)  BP: (!) 153/73 (12/27/24 0900)  SpO2: 100 % (12/27/24 1000) Vital Signs (24h Range):  Temp:  [96.8 °F (36 °C)-98.3 °F (36.8 °C)] 97.5 °F (36.4 °C)  Pulse:  [] 95  Resp:  [9-30] 14  SpO2:  [87 %-100 %] 100 %  BP: ()/(53-82) 153/73     Weight: 56 kg (123 lb 7.3 oz) (12/21/24 0641)  Body mass index is 19.93 kg/m².  Body surface area is 1.61 meters squared.    I/O last 3 completed shifts:  In: 248.1 [P.O.:240; I.V.:8.1]  Out: 375 [Stool:375]     Physical Exam  Constitutional:       General: He is not in acute distress.     Appearance: Normal appearance. He is not ill-appearing or toxic-appearing.   HENT:      Head: Normocephalic and atraumatic.      Right Ear: External ear normal.      Left Ear: External ear normal.      Nose: Nose normal.      Mouth/Throat:      Mouth: Mucous membranes are moist.   Eyes:      Extraocular Movements: Extraocular movements intact.   Cardiovascular:      Rate and Rhythm: Normal rate and regular rhythm.   Pulmonary:      Effort: Pulmonary effort is normal.      Comments: Fine crackles throughout.   Abdominal:      General: Abdomen is flat.      Palpations: Abdomen is soft.   Musculoskeletal:         General: Normal range of motion.      Right lower leg: Edema (2+) present.      Left lower leg: Edema (2+) present.   Skin:     Capillary Refill: Capillary refill takes less than 2 seconds.   Neurological:       General: No focal deficit present.      Mental Status: He is alert and oriented to person, place, and time.   Psychiatric:         Mood and Affect: Mood normal.         Behavior: Behavior normal.         Thought Content: Thought content normal.         Judgment: Judgment normal.          Significant Labs:  CBC:   Recent Labs   Lab 12/27/24  0332   WBC 4.38   RBC 2.39*   HGB 7.2*   HCT 22.9*      MCV 96   MCH 30.1   MCHC 31.4*     CMP:   Recent Labs   Lab 12/27/24  0332   GLU 87  87  87  87   CALCIUM 8.8  8.8  8.8  8.8   ALBUMIN 2.2*  2.2*  2.2*  2.2*   *  132*  132*  132*   K 4.9  4.9  4.9  4.9   CO2 22*  22*  22*  22*     101  101  101   BUN 24*  24*  24*  24*   CREATININE 5.1*  5.1*  5.1*  5.1*     All labs within the past 24 hours have been reviewed.    Significant Imaging:  X-Ray: Reviewed    Assessment/Plan:   Renal/  ESRD on hemodialysis     Outpatient HD Information:  -Dialysis modality: Hemodialysis  -Outpatient HD unit: [facility]  -Nephrologist: Dr. Strickland  -HD TX days: Tuesday/Thursday/Saturday  -Last HD TX prior to hospital admission: 12/18/2024  -Residual urine: Anuric   -EDW: 54.5 kg     -On abx for endocarditis.  - SLED 12/24 and 12/25. Plan for HD session this morning as tolerated by BP. UF 1.5 L.     Thank you for your consult. I will follow-up with patient. Please contact us if you have any additional questions.    Karson White MD  Nephrology  [facility] - Medical ICU

## 2024-12-27 NOTE — PROGRESS NOTES
[facility] ICU  Adult Nutrition  Progress Note    SUMMARY       Recommendations    1) Consider soft and bite size diet per SLP/MD    2) Novasource BID to aid in energy and protein needs    3) Recommend consulting speech for swallow evaluation     4) RD monitor wt, nutritional status, skin, and labs,     Goals: meet % of EEN/EPN by next RD f/u    Nutrition Goal Status: new  Communication of RD Recs: other (comment) (POC)    Assessment and Plan  Nutrition Problem  Severe acute malnutrition    Related to (etiology):   Inadequate energy intake    Signs and Symptoms (as evidenced by):   10 lb weight loss x 1 week (7.5%) and mild depletion in temple, severe depletion in clavicle, and moderate depletion in scapular     Nutrition Diagnosis Status:   New    Nutrition Problem  Inadequate oral intake    Related to (etiology):   Difficulty swallowing     Signs and Symptoms (as evidenced by):   Diet recall, pt stating difficulty swallowing.    Interventions/Recommendations (treatment strategy):  Collaboration of nutritional care with other providers     Nutrition Diagnosis Status:   New      Malnutrition Assessment    [place] (Muscle Loss): mild depletion  Clavicle Bone Region (Muscle Loss): severe depletion  Scapular Bone Region (Muscle Loss): moderate depletion  [place] Hand (Muscle Loss): well nourished               Reason for Assessment    Reason For Assessment: length of stay  Diagnosis: septic shock     General Information Comments: Flakito is a 69 y.o. man w/ HFrEF (30% 8/2024 from 20-25% 6/2024), NICM, MR, ESRD on HD, Crohn's s/p colostomy, h/o R nephrectomy. He presented to [facility] ED from his HD center on 12/19 due to hypotension and ongoing shortness of breath. He usually receives his dialysis on T, R, S and went on Wednesday for an extra session for volume removal. He arrived on Thursday for his usually scheduled dialysis session and was directed to the ED due to hypotension.     RD triggered of LOS  "day 8. Pt's appetite has been fair but food preferences limit his PO intake. Reports difficulty swallowing and couldn't eat the rice on his lunch tray. Had 75% of BR with help of RN cutting up food into bite sized pieces. Spoke to family member on phone and both family and pt interested in supplment drinks. Pt has dealt with nausea and some emeis after meals. Pt had 10lbs weight loss in last week but also positive for edema. Colostomy output 200ml in the last 24 hours. Patient meets ASPEN criteria for severe acute malnutrition.      Nutrition Discharge Planning: adequate oral intake    Nutrition Risk Screen     Difficulty swallowing    Nutrition/Diet History    Patient Reported Diet/Restrictions/Preferences: renal  Spiritual, Cultural Beliefs, Restorationism Practices, Values that Affect Care: no  Food Allergies: NKFA  Factors Affecting Nutritional Intake: chewing difficulties/inability to chew food    Anthropometrics    Temp: 97.5 °F (36.4 °C)  Height Method: Stated  Height: 5' 6" (167.6 cm)  Height (inches): 66 in  Weight Method: Bed Scale  Weight: 56 kg (123 lb 7.3 oz)  Weight (lb): 123.46 lb  Ideal Body Weight (IBW), Male: 142 lb  BMI (Calculated): 19.9       Lab/Procedures/Meds    Pertinent Labs Reviewed: reviewed     Labs:   Recent Labs   Lab 12/28/24  0356   *   K 4.1      CO2 24   BUN 19   CREATININE 4.2*   GLU 84   CALCIUM 8.7   PHOS 4.4  4.4   MG 2.0       Pertinent Medications Reviewed: reviewed  Scheduled Meds:   albuterol-ipratropium  3 mL Nebulization Q8H    atorvastatin  40 mg Oral Daily    ceFAZolin (Ancef) IV (PEDS and ADULTS)  1 g Intravenous Q24H    enoxparin  30 mg Subcutaneous Q24H (prophylaxis, 1700)    melatonin  6 mg Oral Nightly    sevelamer carbonate  800 mg Oral TID WM    sodium zirconium cyclosilicate  10 g Oral TID           Estimated/Assessed Needs    Weight Used For Calorie Calculations: 56 kg (123 lb 7.3 oz)  Energy Calorie Requirements (kcal): 1650-6965 kcal/kg " (30-35kcal/kg)  Energy Need Method: Kcal/kg  Protein Requirements: 84-112g protein (1.5-2.0g/kg)  Weight Used For Protein Calculations: 56 kg (123 lb 7.3 oz)  Fluid Requirements (mL): per MD  Estimated Fluid Requirement Method: RDA Method  RDA Method (mL): 1680  CHO Requirement: 210g/day      Nutrition Prescription Ordered    Current Diet Order: Renal Diet - Fluid 1500ml    Evaluation of Received Nutrient/Fluid Intake    I/O:   Intake/Output Summary (Last 24 hours) at 12/28/2024 1453  Last data filed at 12/28/2024 1114  Gross per 24 hour   Intake 980 ml   Output 1742 ml   Net -762 ml       Energy Calories Required: not meeting needs  Protein Required: not meeting needs  Fluid Required: meeting needs  Tolerance: not tolerating (trouble swallowing)  % Intake of Estimated Energy Needs: 25 - 50 %  % Meal Intake: 25 - 50 %    Nutrition Risk    Level of Risk/Frequency of Follow-up: moderate - high     Monitor and Evaluation    Food and Nutrient Intake: energy intake  Physical Activity and Function: nutrition-related ADLs and IADLs  Anthropometric Measurements: weight, weight change, body mass index  Biochemical Data, Medical Tests and Procedures: electrolyte and renal panel, gastrointestinal profile, glucose/endocrine profile, inflammatory profile, lipid profile  Nutrition-Focused Physical Findings: overall appearance     Nutrition Follow-Up    RD Follow-up?: Yes    Aisha Delcid, Registration Eligible, Provisional LDN , MS

## 2024-12-27 NOTE — PHYSICIAN QUERY
Question: Please clarify the Cardiac diagnosis.    Provider Query Response:  NSTEMI/Myocardial infarction Type 2 due to (please specify): Septic shock

## 2024-12-27 NOTE — SUBJECTIVE & OBJECTIVE
Interval hx: Flakito sitting up in bed this morning. He is feeling a bit more short of breath compared to yesterday.     Objective:     Vital Signs (Most Recent):  Temp: 97.5 °F (36.4 °C) (12/27/24 0701)  Pulse: 95 (12/27/24 1000)  Resp: 14 (12/27/24 1000)  BP: (!) 153/73 (12/27/24 0900)  SpO2: 100 % (12/27/24 1000) Vital Signs (24h Range):  Temp:  [96.8 °F (36 °C)-98.3 °F (36.8 °C)] 97.5 °F (36.4 °C)  Pulse:  [] 95  Resp:  [9-30] 14  SpO2:  [87 %-100 %] 100 %  BP: ()/(53-82) 153/73     Weight: 56 kg (123 lb 7.3 oz) (12/21/24 0641)  Body mass index is 19.93 kg/m².  Body surface area is 1.61 meters squared.    I/O last 3 completed shifts:  In: 248.1 [P.O.:240; I.V.:8.1]  Out: 375 [Stool:375]     Physical Exam  Constitutional:       General: He is not in acute distress.     Appearance: Normal appearance. He is not ill-appearing or toxic-appearing.   HENT:      Head: Normocephalic and atraumatic.      Right Ear: External ear normal.      Left Ear: External ear normal.      Nose: Nose normal.      Mouth/Throat:      Mouth: Mucous membranes are moist.   Eyes:      Extraocular Movements: Extraocular movements intact.   Cardiovascular:      Rate and Rhythm: Normal rate and regular rhythm.   Pulmonary:      Effort: Pulmonary effort is normal.      Comments: Fine crackles throughout.   Abdominal:      General: Abdomen is flat.      Palpations: Abdomen is soft.   Musculoskeletal:         General: Normal range of motion.      Right lower leg: Edema (2+) present.      Left lower leg: Edema (2+) present.   Skin:     Capillary Refill: Capillary refill takes less than 2 seconds.   Neurological:      General: No focal deficit present.      Mental Status: He is alert and oriented to person, place, and time.   Psychiatric:         Mood and Affect: Mood normal.         Behavior: Behavior normal.         Thought Content: Thought content normal.         Judgment: Judgment normal.          Significant Labs:  CBC:   Recent  Labs   Lab 12/27/24  0332   WBC 4.38   RBC 2.39*   HGB 7.2*   HCT 22.9*      MCV 96   MCH 30.1   MCHC 31.4*     CMP:   Recent Labs   Lab 12/27/24 0332   GLU 87  87  87  87   CALCIUM 8.8  8.8  8.8  8.8   ALBUMIN 2.2*  2.2*  2.2*  2.2*   *  132*  132*  132*   K 4.9  4.9  4.9  4.9   CO2 22*  22*  22*  22*     101  101  101   BUN 24*  24*  24*  24*   CREATININE 5.1*  5.1*  5.1*  5.1*     All labs within the past 24 hours have been reviewed.    Significant Imaging:  X-Ray: Reviewed

## 2024-12-27 NOTE — PROGRESS NOTES
1250 Arrived to bedside for 1:1 HD session    1310 Acute HD started via R IJ trialysis; venous lumen sluggish, A Christopher MENDOZA notified

## 2024-12-27 NOTE — CONSULTS
Palliative Medicine  Consult Note       Patient Name: Flakito Mcginnis   MRN: [id number]   Admission Date: 12/19/2024   Hospital Length of Stay: 8   Attending Provider: Nain Betts MD   Consulting Provider: FALLON Mir  Primary Care Physician: Jordin Robbins MD   Principal Problem: Septic shock     Patient information was obtained from patient, relative(s), past medical records, ER records, and primary team.        Inpatient consult to Palliative Care  Consult performed by: Abby Menjivar CNS  Consult ordered by: Clemencia Vyas NP             Assessment/Plan:      Palliative Care Encounter:  Impression:  Mr. Chai Mcginnis, is a 68 y/o gentleman with hx of HfrEF (ef30%), NICM, MR, ESRD (HD TRS), R nephrectomy, Crohn's w/ colostomy. He presented to ED on 12/19 with hypotension (from HD) and was admitted with BNP >4900 and vol overload.   He has presented to ED 5 times in the last 4 months for SOB, Hypotension, and fall. He lives at home with his sister and brother in law, able to transfer self around home with walker.         NSTEMI (non-ST elevated myocardial infarction)  High sensitivity troponin troponin 923.  EKG with t wave inversions.  No complaints of chest pain.    -- Troponin down trending  -managed per primary team and speciality consult services.      HFrEF (heart failure with reduced ejection fraction)  HFrEF (30% 8/2024 from 20-25% 6/2024) per cardiology notes.  Suspect volume overload with history HF and ESRD.   --Echo showing EF of 20-25% with large AV vegetation partially occluding LVOT with moderate AV regurgitation.   -managed per primary team and speciality consult services.       ESRD on hemodialysis  - tunneled catheter removed 12/22. [facility] temp HD catheter placed 12/24. - will need HD access prior to discharge.    -managed per primary team and speciality consult services.    ID  * Septic shock/Endocarditis  -- blood cultures + for staph, tunneled catheter line removal 12/22. Repeat  "blood cultures NGTD.   --Vegetation found on aortic valve on echocardiogram, per CTS not a surgical candidate.   -managed per primary team and speciality consult services.      Palliative care consulted for GOC/ACP conversations, as pt with chronic disease, endocarditis without surgical intervention, and worsening debility, per communication with Dr. Betts.        Advance Care Planning   Advance Directives:   Living Will: No    Do Not Resuscitate Status: Yes    Agent's Name:  Sara Da Silva (sister)   Agent's Contact Number:  [phone]    Decision Making:  Patient answered questions and Family answered questions  Goals of Care: The patient and family endorses that what is most important right now is to focus on remaining as independent as possible and improvement in condition but with limits to invasive therapies    Accordingly, we have decided that the best plan to meet the patient's goals includes continuing with treatment.        MPOA: Pt wishes for his sister (Sara) to be his decision maker.   -Pt does not wish to add any other contacts into his chart.       12/27/24: Met with pt this afternoon, he is alone in room aaox3 and willing to participate in conversation. Sister, Sara, is on phone for our meeting.   -Pt and sister have good understanding of chronic nature of pt's disease and complications r/t ESRD. Answered questions r/t infection and meaning of sepsis.  -Began conversation of GOC, with consideration of pt's chronic health issues and concern for repeat hospitalizations.   -We discussed meaning of DNR and that it does not prevent treatment directed therapies. We discussed that DNR is only meant for a patient with cardiac or respiratory arrest in. Answered questions surrounding various "parts" of cpr including chest compressions, intubation, and medications. Explained that these interventions are meant to be performed together and that success of cpr is only optimal if all are done. " "  -Assured pt and family that DNR status does not prevent interventions if pt choking, with acute illness, etc.   -Pt and sister are agreeable to DNR at this time.    -communicated conversation with primary team.         Symptom Management:  -Pain: R/t shoulder pain s/p fall. Relieved by oxy at home.       -Dyspnea : Reports DALEY and relief after HD.       -Debility: pt with progressive debility and weakness, especially since this hospitalization. PT/OT following.     Summary:  -Most important goals at this time: Return to functional baseline and management of ESRD.    -Code status: DNR  -Disposition: Home       The above recommendations communicated directly to primary team on 12/27/24.     Thank you for your consult. I will follow-up with patient. Please contact us if you have any additional questions.       Subjective:     Chief Complaint:   Chief Complaint   Patient presents with    Shortness of Breath    Hypotension     Pt arrived via EMS with c/o SOB and hypotension. Dialysis center could not do dialysis d/t low BP. Pt states has been SOB x1 wk. Denies fevers.         HPI:   Per chart review: "Mr. Flakito Mcginnis is a 69 y.o. man w/ HFrEF (30% 8/2024 from 20-25% 6/2024), NICM, MR, ESRD on HD, Crohn's s/p colostomy, h/o R nephrectomy. [place] [facility] [place] center on 12[place]19 due to hypotension and ongoing shortness of breath. He usually receives his dialysis on T, R, S and went on Wednesday for an extra session for volume removal. He arrived on Thursday for his usually scheduled dialysis session and was directed to the ED due to hypotension. On arrival in the ED his blood pressure was 78/51. He was found to have a BNP >4900 and CXR concerning for volume overload. High sensitivity troponin 923. Critical care medicine was consulted for hypotension and admitted to MICU."      Hospital Course:   Admitted with septic shock, was requiring pressors, on antbx,Vegetation found on aortic valve on echocardiogra, " not a surgical candidate. PT now HDS, with plans to tx to .     Review of Symptoms      Symptom Assessment (ESAS 0-10 Scale)  Pain:  4  Dyspnea:  0  Anxiety:  0  Nausea:  0  Depression:  0  Anorexia:  0  Fatigue:  4  Insomnia:  0  Restlessness:  0  Agitation:  0         Pain Assessment:    Location(s): other (shoulder)      Performance Status:  60    Living Arrangements:  Lives with family    Psychosocial/Cultural:   See Palliative Psychosocial Note: Yes  Pt is not  and has no children. He has 2 siblings. He lives with his sister and her . He receives consider support from them, not close with brother.   **Primary  to Follow**  Palliative Care  Consult: No  Other       Location (Other): Shoulder Left       Location: left       Quality: aching        Quantity: 4/10 in intensity        Chronicity: Onset 2 month(s) ago, stable        Aggravating Factors: movement        Alleviating Factors: opiates        Associated Symptoms: none        ROS:  Review of Systems   Respiratory:  Positive for chest tightness.          Past Medical History:   Diagnosis Date    Crohn's disease 1998    ESRD (end stage renal disease) on dialysis 10/2017    Hypertension     Obstructive uropathy      Past Surgical History:   Procedure Laterality Date    COLOSTOMY  2006    DIALYSIS FISTULA CREATION Left 02/2018    NEPHRECTOMY Right     REMOVAL OF TUNNELED CENTRAL VENOUS CATHETER (CVC) N/A 5/23/2018    Procedure: PERMCATH REMOVAL-TUNNELED CVC REMOVAL;  Surgeon: Parveen Ray MD;  Location: [facility] CATH LAB;  Service: Nephrology;  Laterality: N/A;  pt coming in @930     Family History   Problem Relation Name Age of Onset    Hypertension Mother      Emphysema Father           Review of patient's allergies indicates:   Allergen Reactions    Vancomycin analogues Anaphylaxis, Other (See Comments), Shortness Of Breath and Swelling     Light headed, see's spots      Aspirin Nausea And Vomiting and Other (See  Comments)     Has crohn's disease    Other reaction(s): FLARES UP CROHNS      Has crohn's disease       Medications:    Current Facility-Administered Medications:     acetaminophen tablet 500 mg, 500 mg, Oral, Q4H PRN, Blayne Gabriel PA-C, 500 mg at 12/26/24 0120    albuterol-ipratropium 2.5 mg-0.5 mg/3 mL nebulizer solution 3 mL, 3 mL, Nebulization, Q8H, Nain Betts MD, 3 mL at 12/27/24 0745    atorvastatin tablet 40 mg, 40 mg, Oral, Daily, Alicia Galloway, MAAME, 40 mg at 12/27/24 0810    ceFAZolin injection 1 g, 1 g, Intravenous, Q24H, Nain Betts MD    dextrose 50% injection 12.5 g, 12.5 g, Intravenous, PRN, Alicia Galloway, NP    dextrose 50% injection 25 g, 25 g, Intravenous, PRN, Alicia Galloway, NP, 25 g at 12/24/24 0909    enoxaparin injection 30 mg, 30 mg, Subcutaneous, Q24H (prophylaxis, 1700), Nain Betts MD, 30 mg at 12/26/24 1626    glucagon (human recombinant) injection 1 mg, 1 mg, Intramuscular, PRN, Alicia Galloway, NP    glucose chewable tablet 16 g, 16 g, Oral, PRN, Alicia Galloway, NP    glucose chewable tablet 24 g, 24 g, Oral, PRN, Alicia Galloway, MAAME    haloperidol lactate injection 5 mg, 5 mg, Intravenous, Q6H PRN, Nain Betts MD, 5 mg at 12/27/24 0008    insulin aspart U-100 pen 0-5 Units, 0-5 Units, Subcutaneous, QID (AC + HS) PRN, Alicia Galloway, MAAME    melatonin tablet 6 mg, 6 mg, Oral, Nightly, Nain Betts MD, 6 mg at 12/26/24 2108    oxyCODONE immediate release tablet 5 mg, 5 mg, Oral, Q4H PRN, Nain Betts MD, 5 mg at 12/27/24 0007    oxyCODONE immediate release tablet Tab 10 mg, 10 mg, Oral, Q6H PRN, Nain Betts MD, 10 mg at 12/27/24 0456    sevelamer carbonate tablet 800 mg, 800 mg, Oral, TID WM, Alicia Galloway, NP, 800 mg at 12/27/24 0721    sodium chloride 0.9% flush 10 mL, 10 mL, Intravenous, PRN, Alicia Galloway, NP    sodium zirconium  cyclosilicate packet 10 g, 10 g, Oral, TID, PipejoeKimberlyGarciaAlicia hung, NP, 10 g at 12/27/24 0810         Objective:      Physical Exam:  Vitals: Temp: 97.5 °F (36.4 °C) (12/27/24 0701)  Pulse: 95 (12/27/24 1000)  Resp: 14 (12/27/24 1000)  BP: (!) 153/73 (12/27/24 0900)  SpO2: 100 % (12/27/24 1000)    Physical Exam  Neurological:      Mental Status: He is alert and oriented to person, place, and time.                 Labs:   Creatinine   Date Value Ref Range Status   12/27/2024 5.1 (H) 0.5 - 1.4 mg/dL Final   12/27/2024 5.1 (H) 0.5 - 1.4 mg/dL Final   12/27/2024 5.1 (H) 0.5 - 1.4 mg/dL Final   12/27/2024 5.1 (H) 0.5 - 1.4 mg/dL Final     POC Creatinine   Date Value Ref Range Status   12/19/2024 5.4 (H) 0.5 - 1.4 mg/dL Final      Hemoglobin   Date Value Ref Range Status   12/27/2024 7.2 (L) 14.0 - 18.0 g/dL Final      Albumin   Date Value Ref Range Status   12/27/2024 2.2 (L) 3.5 - 5.2 g/dL Final   12/27/2024 2.2 (L) 3.5 - 5.2 g/dL Final   12/27/2024 2.2 (L) 3.5 - 5.2 g/dL Final   12/27/2024 2.2 (L) 3.5 - 5.2 g/dL Final          Imaging: Reason for Exam  Priority: STAT  Dx: Shock [R57.9 (ICD-10-CM)]     View Images Vital Vitrea     Show images for Echo  Summary  Show Result Comparison     There is a 10 by 4 mm large mobile echogenic mass present in the LVOT suggestive of vegetation - blood cultures could be done if clinically indicated.    Left Ventricle: The left ventricle is mildly dilated. Mildly increased ventricular mass. Normal wall thickness. There is mild eccentric hypertrophy. Severe global hypokinesis present. Septal motion is abnormal. There is severely reduced systolic function with a visually estimated ejection fraction of 20 - 25%. Ejection fraction is approximately 20%. Quantitated ejection fraction is 25%. There is diastolic dysfunction.    Right Ventricle: Mild right ventricular enlargement. Wall thickness is normal. Right ventricle wall motion has global hypokinesis. Systolic function is  mild-moderately reduced.    Left Atrium: Left atrium is severely dilated.    Right Atrium: Right atrium is moderately dilated.    Aortic Valve: There is moderate aortic valve sclerosis. Moderately restricted motion. There is a 10 by 4 mm large mobile echogenic mass present in the LVOT suggestive of vegetation - blood cultures could be done if clinically indicated. There is moderate stenosis. Aortic valve area by VTI is 1.2 cm2. Aortic valve peak velocity is 2.6 m/s. Mean gradient is 16.5 mmHg. The dimensionless index is 0.37. There is mild to moderate aortic regurgitation.    Mitral Valve: There is moderate bileaflet sclerosis. There is moderate mitral annular calcification present. There is mild regurgitation.    Tricuspid Valve: There is moderate to  moderate-severe regurgitation.    Pulmonary Artery: There is mild pulmonary hypertension. The estimated pulmonary artery systolic pressure is 41 mmHg.    IVC/SVC: Intermediate venous pressure at 8 mmHg.     Vitals    Height Weight BMI (Calculated) BSA (Calculated - sq m) BP Pulse       77/48 184           Additional 17 min time spent on a voluntary advance care planning and /or goals of care discussion, providing emotional support, formulating, and communicating prognosis and exploring burden/benefit of various approaches of treatment.       Thank you for the opportunity to care for this patient and family.       Abby Menjivar, CNS

## 2024-12-27 NOTE — ASSESSMENT & PLAN NOTE
--Nephrology consulted--appreciate input   --HD today  - tunneled catheter removed 12/22. [facility] HD catheter placed 12/24. Awaiting HD trial prior to step down.   - will need HD access prior to discharge.

## 2024-12-27 NOTE — PLAN OF CARE
[facility] - Medical ICU  Discharge Reassessment    Primary Care Provider: Jordin Robbins MD    Expected Discharge Date: 12/31/2024    Reassessment (most recent)       Discharge Reassessment - 12/27/24 1548          Discharge Reassessment    Assessment Type Discharge Planning Reassessment     Did the patient's condition or plan change since previous assessment? No     Discharge Plan discussed with: Patient     Communicated LLUVIA with patient/caregiver Date not available/Unable to determine     Discharge Plan A Home with family     Discharge Plan B Other;Home with family   Outpatient dialysis @ [facility] in [place], [place]    DME Needed Upon Discharge  none     Transition of Care Barriers None     Why the patient remains in the hospital Requires continued medical care        Post-Acute Status    Coverage [employer] Medicare     Discharge Delays None known at this time                   Patient is a DNR per Palliative Medicine and recommendation is to continue with current tx.  PT/OT rec is low intensity therapy.      Discharge Plan A and Plan B have been determined by review of patient's clinical status, future medical and therapeutic needs, and coverage/benefits for post-acute care in coordination with multidisciplinary team members.    Estelle Retana LMSW  [facility]  [phone]

## 2024-12-27 NOTE — PROGRESS NOTES
12/27/24 1553   Post-Hemodialysis Assessment   Blood Volume Processed (Liters) 43.6 L   Duration of Treatment 157 minutes   Net Fluid Removal 942     HD tx ended 23 min early d/t hypotension, pt asymptomatic and BP improved post rinse back. Trialysis TPA locked. Pt in NAD/VSS, report given to primary RN.

## 2024-12-27 NOTE — PLAN OF CARE
MICU DAILY GOALS     Family/Goals of care/Code Status   Code Status: Full Code    24H Vital Sign Range  Temp:  [96.8 °F (36 °C)-98.3 °F (36.8 °C)]   Pulse:  [74-97]   Resp:  [9-30]   BP: ()/(53-82)   SpO2:  [87 %-100 %]      Shift Events (include procedures and significant events)   No acute events throughout shift, PRN meds given. See MAR.    AWAKE RASS: Goal -    Actual - RASS (Sam Agitation-Sedation Scale): alert and calm    Restraint necessity: Not necessary   BREATHE SBT: Not intubated    Coordinate A & B, analgesics/sedatives Pain: managed   SAT: Not intubated   Delirium CAM-ICU:     Early(intubated/ Progressive (non-intubated) Mobility MOVE Screen (INTUBATED ONLY): Not intubated    Activity: Activity Management: Up in chair - L3   Feeding/Nutrition Diet order: Diet/Nutrition Received: restrict fluids, Specialty Diet/Nutrition Received: renal diet   Thrombus DVT prophylaxis: VTE Core Measure: Pharmacological prophylaxis initiated/maintained   HOB Elevation Head of Bed (HOB) Positioning: HOB at 30 degrees   Ulcer Prophylaxis GI: yes   Glucose control managed Glycemic Management: blood glucose monitored   Skin Skin assessment:     Sacrum intact/not altered? Yes  Heels intact/not altered? Yes  Surgical wound? No    CHECK ONE!   (no altered skin or altered skin) and sub boxes:  [] No Altered Skin Integrity Present    []Prevention Measures Documented    [x] Altered Skin Integrity Present or Discovered   [x] LDA present in EPIC, daily doc completed              [] LDA added if not in EPIC (describe wound).                    When describing wound, do not stage, use descriptive words only.    [] Wound Image Taken (required on admit,                   transfer/discharge and every Tuesday)    Wound Care Consulted? No   Bowel Function no issues    Indwelling Catheter Necessity      Trialysis (Dialysis) Catheter 12/24/24 1012 right internal jugular-Line Necessity Review: CRRT/HD  [REMOVED]      Hemodialysis  Catheter right subclavian-Line Necessity Review: CRRT/HD     De-escalation Antibiotics Yes        VS and assessment per flow sheet, patient progressing towards goals as tolerated, plan of care reviewed with  Flakito Mcginnis , all concerns addressed, will continue to monitor.

## 2024-12-27 NOTE — ASSESSMENT & PLAN NOTE
resolved  Presented with hypotension unable to receive iHD.  SBP 70s on arrival to ED.  On exam patient with MAP 65-70 not on vasopressor support, but suspect will need vasopressors for dialysis. No obvious signs of infection on exam, tunneled cath dry/intact. Reports no cough, urinary symptoms.  WBC normal.  Suspect shock likely cardiogenic in setting of HF and ESRD with volume overload.      -- blood cultures + for staph, most recent cultures remain positive with plan for tunneled catheter line removal 12/22. Repeat blood cultures NGTD.   --Vegetation found on aortic valve on echocardiogram. Consult cardiothoracic surgery- appreciate recs, not a surgical candidate.   --Infectious diseases consult--appreciate input   --Started on cefepime and daptomycin given reported vancomycin allergy; discontinue cefepime 12/22. Deescalate daptomycin to cefazolin for 6 weeks.

## 2024-12-27 NOTE — PT/OT/SLP PROGRESS
Occupational Therapy   Treatment    Name: Flakito Mcginnis  MRN: [id number]  Admitting Diagnosis:  Septic shock       Recommendations:     Discharge Recommendations: Low Intensity Therapy  Discharge Equipment Recommendations:  bath bench  Barriers to discharge:  None    Assessment:     Flakito Mcginnis is a 69 y.o. male with a medical diagnosis of Septic shock.  Pt presents with decreased endurance and impaired mobility performance as limited by cardiovascular status and generalized weakness.Pt seen on second attempt today and agreeable for therapy. Session focused on mobility in room today with pt needing seated break on couch. VS WNL with pt on 4L 02. Pt reporting feeling 'malaise' and chronic discomfort with B shoulders. Patient currently demonstrates a need for low intensity therapy on a scheduled basis secondary to a decline in functional status due to illness   Performance deficits affecting function are gait instability, decreased upper extremity function, weakness, impaired endurance, impaired balance, decreased lower extremity function, impaired self care skills, impaired functional mobility.     Rehab Prognosis:  Good; patient would benefit from acute skilled OT services to address these deficits and reach maximum level of function.       Plan:     Patient to be seen 3 x/week to address the above listed problems via self-care/home management, therapeutic activities, therapeutic exercises  Plan of Care Expires: 01/23/25  Plan of Care Reviewed with: patient    Subjective     Chief Complaint: chronic shoulder pain   Patient/Family Comments/goals: to go home   Pain/Comfort:  Pain Rating 1: other (see comments) (chronic pain in BUEs)  Pain Addressed 2: Reposition, Distraction, Cessation of Activity    Objective:     Communicated with: RN prior to session.  Patient found up in chair with telemetry, blood pressure cuff, pulse ox (continuous), oxygen upon OT entry to room.    General Precautions: Standard, fall     Orthopedic Precautions:N/A  Braces: N/A  Respiratory Status: Nasal cannula, flow 4 L/min     Occupational Performance:     Functional Mobility/Transfers:  Patient completed Sit <> Stand Transfer with contact guard assistance  with  rolling walker   Functional Mobility: Pt stood x2 today (once from chair, once from couch) with CGA using a RW    Activities of Daily Living:  Upper Body Dressing: maximal assistance donning gown around back      AMPAC 6 Click ADL: 14    Treatment & Education:  Pt educated on role of occupational therapy, POC, and safety during ADLs and functional mobility. Pt and OT discussed importance of safe, continued mobility to optimize daily living skills. Pt verbalized understanding.   White board updated during session. Pt given instruction to call for medical staff/nurse for assistance.       Patient left up in chair with all lines intact, call button in reach, and RN notified    GOALS:   Multidisciplinary Problems       Occupational Therapy Goals          Problem: Occupational Therapy    Goal Priority Disciplines Outcome Interventions   Occupational Therapy Goal     OT, PT/OT Progressing    Description: Goals to be met by: 1/23/25 (1 mo)     Patient will increase functional independence with ADLs by performing:    UE Dressing with Velva.  LE Dressing with Velva.  Grooming while standing at sink with Velva.  Toileting from toilet with Velva for hygiene and clothing management.   Rolling to Bilateral with Velva.   Supine to sit with Velva.  Step transfer with Velva  Toilet transfer to toilet with Velva.                         Time Tracking:     OT Date of Treatment: 12/27/24  OT Start Time: 1051  OT Stop Time: 1115  OT Total Time (min): 24 min    Billable Minutes:Therapeutic Activity 24 min    OT/HAN: OT          12/27/2024

## 2024-12-27 NOTE — ASSESSMENT & PLAN NOTE
HFrEF (30% 8/2024 from 20-25% 6/2024) per cardiology notes.  Suspect volume overload with history HF and ESRD.    -- s/p line holiday, temp HD catheter placed 12/24. SLED with UF 12/24, HD trial today. Fluid restriction.    --Echo showing EF of 20-25% with large AV vegetation partially occluding LVOT with moderate AV regurgitation.   --Cardiology following  --troponin down trending   --Continuous cardiac monitoring   --EKG PRN

## 2024-12-27 NOTE — SUBJECTIVE & OBJECTIVE
Interval History/Significant Events:   NAEON. On bubble flow this AM. HD today.     Review of Systems  Objective:     Vital Signs (Most Recent):  Temp: 97.5 °F (36.4 °C) (12/27/24 1100)  Pulse: 88 (12/27/24 1200)  Resp: 19 (12/27/24 1200)  BP: 114/73 (12/27/24 1200)  SpO2: 100 % (12/27/24 1200) Vital Signs (24h Range):  Temp:  [97.5 °F (36.4 °C)-98.3 °F (36.8 °C)] 97.5 °F (36.4 °C)  Pulse:  [] 88  Resp:  [9-30] 19  SpO2:  [87 %-100 %] 100 %  BP: ()/(52-73) 114/73   Weight: 56 kg (123 lb 7.3 oz)  Body mass index is 19.93 kg/m².      Intake/Output Summary (Last 24 hours) at 12/27/2024 1303  Last data filed at 12/26/2024 1811  Gross per 24 hour   Intake --   Output 200 ml   Net -200 ml          Physical Exam  Vitals and nursing note reviewed.   Constitutional:       General: He is not in acute distress.     Appearance: Normal appearance. He is normal weight. He is ill-appearing. He is not toxic-appearing or diaphoretic.   HENT:      Head: Normocephalic and atraumatic.      Right Ear: External ear normal.      Left Ear: External ear normal.      Nose: Nose normal.      Mouth/Throat:      Mouth: Mucous membranes are moist.   Eyes:      General: No scleral icterus.     Extraocular Movements: Extraocular movements intact.      Conjunctiva/sclera: Conjunctivae normal.      Pupils: Pupils are equal, round, and reactive to light.   Cardiovascular:      Rate and Rhythm: Normal rate and regular rhythm.      Pulses: Normal pulses.      Heart sounds: Murmur heard.      No friction rub. No gallop.      Comments: No JVD or peripheral edema  Pulmonary:      Effort: Pulmonary effort is normal. No respiratory distress.      Breath sounds: Normal breath sounds.      Comments: Mildly increased respiratory effort with bibasilar crackles  Abdominal:      General: Abdomen is flat. Bowel sounds are normal. There is no distension.      Palpations: Abdomen is soft.      Tenderness: There is no abdominal tenderness. There is no  guarding or rebound.   Musculoskeletal:         General: Tenderness (left shoulder) and deformity (left shoulder) present. Normal range of motion.      Cervical back: Normal range of motion.      Right lower leg: No edema (trace).      Left lower leg: No edema (trace).      Comments: Kyphosis.    Skin:     General: Skin is warm and dry.      Coloration: Skin is not jaundiced or pale.   Neurological:      General: No focal deficit present.      Mental Status: He is alert and oriented to person, place, and time. Mental status is at baseline.   Psychiatric:         Mood and Affect: Mood normal.         Behavior: Behavior normal.         Thought Content: Thought content normal.         Judgment: Judgment normal.            Vents:  Oxygen Concentration (%): 60 (12/27/24 0424)  Lines/Drains/Airways       Central Venous Catheter Line  Duration             Trialysis (Dialysis) Catheter 12/24/24 1012 right internal jugular 3 days              Drain  Duration                  Colostomy 12/19/24 2225 RLQ 7 days              Peripheral Intravenous Line  Duration                  Peripheral IV - Single Lumen 12/22/24 1611 20 G Anterior;Right;Medial Upper Arm 4 days                  Significant Labs:    CBC/Anemia Profile:  Recent Labs   Lab 12/26/24 0228 12/27/24  0332   WBC 4.45 4.38   HGB 7.3* 7.2*   HCT 23.3* 22.9*   * 150   MCV 96 96   RDW 16.1* 15.9*        Chemistries:  Recent Labs   Lab 12/25/24 2048 12/26/24 0228 12/27/24  0332   *  131* 131*  131* 132*  132*  132*  132*   K 5.1  5.1 4.8  4.8 4.9  4.9  4.9  4.9     101 101  101 101  101  101  101   CO2 22*  22* 23  23 22*  22*  22*  22*   BUN 17  17 20  20 24*  24*  24*  24*   CREATININE 4.1*  4.1* 4.1*  4.1* 5.1*  5.1*  5.1*  5.1*   CALCIUM 8.8  8.8 9.0  9.0 8.8  8.8  8.8  8.8   ALBUMIN 2.3*  2.3* 2.4*  2.4* 2.2*  2.2*  2.2*  2.2*   MG 2.1  2.1 2.2  2.2 2.2  2.2  2.2   PHOS 4.4  4.4 4.5  4.5  4.5 5.6*   5.6*  5.6*  5.6*       All pertinent labs within the past 24 hours have been reviewed.    Significant Imaging:  I have reviewed all pertinent imaging results/findings within the past 24 hours.

## 2024-12-27 NOTE — PLAN OF CARE
Recommendations    1) Consider soft and bite size diet per SLP/MD    2) Novasource BID to aid in energy and protein needs    3) Recommend consulting speech for swallow evaluation     4) RD monitor wt, nutritional status, skin, and labs,     Goals: meet % of EEN/EPN by next RD f/u    Nutrition Goal Status: new

## 2024-12-28 LAB
ALBUMIN SERPL BCP-MCNC: 2.1 G/DL (ref 3.5–5.2)
ANION GAP SERPL CALC-SCNC: 7 MMOL/L (ref 8–16)
BACTERIA BLD CULT: NORMAL
BACTERIA BLD CULT: NORMAL
BASOPHILS # BLD AUTO: 0 K/UL (ref 0–0.2)
BASOPHILS NFR BLD: 0 % (ref 0–1.9)
BUN SERPL-MCNC: 19 MG/DL (ref 8–23)
CALCIUM SERPL-MCNC: 8.7 MG/DL (ref 8.7–10.5)
CHLORIDE SERPL-SCNC: 102 MMOL/L (ref 95–110)
CO2 SERPL-SCNC: 24 MMOL/L (ref 23–29)
CREAT SERPL-MCNC: 4.2 MG/DL (ref 0.5–1.4)
DIFFERENTIAL METHOD BLD: ABNORMAL
EOSINOPHIL # BLD AUTO: 0.1 K/UL (ref 0–0.5)
EOSINOPHIL NFR BLD: 2.8 % (ref 0–8)
ERYTHROCYTE [DISTWIDTH] IN BLOOD BY AUTOMATED COUNT: 16.2 % (ref 11.5–14.5)
EST. GFR  (NO RACE VARIABLE): 14.6 ML/MIN/1.73 M^2
GLUCOSE SERPL-MCNC: 84 MG/DL (ref 70–110)
HCT VFR BLD AUTO: 22.1 % (ref 40–54)
HGB BLD-MCNC: 7 G/DL (ref 14–18)
IMM GRANULOCYTES # BLD AUTO: 0.02 K/UL (ref 0–0.04)
IMM GRANULOCYTES NFR BLD AUTO: 0.5 % (ref 0–0.5)
LYMPHOCYTES # BLD AUTO: 0.3 K/UL (ref 1–4.8)
LYMPHOCYTES NFR BLD: 8.8 % (ref 18–48)
MAGNESIUM SERPL-MCNC: 2 MG/DL (ref 1.6–2.6)
MCH RBC QN AUTO: 30.2 PG (ref 27–31)
MCHC RBC AUTO-ENTMCNC: 31.7 G/DL (ref 32–36)
MCV RBC AUTO: 95 FL (ref 82–98)
MONOCYTES # BLD AUTO: 0.4 K/UL (ref 0.3–1)
MONOCYTES NFR BLD: 9.3 % (ref 4–15)
NEUTROPHILS # BLD AUTO: 3.1 K/UL (ref 1.8–7.7)
NEUTROPHILS NFR BLD: 78.6 % (ref 38–73)
NRBC BLD-RTO: 0 /100 WBC
PHOSPHATE SERPL-MCNC: 4.4 MG/DL (ref 2.7–4.5)
PHOSPHATE SERPL-MCNC: 4.4 MG/DL (ref 2.7–4.5)
PLATELET # BLD AUTO: 143 K/UL (ref 150–450)
PMV BLD AUTO: 10.7 FL (ref 9.2–12.9)
POTASSIUM SERPL-SCNC: 4.1 MMOL/L (ref 3.5–5.1)
RBC # BLD AUTO: 2.32 M/UL (ref 4.6–6.2)
SODIUM SERPL-SCNC: 133 MMOL/L (ref 136–145)
WBC # BLD AUTO: 3.88 K/UL (ref 3.9–12.7)

## 2024-12-28 PROCEDURE — 99900035 HC TECH TIME PER 15 MIN (STAT)

## 2024-12-28 PROCEDURE — 25000242 PHARM REV CODE 250 ALT 637 W/ HCPCS: Performed by: INTERNAL MEDICINE

## 2024-12-28 PROCEDURE — 94640 AIRWAY INHALATION TREATMENT: CPT

## 2024-12-28 PROCEDURE — 11000001 HC ACUTE MED/SURG PRIVATE ROOM

## 2024-12-28 PROCEDURE — 5A0935A ASSISTANCE WITH RESPIRATORY VENTILATION, LESS THAN 24 CONSECUTIVE HOURS, HIGH NASAL FLOW/VELOCITY: ICD-10-PCS | Performed by: HOSPITALIST

## 2024-12-28 PROCEDURE — 25000003 PHARM REV CODE 250

## 2024-12-28 PROCEDURE — 63600175 PHARM REV CODE 636 W HCPCS: Performed by: INTERNAL MEDICINE

## 2024-12-28 PROCEDURE — 97535 SELF CARE MNGMENT TRAINING: CPT

## 2024-12-28 PROCEDURE — 80069 RENAL FUNCTION PANEL: CPT | Performed by: STUDENT IN AN ORGANIZED HEALTH CARE EDUCATION/TRAINING PROGRAM

## 2024-12-28 PROCEDURE — 83735 ASSAY OF MAGNESIUM: CPT | Performed by: STUDENT IN AN ORGANIZED HEALTH CARE EDUCATION/TRAINING PROGRAM

## 2024-12-28 PROCEDURE — 25000003 PHARM REV CODE 250: Performed by: INTERNAL MEDICINE

## 2024-12-28 PROCEDURE — 85025 COMPLETE CBC W/AUTO DIFF WBC: CPT

## 2024-12-28 PROCEDURE — 94761 N-INVAS EAR/PLS OXIMETRY MLT: CPT

## 2024-12-28 PROCEDURE — 99233 SBSQ HOSP IP/OBS HIGH 50: CPT | Mod: ,,, | Performed by: INTERNAL MEDICINE

## 2024-12-28 PROCEDURE — 27000221 HC OXYGEN, UP TO 24 HOURS

## 2024-12-28 PROCEDURE — 21400001 HC TELEMETRY ROOM

## 2024-12-28 PROCEDURE — 92610 EVALUATE SWALLOWING FUNCTION: CPT

## 2024-12-28 RX ORDER — HALOPERIDOL 5 MG/1
5 TABLET ORAL EVERY 6 HOURS PRN
Status: DISCONTINUED | OUTPATIENT
Start: 2024-12-28 | End: 2025-01-14 | Stop reason: HOSPADM

## 2024-12-28 RX ADMIN — Medication 6 MG: at 08:12

## 2024-12-28 RX ADMIN — HALOPERIDOL LACTATE 5 MG: 5 INJECTION, SOLUTION INTRAMUSCULAR at 07:12

## 2024-12-28 RX ADMIN — OXYCODONE 5 MG: 5 TABLET ORAL at 08:12

## 2024-12-28 RX ADMIN — SEVELAMER CARBONATE 800 MG: 800 TABLET, FILM COATED ORAL at 08:12

## 2024-12-28 RX ADMIN — SODIUM ZIRCONIUM CYCLOSILICATE 10 G: 5 POWDER, FOR SUSPENSION ORAL at 08:12

## 2024-12-28 RX ADMIN — ENOXAPARIN SODIUM 30 MG: 30 INJECTION SUBCUTANEOUS at 04:12

## 2024-12-28 RX ADMIN — IPRATROPIUM BROMIDE AND ALBUTEROL SULFATE 3 ML: 2.5; .5 SOLUTION RESPIRATORY (INHALATION) at 11:12

## 2024-12-28 RX ADMIN — OXYCODONE HYDROCHLORIDE 10 MG: 10 TABLET ORAL at 08:12

## 2024-12-28 RX ADMIN — IPRATROPIUM BROMIDE AND ALBUTEROL SULFATE 3 ML: 2.5; .5 SOLUTION RESPIRATORY (INHALATION) at 12:12

## 2024-12-28 RX ADMIN — ATORVASTATIN CALCIUM 40 MG: 40 TABLET, FILM COATED ORAL at 08:12

## 2024-12-28 RX ADMIN — CEFAZOLIN 1 G: 1 INJECTION, POWDER, FOR SOLUTION INTRAMUSCULAR; INTRAVENOUS at 08:12

## 2024-12-28 RX ADMIN — SEVELAMER CARBONATE 800 MG: 800 TABLET, FILM COATED ORAL at 12:12

## 2024-12-28 RX ADMIN — SEVELAMER CARBONATE 800 MG: 800 TABLET, FILM COATED ORAL at 04:12

## 2024-12-28 RX ADMIN — SODIUM ZIRCONIUM CYCLOSILICATE 10 G: 5 POWDER, FOR SUSPENSION ORAL at 04:12

## 2024-12-28 NOTE — PLAN OF CARE
MICU DAILY GOALS     Family/Goals of care/Code Status   Code Status: DNR    24H Vital Sign Range  Temp:  [97.5 °F (36.4 °C)-98.2 °F (36.8 °C)]   Pulse:  []   Resp:  [12-37]   BP: ()/(51-84)   SpO2:  [94 %-100 %]      Shift Events (include procedures and significant events)   No acute events throughout shift, Pt slept well this shift. PRN meds given.    AWAKE RASS: Goal -    Actual - RASS (Sam Agitation-Sedation Scale): alert and calm    Restraint necessity: Not necessary   BREATHE SBT: Not intubated    Coordinate A & B, analgesics/sedatives Pain: managed   SAT: Not intubated   Delirium CAM-ICU:     Early(intubated/ Progressive (non-intubated) Mobility MOVE Screen (INTUBATED ONLY): Not intubated    Activity: Activity Management: Rolling - L1   Feeding/Nutrition Diet order: Diet/Nutrition Received: restrict fluids, Specialty Diet/Nutrition Received: renal diet   Thrombus DVT prophylaxis: VTE Core Measure: Pharmacological prophylaxis initiated/maintained   HOB Elevation Head of Bed (HOB) Positioning: HOB at 30 degrees   Ulcer Prophylaxis GI: yes   Glucose control managed Glycemic Management: blood glucose monitored   Skin Skin assessment:     Sacrum intact/not altered? Yes  Heels intact/not altered? Yes  Surgical wound? No    CHECK ONE!   (no altered skin or altered skin) and sub boxes:  [] No Altered Skin Integrity Present    []Prevention Measures Documented    [x] Altered Skin Integrity Present or Discovered   [x] LDA present in EPIC, daily doc completed              [] LDA added if not in EPIC (describe wound).                    When describing wound, do not stage, use descriptive words only.    [] Wound Image Taken (required on admit,                   transfer/discharge and every Tuesday)    Wound Care Consulted? No   Bowel Function no issues    Indwelling Catheter Necessity      Trialysis (Dialysis) Catheter 12/24/24 1012 right internal jugular-Line Necessity Review: CRRT/HD  [REMOVED]       Hemodialysis Catheter right subclavian-Line Necessity Review: CRRT/HD     De-escalation Antibiotics Yes        VS and assessment per flow sheet, patient progressing towards goals as tolerated, plan of care reviewed with  Flakito Pacheco , all concerns addressed, will continue to monitor.

## 2024-12-28 NOTE — ASSESSMENT & PLAN NOTE
Patient with Hypoxic Respiratory failure which is Acute.  he is not on home oxygen. Supplemental oxygen was provided and noted-      .   Signs/symptoms of respiratory failure include- tachypnea, increased work of breathing, respiratory distress, and use of accessory muscles. Contributing diagnoses includes - CHF Labs and images were reviewed. Patient Has recent ABG, which has been reviewed. Will treat underlying causes and adjust management of respiratory failure as follows-   - continue to wean oxygen to goal SaO2 of 90%  - aggressive pulmonary toilet in setting of atelectasis of left lower lung field.

## 2024-12-28 NOTE — PLAN OF CARE
Problem: SLP  Goal: SLP Goal  Description: Speech Language Pathology Goals  Goals expected to be met by 1/3  1. Pt will tolerate minced & moist diet & thin liquids without s/s aspiration & adequate oral phase of swallow  2. Ongoing swallow assessment to determine safest & least restrictive PO consistencies   Outcome: Progressing    SLP Clinical Swallow Evaluation completed. See note for details.

## 2024-12-28 NOTE — NURSING
"Patient stated "I need to urinate. I normally self cath". NP notified and stated patient can self cath if he choices to. Patient handed his own catheter so he can self cath. Patient self cathed.   "

## 2024-12-28 NOTE — PROGRESS NOTES
[facility] - Medical ICU  Critical Care Medicine  Progress Note    Patient Name: Flakito Mcginnis  MRN: [id number]  Admission Date: 12/19/2024  Hospital Length of Stay: 9 days  Code Status: DNR  Attending Provider: Mandy Griffin DO  Primary Care Provider: Jordin Robbins MD   Principal Problem: Septic shock    Subjective:     HPI:  Mr. Flakito Mcginnis is a 69 y.o. man w/ HFrEF (30% 8/2024 from 20-25% 6/2024), NICM, MR, ESRD on HD, Crohn's s/p colostomy, h/o R nephrectomy.  [place] [facility] [place]19 due to hypotension and ongoing shortness of breath.  He usually receives his dialysis on T, R, S and went on Wednesday for an extra session for volume removal.  He arrived on Thursday for his usually scheduled dialysis session and was directed to the ED due to hypotension.  On arrival in the ED his blood pressure was 78/51.  He was found to have a BNP >4900 and CXR concerning for volume overload.  High sensitivity troponin 923.  Critical care medicine was consulted for hypotension and admitted to MICU.      Hospital/ICU Course:  Flakito Mcginnis is a 69 y.o. 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 ID consulted and recommending 6 weeks of IV cefazolin. CTS evaluated patient and recommending medical management at this time due to multiple co-morbidities. Tunneled catheter removed 12/22. Repeat cultures from 12/23 with NGTD. Temporary RIJ trialysis line placed 12/24. Course further complicated by acute hypoxemic respiratory failure likely volume overload requiring HFNC. Oxygenation improved with volume removal with dialysis. Patient weaned down to NC. Patient stable to step down to hospital medicine.     Interval History/Significant Events:    FUENTES.  Complains of back pain.  Off oxygen when entering the room satting 94%.  Off vasopressors    Review of Systems  Objective:     Vital Signs (Most Recent):  Temp: 97.5 °F (36.4 °C) (12/28/24 1112)  Pulse: 101 (12/28/24 1200)  Resp: (!) 21 (12/28/24 1200)  BP: (!) 123/58 (12/28/24 1200)  SpO2: 99 % (12/28/24 1200) Vital Signs (24h Range):  Temp:  [97.5 °F (36.4 °C)-98.2 °F (36.8 °C)] 97.5 °F (36.4 °C)  Pulse:  [] 101  Resp:  [12-37] 21  SpO2:  [96 %-100 %] 99 %  BP: ()/(51-79) 123/58   Weight: 56 kg (123 lb 7.3 oz)  Body mass index is 19.93 kg/m².      Intake/Output Summary (Last 24 hours) at 12/28/2024 1322  Last data filed at 12/28/2024 1114  Gross per 24 hour   Intake 980 ml   Output 1742 ml   Net -762 ml          Physical Exam  Vitals and nursing note reviewed.   Constitutional:       General: He is not in acute distress.     Appearance: Normal appearance. He is normal weight. He is ill-appearing. He is not toxic-appearing or diaphoretic.   HENT:      Head: Normocephalic and atraumatic.      Right Ear: External ear normal.      Left Ear: External ear normal.      Nose: Nose normal.      Mouth/Throat:      Mouth: Mucous membranes are moist.   Eyes:      General: No scleral icterus.     Extraocular Movements: Extraocular movements intact.      Conjunctiva/sclera: Conjunctivae normal.      Pupils: Pupils are equal, round, and reactive to light.   Cardiovascular:      Rate and Rhythm: Normal rate and regular rhythm.      Pulses: Normal pulses.      Heart sounds: Murmur heard.      No friction rub. No gallop.      Comments: No JVD or peripheral edema  Pulmonary:      Effort: Pulmonary effort is normal. No respiratory distress.      Breath sounds: Normal breath sounds.      Comments: Mildly increased respiratory effort with bibasilar crackles  Abdominal:      General: Abdomen is flat. Bowel sounds are normal. There is no distension.      Palpations: Abdomen is soft.      Tenderness: There is no  abdominal tenderness. There is no guarding or rebound.   Musculoskeletal:         General: Tenderness (left shoulder) and deformity (left shoulder) present. Normal range of motion.      Cervical back: Normal range of motion.      Right lower leg: No edema (trace).      Left lower leg: No edema (trace).      Comments: Kyphosis.    Skin:     General: Skin is warm and dry.      Coloration: Skin is not jaundiced or pale.   Neurological:      General: No focal deficit present.      Mental Status: He is alert and oriented to person, place, and time. Mental status is at baseline.   Psychiatric:         Mood and Affect: Mood normal.         Behavior: Behavior normal.         Thought Content: Thought content normal.         Judgment: Judgment normal.            Vents:  Oxygen Concentration (%): 60 (12/27/24 0424)  Lines/Drains/Airways       Central Venous Catheter Line  Duration             Trialysis (Dialysis) Catheter 12/24/24 1012 right internal jugular 4 days              Drain  Duration                  Colostomy 12/19/24 2225 RLQ 8 days              Peripheral Intravenous Line  Duration                  Peripheral IV - Single Lumen 12/22/24 1611 20 G Anterior;Right;Medial Upper Arm 5 days                  Significant Labs:    CBC/Anemia Profile:  Recent Labs   Lab 12/27/24  0332 12/28/24  0356   WBC 4.38 3.88*   HGB 7.2* 7.0*   HCT 22.9* 22.1*    143*   MCV 96 95   RDW 15.9* 16.2*        Chemistries:  Recent Labs   Lab 12/27/24  0332 12/27/24  1328 12/28/24  0356   *  132*  132*  132* 134* 133*   K 4.9  4.9  4.9  4.9 4.1 4.1     101  101  101 103 102   CO2 22*  22*  22*  22* 23 24   BUN 24*  24*  24*  24* 19 19   CREATININE 5.1*  5.1*  5.1*  5.1* 4.0* 4.2*   CALCIUM 8.8  8.8  8.8  8.8 8.7 8.7   ALBUMIN 2.2*  2.2*  2.2*  2.2* 2.3* 2.1*   MG 2.2  2.2  2.2 2.0 2.0   PHOS 5.6*  5.6*  5.6*  5.6* 4.2 4.4  4.4       All pertinent labs within the past 24 hours have been  "reviewed.    Significant Imaging:  I have reviewed all pertinent imaging results/findings within the past 24 hours.    ABG  No results for input(s): "PH", "PO2", "PCO2", "HCO3", "BE" in the last 168 hours.  Assessment/Plan:     Pulmonary  Acute respiratory failure with hypoxia  Patient with Hypoxic Respiratory failure which is Acute.  he is not on home oxygen. Supplemental oxygen was provided and noted-      .   Signs/symptoms of respiratory failure include- tachypnea, increased work of breathing, respiratory distress, and use of accessory muscles. Contributing diagnoses includes - CHF Labs and images were reviewed. Patient Has recent ABG, which has been reviewed. Will treat underlying causes and adjust management of respiratory failure as follows-   - continue to wean oxygen to goal SaO2 of 90%  - aggressive pulmonary toilet in setting of atelectasis of left lower lung field.     Cardiac/Vascular  Endocarditis  See "septic shock"  - despite size, patient would be a poor surgical candidate for valve replacement in setting of Age and comorbidities. Increased risk of stroke based on size and location.     NSTEMI (non-ST elevated myocardial infarction)  High sensitivity troponin troponin 923.  EKG with t wave inversions.  No complaints of chest pain.      --Cardiology following  -- Troponin down trending  --Continuous cardiac monitoring  --EKG PRN     HFrEF (heart failure with reduced ejection fraction)  HFrEF (30% 8/2024 from 20-25% 6/2024) per cardiology notes.  Suspect volume overload with history HF and ESRD.    -- s/p line holiday, temp HD catheter placed 12/24. SLED with UF 12/24, HD tolerated yesterday. Fluid restriction.    --Echo showing EF of 20-25% with large AV vegetation partially occluding LVOT with moderate AV regurgitation.   --Cardiology following  --troponin down trending   --Continuous cardiac monitoring   --EKG PRN     Hypertension  Holding until ensure toleration of HD which he did yesterday. "     Renal/  Hyperkalemia  Due to ESRD. SLED for 8 hours today.     History of nephrectomy  Noted. See ESRD.     ESRD on hemodialysis  --Nephrology consulted--appreciate input   --HD tolerated yesterday  - tunneled catheter removed 12/22. [facility] temp HD catheter placed 12/24.  - will need HD access prior to discharge.     ID  * Septic shock  resolved  Presented with hypotension unable to receive iHD.  SBP 70s on arrival to ED.  On exam patient with MAP 65-70 not on vasopressor support, but suspect will need vasopressors for dialysis. No obvious signs of infection on exam, tunneled cath dry/intact. Reports no cough, urinary symptoms.  WBC normal.  Suspect shock likely cardiogenic in setting of HF and ESRD with volume overload.      -- blood cultures + for staph, most recent cultures remain positive with plan for tunneled catheter line removal 12/22. Repeat blood cultures NGTD.   --Vegetation found on aortic valve on echocardiogram. Consult cardiothoracic surgery- appreciate recs, not a surgical candidate.   --Infectious diseases consult--appreciate input   --Started on cefepime and daptomycin given reported vancomycin allergy; discontinue cefepime 12/22. Deescalate daptomycin to cefazolin for 6 weeks.     Coagulase negative Staphylococcus bacteremia  Deescalated from daptomycin to cefazolin 12/24. 6 weeks treatment post last negative Bcx (12/23)      Dispo: stable for stepdown to hospital medicine for further management     Mandy Griffin,   Critical Care Medicine  [facility] - Medical ICU

## 2024-12-28 NOTE — ASSESSMENT & PLAN NOTE
HFrEF (30% 8/2024 from 20-25% 6/2024) per cardiology notes.  Suspect volume overload with history HF and ESRD.    -- s/p line holiday, temp HD catheter placed 12/24. SLED with UF 12/24, HD tolerated yesterday. Fluid restriction.    --Echo showing EF of 20-25% with large AV vegetation partially occluding LVOT with moderate AV regurgitation.   --Cardiology following  --troponin down trending   --Continuous cardiac monitoring   --EKG PRN

## 2024-12-28 NOTE — PLAN OF CARE
MICU DAILY GOALS     Family/Goals of care/Code Status   Code Status: DNR    24H Vital Sign Range  Temp:  [97.5 °F (36.4 °C)-98.2 °F (36.8 °C)]   Pulse:  []   Resp:  [12-32]   BP: ()/(53-79)   SpO2:  [96 %-100 %]      Shift Events (include procedures and significant events)   No acute events throughout shift. Transfer orders placed. Awaiting bed.     AWAKE RASS: Goal -    Actual - RASS (Sam Agitation-Sedation Scale): alert and calm    Restraint necessity: Not necessary   BREATHE SBT: Not intubated    Coordinate A & B, analgesics/sedatives Pain: managed   SAT: Not intubated   Delirium CAM-ICU:     Early(intubated/ Progressive (non-intubated) Mobility MOVE Screen (INTUBATED ONLY): Not intubated    Activity: Activity Management: Up in chair - L3   Feeding/Nutrition Diet order: Diet/Nutrition Received: mechanical/dental soft, Specialty Diet/Nutrition Received: dysphagia mechanically altered   Thrombus DVT prophylaxis: VTE Core Measure: Pharmacological prophylaxis initiated/maintained   HOB Elevation Head of Bed (HOB) Positioning: HOB elevated   Ulcer Prophylaxis GI: yes   Glucose control managed Glycemic Management: blood glucose monitored   Skin Skin assessment:     Sacrum intact/not altered? Yes  Heels intact/not altered? Yes  Surgical wound? No    CHECK ONE!   (no altered skin or altered skin) and sub boxes:  [x] No Altered Skin Integrity Present    [x]Prevention Measures Documented    [] Altered Skin Integrity Present or Discovered   [] LDA present in EPIC, daily doc completed              [] LDA added if not in EPIC (describe wound).                    When describing wound, do not stage, use descriptive words only.    [] Wound Image Taken (required on admit,                   transfer/discharge and every Tuesday)    Wound Care Consulted? No   Bowel Function diarrhea    Indwelling Catheter Necessity      Trialysis (Dialysis) Catheter 12/24/24 1012 right internal jugular-Line Necessity Review:  CRRT/HD  [REMOVED]      Hemodialysis Catheter right subclavian-Line Necessity Review: CRRT/HD     De-escalation Antibiotics No        VS and assessment per flow sheet, patient progressing towards goals as tolerated, plan of care reviewed with  patient and family , all concerns addressed, will continue to monitor.

## 2024-12-28 NOTE — SUBJECTIVE & OBJECTIVE
Interval History: NAEON, sitting in chair this AM, HD done yesterday, net -1.1 liters.    Review of patient's allergies indicates:   Allergen Reactions    Vancomycin analogues Anaphylaxis, Other (See Comments), Shortness Of Breath and Swelling     Light headed, see's spots      Aspirin Nausea And Vomiting and Other (See Comments)     Has crohn's disease    Other reaction(s): FLARES UP CROHNS      Has crohn's disease     Current Facility-Administered Medications   Medication Frequency    acetaminophen tablet 500 mg Q4H PRN    albuterol-ipratropium 2.5 mg-0.5 mg/3 mL nebulizer solution 3 mL Q8H    atorvastatin tablet 40 mg Daily    ceFAZolin injection 1 g Q24H    dextrose 50% injection 12.5 g PRN    dextrose 50% injection 25 g PRN    enoxaparin injection 30 mg Q24H (prophylaxis, 1700)    glucagon (human recombinant) injection 1 mg PRN    glucose chewable tablet 16 g PRN    glucose chewable tablet 24 g PRN    haloperidol lactate injection 5 mg Q6H PRN    insulin aspart U-100 pen 0-5 Units QID (AC + HS) PRN    melatonin tablet 6 mg Nightly    oxyCODONE immediate release tablet 5 mg Q4H PRN    oxyCODONE immediate release tablet Tab 10 mg Q6H PRN    sevelamer carbonate tablet 800 mg TID WM    sodium chloride 0.9% flush 10 mL PRN    sodium zirconium cyclosilicate packet 10 g TID       Objective:     Vital Signs (Most Recent):  Temp: 97.8 °F (36.6 °C) (12/28/24 0717)  Pulse: 99 (12/28/24 1000)  Resp: 17 (12/28/24 1000)  BP: (!) 102/57 (12/28/24 1000)  SpO2: 100 % (12/28/24 1000) Vital Signs (24h Range):  Temp:  [97.5 °F (36.4 °C)-98.2 °F (36.8 °C)] 97.8 °F (36.6 °C)  Pulse:  [] 99  Resp:  [12-37] 17  SpO2:  [96 %-100 %] 100 %  BP: ()/(51-84) 102/57     Weight: 56 kg (123 lb 7.3 oz) (12/21/24 0641)  Body mass index is 19.93 kg/m².  Body surface area is 1.61 meters squared.    I/O last 3 completed shifts:  In: 500 [Other:500]  Out: 1542 [Other:1342; Stool:200]     Physical Exam  Pulmonary:      Effort: Pulmonary  effort is normal. No respiratory distress.   Neurological:      Mental Status: He is alert.          Significant Labs:  BMP:   Recent Labs   Lab 12/28/24  0356   GLU 84   *   K 4.1      CO2 24   BUN 19   CREATININE 4.2*   CALCIUM 8.7   MG 2.0     CBC:   Recent Labs   Lab 12/28/24  0356   WBC 3.88*   RBC 2.32*   HGB 7.0*   HCT 22.1*   *   MCV 95   MCH 30.2   MCHC 31.7*        Significant Imaging:  Labs: Reviewed

## 2024-12-28 NOTE — PROVIDER TRANSFER
ICU Transfer of Care Note  Critical Care Medicine    Admit Date: 12/19/2024  LOS: 9    CC: Septic shock    Code Status: DNR         HPI and Hospital Course:     HPI:  Mr. Flakito Mcginnis is a 69 y.o. man w/ HFrEF (30% 8/2024 from 20-25% 6/2024), NICM, MR, ESRD on HD, Crohn's s/p colostomy, h/o R nephrectomy.  He presented to [facility] [place] [place] ED from his HD center on 12/19 due to hypotension and ongoing shortness of breath.  He usually receives his dialysis on T, R, S and went on Wednesday for an extra session for volume removal.  He arrived on Thursday for his usually scheduled dialysis session and was directed to the ED due to hypotension.  On arrival in the ED his blood pressure was 78/51.  He was found to have a BNP >4900 and CXR concerning for volume overload.  High sensitivity troponin 923.  Critical care medicine was consulted for hypotension and admitted to MICU.      Hospital/ICU Course:  Flakito Mcginnis is a 69 y.o. 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 ID consulted and recommending 6 weeks of IV cefazolin. CTS evaluated patient and recommending medical management at this time due to multiple co-morbidities. Tunneled catheter removed 12/22. Repeat cultures from 12/23 with NGTD. Temporary RIJ trialysis line placed 12/24. Course further complicated by acute hypoxemic respiratory failure likely volume overload requiring HFNC. Oxygenation improved with volume removal with dialysis. Patient weaned down to NC. Patient stable to step down to hospital medicine.       To Follow Up:     --IV cefazolin until 2/2/25; ID following  --Temporary trialysis catheter in place; will need tunneled line placed prior to discharge  --PT/OT      Discharge Plan:     LTAC vs SNF    Call  with questions.     Elvia Salmeron PA-C  Critical Care Medicine  12/28/2024   12:15 PM

## 2024-12-28 NOTE — PROGRESS NOTES
[facility] - Medical ICU  Nephrology  Progress Note    Patient Name: Flakito Mcginnis  MRN: [id number]  Admission Date: 12/19/2024  Hospital Length of Stay: 9 days  Attending Provider: Mandy Griffin DO   Primary Care Physician: Jordin Robbins MD  Principal Problem:Septic shock    Subjective:     HPI: Patient is a pleasant 69 year old with ESRD on HD TThS who presents to the ER at the request of his HD unit for evaluation of hypotension. Patient completed his a session on Tuesday 12/17/24 and went back for a second session on 12/18/24. Review of his outpatient dialysis notes show that his post BUN dialysis values on 12/17 were 10 with a pre-HD BUN of 41 indicating a urea reduction percentage of 76% suggesting that he received DH on 12/17/24. There are also post HD treatment vital signs recorded on 12/18/24 at 1035 am which also show a pre-HD weight of 56.3 kg and post HD weight of 54.6 kg suggesting that he received an HD session yesterday as well and left at his estimated dry weight. He reported for his routine dialysis but was sent into the ER when he was found to be hypotensive in the HD unit. He reports feeling well with no signs or symptoms of hypotension prior to arrival, however he does report increased loose stool output since his HD session yesterday. He denies any increase in salt or fluid intake and tries to adhere to a low salt and 1L fluid restricting per day diet.    Nephrology has been consulted for management of his dialysis while he is admitted to the hospital. Labs on arrival showed stable electrolytes, venous blood gas showed a metabolic alkalosis with a pCO2 of 44, BNP elevated at >4,900 with no prior values to compare to, and troponin elevated at 923. Weight in the ER was 54 kg. Chest xray was obtained and showed no significant change in the cardiopulmonary status of the patient when compared to previous chest xray. Patient reports oxygen use of 4L at home and reports resolution of his  dyspnea once he was placed on his home oxygen dose.     Outpatient HD Information:  -Dialysis modality: Hemodialysis  -Outpatient HD unit: [facility]  -Nephrologist: Dr. Strickland  -HD TX days: Tuesday/Thursday/Saturday  -Last HD TX prior to hospital admission: 12/18/2024  -Residual urine: Anuric   -EDW: 54.5 kg      Interval History: NAEON, sitting in chair this AM, HD done yesterday, net -1.1 liters.    Review of patient's allergies indicates:   Allergen Reactions    Vancomycin analogues Anaphylaxis, Other (See Comments), Shortness Of Breath and Swelling     Light headed, see's spots      Aspirin Nausea And Vomiting and Other (See Comments)     Has crohn's disease    Other reaction(s): FLARES UP CROHNS      Has crohn's disease     Current Facility-Administered Medications   Medication Frequency    acetaminophen tablet 500 mg Q4H PRN    albuterol-ipratropium 2.5 mg-0.5 mg/3 mL nebulizer solution 3 mL Q8H    atorvastatin tablet 40 mg Daily    ceFAZolin injection 1 g Q24H    dextrose 50% injection 12.5 g PRN    dextrose 50% injection 25 g PRN    enoxaparin injection 30 mg Q24H (prophylaxis, 1700)    glucagon (human recombinant) injection 1 mg PRN    glucose chewable tablet 16 g PRN    glucose chewable tablet 24 g PRN    haloperidol lactate injection 5 mg Q6H PRN    insulin aspart U-100 pen 0-5 Units QID (AC + HS) PRN    melatonin tablet 6 mg Nightly    oxyCODONE immediate release tablet 5 mg Q4H PRN    oxyCODONE immediate release tablet Tab 10 mg Q6H PRN    sevelamer carbonate tablet 800 mg TID WM    sodium chloride 0.9% flush 10 mL PRN    sodium zirconium cyclosilicate packet 10 g TID       Objective:     Vital Signs (Most Recent):  Temp: 97.8 °F (36.6 °C) (12/28/24 0717)  Pulse: 99 (12/28/24 1000)  Resp: 17 (12/28/24 1000)  BP: (!) 102/57 (12/28/24 1000)  SpO2: 100 % (12/28/24 1000) Vital Signs (24h Range):  Temp:  [97.5 °F (36.4 °C)-98.2 °F (36.8 °C)] 97.8 °F (36.6 °C)  Pulse:  [] 99  Resp:   [12-37] 17  SpO2:  [96 %-100 %] 100 %  BP: ()/(51-84) 102/57     Weight: 56 kg (123 lb 7.3 oz) (12/21/24 0641)  Body mass index is 19.93 kg/m².  Body surface area is 1.61 meters squared.    I/O last 3 completed shifts:  In: 500 [Other:500]  Out: 1542 [Other:1342; Stool:200]     Physical Exam  Pulmonary:      Effort: Pulmonary effort is normal. No respiratory distress.   Neurological:      Mental Status: He is alert.          Significant Labs:  BMP:   Recent Labs   Lab 12/28/24  0356   GLU 84   *   K 4.1      CO2 24   BUN 19   CREATININE 4.2*   CALCIUM 8.7   MG 2.0     CBC:   Recent Labs   Lab 12/28/24  0356   WBC 3.88*   RBC 2.32*   HGB 7.0*   HCT 22.1*   *   MCV 95   MCH 30.2   MCHC 31.7*        Significant Imaging:  Labs: Reviewed  Assessment/Plan:     Renal/  ESRD on hemodialysis  After looking through his dialysis notes from yesterday, it does appear as if he completed two back-to-back sessions and left dialysis yesterday at his dry weight (54.6 kg) and reports high volume stool output since his HD session. He reports that he is on 4L chronic oxygen use at home, and is currently on that dose in the ER, and reporting resolution of his dyspnea once he was placed back on his home oxygen. Chest xray appears unchanged from prior studies.       Outpatient HD Information:  -Dialysis modality: Hemodialysis  -Outpatient HD unit: [facility]  -Nephrologist: Dr. Strickland  -HD TX days: Tuesday/Thursday/Saturday  -Last HD TX prior to hospital admission: 12/18/2024  -Residual urine: Anuric   -EDW: 54.5 kg      Recommendations  -HD done yesterday, labs are stable for now, will reassess for next session tomorrow.        Thank you for your consult. I will follow-up with patient. Please contact us if you have any additional questions.    Shakira Munoz MD  Nephrology  [facility] - Medical ICU

## 2024-12-28 NOTE — SUBJECTIVE & OBJECTIVE
Interval History/Significant Events:   NAEON.  Complains of back pain.  Off oxygen when entering the room satting 94%.  Off vasopressors    Review of Systems  Objective:     Vital Signs (Most Recent):  Temp: 97.5 °F (36.4 °C) (12/28/24 1112)  Pulse: 101 (12/28/24 1200)  Resp: (!) 21 (12/28/24 1200)  BP: (!) 123/58 (12/28/24 1200)  SpO2: 99 % (12/28/24 1200) Vital Signs (24h Range):  Temp:  [97.5 °F (36.4 °C)-98.2 °F (36.8 °C)] 97.5 °F (36.4 °C)  Pulse:  [] 101  Resp:  [12-37] 21  SpO2:  [96 %-100 %] 99 %  BP: ()/(51-79) 123/58   Weight: 56 kg (123 lb 7.3 oz)  Body mass index is 19.93 kg/m².      Intake/Output Summary (Last 24 hours) at 12/28/2024 1322  Last data filed at 12/28/2024 1114  Gross per 24 hour   Intake 980 ml   Output 1742 ml   Net -762 ml          Physical Exam  Vitals and nursing note reviewed.   Constitutional:       General: He is not in acute distress.     Appearance: Normal appearance. He is normal weight. He is ill-appearing. He is not toxic-appearing or diaphoretic.   HENT:      Head: Normocephalic and atraumatic.      Right Ear: External ear normal.      Left Ear: External ear normal.      Nose: Nose normal.      Mouth/Throat:      Mouth: Mucous membranes are moist.   Eyes:      General: No scleral icterus.     Extraocular Movements: Extraocular movements intact.      Conjunctiva/sclera: Conjunctivae normal.      Pupils: Pupils are equal, round, and reactive to light.   Cardiovascular:      Rate and Rhythm: Normal rate and regular rhythm.      Pulses: Normal pulses.      Heart sounds: Murmur heard.      No friction rub. No gallop.      Comments: No JVD or peripheral edema  Pulmonary:      Effort: Pulmonary effort is normal. No respiratory distress.      Breath sounds: Normal breath sounds.      Comments: Mildly increased respiratory effort with bibasilar crackles  Abdominal:      General: Abdomen is flat. Bowel sounds are normal. There is no distension.      Palpations: Abdomen is  soft.      Tenderness: There is no abdominal tenderness. There is no guarding or rebound.   Musculoskeletal:         General: Tenderness (left shoulder) and deformity (left shoulder) present. Normal range of motion.      Cervical back: Normal range of motion.      Right lower leg: No edema (trace).      Left lower leg: No edema (trace).      Comments: Kyphosis.    Skin:     General: Skin is warm and dry.      Coloration: Skin is not jaundiced or pale.   Neurological:      General: No focal deficit present.      Mental Status: He is alert and oriented to person, place, and time. Mental status is at baseline.   Psychiatric:         Mood and Affect: Mood normal.         Behavior: Behavior normal.         Thought Content: Thought content normal.         Judgment: Judgment normal.            Vents:  Oxygen Concentration (%): 60 (12/27/24 0424)  Lines/Drains/Airways       Central Venous Catheter Line  Duration             Trialysis (Dialysis) Catheter 12/24/24 1012 right internal jugular 4 days              Drain  Duration                  Colostomy 12/19/24 2225 RLQ 8 days              Peripheral Intravenous Line  Duration                  Peripheral IV - Single Lumen 12/22/24 1611 20 G Anterior;Right;Medial Upper Arm 5 days                  Significant Labs:    CBC/Anemia Profile:  Recent Labs   Lab 12/27/24  0332 12/28/24  0356   WBC 4.38 3.88*   HGB 7.2* 7.0*   HCT 22.9* 22.1*    143*   MCV 96 95   RDW 15.9* 16.2*        Chemistries:  Recent Labs   Lab 12/27/24  0332 12/27/24  1328 12/28/24  0356   *  132*  132*  132* 134* 133*   K 4.9  4.9  4.9  4.9 4.1 4.1     101  101  101 103 102   CO2 22*  22*  22*  22* 23 24   BUN 24*  24*  24*  24* 19 19   CREATININE 5.1*  5.1*  5.1*  5.1* 4.0* 4.2*   CALCIUM 8.8  8.8  8.8  8.8 8.7 8.7   ALBUMIN 2.2*  2.2*  2.2*  2.2* 2.3* 2.1*   MG 2.2  2.2  2.2 2.0 2.0   PHOS 5.6*  5.6*  5.6*  5.6* 4.2 4.4  4.4       All pertinent labs within  the past 24 hours have been reviewed.    Significant Imaging:  I have reviewed all pertinent imaging results/findings within the past 24 hours.

## 2024-12-28 NOTE — PT/OT/SLP EVAL
"Speech Language Pathology Evaluation  Bedside Swallow    Patient Name:  Flakito Mcginnis   MRN:  [id number]  Admitting Diagnosis: Septic shock    Recommendations:                 General Recommendations:  GI evaluation and Dysphagia therapy  Diet recommendations:  Minced & Moist Diet - IDDSI Level 5, Thin   Aspiration Precautions: 1 bite/sip at a time, Double swallow with each bite/sip, HOB to 90 degrees, Small bites/sips, and Strict aspiration precautions   Recs communicated with team via secure chat  General Precautions: Standard, fall, dental soft  Communication strategies:  none    Assessment:     Flaikto Mcginnis is a 69 y.o. male with an SLP diagnosis of suspected esophageal dysphagia     History:     Past Medical History:   Diagnosis Date    Crohn's disease 1998    ESRD (end stage renal disease) on dialysis 10/2017    Hypertension     Obstructive uropathy        Past Surgical History:   Procedure Laterality Date    COLOSTOMY  2006    DIALYSIS FISTULA CREATION Left 02/2018    NEPHRECTOMY Right     REMOVAL OF TUNNELED CENTRAL VENOUS CATHETER (CVC) N/A 5/23/2018    Procedure: PERMCATH REMOVAL-TUNNELED CVC REMOVAL;  Surgeon: Parveen Ray MD;  Location: [facility] CATH LAB;  Service: Nephrology;  Laterality: N/A;  pt coming in @930       Chest X-Rays: 12/26 Probable atelectatic changes involving much of the left lower lobe in a retrocardiac location.     Prior diet: regular    Pt reports having difficulty with solids. He reports globus sensation at throat level & then has to "cough up" masticated bolus with most bites.     Subjective   Awake & alert. Seated in chair, poor posturing, curved back.     Pain/Comfort:  Pain Rating 1: 7/10  Location 1: back  Pain Addressed 2: Distraction  Pain Rating Post-Intervention 2: 7/10    Respiratory Status: Nasal cannula, flow n L/min    Objective:     Oral Musculature Evaluation  Oral Musculature: WFL  Dentition: upper and lower dentures  Mucosal Quality: good  Oral Labial Strength and " Mobility: WFL  Lingual Strength and Mobility: WFL  Volitional Cough: adequate  Volitional Swallow: adequate  Voice Prior to PO Intake: clear    Bedside Swallow Eval:   Consistencies Assessed:  Thin liquids cup sips x5  Puree 1 tsp bites x3  Solids small bites of conchita cracker x3      Oral Phase:   WFL    Pharyngeal Phase:   no overt clinical signs/symptoms of aspiration  Pt reports frequent globus sensation but it did not occur during trials today    SLP recs minced & moist taking small bites, multiple dry swallows with each bite & follow with sips of liquid. SLP educated pt on all recs, precautions, strategies & given in written form. Pt recalled all back to SLP. Pt verbalized understanding & all communicated with NSG.       Goals:   Multidisciplinary Problems       SLP Goals          Problem: SLP    Goal Priority Disciplines Outcome   SLP Goal     SLP Progressing   Description: Speech Language Pathology Goals  Goals expected to be met by 1/3  1. Pt will tolerate minced & moist diet & thin liquids without s/s aspiration & adequate oral phase of swallow  2. Ongoing swallow assessment to determine safest & least restrictive PO consistencies                        Plan:     Patient to be seen:  3 x/week   Plan of Care expires:  01/26/25  Plan of Care reviewed with:  patient   SLP Follow-Up:  Yes       Discharge recommendations:    TBD    Time Tracking:     SLP Treatment Date:   12/28/24  Speech Start Time:  1053  Speech Stop Time:  1110     Speech Total Time (min):  17 min    Billable Minutes: Eval Swallow and Oral Function 9 and Self Care/Home Management Training 8    12/28/2024

## 2024-12-28 NOTE — ASSESSMENT & PLAN NOTE
--Nephrology consulted--appreciate input   --HD tolerated yesterday  - tunneled catheter removed 12/22. [facility] temp HD catheter placed 12/24.  - will need HD access prior to discharge.

## 2024-12-28 NOTE — ASSESSMENT & PLAN NOTE
After looking through his dialysis notes from yesterday, it does appear as if he completed two back-to-back sessions and left dialysis yesterday at his dry weight (54.6 kg) and reports high volume stool output since his HD session. He reports that he is on 4L chronic oxygen use at home, and is currently on that dose in the ER, and reporting resolution of his dyspnea once he was placed back on his home oxygen. Chest xray appears unchanged from prior studies.       Outpatient HD Information:  -Dialysis modality: Hemodialysis  -Outpatient HD unit: [facility]  -Nephrologist: Dr. Strickland  -HD TX days: Tuesday/Thursday/Saturday  -Last HD TX prior to hospital admission: 12/18/2024  -Residual urine: Anuric   -EDW: 54.5 kg      Recommendations  -HD done yesterday, labs are stable for now, will reassess for next session tomorrow.

## 2024-12-29 LAB
ALBUMIN SERPL BCP-MCNC: 2.3 G/DL (ref 3.5–5.2)
ALP SERPL-CCNC: 343 U/L (ref 40–150)
ALT SERPL W/O P-5'-P-CCNC: <5 U/L (ref 10–44)
ANION GAP SERPL CALC-SCNC: 9 MMOL/L (ref 8–16)
AST SERPL-CCNC: 13 U/L (ref 10–40)
BASOPHILS # BLD AUTO: 0 K/UL (ref 0–0.2)
BASOPHILS NFR BLD: 0 % (ref 0–1.9)
BILIRUB SERPL-MCNC: 0.2 MG/DL (ref 0.1–1)
BUN SERPL-MCNC: 32 MG/DL (ref 8–23)
CALCIUM SERPL-MCNC: 8.8 MG/DL (ref 8.7–10.5)
CHLORIDE SERPL-SCNC: 99 MMOL/L (ref 95–110)
CO2 SERPL-SCNC: 22 MMOL/L (ref 23–29)
CREAT SERPL-MCNC: 5.2 MG/DL (ref 0.5–1.4)
DIFFERENTIAL METHOD BLD: ABNORMAL
EOSINOPHIL # BLD AUTO: 0.1 K/UL (ref 0–0.5)
EOSINOPHIL NFR BLD: 2 % (ref 0–8)
ERYTHROCYTE [DISTWIDTH] IN BLOOD BY AUTOMATED COUNT: 15.9 % (ref 11.5–14.5)
EST. GFR  (NO RACE VARIABLE): 11.3 ML/MIN/1.73 M^2
GLUCOSE SERPL-MCNC: 79 MG/DL (ref 70–110)
HCT VFR BLD AUTO: 22.8 % (ref 40–54)
HGB BLD-MCNC: 7.2 G/DL (ref 14–18)
IMM GRANULOCYTES # BLD AUTO: 0.03 K/UL (ref 0–0.04)
IMM GRANULOCYTES NFR BLD AUTO: 0.7 % (ref 0–0.5)
LYMPHOCYTES # BLD AUTO: 0.5 K/UL (ref 1–4.8)
LYMPHOCYTES NFR BLD: 11.8 % (ref 18–48)
MAGNESIUM SERPL-MCNC: 2.2 MG/DL (ref 1.6–2.6)
MCH RBC QN AUTO: 30.1 PG (ref 27–31)
MCHC RBC AUTO-ENTMCNC: 31.6 G/DL (ref 32–36)
MCV RBC AUTO: 95 FL (ref 82–98)
MONOCYTES # BLD AUTO: 0.4 K/UL (ref 0.3–1)
MONOCYTES NFR BLD: 9.6 % (ref 4–15)
NEUTROPHILS # BLD AUTO: 3.1 K/UL (ref 1.8–7.7)
NEUTROPHILS NFR BLD: 75.9 % (ref 38–73)
NRBC BLD-RTO: 0 /100 WBC
PHOSPHATE SERPL-MCNC: 4.2 MG/DL (ref 2.7–4.5)
PLATELET # BLD AUTO: 146 K/UL (ref 150–450)
PMV BLD AUTO: 10.8 FL (ref 9.2–12.9)
POCT GLUCOSE: 93 MG/DL (ref 70–110)
POTASSIUM SERPL-SCNC: 4.1 MMOL/L (ref 3.5–5.1)
PROT SERPL-MCNC: 5.7 G/DL (ref 6–8.4)
RBC # BLD AUTO: 2.39 M/UL (ref 4.6–6.2)
SODIUM SERPL-SCNC: 130 MMOL/L (ref 136–145)
WBC # BLD AUTO: 4.08 K/UL (ref 3.9–12.7)

## 2024-12-29 PROCEDURE — 99900035 HC TECH TIME PER 15 MIN (STAT)

## 2024-12-29 PROCEDURE — 85025 COMPLETE CBC W/AUTO DIFF WBC: CPT

## 2024-12-29 PROCEDURE — 83735 ASSAY OF MAGNESIUM: CPT | Performed by: PHYSICIAN ASSISTANT

## 2024-12-29 PROCEDURE — 25000003 PHARM REV CODE 250

## 2024-12-29 PROCEDURE — 84100 ASSAY OF PHOSPHORUS: CPT

## 2024-12-29 PROCEDURE — 21400001 HC TELEMETRY ROOM

## 2024-12-29 PROCEDURE — 36415 COLL VENOUS BLD VENIPUNCTURE: CPT | Performed by: PHYSICIAN ASSISTANT

## 2024-12-29 PROCEDURE — 63600175 PHARM REV CODE 636 W HCPCS: Performed by: INTERNAL MEDICINE

## 2024-12-29 PROCEDURE — 80053 COMPREHEN METABOLIC PANEL: CPT | Performed by: PHYSICIAN ASSISTANT

## 2024-12-29 PROCEDURE — 63600175 PHARM REV CODE 636 W HCPCS: Performed by: STUDENT IN AN ORGANIZED HEALTH CARE EDUCATION/TRAINING PROGRAM

## 2024-12-29 PROCEDURE — 27000923 HC TRIALYSIS CATHETER, ANY SIZE

## 2024-12-29 PROCEDURE — 25000003 PHARM REV CODE 250: Performed by: INTERNAL MEDICINE

## 2024-12-29 PROCEDURE — 94761 N-INVAS EAR/PLS OXIMETRY MLT: CPT

## 2024-12-29 RX ORDER — HEPARIN SODIUM 5000 [USP'U]/ML
5000 INJECTION, SOLUTION INTRAVENOUS; SUBCUTANEOUS EVERY 8 HOURS
Status: DISCONTINUED | OUTPATIENT
Start: 2024-12-29 | End: 2025-01-14 | Stop reason: HOSPADM

## 2024-12-29 RX ADMIN — ATORVASTATIN CALCIUM 40 MG: 40 TABLET, FILM COATED ORAL at 09:12

## 2024-12-29 RX ADMIN — SODIUM ZIRCONIUM CYCLOSILICATE 10 G: 5 POWDER, FOR SUSPENSION ORAL at 04:12

## 2024-12-29 RX ADMIN — CEFAZOLIN 1 G: 1 INJECTION, POWDER, FOR SOLUTION INTRAMUSCULAR; INTRAVENOUS at 09:12

## 2024-12-29 RX ADMIN — SODIUM ZIRCONIUM CYCLOSILICATE 10 G: 5 POWDER, FOR SUSPENSION ORAL at 09:12

## 2024-12-29 RX ADMIN — SEVELAMER CARBONATE 800 MG: 800 TABLET, FILM COATED ORAL at 09:12

## 2024-12-29 RX ADMIN — Medication 6 MG: at 09:12

## 2024-12-29 RX ADMIN — SEVELAMER CARBONATE 800 MG: 800 TABLET, FILM COATED ORAL at 12:12

## 2024-12-29 RX ADMIN — OXYCODONE HYDROCHLORIDE 10 MG: 10 TABLET ORAL at 09:12

## 2024-12-29 RX ADMIN — HEPARIN SODIUM 5000 UNITS: 5000 INJECTION INTRAVENOUS; SUBCUTANEOUS at 04:12

## 2024-12-29 RX ADMIN — SEVELAMER CARBONATE 800 MG: 800 TABLET, FILM COATED ORAL at 04:12

## 2024-12-29 NOTE — PLAN OF CARE
Problem: Adult Inpatient Plan of Care  Goal: Plan of Care Review  Outcome: Progressing  Goal: Patient-Specific Goal (Individualized)  Outcome: Progressing  Goal: Absence of Hospital-Acquired Illness or Injury  Outcome: Progressing  Goal: Optimal Comfort and Wellbeing  Outcome: Progressing  Goal: Readiness for Transition of Care  Outcome: Progressing     Problem: Infection  Goal: Absence of Infection Signs and Symptoms  Outcome: Progressing     Problem: Skin Injury Risk Increased  Goal: Skin Health and Integrity  Outcome: Progressing     Problem: Sepsis/Septic Shock  Goal: Optimal Coping  Outcome: Progressing  Goal: Absence of Bleeding  Outcome: Progressing  Goal: Blood Glucose Level Within Targeted Range  Outcome: Progressing  Goal: Absence of Infection Signs and Symptoms  Outcome: Progressing  Goal: Optimal Nutrition Intake  Outcome: Progressing     Problem: CRRT (Continuous Renal Replacement Therapy)  Goal: Safe, Effective Therapy Delivery  Outcome: Progressing  Goal: Hemodynamic Stability  Outcome: Progressing  Goal: Body Temperature Maintained in Desired Range  Outcome: Progressing  Goal: Absence of Infection Signs and Symptoms  Outcome: Progressing     Problem: Fall Injury Risk  Goal: Absence of Fall and Fall-Related Injury  Outcome: Progressing     Problem: Coping Ineffective  Goal: Effective Coping  Outcome: Progressing     Problem: Hemodialysis  Goal: Safe, Effective Therapy Delivery  Outcome: Progressing  Goal: Effective Tissue Perfusion  Outcome: Progressing  Goal: Absence of Infection Signs and Symptoms  Outcome: Progressing

## 2024-12-29 NOTE — ASSESSMENT & PLAN NOTE
--Nephrology consulted--appreciate input   --HD tolerated 12/27  - tunneled catheter removed 12/22. [facility] temp HD catheter placed 12/24.  - will need HD access prior to discharge.

## 2024-12-29 NOTE — ASSESSMENT & PLAN NOTE
HFrEF (30% 8/2024 from 20-25% 6/2024) per cardiology notes.  Suspect volume overload with history HF and ESRD.     -- s/p line holiday, temp HD catheter placed 12/24. SLED with UF 12/24, HD tolerated on 12/27  --Echo showing EF of 20-25% with large AV vegetation partially occluding LVOT with moderate AV regurgitation.   --Cardiology following  --troponin down trending   --Continuous cardiac monitoring   --EKG PRN

## 2024-12-29 NOTE — ASSESSMENT & PLAN NOTE
8/17/2024     8:01 AM   Rapid3 Question Responses and Scores   MDHAQ Score 1.1   Psychologic Score 2.2   Pain Score 3.5   When you awakened in the morning OVER THE LAST WEEK, did you feel stiff? No   Fatigue Score 3.5   Global Health Score 2.5   RAPID3 Score 3.22     Answers submitted by the patient for this visit:  Rheumatology Questionnaire (Submitted on 8/17/2024)  fever: No  eye redness: No  mouth sores: No  headaches: No  shortness of breath: Yes  chest pain: No  trouble swallowing: No  diarrhea: No  constipation: No  unexpected weight change: No  genital sore: No  During the last 3 days, have you had a skin rash?: Yes  Bruises or bleeds easily: Yes  cough: No       Deescalated from daptomycin to cefazolin 12/24. 6 weeks treatment post last negative Bcx (12/23)

## 2024-12-29 NOTE — PROGRESS NOTES
"   12/28/24 2300   Vital Signs   Temp 97.9 °F (36.6 °C)   Temp Source Oral   Pulse 105   Heart Rate Source Monitor   Resp 18   SpO2 95 %   Pulse Oximetry Type Intermittent   BP (!) 112/59   BP Location Right forearm   BP Method Automatic   Patient Position Sitting     Pt arrived to floor . Pt sitting in recliner. Pt is Aaox4. Pt c/o of sob and stated "I can't breathe" Resp already in room asses o2 sats and o2 sats 97% on 2L. Via n/c  Pt in no distress R even and unlabored. Pt given neb tx. Pt oreinted to room and call light. POC reviewed with pt . Pt connected to telemetry.  Call light within reach Safety maintained .  "

## 2024-12-29 NOTE — PLAN OF CARE
Problem: Adult Inpatient Plan of Care  Goal: Plan of Care Review  Outcome: Not Progressing  Goal: Optimal Comfort and Wellbeing  Outcome: Not Progressing     Problem: Infection  Goal: Absence of Infection Signs and Symptoms  Outcome: Not Progressing     Problem: Fall Injury Risk  Goal: Absence of Fall and Fall-Related Injury  Outcome: Not Progressing

## 2024-12-29 NOTE — SUBJECTIVE & OBJECTIVE
Interval History/Significant Events:   NAEON.  Patient was alert and oriented. Denies active complaints. Reports improvement in symptoms.    Review of Systems  Objective:     Vital Signs (Most Recent):  Temp: 97.8 °F (36.6 °C) (12/29/24 1136)  Pulse: 89 (12/29/24 1136)  Resp: 18 (12/29/24 1136)  BP: 106/65 (12/29/24 1136)  SpO2: (!) 94 % (12/29/24 1136) Vital Signs (24h Range):  Temp:  [97.5 °F (36.4 °C)-97.9 °F (36.6 °C)] 97.8 °F (36.6 °C)  Pulse:  [] 89  Resp:  [17-30] 18  SpO2:  [94 %-100 %] 94 %  BP: ()/(58-88) 106/65   Weight: 56 kg (123 lb 7.3 oz)  Body mass index is 19.93 kg/m².      Intake/Output Summary (Last 24 hours) at 12/29/2024 1416  Last data filed at 12/28/2024 1857  Gross per 24 hour   Intake 480 ml   Output --   Net 480 ml          Physical Exam  Vitals and nursing note reviewed.   Constitutional:       General: He is not in acute distress.     Appearance: Normal appearance. He is normal weight. He is ill-appearing. He is not toxic-appearing or diaphoretic.   HENT:      Head: Normocephalic and atraumatic.      Right Ear: External ear normal.      Left Ear: External ear normal.      Nose: Nose normal.      Mouth/Throat:      Mouth: Mucous membranes are moist.   Eyes:      General: No scleral icterus.     Extraocular Movements: Extraocular movements intact.      Conjunctiva/sclera: Conjunctivae normal.      Pupils: Pupils are equal, round, and reactive to light.   Cardiovascular:      Rate and Rhythm: Normal rate and regular rhythm.      Pulses: Normal pulses.      Heart sounds: Murmur heard.      No friction rub. No gallop.      Comments: No JVD or peripheral edema  Pulmonary:      Effort: Pulmonary effort is normal. No respiratory distress.      Breath sounds: Normal breath sounds.      Comments: Mildly increased respiratory effort with bibasilar crackles  Abdominal:      General: Abdomen is flat. Bowel sounds are normal. There is no distension.      Palpations: Abdomen is soft.       Tenderness: There is no abdominal tenderness. There is no guarding or rebound.   Musculoskeletal:         General: Tenderness (left shoulder) and deformity (left shoulder) present. Normal range of motion.      Cervical back: Normal range of motion.      Right lower leg: No edema (trace).      Left lower leg: No edema (trace).      Comments: Kyphosis.    Skin:     General: Skin is warm and dry.      Coloration: Skin is not jaundiced or pale.   Neurological:      General: No focal deficit present.      Mental Status: He is alert and oriented to person, place, and time. Mental status is at baseline.   Psychiatric:         Mood and Affect: Mood normal.         Behavior: Behavior normal.         Thought Content: Thought content normal.         Judgment: Judgment normal.            Vents:  Oxygen Concentration (%): 60 (12/27/24 0424)  Lines/Drains/Airways       Central Venous Catheter Line  Duration             Trialysis (Dialysis) Catheter 12/24/24 1012 right internal jugular 5 days              Drain  Duration                  Colostomy 12/19/24 2225 RLQ 9 days              Peripheral Intravenous Line  Duration                  Peripheral IV - Single Lumen 12/22/24 1611 20 G Anterior;Right;Medial Upper Arm 6 days                  Significant Labs:    CBC/Anemia Profile:  Recent Labs   Lab 12/28/24  0356 12/29/24  0227   WBC 3.88* 4.08   HGB 7.0* 7.2*   HCT 22.1* 22.8*   * 146*   MCV 95 95   RDW 16.2* 15.9*        Chemistries:  Recent Labs   Lab 12/28/24  0356 12/29/24  0227   * 130*   K 4.1 4.1    99   CO2 24 22*   BUN 19 32*   CREATININE 4.2* 5.2*   CALCIUM 8.7 8.8   ALBUMIN 2.1* 2.3*   PROT  --  5.7*   BILITOT  --  0.2   ALKPHOS  --  343*   ALT  --  <5*   AST  --  13   MG 2.0 2.2   PHOS 4.4  4.4 4.2       All pertinent labs within the past 24 hours have been reviewed.    Significant Imaging:  I have reviewed all pertinent imaging results/findings within the past 24 hours.

## 2024-12-29 NOTE — PROGRESS NOTES
[facility] - Internal Medicine [facility] Medicine  Progress Note    Patient Name: Flakito Mcginnis  MRN: [id number]  Patient Class: IP- Inpatient   Admission Date: 12/19/2024  Length of Stay: 10 days  Attending Physician: Rebecca Andrade MD  Primary Care Provider: Jordin Robbins MD        Subjective     Principal Problem:Septic shock        HPI:  Mr. Flakito Mcginnis is a 69 y.o. man w/ HFrEF (30% 8/2024 from 20-25% 6/2024), NICM, MR, ESRD on HD, Crohn's s/p colostomy, h/o R nephrectomy. [place] [facility] [place] on 12/19 due to hypotension and ongoing shortness of breath. He usually receives his dialysis on T, R, S and went on Wednesday for an extra session for volume removal. He arrived on Thursday for his usually scheduled dialysis session and was directed to the ED due to hypotension. On arrival in the ED his blood pressure was 78/51. He was found to have a BNP >4900 and CXR concerning for volume overload. High sensitivity troponin 923. Critical care medicine was consulted for hypotension and admitted to MICU.     Overview/Hospital Course:  Flakito Mcginnis is a 69 y.o. 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 ID consulted and recommending 6 weeks of IV cefazolin. CTS evaluated patient and recommending medical management at this time due to multiple co-morbidities. Tunneled catheter removed 12/22. Repeat cultures from 12/23 with NGTD. Temporary RIJ trialysis line placed 12/24. Course further complicated by acute hypoxemic respiratory failure likely volume overload requiring HFNC. Oxygenation improved with volume removal with dialysis. Patient weaned down to NC. Patient stable to step down to hospital medicine.     Interval  History/Significant Events:   NAEON.  Patient was alert and oriented. Denies active complaints. Reports improvement in symptoms.    Review of Systems  Objective:     Vital Signs (Most Recent):  Temp: 97.8 °F (36.6 °C) (12/29/24 1136)  Pulse: 89 (12/29/24 1136)  Resp: 18 (12/29/24 1136)  BP: 106/65 (12/29/24 1136)  SpO2: (!) 94 % (12/29/24 1136) Vital Signs (24h Range):  Temp:  [97.5 °F (36.4 °C)-97.9 °F (36.6 °C)] 97.8 °F (36.6 °C)  Pulse:  [] 89  Resp:  [17-30] 18  SpO2:  [94 %-100 %] 94 %  BP: ()/(58-88) 106/65   Weight: 56 kg (123 lb 7.3 oz)  Body mass index is 19.93 kg/m².      Intake/Output Summary (Last 24 hours) at 12/29/2024 1416  Last data filed at 12/28/2024 1857  Gross per 24 hour   Intake 480 ml   Output --   Net 480 ml          Physical Exam  Vitals and nursing note reviewed.   Constitutional:       General: He is not in acute distress.     Appearance: Normal appearance. He is normal weight. He is ill-appearing. He is not toxic-appearing or diaphoretic.   HENT:      Head: Normocephalic and atraumatic.      Right Ear: External ear normal.      Left Ear: External ear normal.      Nose: Nose normal.      Mouth/Throat:      Mouth: Mucous membranes are moist.   Eyes:      General: No scleral icterus.     Extraocular Movements: Extraocular movements intact.      Conjunctiva/sclera: Conjunctivae normal.      Pupils: Pupils are equal, round, and reactive to light.   Cardiovascular:      Rate and Rhythm: Normal rate and regular rhythm.      Pulses: Normal pulses.      Heart sounds: Murmur heard.      No friction rub. No gallop.      Comments: No JVD or peripheral edema  Pulmonary:      Effort: Pulmonary effort is normal. No respiratory distress.      Breath sounds: Normal breath sounds.      Comments: Mildly increased respiratory effort with bibasilar crackles  Abdominal:      General: Abdomen is flat. Bowel sounds are normal. There is no distension.      Palpations: Abdomen is soft.      Tenderness:  There is no abdominal tenderness. There is no guarding or rebound.   Musculoskeletal:         General: Tenderness (left shoulder) and deformity (left shoulder) present. Normal range of motion.      Cervical back: Normal range of motion.      Right lower leg: No edema (trace).      Left lower leg: No edema (trace).      Comments: Kyphosis.    Skin:     General: Skin is warm and dry.      Coloration: Skin is not jaundiced or pale.   Neurological:      General: No focal deficit present.      Mental Status: He is alert and oriented to person, place, and time. Mental status is at baseline.   Psychiatric:         Mood and Affect: Mood normal.         Behavior: Behavior normal.         Thought Content: Thought content normal.         Judgment: Judgment normal.            Vents:  Oxygen Concentration (%): 60 (12/27/24 0424)  Lines/Drains/Airways       Central Venous Catheter Line  Duration             Trialysis (Dialysis) Catheter 12/24/24 1012 right internal jugular 5 days              Drain  Duration                  Colostomy 12/19/24 2225 RLQ 9 days              Peripheral Intravenous Line  Duration                  Peripheral IV - Single Lumen 12/22/24 1611 20 G Anterior;Right;Medial Upper Arm 6 days                  Significant Labs:    CBC/Anemia Profile:  Recent Labs   Lab 12/28/24  0356 12/29/24  0227   WBC 3.88* 4.08   HGB 7.0* 7.2*   HCT 22.1* 22.8*   * 146*   MCV 95 95   RDW 16.2* 15.9*        Chemistries:  Recent Labs   Lab 12/28/24  0356 12/29/24  0227   * 130*   K 4.1 4.1    99   CO2 24 22*   BUN 19 32*   CREATININE 4.2* 5.2*   CALCIUM 8.7 8.8   ALBUMIN 2.1* 2.3*   PROT  --  5.7*   BILITOT  --  0.2   ALKPHOS  --  343*   ALT  --  <5*   AST  --  13   MG 2.0 2.2   PHOS 4.4  4.4 4.2       All pertinent labs within the past 24 hours have been reviewed.    Significant Imaging:  I have reviewed all pertinent imaging results/findings within the past 24 hours.    Assessment and Plan     * Septic  "shock  resolved  Presented with hypotension unable to receive iHD.  SBP 70s on arrival to ED.  On exam patient with MAP 65-70 not on vasopressor support, but suspect will need vasopressors for dialysis. No obvious signs of infection on exam, tunneled cath dry/intact. Reports no cough, urinary symptoms.  WBC normal.  Suspect shock likely cardiogenic in setting of HF and ESRD with volume overload.       -- blood cultures + for staph, most recent cultures remain positive with plan for tunneled catheter line removal 12/22. Repeat blood cultures NGTD.   --Vegetation found on aortic valve on echocardiogram. Consult cardiothoracic surgery- appreciate recs, not a surgical candidate.   --Infectious diseases consult--appreciate input   --Started on cefepime and daptomycin given reported vancomycin allergy; discontinue cefepime 12/22. Deescalate daptomycin to cefazolin for 6 weeks.        Acute respiratory failure with hypoxia  Patient with Hypoxic Respiratory failure which is Acute.  he is not on home oxygen. Supplemental oxygen was provided and noted-       .   Signs/symptoms of respiratory failure include- tachypnea, increased work of breathing, respiratory distress, and use of accessory muscles. Contributing diagnoses includes - CHF Labs and images were reviewed. Patient Has recent ABG, which has been reviewed. Will treat underlying causes and adjust management of respiratory failure as follows-   - continue to wean oxygen to goal SaO2 of 90%  - aggressive pulmonary toilet in setting of atelectasis of left lower lung field.     Coagulase negative Staphylococcus bacteremia  Deescalated from daptomycin to cefazolin 12/24. 6 weeks treatment post last negative Bcx (12/23)       Endocarditis  See "septic shock"  - despite size, patient would be a poor surgical candidate for valve replacement in setting of Age and comorbidities. Increased risk of stroke based on size and location.       NSTEMI (non-ST elevated myocardial " infarction)  High sensitivity troponin troponin 923.  EKG with t wave inversions.  No complaints of chest pain.       --Cardiology following  -- Troponin down trending  --Continuous cardiac monitoring  --EKG PRN          HFrEF (heart failure with reduced ejection fraction)  HFrEF (30% 8/2024 from 20-25% 6/2024) per cardiology notes.  Suspect volume overload with history HF and ESRD.     -- s/p line holiday, temp HD catheter placed 12/24. SLED with UF 12/24, HD tolerated on 12/27  --Echo showing EF of 20-25% with large AV vegetation partially occluding LVOT with moderate AV regurgitation.   --Cardiology following  --troponin down trending   --Continuous cardiac monitoring   --EKG PRN          History of nephrectomy  Noted. See ESRD.         Hypertension  Patient's blood pressure range in the last 24 hours was: BP  Min: 92/59  Max: 139/72.  On hold    ESRD on hemodialysis  --Nephrology consulted--appreciate input   --HD tolerated 12/27  - tunneled catheter removed 12/22. RIJ temp HD catheter placed 12/24.  - will need HD access prior to discharge.          VTE Risk Mitigation (From admission, onward)           Ordered     heparin (porcine) injection 5,000 Units  Every 8 hours         12/29/24 1000     Place sequential compression device  Until discontinued         12/19/24 1736     IP VTE LOW RISK PATIENT  Once         12/19/24 1736                    Discharge Planning   LLUVIA: 12/31/2024     Code Status: DNR   Medical Readiness for Discharge Date:   Discharge Plan A: Home with family   Discharge Delays: None known at this time            Please place Justification for DME        Rebecca Andrade MD  Department of Hospital Medicine   [facility] - Internal Medicine Telemetry

## 2024-12-29 NOTE — HOSPITAL COURSE
1/9 Mr. 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 ID was consulted and recommending 6 weeks of IV Cefazolin after HD, end date 2/2/25. CTS evaluated and recommending medical management at this time due to multiple co-morbidities. Tunneled catheter was removed 12/22. Repeat cultures from 12/23 with NGTD. Temporary RIJ trialysis line placed 12/24.  Course further complicated by acute hypoxemic respiratory failure likely volume overload requiring HFNC.  Oxygenation improved with volume removal with dialysis and he was weaned down to NC.  He was SD to  on 12/29.  IR was consulted for new HD line placement which was placed on 12/31.  He was going to be discharged home after his new line placement, but sister refuses to take him home and says he needs detention NH placement. K of 5.3. Lokelma ordered. renal panel q 4h. 1L/24h fluid restriction CM assisting Pending financials submission to [facility]  1/10 Hb trended down to 6.8. FOBT. Transfusion with 1 unit of PRBC . K improved to 5. HD today    1/11 persistent perirpheral edema. UF of 1L yesterday and continue UF as BP permits. nephrology f/u for HD today - refused HD today. Plan for next session Monday. mild epistaxis left  nostril - improved with pressure. Nasal saline  ordered  1/12 Hb 8.9. FOBT +. K of 5.2. refused telemetry.  monitor , reported self limiting bloody mucous discharge from rectum. no intervention per CRS. monitor Hb   1/13 K 5.5 . HD today. wound care follow up for ostomy care. Hypotension with HD resolved. asymptomatic.   1/14 K 5.1. lokelma x 1.   Discharge to NH

## 2024-12-30 PROBLEM — B95.8 BACTEREMIA DUE TO STAPHYLOCOCCUS: Status: ACTIVE | Noted: 2024-12-24

## 2024-12-30 LAB
ABO + RH BLD: ABNORMAL
ABO + RH BLD: NORMAL
ALBUMIN SERPL BCP-MCNC: 2.3 G/DL (ref 3.5–5.2)
ALP SERPL-CCNC: 325 U/L (ref 40–150)
ALT SERPL W/O P-5'-P-CCNC: <5 U/L (ref 10–44)
ANION GAP SERPL CALC-SCNC: 11 MMOL/L (ref 8–16)
AST SERPL-CCNC: 14 U/L (ref 10–40)
BASOPHILS # BLD AUTO: 0.03 K/UL (ref 0–0.2)
BASOPHILS NFR BLD: 0.7 % (ref 0–1.9)
BILIRUB SERPL-MCNC: 0.2 MG/DL (ref 0.1–1)
BLD GP AB SCN CELLS X3 SERPL QL: ABNORMAL
BLD PROD TYP BPU: NORMAL
BLOOD GROUP ANTIBODIES SERPL: NORMAL
BLOOD UNIT EXPIRATION DATE: NORMAL
BLOOD UNIT TYPE CODE: 5100
BLOOD UNIT TYPE: NORMAL
BUN SERPL-MCNC: 45 MG/DL (ref 8–23)
CALCIUM SERPL-MCNC: 9.1 MG/DL (ref 8.7–10.5)
CHLORIDE SERPL-SCNC: 98 MMOL/L (ref 95–110)
CO2 SERPL-SCNC: 19 MMOL/L (ref 23–29)
CODING SYSTEM: NORMAL
CREAT SERPL-MCNC: 6.2 MG/DL (ref 0.5–1.4)
CROSSMATCH INTERPRETATION: NORMAL
DIFFERENTIAL METHOD BLD: ABNORMAL
DISPENSE STATUS: NORMAL
EOSINOPHIL # BLD AUTO: 0.1 K/UL (ref 0–0.5)
EOSINOPHIL NFR BLD: 2 % (ref 0–8)
ERYTHROCYTE [DISTWIDTH] IN BLOOD BY AUTOMATED COUNT: 16.7 % (ref 11.5–14.5)
EST. GFR  (NO RACE VARIABLE): 9.1 ML/MIN/1.73 M^2
GLUCOSE SERPL-MCNC: 74 MG/DL (ref 70–110)
HBV SURFACE AG SERPL QL IA: NORMAL
HCT VFR BLD AUTO: 22.1 % (ref 40–54)
HGB BLD-MCNC: 6.9 G/DL (ref 14–18)
IMM GRANULOCYTES # BLD AUTO: 0.04 K/UL (ref 0–0.04)
IMM GRANULOCYTES NFR BLD AUTO: 0.9 % (ref 0–0.5)
LYMPHOCYTES # BLD AUTO: 0.5 K/UL (ref 1–4.8)
LYMPHOCYTES NFR BLD: 10.2 % (ref 18–48)
MAGNESIUM SERPL-MCNC: 2.2 MG/DL (ref 1.6–2.6)
MCH RBC QN AUTO: 30 PG (ref 27–31)
MCHC RBC AUTO-ENTMCNC: 31.2 G/DL (ref 32–36)
MCV RBC AUTO: 96 FL (ref 82–98)
MONOCYTES # BLD AUTO: 0.4 K/UL (ref 0.3–1)
MONOCYTES NFR BLD: 9.6 % (ref 4–15)
NEUTROPHILS # BLD AUTO: 3.5 K/UL (ref 1.8–7.7)
NEUTROPHILS NFR BLD: 76.6 % (ref 38–73)
NRBC BLD-RTO: 0 /100 WBC
PHOSPHATE SERPL-MCNC: 5.2 MG/DL (ref 2.7–4.5)
PLATELET # BLD AUTO: 143 K/UL (ref 150–450)
PMV BLD AUTO: 10.6 FL (ref 9.2–12.9)
POCT GLUCOSE: 86 MG/DL (ref 70–110)
POTASSIUM SERPL-SCNC: 4.1 MMOL/L (ref 3.5–5.1)
PROT SERPL-MCNC: 5.8 G/DL (ref 6–8.4)
RBC # BLD AUTO: 2.3 M/UL (ref 4.6–6.2)
SODIUM SERPL-SCNC: 128 MMOL/L (ref 136–145)
SPECIMEN OUTDATE: ABNORMAL
SPECIMEN OUTDATE: NORMAL
TRANS ERYTHROCYTES VOL PATIENT: NORMAL ML
WBC # BLD AUTO: 4.59 K/UL (ref 3.9–12.7)

## 2024-12-30 PROCEDURE — 25000242 PHARM REV CODE 250 ALT 637 W/ HCPCS: Performed by: INTERNAL MEDICINE

## 2024-12-30 PROCEDURE — 83735 ASSAY OF MAGNESIUM: CPT | Performed by: PHYSICIAN ASSISTANT

## 2024-12-30 PROCEDURE — 85025 COMPLETE CBC W/AUTO DIFF WBC: CPT

## 2024-12-30 PROCEDURE — 86870 RBC ANTIBODY IDENTIFICATION: CPT | Performed by: HOSPITALIST

## 2024-12-30 PROCEDURE — 86902 BLOOD TYPE ANTIGEN DONOR EA: CPT | Mod: 59 | Performed by: HOSPITALIST

## 2024-12-30 PROCEDURE — 86922 COMPATIBILITY TEST ANTIGLOB: CPT | Performed by: HOSPITALIST

## 2024-12-30 PROCEDURE — 63600175 PHARM REV CODE 636 W HCPCS: Performed by: INTERNAL MEDICINE

## 2024-12-30 PROCEDURE — 94640 AIRWAY INHALATION TREATMENT: CPT

## 2024-12-30 PROCEDURE — 87340 HEPATITIS B SURFACE AG IA: CPT | Performed by: HOSPITALIST

## 2024-12-30 PROCEDURE — 86905 BLOOD TYPING RBC ANTIGENS: CPT | Performed by: HOSPITALIST

## 2024-12-30 PROCEDURE — 25000003 PHARM REV CODE 250: Performed by: INTERNAL MEDICINE

## 2024-12-30 PROCEDURE — 86901 BLOOD TYPING SEROLOGIC RH(D): CPT | Performed by: HOSPITALIST

## 2024-12-30 PROCEDURE — P9021 RED BLOOD CELLS UNIT: HCPCS | Performed by: HOSPITALIST

## 2024-12-30 PROCEDURE — 27000221 HC OXYGEN, UP TO 24 HOURS

## 2024-12-30 PROCEDURE — 84100 ASSAY OF PHOSPHORUS: CPT

## 2024-12-30 PROCEDURE — 86901 BLOOD TYPING SEROLOGIC RH(D): CPT | Mod: 91 | Performed by: HOSPITALIST

## 2024-12-30 PROCEDURE — 21400001 HC TELEMETRY ROOM

## 2024-12-30 PROCEDURE — 30233N1 TRANSFUSION OF NONAUTOLOGOUS RED BLOOD CELLS INTO PERIPHERAL VEIN, PERCUTANEOUS APPROACH: ICD-10-PCS | Performed by: HOSPITALIST

## 2024-12-30 PROCEDURE — 80053 COMPREHEN METABOLIC PANEL: CPT | Performed by: PHYSICIAN ASSISTANT

## 2024-12-30 PROCEDURE — 90935 HEMODIALYSIS ONE EVALUATION: CPT | Mod: ,,, | Performed by: INTERNAL MEDICINE

## 2024-12-30 PROCEDURE — 25000003 PHARM REV CODE 250

## 2024-12-30 PROCEDURE — 99900035 HC TECH TIME PER 15 MIN (STAT)

## 2024-12-30 PROCEDURE — 63600175 PHARM REV CODE 636 W HCPCS: Performed by: STUDENT IN AN ORGANIZED HEALTH CARE EDUCATION/TRAINING PROGRAM

## 2024-12-30 PROCEDURE — 90935 HEMODIALYSIS ONE EVALUATION: CPT

## 2024-12-30 PROCEDURE — 36415 COLL VENOUS BLD VENIPUNCTURE: CPT | Performed by: HOSPITALIST

## 2024-12-30 PROCEDURE — 99223 1ST HOSP IP/OBS HIGH 75: CPT | Mod: ,,, | Performed by: PHYSICIAN ASSISTANT

## 2024-12-30 PROCEDURE — 94761 N-INVAS EAR/PLS OXIMETRY MLT: CPT

## 2024-12-30 RX ORDER — SODIUM CHLORIDE 9 MG/ML
INJECTION, SOLUTION INTRAVENOUS ONCE
Status: COMPLETED | OUTPATIENT
Start: 2024-12-31 | End: 2025-01-01

## 2024-12-30 RX ORDER — HYDROCODONE BITARTRATE AND ACETAMINOPHEN 500; 5 MG/1; MG/1
TABLET ORAL
Status: DISCONTINUED | OUTPATIENT
Start: 2024-12-30 | End: 2025-01-14 | Stop reason: HOSPADM

## 2024-12-30 RX ADMIN — CEFAZOLIN 1 G: 1 INJECTION, POWDER, FOR SOLUTION INTRAMUSCULAR; INTRAVENOUS at 08:12

## 2024-12-30 RX ADMIN — HEPARIN SODIUM 5000 UNITS: 5000 INJECTION INTRAVENOUS; SUBCUTANEOUS at 05:12

## 2024-12-30 RX ADMIN — IPRATROPIUM BROMIDE AND ALBUTEROL SULFATE 3 ML: 2.5; .5 SOLUTION RESPIRATORY (INHALATION) at 11:12

## 2024-12-30 RX ADMIN — Medication 6 MG: at 08:12

## 2024-12-30 RX ADMIN — SEVELAMER CARBONATE 800 MG: 800 TABLET, FILM COATED ORAL at 05:12

## 2024-12-30 RX ADMIN — HEPARIN SODIUM 5000 UNITS: 5000 INJECTION INTRAVENOUS; SUBCUTANEOUS at 01:12

## 2024-12-30 NOTE — PLAN OF CARE
Discharge Plan A and Plan B have been determined by review of patient's clinical status, future medical and therapeutic needs, and coverage/benefits for post-acute care in coordination with multidisciplinary team members.    12/30/24 1526   Post-Acute Status   Post-Acute Authorization IV Infusion   Coverage [employer] MANAGED MEDICARE - [employer] MEDICARE DUAL DSNP -   IV Infusion Status Referral(s) sent   Discharge Plan   Discharge Plan A Home with family   Discharge Plan B Home Health;Home with family     TIERA confirmed w/ MD that patient requires IV abx on HD days./ TIERA phoned HD clinic ([facility]) [phone]. Lucia confirmed that clinic has med in stock and is able to administer IV meds on HD days. TIERA sent ID OPAT to clinic via hard fax to [phone] as requested. MD notified.    TIERA will continue to follow                  VAL Haley, LMSW  [facility]  Case Management  Ext. [phone]

## 2024-12-30 NOTE — PROGRESS NOTES
0840 Patient arrived to ERNST via stretcher, [id number].    0850 HD treatment started via R IJ catheter; site looks clean and dry and no complication noted; accessed with good blood flow, VSS and recorded, NADN.

## 2024-12-30 NOTE — ASSESSMENT & PLAN NOTE
Patient's blood pressure range in the last 24 hours was: BP  Min: 103/61  Max: 143/72.  Hold BP meds

## 2024-12-30 NOTE — ASSESSMENT & PLAN NOTE
Nephro following  Tunneled HD line removed 12/22  Temporary trialysis placed 12/24  IR consulted for new HD line placement

## 2024-12-30 NOTE — PROGRESS NOTES
[facility] Long - Internal Medicine [facility] Medicine  Progress Note    Patient Name: Flakito Mcginnis  MRN: [id number]  Patient Class: IP- Inpatient   Admission Date: 12/19/2024  Length of Stay: 11 days  Attending Physician: Dixie Melgar MD  Primary Care Provider: Jordin Robbins MD        Subjective     Principal Problem:Septic shock        HPI:  By Alicia Galloway NP    Mr. Flakito Mcginnis is a 69 y.o. man w/ HFrEF (30% 8/2024 from 20-25% 6/2024), NICM, MR, ESRD on HD, Crohn's s/p colostomy, h/o R nephrectomy. [place] [facility]19 due to hypotension and ongoing shortness of breath. He usually receives his dialysis on T, R, S and went on Wednesday for an extra session for volume removal. He arrived on Thursday for his usually scheduled dialysis session and was directed to the ED due to hypotension. On arrival in the ED his blood pressure was 78/51. He was found to have a BNP >4900 and CXR concerning for volume overload. High sensitivity troponin 923. Critical care medicine was consulted for hypotension and admitted to MICU.     Overview/Hospital Course:  Mr. 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 ID was consulted and recommending 6 weeks of IV Cefazolin after HD, end date 2/2/25. CTS evaluated and recommending medical management at this time due to multiple co-morbidities. Tunneled catheter was removed 12/22. Repeat cultures from 12/23 with NGTD. Temporary RIJ trialysis line placed 12/24.  Course further complicated by acute hypoxemic respiratory failure likely volume overload requiring HFNC.  Oxygenation improved with volume removal with dialysis and he was weaned down to NC.  He was SD to  on 12/29.  IR was consulted for new HD line placement.    Interval History: Seen on HD.  Has no complaints.   Discussed plan for new IR line placement for HD.  Says he has been walking fine.  Consent for blood.  Hopeful to DC soon with IV abx with HD soon.    Review of Systems   Constitutional:  Positive for fatigue. Negative for chills and fever.   Respiratory:  Negative for cough and shortness of breath.    Cardiovascular:  Negative for chest pain, palpitations and leg swelling.   Gastrointestinal:  Negative for abdominal pain, diarrhea, nausea and vomiting.   Genitourinary:  Negative for dysuria and urgency.   Neurological:  Positive for weakness. Negative for dizziness and headaches.   All other systems reviewed and are negative.    Objective:     Vital Signs (Most Recent):  Temp: (P) 97.7 °F (36.5 °C) (12/30/24 1150)  Pulse: 92 (12/30/24 1215)  Resp: (P) 18 (12/30/24 1150)  BP: (!) 115/59 (12/30/24 1215)  SpO2: 100 % (12/30/24 1215) Vital Signs (24h Range):  Temp:  [97.4 °F (36.3 °C)-97.8 °F (36.6 °C)] (P) 97.7 °F (36.5 °C)  Pulse:  [] 92  Resp:  [17-18] (P) 18  SpO2:  [94 %-100 %] 100 %  BP: (103-143)/(58-83) 115/59     Weight: 56 kg (123 lb 7.3 oz)  Body mass index is 19.93 kg/m².    Intake/Output Summary (Last 24 hours) at 12/30/2024 1224  Last data filed at 12/29/2024 2000  Gross per 24 hour   Intake 600 ml   Output --   Net 600 ml      Physical Exam  Constitutional:       Appearance: He is ill-appearing.   HENT:      Head: Normocephalic and atraumatic.   Neck:      Comments: Right trialysis  Cardiovascular:      Rate and Rhythm: Normal rate and regular rhythm.      Heart sounds: Murmur heard.   Pulmonary:      Effort: Pulmonary effort is normal. No respiratory distress.      Breath sounds: Normal breath sounds. No wheezing or rales.   Abdominal:      General: There is no distension.      Palpations: Abdomen is soft.      Tenderness: There is no abdominal tenderness.   Musculoskeletal:         General: No deformity.      Right lower leg: No edema.      Left lower leg: No edema.   Skin:     General: Skin is  warm and dry.      Coloration: Skin is pale.   Neurological:      General: No focal deficit present.      Mental Status: He is alert and oriented to person, place, and time. Mental status is at baseline.           Significant Labs:  CBC:  Recent Labs   Lab 12/29/24 0227 12/30/24  0553   WBC 4.08 4.59   HGB 7.2* 6.9*   HCT 22.8* 22.1*   * 143*     CMP:  Recent Labs   Lab 12/29/24 0227 12/30/24  0553   * 128*   K 4.1 4.1   CL 99 98   CO2 22* 19*   GLU 79 74   BUN 32* 45*   CREATININE 5.2* 6.2*   CALCIUM 8.8 9.1   PROT 5.7* 5.8*   ALBUMIN 2.3* 2.3*   BILITOT 0.2 0.2   ALKPHOS 343* 325*   AST 13 14   ALT <5* <5*   ANIONGAP 9 11         Assessment and Plan     * Septic shock  Admitted to MICU 12/19 for septic shock 2/2 Staph capitis bacteremia and endocarditis from his tunneled HD line  Weaned off vasopressors in the ICU  2D echo with mass on the aortic valve suggestive of vegetation  Tunneled HD line removed 12/22  Temporary trialysis placed 12/24  ID consulted:  Suggest 6 weeks of IV Ancef with HD through 2/2/25  CTS consulted:  Suggest IV abx, no surgical intervention  IR consulted for new HD line placement    Bacteremia due to Staphylococcus  Seeding from tunneled HD line with AV vegetation  ID consulted:  Suggest 6 weeks of IV Ancef with HD through 2/2/25    Endocarditis  See septic shock    ACP (advance care planning)  DNR noted      Debility  Patient with Acute debility due to  acute illness . The patient's latest AMPAC (Activity Measure for Post Acute Care) Score is listed below.    AM-PAC Score - How much help does the patient need for each activity listed  Basic Mobility Total Score: 17  Turning over in bed (including adjusting bedclothes, sheets and blankets)?: A little  Sitting down on and standing up from a chair with arms (e.g., wheelchair, bedside commode, etc.): A little  Moving from lying on back to sitting on the side of the bed?: A little  Moving to and from a bed to a chair (including a  wheelchair)?: A little  Need to walk in hospital room?: A little  Climbing 3-5 steps with a railing?: A lot    Plan  - Progressive mobility protocol initated  - PT/OT consulted    Palliative care encounter  DNR noted      Acute respiratory failure with hypoxia  Patient with Hypoxic Respiratory failure which is Acute.  he is not on home oxygen. Supplemental oxygen was provided and noted-       .   Signs/symptoms of respiratory failure include- tachypnea, increased work of breathing, respiratory distress, and use of accessory muscles. Contributing diagnoses includes - CHF Labs and images were reviewed. Patient Has recent ABG, which has been reviewed. Will treat underlying causes and adjust management of respiratory failure as follows-   Continue to wean oxygen to goal SaO2 of 90%  Aggressive pulmonary toilet in setting of atelectasis of left lower lung field.     Hyperkalemia  Hyperkalemia is likely due to ESRD.The patients most recent potassium results are listed below.  Recent Labs     12/28/24  0356 12/29/24 0227 12/30/24  0553   K 4.1 4.1 4.1     Plan  - Monitor for arrhythmias with EKG and/or continuous telemetry.   - Now resolved            Anemia of chronic renal failure, stage 5  Anemia is likely due to chronic disease due to ESRD. Most recent hemoglobin and hematocrit are listed below.  Recent Labs     12/28/24  0356 12/29/24 0227 12/30/24  0553   HGB 7.0* 7.2* 6.9*   HCT 22.1* 22.8* 22.1*     Plan  - Monitor serial CBC: Daily  - Transfuse PRBC if patient becomes hemodynamically unstable, symptomatic or H/H drops below 7/21.  - Patient will receive 1 unit PRBCs today  - Patient's anemia is currently worsening    NSTEMI (non-ST elevated myocardial infarction)  In setting of septic shock  High sensitivity troponin troponin 923.    EKG with T wave inversions  No chest pain    Hypercholesterolemia  Chronic and stable  Continue Statin      HFrEF (heart failure with reduced ejection fraction)  2D Echo showing EF  of 20-25% with large AV vegetation partially occluding LVOT with moderate AV regurgitation  Volume removal with HD    History of nephrectomy  Noted  ESRD on HD    Hypertension  Patient's blood pressure range in the last 24 hours was: BP  Min: 103/61  Max: 143/72.  Hold BP meds    ESRD on hemodialysis  Nephro following  Tunneled HD line removed 12/22  Temporary trialysis placed 12/24  IR consulted for new HD line placement      VTE Risk Mitigation (From admission, onward)           Ordered     heparin (porcine) injection 5,000 Units  Every 8 hours         12/29/24 1000     Place sequential compression device  Until discontinued         12/19/24 1736     IP VTE LOW RISK PATIENT  Once         12/19/24 1736                    Discharge Planning   LLUVIA: 1/2/2025     Code Status: DNR   Medical Readiness for Discharge Date:   Discharge Plan A: Home with family   Discharge Delays: None known at this time          Dixie Melgar MD  Department of Hospital Medicine   [facility] - Internal Medicine Telemetry

## 2024-12-30 NOTE — PLAN OF CARE
Problem: Adult Inpatient Plan of Care  Goal: Plan of Care Review  Outcome: Progressing  Goal: Patient-Specific Goal (Individualized)  Outcome: Progressing  Goal: Absence of Hospital-Acquired Illness or Injury  Outcome: Progressing  Goal: Optimal Comfort and Wellbeing  Outcome: Progressing  Goal: Readiness for Transition of Care  Outcome: Progressing     Problem: Infection  Goal: Absence of Infection Signs and Symptoms  Outcome: Progressing     Problem: Skin Injury Risk Increased  Goal: Skin Health and Integrity  Outcome: Progressing     Problem: Sepsis/Septic Shock  Goal: Optimal Coping  Outcome: Progressing  Goal: Absence of Bleeding  Outcome: Progressing  Goal: Blood Glucose Level Within Targeted Range  Outcome: Progressing  Goal: Absence of Infection Signs and Symptoms  Outcome: Progressing  Goal: Optimal Nutrition Intake  Outcome: Progressing     Problem: CRRT (Continuous Renal Replacement Therapy)  Goal: Safe, Effective Therapy Delivery  Outcome: Progressing  Goal: Hemodynamic Stability  Outcome: Progressing  Goal: Body Temperature Maintained in Desired Range  Outcome: Progressing  Goal: Absence of Infection Signs and Symptoms  Outcome: Progressing     Problem: Fall Injury Risk  Goal: Absence of Fall and Fall-Related Injury  Outcome: Progressing     Problem: Coping Ineffective  Goal: Effective Coping  Outcome: Progressing     Problem: Hemodialysis  Goal: Safe, Effective Therapy Delivery  Outcome: Progressing  Goal: Effective Tissue Perfusion  Outcome: Progressing  Goal: Absence of Infection Signs and Symptoms  Outcome: Progressing    Pt had an uneventful night. VSS. Pt denied any pain or discomfort Pt remains in on O2 at 1L  Pt is free of falls and injuries. Bed in lowest position and call light within reach. Safety maintained

## 2024-12-30 NOTE — PT/OT/SLP PROGRESS
Speech Language Pathology      Flakito Mcginnis  MRN: [id number]    Patient not seen today secondary to being off the floor for dialysis upon first attempt. Upon second attempt pt was NPO. Will follow-up next available session date.

## 2024-12-30 NOTE — ASSESSMENT & PLAN NOTE
2D Echo showing EF of 20-25% with large AV vegetation partially occluding LVOT with moderate AV regurgitation  Volume removal with HD

## 2024-12-30 NOTE — PT/OT/SLP PROGRESS
Occupational Therapy      Patient Name:  Flakito Mcginnis   MRN:  [id number]    Patient not seen today secondary to off the floor for dialysis in the AM. Will follow-up.    12/30/2024

## 2024-12-30 NOTE — PT/OT/SLP PROGRESS
Physical Therapy      Patient Name:  Flakito Mcginnis   MRN:  [id number]    Patient not seen today secondary to Dialysis. Will follow-up as appropriate.

## 2024-12-30 NOTE — PLAN OF CARE
Problem: Hemodialysis  Goal: Safe, Effective Therapy Delivery  Outcome: Progressing  Goal: Effective Tissue Perfusion  Outcome: Progressing

## 2024-12-30 NOTE — ASSESSMENT & PLAN NOTE
Seeding from tunneled HD line with AV vegetation  ID consulted:  Suggest 6 weeks of IV Ancef with HD through 2/2/25

## 2024-12-30 NOTE — ASSESSMENT & PLAN NOTE
Admitted to MICU 12/19 for septic shock 2/2 Staph capitis bacteremia and endocarditis from his tunneled HD line  Weaned off vasopressors in the ICU  2D echo with mass on the aortic valve suggestive of vegetation  Tunneled HD line removed 12/22  Temporary trialysis placed 12/24  ID consulted:  Suggest 6 weeks of IV Ancef with HD through 2/2/25  CTS consulted:  Suggest IV abx, no surgical intervention  IR consulted for new HD line placement

## 2024-12-30 NOTE — ASSESSMENT & PLAN NOTE
In setting of septic shock  High sensitivity troponin troponin 923.    EKG with T wave inversions  No chest pain

## 2024-12-30 NOTE — CONSULTS
Interventional Radiology  Consult/History & Physical Note    Consult Requested By: Dixie Melgar MD  Reason for Consult: New HD line placement    SUBJECTIVE:     Chief Complaint: ESRD    History of Present Illness:  Flakito Mcginnis is a 69 y.o. male with a PMHx of HFrEF, NICM, MR, ESRD on HD, Crohn's s/p colostomy, h/o R nephrectomy who was admitted on 12/19/24 for septic shock 2/2 Staph capitis bacteremia and endocarditis suspected from tunneled catheter. Hospital course notable for TDC removal on 12/22, acute resp failure requiring HFNC and transfer to the MICU, pt has since been stepped back down to . Interventional Radiology has been consulted for TDC placement for HD access. Pt has initiated HD and currently does have a temporary HD line in place (R IJ trialysis placed on 12/24). Her last HD session was today. His WBC is 4.59 from 4.08, last set of blood cultures on 12/23/24 revealed NGTD. Pt is afebrile and hemodynamically stable. He is currently satting well on 2 L O2 via NC. He denies a history of BORA requiring nightly CPAP or difficulty breathing when lying flat. He has been receiving prophylactic SQ heparin while admitted. He is NPO.    Review of Systems   Constitutional:  Negative for chills and fever.       Scheduled Meds:   [START ON 12/31/2024] 0.9% NaCl   Intravenous Once    albuterol-ipratropium  3 mL Nebulization Q8H    atorvastatin  40 mg Oral Daily    ceFAZolin (Ancef) IV (PEDS and ADULTS)  1 g Intravenous Q24H    heparin (porcine)  5,000 Units Subcutaneous Q8H    melatonin  6 mg Oral Nightly    sevelamer carbonate  800 mg Oral TID WM     Continuous Infusions:  PRN Meds:  Current Facility-Administered Medications:     0.9%  NaCl infusion (for blood administration), , Intravenous, Q24H PRN    acetaminophen, 500 mg, Oral, Q4H PRN    haloperidoL, 5 mg, Oral, Q6H PRN    oxyCODONE, 5 mg, Oral, Q4H PRN    oxyCODONE, 10 mg, Oral, Q6H PRN    sodium chloride 0.9%, 10 mL, Intravenous, PRN    Review of  patient's allergies indicates:   Allergen Reactions    Vancomycin analogues Anaphylaxis, Other (See Comments), Shortness Of Breath and Swelling     Light headed, see's spots      Aspirin Nausea And Vomiting and Other (See Comments)     Has crohn's disease    Other reaction(s): FLARES UP CROHNS      Has crohn's disease       Past Medical History:   Diagnosis Date    Crohn's disease 1998    ESRD (end stage renal disease) on dialysis 10/2017    Hypertension     Obstructive uropathy      Past Surgical History:   Procedure Laterality Date    COLOSTOMY  2006    DIALYSIS FISTULA CREATION Left 02/2018    NEPHRECTOMY Right     REMOVAL OF TUNNELED CENTRAL VENOUS CATHETER (CVC) N/A 5/23/2018    Procedure: PERMCATH REMOVAL-TUNNELED CVC REMOVAL;  Surgeon: Parveen Ray MD;  Location: [facility] CATH LAB;  Service: Nephrology;  Laterality: N/A;  pt coming in @930     Family History   Problem Relation Name Age of Onset    Hypertension Mother      Emphysema Father       Social History     Tobacco Use    Smoking status: Former     Passive exposure: Never    Smokeless tobacco: Never   Substance Use Topics    Alcohol use: Not Currently    Drug use: Never       OBJECTIVE:     Vital Signs (Most Recent)  Temp: 97.8 °F (36.6 °C) (12/30/24 0806)  Pulse: 94 (12/30/24 0806)  Resp: 17 (12/30/24 0806)  BP: 128/83 (12/30/24 0806)  SpO2: 97 % (12/30/24 0806)    Physical Exam:  Physical Exam  Vitals and nursing note reviewed.   Constitutional:       General: He is not in acute distress.     Appearance: He is ill-appearing.   HENT:      Head: Normocephalic and atraumatic.      Right Ear: External ear normal.      Left Ear: External ear normal.   Eyes:      Extraocular Movements: Extraocular movements intact.      Conjunctiva/sclera: Conjunctivae normal.      Pupils: Pupils are equal, round, and reactive to light.   Neck:      Comments: R IJ trialysis  Cardiovascular:      Rate and Rhythm: Normal rate.   Pulmonary:      Effort: Pulmonary effort is  "normal. No respiratory distress.   Abdominal:      General: Abdomen is flat.   Skin:     General: Skin is warm and dry.      Coloration: Skin is not jaundiced.   Neurological:      General: No focal deficit present.      Mental Status: He is alert and oriented to person, place, and time.   Psychiatric:         Mood and Affect: Mood normal.         Behavior: Behavior normal.         Thought Content: Thought content normal.         Judgment: Judgment normal.         Laboratory  I have reviewed all pertinent lab results within the past 24 hours.  CBC:   Recent Labs   Lab 12/30/24  0553   WBC 4.59   RBC 2.30*   HGB 6.9*   HCT 22.1*   *   MCV 96   MCH 30.0   MCHC 31.2*     BMP:   Recent Labs   Lab 12/30/24  0553   GLU 74   *   K 4.1   CL 98   CO2 19*   BUN 45*   CREATININE 6.2*   CALCIUM 9.1   MG 2.2     CMP:   Recent Labs   Lab 12/30/24  0553   GLU 74   CALCIUM 9.1   ALBUMIN 2.3*   PROT 5.8*   *   K 4.1   CO2 19*   CL 98   BUN 45*   CREATININE 6.2*   ALKPHOS 325*   ALT <5*   AST 14   BILITOT 0.2     LFTs:   Recent Labs   Lab 12/30/24  0553   ALT <5*   AST 14   ALKPHOS 325*   BILITOT 0.2   PROT 5.8*   ALBUMIN 2.3*     Coagulation: No results for input(s): "LABPROT", "INR", "APTT" in the last 168 hours.  Microbiology Results (last 7 days)       Procedure Component Value Units Date/Time    Blood culture [[id number]] Collected: 12/23/24 1136    Order Status: Completed Specimen: Blood from Peripheral, Upper Arm, Right Updated: 12/28/24 1812     Blood Culture, Routine No growth after 5 days.    Blood culture [[id number]] Collected: 12/23/24 1135    Order Status: Completed Specimen: Blood from Peripheral, Lower Arm, Right Updated: 12/28/24 1812     Blood Culture, Routine No growth after 5 days.    Blood culture [[id number]]  (Abnormal)  (Susceptibility) Collected: 12/21/24 0115    Order Status: Completed Specimen: Blood from Peripheral, Forearm, Right Updated: 12/26/24 1156     Blood Culture, Routine Gram " stain aer bottle: Gram positive cocci in clusters resembling Staph      Results called to and read back by: KAYLENE Padgett. 12/22/2024  06:04      STAPHYLOCOCCUS CAPITIS SUBSPECIES CAPITIS    Narrative:      Blood cultures from 2 different sites. 4 bottles total.  Please draw before starting antibiotics.    Blood culture x two cultures. Draw prior to antibiotics. [[id number]]  (Abnormal)  (Susceptibility) Collected: 12/19/24 1430    Order Status: Completed Specimen: Blood from Peripheral, Hand, Left Updated: 12/26/24 1155     Blood Culture, Routine Gram stain aer bottle: Gram positive cocci in clusters resembling Staph      Results called to and read back by:Lu Robles RN 12/20/2024  16:30      Gram stain benja bottle: Gram positive cocci in clusters resembling Staph      STAPHYLOCOCCUS CAPITIS SUBSPECIES CAPITIS    Narrative:      Aerobic and anaerobic    Blood culture [[id number]]  (Abnormal)  (Susceptibility) Collected: 12/21/24 0115    Order Status: Completed Specimen: Blood from Peripheral, Forearm, Right Updated: 12/25/24 1050     Blood Culture, Routine Gram stain aer bottle: Gram positive cocci in clusters resembling Staph      Results called to and read back by: KAYLENE Padgett. 12/22/2024  04:06      STAPHYLOCOCCUS CAPITIS    Narrative:      Blood cultures x 2 different sites. 4 bottles total. Please  draw cultures before administering antibiotics.    Blood culture x two cultures. Draw prior to antibiotics. [[id number]]  (Abnormal)  (Susceptibility) Collected: 12/19/24 1434    Order Status: Completed Specimen: Blood from Peripheral, Forearm, Right Updated: 12/24/24 1101     Blood Culture, Routine Gram stain aer bottle: Gram positive cocci in clusters resembling Staph      Positive results previously called 12/20/2024      Gram stain benja bottle: Gram positive cocci in clusters resembling Staph      12/21/2024  01:37      STAPHYLOCOCCUS CAPITIS SUBSPECIES CAPITIS    Narrative:      Aerobic and anaerobic             ASA/Mallampati  ASA: 3  Mallampati: 2    Imaging:  Recent imaging studies reviewed.     ASSESSMENT/PLAN:     Assessment:  69 y.o. male with a PMHx of HFrEF, NICM, MR, ESRD on HD, Crohn's s/p colostomy, h/o R nephrectomy who has been referred to IR for TDC placement for HD access. The procedure was discussed in great detail with the patient including thorough explanations of the potential risks and benefits of TDC placement. Risks include bleeding at the puncture site, infection, catheter related thrombus, catheter dysfunction, central vein stenosis and need for additional procedures. The patient is a candidate for TDC placement under moderate sedation. Plan discussed with ordering physician and pt verbalized understanding of the plan and would like to proceed.    Plan:  Will proceed with TDC placement under moderate sedation on 12/30/24.   Please keep pt NPO   Anticoagulation history reviewed. Pt receives SQ heparin, no need to hold per SIR.  Coagulation labs reviewed- not routinely recommended per SIR  Thank you for the consult. Please contact with questions via [employer] secure chat or spectra    Time spent during patient care today was 81 minutes. This includes time spent before the visit reviewing the chart, discussing case with staff physician and ordering provider, time spent during the face to face patient visit, and time spent after the visit on documentation. Time excludes procedure time.     Bernadette Michelle PA-C  Interventional Radiology  Spectra: [id number]

## 2024-12-30 NOTE — PROGRESS NOTES
Palliative Medicine  Consult Note       Patient Name: Flakito Mcginnis   MRN: [id number]   Admission Date: 12/19/2024   Hospital Length of Stay: 11   Attending Provider: Dixie Melgar MD   Consulting Provider: FALLON Mir  Primary Care Physician: Jordin Robbins MD   Principal Problem: Septic shock     Patient information was obtained from patient, relative(s), past medical records, ER records, and primary team.      Assessment/Plan:      Palliative Care Encounter:  Impression:  Mr. Chai Mcginnis, is a 70 y/o gentleman with hx of HfrEF (ef30%), NICM, MR, ESRD (HD TRS), R nephrectomy, Crohn's w/ colostomy. He presented to ED on 12/19 with hypotension (from HD) and was admitted with BNP >4900 and vol overload.   He has presented to ED 5 times in the last 4 months for SOB, Hypotension, and fall. He lives at home with his sister and brother in law, able to transfer self around home with walker.         NSTEMI (non-ST elevated myocardial infarction)  High sensitivity troponin troponin 923.  EKG with t wave inversions.  No complaints of chest pain.    -- Troponin down trending  -managed per primary team and speciality consult services.      HFrEF (heart failure with reduced ejection fraction)  HFrEF (30% 8/2024 from 20-25% 6/2024) per cardiology notes.  Suspect volume overload with history HF and ESRD.   --Echo showing EF of 20-25% with large AV vegetation partially occluding LVOT with moderate AV regurgitation.   -managed per primary team and speciality consult services.       ESRD on hemodialysis  - tunneled catheter removed 12/22. [facility] HD catheter placed 12/24. - will need HD access prior to discharge.    -managed per primary team and speciality consult services.    ID  * Septic shock/Endocarditis  -- blood cultures + for staph, tunneled catheter line removal 12/22. Repeat blood cultures NGTD.   --Vegetation found on aortic valve on echocardiogram, per CTS not a surgical candidate.   -managed per primary  "team and speciality consult services.      Palliative care consulted for GOC/ACP conversations, as pt with chronic disease, endocarditis without surgical intervention, and worsening debility, per communication with Dr. Betts.        Advance Care Planning   Advance Directives:   Living Will: No    Do Not Resuscitate Status: Yes    Agent's Name:  Sara Da Silva (sister)   Agent's Contact Number:  [phone]    Decision Making:  Patient answered questions and Family answered questions  Goals of Care: The patient and family endorses that what is most important right now is to focus on remaining as independent as possible and improvement in condition.     Accordingly, we have decided that the best plan to meet the patient's goals includes continuing with treatment, but with limits to invasive therapies       MPOA: Pt wishes for his sister (Sara) to be his decision maker.   -Pt does not wish to add any other contacts into his chart.     12/30/24: Visited with pt today at  after dialysis. He reports feeling tired but better than last week. He remembers our previous conversation and shares he "feels the same as he did last week" about his GOC.   -Inquired if he would like to fill out MPOA to name only his sister as decision maker, he declines. Pt shares he believes his brother will go along with sister if needed and does not wish to fill out paperwork.  -Pt is hopeful to go home tomorrow. [facility] will sign off at this time. Please re-consult at this time.       12/27/24: Met with pt this afternoon, he is alone in room aaox3 and willing to participate in conversation. Sister, Sara, is on phone for our meeting.   -Pt and sister have good understanding of chronic nature of pt's disease and complications r/t ESRD. Answered questions r/t infection and meaning of sepsis.  -Began conversation of GOC, with consideration of pt's chronic health issues and concern for repeat hospitalizations.   -We discussed meaning of " "DNR and that it does not prevent treatment directed therapies. We discussed that DNR is only meant for a patient with cardiac or respiratory arrest in. Answered questions surrounding various "parts" of cpr including chest compressions, intubation, and medications. Explained that these interventions are meant to be performed together and that success of cpr is only optimal if all are done.   -Assured pt and family that DNR status does not prevent interventions if pt choking, with acute illness, etc.   -Pt and sister are agreeable to DNR at this time.    -communicated conversation with primary team.         Symptom Management:  -Pain: R/t shoulder pain s/p fall. Relieved by oxy at home.       -Dyspnea : Reports DALEY and relief after HD.       -Debility: pt with progressive debility and weakness, especially since this hospitalization. PT/OT following.     Summary:  -Most important goals at this time: Return to functional baseline and management of ESRD.    -Code status: DNR  -Disposition: Home     Thank you for your consult. I will sign off. Please contact us if you have any additional questions.       Subjective:     Chief Complaint:   Chief Complaint   Patient presents with    Shortness of Breath    Hypotension     Pt arrived via EMS with c/o SOB and hypotension. Dialysis center could not do dialysis d/t low BP. Pt states has been SOB x1 wk. Denies fevers.         HPI:   Per chart review: "Mr. Flakito Mcginnis is a 69 y.o. man w/ HFrEF (30% 8/2024 from 20-25% 6/2024), NICM, MR, ESRD on HD, Crohn's s/p colostomy, h/o R nephrectomy. [place] [facility] [place]19 due to hypotension and ongoing shortness of breath. He usually receives his dialysis on T, R, S and went on Wednesday for an extra session for volume removal. He arrived on Thursday for his usually scheduled dialysis session and was directed to the ED due to hypotension. On arrival in the ED his blood pressure was 78/51. He was found to have a " "BNP >4900 and CXR concerning for volume overload. High sensitivity troponin 923. Critical care medicine was consulted for hypotension and admitted to MICU."      Hospital Course:   Admitted with septic shock, was requiring pressors, on antbx,Vegetation found on aortic valve on echocardiogra, not a surgical candidate. PT now HDS, with plans to tx to .     Review of Symptoms      Symptom Assessment (ESAS 0-10 Scale)  Pain:  4  Dyspnea:  0  Anxiety:  0  Nausea:  0  Depression:  0  Anorexia:  0  Fatigue:  4  Insomnia:  0  Restlessness:  0  Agitation:  0         Pain Assessment:    Location(s): other (shoulder)      Performance Status:  60    Living Arrangements:  Lives with family    Psychosocial/Cultural:   See Palliative Psychosocial Note: Yes  Pt is not  and has no children. He has 2 siblings. He lives with his sister and her . He receives consider support from them, not close with brother.   **Primary  to Follow**  Palliative Care  Consult: No  Other       Location (Other): Shoulder Left       Location: left       Quality: aching        Quantity: 4/10 in intensity        Chronicity: Onset 2 month(s) ago, stable        Aggravating Factors: movement        Alleviating Factors: opiates        Associated Symptoms: none        ROS:  Review of Systems   Respiratory:  Positive for chest tightness.          Past Medical History:   Diagnosis Date    Crohn's disease 1998    ESRD (end stage renal disease) on dialysis 10/2017    Hypertension     Obstructive uropathy      Past Surgical History:   Procedure Laterality Date    COLOSTOMY  2006    DIALYSIS FISTULA CREATION Left 02/2018    NEPHRECTOMY Right     REMOVAL OF TUNNELED CENTRAL VENOUS CATHETER (CVC) N/A 5/23/2018    Procedure: PERMCATH REMOVAL-TUNNELED CVC REMOVAL;  Surgeon: Parveen Ray MD;  Location: [facility] CATH LAB;  Service: Nephrology;  Laterality: N/A;  pt coming in @930     Family History   Problem Relation Name Age of Onset "    Hypertension Mother      Emphysema Father           Review of patient's allergies indicates:   Allergen Reactions    Vancomycin analogues Anaphylaxis, Other (See Comments), Shortness Of Breath and Swelling     Light headed, see's spots      Aspirin Nausea And Vomiting and Other (See Comments)     Has crohn's disease    Other reaction(s): FLARES UP CROHNS      Has crohn's disease       Medications:    Current Facility-Administered Medications:     0.9%  NaCl infusion (for blood administration), , Intravenous, Q24H PRN, Dixie Melgar MD    [START ON 12/31/2024] 0.9% NaCl infusion, , Intravenous, Once, Shakira Munoz MD    acetaminophen tablet 500 mg, 500 mg, Oral, Q4H PRN, Blayne Gabriel PA-C, 500 mg at 12/26/24 0120    albuterol-ipratropium 2.5 mg-0.5 mg/3 mL nebulizer solution 3 mL, 3 mL, Nebulization, Q8H, Nain Betts MD, 3 mL at 12/28/24 2311    atorvastatin tablet 40 mg, 40 mg, Oral, Daily, Alicia Galloway NP, 40 mg at 12/29/24 0924    ceFAZolin injection 1 g, 1 g, Intravenous, Q24H, Mandy Griffin DO, 1 g at 12/29/24 2143    haloperidoL tablet 5 mg, 5 mg, Oral, Q6H PRN, Elvia Salmeron PA-C    heparin (porcine) injection 5,000 Units, 5,000 Units, Subcutaneous, Q8H, Rebecca Andrade MD, 5,000 Units at 12/30/24 0108    melatonin tablet 6 mg, 6 mg, Oral, Nightly, Nain Betts MD, 6 mg at 12/29/24 2143    oxyCODONE immediate release tablet 5 mg, 5 mg, Oral, Q4H PRN, Nain Betts MD, 5 mg at 12/28/24 2039    oxyCODONE immediate release tablet Tab 10 mg, 10 mg, Oral, Q6H PRN, Nain Betts MD, 10 mg at 12/29/24 0936    sevelamer carbonate tablet 800 mg, 800 mg, Oral, TID WM, Alicia Galloway, NP, 800 mg at 12/29/24 1646    sodium chloride 0.9% flush 10 mL, 10 mL, Intravenous, PRN, Alicia Galloway, MAAME         Objective:      Physical Exam:  Vitals: Temp: 97.7 °F (36.5 °C) (12/30/24 1500)  Pulse: 88 (12/30/24 1500)  Resp: 16  (12/30/24 1500)  BP: 104/63 (12/30/24 1500)  SpO2: 100 % (12/30/24 1500)    Physical Exam  Neurological:      Mental Status: He is alert and oriented to person, place, and time.                 Labs:   Creatinine   Date Value Ref Range Status   12/30/2024 6.2 (H) 0.5 - 1.4 mg/dL Final     POC Creatinine   Date Value Ref Range Status   12/19/2024 5.4 (H) 0.5 - 1.4 mg/dL Final      Hemoglobin   Date Value Ref Range Status   12/30/2024 6.9 (L) 14.0 - 18.0 g/dL Final      Albumin   Date Value Ref Range Status   12/30/2024 2.3 (L) 3.5 - 5.2 g/dL Final   12/29/2024 2.3 (L) 3.5 - 5.2 g/dL Final   12/28/2024 2.1 (L) 3.5 - 5.2 g/dL Final          Imaging: Reason for Exam  Priority: STAT  Dx: Shock [R57.9 (ICD-10-CM)]     View Images Vital Vitrea     Show images for Echo  Summary  Show Result Comparison     There is a 10 by 4 mm large mobile echogenic mass present in the LVOT suggestive of vegetation - blood cultures could be done if clinically indicated.    Left Ventricle: The left ventricle is mildly dilated. Mildly increased ventricular mass. Normal wall thickness. There is mild eccentric hypertrophy. Severe global hypokinesis present. Septal motion is abnormal. There is severely reduced systolic function with a visually estimated ejection fraction of 20 - 25%. Ejection fraction is approximately 20%. Quantitated ejection fraction is 25%. There is diastolic dysfunction.    Right Ventricle: Mild right ventricular enlargement. Wall thickness is normal. Right ventricle wall motion has global hypokinesis. Systolic function is mild-moderately reduced.    Left Atrium: Left atrium is severely dilated.    Right Atrium: Right atrium is moderately dilated.    Aortic Valve: There is moderate aortic valve sclerosis. Moderately restricted motion. There is a 10 by 4 mm large mobile echogenic mass present in the LVOT suggestive of vegetation - blood cultures could be done if clinically indicated. There is moderate stenosis. Aortic valve  area by VTI is 1.2 cm2. Aortic valve peak velocity is 2.6 m/s. Mean gradient is 16.5 mmHg. The dimensionless index is 0.37. There is mild to moderate aortic regurgitation.    Mitral Valve: There is moderate bileaflet sclerosis. There is moderate mitral annular calcification present. There is mild regurgitation.    Tricuspid Valve: There is moderate to  moderate-severe regurgitation.    Pulmonary Artery: There is mild pulmonary hypertension. The estimated pulmonary artery systolic pressure is 41 mmHg.    IVC/SVC: Intermediate venous pressure at 8 mmHg.     Vitals    Height Weight BMI (Calculated) BSA (Calculated - sq m) BP Pulse       77/48 184     > 50% of  55 min visit spent in chart review, face to face discussion of goals of care,  symptom assessment, coordination of care, charting, and emotional support       Thank you for the opportunity to care for this patient and family.       Abby Menjivar, CNS

## 2024-12-30 NOTE — PHYSICIAN QUERY
Please provide the nutritional diagnosis associated with the clinical findings:    Severe protein calorie malnutrition

## 2024-12-30 NOTE — ASSESSMENT & PLAN NOTE
Patient with Hypoxic Respiratory failure which is Acute.  he is not on home oxygen. Supplemental oxygen was provided and noted-       .   Signs/symptoms of respiratory failure include- tachypnea, increased work of breathing, respiratory distress, and use of accessory muscles. Contributing diagnoses includes - CHF Labs and images were reviewed. Patient Has recent ABG, which has been reviewed. Will treat underlying causes and adjust management of respiratory failure as follows-   Continue to wean oxygen to goal SaO2 of 90%  Aggressive pulmonary toilet in setting of atelectasis of left lower lung field.

## 2024-12-30 NOTE — ASSESSMENT & PLAN NOTE
Patient with Acute debility due to  acute illness . The patient's latest AMPAC (Activity Measure for Post Acute Care) Score is listed below.    AM-PAC Score - How much help does the patient need for each activity listed  Basic Mobility Total Score: 17  Turning over in bed (including adjusting bedclothes, sheets and blankets)?: A little  Sitting down on and standing up from a chair with arms (e.g., wheelchair, bedside commode, etc.): A little  Moving from lying on back to sitting on the side of the bed?: A little  Moving to and from a bed to a chair (including a wheelchair)?: A little  Need to walk in hospital room?: A little  Climbing 3-5 steps with a railing?: A lot    Plan  - Progressive mobility protocol initated  - PT/OT consulted

## 2024-12-30 NOTE — ASSESSMENT & PLAN NOTE
Hyperkalemia is likely due to ESRD.The patients most recent potassium results are listed below.  Recent Labs     12/28/24  0356 12/29/24  0227 12/30/24  0553   K 4.1 4.1 4.1     Plan  - Monitor for arrhythmias with EKG and/or continuous telemetry.   - Now resolved

## 2024-12-30 NOTE — PLAN OF CARE
[facility] - Internal Medicine Telemetry  Discharge Reassessment    Primary Care Provider: Jordin Robbins MD    Expected Discharge Date: 1/2/2025    Reassessment (most recent)       Discharge Reassessment - 12/30/24 1522          Discharge Reassessment    Assessment Type Discharge Planning Reassessment (P)      Did the patient's condition or plan change since previous assessment? Yes (P)      Discharge Plan discussed with: Patient (P)      Communicated LLUVIA with patient/caregiver Yes (P)      Discharge Plan A Home with family (P)      Discharge Plan B Home Health;Home with family (P)      DME Needed Upon Discharge  none (P)      Transition of Care Barriers None (P)      Why the patient remains in the hospital Requires continued medical care (P)         Post-Acute Status    Post-Acute Authorization IV Infusion (P)      Coverage [employer] MANAGED MEDICARE - AETNA MEDICARE DUAL DSNP - (P)      IV Infusion Status Referral(s) sent (P)    IV abx on HD days via [facility]                Discharge Plan A and Plan B have been determined by review of patient's clinical status, future medical and therapeutic needs, and coverage/benefits for post-acute care in coordination with multidisciplinary team members.         Patient confirmed plan of dc home w/ family; IV abx to be provided on HD days via [facility] [address] Patient states that he may need ride home upon dc.    LLUVIA 1/3/2025.    SW will continue to follow.            VAL Haley, LMSW  [facility]  Case Management  Ext. [phone]

## 2024-12-30 NOTE — PROGRESS NOTES
Patient currently on hemodialysis for removal of uremic toxins and volume control.   I personally saw and examined the patient on hemodialysis.  The patient is tolerating the treatment, see hemodyalisis flow sheet for vitals and assessemts. I also reviewed the chart and current medication and the recent lab work. The dialysis bath was adjusted accordingly.     Volume management/HTN:  1 liter UF target  Anemia (target 10-12 mg/dl): will receive a unit of PRBC  Bone metabolism: continue renal diet  Nutrition: needs protein supplements     IR for placement of tunneled line (already seen)       Pharmacy Consultation Note  (Anticoagulant Dosing and Monitoring)    Initial consult date: 7/6/2018  Consulting physician: Dr. Deleon Files    Allergies:  Patient has no known allergies. Ht Readings from Last 1 Encounters:   07/05/18 5' 5\" (1.651 m)     Wt Readings from Last 1 Encounters:   07/05/18 125 lb (56.7 kg)       Warfarin Indication Target   INR Range Home Dose  (if applicable) Diet/Feeding Tube   Atrial fibrillation 2-3 Warfarin 3 mg and warfarin 2.5 mg alternating General diet       Vitamin K or Blood product  Administration Date                 Warfarin drug-drug interactions  Start  Stop Home Med?  Comments    Enoxaparin 7/6  No May increase bleeding risk   Azithromycin 7/6  No May increase INR   Sucralfate Prior to admission  Yes May decrease INR           TSH:    Lab Results   Component Value Date    TSH 1.260 12/11/2017        Hepatic Function Panel:                          Lab Results   Component Value Date    ALKPHOS 66 07/06/2018    ALT 41 07/06/2018    AST 29 07/06/2018    PROT 5.6 07/06/2018    BILITOT <0.2 07/06/2018    BILIDIR <0.2 07/06/2018    IBILI see below 07/06/2018    LABALBU 3.5 07/06/2018    LABALBU 3.1 11/09/2017       Date Warfarin Dose INR Heparin or LMWH HBG/  HCT PLT Comment   7/6 3 mg 1.1 Enoxaparin 40 mg daily 12/38.4 318                                          Assessment:  · Patient is a 61year old male on warfarin at home for atrial fibrillation  · Currently, patient is ordered enoxaparin for VTE prophylaxis  · Patient is ordered sucralfate at home - this medication may decrease the absorption of warfarin and decrease INR  · INR goal = 2-3; INR today = 1.1 (subtherapeutic)    Plan:  · Will give warfarin 3 mg x 1 dose today at 1000  · Avoid coadministration of warfarin and sucralfate at the same time  · Daily PT/INR until the INR is stable within the therapeutic range  · Pharmacist will follow and monitor/adjust dosing as necessary    Tiff Hinojosa, PharmD, [facility]  7/6/2018  9:34 AM

## 2024-12-30 NOTE — ASSESSMENT & PLAN NOTE
Anemia is likely due to chronic disease due to ESRD. Most recent hemoglobin and hematocrit are listed below.  Recent Labs     12/28/24  0356 12/29/24  0227 12/30/24  0553   HGB 7.0* 7.2* 6.9*   HCT 22.1* 22.8* 22.1*     Plan  - Monitor serial CBC: Daily  - Transfuse PRBC if patient becomes hemodynamically unstable, symptomatic or H/H drops below 7/21.  - Patient will receive 1 unit PRBCs today  - Patient's anemia is currently worsening

## 2024-12-30 NOTE — PROGRESS NOTES
3hrs HD tx completed with 1L of fluid removed.    Patient tolerated well.    Blood returned; lines flushed with NS; catheter locked with normal saline, clamped ; capped and wrapped with sterile gauze.    Blood transfusion ordered but wasn't given due to delayed Type and screen result; blood consent wasn't ready on time.     Report called to KAYLENE Pino.    0170 Patient departed ERNST via stretcher by patient transport.

## 2024-12-30 NOTE — ACP (ADVANCE CARE PLANNING)
Advance Care Planning      Date:  12/30/2024      Code Status  I engaged the patient in a conversation about the patient's preferences for care at the very end of life. The patient does not wish to have CPR and other invasive treatments performed when their heart and/or breathing stops. I communicated to the patient that a DNR status would be placed in their chart.     I spent a total of 15minutes engaging the patient/family in this advance care planning discussion.    Dixie Melgar MD, [facility]

## 2024-12-31 LAB
ALBUMIN SERPL BCP-MCNC: 2.4 G/DL (ref 3.5–5.2)
ALP SERPL-CCNC: 340 U/L (ref 40–150)
ALT SERPL W/O P-5'-P-CCNC: <5 U/L (ref 10–44)
ANION GAP SERPL CALC-SCNC: 9 MMOL/L (ref 8–16)
AST SERPL-CCNC: 16 U/L (ref 10–40)
BASOPHILS # BLD AUTO: 0.04 K/UL (ref 0–0.2)
BASOPHILS NFR BLD: 0.8 % (ref 0–1.9)
BILIRUB SERPL-MCNC: 0.5 MG/DL (ref 0.1–1)
BUN SERPL-MCNC: 30 MG/DL (ref 8–23)
CALCIUM SERPL-MCNC: 8.6 MG/DL (ref 8.7–10.5)
CHLORIDE SERPL-SCNC: 98 MMOL/L (ref 95–110)
CO2 SERPL-SCNC: 22 MMOL/L (ref 23–29)
CREAT SERPL-MCNC: 4.2 MG/DL (ref 0.5–1.4)
DIFFERENTIAL METHOD BLD: ABNORMAL
EOSINOPHIL # BLD AUTO: 0.1 K/UL (ref 0–0.5)
EOSINOPHIL NFR BLD: 1.5 % (ref 0–8)
ERYTHROCYTE [DISTWIDTH] IN BLOOD BY AUTOMATED COUNT: 18 % (ref 11.5–14.5)
EST. GFR  (NO RACE VARIABLE): 14.6 ML/MIN/1.73 M^2
GLUCOSE SERPL-MCNC: 80 MG/DL (ref 70–110)
HCT VFR BLD AUTO: 26.5 % (ref 40–54)
HGB BLD-MCNC: 8.5 G/DL (ref 14–18)
IMM GRANULOCYTES # BLD AUTO: 0.04 K/UL (ref 0–0.04)
IMM GRANULOCYTES NFR BLD AUTO: 0.8 % (ref 0–0.5)
LYMPHOCYTES # BLD AUTO: 0.5 K/UL (ref 1–4.8)
LYMPHOCYTES NFR BLD: 8.6 % (ref 18–48)
MAGNESIUM SERPL-MCNC: 2.1 MG/DL (ref 1.6–2.6)
MCH RBC QN AUTO: 29.1 PG (ref 27–31)
MCHC RBC AUTO-ENTMCNC: 32.1 G/DL (ref 32–36)
MCV RBC AUTO: 91 FL (ref 82–98)
MONOCYTES # BLD AUTO: 0.5 K/UL (ref 0.3–1)
MONOCYTES NFR BLD: 9.2 % (ref 4–15)
NEUTROPHILS # BLD AUTO: 4.2 K/UL (ref 1.8–7.7)
NEUTROPHILS NFR BLD: 79.1 % (ref 38–73)
NRBC BLD-RTO: 0 /100 WBC
PHOSPHATE SERPL-MCNC: 4.5 MG/DL (ref 2.7–4.5)
PLATELET # BLD AUTO: 169 K/UL (ref 150–450)
PMV BLD AUTO: 11 FL (ref 9.2–12.9)
POTASSIUM SERPL-SCNC: 4.1 MMOL/L (ref 3.5–5.1)
PROT SERPL-MCNC: 6.3 G/DL (ref 6–8.4)
RBC # BLD AUTO: 2.92 M/UL (ref 4.6–6.2)
SODIUM SERPL-SCNC: 129 MMOL/L (ref 136–145)
WBC # BLD AUTO: 5.33 K/UL (ref 3.9–12.7)

## 2024-12-31 PROCEDURE — 80053 COMPREHEN METABOLIC PANEL: CPT | Performed by: PHYSICIAN ASSISTANT

## 2024-12-31 PROCEDURE — 25000003 PHARM REV CODE 250

## 2024-12-31 PROCEDURE — 25000003 PHARM REV CODE 250: Performed by: INTERNAL MEDICINE

## 2024-12-31 PROCEDURE — 84100 ASSAY OF PHOSPHORUS: CPT

## 2024-12-31 PROCEDURE — 99900035 HC TECH TIME PER 15 MIN (STAT)

## 2024-12-31 PROCEDURE — 21400001 HC TELEMETRY ROOM

## 2024-12-31 PROCEDURE — 97530 THERAPEUTIC ACTIVITIES: CPT | Mod: CQ

## 2024-12-31 PROCEDURE — 92526 ORAL FUNCTION THERAPY: CPT

## 2024-12-31 PROCEDURE — 02H633Z INSERTION OF INFUSION DEVICE INTO RIGHT ATRIUM, PERCUTANEOUS APPROACH: ICD-10-PCS | Performed by: STUDENT IN AN ORGANIZED HEALTH CARE EDUCATION/TRAINING PROGRAM

## 2024-12-31 PROCEDURE — 36415 COLL VENOUS BLD VENIPUNCTURE: CPT | Performed by: PHYSICIAN ASSISTANT

## 2024-12-31 PROCEDURE — 27000221 HC OXYGEN, UP TO 24 HOURS

## 2024-12-31 PROCEDURE — 85025 COMPLETE CBC W/AUTO DIFF WBC: CPT

## 2024-12-31 PROCEDURE — 63600175 PHARM REV CODE 636 W HCPCS: Performed by: INTERNAL MEDICINE

## 2024-12-31 PROCEDURE — 94761 N-INVAS EAR/PLS OXIMETRY MLT: CPT

## 2024-12-31 PROCEDURE — 97535 SELF CARE MNGMENT TRAINING: CPT

## 2024-12-31 PROCEDURE — 63600175 PHARM REV CODE 636 W HCPCS: Performed by: STUDENT IN AN ORGANIZED HEALTH CARE EDUCATION/TRAINING PROGRAM

## 2024-12-31 PROCEDURE — 0JH63XZ INSERTION OF TUNNELED VASCULAR ACCESS DEVICE INTO CHEST SUBCUTANEOUS TISSUE AND FASCIA, PERCUTANEOUS APPROACH: ICD-10-PCS | Performed by: STUDENT IN AN ORGANIZED HEALTH CARE EDUCATION/TRAINING PROGRAM

## 2024-12-31 PROCEDURE — 97116 GAIT TRAINING THERAPY: CPT | Mod: CQ

## 2024-12-31 PROCEDURE — 83735 ASSAY OF MAGNESIUM: CPT | Performed by: PHYSICIAN ASSISTANT

## 2024-12-31 PROCEDURE — 86077 PHYS BLOOD BANK SERV XMATCH: CPT | Mod: ,,, | Performed by: PATHOLOGY

## 2024-12-31 RX ORDER — MIDAZOLAM HYDROCHLORIDE 1 MG/ML
INJECTION, SOLUTION INTRAMUSCULAR; INTRAVENOUS
Status: COMPLETED | OUTPATIENT
Start: 2024-12-31 | End: 2024-12-31

## 2024-12-31 RX ORDER — FENTANYL CITRATE 50 UG/ML
INJECTION, SOLUTION INTRAMUSCULAR; INTRAVENOUS
Status: COMPLETED | OUTPATIENT
Start: 2024-12-31 | End: 2024-12-31

## 2024-12-31 RX ORDER — HEPARIN SODIUM 1000 [USP'U]/ML
INJECTION, SOLUTION INTRAVENOUS; SUBCUTANEOUS
Status: COMPLETED | OUTPATIENT
Start: 2024-12-31 | End: 2024-12-31

## 2024-12-31 RX ORDER — SODIUM CHLORIDE 9 MG/ML
INJECTION, SOLUTION INTRAVENOUS ONCE
Status: CANCELLED | OUTPATIENT
Start: 2025-01-01

## 2024-12-31 RX ORDER — CEFAZOLIN SODIUM 1 G/3ML
INJECTION, POWDER, FOR SOLUTION INTRAMUSCULAR; INTRAVENOUS
Status: COMPLETED | OUTPATIENT
Start: 2024-12-31 | End: 2024-12-31

## 2024-12-31 RX ORDER — LIDOCAINE HYDROCHLORIDE 10 MG/ML
INJECTION, SOLUTION INFILTRATION; PERINEURAL
Status: COMPLETED | OUTPATIENT
Start: 2024-12-31 | End: 2024-12-31

## 2024-12-31 RX ORDER — SODIUM CHLORIDE 9 MG/ML
INJECTION, SOLUTION INTRAVENOUS
Status: CANCELLED | OUTPATIENT
Start: 2025-01-01

## 2024-12-31 RX ADMIN — CEFAZOLIN 1 G: 330 INJECTION, POWDER, FOR SOLUTION INTRAMUSCULAR; INTRAVENOUS at 08:12

## 2024-12-31 RX ADMIN — Medication 6 MG: at 08:12

## 2024-12-31 RX ADMIN — SEVELAMER CARBONATE 800 MG: 800 TABLET, FILM COATED ORAL at 04:12

## 2024-12-31 RX ADMIN — CEFAZOLIN 1 G: 1 INJECTION, POWDER, FOR SOLUTION INTRAMUSCULAR; INTRAVENOUS at 08:12

## 2024-12-31 RX ADMIN — SEVELAMER CARBONATE 800 MG: 800 TABLET, FILM COATED ORAL at 11:12

## 2024-12-31 RX ADMIN — HEPARIN SODIUM 4000 UNITS: 1000 INJECTION, SOLUTION INTRAVENOUS; SUBCUTANEOUS at 08:12

## 2024-12-31 RX ADMIN — FENTANYL CITRATE 50 MCG: 0.05 INJECTION, SOLUTION INTRAMUSCULAR; INTRAVENOUS at 08:12

## 2024-12-31 RX ADMIN — LIDOCAINE HYDROCHLORIDE 10 ML: 10 INJECTION, SOLUTION INFILTRATION; PERINEURAL at 08:12

## 2024-12-31 RX ADMIN — HEPARIN SODIUM 5000 UNITS: 5000 INJECTION INTRAVENOUS; SUBCUTANEOUS at 10:12

## 2024-12-31 RX ADMIN — HEPARIN SODIUM 5000 UNITS: 5000 INJECTION INTRAVENOUS; SUBCUTANEOUS at 04:12

## 2024-12-31 RX ADMIN — MIDAZOLAM 1 MG: 1 INJECTION INTRAMUSCULAR; INTRAVENOUS at 07:12

## 2024-12-31 RX ADMIN — SEVELAMER CARBONATE 800 MG: 800 TABLET, FILM COATED ORAL at 10:12

## 2024-12-31 RX ADMIN — ATORVASTATIN CALCIUM 40 MG: 40 TABLET, FILM COATED ORAL at 10:12

## 2024-12-31 NOTE — DISCHARGE INSTRUCTIONS
Our goal at [facility] is to always give you outstanding care and exceptional service. You may receive a survey by mail, text or e-mail in the next 7-10 days from Sukhdeep Allen and our leadership team asking about the care you received with us. The survey should only take 5-10 minutes to complete and is very important to us.     Your feedback provides us with a way to recognize our staff who work tirelessly to provide the best care! Also, your responses help us learn how to improve when your experience was below our aspiration of excellence. We WILL use your feedback to continue making improvements to help us provide the highest quality care. We keep your personal information and feedback confidential. We appreciate your time completing this survey and can't wait to hear from you!!!     We want you to leave us today feeling like you can DEFINITELY recommend us to others! We look forward to your continued care with us! Thanks so much for choosing [facility] for your healthcare needs!

## 2024-12-31 NOTE — SUBJECTIVE & OBJECTIVE
Interval History: No acute events overnight. Went for IR line placement this morning.  Seen postop. Feels well and is ready to go back home to live with his sister.  He was going to be discharged home after his new line placement, but sister refuses to take him home and says he needs [facility] placement.    Review of Systems   Constitutional:  Positive for fatigue. Negative for chills and fever.   Respiratory:  Negative for cough and shortness of breath.    Cardiovascular:  Negative for chest pain, palpitations and leg swelling.   Gastrointestinal:  Negative for abdominal pain, diarrhea, nausea and vomiting.   Genitourinary:  Negative for dysuria and urgency.   Neurological:  Positive for weakness. Negative for dizziness and headaches.   All other systems reviewed and are negative.    Objective:     Vital Signs (Most Recent):  Temp: 97.3 °F (36.3 °C) (12/31/24 0850)  Pulse: 82 (12/31/24 0950)  Resp: 18 (12/31/24 0950)  BP: (!) 141/73 (12/31/24 0950)  SpO2: 95 % (12/31/24 0950) Vital Signs (24h Range):  Temp:  [97.3 °F (36.3 °C)-98.5 °F (36.9 °C)] 97.3 °F (36.3 °C)  Pulse:  [] 82  Resp:  [12-20] 18  SpO2:  [92 %-100 %] 95 %  BP: ()/(50-84) 141/73     Weight: 56 kg (123 lb 7.3 oz)  Body mass index is 19.93 kg/m².    Intake/Output Summary (Last 24 hours) at 12/31/2024 1129  Last data filed at 12/30/2024 2107  Gross per 24 hour   Intake 590 ml   Output 1225 ml   Net -635 ml      Physical Exam  Constitutional:       Appearance: He is ill-appearing.   HENT:      Head: Normocephalic and atraumatic.      Comments: Tunneled HD line in place  Cardiovascular:      Rate and Rhythm: Normal rate and regular rhythm.      Heart sounds: Murmur heard.   Pulmonary:      Effort: Pulmonary effort is normal. No respiratory distress.      Breath sounds: Normal breath sounds. No wheezing or rales.   Abdominal:      General: There is no distension.      Palpations: Abdomen is soft.      Tenderness: There is no abdominal  tenderness.   Musculoskeletal:         General: No deformity.      Right lower leg: Edema present.      Left lower leg: Edema present.   Skin:     General: Skin is warm and dry.      Coloration: Skin is pale.   Neurological:      General: No focal deficit present.      Mental Status: He is alert and oriented to person, place, and time. Mental status is at baseline.           Significant Labs:  CBC:  Recent Labs   Lab 12/30/24  0553 12/31/24  0607   WBC 4.59 5.33   HGB 6.9* 8.5*   HCT 22.1* 26.5*   * 169     CMP:  Recent Labs   Lab 12/30/24  0553 12/31/24  0607   * 129*   K 4.1 4.1   CL 98 98   CO2 19* 22*   GLU 74 80   BUN 45* 30*   CREATININE 6.2* 4.2*   CALCIUM 9.1 8.6*   PROT 5.8* 6.3   ALBUMIN 2.3* 2.4*   BILITOT 0.2 0.5   ALKPHOS 325* 340*   AST 14 16   ALT <5* <5*   ANIONGAP 11 9

## 2024-12-31 NOTE — PLAN OF CARE
[facility] - Internal Medicine Telemetry  Discharge Reassessment    Primary Care Provider: Jordin Robbins MD    Expected Discharge Date: 1/3/2025    Reassessment (most recent)       Discharge Reassessment - 12/31/24 1609          Discharge Reassessment    Assessment Type Discharge Planning Reassessment (P)      Did the patient's condition or plan change since previous assessment? Yes (P)      Discharge Plan discussed with: Patient;Sibling (P)      Name(s) and Number(s) Sara Da Silva (Sister)  [phone] (P)      Communicated LLUVIA with patient/caregiver Yes (P)      Discharge Plan A New Nursing Home placement - skilled nursing care facility (P)      Discharge Plan B Group home (P)      DME Needed Upon Discharge  -- (P)    TBD    Transition of Care Barriers Social;Mobility;No family/friends to help (P)      Why the patient remains in the hospital Social issues (P)         Post-Acute Status    Post-Acute Authorization IV Infusion (P)      Coverage [employer] MANAGED MEDICARE - A[employer] MEDICARE DUAL DSNP - (P)      IV Infusion Status Set-up Complete/Auth obtained (P)    Via HD clinic    Hospital Resources/Appts/Education Provided Provided education on problems/symptoms using teachback;Provided patient/caregiver with written discharge plan information (P)      Patient choice form signed by patient/caregiver List from System Post-Acute Care (P)      Discharge Delays Patient and Family Barriers (P)                  Discharge Plan A and Plan B have been determined by review of patient's clinical status, future medical and therapeutic needs, and coverage/benefits for post-acute care in coordination with multidisciplinary team members.                   Cole Morales, VAL, LMSW  [facility]  Case Management  Ext. [phone]

## 2024-12-31 NOTE — CARE UPDATE
Pt arrived to MPU 5 for recovery of a HD line placement in NAD. Pt to recover for 1hr then may return to the floor.See vs and assessment in the computer.

## 2024-12-31 NOTE — CARE UPDATE
Pt fully recovered and report called to KAYLENE Rios. Pt transported via stretcher back to the room by escort.

## 2024-12-31 NOTE — CARE UPDATE
[facility] Care Support Interaction      I have reviewed the chart of Flakito Mcginnis who is hospitalized for Septic shock. The patient is currently located in the following unit: IMTA        I have assisted the primary physician in management of the following:      Post Acute Order Clarification - Reviewed; Discussed discharge planning with sister (via phone) + patient at bedside. Social work has coordinated DC home with HH, abx to be given with HD 3x weekly. However, sister concerned she is unable to provide the care needed to the patient at home. Sister insistent on pursuing placement in group home vs group home. Continued  assistance appreciated.      I have seen and examined the patient and provided the following support:     Clinical support - confirmed care plan IV abx infusion until 2/2/25       Yumiko Kaufman PA-C  [facility]

## 2024-12-31 NOTE — PLAN OF CARE
Problem: Adult Inpatient Plan of Care  Goal: Plan of Care Review  Outcome: Progressing  Goal: Absence of Hospital-Acquired Illness or Injury  Outcome: Progressing  Goal: Optimal Comfort and Wellbeing  Outcome: Progressing  Goal: Readiness for Transition of Care  Outcome: Progressing     Problem: Infection  Goal: Absence of Infection Signs and Symptoms  Outcome: Progressing

## 2024-12-31 NOTE — PLAN OF CARE
Problem: Adult Inpatient Plan of Care  Goal: Plan of Care Review  12/30/2024 2335 by Andrea Edmond LPN  Outcome: Progressing  12/30/2024 2150 by Andrea Edmond LPN  Outcome: Progressing  Goal: Patient-Specific Goal (Individualized)  12/30/2024 2335 by Andrea Edmond LPN  Outcome: Progressing  12/30/2024 2150 by Andrea Edmond LPN  Outcome: Progressing  Goal: Absence of Hospital-Acquired Illness or Injury  12/30/2024 2335 by Andrea Edmond LPN  Outcome: Progressing  12/30/2024 2150 by Andrea Edmond LPN  Outcome: Progressing  Goal: Optimal Comfort and Wellbeing  12/30/2024 2335 by Andrea Edmond LPN  Outcome: Progressing  12/30/2024 2150 by Andrea Edmond LPN  Outcome: Progressing  Goal: Readiness for Transition of Care  12/30/2024 2335 by Andrea Edmond LPN  Outcome: Progressing  12/30/2024 2150 by Andrea Edmond LPN  Outcome: Progressing     Problem: Infection  Goal: Absence of Infection Signs and Symptoms  12/30/2024 2335 by Andrea Edmond LPN  Outcome: Progressing  12/30/2024 2150 by Andrea Edmnod LPN  Outcome: Progressing     Problem: Skin Injury Risk Increased  Goal: Skin Health and Integrity  12/30/2024 2335 by Andrea Edmond LPN  Outcome: Progressing  12/30/2024 2150 by Andrea Edmond LPN  Outcome: Progressing     Problem: Sepsis/Septic Shock  Goal: Optimal Coping  12/30/2024 2335 by Andrea Edmond LPN  Outcome: Progressing  12/30/2024 2150 by Andrea Edmond LPN  Outcome: Progressing  Goal: Absence of Bleeding  12/30/2024 2335 by Anrdea Edmond LPN  Outcome: Progressing  12/30/2024 2150 by Andrea Edmond LPN  Outcome: Progressing  Goal: Blood Glucose Level Within Targeted Range  12/30/2024 2335 by Andrea Edmond LPN  Outcome: Progressing  12/30/2024 2150 by Andrea Edmond LPN  Outcome: Progressing  Goal: Absence of Infection Signs and Symptoms  12/30/2024 2335 by Andrea Edmond LPN  Outcome: Progressing  12/30/2024 2150 by Andrea Edmond,  LPN  Outcome: Progressing  Goal: Optimal Nutrition Intake  12/30/2024 2335 by Andrea Edmond LPN  Outcome: Progressing  12/30/2024 2150 by Andrea Edmond LPN  Outcome: Progressing     Problem: CRRT (Continuous Renal Replacement Therapy)  Goal: Safe, Effective Therapy Delivery  12/30/2024 2335 by Andrea Edmond LPN  Outcome: Progressing  12/30/2024 2150 by Andrea Edmond LPN  Outcome: Progressing  Goal: Hemodynamic Stability  12/30/2024 2335 by Andrea Edmond LPN  Outcome: Progressing  12/30/2024 2150 by Andrea Edmond LPN  Outcome: Progressing  Goal: Body Temperature Maintained in Desired Range  12/30/2024 2335 by Andrea Edmond LPN  Outcome: Progressing  12/30/2024 2150 by Andrea Edmond LPN  Outcome: Progressing  Goal: Absence of Infection Signs and Symptoms  12/30/2024 2335 by Andrea Edmond LPN  Outcome: Progressing  12/30/2024 2150 by Andrea Edmond LPN  Outcome: Progressing     Problem: Fall Injury Risk  Goal: Absence of Fall and Fall-Related Injury  12/30/2024 2335 by Andrea Edmond LPN  Outcome: Progressing  12/30/2024 2150 by Andrea Edmond LPN  Outcome: Progressing     Problem: Coping Ineffective  Goal: Effective Coping  12/30/2024 2335 by Andrea Edmond LPN  Outcome: Progressing  12/30/2024 2150 by Andrea Edmond LPN  Outcome: Progressing     Problem: Hemodialysis  Goal: Safe, Effective Therapy Delivery  12/30/2024 2335 by Andrea Edmond LPN  Outcome: Progressing  12/30/2024 2150 by Andrea Edmond LPN  Outcome: Progressing  Goal: Effective Tissue Perfusion  12/30/2024 2335 by Andrea Edmond LPN  Outcome: Progressing  12/30/2024 2150 by Andrea Edmond LPN  Outcome: Progressing  Goal: Absence of Infection Signs and Symptoms  12/30/2024 2335 by Andrea Edmond LPN  Outcome: Progressing  12/30/2024 2150 by Andrea Edmond LPN  Outcome: Progressing

## 2024-12-31 NOTE — ASSESSMENT & PLAN NOTE
Patient's blood pressure range in the last 24 hours was: BP  Min: 96/57  Max: 172/82.  Hold BP meds

## 2024-12-31 NOTE — CONSULTS
"Admission Medication History     The home medication history was taken by Vipin Henao, PharmD, BCPS.    You may go to "Admission" then "Reconcile Home Medications" tabs to review and/or act upon these items.     The home medication list has been updated by the Pharmacy department.   Please read ALL comments highlighted in yellow.   Please address this information as you see fit.    Feel free to contact us if you have any questions or require assistance.    Medications listed below were obtained from: [employer] [employer] and Patient's pharmacy  PTA Medications   Medication Sig    atorvastatin (LIPITOR) 40 MG tablet Take 40 mg by mouth once daily.    carvediloL (COREG) 12.5 MG tablet Take 12.5 mg by mouth 2 (two) times daily.    sevelamer carbonate (RENVELA) 800 mg Tab Take 800 mg by mouth 3 (three) times daily with meals.    [DISCONTINUED] amLODIPine (NORVASC) 10 MG tablet Take by mouth once daily.    [DISCONTINUED] butalbital-acetaminophen-caffeine -40 mg (FIORICET, ESGIC) -40 mg per tablet Take 1 tablet by mouth every 4 (four) hours as needed for Headaches.    [DISCONTINUED] catheter 10-16 Fr-" Misc 1 each by Misc.(Non-Drug; Combo Route) route daily as needed (for voiding).    [DISCONTINUED] cholestyramine (QUESTRAN) 4 gram packet Take 4 g by mouth once daily.    [DISCONTINUED] cinacalcet (SENSIPAR) 30 MG Tab Take 30 mg by mouth daily with breakfast.    [DISCONTINUED] lisinopriL (PRINIVIL,ZESTRIL) 20 MG tablet Take 20 mg by mouth once daily.    [DISCONTINUED] methocarbamoL (ROBAXIN) 500 MG Tab Take 1 tablet (500 mg total) by mouth nightly as needed (muscle spasm). (Patient not taking: Reported on 12/11/2024)    [DISCONTINUED] oxyCODONE-acetaminophen (PERCOCET) 5-325 mg per tablet Take 1 tablet by mouth every 6 (six) hours as needed for Pain.    [DISCONTINUED] sodium zirconium cyclosilicate (LOKELMA) 5 gram packet Take 5 g by mouth once daily.       Potential issues to be addressed PRIOR TO " DISCHARGE  None at this time    Vipin Henao, PharmD, BCPS   Ext. [phone]

## 2024-12-31 NOTE — CONSULTS
Hospital Medicine Pharmacy Consult Note    PharmD Consult Received For:     Pharmacy to perform an admission medication history and reconciliation. Please see separate not for specifics.    Thank you for the consult,  Vipin Henao, PharmD, [facility]   Extension [phone]    **Note: Consults are reviewed Monday-Friday 7:00am-3:30pm. The above recommendations are only suggested. The recommendations should be considered in conjunction with all patient factors.**

## 2024-12-31 NOTE — SEDATION DOCUMENTATION
Pt arrived to IR 89 for hd cath placement. Pt oriented to unit and staff, Pt safely transferred from stretcher to procedural table. Fall risk reviewed and comfort measures utilized with interventions. Safety strap applied, position pillows to minimize pressure points. Blankets applied. Pt prepped and draped utilizing standard sterile technique. Patient placed on continuous monitoring, as required by sedation policy. Timeouts implemented utilizing standard universal time-out per department and facility policy. RN to remain at bedside with continuous monitoring. Pt resting comfortably. Denies pain/discomfort. Will continue to monitor. See flow sheets for monitoring, medication administration, and updates. patient verbalizes understanding.

## 2024-12-31 NOTE — PLAN OF CARE
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  Treat for a total of 6 weeks. End date: 2/2/25.  No need for OPAT note as patient will need antibiotics with HD. Please ensure HD unit and HD physician aware of need for therapy.    Dixie Melgar MD, [facility]  Senior Physician  [facility] Medicine

## 2024-12-31 NOTE — PSYCH EVALUATION
SW received phone call from [facility] ([facility]). [facility] asking SW to submit clinicals w/ IV abx rec dx. SW submitted MD POC clinicals via hard fax to [phone].    11:00am  SW met w/ patient at the bedside for continued dc planning. Patient expressed concerns about living arrangements. Patient states that sister declines to accept him back at home due to she is unable to care for patient at home. SW attempted to contact sister w/ patient at bedside, no answer. SW to f/u. MD and bedside nurse aware of dc barrier/delays.     3:30pm  SW completed dc planning session w/ sister. Sister states that patient returning to her home is not an option due to patient's excessive care needs. Patuient and family requesting support w/ placement. SW provided education on post-acute options (HH, Assisted Living, NH, group homes and shleters). Patient states that he cannot afford jail and HH not an option due to sister not agreeable. Patient and sister agreeable to NH placement.     SW met with patient and sister liz to review discharge recommendation of jail NH and is agreeable to plan    Patient/family provided list of facilities in-network with patient's payor plan. Providers that are owned, operated, or affiliated with [facility] are included on the list.     Notified that referral sent to below listed facilities from in-network list based on proximity to home/family support:   [facility]  2. [facility]     Patient/family instructed to identify preference.    Preferred Facility: (if more than 1, listed in order of descending preference)  [facility]  [facility]    If an additional preferred facility not listed above is identified, additional referral to be sent. If above facilities unable to accept, will send additional referrals to in-network providers.                        Cole Morales, VAL, LMSW  [facility]  Case Management  Ext. [phone]

## 2024-12-31 NOTE — ASSESSMENT & PLAN NOTE
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

## 2024-12-31 NOTE — PT/OT/SLP PROGRESS
Speech Language Pathology Treatment    Patient Name:  Flakito Mcginnis   MRN:  [id number]  Admitting Diagnosis: Septic shock    Recommendations:                 General Recommendations:  GI evaluation  Diet recommendations:  Minced & Moist Diet - IDDSI Level 5, Liquid Diet Level: Thin liquids - IDDSI Level 0   Aspiration Precautions: 1 bite/sip at a time, Small bites/sips, and Standard aspiration precautions   General Precautions: Standard, fall, dental soft  Communication strategies:  none    Assessment:     Flakito Mcginnis is a 69 y.o. male with an SLP diagnosis of esophageal dysphagia     Subjective     Pt seen with meal tray at bedside.     Pain/Comfort:  Pain Rating 1: 0/10    Respiratory Status: Room air    Objective:     Has the patient been evaluated by SLP for swallowing?   Yes  Keep patient NPO? No   Current Respiratory Status:    Room air     Pt seen for ongoing dysphagia tx. Pt stating he has been getting regular solid meal trays the past day despite recs for minced/moist solids which is patients baseline. He was able to take sips of thin liquids this date and endorsed that his globus sensation was eliminated with soft foods. Pt denies signs of aspiration during PO intake, though reiterated that he will regurgitate solids in chew it again before swallowing again. Discussed following up with GI services for further management given overt signs of esophageal involvement. No further skilled ST services indicated at this time.     Goals:   Multidisciplinary Problems       SLP Goals       Not on file              Multidisciplinary Problems (Resolved)          Problem: SLP    Goal Priority Disciplines Outcome   SLP Goal   (Resolved)     SLP Met   Description: Speech Language Pathology Goals  Goals expected to be met by 1/3  1. Pt will tolerate minced & moist diet & thin liquids without s/s aspiration & adequate oral phase of swallow  2. Ongoing swallow assessment to determine safest & least restrictive PO  consistencies                        Plan:     Patient to be seen:  3 x/week   Plan of Care expires:  01/26/25  Plan of Care reviewed with:  patient   SLP Follow-Up:  No       Discharge recommendations:    none   Barriers to Discharge:  None    Time Tracking:     SLP Treatment Date:   12/31/24  Speech Start Time:  1320  Speech Stop Time:  1333     Speech Total Time (min):  13 min    Billable Minutes: Treatment Swallowing Dysfunction 5 and Self Care/Home Management Training 8    12/31/2024

## 2024-12-31 NOTE — ASSESSMENT & PLAN NOTE
Anemia is likely due to chronic disease due to ESRD. Most recent hemoglobin and hematocrit are listed below.  Recent Labs     12/29/24  0227 12/30/24  0553 12/31/24  0607   HGB 7.2* 6.9* 8.5*   HCT 22.8* 22.1* 26.5*       Plan  - Monitor serial CBC: Daily  - Transfuse PRBC if patient becomes hemodynamically unstable, symptomatic or H/H drops below 7/21.  - Patient will receive 1 unit PRBCs today  - Patient's anemia is currently worsening

## 2024-12-31 NOTE — PT/OT/SLP PROGRESS
Physical Therapy Treatment    Patient Name:  Flakito Mcginnis   MRN:  [id number]    Recommendations:     Discharge Recommendations: Low Intensity Therapy  Discharge Equipment Recommendations: bath bench  Barriers to discharge: Inaccessible home and Decreased caregiver support    Assessment:     Flakito Mcginnis is a 69 y.o. male admitted with a medical diagnosis of Septic shock.  He presents with the following impairments/functional limitations: weakness, impaired endurance, impaired self care skills, impaired functional mobility, gait instability, impaired balance, impaired cardiopulmonary response to activity, decreased ROM . Patient ambulates with fwd/flexed posture due to structural Kyphosis, decreased step length and narrow base of support. Attempted to correct downward gaze, but Patient was not receptive to instructions.    Rehab Prognosis: Good; patient would benefit from acute skilled PT services to address these deficits and reach maximum level of function.    Recent Surgery: * No surgery found *      Plan:     During this hospitalization, patient to be seen 3 x/week to address the identified rehab impairments via gait training, therapeutic activities, therapeutic exercises, neuromuscular re-education and progress toward the following goals:    Plan of Care Expires:  01/22/25    Subjective     Chief Complaint: a little tired  Patient/Family Comments/goals: to get well  Pain/Comfort:  Pain Rating 1: 0/10  Location - Side 1: Right  Location - Orientation 1: generalized  Location 1: back  Pain Addressed 1: Pre-medicate for activity, Reposition, Distraction  Pain Rating Post-Intervention 1: 0/10      Objective:     Communicated with NSG prior to session.  Patient found up in chair with pulse ox (continuous) upon PT entry to room.     General Precautions: Standard, fall  Orthopedic Precautions: N/A  Braces: N/A  Respiratory Status: Nasal cannula, flow 2 L/min     Functional Mobility:  Transfers:     Sit to Stand:   contact guard assistance and minimum assistance with rolling walker  Gait: 56 ft x 2 trials with RW and CGA, with 2L O2 and chair follow by PT tech , with a rest break sitting.      AM-PAC 6 CLICK MOBILITY  Turning over in bed (including adjusting bedclothes, sheets and blankets)?: 3  Sitting down on and standing up from a chair with arms (e.g., wheelchair, bedside commode, etc.): 3  Moving from lying on back to sitting on the side of the bed?: 3  Moving to and from a bed to a chair (including a wheelchair)?: 3  Need to walk in hospital room?: 3  Climbing 3-5 steps with a railing?: 2  Basic Mobility Total Score: 17       Treatment & Education:  Donned/Doffed a gown. Educated on hand placement for transfer from sit<>stand and postural awareness when ambulating.    Patient left up in chair with all lines intact and call button in reach..    GOALS:   Multidisciplinary Problems       Physical Therapy Goals          Problem: Physical Therapy    Goal Priority Disciplines Outcome Interventions   Physical Therapy Goal     PT, PT/OT Progressing    Description: Goals to be met by: 2025     Patient will increase functional independence with mobility by performin. Sit to stand transfer with Modified [place]  2. Bed to chair transfer with Supervision using LRAD  3. Gait  x 250 feet with Supervision using LRAD.   4. Stand for 5 minutes with Supervision using LRAD while performing dynamic balance tasks  5. Lower extremity exercise program x30 reps per handout, with independence                         Time Tracking:     PT Received On: 24  PT Start Time: 1500     PT Stop Time: 1525  PT Total Time (min): 25 min     Billable Minutes: Gait Training 15 and Therapeutic Activity 10    Treatment Type: Treatment  PT/PTA: PTA     Number of PTA visits since last PT visit: 2024

## 2024-12-31 NOTE — PROGRESS NOTES
Jason Long - Internal Medicine [facility] Medicine  Progress Note    Patient Name: Flakito Mcginnis  MRN: [id number]  Patient Class: IP- Inpatient   Admission Date: 12/19/2024  Length of Stay: 12 days  Attending Physician: Dixie Melgar MD  Primary Care Provider: Jordin Robbins MD        Subjective     Principal Problem:Septic shock        HPI:  By Alicia Galloway NP    Mr. Flakito Mcginnis is a 69 y.o. man w/ HFrEF (30% 8/2024 from 20-25% 6/2024), NICM, MR, ESRD on HD, Crohn's s/p colostomy, h/o R nephrectomy. He presented to [facility] ED from his HD center on 12/19 due to hypotension and ongoing shortness of breath. He usually receives his dialysis on T, R, S and went on Wednesday for an extra session for volume removal. He arrived on Thursday for his usually scheduled dialysis session and was directed to the ED due to hypotension. On arrival in the ED his blood pressure was 78/51. He was found to have a BNP >4900 and CXR concerning for volume overload. High sensitivity troponin 923. Critical care medicine was consulted for hypotension and admitted to MICU.     Overview/Hospital Course:  Mr. 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 ID was consulted and recommending 6 weeks of IV Cefazolin after HD, end date 2/2/25. CTS evaluated and recommending medical management at this time due to multiple co-morbidities. Tunneled catheter was removed 12/22. Repeat cultures from 12/23 with NGTD. Temporary RIJ trialysis line placed 12/24.  Course further complicated by acute hypoxemic respiratory failure likely volume overload requiring HFNC.  Oxygenation improved with volume removal with dialysis and he was weaned down to NC.  He was SD to  on 12/29.  IR was consulted for new HD line placement which was placed on 12/31.  He was going to be discharged  home after his new line placement, but sister refuses to take him home and says he needs assisted NH placement.    Interval History: No acute events overnight. Went for IR line placement this morning.  Seen postop. Feels well and is ready to go back home to live with his sister.  He was going to be discharged home after his new line placement, but sister refuses to take him home and says he needs assisted NH placement.    Review of Systems   Constitutional:  Positive for fatigue. Negative for chills and fever.   Respiratory:  Negative for cough and shortness of breath.    Cardiovascular:  Negative for chest pain, palpitations and leg swelling.   Gastrointestinal:  Negative for abdominal pain, diarrhea, nausea and vomiting.   Genitourinary:  Negative for dysuria and urgency.   Neurological:  Positive for weakness. Negative for dizziness and headaches.   All other systems reviewed and are negative.    Objective:     Vital Signs (Most Recent):  Temp: 97.3 °F (36.3 °C) (12/31/24 0850)  Pulse: 82 (12/31/24 0950)  Resp: 18 (12/31/24 0950)  BP: (!) 141/73 (12/31/24 0950)  SpO2: 95 % (12/31/24 0950) Vital Signs (24h Range):  Temp:  [97.3 °F (36.3 °C)-98.5 °F (36.9 °C)] 97.3 °F (36.3 °C)  Pulse:  [] 82  Resp:  [12-20] 18  SpO2:  [92 %-100 %] 95 %  BP: ()/(50-84) 141/73     Weight: 56 kg (123 lb 7.3 oz)  Body mass index is 19.93 kg/m².    Intake/Output Summary (Last 24 hours) at 12/31/2024 1129  Last data filed at 12/30/2024 2107  Gross per 24 hour   Intake 590 ml   Output 1225 ml   Net -635 ml      Physical Exam  Constitutional:       Appearance: He is ill-appearing.   HENT:      Head: Normocephalic and atraumatic.      Comments: Tunneled HD line in place  Cardiovascular:      Rate and Rhythm: Normal rate and regular rhythm.      Heart sounds: Murmur heard.   Pulmonary:      Effort: Pulmonary effort is normal. No respiratory distress.      Breath sounds: Normal breath sounds. No wheezing or rales.   Abdominal:       General: There is no distension.      Palpations: Abdomen is soft.      Tenderness: There is no abdominal tenderness.   Musculoskeletal:         General: No deformity.      Right lower leg: Edema present.      Left lower leg: Edema present.   Skin:     General: Skin is warm and dry.      Coloration: Skin is pale.   Neurological:      General: No focal deficit present.      Mental Status: He is alert and oriented to person, place, and time. Mental status is at baseline.           Significant Labs:  CBC:  Recent Labs   Lab 12/30/24  0553 12/31/24  0607   WBC 4.59 5.33   HGB 6.9* 8.5*   HCT 22.1* 26.5*   * 169     CMP:  Recent Labs   Lab 12/30/24  0553 12/31/24  0607   * 129*   K 4.1 4.1   CL 98 98   CO2 19* 22*   GLU 74 80   BUN 45* 30*   CREATININE 6.2* 4.2*   CALCIUM 9.1 8.6*   PROT 5.8* 6.3   ALBUMIN 2.3* 2.4*   BILITOT 0.2 0.5   ALKPHOS 325* 340*   AST 14 16   ALT <5* <5*   ANIONGAP 11 9         Assessment and Plan     * Septic shock  Admitted to MICU 12/19 for septic shock 2/2 Staph capitis bacteremia and endocarditis from his tunneled HD line  Weaned off vasopressors in the ICU  2D echo with mass on the aortic valve suggestive of vegetation  Tunneled HD line removed 12/22  Temporary trialysis placed 12/24  ID consulted:  Suggest 6 weeks of IV Ancef with HD through 2/2/25  CTS consulted:  Suggest IV abx, no surgical intervention  IR placed new tunneled line 12/31    Bacteremia due to Staphylococcus  Seeding from tunneled HD line with AV vegetation  ID consulted:  Suggest 6 weeks of IV Ancef with HD through 2/2/25    Endocarditis  See septic shock    ACP (advance care planning)  DNR noted      Debility  Patient with Acute debility due to  acute illness . The patient's latest AMPAC (Activity Measure for Post Acute Care) Score is listed below.    AM-PAC Score - How much help does the patient need for each activity listed  Basic Mobility Total Score: 17  Turning over in bed (including adjusting  bedclothes, sheets and blankets)?: A little  Sitting down on and standing up from a chair with arms (e.g., wheelchair, bedside commode, etc.): A little  Moving from lying on back to sitting on the side of the bed?: A little  Moving to and from a bed to a chair (including a wheelchair)?: A little  Need to walk in hospital room?: A little  Climbing 3-5 steps with a railing?: A lot    Plan  - Progressive mobility protocol initated  - PT/OT consulted    Palliative care encounter  DNR noted      Acute respiratory failure with hypoxia  Patient with Hypoxic Respiratory failure which is Acute.  he is not on home oxygen. Supplemental oxygen was provided and noted-       .   Signs/symptoms of respiratory failure include- tachypnea, increased work of breathing, respiratory distress, and use of accessory muscles. Contributing diagnoses includes - CHF Labs and images were reviewed. Patient Has recent ABG, which has been reviewed. Will treat underlying causes and adjust management of respiratory failure as follows-   Continue to wean oxygen to goal SaO2 of 90%  Aggressive pulmonary toilet in setting of atelectasis of left lower lung field.     Hyperkalemia  Hyperkalemia is likely due to ESRD.The patients most recent potassium results are listed below.  Recent Labs     12/29/24 0227 12/30/24  0553 12/31/24  0607   K 4.1 4.1 4.1       Plan  - Monitor for arrhythmias with EKG and/or continuous telemetry.   - Now resolved            Anemia of chronic renal failure, stage 5  Anemia is likely due to chronic disease due to ESRD. Most recent hemoglobin and hematocrit are listed below.  Recent Labs     12/29/24 0227 12/30/24  0553 12/31/24  0607   HGB 7.2* 6.9* 8.5*   HCT 22.8* 22.1* 26.5*       Plan  - Monitor serial CBC: Daily  - Transfuse PRBC if patient becomes hemodynamically unstable, symptomatic or H/H drops below 7/21.  - Patient will receive 1 unit PRBCs today  - Patient's anemia is currently worsening    NSTEMI (non-ST  elevated myocardial infarction)  In setting of septic shock  High sensitivity troponin troponin 923.    EKG with T wave inversions  No chest pain    Hypercholesterolemia  Chronic and stable  Continue Statin      HFrEF (heart failure with reduced ejection fraction)  2D Echo showing EF of 20-25% with large AV vegetation partially occluding LVOT with moderate AV regurgitation  Volume removal with HD    History of nephrectomy  Noted  ESRD on HD    Hypertension  Patient's blood pressure range in the last 24 hours was: BP  Min: 96/57  Max: 172/82.  Hold BP meds    ESRD on hemodialysis  Nephro following  Tunneled HD line removed 12/22  Temporary trialysis placed 12/24  IR consulted for new HD line placement      VTE Risk Mitigation (From admission, onward)           Ordered     heparin (porcine) injection 5,000 Units  Every 8 hours         12/29/24 1000     Place sequential compression device  Until discontinued         12/19/24 1736     IP VTE LOW RISK PATIENT  Once         12/19/24 1736                    Discharge Planning   LLUVIA: 1/3/2025     Code Status: DNR   Medical Readiness for Discharge Date: 12/31/2024  Discharge Plan A: Home with family   Discharge Delays: None known at this time            Please place Justification for DME        Dixie Melgar MD  Department of Hospital Medicine   [facility] - Internal Medicine Telemetry

## 2024-12-31 NOTE — ASSESSMENT & PLAN NOTE
Hyperkalemia is likely due to ESRD.The patients most recent potassium results are listed below.  Recent Labs     12/29/24  0227 12/30/24  0553 12/31/24  0607   K 4.1 4.1 4.1       Plan  - Monitor for arrhythmias with EKG and/or continuous telemetry.   - Now resolved

## 2024-12-31 NOTE — PT/OT/SLP PROGRESS
"Occupational Therapy   Treatment    Name: Flakito Mcginnis  MRN: [id number]  Admitting Diagnosis:  Septic shock       Recommendations:     Discharge Recommendations: Low Intensity Therapy  Discharge Equipment Recommendations:  bath bench  Barriers to discharge:  Decreased caregiver support    Assessment:     Flakito Mcginnis is a 69 y.o. male with a medical diagnosis of Septic shock. Performance deficits affecting function are weakness, impaired endurance, decreased coordination, impaired self care skills, impaired functional mobility, gait instability, impaired balance.     Rehab Prognosis:  Good; patient would benefit from acute skilled OT services to address these deficits and reach maximum level of function.       Plan:     Patient to be seen 3 x/week to address the above listed problems via self-care/home management, therapeutic activities, therapeutic exercises  Plan of Care Expires: 01/23/25  Plan of Care Reviewed with: patient    Subjective     Patient/Family Comments/goals: "I can't go back to my sister's house. I may have to go to a group home."    Pain/Comfort:  Pain Rating 1: 0/10    Objective:     Communicated with: rn prior to session.  Patient found sitting edge of bed with pulse ox (continuous) upon OT entry to room.    General Precautions: Standard, fall    Orthopedic Precautions:N/A  Braces: N/A  Respiratory Status: Nasal cannula, flow 2 L/min     Occupational Performance:     Bed Mobility:    Sitting EOB.    Functional Mobility/Transfers:  Patient completed Sit <> Stand Transfer with minimum assistance  with  straight cane   Functional Mobility: Pt walked from the bed<>bathroom CGA with a SPC.    Activities of Daily Living:  Grooming: stand by assistance standing at the sink.    [facility] 6 Click ADL: 18    Treatment & Education:  Discussed OT POC and progress.    Patient left sitting edge of bed with all lines intact and call button in reach    GOALS:   Multidisciplinary Problems       Occupational Therapy " Goals          Problem: Occupational Therapy    Goal Priority Disciplines Outcome Interventions   Occupational Therapy Goal     OT, PT/OT Progressing    Description: Goals to be met by: 1/23/25 (1 mo)     Patient will increase functional independence with ADLs by performing:    UE Dressing with [place].  LE Dressing with [place].  Grooming while standing at sink with [place].  Toileting from toilet with [place] for hygiene and clothing management.   Rolling to Bilateral with [place].   Supine to sit with [place].  Step transfer with [place]  Toilet transfer to toilet with [place].                         Time Tracking:     OT Date of Treatment: 12/31/24  OT Start Time: 1112  OT Stop Time: 1145  OT Total Time (min): 33 min    Billable Minutes:Self Care/Home Management 33    OT/HAN: OT          12/31/2024

## 2024-12-31 NOTE — H&P
"Radiology History & Physical      SUBJECTIVE:     Chief Complaint: renal failure    History of Present Illness:  Flakito Mcginnis is a 69 y.o. male with ESRD, IR was consulted for placement of tunneled HD catheter for long-term dialysis.    Past Medical History:   Diagnosis Date    Crohn's disease 1998    ESRD (end stage renal disease) on dialysis 10/2017    Hypertension     Obstructive uropathy      Past Surgical History:   Procedure Laterality Date    COLOSTOMY  2006    DIALYSIS FISTULA CREATION Left 02/2018    NEPHRECTOMY Right     REMOVAL OF TUNNELED CENTRAL VENOUS CATHETER (CVC) N/A 5/23/2018    Procedure: PERMCATH REMOVAL-TUNNELED CVC REMOVAL;  Surgeon: Parveen Ray MD;  Location: [facility] CATH LAB;  Service: Nephrology;  Laterality: N/A;  pt coming in @930       Home Meds:   Prior to Admission medications    Medication Sig Start Date End Date Taking? Authorizing Provider   amLODIPine (NORVASC) 10 MG tablet Take by mouth once daily.    Provider, Historical   atorvastatin (LIPITOR) 40 MG tablet Take 40 mg by mouth once daily. 5/24/23   Provider, Historical   butalbital-acetaminophen-caffeine -40 mg (FIORICET, ESGIC) -40 mg per tablet Take 1 tablet by mouth every 4 (four) hours as needed for Headaches. 12/11/23   Naye Ventura MD   carvediloL (COREG) 12.5 MG tablet Take 12.5 mg by mouth 2 (two) times daily. 1/9/23   Provider, Historical   catheter 10-16 Fr-" Misc 1 each by Misc.(Non-Drug; Combo Route) route daily as needed (for voiding). 1/17/24   Lu White PA-C   cholestyramine (QUESTRAN) 4 gram packet Take 4 g by mouth once daily.    Provider, Historical   cinacalcet (SENSIPAR) 30 MG Tab Take 30 mg by mouth daily with breakfast.    Provider, Historical   lisinopriL (PRINIVIL,ZESTRIL) 20 MG tablet Take 20 mg by mouth.    Provider, Historical   methocarbamoL (ROBAXIN) 500 MG Tab Take 1 tablet (500 mg total) by mouth nightly as needed (muscle spasm).  Patient not taking: Reported on " "12/11/2024 8/25/23   Charlie Valencia, CARMEN   oxyCODONE-acetaminophen (PERCOCET) 5-325 mg per tablet Take 1 tablet by mouth every 6 (six) hours as needed for Pain. 10/19/24   Kaylyn Mendosa MD   sevelamer carbonate (RENVELA) 800 mg Tab Take 800 mg by mouth 3 (three) times daily with meals.    Provider, Historical   sodium zirconium cyclosilicate (LOKELMA) 5 gram packet Take 5 g by mouth once daily.    Provider, Historical     Anticoagulants/Antiplatelets: Heparin    Allergies:   Review of patient's allergies indicates:   Allergen Reactions    Vancomycin analogues Anaphylaxis, Other (See Comments), Shortness Of Breath and Swelling     Light headed, see's spots      Aspirin Nausea And Vomiting and Other (See Comments)     Has crohn's disease    Other reaction(s): FLARES UP CROHNS      Has crohn's disease     Sedation History:  no adverse reactions    Review of Systems:   Hematological: no known coagulopathies  Respiratory: no shortness of breath  Cardiovascular: no chest pain  Gastrointestinal: no abdominal pain  Genito-Urinary: no dysuria  Musculoskeletal: negative  Neurological: no TIA or stroke symptoms         OBJECTIVE:     Vital Signs (Most Recent)  Temp: 97.8 °F (36.6 °C) (12/31/24 0503)  Pulse: (!) 59 (12/31/24 0503)  Resp: 16 (12/30/24 2358)  BP: 139/80 (12/31/24 0503)  SpO2: 100 % (12/31/24 0503)    Physical Exam:  ASA: 3    Mallampati: class II    General: no acute distress  Mental Status: alert and oriented to person, place and time  HEENT: normocephalic, atraumatic  Chest: unlabored breathing  Heart: regular heart rate  Abdomen: nondistended  Extremity: moves all extremities    Laboratory  No results found for: "INR", "PT", "PTT"    Lab Results   Component Value Date    WBC 5.33 12/31/2024    HGB 8.5 (L) 12/31/2024    HCT 26.5 (L) 12/31/2024    MCV 91 12/31/2024     12/31/2024      Lab Results   Component Value Date    GLU 80 12/31/2024     (L) 12/31/2024    K 4.1 12/31/2024    CL 98 " 12/31/2024    CO2 22 (L) 12/31/2024    BUN 30 (H) 12/31/2024    CREATININE 4.2 (H) 12/31/2024    CALCIUM 8.6 (L) 12/31/2024    MG 2.1 12/31/2024    ALT <5 (L) 12/31/2024    AST 16 12/31/2024    ALBUMIN 2.4 (L) 12/31/2024    BILITOT 0.5 12/31/2024       ASSESSMENT/PLAN:     Sedation Plan: up to moderate sedation.    After thorough discussion regarding the nature of the procedure and its risks and benefits, patient elected to proceed, and formal consent was obtained. Patient will undergo tunneled HD catheter placement.    Omer Deutsch MD PGY-2  Department of Radiology  [facility]

## 2024-12-31 NOTE — PROCEDURES
Interventional Radiology Post-Procedure Note    Pre Op Diagnosis: renal dysfunction    Post Op Diagnosis: Same    Procedure: Tunneled central venous catheter placement, removal    Procedure performed by:  Indy Quiles MD    Written Informed Consent Obtained: Yes    Specimen Removed: No    Estimated Blood Loss:  Minimal     Findings:   The right internal jugular vein is sonographically patent.  Successful placement of right-sided tunneled 14.5Fr x 19cm central venous catheter with catheter tip in the right atrium.     The patient tolerated the procedure without complication. The central venous catheter is ready for immediate use.     Indy Quiles MD  Interventional Radiology

## 2025-01-01 LAB
ALBUMIN SERPL BCP-MCNC: 2.5 G/DL (ref 3.5–5.2)
ALP SERPL-CCNC: 335 U/L (ref 40–150)
ALT SERPL W/O P-5'-P-CCNC: <5 U/L (ref 10–44)
ANION GAP SERPL CALC-SCNC: 12 MMOL/L (ref 8–16)
AST SERPL-CCNC: 22 U/L (ref 10–40)
BASOPHILS # BLD AUTO: 0.03 K/UL (ref 0–0.2)
BASOPHILS NFR BLD: 0.6 % (ref 0–1.9)
BILIRUB SERPL-MCNC: 0.2 MG/DL (ref 0.1–1)
BUN SERPL-MCNC: 43 MG/DL (ref 8–23)
CALCIUM SERPL-MCNC: 9.2 MG/DL (ref 8.7–10.5)
CHLORIDE SERPL-SCNC: 98 MMOL/L (ref 95–110)
CO2 SERPL-SCNC: 22 MMOL/L (ref 23–29)
CREAT SERPL-MCNC: 5 MG/DL (ref 0.5–1.4)
DIFFERENTIAL METHOD BLD: ABNORMAL
EOSINOPHIL # BLD AUTO: 0.1 K/UL (ref 0–0.5)
EOSINOPHIL NFR BLD: 1.5 % (ref 0–8)
ERYTHROCYTE [DISTWIDTH] IN BLOOD BY AUTOMATED COUNT: 18 % (ref 11.5–14.5)
EST. GFR  (NO RACE VARIABLE): 11.8 ML/MIN/1.73 M^2
GLUCOSE SERPL-MCNC: 133 MG/DL (ref 70–110)
HCT VFR BLD AUTO: 26.1 % (ref 40–54)
HGB BLD-MCNC: 8.3 G/DL (ref 14–18)
IMM GRANULOCYTES # BLD AUTO: 0.02 K/UL (ref 0–0.04)
IMM GRANULOCYTES NFR BLD AUTO: 0.4 % (ref 0–0.5)
LYMPHOCYTES # BLD AUTO: 0.5 K/UL (ref 1–4.8)
LYMPHOCYTES NFR BLD: 8.6 % (ref 18–48)
MCH RBC QN AUTO: 29.4 PG (ref 27–31)
MCHC RBC AUTO-ENTMCNC: 31.8 G/DL (ref 32–36)
MCV RBC AUTO: 93 FL (ref 82–98)
MONOCYTES # BLD AUTO: 0.5 K/UL (ref 0.3–1)
MONOCYTES NFR BLD: 9.5 % (ref 4–15)
NEUTROPHILS # BLD AUTO: 4.3 K/UL (ref 1.8–7.7)
NEUTROPHILS NFR BLD: 79.4 % (ref 38–73)
NRBC BLD-RTO: 0 /100 WBC
PLATELET # BLD AUTO: 157 K/UL (ref 150–450)
PMV BLD AUTO: 11.9 FL (ref 9.2–12.9)
POTASSIUM SERPL-SCNC: 4.3 MMOL/L (ref 3.5–5.1)
PROT SERPL-MCNC: 6.7 G/DL (ref 6–8.4)
RBC # BLD AUTO: 2.82 M/UL (ref 4.6–6.2)
SODIUM SERPL-SCNC: 132 MMOL/L (ref 136–145)
WBC # BLD AUTO: 5.35 K/UL (ref 3.9–12.7)

## 2025-01-01 PROCEDURE — 63600175 PHARM REV CODE 636 W HCPCS

## 2025-01-01 PROCEDURE — 99900035 HC TECH TIME PER 15 MIN (STAT)

## 2025-01-01 PROCEDURE — 25000003 PHARM REV CODE 250: Performed by: INTERNAL MEDICINE

## 2025-01-01 PROCEDURE — 90935 HEMODIALYSIS ONE EVALUATION: CPT

## 2025-01-01 PROCEDURE — 25000242 PHARM REV CODE 250 ALT 637 W/ HCPCS: Performed by: INTERNAL MEDICINE

## 2025-01-01 PROCEDURE — 63600175 PHARM REV CODE 636 W HCPCS: Performed by: STUDENT IN AN ORGANIZED HEALTH CARE EDUCATION/TRAINING PROGRAM

## 2025-01-01 PROCEDURE — 85025 COMPLETE CBC W/AUTO DIFF WBC: CPT | Performed by: HOSPITALIST

## 2025-01-01 PROCEDURE — 21400001 HC TELEMETRY ROOM

## 2025-01-01 PROCEDURE — 80053 COMPREHEN METABOLIC PANEL: CPT | Performed by: HOSPITALIST

## 2025-01-01 PROCEDURE — 27000221 HC OXYGEN, UP TO 24 HOURS

## 2025-01-01 PROCEDURE — 90935 HEMODIALYSIS ONE EVALUATION: CPT | Mod: ,,,

## 2025-01-01 PROCEDURE — 25000003 PHARM REV CODE 250

## 2025-01-01 PROCEDURE — 25000003 PHARM REV CODE 250: Performed by: GENERAL ACUTE CARE HOSPITAL

## 2025-01-01 PROCEDURE — 63600175 PHARM REV CODE 636 W HCPCS: Performed by: INTERNAL MEDICINE

## 2025-01-01 PROCEDURE — 36415 COLL VENOUS BLD VENIPUNCTURE: CPT | Performed by: HOSPITALIST

## 2025-01-01 PROCEDURE — 94761 N-INVAS EAR/PLS OXIMETRY MLT: CPT

## 2025-01-01 PROCEDURE — 94640 AIRWAY INHALATION TREATMENT: CPT

## 2025-01-01 RX ORDER — HEPARIN SODIUM 1000 [USP'U]/ML
1000 INJECTION, SOLUTION INTRAVENOUS; SUBCUTANEOUS
Status: COMPLETED | OUTPATIENT
Start: 2025-01-01 | End: 2025-01-01

## 2025-01-01 RX ADMIN — ATORVASTATIN CALCIUM 40 MG: 40 TABLET, FILM COATED ORAL at 08:01

## 2025-01-01 RX ADMIN — HEPARIN SODIUM 5000 UNITS: 5000 INJECTION INTRAVENOUS; SUBCUTANEOUS at 04:01

## 2025-01-01 RX ADMIN — SEVELAMER CARBONATE 800 MG: 800 TABLET, FILM COATED ORAL at 08:01

## 2025-01-01 RX ADMIN — HEPARIN SODIUM 5000 UNITS: 5000 INJECTION INTRAVENOUS; SUBCUTANEOUS at 12:01

## 2025-01-01 RX ADMIN — Medication 6 MG: at 09:01

## 2025-01-01 RX ADMIN — SEVELAMER CARBONATE 800 MG: 800 TABLET, FILM COATED ORAL at 12:01

## 2025-01-01 RX ADMIN — HEPARIN SODIUM 5000 UNITS: 5000 INJECTION INTRAVENOUS; SUBCUTANEOUS at 08:01

## 2025-01-01 RX ADMIN — SEVELAMER CARBONATE 800 MG: 800 TABLET, FILM COATED ORAL at 04:01

## 2025-01-01 RX ADMIN — OXYCODONE HYDROCHLORIDE 10 MG: 10 TABLET ORAL at 08:01

## 2025-01-01 RX ADMIN — HEPARIN SODIUM 1000 UNITS: 1000 INJECTION, SOLUTION INTRAVENOUS; SUBCUTANEOUS at 12:01

## 2025-01-01 RX ADMIN — CEFAZOLIN 1 G: 1 INJECTION, POWDER, FOR SOLUTION INTRAMUSCULAR; INTRAVENOUS at 09:01

## 2025-01-01 RX ADMIN — EPOETIN ALFA-EPBX 3000 UNITS: 3000 INJECTION, SOLUTION INTRAVENOUS; SUBCUTANEOUS at 12:01

## 2025-01-01 RX ADMIN — ACETAMINOPHEN 500 MG: 500 TABLET ORAL at 11:01

## 2025-01-01 RX ADMIN — IPRATROPIUM BROMIDE AND ALBUTEROL SULFATE 3 ML: 2.5; .5 SOLUTION RESPIRATORY (INHALATION) at 12:01

## 2025-01-01 RX ADMIN — SODIUM CHLORIDE: 9 INJECTION, SOLUTION INTRAVENOUS at 09:01

## 2025-01-01 NOTE — ASSESSMENT & PLAN NOTE
Hyperkalemia is likely due to ESRD.The patients most recent potassium results are listed below.  Recent Labs     12/30/24  0553 12/31/24  0607 01/01/25  0936   K 4.1 4.1 4.3       Plan  - Monitor for arrhythmias with EKG and/or continuous telemetry.   - Now resolved

## 2025-01-01 NOTE — PROGRESS NOTES
0900 patient arrived to ERNST via Stretcher, [id number].    0915 HD treatment started via R subclavian catheter; dressing soiled with old blood; no complication noted on site ; accessed with good blood flow, VSS and recorded, LAKEISHA.

## 2025-01-01 NOTE — SUBJECTIVE & OBJECTIVE
Interval History: No acute events overnight. Hypotensive this morning, not on BP meds - at HD.  Working on NH placement.    Review of Systems   Constitutional:  Positive for fatigue. Negative for chills and fever.   Respiratory:  Negative for cough and shortness of breath.    Cardiovascular:  Negative for chest pain, palpitations and leg swelling.   Gastrointestinal:  Negative for abdominal pain, diarrhea, nausea and vomiting.   Genitourinary:  Negative for dysuria and urgency.   Neurological:  Positive for weakness. Negative for dizziness and headaches.   All other systems reviewed and are negative.    Objective:     Vital Signs (Most Recent):  Temp: 97.4 °F (36.3 °C) (01/01/25 0915)  Pulse: 86 (01/01/25 1115)  Resp: 18 (01/01/25 0915)  BP: (!) 91/54 (01/01/25 1100)  SpO2: 99 % (01/01/25 1100) Vital Signs (24h Range):  Temp:  [97.4 °F (36.3 °C)-97.9 °F (36.6 °C)] 97.4 °F (36.3 °C)  Pulse:  [] 86  Resp:  [16-20] 18  SpO2:  [84 %-99 %] 99 %  BP: ()/(52-72) 91/54     Weight: 56 kg (123 lb 7.3 oz)  Body mass index is 19.93 kg/m².    Intake/Output Summary (Last 24 hours) at 1/1/2025 1121  Last data filed at 12/31/2024 1709  Gross per 24 hour   Intake 50 ml   Output --   Net 50 ml      Physical Exam  Constitutional:       Appearance: He is ill-appearing.   HENT:      Head: Normocephalic and atraumatic.      Comments: Tunneled HD line in place  Cardiovascular:      Rate and Rhythm: Normal rate and regular rhythm.      Heart sounds: Murmur heard.   Pulmonary:      Effort: Pulmonary effort is normal. No respiratory distress.      Breath sounds: Normal breath sounds. No wheezing or rales.   Abdominal:      General: There is no distension.      Palpations: Abdomen is soft.      Tenderness: There is no abdominal tenderness.   Musculoskeletal:         General: No deformity.      Right lower leg: Edema present.      Left lower leg: Edema present.   Skin:     General: Skin is warm and dry.      Coloration: Skin is  pale.   Neurological:      General: No focal deficit present.      Mental Status: He is alert and oriented to person, place, and time. Mental status is at baseline.           Significant Labs:  CBC:  Recent Labs   Lab 12/31/24  0607 01/01/25  0233   WBC 5.33 5.35   HGB 8.5* 8.3*   HCT 26.5* 26.1*    157     CMP:  Recent Labs   Lab 12/31/24  0607 01/01/25  0936   * 132*   K 4.1 4.3   CL 98 98   CO2 22* 22*   GLU 80 133*   BUN 30* 43*   CREATININE 4.2* 5.0*   CALCIUM 8.6* 9.2   PROT 6.3 6.7   ALBUMIN 2.4* 2.5*   BILITOT 0.5 0.2   ALKPHOS 340* 335*   AST 16 22   ALT <5* <5*   ANIONGAP 9 12

## 2025-01-01 NOTE — ASSESSMENT & PLAN NOTE
Nephro following  Tunneled HD line removed 12/22  Temporary trialysis placed 12/24  IR placed new line 12/31

## 2025-01-01 NOTE — PLAN OF CARE
Problem: Adult Inpatient Plan of Care  Goal: Plan of Care Review  Outcome: Progressing  Goal: Patient-Specific Goal (Individualized)  Outcome: Progressing  Goal: Absence of Hospital-Acquired Illness or Injury  Outcome: Progressing  Goal: Optimal Comfort and Wellbeing  Outcome: Progressing  Goal: Readiness for Transition of Care  Outcome: Progressing     Problem: Infection  Goal: Absence of Infection Signs and Symptoms  Outcome: Progressing     Problem: Skin Injury Risk Increased  Goal: Skin Health and Integrity  Outcome: Progressing     Problem: Sepsis/Septic Shock  Goal: Optimal Coping  Outcome: Progressing  Goal: Absence of Bleeding  Outcome: Progressing  Goal: Blood Glucose Level Within Targeted Range  Outcome: Progressing  Goal: Absence of Infection Signs and Symptoms  Outcome: Progressing  Goal: Optimal Nutrition Intake  Outcome: Progressing     Problem: CRRT (Continuous Renal Replacement Therapy)  Goal: Safe, Effective Therapy Delivery  Outcome: Progressing  Goal: Hemodynamic Stability  Outcome: Progressing  Goal: Body Temperature Maintained in Desired Range  Outcome: Progressing  Goal: Absence of Infection Signs and Symptoms  Outcome: Progressing     Problem: Fall Injury Risk  Goal: Absence of Fall and Fall-Related Injury  Outcome: Progressing     Problem: Coping Ineffective  Goal: Effective Coping  Outcome: Progressing     Problem: Hemodialysis  Goal: Safe, Effective Therapy Delivery  Outcome: Progressing  Goal: Effective Tissue Perfusion  Outcome: Progressing  Goal: Absence of Infection Signs and Symptoms  Outcome: Progressing     Problem: Wound  Goal: Optimal Coping  Outcome: Progressing  Goal: Optimal Functional Ability  Outcome: Progressing  Goal: Absence of Infection Signs and Symptoms  Outcome: Progressing  Goal: Improved Oral Intake  Outcome: Progressing  Goal: Optimal Pain Control and Function  Outcome: Progressing  Goal: Skin Health and Integrity  Outcome: Progressing  Goal: Optimal Wound  Healing  Outcome: Progressing

## 2025-01-01 NOTE — PROGRESS NOTES
Jason Long - Internal Medicine [facility] Medicine  Progress Note    Patient Name: Flakito Mcginnis  MRN: [id number]  Patient Class: IP- Inpatient   Admission Date: 12/19/2024  Length of Stay: 13 days  Attending Physician: Dixie Melgar MD  Primary Care Provider: Jordin Robbins MD        Subjective     Principal Problem:Septic shock        HPI:  By Alicia Galloway NP    Mr. Flakito Mcginnis is a 69 y.o. man w/ HFrEF (30% 8/2024 from 20-25% 6/2024), NICM, MR, ESRD on HD, Crohn's s/p colostomy, h/o R nephrectomy. He presented to [facility] [place] from his HD center on 12/19 due to hypotension and ongoing shortness of breath. He usually receives his dialysis on T, R, S and went on Wednesday for an extra session for volume removal. He arrived on Thursday for his usually scheduled dialysis session and was directed to the ED due to hypotension. On arrival in the ED his blood pressure was 78/51. He was found to have a BNP >4900 and CXR concerning for volume overload. High sensitivity troponin 923. Critical care medicine was consulted for hypotension and admitted to MICU.     Overview/Hospital Course:  Mr. 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 ID was consulted and recommending 6 weeks of IV Cefazolin after HD, end date 2/2/25. CTS evaluated and recommending medical management at this time due to multiple co-morbidities. Tunneled catheter was removed 12/22. Repeat cultures from 12/23 with NGTD. Temporary RIJ trialysis line placed 12/24.  Course further complicated by acute hypoxemic respiratory failure likely volume overload requiring HFNC.  Oxygenation improved with volume removal with dialysis and he was weaned down to NC.  He was SD to  on 12/29.  IR was consulted for new HD line placement which was placed on 12/31.  He was going to be discharged  home after his new line placement, but sister refuses to take him home and says he needs [facility] NH placement.    Interval History: No acute events overnight. Hypotensive this morning, not on BP meds - at HD.  Working on NH placement.    Review of Systems   Constitutional:  Positive for fatigue. Negative for chills and fever.   Respiratory:  Negative for cough and shortness of breath.    Cardiovascular:  Negative for chest pain, palpitations and leg swelling.   Gastrointestinal:  Negative for abdominal pain, diarrhea, nausea and vomiting.   Genitourinary:  Negative for dysuria and urgency.   Neurological:  Positive for weakness. Negative for dizziness and headaches.   All other systems reviewed and are negative.    Objective:     Vital Signs (Most Recent):  Temp: 97.4 °F (36.3 °C) (01/01/25 0915)  Pulse: 86 (01/01/25 1115)  Resp: 18 (01/01/25 0915)  BP: (!) 91/54 (01/01/25 1100)  SpO2: 99 % (01/01/25 1100) Vital Signs (24h Range):  Temp:  [97.4 °F (36.3 °C)-97.9 °F (36.6 °C)] 97.4 °F (36.3 °C)  Pulse:  [] 86  Resp:  [16-20] 18  SpO2:  [84 %-99 %] 99 %  BP: ()/(52-72) 91/54     Weight: 56 kg (123 lb 7.3 oz)  Body mass index is 19.93 kg/m².    Intake/Output Summary (Last 24 hours) at 1/1/2025 1121  Last data filed at 12/31/2024 1709  Gross per 24 hour   Intake 50 ml   Output --   Net 50 ml      Physical Exam  Constitutional:       Appearance: He is ill-appearing.   HENT:      Head: Normocephalic and atraumatic.      Comments: Tunneled HD line in place  Cardiovascular:      Rate and Rhythm: Normal rate and regular rhythm.      Heart sounds: Murmur heard.   Pulmonary:      Effort: Pulmonary effort is normal. No respiratory distress.      Breath sounds: Normal breath sounds. No wheezing or rales.   Abdominal:      General: There is no distension.      Palpations: Abdomen is soft.      Tenderness: There is no abdominal tenderness.   Musculoskeletal:         General: No deformity.      Right lower leg: Edema  present.      Left lower leg: Edema present.   Skin:     General: Skin is warm and dry.      Coloration: Skin is pale.   Neurological:      General: No focal deficit present.      Mental Status: He is alert and oriented to person, place, and time. Mental status is at baseline.           Significant Labs:  CBC:  Recent Labs   Lab 12/31/24  0607 01/01/25  0233   WBC 5.33 5.35   HGB 8.5* 8.3*   HCT 26.5* 26.1*    157     CMP:  Recent Labs   Lab 12/31/24  0607 01/01/25  0936   * 132*   K 4.1 4.3   CL 98 98   CO2 22* 22*   GLU 80 133*   BUN 30* 43*   CREATININE 4.2* 5.0*   CALCIUM 8.6* 9.2   PROT 6.3 6.7   ALBUMIN 2.4* 2.5*   BILITOT 0.5 0.2   ALKPHOS 340* 335*   AST 16 22   ALT <5* <5*   ANIONGAP 9 12         Assessment and Plan     * Septic shock  Admitted to MICU 12/19 for septic shock 2/2 Staph capitis bacteremia and endocarditis from his tunneled HD line  Weaned off vasopressors in the ICU  2D echo with mass on the aortic valve suggestive of vegetation  Tunneled HD line removed 12/22  Temporary trialysis placed 12/24  ID consulted:  Suggest 6 weeks of IV Ancef with HD through 2/2/25  CTS consulted:  Suggest IV abx, no surgical intervention  IR placed new tunneled line 12/31    Bacteremia due to Staphylococcus  Seeding from tunneled HD line with AV vegetation  ID consulted:  Suggest 6 weeks of IV Ancef with HD through 2/2/25    Endocarditis  See septic shock    ACP (advance care planning)  DNR noted      Debility  Patient with Acute debility due to  acute illness . The patient's latest AMPAC (Activity Measure for Post Acute Care) Score is listed below.    AM-PAC Score - How much help does the patient need for each activity listed  Basic Mobility Total Score: 17  Turning over in bed (including adjusting bedclothes, sheets and blankets)?: A little  Sitting down on and standing up from a chair with arms (e.g., wheelchair, bedside commode, etc.): A little  Moving from lying on back to sitting on the side of  the bed?: A little  Moving to and from a bed to a chair (including a wheelchair)?: A little  Need to walk in hospital room?: A little  Climbing 3-5 steps with a railing?: A lot    Plan  - Progressive mobility protocol initated  - PT/OT consulted    Palliative care encounter  DNR noted      Acute respiratory failure with hypoxia  Patient with Hypoxic Respiratory failure which is Acute.  he is not on home oxygen. Supplemental oxygen was provided and noted-       .   Signs/symptoms of respiratory failure include- tachypnea, increased work of breathing, respiratory distress, and use of accessory muscles. Contributing diagnoses includes - CHF Labs and images were reviewed. Patient Has recent ABG, which has been reviewed. Will treat underlying causes and adjust management of respiratory failure as follows-   Continue to wean oxygen to goal SaO2 of 90%  Aggressive pulmonary toilet in setting of atelectasis of left lower lung field.     Hyperkalemia  Hyperkalemia is likely due to ESRD.The patients most recent potassium results are listed below.  Recent Labs     12/30/24  0553 12/31/24  0607 01/01/25  0936   K 4.1 4.1 4.3       Plan  - Monitor for arrhythmias with EKG and/or continuous telemetry.   - Now resolved            Anemia of chronic renal failure, stage 5  Anemia is likely due to chronic disease due to ESRD. Most recent hemoglobin and hematocrit are listed below.  Recent Labs     12/30/24  0553 12/31/24  0607 01/01/25  0233   HGB 6.9* 8.5* 8.3*   HCT 22.1* 26.5* 26.1*       Plan  - Monitor serial CBC: Daily  - Transfuse PRBC if patient becomes hemodynamically unstable, symptomatic or H/H drops below 7/21.  - Patient will receive 1 unit PRBCs today  - Patient's anemia is currently worsening    NSTEMI (non-ST elevated myocardial infarction)  In setting of septic shock  High sensitivity troponin troponin 923.    EKG with T wave inversions  No chest pain    Hypercholesterolemia  Chronic and stable  Continue  Statin      HFrEF (heart failure with reduced ejection fraction)  2D Echo showing EF of 20-25% with large AV vegetation partially occluding LVOT with moderate AV regurgitation  Volume removal with HD    History of nephrectomy  Noted  ESRD on HD    Hypertension  Patient's blood pressure range in the last 24 hours was: BP  Min: 90/52  Max: 124/65.  Hold BP meds    ESRD on hemodialysis  Nephro following  Tunneled HD line removed 12/22  Temporary trialysis placed 12/24  IR placed new line 12/31      VTE Risk Mitigation (From admission, onward)           Ordered     heparin (porcine) injection 5,000 Units  Every 8 hours         12/29/24 1000     Place sequential compression device  Until discontinued         12/19/24 1736     IP VTE LOW RISK PATIENT  Once         12/19/24 1736                    Discharge Planning   LLUVIA: 1/3/2025     Code Status: DNR   Medical Readiness for Discharge Date: 12/31/2024  Discharge Plan A: New Nursing Home placement - group home care facility   Discharge Delays: (!) Patient and Family Barriers            Please place Justification for DME        Dixie Melgar MD  Department of Hospital Medicine   [facility] - Internal Medicine Telemetry

## 2025-01-01 NOTE — ASSESSMENT & PLAN NOTE
Anemia is likely due to chronic disease due to ESRD. Most recent hemoglobin and hematocrit are listed below.  Recent Labs     12/30/24  0553 12/31/24  0607 01/01/25  0233   HGB 6.9* 8.5* 8.3*   HCT 22.1* 26.5* 26.1*       Plan  - Monitor serial CBC: Daily  - Transfuse PRBC if patient becomes hemodynamically unstable, symptomatic or H/H drops below 7/21.  - Patient will receive 1 unit PRBCs today  - Patient's anemia is currently worsening

## 2025-01-01 NOTE — PROGRESS NOTES
[facility]FF HEMODIALYSIS NOTE     Patient currently on hemodialysis for removal of uremic toxins and volume.     Patient seen and evaluated on hemodialysis, tolerating treatment, see HD flowsheet for vitals and assessments.     Labs have been reviewed and the dialysate bath has been adjusted.        Assessment/Plan:     -Patient seen on HD, tolerating treatment well, w/o complaints   -UF goal of 1 L as tolerated  -Patient with EDW of 54.5 kg, pre HD weight 59 kg, however blood pressure is soft today (SBP 90s), gentle UF as tolerated  -Pending NH placement for discharge  -TDC placed yesterday with IR  -Renal diet, if not NPO   -Strict I/O's and daily weights  -Daily renal function panels  -Keep MAP >65 while on HD   -Hgb goal 10-11, hgb stable at 8.3 s/p 1 unit pRBC on 12/30, resume OP Epo today  -Phos controlled on binders  -Will continue to follow while inpatient      Lexie Díaz PA-C  Nephrology

## 2025-01-01 NOTE — PROGRESS NOTES
3hrs HD tx completed with 500cc of fluid removed.    BP trending down during tx.    Blood returned; lines flushed with NS; catheter locked with heparin, clamped ; capped and wrapped with sterile gauze.    Report called to KAYLENE Rios.    [id number]  Patient departed [facility] via stretcher by patient transport.

## 2025-01-02 PROBLEM — J96.21 ACUTE ON CHRONIC RESPIRATORY FAILURE WITH HYPOXIA: Status: ACTIVE | Noted: 2024-12-26

## 2025-01-02 PROBLEM — I50.22 CHRONIC SYSTOLIC HEART FAILURE: Status: ACTIVE | Noted: 2024-12-11

## 2025-01-02 LAB — PATHOLOGIST INTERPRETATION AB/XM: NORMAL

## 2025-01-02 PROCEDURE — 94761 N-INVAS EAR/PLS OXIMETRY MLT: CPT

## 2025-01-02 PROCEDURE — 25000242 PHARM REV CODE 250 ALT 637 W/ HCPCS: Performed by: INTERNAL MEDICINE

## 2025-01-02 PROCEDURE — 25000003 PHARM REV CODE 250

## 2025-01-02 PROCEDURE — 27000221 HC OXYGEN, UP TO 24 HOURS

## 2025-01-02 PROCEDURE — 99900035 HC TECH TIME PER 15 MIN (STAT)

## 2025-01-02 PROCEDURE — 97530 THERAPEUTIC ACTIVITIES: CPT | Mod: CO

## 2025-01-02 PROCEDURE — 97535 SELF CARE MNGMENT TRAINING: CPT | Mod: CO

## 2025-01-02 PROCEDURE — 99232 SBSQ HOSP IP/OBS MODERATE 35: CPT | Mod: ,,, | Performed by: NURSE PRACTITIONER

## 2025-01-02 PROCEDURE — 94640 AIRWAY INHALATION TREATMENT: CPT

## 2025-01-02 PROCEDURE — 25000003 PHARM REV CODE 250: Performed by: INTERNAL MEDICINE

## 2025-01-02 PROCEDURE — 21400001 HC TELEMETRY ROOM

## 2025-01-02 PROCEDURE — 63600175 PHARM REV CODE 636 W HCPCS: Performed by: INTERNAL MEDICINE

## 2025-01-02 PROCEDURE — 63600175 PHARM REV CODE 636 W HCPCS: Performed by: STUDENT IN AN ORGANIZED HEALTH CARE EDUCATION/TRAINING PROGRAM

## 2025-01-02 RX ADMIN — CEFAZOLIN 1 G: 1 INJECTION, POWDER, FOR SOLUTION INTRAMUSCULAR; INTRAVENOUS at 09:01

## 2025-01-02 RX ADMIN — Medication 6 MG: at 09:01

## 2025-01-02 RX ADMIN — ATORVASTATIN CALCIUM 40 MG: 40 TABLET, FILM COATED ORAL at 09:01

## 2025-01-02 RX ADMIN — IPRATROPIUM BROMIDE AND ALBUTEROL SULFATE 3 ML: 2.5; .5 SOLUTION RESPIRATORY (INHALATION) at 09:01

## 2025-01-02 RX ADMIN — HEPARIN SODIUM 5000 UNITS: 5000 INJECTION INTRAVENOUS; SUBCUTANEOUS at 12:01

## 2025-01-02 RX ADMIN — IPRATROPIUM BROMIDE AND ALBUTEROL SULFATE 3 ML: 2.5; .5 SOLUTION RESPIRATORY (INHALATION) at 05:01

## 2025-01-02 RX ADMIN — SEVELAMER CARBONATE 800 MG: 800 TABLET, FILM COATED ORAL at 12:01

## 2025-01-02 RX ADMIN — HEPARIN SODIUM 5000 UNITS: 5000 INJECTION INTRAVENOUS; SUBCUTANEOUS at 09:01

## 2025-01-02 RX ADMIN — SEVELAMER CARBONATE 800 MG: 800 TABLET, FILM COATED ORAL at 05:01

## 2025-01-02 RX ADMIN — SEVELAMER CARBONATE 800 MG: 800 TABLET, FILM COATED ORAL at 09:01

## 2025-01-02 RX ADMIN — OXYCODONE HYDROCHLORIDE 10 MG: 10 TABLET ORAL at 01:01

## 2025-01-02 RX ADMIN — HEPARIN SODIUM 5000 UNITS: 5000 INJECTION INTRAVENOUS; SUBCUTANEOUS at 05:01

## 2025-01-02 RX ADMIN — OXYCODONE HYDROCHLORIDE 10 MG: 10 TABLET ORAL at 07:01

## 2025-01-02 NOTE — ASSESSMENT & PLAN NOTE
Patient with Hypoxic Respiratory failure which is Acute on chronic.  He is on home oxygen 3-4L. Supplemental oxygen was provided and noted-       .   Signs/symptoms of respiratory failure include- tachypnea, increased work of breathing, respiratory distress, and use of accessory muscles. Contributing diagnoses includes - CHF Labs and images were reviewed. Patient Has recent ABG, which has been reviewed. Will treat underlying causes and adjust management of respiratory failure as follows-   Continue to wean oxygen to goal SaO2 of 90%  Aggressive pulmonary toilet in setting of atelectasis of left lower lung field.

## 2025-01-02 NOTE — ASSESSMENT & PLAN NOTE
Hyperkalemia is likely due to ESRD.The patients most recent potassium results are listed below.  Recent Labs     12/31/24  0607 01/01/25  0936   K 4.1 4.3       Plan  - Monitor for arrhythmias with EKG and/or continuous telemetry.   - Now resolved

## 2025-01-02 NOTE — ASSESSMENT & PLAN NOTE
Anemia is likely due to chronic disease due to ESRD. Most recent hemoglobin and hematocrit are listed below.  Recent Labs     12/31/24  0607 01/01/25  0233   HGB 8.5* 8.3*   HCT 26.5* 26.1*       Plan  - Monitor serial CBC: Daily  - Transfuse PRBC if patient becomes hemodynamically unstable, symptomatic or H/H drops below 7/21.  - Patient will receive 1 unit PRBCs today  - Patient's anemia is currently worsening

## 2025-01-02 NOTE — SUBJECTIVE & OBJECTIVE
Interval History: HD completed yesterday with 0.5 L removed. No distress on exam. Pending NH placement.     Review of patient's allergies indicates:   Allergen Reactions    Vancomycin analogues Anaphylaxis, Other (See Comments), Shortness Of Breath and Swelling     Light headed, see's spots      Aspirin Nausea And Vomiting and Other (See Comments)     Has crohn's disease    Other reaction(s): FLARES UP CROHNS      Has crohn's disease     Current Facility-Administered Medications   Medication Frequency    0.9%  NaCl infusion (for blood administration) Q24H PRN    acetaminophen tablet 500 mg Q4H PRN    albuterol-ipratropium 2.5 mg-0.5 mg/3 mL nebulizer solution 3 mL Q8H    atorvastatin tablet 40 mg Daily    ceFAZolin injection 1 g Q24H    epoetin ludin-epbx injection 3,000 Units Every Mon, Wed, Fri    haloperidoL tablet 5 mg Q6H PRN    heparin (porcine) injection 5,000 Units Q8H    melatonin tablet 6 mg Nightly    oxyCODONE immediate release tablet 5 mg Q4H PRN    oxyCODONE immediate release tablet Tab 10 mg Q6H PRN    sevelamer carbonate tablet 800 mg TID WM    sodium chloride 0.9% flush 10 mL PRN       Objective:     Vital Signs (Most Recent):  Temp: 97.5 °F (36.4 °C) (01/02/25 0818)  Pulse: 94 (01/02/25 1019)  Resp: 18 (01/02/25 0905)  BP: 111/68 (01/02/25 0818)  SpO2: (!) 93 % (01/02/25 1018) Vital Signs (24h Range):  Temp:  [97.2 °F (36.2 °C)-98 °F (36.7 °C)] 97.5 °F (36.4 °C)  Pulse:  [] 94  Resp:  [16-20] 18  SpO2:  [91 %-99 %] 93 %  BP: ()/(54-75) 111/68     Weight: 56 kg (123 lb 7.3 oz) (12/21/24 0641)  Body mass index is 19.93 kg/m².  Body surface area is 1.61 meters squared.    I/O last 3 completed shifts:  In: 480 [P.O.:480]  Out: 1611 [Urine:200; Other:1111; Stool:300]     Physical Exam  Vitals and nursing note reviewed.   Constitutional:       Appearance: He is ill-appearing.   HENT:      Head: Normocephalic and atraumatic.      Comments: Tunneled HD line in place  Cardiovascular:      Rate  and Rhythm: Normal rate and regular rhythm.   Pulmonary:      Effort: Pulmonary effort is normal. No respiratory distress.      Breath sounds: Normal breath sounds.   Abdominal:      General: There is no distension.      Palpations: Abdomen is soft.   Musculoskeletal:         General: No deformity.      Right lower leg: Edema present.      Left lower leg: Edema present.   Skin:     General: Skin is warm and dry.      Coloration: Skin is pale.   Neurological:      General: No focal deficit present.      Mental Status: He is alert and oriented to person, place, and time. Mental status is at baseline.          Significant Labs:  CBC:   Recent Labs   Lab 01/01/25  0233   WBC 5.35   RBC 2.82*   HGB 8.3*   HCT 26.1*      MCV 93   MCH 29.4   MCHC 31.8*     CMP:   Recent Labs   Lab 01/01/25  0936   *   CALCIUM 9.2   ALBUMIN 2.5*   PROT 6.7   *   K 4.3   CO2 22*   CL 98   BUN 43*   CREATININE 5.0*   ALKPHOS 335*   ALT <5*   AST 22   BILITOT 0.2     All labs within the past 24 hours have been reviewed.

## 2025-01-02 NOTE — PROGRESS NOTES
[facility] - Internal [facility] Medicine  Progress Note    Patient Name: Flakito Mcginnis  MRN: [id number]  Patient Class: IP- Inpatient   Admission Date: 12/19/2024  Length of Stay: 14 days  Attending Physician: Dixie Melgar MD  Primary Care Provider: Jordin Robbins MD        Subjective     Principal Problem:Septic shock        HPI:  By Alicia Galloway NP    Mr. Flakito Mcginnis is a 69 y.o. man w/ HFrEF (30% 8/2024 from 20-25% 6/2024), NICM, MR, ESRD on HD, chronic respiratory failure on home 3L NC, Crohn's s/p colostomy, h/o R nephrectomy. He presented to [facility] ED from his HD center on 12/19 due to hypotension and ongoing shortness of breath. He usually receives his dialysis on T, R, S and went on Wednesday for an extra session for volume removal. He arrived on Thursday for his usually scheduled dialysis session and was directed to the ED due to hypotension. On arrival in the ED his blood pressure was 78/51. He was found to have a BNP >4900 and CXR concerning for volume overload. High sensitivity troponin 923. Critical care medicine was consulted for hypotension and admitted to MICU.     Overview/Hospital Course:  Mr. Mcignnis was admitted to MICU 12/19 for septic shock 2/2 Staph capitis bacteremia and endocarditis suspected from tunneled catheter. 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 ID was consulted and recommending 6 weeks of IV Cefazolin after HD, end date 2/2/25. CTS evaluated and recommending medical management at this time due to multiple co-morbidities. Tunneled catheter was removed 12/22. Repeat cultures from 12/23 with NGTD. Temporary RIJ trialysis line placed 12/24.  Course further complicated by acute hypoxemic respiratory failure likely volume overload requiring HFNC.  Oxygenation improved with volume removal with dialysis and he was weaned down to NC.  He was SD to  on 12/29.  IR was consulted for new HD line placement which was placed  on 12/31.  He was going to be discharged home after his new line placement, but sister refuses to take him home and says he needs retirement NH placement.    Interval History: No acute events overnight. He is upset that he has the bed alarm.  He wants to be able to walk.  Walker ordered, but was defective.  So new walker ordered.  Waiting on dispo per CM as sister refuses to take him home.    Review of Systems   Constitutional:  Positive for fatigue. Negative for chills and fever.   Respiratory:  Negative for cough and shortness of breath.    Cardiovascular:  Negative for chest pain, palpitations and leg swelling.   Gastrointestinal:  Negative for abdominal pain, diarrhea, nausea and vomiting.   Genitourinary:  Negative for dysuria and urgency.   Neurological:  Positive for weakness. Negative for dizziness and headaches.   All other systems reviewed and are negative.    Objective:     Vital Signs (Most Recent):  Temp: 97.5 °F (36.4 °C) (01/02/25 0818)  Pulse: 78 (01/02/25 0905)  Resp: 18 (01/02/25 0905)  BP: 111/68 (01/02/25 0818)  SpO2: (!) 91 % (01/02/25 0905) Vital Signs (24h Range):  Temp:  [97.2 °F (36.2 °C)-98 °F (36.7 °C)] 97.5 °F (36.4 °C)  Pulse:  [] 78  Resp:  [16-20] 18  SpO2:  [87 %-99 %] 91 %  BP: ()/(52-75) 111/68     Weight: 56 kg (123 lb 7.3 oz)  Body mass index is 19.93 kg/m².    Intake/Output Summary (Last 24 hours) at 1/2/2025 0951  Last data filed at 1/1/2025 1816  Gross per 24 hour   Intake 480 ml   Output 1611 ml   Net -1131 ml      Physical Exam  Constitutional:       Appearance: He is ill-appearing.   HENT:      Head: Normocephalic and atraumatic.      Comments: Tunneled HD line in place  Cardiovascular:      Rate and Rhythm: Normal rate and regular rhythm.      Heart sounds: Murmur heard.   Pulmonary:      Effort: Pulmonary effort is normal. No respiratory distress.      Breath sounds: Normal breath sounds. No wheezing or rales.      Comments: On home 3L NC  Abdominal:       General: There is no distension.      Palpations: Abdomen is soft.      Tenderness: There is no abdominal tenderness.   Musculoskeletal:         General: No deformity.      Right lower leg: Edema present.      Left lower leg: Edema present.   Skin:     General: Skin is warm and dry.      Coloration: Skin is pale.   Neurological:      General: No focal deficit present.      Mental Status: He is alert and oriented to person, place, and time. Mental status is at baseline.           Significant Labs:  CBC:  Recent Labs   Lab 01/01/25  0233   WBC 5.35   HGB 8.3*   HCT 26.1*        CMP:  Recent Labs   Lab 01/01/25  0936   *   K 4.3   CL 98   CO2 22*   *   BUN 43*   CREATININE 5.0*   CALCIUM 9.2   PROT 6.7   ALBUMIN 2.5*   BILITOT 0.2   ALKPHOS 335*   AST 22   ALT <5*   ANIONGAP 12         Assessment and Plan     * Septic shock  Admitted to MICU 12/19 for septic shock 2/2 Staph capitis bacteremia and endocarditis from his tunneled HD line  Weaned off vasopressors in the ICU  2D echo with mass on the aortic valve suggestive of vegetation  Tunneled HD line removed 12/22  Temporary trialysis placed 12/24  ID consulted:  Suggest 6 weeks of IV Ancef with HD through 2/2/25  CTS consulted:  Suggest IV abx, no surgical intervention  IR placed new tunneled line 12/31    Bacteremia due to Staphylococcus  Seeding from tunneled HD line with AV vegetation  ID consulted:  Suggest 6 weeks of IV Ancef with HD through 2/2/25    Endocarditis  See septic shock    ACP (advance care planning)  DNR noted      Debility  Patient with Acute debility due to  acute illness . The patient's latest AMPAC (Activity Measure for Post Acute Care) Score is listed below.    AM-PAC Score - How much help does the patient need for each activity listed  Basic Mobility Total Score: 17  Turning over in bed (including adjusting bedclothes, sheets and blankets)?: A little  Sitting down on and standing up from a chair with arms (e.g., wheelchair,  bedside commode, etc.): A little  Moving from lying on back to sitting on the side of the bed?: A little  Moving to and from a bed to a chair (including a wheelchair)?: A little  Need to walk in hospital room?: A little  Climbing 3-5 steps with a railing?: A lot    Plan  - Progressive mobility protocol initated  - PT/OT consulted    Palliative care encounter  DNR noted      Acute on chronic respiratory failure with hypoxia  Patient with Hypoxic Respiratory failure which is Acute on chronic.  He is on home oxygen 3-4L. Supplemental oxygen was provided and noted-       .   Signs/symptoms of respiratory failure include- tachypnea, increased work of breathing, respiratory distress, and use of accessory muscles. Contributing diagnoses includes - CHF Labs and images were reviewed. Patient Has recent ABG, which has been reviewed. Will treat underlying causes and adjust management of respiratory failure as follows-   Continue to wean oxygen to goal SaO2 of 90%  Aggressive pulmonary toilet in setting of atelectasis of left lower lung field.     Hyperkalemia  Hyperkalemia is likely due to ESRD.The patients most recent potassium results are listed below.  Recent Labs     12/31/24  0607 01/01/25  0936   K 4.1 4.3       Plan  - Monitor for arrhythmias with EKG and/or continuous telemetry.   - Now resolved            Anemia of chronic renal failure, stage 5  Anemia is likely due to chronic disease due to ESRD. Most recent hemoglobin and hematocrit are listed below.  Recent Labs     12/31/24  0607 01/01/25  0233   HGB 8.5* 8.3*   HCT 26.5* 26.1*       Plan  - Monitor serial CBC: Daily  - Transfuse PRBC if patient becomes hemodynamically unstable, symptomatic or H/H drops below 7/21.  - Patient will receive 1 unit PRBCs today  - Patient's anemia is currently worsening    NSTEMI (non-ST elevated myocardial infarction)  In setting of septic shock  High sensitivity troponin troponin 923.    EKG with T wave inversions  No chest  pain    Hypercholesterolemia  Chronic and stable  Continue Statin      Chronic systolic heart failure  2D Echo showing EF of 20-25% with large AV vegetation partially occluding LVOT with moderate AV regurgitation  Volume removal with HD    Crohn's disease  S/p colectomy  No acute issues      History of nephrectomy  Noted  ESRD on HD    Hypertension  Patient's blood pressure range in the last 24 hours was: BP  Min: 90/52  Max: 115/75.  Hold BP meds    ESRD on hemodialysis  Nephro following  Tunneled HD line removed 12/22  Temporary trialysis placed 12/24  IR placed new line 12/31      VTE Risk Mitigation (From admission, onward)           Ordered     heparin (porcine) injection 5,000 Units  Every 8 hours         12/29/24 1000     Place sequential compression device  Until discontinued         12/19/24 1736     IP VTE LOW RISK PATIENT  Once         12/19/24 1736                    Discharge Planning   LLUVIA: 1/3/2025     Code Status: DNR   Medical Readiness for Discharge Date: 12/31/2024  Discharge Plan A: New Nursing Home placement - nursing home care facility   Discharge Delays: (!) Patient and Family Barriers            Please place Justification for DME        Dixie Melgar MD  Department of Hospital Medicine   [facility] - Internal Medicine Telemetry

## 2025-01-02 NOTE — PLAN OF CARE
[facility] - Internal Medicine Telemetry  Discharge Reassessment    Primary Care Provider: Jordin Robbins MD    Expected Discharge Date: 1/3/2025    Reassessment (most recent)       Discharge Reassessment - 01/02/25 1305          Discharge Reassessment    Assessment Type Discharge Planning Reassessment     Did the patient's condition or plan change since previous assessment? No     Discharge Plan discussed with: Patient;Sibling     Name(s) and Number(s) Sara Da Silva (Sister)  [phone] (P)      Communicated LLUVIA with patient/caregiver Yes (P)      Discharge Plan A New Nursing Home placement - detention care facility (P)      Discharge Plan B Home Health;Group home (P)      Transition of Care Barriers Social;No family/friends to help;Mobility (P)      Why the patient remains in the hospital Social issues (P)         Post-Acute Status    Post-Acute Authorization Placement (P)      Post-Acute Placement Status Pending post-acute provider review/more information requested (P)      Coverage AET[employer] MANAGED MEDICARE - AETNA MEDICARE DUAL DSNP - (P)      Discharge Delays Patient and Family Barriers (P)                  Discharge Plan A and Plan B have been determined by review of patient's clinical status, future medical and therapeutic needs, and coverage/benefits for post-acute care in coordination with multidisciplinary team members.                     VAL Haley, LMSW  [facility]  Case Management  Ext. [phone]

## 2025-01-02 NOTE — PROGRESS NOTES
[facility] - Internal Medicine Telemetry  Nephrology  Progress Note    Patient Name: Flakito Mcginnis  MRN: [id number]  Admission Date: 12/19/2024  Hospital Length of Stay: 14 days  Attending Provider: Dixie Melgar MD   Primary Care Physician: Jordin Robbins MD  Principal Problem:Septic shock    Subjective:     Interval History: HD completed yesterday with 0.5 L removed. No distress on exam. Pending NH placement.     Review of patient's allergies indicates:   Allergen Reactions    Vancomycin analogues Anaphylaxis, Other (See Comments), Shortness Of Breath and Swelling     Light headed, see's spots      Aspirin Nausea And Vomiting and Other (See Comments)     Has crohn's disease    Other reaction(s): FLARES UP CROHNS      Has crohn's disease     Current Facility-Administered Medications   Medication Frequency    0.9%  NaCl infusion (for blood administration) Q24H PRN    acetaminophen tablet 500 mg Q4H PRN    albuterol-ipratropium 2.5 mg-0.5 mg/3 mL nebulizer solution 3 mL Q8H    atorvastatin tablet 40 mg Daily    ceFAZolin injection 1 g Q24H    epoetin ludin-epbx injection 3,000 Units Every Mon, Wed, Fri    haloperidoL tablet 5 mg Q6H PRN    heparin (porcine) injection 5,000 Units Q8H    melatonin tablet 6 mg Nightly    oxyCODONE immediate release tablet 5 mg Q4H PRN    oxyCODONE immediate release tablet Tab 10 mg Q6H PRN    sevelamer carbonate tablet 800 mg TID WM    sodium chloride 0.9% flush 10 mL PRN       Objective:     Vital Signs (Most Recent):  Temp: 97.5 °F (36.4 °C) (01/02/25 0818)  Pulse: 94 (01/02/25 1019)  Resp: 18 (01/02/25 0905)  BP: 111/68 (01/02/25 0818)  SpO2: (!) 93 % (01/02/25 1018) Vital Signs (24h Range):  Temp:  [97.2 °F (36.2 °C)-98 °F (36.7 °C)] 97.5 °F (36.4 °C)  Pulse:  [] 94  Resp:  [16-20] 18  SpO2:  [91 %-99 %] 93 %  BP: ()/(54-75) 111/68     Weight: 56 kg (123 lb 7.3 oz) (12/21/24 9747)  Body mass index is 19.93 kg/m².  Body surface area is 1.61 meters squared.    I/O last 3  completed shifts:  In: 480 [P.O.:480]  Out: 1611 [Urine:200; Other:1111; Stool:300]     Physical Exam  Vitals and nursing note reviewed.   Constitutional:       Appearance: He is ill-appearing.   HENT:      Head: Normocephalic and atraumatic.      Comments: Tunneled HD line in place  Cardiovascular:      Rate and Rhythm: Normal rate and regular rhythm.   Pulmonary:      Effort: Pulmonary effort is normal. No respiratory distress.      Breath sounds: Normal breath sounds.   Abdominal:      General: There is no distension.      Palpations: Abdomen is soft.   Musculoskeletal:         General: No deformity.      Right lower leg: Edema present.      Left lower leg: Edema present.   Skin:     General: Skin is warm and dry.      Coloration: Skin is pale.   Neurological:      General: No focal deficit present.      Mental Status: He is alert and oriented to person, place, and time. Mental status is at baseline.          Significant Labs:  CBC:   Recent Labs   Lab 01/01/25  0233   WBC 5.35   RBC 2.82*   HGB 8.3*   HCT 26.1*      MCV 93   MCH 29.4   MCHC 31.8*     CMP:   Recent Labs   Lab 01/01/25  0936   *   CALCIUM 9.2   ALBUMIN 2.5*   PROT 6.7   *   K 4.3   CO2 22*   CL 98   BUN 43*   CREATININE 5.0*   ALKPHOS 335*   ALT <5*   AST 22   BILITOT 0.2     All labs within the past 24 hours have been reviewed.   Assessment/Plan:     Renal/  ESRD on hemodialysis  After looking through his dialysis notes from yesterday, it does appear as if he completed two back-to-back sessions and left dialysis yesterday at his dry weight (54.6 kg) and reports high volume stool output since his HD session. He reports that he is on 4L chronic oxygen use at home, and is currently on that dose in the ER, and reporting resolution of his dyspnea once he was placed back on his home oxygen. Chest xray appears unchanged from prior studies.       Outpatient HD Information:  -Dialysis modality: Hemodialysis  -Outpatient HD unit:  [facility] [place]  -Nephrologist: Dr. Strickland  -HD TX days: Tuesday/Thursday/Saturday  -Last HD TX prior to hospital admission: 12/18/2024  -Residual urine: Anuric   -EDW: 54.5 kg    Assessment:   - Dialysis for metabolic clearance and volume management will be provided tomorrow AM.  - Labs reviewed and dialysate to be adjusted to current labs.   - Continue to monitor intake and output  - Please avoid gadolinium, fleets, phos-based laxatives, NSAIDs  - Dialysis thrice weekly unless more urgent indications arise. Will evaluate RRT requirements Daily.    Anemia of ESRD   Recent Labs   Lab 12/30/24  0553 12/31/24  0607 01/01/25  0233   WBC 4.59 5.33 5.35   HGB 6.9* 8.5* 8.3*   HCT 22.1* 26.5* 26.1*   * 169 157     Lab Results   Component Value Date    FESATURATED 12 (L) 12/20/2024    FERRITIN 2,808 (H) 12/20/2024       - Goal in ESRD is Hgb of 10-11. Hgb 8.3. EPO with HD.   - if patient is on iron infusions please D/C when active infection, cautiously use EPO when hx of malignancy, high BP (SBP usually > 170mmhg).    Mineral Bone Disease in ESRD   Lab Results   Component Value Date    .8 (H) 12/20/2024    CALCIUM 9.2 01/01/2025    ALBUMIN 2.5 (L) 01/01/2025    CAION 1.22 12/25/2024    PHOS 4.5 12/31/2024       - F/U PO4, Mg, Calcium. And albumin levels daily.   - Renal diet with protein intake goal 1.5 g/kg/d with 1 L fluid restriction   - If patient has poor oral intake, recommend nephro nutritional shakes (ex Novasource)  - Continue or restart home phos binder       ID  * Septic shock  - defer to primary team         Thank you for your consult. I will follow-up with patient. Please contact us if you have any additional questions.    Liza Still, ANIVAL, FNP-C  Nephrology  [facility] - Internal Medicine Telemetry

## 2025-01-02 NOTE — PT/OT/SLP PROGRESS
"Occupational Therapy   Treatment    Name: Flakito Mcginnis  MRN: [id number]  Admitting Diagnosis:  Septic shock       Recommendations:     Discharge Recommendations: Low Intensity Therapy(NH assisted care due limited assistance)  Discharge Equipment Recommendations:  bath bench  Barriers to discharge:  Decreased caregiver support, Other (Comment) (patient requires increased level of assistance)    Assessment:     Flakito Mcginnis is a 69 y.o. male with a medical diagnosis of Septic shock.  He presents with the fallowing performance deficits affecting function are weakness, impaired endurance, impaired self care skills, impaired functional mobility, gait instability, impaired balance, pain, impaired cardiopulmonary response to activity, decreased safety awareness, decreased lower extremity function, decreased upper extremity function, edema, decreased coordination, decreased ROM. Patient agreeable to tx session, patient is demonstrating progress with functional transfers, bed mobility, and self-care task, however; patient continues to have limited activity tolerance due to pain and weakness from recent hospitalization. Patient would benefit from Low intensity therapy intervention to address over all functional decline with mobility task, endurance, and ADLs in order to return to PLOF.     Rehab Prognosis:  Good; patient would benefit from acute skilled OT services to address these deficits and reach maximum level of function.       Plan:     Patient to be seen 3 x/week to address the above listed problems via self-care/home management, therapeutic activities, therapeutic exercises  Plan of Care Expires: 01/23/25  Plan of Care Reviewed with: patient    Subjective     Chief Complaint: "fatigue"  Patient/Family Comments/goals: to return to PLOF  Pain/Comfort:  Pain Rating 1: 0/10    Objective:     Communicated with: Nurse prior to session.  Patient found up in chair with telemetry, pulse ox (continuous), oxygen, colostomy " upon OT entry to room.  A client care conference was completed by the OTR and the LUND prior to treatment by the LUND to discuss the patient's POC and current status.   General Precautions: Standard, fall, aspiration, pureed diet    Orthopedic Precautions:N/A  Braces: N/A  Respiratory Status: Nasal cannula, flow 2 L/min     Occupational Performance:     Functional Mobility/Transfers:  Patient completed Sit <> Stand Transfer from bedside chair x2 trials with minimum assistance and moderate assistance  with  rolling walker and verbal cues for proper task sequencing.   Functional Mobility: Patient ambulated 10ft from bedside chair to bathroom to complete a grooming task in standing. Patient required chair fallow for safety due to poor endurance. Patient required extended time to complete task.    Activities of Daily Living:  Grooming: Setup A to complete oral care task and required CGA to SBA for standing balance   Patient was able to stand for 4 mins to complete task.   Upper Body Dressing: maximal assistance to elkin back gown       [facility] 6 Click ADL: 17    Treatment & Education:  Discussed OT POC and progress  Educated patient on the importance to continue to perform exercises in order to reduce stiffness and promote joint mobility and blood flow  Addressed patient's questions and concerns within LUND scope of practice      Patient left up in chair with all lines intact, call button in reach, chair alarm on, and nurse notified    GOALS:   Multidisciplinary Problems       Occupational Therapy Goals          Problem: Occupational Therapy    Goal Priority Disciplines Outcome Interventions   Occupational Therapy Goal     OT, PT/OT Progressing    Description: Goals to be met by: 1/23/25 (1 mo)     Patient will increase functional independence with ADLs by performing:    UE Dressing with Ola.  LE Dressing with Ola.  Grooming while standing at sink with Ola.  Toileting from toilet with  [place] for hygiene and clothing management.   Rolling to Bilateral with [place].   Supine to sit with [place].  Step transfer with [place]  Toilet transfer to toilet with [place].                         Time Tracking:     OT Date of Treatment: 01/02/25  OT Start Time: 1143  OT Stop Time: 1248  OT Total Time (min): 65 min    Billable Minutes:Self Care/Home Management 30  Therapeutic Activity 35    OT/HAN: HAN     Number of HAN visits since last OT visit: 1 1/2/2025

## 2025-01-02 NOTE — ASSESSMENT & PLAN NOTE
After looking through his dialysis notes from yesterday, it does appear as if he completed two back-to-back sessions and left dialysis yesterday at his dry weight (54.6 kg) and reports high volume stool output since his HD session. He reports that he is on 4L chronic oxygen use at home, and is currently on that dose in the ER, and reporting resolution of his dyspnea once he was placed back on his home oxygen. Chest xray appears unchanged from prior studies.       Outpatient HD Information:  -Dialysis modality: Hemodialysis  -Outpatient HD unit: [facility]  -Nephrologist: Dr. Strickland  -HD TX days: Tuesday/Thursday/Saturday  -Last HD TX prior to hospital admission: 12/18/2024  -Residual urine: Anuric   -EDW: 54.5 kg    Assessment:   - Dialysis for metabolic clearance and volume management will be provided tomorrow AM.  - Labs reviewed and dialysate to be adjusted to current labs.   - Continue to monitor intake and output  - Please avoid gadolinium, fleets, phos-based laxatives, NSAIDs  - Dialysis thrice weekly unless more urgent indications arise. Will evaluate RRT requirements Daily.    Anemia of ESRD   Recent Labs   Lab 12/30/24  0553 12/31/24  0607 01/01/25  0233   WBC 4.59 5.33 5.35   HGB 6.9* 8.5* 8.3*   HCT 22.1* 26.5* 26.1*   * 169 157     Lab Results   Component Value Date    FESATURATED 12 (L) 12/20/2024    FERRITIN 2,808 (H) 12/20/2024       - Goal in ESRD is Hgb of 10-11. Hgb 8.3. EPO with HD.   - if patient is on iron infusions please D/C when active infection, cautiously use EPO when hx of malignancy, high BP (SBP usually > 170mmhg).    Mineral Bone Disease in ESRD   Lab Results   Component Value Date    .8 (H) 12/20/2024    CALCIUM 9.2 01/01/2025    ALBUMIN 2.5 (L) 01/01/2025    CAION 1.22 12/25/2024    PHOS 4.5 12/31/2024       - F/U PO4, Mg, Calcium. And albumin levels daily.   - Renal diet with protein intake goal 1.5 g/kg/d with 1 L fluid restriction   - If patient  has poor oral intake, recommend nephro nutritional shakes (ex Novasource)  - Continue or restart home phos binder

## 2025-01-02 NOTE — ASSESSMENT & PLAN NOTE
Patient's blood pressure range in the last 24 hours was: BP  Min: 90/52  Max: 115/75.  Hold BP meds

## 2025-01-02 NOTE — PLAN OF CARE
SW completed and faxed the PASRR . TIERA requested assistance of CHW Team 1 w/ calling in LOCET to obtain the 142 for NH admission.              VAL Haley, LMSW  [facility]  Case Management  Ext. [phone]

## 2025-01-02 NOTE — PLAN OF CARE
Discharge Plan A and Plan B have been determined by review of patient's clinical status, future medical and therapeutic needs, and coverage/benefits for post-acute care in coordination with multidisciplinary team members.    01/02/25 1239   Post-Acute Status   Post-Acute Authorization Placement   Post-Acute Placement Status Pending post-acute provider review/more information requested   Coverage [employer] MANAGED MEDICARE - AETNA MEDICARE DUAL DSNP -   Discharge Delays (!) Patient and Family Barriers   Discharge Plan   Discharge Plan A New Nursing Home placement - [facility] care facility   Discharge Plan B Group home;Home Health     SW reviewed [facility] for update on status of [facility] NH referrals. Patient has an accepting NH ([facility]). SW phoned [facility] admissions [phone] to review. Per Leah (admissions), facility has accepted patient. Leah states that facility has no [facility] bed available Today, will provide SW w/ update on bed availability; Leah states that business office  can begin process of reaching out to family today for financials required. Leah to contact patient's sister for documents needed.     SW reviewed the above w/ patient and patient's sister Sara. Patient states that he does not have access to financial documents and does not know how to access online. Sister also states that she does not know how to access patient's direct express bank card account online. Sister agreeable to visiting patient at bedside this evening w/ her  to assist patient w/ online portal set-up. Patient also requesting that referral be sent to [facility] for review. SW sent referral as requested. Patient agreeable to being financially reviewed for acceptance by [facility].     [facility] - accepts  [facility] - ref sent  [facility] - irma        SW will continue to follow.                Cole Morales, VAL, LMSW  [facility]  Case Management  Ext. [phone]

## 2025-01-02 NOTE — SUBJECTIVE & OBJECTIVE
Interval History: No acute events overnight. He is upset that he has the bed alarm.  He wants to be able to walk.  Walker ordered, but was defective.  So new walker ordered.  Waiting on dispo per CM as sister refuses to take him home.    Review of Systems   Constitutional:  Positive for fatigue. Negative for chills and fever.   Respiratory:  Negative for cough and shortness of breath.    Cardiovascular:  Negative for chest pain, palpitations and leg swelling.   Gastrointestinal:  Negative for abdominal pain, diarrhea, nausea and vomiting.   Genitourinary:  Negative for dysuria and urgency.   Neurological:  Positive for weakness. Negative for dizziness and headaches.   All other systems reviewed and are negative.    Objective:     Vital Signs (Most Recent):  Temp: 97.5 °F (36.4 °C) (01/02/25 0818)  Pulse: 78 (01/02/25 0905)  Resp: 18 (01/02/25 0905)  BP: 111/68 (01/02/25 0818)  SpO2: (!) 91 % (01/02/25 0905) Vital Signs (24h Range):  Temp:  [97.2 °F (36.2 °C)-98 °F (36.7 °C)] 97.5 °F (36.4 °C)  Pulse:  [] 78  Resp:  [16-20] 18  SpO2:  [87 %-99 %] 91 %  BP: ()/(52-75) 111/68     Weight: 56 kg (123 lb 7.3 oz)  Body mass index is 19.93 kg/m².    Intake/Output Summary (Last 24 hours) at 1/2/2025 0951  Last data filed at 1/1/2025 1816  Gross per 24 hour   Intake 480 ml   Output 1611 ml   Net -1131 ml      Physical Exam  Constitutional:       Appearance: He is ill-appearing.   HENT:      Head: Normocephalic and atraumatic.      Comments: Tunneled HD line in place  Cardiovascular:      Rate and Rhythm: Normal rate and regular rhythm.      Heart sounds: Murmur heard.   Pulmonary:      Effort: Pulmonary effort is normal. No respiratory distress.      Breath sounds: Normal breath sounds. No wheezing or rales.      Comments: On home 3L NC  Abdominal:      General: There is no distension.      Palpations: Abdomen is soft.      Tenderness: There is no abdominal tenderness.   Musculoskeletal:         General: No  deformity.      Right lower leg: Edema present.      Left lower leg: Edema present.   Skin:     General: Skin is warm and dry.      Coloration: Skin is pale.   Neurological:      General: No focal deficit present.      Mental Status: He is alert and oriented to person, place, and time. Mental status is at baseline.           Significant Labs:  CBC:  Recent Labs   Lab 01/01/25  0233   WBC 5.35   HGB 8.3*   HCT 26.1*        CMP:  Recent Labs   Lab 01/01/25  0936   *   K 4.3   CL 98   CO2 22*   *   BUN 43*   CREATININE 5.0*   CALCIUM 9.2   PROT 6.7   ALBUMIN 2.5*   BILITOT 0.2   ALKPHOS 335*   AST 22   ALT <5*   ANIONGAP 12

## 2025-01-03 LAB
ALBUMIN SERPL BCP-MCNC: 2.5 G/DL (ref 3.5–5.2)
ALP SERPL-CCNC: 298 U/L (ref 40–150)
ALT SERPL W/O P-5'-P-CCNC: <5 U/L (ref 10–44)
ANION GAP SERPL CALC-SCNC: 8 MMOL/L (ref 8–16)
AST SERPL-CCNC: 17 U/L (ref 10–40)
BASOPHILS # BLD AUTO: 0 K/UL (ref 0–0.2)
BASOPHILS NFR BLD: 0 % (ref 0–1.9)
BILIRUB SERPL-MCNC: 0.3 MG/DL (ref 0.1–1)
BUN SERPL-MCNC: 28 MG/DL (ref 8–23)
CALCIUM SERPL-MCNC: 9 MG/DL (ref 8.7–10.5)
CHLORIDE SERPL-SCNC: 102 MMOL/L (ref 95–110)
CO2 SERPL-SCNC: 23 MMOL/L (ref 23–29)
CREAT SERPL-MCNC: 4.5 MG/DL (ref 0.5–1.4)
DIFFERENTIAL METHOD BLD: ABNORMAL
EOSINOPHIL # BLD AUTO: 0.1 K/UL (ref 0–0.5)
EOSINOPHIL NFR BLD: 2.9 % (ref 0–8)
ERYTHROCYTE [DISTWIDTH] IN BLOOD BY AUTOMATED COUNT: 17.8 % (ref 11.5–14.5)
EST. GFR  (NO RACE VARIABLE): 13.4 ML/MIN/1.73 M^2
GLUCOSE SERPL-MCNC: 68 MG/DL (ref 70–110)
HCT VFR BLD AUTO: 24.4 % (ref 40–54)
HGB BLD-MCNC: 7.6 G/DL (ref 14–18)
IMM GRANULOCYTES # BLD AUTO: 0.02 K/UL (ref 0–0.04)
IMM GRANULOCYTES NFR BLD AUTO: 0.6 % (ref 0–0.5)
LYMPHOCYTES # BLD AUTO: 0.5 K/UL (ref 1–4.8)
LYMPHOCYTES NFR BLD: 13.5 % (ref 18–48)
MAGNESIUM SERPL-MCNC: 2.1 MG/DL (ref 1.6–2.6)
MCH RBC QN AUTO: 29.5 PG (ref 27–31)
MCHC RBC AUTO-ENTMCNC: 31.1 G/DL (ref 32–36)
MCV RBC AUTO: 95 FL (ref 82–98)
MONOCYTES # BLD AUTO: 0.3 K/UL (ref 0.3–1)
MONOCYTES NFR BLD: 9.1 % (ref 4–15)
NEUTROPHILS # BLD AUTO: 2.5 K/UL (ref 1.8–7.7)
NEUTROPHILS NFR BLD: 73.9 % (ref 38–73)
NRBC BLD-RTO: 0 /100 WBC
PHOSPHATE SERPL-MCNC: 4.5 MG/DL (ref 2.7–4.5)
PLATELET # BLD AUTO: 137 K/UL (ref 150–450)
PMV BLD AUTO: 11.8 FL (ref 9.2–12.9)
POTASSIUM SERPL-SCNC: 4.7 MMOL/L (ref 3.5–5.1)
PROT SERPL-MCNC: 6.2 G/DL (ref 6–8.4)
RBC # BLD AUTO: 2.58 M/UL (ref 4.6–6.2)
SODIUM SERPL-SCNC: 133 MMOL/L (ref 136–145)
WBC # BLD AUTO: 3.4 K/UL (ref 3.9–12.7)

## 2025-01-03 PROCEDURE — 25000242 PHARM REV CODE 250 ALT 637 W/ HCPCS: Performed by: INTERNAL MEDICINE

## 2025-01-03 PROCEDURE — 63600175 PHARM REV CODE 636 W HCPCS

## 2025-01-03 PROCEDURE — 84100 ASSAY OF PHOSPHORUS: CPT | Performed by: HOSPITALIST

## 2025-01-03 PROCEDURE — 80053 COMPREHEN METABOLIC PANEL: CPT | Performed by: HOSPITALIST

## 2025-01-03 PROCEDURE — 99900035 HC TECH TIME PER 15 MIN (STAT)

## 2025-01-03 PROCEDURE — 85025 COMPLETE CBC W/AUTO DIFF WBC: CPT | Performed by: HOSPITALIST

## 2025-01-03 PROCEDURE — 90935 HEMODIALYSIS ONE EVALUATION: CPT

## 2025-01-03 PROCEDURE — 83735 ASSAY OF MAGNESIUM: CPT | Performed by: HOSPITALIST

## 2025-01-03 PROCEDURE — 25000003 PHARM REV CODE 250: Performed by: INTERNAL MEDICINE

## 2025-01-03 PROCEDURE — 21400001 HC TELEMETRY ROOM

## 2025-01-03 PROCEDURE — 63600175 PHARM REV CODE 636 W HCPCS: Performed by: STUDENT IN AN ORGANIZED HEALTH CARE EDUCATION/TRAINING PROGRAM

## 2025-01-03 PROCEDURE — 25000003 PHARM REV CODE 250

## 2025-01-03 PROCEDURE — 94640 AIRWAY INHALATION TREATMENT: CPT

## 2025-01-03 PROCEDURE — 94761 N-INVAS EAR/PLS OXIMETRY MLT: CPT

## 2025-01-03 PROCEDURE — 90935 HEMODIALYSIS ONE EVALUATION: CPT | Mod: ,,,

## 2025-01-03 RX ORDER — HEPARIN SODIUM 1000 [USP'U]/ML
1000 INJECTION, SOLUTION INTRAVENOUS; SUBCUTANEOUS
Status: DISCONTINUED | OUTPATIENT
Start: 2025-01-03 | End: 2025-01-14 | Stop reason: HOSPADM

## 2025-01-03 RX ORDER — SODIUM CHLORIDE 9 MG/ML
INJECTION, SOLUTION INTRAVENOUS ONCE
Status: DISCONTINUED | OUTPATIENT
Start: 2025-01-03 | End: 2025-01-07

## 2025-01-03 RX ADMIN — HEPARIN SODIUM 1000 UNITS: 1000 INJECTION, SOLUTION INTRAVENOUS; SUBCUTANEOUS at 11:01

## 2025-01-03 RX ADMIN — OXYCODONE HYDROCHLORIDE 10 MG: 10 TABLET ORAL at 03:01

## 2025-01-03 RX ADMIN — OXYCODONE HYDROCHLORIDE 10 MG: 10 TABLET ORAL at 05:01

## 2025-01-03 RX ADMIN — IPRATROPIUM BROMIDE AND ALBUTEROL SULFATE 3 ML: 2.5; .5 SOLUTION RESPIRATORY (INHALATION) at 03:01

## 2025-01-03 RX ADMIN — ATORVASTATIN CALCIUM 40 MG: 40 TABLET, FILM COATED ORAL at 12:01

## 2025-01-03 RX ADMIN — OXYCODONE HYDROCHLORIDE 10 MG: 10 TABLET ORAL at 12:01

## 2025-01-03 RX ADMIN — SEVELAMER CARBONATE 800 MG: 800 TABLET, FILM COATED ORAL at 12:01

## 2025-01-03 RX ADMIN — Medication 6 MG: at 09:01

## 2025-01-03 RX ADMIN — HEPARIN SODIUM 5000 UNITS: 5000 INJECTION INTRAVENOUS; SUBCUTANEOUS at 04:01

## 2025-01-03 RX ADMIN — SEVELAMER CARBONATE 800 MG: 800 TABLET, FILM COATED ORAL at 04:01

## 2025-01-03 RX ADMIN — EPOETIN ALFA-EPBX 3000 UNITS: 3000 INJECTION, SOLUTION INTRAVENOUS; SUBCUTANEOUS at 10:01

## 2025-01-03 RX ADMIN — HEPARIN SODIUM 5000 UNITS: 5000 INJECTION INTRAVENOUS; SUBCUTANEOUS at 12:01

## 2025-01-03 NOTE — PROGRESS NOTES
"[facility] - Internal Medicine Telemetry  Adult Nutrition  Progress Note    SUMMARY       Recommendations  1) Continue Low sodium, Minced and Moist diet   2) Continue Novasource Renal BID to aid in energy and protein needs   3) RD following    Nutrition Goal Status: continues   Communication of RD Recs:  (POC)    Assessment and Plan    Nutrition Problem  Severe acute malnutrition     Related to (etiology):   Inadequate energy intake     Signs and Symptoms (as evidenced by):   10 lb weight loss x 1 week (7.5%) and mild depletion in temple, severe depletion in clavicle, and moderate depletion in scapular      Nutrition Diagnosis Status:   New     Malnutrition Assessment  Malnutrition Context: acute illness or injury  Malnutrition Level: severe          Weight Loss (Malnutrition): 1-2% in 1 week (10 lb weight loss x 1 week (7.5%))  Muscle Mass (Malnutrition): severe depletion       Religion Region (Muscle Loss): mild depletion  Clavicle Bone Region (Muscle Loss): severe depletion  Scapular Bone Region (Muscle Loss): moderate depletion  Dorsal Hand (Muscle Loss): well nourished                 Reason for Assessment    Reason For Assessment: RD follow-up  General Information Comments: RD follow-up: Pt has been consuming % of meals along with supplemental intake. Pt w/ some issues chewing/swallowing noted. Pt continues to meet criteria for malnutrition- see PES statements for details. RD following.  Nutrition Discharge Planning: adequate oral intake  Nutrition Related Social Determinants of Health: SDOH: None Identified      Nutrition/Diet History    Patient Reported Diet/Restrictions/Preferences: renal  Spiritual, Cultural Beliefs, Shinto Practices, Values that Affect Care: no  Food Allergies: NKFA  Factors Affecting Nutritional Intake: chewing difficulties/inability to chew food    Anthropometrics    Temp: 97.9 °F (36.6 °C)  Height Method: Stated  Height: 5' 6" (167.6 cm)  Height (inches): 66 in  Weight Method: Bed " Scale  Weight: 56 kg (123 lb 7.3 oz)  Weight (lb): 123.46 lb  Ideal Body Weight (IBW), Male: 142 lb  BMI (Calculated): 19.9       Lab/Procedures/Meds    Pertinent Labs Reviewed: reviewed  Pertinent Medications Reviewed: reviewed    Estimated/Assessed Needs    Weight Used For Calorie Calculations: 56 kg (123 lb 7.3 oz)  Energy Calorie Requirements (kcal): 5124-3717 kcal/kg (30-35kcal/kg)  Energy Need Method: Kcal/kg  Protein Requirements: 84-112g protein (1.5-2.0g/kg)  Weight Used For Protein Calculations: 56 kg (123 lb 7.3 oz)  Fluid Requirements (mL): per MD  Estimated Fluid Requirement Method: RDA Method  RDA Method (mL): 1680  CHO Requirement: 210g/day      Nutrition Prescription Ordered    Current Diet Order: Low sodium, Minced and Moist diet     Evaluation of Received Nutrient/Fluid Intake    I/O: .  Energy Calories Required: meeting needs  Protein Required: meeting needs  Fluid Required: meeting needs  % Intake of Estimated Energy Needs: 75 - 100 %  % Meal Intake: 75 - 100 %    Nutrition Risk    Level of Risk/Frequency of Follow-up: moderate - high     Monitor and Evaluation    Food and Nutrient Intake: energy intake  Physical Activity and Function: nutrition-related ADLs and IADLs  Anthropometric Measurements: weight, weight change, body mass index  Biochemical Data, Medical Tests and Procedures: electrolyte and renal panel, gastrointestinal profile, glucose/endocrine profile, inflammatory profile, lipid profile  Nutrition-Focused Physical Findings: overall appearance     Nutrition Follow-Up    RD Follow-up?: Yes

## 2025-01-03 NOTE — PT/OT/SLP PROGRESS
Physical Therapy      Patient Name:  Flakito Mcginnis   MRN:  [id number]    Patient not seen today secondary to Dialysis. Will follow-up as appropriate.

## 2025-01-03 NOTE — PLAN OF CARE
Patient requested support w/ securing shelters due to patient not agreeable to contributing monthly income check to [facility] or NH placement at this time. Patient states that he is open to NH placement but would like to consider [facility] shelter as an option as a means to keep his monthly check. SW requested that Isa ([facility]) review patient for eligibility per patient's request. Per Isa, patient does not meet eligibility at this time; will further review post-pt/ot sessions.      2:00pm  SW met w/ patient at bedside for update on status of obtaining financials for alf NH placement. Patient states that his sister visited bedside on yesterday but was unable to secure bank statements. Patient requires additional assistance w/ accessing statements via phone. SW to provide patient continued support w/ addressing barriers for NH placement.     SW will continue to follow.                VAL Haley, LMSW  [facility]  Case Management  Ext. [phone]

## 2025-01-03 NOTE — PLAN OF CARE
Recommendations  1) Continue Low sodium, Minced and Moist diet   2) Continue Novasource Renal BID to aid in energy and protein needs   3) RD following     Nutrition Goal Status: continues   Communication of RD Recs:  (POC)

## 2025-01-03 NOTE — ASSESSMENT & PLAN NOTE
Hyperkalemia is likely due to ESRD.The patients most recent potassium results are listed below.  Recent Labs     01/01/25  0936   K 4.3       Plan  - Monitor for arrhythmias with EKG and/or continuous telemetry.   - Now resolved

## 2025-01-03 NOTE — ASSESSMENT & PLAN NOTE
Patient's blood pressure range in the last 24 hours was: BP  Min: 91/55  Max: 113/71.  Hold BP meds

## 2025-01-03 NOTE — PROGRESS NOTES
Patient arrived to the ERNST by wheelchair. Maintenance dialysis started via right IJ tunneled catheter. Vitals stable.

## 2025-01-03 NOTE — PROGRESS NOTES
Dialysis completed. Right IJ tunneled catheter flushed, heparinized, capped and ends wrapped with sterile gauze. Dialyzed for 3 hours with fluid removal of 1.5 liters. Tolerated with stable vital signs.Returned to his room by wheelchair.

## 2025-01-03 NOTE — PROGRESS NOTES
[facility] - Internal Medicine [facility] Medicine  Progress Note    Patient Name: Flakito Mcginnis  MRN: [id number]  Patient Class: IP- Inpatient   Admission Date: 12/19/2024  Length of Stay: 15 days  Attending Physician: Dixie Melgar MD  Primary Care Provider: Jordin Robbins MD        Subjective     Principal Problem:Septic shock        HPI:  By Alicia Galloway NP    Mr. Flkaito Mcginnis is a 69 y.o. man w/ HFrEF (30% 8/2024 from 20-25% 6/2024), NICM, MR, ESRD on HD, chronic respiratory failure on home 3L NC, Crohn's s/p colostomy, h/o R nephrectomy. He presented to [facility] ED from his HD center on 12/19 due to hypotension and ongoing shortness of breath. He usually receives his dialysis on T, R, S and went on Wednesday for an extra session for volume removal. He arrived on Thursday for his usually scheduled dialysis session and was directed to the ED due to hypotension. On arrival in the ED his blood pressure was 78/51. He was found to have a BNP >4900 and CXR concerning for volume overload. High sensitivity troponin 923. Critical care medicine was consulted for hypotension and admitted to MICU.     Overview/Hospital Course:  Mr. 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 ID was consulted and recommending 6 weeks of IV Cefazolin after HD, end date 2/2/25. CTS evaluated and recommending medical management at this time due to multiple co-morbidities. Tunneled catheter was removed 12/22. Repeat cultures from 12/23 with NGTD. Temporary RIJ trialysis line placed 12/24.  Course further complicated by acute hypoxemic respiratory failure likely volume overload requiring HFNC.  Oxygenation improved with volume removal with dialysis and he was weaned down to NC.  He was SD to  on 12/29.  IR was consulted for new HD line placement which was placed  on 12/31.  He was going to be discharged home after his new line placement, but sister refuses to take him home and says he needs half-way NH placement.    Interval History: No acute events overnight. Waitin on NH placement for discharge.      Review of Systems   Constitutional:  Positive for fatigue. Negative for chills and fever.   Respiratory:  Negative for cough and shortness of breath.    Cardiovascular:  Negative for chest pain, palpitations and leg swelling.   Gastrointestinal:  Negative for abdominal pain, diarrhea, nausea and vomiting.   Genitourinary:  Negative for dysuria and urgency.   Neurological:  Positive for weakness. Negative for dizziness and headaches.   All other systems reviewed and are negative.    Objective:     Vital Signs (Most Recent):  Temp: 97.9 °F (36.6 °C) (01/03/25 0745)  Pulse: 89 (01/03/25 0915)  Resp: 18 (01/03/25 0745)  BP: (!) 94/55 (01/03/25 0915)  SpO2: (!) 93 % (01/03/25 0745) Vital Signs (24h Range):  Temp:  [97.5 °F (36.4 °C)-98.4 °F (36.9 °C)] 97.9 °F (36.6 °C)  Pulse:  [] 89  Resp:  [16-18] 18  SpO2:  [86 %-97 %] 93 %  BP: ()/(55-71) 94/55     Weight: 56 kg (123 lb 7.3 oz)  Body mass index is 19.93 kg/m².    Intake/Output Summary (Last 24 hours) at 1/3/2025 1002  Last data filed at 1/3/2025 0429  Gross per 24 hour   Intake 240 ml   Output 500 ml   Net -260 ml      Physical Exam  Constitutional:       Appearance: He is ill-appearing.   HENT:      Head: Normocephalic and atraumatic.      Comments: Tunneled HD line in place  Cardiovascular:      Rate and Rhythm: Normal rate and regular rhythm.      Heart sounds: Murmur heard.   Pulmonary:      Effort: Pulmonary effort is normal. No respiratory distress.      Breath sounds: Normal breath sounds. No wheezing or rales.      Comments: On home 3L NC  Abdominal:      General: There is no distension.      Palpations: Abdomen is soft.      Tenderness: There is no abdominal tenderness.   Musculoskeletal:         General:  "No deformity.      Right lower leg: Edema present.      Left lower leg: Edema present.   Skin:     General: Skin is warm and dry.      Coloration: Skin is pale.   Neurological:      General: No focal deficit present.      Mental Status: He is alert and oriented to person, place, and time. Mental status is at baseline.           Significant Labs:  CBC:  Recent Labs   Lab 01/03/25  0811   WBC 3.40*   HGB 7.6*   HCT 24.4*   *     CMP:  No results for input(s): "NA", "K", "CL", "CO2", "GLU", "BUN", "CREATININE", "CALCIUM", "PROT", "ALBUMIN", "BILITOT", "ALKPHOS", "AST", "ALT", "ANIONGAP", "EGFRNONAA" in the last 48 hours.    Invalid input(s): "EST[place]"        Assessment and Plan     * Septic shock  Admitted to MICU 12/19 for septic shock 2/2 Staph capitis bacteremia and endocarditis from his tunneled HD line  Weaned off vasopressors in the ICU  2D echo with mass on the aortic valve suggestive of vegetation  Tunneled HD line removed 12/22  Temporary trialysis placed 12/24  ID consulted:  Suggest 6 weeks of IV Ancef with HD through 2/2/25  CTS consulted:  Suggest IV abx, no surgical intervention  IR placed new tunneled line 12/31    Bacteremia due to Staphylococcus  Seeding from tunneled HD line with AV vegetation  ID consulted:  Suggest 6 weeks of IV Ancef with HD through 2/2/25    Endocarditis  See septic shock    ACP (advance care planning)  DNR noted      Debility  Patient with Acute debility due to  acute illness . The patient's latest AMPAC (Activity Measure for Post Acute Care) Score is listed below.    AM-PAC Score - How much help does the patient need for each activity listed  Basic Mobility Total Score: 17  Turning over in bed (including adjusting bedclothes, sheets and blankets)?: A little  Sitting down on and standing up from a chair with arms (e.g., wheelchair, bedside commode, etc.): A little  Moving from lying on back to sitting on the side of the bed?: A little  Moving to and from a bed to a " chair (including a wheelchair)?: A little  Need to walk in hospital room?: A little  Climbing 3-5 steps with a railing?: A lot    Plan  - Progressive mobility protocol initated  - PT/OT consulted    Palliative care encounter  DNR noted      Acute on chronic respiratory failure with hypoxia  Patient with Hypoxic Respiratory failure which is Acute on chronic.  He is on home oxygen 3-4L. Supplemental oxygen was provided and noted-       .   Signs/symptoms of respiratory failure include- tachypnea, increased work of breathing, respiratory distress, and use of accessory muscles. Contributing diagnoses includes - CHF Labs and images were reviewed. Patient Has recent ABG, which has been reviewed. Will treat underlying causes and adjust management of respiratory failure as follows-   Continue to wean oxygen to goal SaO2 of 90%  Aggressive pulmonary toilet in setting of atelectasis of left lower lung field.     Hyperkalemia  Hyperkalemia is likely due to ESRD.The patients most recent potassium results are listed below.  Recent Labs     01/01/25  0936   K 4.3       Plan  - Monitor for arrhythmias with EKG and/or continuous telemetry.   - Now resolved            Anemia of chronic renal failure, stage 5  Anemia is likely due to chronic disease due to ESRD. Most recent hemoglobin and hematocrit are listed below.  Recent Labs     01/01/25  0233 01/03/25  0811   HGB 8.3* 7.6*   HCT 26.1* 24.4*       Plan  - Monitor serial CBC: Daily  - Transfuse PRBC if patient becomes hemodynamically unstable, symptomatic or H/H drops below 7/21.  - Patient will receive 1 unit PRBCs today  - Patient's anemia is currently worsening    NSTEMI (non-ST elevated myocardial infarction)  In setting of septic shock  High sensitivity troponin troponin 923.    EKG with T wave inversions  No chest pain    Hypercholesterolemia  Chronic and stable  Continue Statin      Chronic systolic heart failure  2D Echo showing EF of 20-25% with large AV vegetation  partially occluding LVOT with moderate AV regurgitation  Volume removal with HD    Crohn's disease  S/p colectomy  No acute issues      History of nephrectomy  Noted  ESRD on HD    Hypertension  Patient's blood pressure range in the last 24 hours was: BP  Min: 91/55  Max: 113/71.  Hold BP meds    ESRD on hemodialysis  Nephro following  Tunneled HD line removed 12/22  Temporary trialysis placed 12/24  IR placed new line 12/31      VTE Risk Mitigation (From admission, onward)           Ordered     heparin (porcine) injection 5,000 Units  Every 8 hours         12/29/24 1000     Place sequential compression device  Until discontinued         12/19/24 1736     IP VTE LOW RISK PATIENT  Once         12/19/24 1736                    Discharge Planning   LLUVIA: 1/3/2025     Code Status: DNR   Medical Readiness for Discharge Date: 12/31/2024  Discharge Plan A: New Nursing Home placement - correction care facility   Discharge Delays: (!) Patient and Family Barriers            Please place Justification for DME        Dixie Melgar MD  Department of Hospital Medicine   [facility] - Internal Medicine Telemetry

## 2025-01-03 NOTE — PROGRESS NOTES
[facility] HEMODIALYSIS NOTE     Patient currently on hemodialysis for removal of uremic toxins and volume.     Patient seen and evaluated on hemodialysis, tolerating treatment, see HD flowsheet for vitals and assessments.     Labs have been reviewed and the dialysate bath has been adjusted.        Assessment/Plan:     -Patient ESRD, seen on HD, tolerating treatment well, w/o complaints   -UF goal of 1.5 L as tolerated, midodrine given prior to HD, BP stable with SBP 90s  -Patient refusing full 3.5 hours tx. Requesting 3 hours tx. Discussed with patient we may not be able to achieve desired UF goal if we decrease duration of tx. He verbalized understanding and continued to refuse full tx.   -Discharge pending placement as sister refused to take patient home  -Renal diet, if not NPO   -Strict I/O's and daily weights  -Daily renal function panels  -Keep MAP >65 while on HD   -Hgb goal 10-11, hgb 8.3, continue Epo  -Continue phos binders  -Will continue to follow while inpatient      Lexie Díaz PA-C  Nephrology

## 2025-01-03 NOTE — SUBJECTIVE & OBJECTIVE
Interval History: No acute events overnight. Waitin on NH placement for discharge.      Review of Systems   Constitutional:  Positive for fatigue. Negative for chills and fever.   Respiratory:  Negative for cough and shortness of breath.    Cardiovascular:  Negative for chest pain, palpitations and leg swelling.   Gastrointestinal:  Negative for abdominal pain, diarrhea, nausea and vomiting.   Genitourinary:  Negative for dysuria and urgency.   Neurological:  Positive for weakness. Negative for dizziness and headaches.   All other systems reviewed and are negative.    Objective:     Vital Signs (Most Recent):  Temp: 97.9 °F (36.6 °C) (01/03/25 0745)  Pulse: 89 (01/03/25 0915)  Resp: 18 (01/03/25 0745)  BP: (!) 94/55 (01/03/25 0915)  SpO2: (!) 93 % (01/03/25 0745) Vital Signs (24h Range):  Temp:  [97.5 °F (36.4 °C)-98.4 °F (36.9 °C)] 97.9 °F (36.6 °C)  Pulse:  [] 89  Resp:  [16-18] 18  SpO2:  [86 %-97 %] 93 %  BP: ()/(55-71) 94/55     Weight: 56 kg (123 lb 7.3 oz)  Body mass index is 19.93 kg/m².    Intake/Output Summary (Last 24 hours) at 1/3/2025 1002  Last data filed at 1/3/2025 0429  Gross per 24 hour   Intake 240 ml   Output 500 ml   Net -260 ml      Physical Exam  Constitutional:       Appearance: He is ill-appearing.   HENT:      Head: Normocephalic and atraumatic.      Comments: Tunneled HD line in place  Cardiovascular:      Rate and Rhythm: Normal rate and regular rhythm.      Heart sounds: Murmur heard.   Pulmonary:      Effort: Pulmonary effort is normal. No respiratory distress.      Breath sounds: Normal breath sounds. No wheezing or rales.      Comments: On home 3L NC  Abdominal:      General: There is no distension.      Palpations: Abdomen is soft.      Tenderness: There is no abdominal tenderness.   Musculoskeletal:         General: No deformity.      Right lower leg: Edema present.      Left lower leg: Edema present.   Skin:     General: Skin is warm and dry.      Coloration: Skin is  "pale.   Neurological:      General: No focal deficit present.      Mental Status: He is alert and oriented to person, place, and time. Mental status is at baseline.           Significant Labs:  CBC:  Recent Labs   Lab 01/03/25  0811   WBC 3.40*   HGB 7.6*   HCT 24.4*   *     CMP:  No results for input(s): "NA", "K", "CL", "CO2", "GLU", "BUN", "CREATININE", "CALCIUM", "PROT", "ALBUMIN", "BILITOT", "ALKPHOS", "AST", "ALT", "ANIONGAP", "EGFRNONAA" in the last 48 hours.    Invalid input(s): "ESTGF[place]"      "

## 2025-01-04 PROCEDURE — 27000221 HC OXYGEN, UP TO 24 HOURS

## 2025-01-04 PROCEDURE — 25000003 PHARM REV CODE 250: Performed by: INTERNAL MEDICINE

## 2025-01-04 PROCEDURE — 25000003 PHARM REV CODE 250: Performed by: GENERAL ACUTE CARE HOSPITAL

## 2025-01-04 PROCEDURE — 25000003 PHARM REV CODE 250

## 2025-01-04 PROCEDURE — 97116 GAIT TRAINING THERAPY: CPT | Mod: CQ

## 2025-01-04 PROCEDURE — 94761 N-INVAS EAR/PLS OXIMETRY MLT: CPT

## 2025-01-04 PROCEDURE — 97530 THERAPEUTIC ACTIVITIES: CPT | Mod: CQ

## 2025-01-04 PROCEDURE — 25000242 PHARM REV CODE 250 ALT 637 W/ HCPCS: Performed by: INTERNAL MEDICINE

## 2025-01-04 PROCEDURE — 94640 AIRWAY INHALATION TREATMENT: CPT

## 2025-01-04 PROCEDURE — 63600175 PHARM REV CODE 636 W HCPCS: Performed by: INTERNAL MEDICINE

## 2025-01-04 PROCEDURE — 63600175 PHARM REV CODE 636 W HCPCS: Performed by: STUDENT IN AN ORGANIZED HEALTH CARE EDUCATION/TRAINING PROGRAM

## 2025-01-04 PROCEDURE — 21400001 HC TELEMETRY ROOM

## 2025-01-04 PROCEDURE — 99900035 HC TECH TIME PER 15 MIN (STAT)

## 2025-01-04 RX ORDER — MUPIROCIN 20 MG/G
OINTMENT TOPICAL DAILY
Status: DISCONTINUED | OUTPATIENT
Start: 2025-01-04 | End: 2025-01-14 | Stop reason: HOSPADM

## 2025-01-04 RX ORDER — SODIUM CHLORIDE 9 MG/ML
INJECTION, SOLUTION INTRAVENOUS ONCE
Status: CANCELLED | OUTPATIENT
Start: 2025-01-06

## 2025-01-04 RX ADMIN — IPRATROPIUM BROMIDE AND ALBUTEROL SULFATE 3 ML: 2.5; .5 SOLUTION RESPIRATORY (INHALATION) at 10:01

## 2025-01-04 RX ADMIN — SEVELAMER CARBONATE 800 MG: 800 TABLET, FILM COATED ORAL at 08:01

## 2025-01-04 RX ADMIN — ATORVASTATIN CALCIUM 40 MG: 40 TABLET, FILM COATED ORAL at 08:01

## 2025-01-04 RX ADMIN — HEPARIN SODIUM 5000 UNITS: 5000 INJECTION INTRAVENOUS; SUBCUTANEOUS at 08:01

## 2025-01-04 RX ADMIN — IPRATROPIUM BROMIDE AND ALBUTEROL SULFATE 3 ML: 2.5; .5 SOLUTION RESPIRATORY (INHALATION) at 01:01

## 2025-01-04 RX ADMIN — HEPARIN SODIUM 5000 UNITS: 5000 INJECTION INTRAVENOUS; SUBCUTANEOUS at 12:01

## 2025-01-04 RX ADMIN — MUPIROCIN: 20 OINTMENT TOPICAL at 10:01

## 2025-01-04 RX ADMIN — CEFAZOLIN 1 G: 1 INJECTION, POWDER, FOR SOLUTION INTRAMUSCULAR; INTRAVENOUS at 09:01

## 2025-01-04 RX ADMIN — SEVELAMER CARBONATE 800 MG: 800 TABLET, FILM COATED ORAL at 12:01

## 2025-01-04 RX ADMIN — SEVELAMER CARBONATE 800 MG: 800 TABLET, FILM COATED ORAL at 05:01

## 2025-01-04 RX ADMIN — ACETAMINOPHEN 500 MG: 500 TABLET ORAL at 12:01

## 2025-01-04 RX ADMIN — HEPARIN SODIUM 5000 UNITS: 5000 INJECTION INTRAVENOUS; SUBCUTANEOUS at 05:01

## 2025-01-04 RX ADMIN — Medication 6 MG: at 09:01

## 2025-01-04 NOTE — SUBJECTIVE & OBJECTIVE
Interval History: NAEON and VSS    Review of Systems   Constitutional:  Negative for chills and fever.   HENT:  Negative for ear pain and sinus pain.    Eyes:  Negative for pain.   Respiratory:  Negative for cough and shortness of breath.    Cardiovascular:  Negative for chest pain and leg swelling.   Gastrointestinal:  Negative for abdominal pain, constipation, diarrhea, nausea, rectal pain and vomiting.   Genitourinary:  Negative for dysuria and flank pain.   Musculoskeletal:  Negative for arthralgias, back pain, joint swelling, myalgias and neck pain.   Neurological:  Negative for weakness, numbness and headaches.   Psychiatric/Behavioral:  Negative for agitation, dysphoric mood and sleep disturbance. The patient is not nervous/anxious.      Objective:     Vital Signs (Most Recent):  Temp: 97.6 °F (36.4 °C) (01/04/25 0744)  Pulse: 109 (01/04/25 0852)  Resp: 18 (01/04/25 0846)  BP: 106/67 (01/04/25 0744)  SpO2: (!) 94 % (01/04/25 0846) Vital Signs (24h Range):  Temp:  [97.5 °F (36.4 °C)-98 °F (36.7 °C)] 97.6 °F (36.4 °C)  Pulse:  [] 109  Resp:  [16-18] 18  SpO2:  [93 %-98 %] 94 %  BP: ()/(62-81) 106/67     Weight: 56 kg (123 lb 7.3 oz)  Body mass index is 19.93 kg/m².    Intake/Output Summary (Last 24 hours) at 1/4/2025 1116  Last data filed at 1/3/2025 2000  Gross per 24 hour   Intake 360 ml   Output --   Net 360 ml      Physical Exam  Constitutional:       General: He is not in acute distress.     Appearance: Normal appearance. He is not ill-appearing, toxic-appearing or diaphoretic.   HENT:      Head: Normocephalic and atraumatic.      Right Ear: External ear normal.      Left Ear: External ear normal.      Nose: Nose normal.      Mouth/Throat:      Mouth: Mucous membranes are moist.   Eyes:      General: No scleral icterus.        Right eye: No discharge.         Left eye: No discharge.      Extraocular Movements: Extraocular movements intact.   Cardiovascular:      Rate and Rhythm: Normal rate and  "regular rhythm.      Heart sounds: Murmur heard.      No friction rub.   Pulmonary:      Effort: Pulmonary effort is normal. No respiratory distress.      Breath sounds: Normal breath sounds. No stridor. No wheezing, rhonchi or rales.   Abdominal:      General: Abdomen is flat. Bowel sounds are normal. There is no distension.      Palpations: Abdomen is soft.      Tenderness: There is no abdominal tenderness.   Musculoskeletal:         General: No swelling or deformity.      Cervical back: Normal range of motion.      Right lower leg: Edema present.      Left lower leg: Edema present.      Comments: Kyphosis noted   Skin:     General: Skin is warm and dry.      Capillary Refill: Capillary refill takes less than 2 seconds.      Coloration: Skin is pale. Skin is not jaundiced.      Findings: No bruising.   Neurological:      General: No focal deficit present.      Mental Status: He is alert and oriented to person, place, and time. Mental status is at baseline.      Motor: No weakness.   Psychiatric:         Mood and Affect: Mood normal.         Behavior: Behavior normal.         Thought Content: Thought content normal.         Computed MELD 3.0 unavailable. One or more values for this score either were not found within the given timeframe or did not fit some other criterion.  Computed MELD-Na unavailable. One or more values for this score either were not found within the given timeframe or did not fit some other criterion.      Significant Labs:  CBC:  Recent Labs   Lab 01/03/25  0811   WBC 3.40*   HGB 7.6*   HCT 24.4*   *     CMP:  Recent Labs   Lab 01/03/25  0811   *   K 4.7      CO2 23   GLU 68*   BUN 28*   CREATININE 4.5*   CALCIUM 9.0   PROT 6.2   ALBUMIN 2.5*   BILITOT 0.3   ALKPHOS 298*   AST 17   ALT <5*   ANIONGAP 8     PTINR:  No results for input(s): "INR" in the last 48 hours.    Significant Procedures:   Dobutamine Stress Test with Color Flow: No results found for this or any previous " visit.    None

## 2025-01-04 NOTE — PROGRESS NOTES
[facility] - Internal Medicine [facility] Medicine  Progress Note    Patient Name: Flakito Mcginnis  MRN: [id number]  Patient Class: IP- Inpatient   Admission Date: 12/19/2024  Length of Stay: 16 days  Attending Physician: Adriana Ventura MD  Primary Care Provider: Jordin Robbins MD        Subjective     Principal Problem:Septic shock        HPI:  By Alicia Galloway NP    Mr. Flakito Mcginnis is a 69 y.o. man w/ HFrEF (30% 8/2024 from 20-25% 6/2024), NICM, MR, ESRD on HD, chronic respiratory failure on home 3L NC, Crohn's s/p colostomy, h/o R nephrectomy. He [facility] [place] his HD center on 12/19 due to hypotension and ongoing shortness of breath. He usually receives his dialysis on T, R, S and went on Wednesday for an extra session for volume removal. He arrived on Thursday for his usually scheduled dialysis session and was directed to the ED due to hypotension. On arrival in the ED his blood pressure was 78/51. He was found to have a BNP >4900 and CXR concerning for volume overload. High sensitivity troponin 923. Critical care medicine was consulted for hypotension and admitted to MICU.     Overview/Hospital Course:  Mr. 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 ID was consulted and recommending 6 weeks of IV Cefazolin after HD, end date 2/2/25. CTS evaluated and recommending medical management at this time due to multiple co-morbidities. Tunneled catheter was removed 12/22. Repeat cultures from 12/23 with NGTD. Temporary RIJ trialysis line placed 12/24.  Course further complicated by acute hypoxemic respiratory failure likely volume overload requiring HFNC.  Oxygenation improved with volume removal with dialysis and he was weaned down to NC.  He was SD to  on 12/29.  IR was consulted for new HD line placement which was placed on  12/31.  He was going to be discharged home after his new line placement, but sister refuses to take him home and says he needs long-term NH placement.    Interval History: NAEON and VSS    Review of Systems   Constitutional:  Negative for chills and fever.   HENT:  Negative for ear pain and sinus pain.    Eyes:  Negative for pain.   Respiratory:  Negative for cough and shortness of breath.    Cardiovascular:  Negative for chest pain and leg swelling.   Gastrointestinal:  Negative for abdominal pain, constipation, diarrhea, nausea, rectal pain and vomiting.   Genitourinary:  Negative for dysuria and flank pain.   Musculoskeletal:  Negative for arthralgias, back pain, joint swelling, myalgias and neck pain.   Neurological:  Negative for weakness, numbness and headaches.   Psychiatric/Behavioral:  Negative for agitation, dysphoric mood and sleep disturbance. The patient is not nervous/anxious.      Objective:     Vital Signs (Most Recent):  Temp: 97.6 °F (36.4 °C) (01/04/25 0744)  Pulse: 109 (01/04/25 0852)  Resp: 18 (01/04/25 0846)  BP: 106/67 (01/04/25 0744)  SpO2: (!) 94 % (01/04/25 0846) Vital Signs (24h Range):  Temp:  [97.5 °F (36.4 °C)-98 °F (36.7 °C)] 97.6 °F (36.4 °C)  Pulse:  [] 109  Resp:  [16-18] 18  SpO2:  [93 %-98 %] 94 %  BP: ()/(62-81) 106/67     Weight: 56 kg (123 lb 7.3 oz)  Body mass index is 19.93 kg/m².    Intake/Output Summary (Last 24 hours) at 1/4/2025 1116  Last data filed at 1/3/2025 2000  Gross per 24 hour   Intake 360 ml   Output --   Net 360 ml      Physical Exam  Constitutional:       General: He is not in acute distress.     Appearance: Normal appearance. He is not ill-appearing, toxic-appearing or diaphoretic.   HENT:      Head: Normocephalic and atraumatic.      Right Ear: External ear normal.      Left Ear: External ear normal.      Nose: Nose normal.      Mouth/Throat:      Mouth: Mucous membranes are moist.   Eyes:      General: No scleral icterus.        Right eye: No  discharge.         Left eye: No discharge.      Extraocular Movements: Extraocular movements intact.   Cardiovascular:      Rate and Rhythm: Normal rate and regular rhythm.      Heart sounds: Murmur heard.      No friction rub.   Pulmonary:      Effort: Pulmonary effort is normal. No respiratory distress.      Breath sounds: Normal breath sounds. No stridor. No wheezing, rhonchi or rales.   Abdominal:      General: Abdomen is flat. Bowel sounds are normal. There is no distension.      Palpations: Abdomen is soft.      Tenderness: There is no abdominal tenderness.   Musculoskeletal:         General: No swelling or deformity.      Cervical back: Normal range of motion.      Right lower leg: Edema present.      Left lower leg: Edema present.      Comments: Kyphosis noted   Skin:     General: Skin is warm and dry.      Capillary Refill: Capillary refill takes less than 2 seconds.      Coloration: Skin is pale. Skin is not jaundiced.      Findings: No bruising.   Neurological:      General: No focal deficit present.      Mental Status: He is alert and oriented to person, place, and time. Mental status is at baseline.      Motor: No weakness.   Psychiatric:         Mood and Affect: Mood normal.         Behavior: Behavior normal.         Thought Content: Thought content normal.         Computed MELD 3.0 unavailable. One or more values for this score either were not found within the given timeframe or did not fit some other criterion.  Computed MELD-Na unavailable. One or more values for this score either were not found within the given timeframe or did not fit some other criterion.      Significant Labs:  CBC:  Recent Labs   Lab 01/03/25  0811   WBC 3.40*   HGB 7.6*   HCT 24.4*   *     CMP:  Recent Labs   Lab 01/03/25  0811   *   K 4.7      CO2 23   GLU 68*   BUN 28*   CREATININE 4.5*   CALCIUM 9.0   PROT 6.2   ALBUMIN 2.5*   BILITOT 0.3   ALKPHOS 298*   AST 17   ALT <5*   ANIONGAP 8     PTINR:  No  "results for input(s): "INR" in the last 48 hours.    Significant Procedures:   Dobutamine Stress Test with Color Flow: No results found for this or any previous visit.    None    Assessment and Plan     * Septic shock  Admitted to MICU 12/19 for septic shock 2/2 Staph capitis bacteremia and endocarditis from his tunneled HD line  Weaned off vasopressors in the ICU  2D echo with mass on the aortic valve suggestive of vegetation  Tunneled HD line removed 12/22  Temporary trialysis placed 12/24  ID consulted:  Suggest 6 weeks of IV Ancef with HD through 2/2/25  CTS consulted:  Suggest IV abx, no surgical intervention  IR placed new tunneled line 12/31    ACP (advance care planning)  DNR noted      Debility  Patient with Acute debility due to  acute illness . The patient's latest AMPAC (Activity Measure for Post Acute Care) Score is listed below.    AM-PAC Score - How much help does the patient need for each activity listed  Basic Mobility Total Score: 17  Turning over in bed (including adjusting bedclothes, sheets and blankets)?: A little  Sitting down on and standing up from a chair with arms (e.g., wheelchair, bedside commode, etc.): A little  Moving from lying on back to sitting on the side of the bed?: A little  Moving to and from a bed to a chair (including a wheelchair)?: A little  Need to walk in hospital room?: A little  Climbing 3-5 steps with a railing?: A lot    Plan  - Progressive mobility protocol initated  - PT/OT consulted    Palliative care encounter  DNR noted      Acute on chronic respiratory failure with hypoxia  Patient with Hypoxic Respiratory failure which is Acute on chronic.  He is on home oxygen 3-4L. Supplemental oxygen was provided and noted-       .   Signs/symptoms of respiratory failure include- tachypnea, increased work of breathing, respiratory distress, and use of accessory muscles. Contributing diagnoses includes - CHF Labs and images were reviewed. Patient Has recent ABG, which has " been reviewed. Will treat underlying causes and adjust management of respiratory failure as follows-   Continue to wean oxygen to goal SaO2 of 90%  Aggressive pulmonary toilet in setting of atelectasis of left lower lung field.     Hyperkalemia  Hyperkalemia is likely due to ESRD.The patients most recent potassium results are listed below.  Recent Labs     01/01/25  0936   K 4.3       Plan  - Monitor for arrhythmias with EKG and/or continuous telemetry.   - Now resolved            Bacteremia due to Staphylococcus  Seeding from tunneled HD line with AV vegetation  ID consulted:  Suggest 6 weeks of IV Ancef with HD through 2/2/25    Endocarditis  See septic shock    Anemia of chronic renal failure, stage 5  Anemia is likely due to chronic disease due to ESRD. Most recent hemoglobin and hematocrit are listed below.  Recent Labs     01/01/25  0233 01/03/25  0811   HGB 8.3* 7.6*   HCT 26.1* 24.4*       Plan  - Monitor serial CBC: Daily  - Transfuse PRBC if patient becomes hemodynamically unstable, symptomatic or H/H drops below 7/21.  - Patient will receive 1 unit PRBCs today  - Patient's anemia is currently worsening    NSTEMI (non-ST elevated myocardial infarction)  In setting of septic shock  High sensitivity troponin troponin 923.    EKG with T wave inversions  No chest pain    Hypercholesterolemia  Chronic and stable  Continue Statin      Chronic systolic heart failure  2D Echo showing EF of 20-25% with large AV vegetation partially occluding LVOT with moderate AV regurgitation  Volume removal with HD    Crohn's disease  S/p colectomy  No acute issues      History of nephrectomy  Noted  ESRD on HD    Hypertension  Patient's blood pressure range in the last 24 hours was: BP  Min: 91/55  Max: 113/71.  Hold BP meds    ESRD on hemodialysis  Nephro following  Tunneled HD line removed 12/22  Temporary trialysis placed 12/24  IR placed new line 12/31      VTE Risk Mitigation (From admission, onward)           Ordered      heparin (porcine) injection 1,000 Units  As needed (PRN)         01/03/25 1033     heparin (porcine) injection 5,000 Units  Every 8 hours         12/29/24 1000     Place sequential compression device  Until discontinued         12/19/24 1736     IP VTE LOW RISK PATIENT  Once         12/19/24 1736                    Discharge Planning   LLUVIA: 1/7/2025     Code Status: DNR   Medical Readiness for Discharge Date: 12/31/2024  Discharge Plan A: [facility] Nursing [facility] placement [facility]   Discharge Delays: (!) Patient and Family Barriers            Please place Justification for DME        Adriana Ventura MD  Department of Hospital Medicine   [facility] - Internal Medicine Telemetry

## 2025-01-04 NOTE — PT/OT/SLP PROGRESS
"Physical Therapy Treatment    Patient Name:  Flakito Mcginnis   MRN:  [id number]    Recommendations:     Discharge Recommendations: Low Intensity Therapy  Discharge Equipment Recommendations: bath bench  Barriers to discharge: None    Assessment:     Flakito Mcginnis is a 69 y.o. male admitted with a medical diagnosis of Septic shock.  He presents with the following impairments/functional limitations: weakness, impaired endurance, impaired self care skills, impaired functional mobility, decreased safety awareness, decreased lower extremity function, decreased upper extremity function, impaired skin, edema, impaired cardiopulmonary response to activity Pt tolerated treatment session well today. Pt requiring little to no assistance for transfers and gait training. Pt was able to increase distance ambulated during today's session. Post ambulation pt wanting to remain seated on Rolator rather than bedside chair, pt educated on potential risk ans safety concerns. Pt with mild agitation with safety precautions, yet agreeable to sit in bedside chair. Patient remains appropriate for continued skilled services within the acute environment and goals remain appropriate.   .    Rehab Prognosis: Good; patient would benefit from acute skilled PT services to address these deficits and reach maximum level of function.    Recent Surgery: * No surgery found *      Plan:     During this hospitalization, patient to be seen 3 x/week to address the identified rehab impairments via gait training, therapeutic activities, therapeutic exercises, neuromuscular re-education and progress toward the following goals:    Plan of Care Expires:  01/22/25    Subjective     Chief Complaint: None stated   Patient/Family Comments/goals: "all these rules we have to follow."  Pain/Comfort:  Pain Rating 1: 0/10      Objective:     Communicated with Rn prior to session.  Patient found up in chair with telemetry, oxygen, colostomy upon PT entry to room. "     General Precautions: Standard, fall  Orthopedic Precautions: N/A  Braces: N/A  Respiratory Status: Nasal cannula     Functional Mobility:  Transfers:     Sit to Stand x 2:  contact guard assistance with rolling walker  Gait: 60 ft + 60 ft CGA with Rolator   Seated rest break between trials       AM-PAC 6 CLICK MOBILITY  Turning over in bed (including adjusting bedclothes, sheets and blankets)?: 3  Sitting down on and standing up from a chair with arms (e.g., wheelchair, bedside commode, etc.): 3  Moving from lying on back to sitting on the side of the bed?: 3  Moving to and from a bed to a chair (including a wheelchair)?: 3  Need to walk in hospital room?: 3  Climbing 3-5 steps with a railing?: 2  Basic Mobility Total Score: 17       Treatment & Education:  Therapist provided instruction and educated for safety during transfers and gait training. As well as proper body mechanics, energy conservation, and fall prevention strategies during tasks listed above, and the effects of prolonged immobility and the importance of performing EOB/OOB activity and exercises to promote healing and reduce recovery time.       Patient left up in chair with all lines intact, call button in reach, and Rn notified..    GOALS:   Multidisciplinary Problems       Physical Therapy Goals          Problem: Physical Therapy    Goal Priority Disciplines Outcome Interventions   Physical Therapy Goal     PT, PT/OT Progressing    Description: Goals to be met by: 2025     Patient will increase functional independence with mobility by performin. Sit to stand transfer with Modified [place]  2. Bed to chair transfer with Supervision using LRAD  3. Gait  x 250 feet with Supervision using LRAD.   4. Stand for 5 minutes with Supervision using LRAD while performing dynamic balance tasks  5. Lower extremity exercise program x30 reps per handout, with independence                         Time Tracking:     PT Received On: 25  PT  Start Time: 0930     PT Stop Time: 0959  PT Total Time (min): 29 min     Billable Minutes: Gait Training 15 and Therapeutic Activity 14    Treatment Type: Treatment  PT/PTA: PTA     Number of PTA visits since last PT visit: 2 01/04/2025

## 2025-01-04 NOTE — PLAN OF CARE
Problem: Adult Inpatient Plan of Care  Goal: Plan of Care Review  Outcome: Progressing  Goal: Patient-Specific Goal (Individualized)  Outcome: Progressing  Goal: Absence of Hospital-Acquired Illness or Injury  Outcome: Progressing  Goal: Optimal Comfort and Wellbeing  Outcome: Progressing  Goal: Readiness for Transition of Care  Outcome: Progressing     Problem: Infection  Goal: Absence of Infection Signs and Symptoms  Outcome: Progressing     Problem: Skin Injury Risk Increased  Goal: Skin Health and Integrity  Outcome: Progressing     Problem: Sepsis/Septic Shock  Goal: Optimal Coping  Outcome: Progressing  Goal: Absence of Bleeding  Outcome: Progressing  Goal: Blood Glucose Level Within Targeted Range  Outcome: Progressing  Goal: Absence of Infection Signs and Symptoms  Outcome: Progressing  Goal: Optimal Nutrition Intake  Outcome: Progressing     Problem: CRRT (Continuous Renal Replacement Therapy)  Goal: Safe, Effective Therapy Delivery  Outcome: Progressing  Goal: Hemodynamic Stability  Outcome: Progressing  Goal: Body Temperature Maintained in Desired Range  Outcome: Progressing  Goal: Absence of Infection Signs and Symptoms  Outcome: Progressing     Problem: Fall Injury Risk  Goal: Absence of Fall and Fall-Related Injury  Outcome: Progressing     Problem: Coping Ineffective  Goal: Effective Coping  Outcome: Progressing     Problem: Hemodialysis  Goal: Safe, Effective Therapy Delivery  Outcome: Progressing  Goal: Effective Tissue Perfusion  Outcome: Progressing  Goal: Absence of Infection Signs and Symptoms  Outcome: Progressing     Problem: Wound  Goal: Optimal Coping  Outcome: Progressing  Goal: Optimal Functional Ability  Outcome: Progressing  Goal: Absence of Infection Signs and Symptoms  Outcome: Progressing  Goal: Improved Oral Intake  Outcome: Progressing  Goal: Optimal Pain Control and Function  Outcome: Progressing  Goal: Skin Health and Integrity  Outcome: Progressing  Goal: Optimal Wound  Healing  Outcome: Progressing       Pt had an uneventful night. VSS. Pt remains on O2 at 3L Pt given prn tyneol for back pain . Pt uncooperative at times with plan of care. Pt likes to go in bathroom by himself and empty colostomy and walk around room despite being educated on being a fall risk. Mirella monitor and bed alarm in place for safety  Pt is free of falls and injuries. Bed in lowest position and call light within reach. Safety maintained

## 2025-01-04 NOTE — PT/OT/SLP PROGRESS
Occupational Therapy      Patient Name:  Flakito Mcginnis   MRN:  [id number]    Patient not seen today secondary to patient off floor for HD at 1032 am, unable to return for 2nd attempt . Will follow-up on the next schedule visit accordingly to OT POC.    1/3/2025

## 2025-01-05 PROBLEM — G89.29 CHRONIC LEFT SHOULDER PAIN: Status: ACTIVE | Noted: 2025-01-05

## 2025-01-05 PROBLEM — M25.512 CHRONIC LEFT SHOULDER PAIN: Status: ACTIVE | Noted: 2025-01-05

## 2025-01-05 PROCEDURE — 25000242 PHARM REV CODE 250 ALT 637 W/ HCPCS: Performed by: INTERNAL MEDICINE

## 2025-01-05 PROCEDURE — 63600175 PHARM REV CODE 636 W HCPCS: Performed by: INTERNAL MEDICINE

## 2025-01-05 PROCEDURE — 21400001 HC TELEMETRY ROOM

## 2025-01-05 PROCEDURE — 25000003 PHARM REV CODE 250

## 2025-01-05 PROCEDURE — 25000003 PHARM REV CODE 250: Performed by: GENERAL ACUTE CARE HOSPITAL

## 2025-01-05 PROCEDURE — 25000003 PHARM REV CODE 250: Performed by: INTERNAL MEDICINE

## 2025-01-05 PROCEDURE — 63600175 PHARM REV CODE 636 W HCPCS: Performed by: STUDENT IN AN ORGANIZED HEALTH CARE EDUCATION/TRAINING PROGRAM

## 2025-01-05 PROCEDURE — 94761 N-INVAS EAR/PLS OXIMETRY MLT: CPT

## 2025-01-05 PROCEDURE — 94640 AIRWAY INHALATION TREATMENT: CPT

## 2025-01-05 PROCEDURE — 27000221 HC OXYGEN, UP TO 24 HOURS

## 2025-01-05 RX ADMIN — ACETAMINOPHEN 500 MG: 500 TABLET ORAL at 02:01

## 2025-01-05 RX ADMIN — Medication 6 MG: at 09:01

## 2025-01-05 RX ADMIN — IPRATROPIUM BROMIDE AND ALBUTEROL SULFATE 3 ML: 2.5; .5 SOLUTION RESPIRATORY (INHALATION) at 03:01

## 2025-01-05 RX ADMIN — IPRATROPIUM BROMIDE AND ALBUTEROL SULFATE 3 ML: 2.5; .5 SOLUTION RESPIRATORY (INHALATION) at 07:01

## 2025-01-05 RX ADMIN — HEPARIN SODIUM 5000 UNITS: 5000 INJECTION INTRAVENOUS; SUBCUTANEOUS at 08:01

## 2025-01-05 RX ADMIN — ACETAMINOPHEN 500 MG: 500 TABLET ORAL at 06:01

## 2025-01-05 RX ADMIN — ATORVASTATIN CALCIUM 40 MG: 40 TABLET, FILM COATED ORAL at 08:01

## 2025-01-05 RX ADMIN — SEVELAMER CARBONATE 800 MG: 800 TABLET, FILM COATED ORAL at 08:01

## 2025-01-05 RX ADMIN — MUPIROCIN: 20 OINTMENT TOPICAL at 08:01

## 2025-01-05 RX ADMIN — CEFAZOLIN 1 G: 1 INJECTION, POWDER, FOR SOLUTION INTRAMUSCULAR; INTRAVENOUS at 09:01

## 2025-01-05 RX ADMIN — HEPARIN SODIUM 5000 UNITS: 5000 INJECTION INTRAVENOUS; SUBCUTANEOUS at 05:01

## 2025-01-05 RX ADMIN — SEVELAMER CARBONATE 800 MG: 800 TABLET, FILM COATED ORAL at 05:01

## 2025-01-05 NOTE — PLAN OF CARE
Problem: Adult Inpatient Plan of Care  Goal: Plan of Care Review  Outcome: Progressing  Goal: Patient-Specific Goal (Individualized)  Outcome: Progressing  Goal: Absence of Hospital-Acquired Illness or Injury  Outcome: Progressing  Goal: Optimal Comfort and Wellbeing  Outcome: Progressing  Goal: Readiness for Transition of Care  Outcome: Progressing     Problem: Infection  Goal: Absence of Infection Signs and Symptoms  Outcome: Progressing     Problem: Skin Injury Risk Increased  Goal: Skin Health and Integrity  Outcome: Progressing     Problem: Sepsis/Septic Shock  Goal: Optimal Coping  Outcome: Progressing  Goal: Absence of Bleeding  Outcome: Progressing  Goal: Blood Glucose Level Within Targeted Range  Outcome: Progressing  Goal: Absence of Infection Signs and Symptoms  Outcome: Progressing  Goal: Optimal Nutrition Intake  Outcome: Progressing     Problem: CRRT (Continuous Renal Replacement Therapy)  Goal: Safe, Effective Therapy Delivery  Outcome: Progressing  Goal: Hemodynamic Stability  Outcome: Progressing  Goal: Body Temperature Maintained in Desired Range  Outcome: Progressing  Goal: Absence of Infection Signs and Symptoms  Outcome: Progressing     Problem: Fall Injury Risk  Goal: Absence of Fall and Fall-Related Injury  Outcome: Progressing     Problem: Coping Ineffective  Goal: Effective Coping  Outcome: Progressing     Problem: Hemodialysis  Goal: Safe, Effective Therapy Delivery  Outcome: Progressing  Goal: Effective Tissue Perfusion  Outcome: Progressing  Goal: Absence of Infection Signs and Symptoms  Outcome: Progressing     Problem: Wound  Goal: Optimal Coping  Outcome: Progressing  Goal: Optimal Functional Ability  Outcome: Progressing  Goal: Absence of Infection Signs and Symptoms  Outcome: Progressing  Goal: Improved Oral Intake  Outcome: Progressing  Goal: Optimal Pain Control and Function  Outcome: Progressing  Goal: Skin Health and Integrity  Outcome: Progressing  Goal: Optimal Wound  Healing      Outcome: Progressing  Pt had an uneventful night. VSS. Pt given tyneol for back pain. Pt uncooperative with POC at times despite being told he is a fall risk. Pt gets up sometimes without awaiting assistance  Mirella monitor remains in place . Bed alarm remains set.   Pt is free of falls and injuries. Bed in lowest position and call light within reach. Safety maintained

## 2025-01-05 NOTE — SUBJECTIVE & OBJECTIVE
Interval History: NAEON and VSS    Review of Systems   Constitutional:  Negative for chills and fever.   HENT:  Negative for ear pain and sinus pain.    Eyes:  Negative for pain.   Respiratory:  Negative for cough and shortness of breath.    Cardiovascular:  Negative for chest pain and leg swelling.   Gastrointestinal:  Negative for abdominal pain, constipation, diarrhea, nausea, rectal pain and vomiting.   Genitourinary:  Negative for dysuria and flank pain.   Musculoskeletal:  Negative for arthralgias, back pain, joint swelling, myalgias and neck pain.   Neurological:  Negative for weakness, numbness and headaches.   Psychiatric/Behavioral:  Negative for agitation, dysphoric mood and sleep disturbance. The patient is not nervous/anxious.      Objective:     Vital Signs (Most Recent):  Temp: 98.5 °F (36.9 °C) (01/05/25 0752)  Pulse: 97 (01/05/25 1035)  Resp: 18 (01/05/25 0752)  BP: 107/69 (01/05/25 0752)  SpO2: (!) 94 % (01/05/25 0752) Vital Signs (24h Range):  Temp:  [97.6 °F (36.4 °C)-98.5 °F (36.9 °C)] 98.5 °F (36.9 °C)  Pulse:  [] 97  Resp:  [18-20] 18  SpO2:  [94 %-100 %] 94 %  BP: ()/(44-79) 107/69     Weight: 56 kg (123 lb 7.3 oz)  Body mass index is 19.93 kg/m².  No intake or output data in the 24 hours ending 01/05/25 1042     Physical Exam  Constitutional:       General: He is not in acute distress.     Appearance: Normal appearance. He is not ill-appearing, toxic-appearing or diaphoretic.   HENT:      Head: Normocephalic and atraumatic.      Right Ear: External ear normal.      Left Ear: External ear normal.      Nose: Nose normal.      Mouth/Throat:      Mouth: Mucous membranes are moist.   Eyes:      General: No scleral icterus.        Right eye: No discharge.         Left eye: No discharge.      Extraocular Movements: Extraocular movements intact.   Cardiovascular:      Rate and Rhythm: Normal rate and regular rhythm.      Heart sounds: Murmur heard.      No friction rub.   Pulmonary:       "Effort: Pulmonary effort is normal. No respiratory distress.      Breath sounds: Normal breath sounds. No stridor. No wheezing, rhonchi or rales.   Abdominal:      General: Abdomen is flat. Bowel sounds are normal. There is no distension.      Palpations: Abdomen is soft.      Tenderness: There is no abdominal tenderness.   Musculoskeletal:         General: No swelling or deformity.      Cervical back: Normal range of motion.      Right lower leg: Edema present.      Left lower leg: Edema present.      Comments: Kyphosis noted   Skin:     General: Skin is warm and dry.      Capillary Refill: Capillary refill takes less than 2 seconds.      Coloration: Skin is pale. Skin is not jaundiced.      Findings: No bruising.   Neurological:      General: No focal deficit present.      Mental Status: He is alert and oriented to person, place, and time. Mental status is at baseline.      Motor: No weakness.   Psychiatric:         Mood and Affect: Mood normal.         Behavior: Behavior normal.         Thought Content: Thought content normal.         Computed MELD 3.0 unavailable. One or more values for this score either were not found within the given timeframe or did not fit some other criterion.  Computed MELD-Na unavailable. One or more values for this score either were not found within the given timeframe or did not fit some other criterion.      Significant Labs:  CBC:  No results for input(s): "WBC", "HGB", "HCT", "PLT" in the last 48 hours.    CMP:  No results for input(s): "NA", "K", "CL", "CO2", "GLU", "BUN", "CREATININE", "CALCIUM", "PROT", "ALBUMIN", "BILITOT", "ALKPHOS", "AST", "ALT", "ANIONGAP", "EGFRNONAA" in the last 48 hours.    Invalid input(s): "ESTGFAFR[place]"    PTINR:  No results for input(s): "INR" in the last 48 hours.    Significant Procedures:   Dobutamine Stress Test with Color Flow: No results found for this or any previous visit.    None  "

## 2025-01-05 NOTE — PROGRESS NOTES
[facility]  Progress Note    Patient Name: Flakito Mcginnis  MRN: [id number]  Patient Class: IP- Inpatient   Admission Date: 12/19/2024  Length of Stay: 17 days  Attending Physician: Adriana Ventura MD  Primary Care Provider: Joridn Robbins MD        Subjective     Principal Problem:Septic shock        HPI:  By Alicia Galloway NP    Mr. Flakito Mcginnis is a 69 y.o. man w/ HFrEF (30% 8/2024 from 20-25% 6/2024), NICM, MR, ESRD on HD, chronic respiratory failure on home 3L NC, Crohn's s/p colostomy, h/o R nephrectomy. He presented to [facility] ED from his HD center on 12/19 due to hypotension and ongoing shortness of breath. He usually receives his dialysis on T, R, S and went on Wednesday for an extra session for volume removal. He arrived on Thursday for his usually scheduled dialysis session and was directed to the ED due to hypotension. On arrival in the ED his blood pressure was 78/51. He was found to have a BNP >4900 and CXR concerning for volume overload. High sensitivity troponin 923. Critical care medicine was consulted for hypotension and admitted to MICU.     Overview/Hospital Course:  Mr. 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 ID was consulted and recommending 6 weeks of IV Cefazolin after HD, end date 2/2/25. CTS evaluated and recommending medical management at this time due to multiple co-morbidities. Tunneled catheter was removed 12/22. Repeat cultures from 12/23 with NGTD. Temporary RIJ trialysis line placed 12/24.  Course further complicated by acute hypoxemic respiratory failure likely volume overload requiring HFNC.  Oxygenation improved with volume removal with dialysis and he was weaned down to NC.  He was SD to  on 12/29.  IR was consulted for new HD line placement which was placed on  12/31.  He was going to be discharged home after his new line placement, but sister refuses to take him home and says he needs long-term NH placement.    Interval History: NAEON and VSS    Review of Systems   Constitutional:  Negative for chills and fever.   HENT:  Negative for ear pain and sinus pain.    Eyes:  Negative for pain.   Respiratory:  Negative for cough and shortness of breath.    Cardiovascular:  Negative for chest pain and leg swelling.   Gastrointestinal:  Negative for abdominal pain, constipation, diarrhea, nausea, rectal pain and vomiting.   Genitourinary:  Negative for dysuria and flank pain.   Musculoskeletal:  Negative for arthralgias, back pain, joint swelling, myalgias and neck pain.   Neurological:  Negative for weakness, numbness and headaches.   Psychiatric/Behavioral:  Negative for agitation, dysphoric mood and sleep disturbance. The patient is not nervous/anxious.      Objective:     Vital Signs (Most Recent):  Temp: 98.5 °F (36.9 °C) (01/05/25 0752)  Pulse: 97 (01/05/25 1035)  Resp: 18 (01/05/25 0752)  BP: 107/69 (01/05/25 0752)  SpO2: (!) 94 % (01/05/25 0752) Vital Signs (24h Range):  Temp:  [97.6 °F (36.4 °C)-98.5 °F (36.9 °C)] 98.5 °F (36.9 °C)  Pulse:  [] 97  Resp:  [18-20] 18  SpO2:  [94 %-100 %] 94 %  BP: ()/(44-79) 107/69     Weight: 56 kg (123 lb 7.3 oz)  Body mass index is 19.93 kg/m².  No intake or output data in the 24 hours ending 01/05/25 1042     Physical Exam  Constitutional:       General: He is not in acute distress.     Appearance: Normal appearance. He is not ill-appearing, toxic-appearing or diaphoretic.   HENT:      Head: Normocephalic and atraumatic.      Right Ear: External ear normal.      Left Ear: External ear normal.      Nose: Nose normal.      Mouth/Throat:      Mouth: Mucous membranes are moist.   Eyes:      General: No scleral icterus.        Right eye: No discharge.         Left eye: No discharge.      Extraocular Movements: Extraocular  "movements intact.   Cardiovascular:      Rate and Rhythm: Normal rate and regular rhythm.      Heart sounds: Murmur heard.      No friction rub.   Pulmonary:      Effort: Pulmonary effort is normal. No respiratory distress.      Breath sounds: Normal breath sounds. No stridor. No wheezing, rhonchi or rales.   Abdominal:      General: Abdomen is flat. Bowel sounds are normal. There is no distension.      Palpations: Abdomen is soft.      Tenderness: There is no abdominal tenderness.   Musculoskeletal:         General: No swelling or deformity.      Cervical back: Normal range of motion.      Right lower leg: Edema present.      Left lower leg: Edema present.      Comments: Kyphosis noted   Skin:     General: Skin is warm and dry.      Capillary Refill: Capillary refill takes less than 2 seconds.      Coloration: Skin is pale. Skin is not jaundiced.      Findings: No bruising.   Neurological:      General: No focal deficit present.      Mental Status: He is alert and oriented to person, place, and time. Mental status is at baseline.      Motor: No weakness.   Psychiatric:         Mood and Affect: Mood normal.         Behavior: Behavior normal.         Thought Content: Thought content normal.         Computed MELD 3.0 unavailable. One or more values for this score either were not found within the given timeframe or did not fit some other criterion.  Computed MELD-Na unavailable. One or more values for this score either were not found within the given timeframe or did not fit some other criterion.      Significant Labs:  CBC:  No results for input(s): "WBC", "HGB", "HCT", "PLT" in the last 48 hours.    CMP:  No results for input(s): "NA", "K", "CL", "CO2", "GLU", "BUN", "CREATININE", "CALCIUM", "PROT", "ALBUMIN", "BILITOT", "ALKPHOS", "AST", "ALT", "ANIONGAP", "EGFRNONAA" in the last 48 hours.    Invalid input(s): "ESTGFA[place]"    PTINR:  No results for input(s): "INR" in the last 48 hours.    Significant Procedures: "   Dobutamine Stress Test with Color Flow: No results found for this or any previous visit.    None    Assessment and Plan     * Septic shock  Admitted to MICU 12/19 for septic shock 2/2 Staph capitis bacteremia and endocarditis from his tunneled HD line  Weaned off vasopressors in the ICU  2D echo with mass on the aortic valve suggestive of vegetation  Tunneled HD line removed 12/22  Temporary trialysis placed 12/24  ID consulted:  Suggest 6 weeks of IV Ancef with HD through 2/2/25  CTS consulted:  Suggest IV abx, no surgical intervention  IR placed new tunneled line 12/31    Chronic left shoulder pain  Patient states that he has been having shoulder pain for the past couple of weeks associated with a fall some time back. Was not able to assess due to being in the hospital and is worried that it is dislocated.     - will obtain xrays of the shoulder and back      ACP (advance care planning)  DNR noted      Debility  Patient with Acute debility due to  acute illness . The patient's latest AMPAC (Activity Measure for Post Acute Care) Score is listed below.    AM-PAC Score - How much help does the patient need for each activity listed  Basic Mobility Total Score: 17  Turning over in bed (including adjusting bedclothes, sheets and blankets)?: A little  Sitting down on and standing up from a chair with arms (e.g., wheelchair, bedside commode, etc.): A little  Moving from lying on back to sitting on the side of the bed?: A little  Moving to and from a bed to a chair (including a wheelchair)?: A little  Need to walk in hospital room?: A little  Climbing 3-5 steps with a railing?: A lot    Plan  - Progressive mobility protocol initated  - PT/OT consulted    Palliative care encounter  DNR noted      Acute on chronic respiratory failure with hypoxia  Patient with Hypoxic Respiratory failure which is Acute on chronic.  He is on home oxygen 3-4L. Supplemental oxygen was provided and noted-       .   Signs/symptoms of  respiratory failure include- tachypnea, increased work of breathing, respiratory distress, and use of accessory muscles. Contributing diagnoses includes - CHF Labs and images were reviewed. Patient Has recent ABG, which has been reviewed. Will treat underlying causes and adjust management of respiratory failure as follows-   Continue to wean oxygen to goal SaO2 of 90%  Aggressive pulmonary toilet in setting of atelectasis of left lower lung field.     Hyperkalemia  Hyperkalemia is likely due to ESRD.The patients most recent potassium results are listed below.  Recent Labs     01/01/25  0936   K 4.3       Plan  - Monitor for arrhythmias with EKG and/or continuous telemetry.   - Now resolved            Bacteremia due to Staphylococcus  Seeding from tunneled HD line with AV vegetation  ID consulted:  Suggest 6 weeks of IV Ancef with HD through 2/2/25    Endocarditis  See septic shock    Anemia of chronic renal failure, stage 5  Anemia is likely due to chronic disease due to ESRD. Most recent hemoglobin and hematocrit are listed below.  Recent Labs     01/01/25  0233 01/03/25  0811   HGB 8.3* 7.6*   HCT 26.1* 24.4*       Plan  - Monitor serial CBC: Daily  - Transfuse PRBC if patient becomes hemodynamically unstable, symptomatic or H/H drops below 7/21.  - Patient will receive 1 unit PRBCs today  - Patient's anemia is currently worsening    NSTEMI (non-ST elevated myocardial infarction)  In setting of septic shock  High sensitivity troponin troponin 923.    EKG with T wave inversions  No chest pain    Hypercholesterolemia  Chronic and stable  Continue Statin      Chronic systolic heart failure  2D Echo showing EF of 20-25% with large AV vegetation partially occluding LVOT with moderate AV regurgitation  Volume removal with HD    Crohn's disease  S/p colectomy  No acute issues      History of nephrectomy  Noted  ESRD on HD    Hypertension  Patient's blood pressure range in the last 24 hours was: BP  Min: 91/55  Max:  113/71.  Hold BP meds    ESRD on hemodialysis  Nephro following  Tunneled HD line removed 12/22  Temporary trialysis placed 12/24  IR placed new line 12/31      VTE Risk Mitigation (From admission, onward)           Ordered     heparin (porcine) injection 1,000 Units  As needed (PRN)         01/03/25 1033     heparin (porcine) injection 5,000 Units  Every 8 hours         12/29/24 1000     Place sequential compression device  Until discontinued         12/19/24 1736     IP VTE LOW RISK PATIENT  Once         12/19/24 1736                    Discharge Planning   LLUVIA: 1/7/2025     Code Status: DNR   Medical Readiness for Discharge Date: 12/31/2024  Discharge Plan A: New Nursing Home placement - nursing home care facility   Discharge Delays: (!) Patient and Family Barriers            Please place Justification for DME        Adriana Ventura MD  Department of Hospital Medicine   [facility] Telemetry

## 2025-01-05 NOTE — ASSESSMENT & PLAN NOTE
Patient states that he has been having shoulder pain for the past couple of weeks associated with a fall some time back. Was not able to assess due to being in the hospital and is worried that it is dislocated.     - will obtain xrays of the shoulder and back

## 2025-01-06 LAB
ALBUMIN SERPL BCP-MCNC: 2.8 G/DL (ref 3.5–5.2)
ALP SERPL-CCNC: 305 U/L (ref 40–150)
ALT SERPL W/O P-5'-P-CCNC: <5 U/L (ref 10–44)
ANION GAP SERPL CALC-SCNC: 9 MMOL/L (ref 8–16)
AST SERPL-CCNC: 17 U/L (ref 10–40)
BASOPHILS # BLD AUTO: 0.05 K/UL (ref 0–0.2)
BASOPHILS NFR BLD: 1.1 % (ref 0–1.9)
BILIRUB SERPL-MCNC: 0.3 MG/DL (ref 0.1–1)
BUN SERPL-MCNC: 41 MG/DL (ref 8–23)
CALCIUM SERPL-MCNC: 9.6 MG/DL (ref 8.7–10.5)
CHLORIDE SERPL-SCNC: 103 MMOL/L (ref 95–110)
CO2 SERPL-SCNC: 20 MMOL/L (ref 23–29)
CREAT SERPL-MCNC: 5.3 MG/DL (ref 0.5–1.4)
DIFFERENTIAL METHOD BLD: ABNORMAL
EOSINOPHIL # BLD AUTO: 0.2 K/UL (ref 0–0.5)
EOSINOPHIL NFR BLD: 3.9 % (ref 0–8)
ERYTHROCYTE [DISTWIDTH] IN BLOOD BY AUTOMATED COUNT: 18 % (ref 11.5–14.5)
EST. GFR  (NO RACE VARIABLE): 11 ML/MIN/1.73 M^2
GLUCOSE SERPL-MCNC: 80 MG/DL (ref 70–110)
HCT VFR BLD AUTO: 26.1 % (ref 40–54)
HGB BLD-MCNC: 7.9 G/DL (ref 14–18)
IMM GRANULOCYTES # BLD AUTO: 0.02 K/UL (ref 0–0.04)
IMM GRANULOCYTES NFR BLD AUTO: 0.5 % (ref 0–0.5)
LYMPHOCYTES # BLD AUTO: 0.5 K/UL (ref 1–4.8)
LYMPHOCYTES NFR BLD: 11.4 % (ref 18–48)
MAGNESIUM SERPL-MCNC: 2.1 MG/DL (ref 1.6–2.6)
MCH RBC QN AUTO: 29.3 PG (ref 27–31)
MCHC RBC AUTO-ENTMCNC: 30.3 G/DL (ref 32–36)
MCV RBC AUTO: 97 FL (ref 82–98)
MONOCYTES # BLD AUTO: 0.5 K/UL (ref 0.3–1)
MONOCYTES NFR BLD: 11.6 % (ref 4–15)
NEUTROPHILS # BLD AUTO: 3.1 K/UL (ref 1.8–7.7)
NEUTROPHILS NFR BLD: 71.5 % (ref 38–73)
NRBC BLD-RTO: 0 /100 WBC
PHOSPHATE SERPL-MCNC: 6.3 MG/DL (ref 2.7–4.5)
PLATELET # BLD AUTO: 147 K/UL (ref 150–450)
PMV BLD AUTO: 12 FL (ref 9.2–12.9)
POTASSIUM SERPL-SCNC: 6 MMOL/L (ref 3.5–5.1)
PROT SERPL-MCNC: 7.2 G/DL (ref 6–8.4)
RBC # BLD AUTO: 2.7 M/UL (ref 4.6–6.2)
SODIUM SERPL-SCNC: 132 MMOL/L (ref 136–145)
WBC # BLD AUTO: 4.39 K/UL (ref 3.9–12.7)

## 2025-01-06 PROCEDURE — 85025 COMPLETE CBC W/AUTO DIFF WBC: CPT | Performed by: HOSPITALIST

## 2025-01-06 PROCEDURE — 80053 COMPREHEN METABOLIC PANEL: CPT | Performed by: HOSPITALIST

## 2025-01-06 PROCEDURE — 99223 1ST HOSP IP/OBS HIGH 75: CPT | Mod: GC,,, | Performed by: ORTHOPAEDIC SURGERY

## 2025-01-06 PROCEDURE — 90935 HEMODIALYSIS ONE EVALUATION: CPT | Mod: ,,, | Performed by: NURSE PRACTITIONER

## 2025-01-06 PROCEDURE — 25000003 PHARM REV CODE 250

## 2025-01-06 PROCEDURE — 25000003 PHARM REV CODE 250: Performed by: INTERNAL MEDICINE

## 2025-01-06 PROCEDURE — 21400001 HC TELEMETRY ROOM

## 2025-01-06 PROCEDURE — 25000003 PHARM REV CODE 250: Performed by: PHYSICIAN ASSISTANT

## 2025-01-06 PROCEDURE — 99900035 HC TECH TIME PER 15 MIN (STAT)

## 2025-01-06 PROCEDURE — 83735 ASSAY OF MAGNESIUM: CPT | Performed by: HOSPITALIST

## 2025-01-06 PROCEDURE — 63600175 PHARM REV CODE 636 W HCPCS

## 2025-01-06 PROCEDURE — 94640 AIRWAY INHALATION TREATMENT: CPT

## 2025-01-06 PROCEDURE — 36415 COLL VENOUS BLD VENIPUNCTURE: CPT | Performed by: HOSPITALIST

## 2025-01-06 PROCEDURE — 25000242 PHARM REV CODE 250 ALT 637 W/ HCPCS: Performed by: INTERNAL MEDICINE

## 2025-01-06 PROCEDURE — 63600175 PHARM REV CODE 636 W HCPCS: Performed by: INTERNAL MEDICINE

## 2025-01-06 PROCEDURE — 84100 ASSAY OF PHOSPHORUS: CPT | Performed by: HOSPITALIST

## 2025-01-06 PROCEDURE — 90935 HEMODIALYSIS ONE EVALUATION: CPT

## 2025-01-06 PROCEDURE — 63600175 PHARM REV CODE 636 W HCPCS: Performed by: STUDENT IN AN ORGANIZED HEALTH CARE EDUCATION/TRAINING PROGRAM

## 2025-01-06 RX ADMIN — HEPARIN SODIUM 1000 UNITS: 1000 INJECTION, SOLUTION INTRAVENOUS; SUBCUTANEOUS at 11:01

## 2025-01-06 RX ADMIN — SEVELAMER CARBONATE 800 MG: 800 TABLET, FILM COATED ORAL at 12:01

## 2025-01-06 RX ADMIN — OXYCODONE HYDROCHLORIDE 10 MG: 10 TABLET ORAL at 08:01

## 2025-01-06 RX ADMIN — HALOPERIDOL 5 MG: 5 TABLET ORAL at 08:01

## 2025-01-06 RX ADMIN — CEFAZOLIN 1 G: 1 INJECTION, POWDER, FOR SOLUTION INTRAMUSCULAR; INTRAVENOUS at 08:01

## 2025-01-06 RX ADMIN — HEPARIN SODIUM 5000 UNITS: 5000 INJECTION INTRAVENOUS; SUBCUTANEOUS at 05:01

## 2025-01-06 RX ADMIN — SEVELAMER CARBONATE 800 MG: 800 TABLET, FILM COATED ORAL at 05:01

## 2025-01-06 RX ADMIN — HEPARIN SODIUM 5000 UNITS: 5000 INJECTION INTRAVENOUS; SUBCUTANEOUS at 11:01

## 2025-01-06 RX ADMIN — EPOETIN ALFA-EPBX 3000 UNITS: 3000 INJECTION, SOLUTION INTRAVENOUS; SUBCUTANEOUS at 08:01

## 2025-01-06 RX ADMIN — IPRATROPIUM BROMIDE AND ALBUTEROL SULFATE 3 ML: 2.5; .5 SOLUTION RESPIRATORY (INHALATION) at 01:01

## 2025-01-06 RX ADMIN — Medication 6 MG: at 08:01

## 2025-01-06 RX ADMIN — ATORVASTATIN CALCIUM 40 MG: 40 TABLET, FILM COATED ORAL at 12:01

## 2025-01-06 NOTE — PLAN OF CARE
CHW follow-up on patient with bank statement. He stated he called bank last week and request statements. [facility] will mail statements to resident.

## 2025-01-06 NOTE — ASSESSMENT & PLAN NOTE
Hyperkalemia is likely due to ESRD.The patients most recent potassium results are listed below.  Recent Labs     01/06/25  0241   K 6.0*       Plan  - Monitor for arrhythmias with EKG and/or continuous telemetry.   - HD today

## 2025-01-06 NOTE — PROGRESS NOTES
Jason Long - Internal Medicine [facility] Medicine  Progress Note    Patient Name: Flakito Mcginnis  MRN: [id number]  Patient Class: IP- Inpatient   Admission Date: 12/19/2024  Length of Stay: 18 days  Attending Physician: Jeff Morejon,*  Primary Care Provider: Jordin Robbins MD        Subjective     Principal Problem:Septic shock        HPI:  By Alicia Galloway NP    Mr. Flakito Mcginnis is a 69 y.o. man w/ HFrEF (30% 8/2024 from 20-25% 6/2024), NICM, MR, ESRD on HD, chronic respiratory failure on home 3L NC, Crohn's s/p colostomy, h/o R nephrectomy. He presented to [facility] ED from his HD center on 12/19 due to hypotension and ongoing shortness of breath. He usually receives his dialysis on T, R, S and went on Wednesday for an extra session for volume removal. He arrived on Thursday for his usually scheduled dialysis session and was directed to the ED due to hypotension. On arrival in the ED his blood pressure was 78/51. He was found to have a BNP >4900 and CXR concerning for volume overload. High sensitivity troponin 923. Critical care medicine was consulted for hypotension and admitted to MICU.     Overview/Hospital Course:  Mr. 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 ID was consulted and recommending 6 weeks of IV Cefazolin after HD, end date 2/2/25. CTS evaluated and recommending medical management at this time due to multiple co-morbidities. Tunneled catheter was removed 12/22. Repeat cultures from 12/23 with NGTD. Temporary RIJ trialysis line placed 12/24.  Course further complicated by acute hypoxemic respiratory failure likely volume overload requiring HFNC.  Oxygenation improved with volume removal with dialysis and he was weaned down to NC.  He was SD to  on 12/29.  IR was consulted for new HD line placement which was  placed on 12/31.  He was going to be discharged home after his new line placement, but sister refuses to take him home and says he needs residential NH placement. CM assisting    Interval History: K at 6 this am Plan for HD today    Review of Systems  Objective:     Vital Signs (Most Recent):  Temp: 97.3 °F (36.3 °C) (01/06/25 0341)  Pulse: 91 (01/06/25 1202)  Resp: 18 (01/06/25 0842)  BP: 118/69 (01/06/25 1202)  SpO2: 97 % (01/06/25 1202) Vital Signs (24h Range):  Temp:  [97.3 °F (36.3 °C)-98.4 °F (36.9 °C)] 97.3 °F (36.3 °C)  Pulse:  [] 91  Resp:  [16-19] 18  SpO2:  [94 %-97 %] 97 %  BP: ()/(50-69) 118/69     Weight: 56 kg (123 lb 7.3 oz)  Body mass index is 19.93 kg/m².    Intake/Output Summary (Last 24 hours) at 1/6/2025 1237  Last data filed at 1/6/2025 1145  Gross per 24 hour   Intake 1120 ml   Output 699 ml   Net 421 ml      Physical Exam  Constitutional:       General: He is not in acute distress.  Cardiovascular:      Rate and Rhythm: Normal rate.      Pulses: Normal pulses.   Pulmonary:      Effort: No respiratory distress.      Breath sounds: Normal breath sounds. No wheezing.   Abdominal:      General: Bowel sounds are normal. There is no distension.      Palpations: Abdomen is soft.      Tenderness: There is no abdominal tenderness.   Musculoskeletal:      Right lower leg: No edema.      Left lower leg: No edema.   Neurological:      Mental Status: He is alert. Mental status is at baseline.         Computed MELD 3.0 unavailable. One or more values for this score either were not found within the given timeframe or did not fit some other criterion.  Computed MELD-Na unavailable. One or more values for this score either were not found within the given timeframe or did not fit some other criterion.      Significant Labs:  CBC:  Recent Labs   Lab 01/06/25  0241   WBC 4.39   HGB 7.9*   HCT 26.1*   *     CMP:  Recent Labs   Lab 01/06/25  0241   *   K 6.0*      CO2 20*   GLU 80  "  BUN 41*   CREATININE 5.3*   CALCIUM 9.6   PROT 7.2   ALBUMIN 2.8*   BILITOT 0.3   ALKPHOS 305*   AST 17   ALT <5*   ANIONGAP 9     PTINR:  No results for input(s): "INR" in the last 48 hours.        Assessment and Plan     * Septic shock  Admitted to MICU 12/19 for septic shock 2/2 Staph capitis bacteremia and endocarditis from his tunneled HD line  Weaned off vasopressors in the ICU  2D echo with mass on the aortic valve suggestive of vegetation  Tunneled HD line removed 12/22  Temporary trialysis placed 12/24  ID consulted:  Suggest 6 weeks of IV Ancef with HD through 2/2/25  CTS consulted:  Suggest IV abx, no surgical intervention  IR placed new tunneled line 12/31    Chronic left shoulder pain  Patient states that he has been having shoulder pain for the past couple of weeks associated with a fall some time back. Was not able to assess due to being in the hospital and is worried that it is dislocated.       Xray L shoulder with remote fracture about the superolateral aspect of the humeral head. Suspected anterior subluxation of the humerus with respect to the glenoid   Consulted ortho    ACP (advance care planning)  DNR noted      Debility  Patient with Acute debility due to  acute illness . The patient's latest AMPAC (Activity Measure for Post Acute Care) Score is listed below.    AM-PAC Score - How much help does the patient need for each activity listed  Basic Mobility Total Score: 17  Turning over in bed (including adjusting bedclothes, sheets and blankets)?: A little  Sitting down on and standing up from a chair with arms (e.g., wheelchair, bedside commode, etc.): A little  Moving from lying on back to sitting on the side of the bed?: A little  Moving to and from a bed to a chair (including a wheelchair)?: A little  Need to walk in hospital room?: A little  Climbing 3-5 steps with a railing?: A lot    Plan  - Progressive mobility protocol initated  - PT/OT consulted    Palliative care encounter  DNR " noted      Acute on chronic respiratory failure with hypoxia  Patient with Hypoxic Respiratory failure which is Acute on chronic.  He is on home oxygen 3-4L. Supplemental oxygen was provided and noted-       .   Signs/symptoms of respiratory failure include- tachypnea, increased work of breathing, respiratory distress, and use of accessory muscles. Contributing diagnoses includes - CHF Labs and images were reviewed. Patient Has recent ABG, which has been reviewed. Will treat underlying causes and adjust management of respiratory failure as follows-   Continue to wean oxygen to goal SaO2 of 90%  Aggressive pulmonary toilet in setting of atelectasis of left lower lung field.   Weaned down to baseline 3L of O2 with NC    Hyperkalemia  Hyperkalemia is likely due to ESRD.The patients most recent potassium results are listed below.  Recent Labs     01/06/25  0241   K 6.0*       Plan  - Monitor for arrhythmias with EKG and/or continuous telemetry.   - HD today            Bacteremia due to Staphylococcus  Seeding from tunneled HD line with AV vegetation  ID consulted:  Suggest 6 weeks of IV Ancef with HD through 2/2/25    Endocarditis  See septic shock    Anemia of chronic renal failure, stage 5  Anemia is likely due to chronic disease due to ESRD. Most recent hemoglobin and hematocrit are listed below.  Recent Labs     01/06/25  0241   HGB 7.9*   HCT 26.1*       Plan  - Monitor serial CBC: Daily  - Transfuse PRBC if patient becomes hemodynamically unstable, symptomatic or H/H drops below 7/21.  - s/p 1 unit PRBCs this admit  - Patient's anemia is currently worsening    NSTEMI (non-ST elevated myocardial infarction)  In setting of septic shock  High sensitivity troponin troponin 923.    EKG with T wave inversions  No chest pain    Hypercholesterolemia  Chronic and stable  Continue Statin      Chronic systolic heart failure  2D Echo showing EF of 20-25% with large AV vegetation partially occluding LVOT with moderate AV  regurgitation  Volume removal with HD    Crohn's disease  S/p colectomy  No acute issues      History of nephrectomy  Noted  ESRD on HD    Hypertension  Patient's blood pressure range in the last 24 hours was: BP  Min: 83/52  Max: 118/69.  Hold BP meds    ESRD on hemodialysis  Nephro following  Tunneled HD line removed 12/22  Temporary trialysis placed 12/24  IR placed new line 12/31      VTE Risk Mitigation (From admission, onward)           Ordered     heparin (porcine) injection 1,000 Units  As needed (PRN)         01/03/25 1033     heparin (porcine) injection 5,000 Units  Every 8 hours         12/29/24 1000     Place sequential compression device  Until discontinued         12/19/24 1736     IP VTE LOW RISK PATIENT  Once         12/19/24 1736                    Discharge Planning   LLUVIA: 1/7/2025     Code Status: DNR   Medical Readiness for Discharge Date: 12/31/2024  Discharge Plan A: New Nursing Home placement - senior living care facility   Discharge Delays: (!) Patient and Family Barriers            Please place Justification for DME        Jeff Morejon MD  Department of Hospital Medicine   [facility] - Internal Medicine Telemetry

## 2025-01-06 NOTE — PLAN OF CARE
[facility] Long - Internal Medicine Telemetry  Discharge Reassessment    Primary Care Provider: Jordin Robbins MD    Expected Discharge Date: 1/7/2025    Reassessment (most recent)       Discharge Reassessment - 01/06/25 1649          Discharge Reassessment    Assessment Type Discharge Planning Reassessment (P)      Did the patient's condition or plan change since previous assessment? No (P)      Discharge Plan discussed with: Patient (P)      Communicated LLUVIA with patient/caregiver Yes (P)      Discharge Plan A New Nursing Home placement - [facility] care facility (P)      Discharge Plan B Home Health;Group home (P)      DME Needed Upon Discharge  -- (P)    TBD    Transition of Care Barriers Social;No family/friends to help;Homeless (P)      Why the patient remains in the hospital Social issues (P)         Post-Acute Status    Post-Acute Authorization Placement (P)      Post-Acute Placement Status Pending post-acute provider review/more information requested (P)      Coverage [employer] MANAGED MEDICARE - AETNA MEDICARE DUAL DSNP - (P)      Discharge Delays Patient and Family Barriers (P)                  Discharge Plan A and Plan B have been determined by review of patient's clinical status, future medical and therapeutic needs, and coverage/benefits for post-acute care in coordination with multidisciplinary team members.         Patient confirmed that plan remains dc to [facility] pending receipt of financials. Patient states that he requested previous 3 months bank statements via phone, to be sent by mail.     [facility] accepts, pending financials.                 VAL Haley, LMSW  [facility]  Case Management  Ext. [phone]

## 2025-01-06 NOTE — PROGRESS NOTES
[facility] NOTE     Patient currently on hemodialysis for removal of uremic toxins .     Patient seen and evaluated on hemodialysis, tolerating treatment, see HD flowsheet for vitals and assessments.      No chest pain, shortness of breath, cramping, nausea or vomiting.     Following patient for the management of ESRD    Target UF: adjusted to 0 L due to hypotension.   Pending NH placement. Potassium 6.0. Patient refusing to dialyze longer than 3 hrs even with significant hyperkalemia on AM labs. Potassium bath adjusted.   UF adjusted to 0 as patient remain hypotensive this AM. SBPs in the 80s. Appears asymptomatic.   Continue EPO  Continue Sevelamer   Consider renal diet restrictions; please limit daily intake to 32 oz per day.   No lab stick/BP intake on access site  Continue to monitor intake and output, daily weights   Please avoid gadolinium, fleets, phos-based laxatives, NSAIDs  Will follow closely and continue dialysis treatments while in-patient    LONDON Still DNP, APRN, FNP-C  Department of Nephrology  [facility]  Pager: [phone]

## 2025-01-06 NOTE — SUBJECTIVE & OBJECTIVE
"Interval History: K at 6 this am Plan for HD today    Review of Systems  Objective:     Vital Signs (Most Recent):  Temp: 97.3 °F (36.3 °C) (01/06/25 0341)  Pulse: 91 (01/06/25 1202)  Resp: 18 (01/06/25 0842)  BP: 118/69 (01/06/25 1202)  SpO2: 97 % (01/06/25 1202) Vital Signs (24h Range):  Temp:  [97.3 °F (36.3 °C)-98.4 °F (36.9 °C)] 97.3 °F (36.3 °C)  Pulse:  [] 91  Resp:  [16-19] 18  SpO2:  [94 %-97 %] 97 %  BP: ()/(50-69) 118/69     Weight: 56 kg (123 lb 7.3 oz)  Body mass index is 19.93 kg/m².    Intake/Output Summary (Last 24 hours) at 1/6/2025 1237  Last data filed at 1/6/2025 1145  Gross per 24 hour   Intake 1120 ml   Output 699 ml   Net 421 ml      Physical Exam  Constitutional:       General: He is not in acute distress.  Cardiovascular:      Rate and Rhythm: Normal rate.      Pulses: Normal pulses.   Pulmonary:      Effort: No respiratory distress.      Breath sounds: Normal breath sounds. No wheezing.   Abdominal:      General: Bowel sounds are normal. There is no distension.      Palpations: Abdomen is soft.      Tenderness: There is no abdominal tenderness.   Musculoskeletal:      Right lower leg: No edema.      Left lower leg: No edema.   Neurological:      Mental Status: He is alert. Mental status is at baseline.         Computed MELD 3.0 unavailable. One or more values for this score either were not found within the given timeframe or did not fit some other criterion.  Computed MELD-Na unavailable. One or more values for this score either were not found within the given timeframe or did not fit some other criterion.      Significant Labs:  CBC:  Recent Labs   Lab 01/06/25  0241   WBC 4.39   HGB 7.9*   HCT 26.1*   *     CMP:  Recent Labs   Lab 01/06/25  0241   *   K 6.0*      CO2 20*   GLU 80   BUN 41*   CREATININE 5.3*   CALCIUM 9.6   PROT 7.2   ALBUMIN 2.8*   BILITOT 0.3   ALKPHOS 305*   AST 17   ALT <5*   ANIONGAP 9     PTINR:  No results for input(s): "INR" in the " last 48 hours.

## 2025-01-06 NOTE — NURSING
"Dialysis started to [facility].  Tolerated well.    3.5 hour dialysis ordered, but patient states, "I will not go on this machine unless I am guaranteed only 3 hours".      This writer informed patient of 6 K+ on labs and the potential danger of such a lab value.    Patient then repeated the above statement.    Nephrology notified.  "

## 2025-01-06 NOTE — PT/OT/SLP PROGRESS
Occupational Therapy      Patient Name:  Flakito Mcginnis   MRN:  [id number]    Patient not seen today secondary to patient off floor at 1052 am for HD . Will follow-up on the next schedule visit accordingly to OT POC.    1/6/2025

## 2025-01-06 NOTE — ASSESSMENT & PLAN NOTE
Anemia is likely due to chronic disease due to ESRD. Most recent hemoglobin and hematocrit are listed below.  Recent Labs     01/06/25  0241   HGB 7.9*   HCT 26.1*       Plan  - Monitor serial CBC: Daily  - Transfuse PRBC if patient becomes hemodynamically unstable, symptomatic or H/H drops below 7/21.  - s/p 1 unit PRBCs this admit  - Patient's anemia is currently worsening

## 2025-01-06 NOTE — ASSESSMENT & PLAN NOTE
Patient's blood pressure range in the last 24 hours was: BP  Min: 83/52  Max: 118/69.  Hold BP meds

## 2025-01-06 NOTE — HPI
Flakito Mcginnis is a 69 y.o. male with PMH significant for ESRD on HD, HTN, Crohn's disease, CHF, prior NSTEMI, COPD, chronic left shoulder pain presenting with left shoulder pain. The patient was found to have a left greater tuberosity fracture after a GLF 8/19/24 and was treated non-op in a sling by Francisco Nur MD. Since then, he has had pain to the left shoulder but I am unable to see where he has followed up with anyone. Patient denies any head trauma or LOC. The patient denies any additional falls. Patient denies numbness and tingling to the left upper extremity. Denies any other musculoskeletal pain or injuries. No known history of prior left shoulder injury or surgery.

## 2025-01-06 NOTE — ASSESSMENT & PLAN NOTE
Patient states that he has been having shoulder pain for the past couple of weeks associated with a fall some time back. Was not able to assess due to being in the hospital and is worried that it is dislocated.       Xray L shoulder with remote fracture about the superolateral aspect of the humeral head. Suspected anterior subluxation of the humerus with respect to the glenoid   Consulted ortho

## 2025-01-06 NOTE — PLAN OF CARE
AAOx4; POC reviewed & verbalizes understanding.  Admit DX: Septic Shock  Afebrile. No acute events on shift. No deficits noted  IV access & IVF: PIV saline locked  BM: 1/6/24 LLQ Colostomy, emptied today, liquid brown stool noted  : oliguria, pt HD  HD today, 3 hour session, fluid not removed  Appetite: adequate  Bed alarm set; fall precautions maintained.   Bed locked in lowest position, side rails up x2, call light within reach, environment clear. Encouraged pt to call nurse with any concerns.  Safety measures maintained

## 2025-01-06 NOTE — PLAN OF CARE
Problem: Adult Inpatient Plan of Care  Goal: Plan of Care Review  Outcome: Progressing  Goal: Patient-Specific Goal (Individualized)  Outcome: Progressing  Goal: Absence of Hospital-Acquired Illness or Injury  Outcome: Progressing  Goal: Optimal Comfort and Wellbeing  Outcome: Progressing  Goal: Readiness for Transition of Care  Outcome: Progressing     Problem: Adult Inpatient Plan of Care  Goal: Plan of Care Review  Outcome: Progressing  Goal: Patient-Specific Goal (Individualized)  Outcome: Progressing  Goal: Absence of Hospital-Acquired Illness or Injury  Outcome: Progressing  Goal: Optimal Comfort and Wellbeing  Outcome: Progressing  Goal: Readiness for Transition of Care  Outcome: Progressing     Problem: Infection  Goal: Absence of Infection Signs and Symptoms  Outcome: Progressing     Problem: Skin Injury Risk Increased  Goal: Skin Health and Integrity  Outcome: Progressing     Problem: Sepsis/Septic Shock  Goal: Optimal Coping  Outcome: Progressing  Goal: Absence of Bleeding  Outcome: Progressing  Goal: Blood Glucose Level Within Targeted Range  Outcome: Progressing  Goal: Absence of Infection Signs and Symptoms  Outcome: Progressing  Goal: Optimal Nutrition Intake  Outcome: Progressing     Problem: CRRT (Continuous Renal Replacement Therapy)  Goal: Safe, Effective Therapy Delivery  Outcome: Progressing  Goal: Hemodynamic Stability  Outcome: Progressing  Goal: Body Temperature Maintained in Desired Range  Outcome: Progressing  Goal: Absence of Infection Signs and Symptoms  Outcome: Progressing     Problem: Fall Injury Risk  Goal: Absence of Fall and Fall-Related Injury  Outcome: Progressing     Problem: Coping Ineffective  Goal: Effective Coping  Outcome: Progressing     Problem: Hemodialysis  Goal: Safe, Effective Therapy Delivery  Outcome: Progressing  Goal: Effective Tissue Perfusion  Outcome: Progressing  Goal: Absence of Infection Signs and Symptoms  Outcome: Progressing     Problem: Wound  Goal:  Optimal Coping  Outcome: Progressing  Goal: Optimal Functional Ability  Outcome: Progressing  Goal: Absence of Infection Signs and Symptoms  Outcome: Progressing  Goal: Improved Oral Intake  Outcome: Progressing  Goal: Optimal Pain Control and Function  Outcome: Progressing  Goal: Skin Health and Integrity  Outcome: Progressing  Goal: Optimal Wound Healing  Outcome: Progressing    Pt had an uneventful night. VSS. Pt denied any pain or discomfort  Pt is free of falls and injuries. Pt uncooperative with calling for assistance and plan of care  Bed in lowest position and call light within reach. Safety maintained

## 2025-01-06 NOTE — NURSING
3 hour dialysis complete.  Blood returned.  RIJ PC flushed, heparin locked, capped and wrapped in gauze.    Net +300 mls d/t hypotensive (asymptomatic) during tx.  LONDON Still DNP aware.    Epo given.      Transported from ERNST to room 1155 via w/c by transporter.

## 2025-01-06 NOTE — PT/OT/SLP PROGRESS
Physical Therapy      Patient Name:  Flakito Mcginnis   MRN:  [id number]    Patient not seen today secondary to Patient out on Dialysis. Will follow-up on next scheduled visit.

## 2025-01-06 NOTE — ASSESSMENT & PLAN NOTE
Patient with Hypoxic Respiratory failure which is Acute on chronic.  He is on home oxygen 3-4L. Supplemental oxygen was provided and noted-       .   Signs/symptoms of respiratory failure include- tachypnea, increased work of breathing, respiratory distress, and use of accessory muscles. Contributing diagnoses includes - CHF Labs and images were reviewed. Patient Has recent ABG, which has been reviewed. Will treat underlying causes and adjust management of respiratory failure as follows-   Continue to wean oxygen to goal SaO2 of 90%  Aggressive pulmonary toilet in setting of atelectasis of left lower lung field.   Weaned down to baseline 3L of O2 with NC

## 2025-01-06 NOTE — SUBJECTIVE & OBJECTIVE
Past Medical History:   Diagnosis Date    Crohn's disease 1998    ESRD (end stage renal disease) on dialysis 10/2017    Hypertension     Obstructive uropathy        Past Surgical History:   Procedure Laterality Date    COLOSTOMY  2006    DIALYSIS FISTULA CREATION Left 02/2018    NEPHRECTOMY Right     REMOVAL OF TUNNELED CENTRAL VENOUS CATHETER (CVC) N/A 5/23/2018    Procedure: PERMCATH REMOVAL-TUNNELED CVC REMOVAL;  Surgeon: Parveen Ray MD;  Location: [facility] CATH LAB;  Service: Nephrology;  Laterality: N/A;  pt coming in @930       Review of patient's allergies indicates:   Allergen Reactions    Vancomycin analogues Anaphylaxis, Other (See Comments), Shortness Of Breath and Swelling     Light headed, see's spots      Aspirin Nausea And Vomiting and Other (See Comments)     Has crohn's disease    Other reaction(s): FLARES UP CROHNS      Has crohn's disease       Current Facility-Administered Medications   Medication    0.9%  NaCl infusion (for blood administration)    0.9% NaCl infusion    acetaminophen tablet 500 mg    albuterol-ipratropium 2.5 mg-0.5 mg/3 mL nebulizer solution 3 mL    atorvastatin tablet 40 mg    ceFAZolin injection 1 g    epoetin ludin-epbx injection 3,000 Units    haloperidoL tablet 5 mg    heparin (porcine) injection 1,000 Units    heparin (porcine) injection 5,000 Units    melatonin tablet 6 mg    mupirocin 2 % ointment    oxyCODONE immediate release tablet 5 mg    oxyCODONE immediate release tablet Tab 10 mg    sevelamer carbonate tablet 800 mg    sodium chloride 0.9% flush 10 mL     Family History       Problem Relation (Age of Onset)    Emphysema Father    Hypertension Mother          Tobacco Use    Smoking status: Former     Passive exposure: Never    Smokeless tobacco: Never   Substance and Sexual Activity    Alcohol use: Not Currently    Drug use: Never    Sexual activity: Not Currently     ROS  Constitutional: negative for fevers or chills  Eyes: negative visual changes or eye  "discharge  ENT: negative for ear pain or sore throat  Respiratory: negative for shortness of breath or cough  Cardiovascular: negative for chest pain or palpitations  Gastrointestinal: negative for abdominal pain, nausea, or vomiting  Genitourinary: negative for dysuria and flank pain  Neurological: negative for headaches or dizziness  Musculoskeletal: positive for left shoulder pain     Objective:     Vital Signs (Most Recent):  Temp: 97.3 °F (36.3 °C) (01/06/25 0341)  Pulse: 91 (01/06/25 1202)  Resp: 18 (01/06/25 0842)  BP: 118/69 (01/06/25 1202)  SpO2: 97 % (01/06/25 1202) Vital Signs (24h Range):  Temp:  [97.3 °F (36.3 °C)-98.4 °F (36.9 °C)] 97.3 °F (36.3 °C)  Pulse:  [] 91  Resp:  [16-19] 18  SpO2:  [94 %-97 %] 97 %  BP: ()/(50-69) 118/69     Weight: 56 kg (123 lb 7.3 oz)  Height: 5' 6" (167.6 cm)  Body mass index is 19.93 kg/m².      Intake/Output Summary (Last 24 hours) at 1/6/2025 1414  Last data filed at 1/6/2025 1145  Gross per 24 hour   Intake 1120 ml   Output 699 ml   Net 421 ml        Ortho/SPM Exam  General:  he has severe cervical-thoracic kyphosis and is unable to lift his head up to make eye contact during the interview  Neuro: alert and oriented x3  Psych: normal mood  Head: normocephalic, atraumatic.  Eyes: no scleral icterus  Mouth: moist mucous membranes  CV: extremities warm and well perfused  Pulm: breathing comfortably, equal chest rise bilat  Skin: clean, dry, intact (any exceptions noted in below musculoskeletal exam)    MSK:    RUE:  - Skin intact throughout, no open wounds  - No swelling  - No ecchymosis, erythema, or signs of cellulitis  - NonTTP throughout  - AROM and PROM of the shoulder, elbow, wrist, and hand intact without pain  - Axillary/AIN/PIN/Radial/Median/Ulnar Nerves assessed in isolation without deficit  - SILT throughout  - Compartments soft  - Radial artery palpated   - Capillary Refill <3s    LUE:  - Skin intact throughout, no open wounds  - 2+ pitting edema " of the hand   - No ecchymosis, erythema, or signs of cellulitis  - TTP of the shoulder; prominent AC joint and the humeral head projects anterior to the glenoid as compared to the contralateral side   - Pain with any ROM of the shoulder   - AROM and PROM of the elbow, wrist, and hand intact without pain  - Axillary/AIN/PIN/Radial/Median/Ulnar Nerves assessed in isolation without deficit  - SILT throughout  - Compartments soft  - Radial artery palpated   - Capillary Refill <3s    RLE:  - Skin intact throughout, no open wounds  - @+ pitting edema distal to the mid tibia   - No ecchymosis, erythema, or signs of cellulitis  - AROM and PROM of the hip, knee, ankle, and foot intact without pain  - TA/EHL/Gastroc/FHL assessed in isolation without deficit  - SILT throughout  - Compartments soft  - DP palpated    LLE:  - Skin intact throughout, no open wounds  - @+ pitting edema distal to the mid tibia   - No ecchymosis, erythema, or signs of cellulitis  - AROM and PROM of the hip, knee, ankle, and foot intact without pain  - TA/EHL/Gastroc/FHL assessed in isolation without deficit  - SILT throughout  - Compartments soft  - DP palpated       Significant Labs:   Recent Lab Results         01/06/25  0241        Albumin 2.8              ALT <5       Anion Gap 9       AST 17       Baso # 0.05       Basophil % 1.1       BILIRUBIN TOTAL 0.3  Comment: For infants and newborns, interpretation of results should be based  on gestational age, weight and in agreement with clinical  observations.    Premature Infant recommended reference ranges:  Up to 24 hours.............<8.0 mg/dL  Up to 48 hours............<12.0 mg/dL  3-5 days..................<15.0 mg/dL  6-29 days.................<15.0 mg/dL         BUN 41       Calcium 9.6       Chloride 103       CO2 20       Creatinine 5.3       Differential Method Automated       eGFR 11.0       Eos # 0.2       Eos % 3.9       Glucose 80       Gran # (ANC) 3.1       Gran % 71.5        Hematocrit 26.1       Hemoglobin 7.9       Immature Grans (Abs) 0.02  Comment: Mild elevation in immature granulocytes is non specific and   can be seen in a variety of conditions including stress response,   acute inflammation, trauma and pregnancy. Correlation with other   laboratory and clinical findings is essential.         Immature Granulocytes 0.5       Lymph # 0.5       Lymph % 11.4       Magnesium  2.1       MCH 29.3       MCHC 30.3       MCV 97       Mono # 0.5       Mono % 11.6       MPV 12.0       nRBC 0       Phosphorus Level 6.3       Platelet Count 147       Potassium 6.0  Comment: *No Visible Hemolysis       PROTEIN TOTAL 7.2       RBC 2.70       RDW 18.0       Sodium 132       WBC 4.39             All pertinent labs within the past 24 hours have been reviewed.    Significant Imaging: X-Ray: I have reviewed all pertinent results/findings and my personal findings are:  XR L shoulder with remote appearing fracture of the greater tuberosity. The humerus is subluxed anteriorly; however, these films are similar in appearance as compared to prior L shoulder films October 2024.

## 2025-01-07 LAB
ALBUMIN SERPL BCP-MCNC: 2.6 G/DL (ref 3.5–5.2)
ANION GAP SERPL CALC-SCNC: 7 MMOL/L (ref 8–16)
BUN SERPL-MCNC: 30 MG/DL (ref 8–23)
CALCIUM SERPL-MCNC: 9.4 MG/DL (ref 8.7–10.5)
CHLORIDE SERPL-SCNC: 101 MMOL/L (ref 95–110)
CO2 SERPL-SCNC: 26 MMOL/L (ref 23–29)
CREAT SERPL-MCNC: 3.9 MG/DL (ref 0.5–1.4)
EST. GFR  (NO RACE VARIABLE): 15.9 ML/MIN/1.73 M^2
GLUCOSE SERPL-MCNC: 99 MG/DL (ref 70–110)
PHOSPHATE SERPL-MCNC: 5.1 MG/DL (ref 2.7–4.5)
POTASSIUM SERPL-SCNC: 4.9 MMOL/L (ref 3.5–5.1)
SODIUM SERPL-SCNC: 134 MMOL/L (ref 136–145)

## 2025-01-07 PROCEDURE — 97535 SELF CARE MNGMENT TRAINING: CPT | Mod: CO

## 2025-01-07 PROCEDURE — 25000003 PHARM REV CODE 250

## 2025-01-07 PROCEDURE — 92610 EVALUATE SWALLOWING FUNCTION: CPT

## 2025-01-07 PROCEDURE — 99232 SBSQ HOSP IP/OBS MODERATE 35: CPT | Mod: ,,, | Performed by: NURSE PRACTITIONER

## 2025-01-07 PROCEDURE — 21400001 HC TELEMETRY ROOM

## 2025-01-07 PROCEDURE — 99900035 HC TECH TIME PER 15 MIN (STAT)

## 2025-01-07 PROCEDURE — 63600175 PHARM REV CODE 636 W HCPCS: Performed by: STUDENT IN AN ORGANIZED HEALTH CARE EDUCATION/TRAINING PROGRAM

## 2025-01-07 PROCEDURE — 97116 GAIT TRAINING THERAPY: CPT | Mod: CQ

## 2025-01-07 PROCEDURE — 97530 THERAPEUTIC ACTIVITIES: CPT | Mod: CQ

## 2025-01-07 PROCEDURE — 27000221 HC OXYGEN, UP TO 24 HOURS

## 2025-01-07 PROCEDURE — 25000003 PHARM REV CODE 250: Performed by: PHYSICIAN ASSISTANT

## 2025-01-07 PROCEDURE — 25000003 PHARM REV CODE 250: Performed by: INTERNAL MEDICINE

## 2025-01-07 PROCEDURE — 97110 THERAPEUTIC EXERCISES: CPT | Mod: CQ

## 2025-01-07 PROCEDURE — 36415 COLL VENOUS BLD VENIPUNCTURE: CPT | Performed by: STUDENT IN AN ORGANIZED HEALTH CARE EDUCATION/TRAINING PROGRAM

## 2025-01-07 PROCEDURE — 80069 RENAL FUNCTION PANEL: CPT | Performed by: STUDENT IN AN ORGANIZED HEALTH CARE EDUCATION/TRAINING PROGRAM

## 2025-01-07 PROCEDURE — 94640 AIRWAY INHALATION TREATMENT: CPT

## 2025-01-07 PROCEDURE — 25000242 PHARM REV CODE 250 ALT 637 W/ HCPCS: Performed by: INTERNAL MEDICINE

## 2025-01-07 PROCEDURE — 94761 N-INVAS EAR/PLS OXIMETRY MLT: CPT

## 2025-01-07 PROCEDURE — 63600175 PHARM REV CODE 636 W HCPCS: Performed by: INTERNAL MEDICINE

## 2025-01-07 RX ADMIN — HEPARIN SODIUM 5000 UNITS: 5000 INJECTION INTRAVENOUS; SUBCUTANEOUS at 09:01

## 2025-01-07 RX ADMIN — HEPARIN SODIUM 5000 UNITS: 5000 INJECTION INTRAVENOUS; SUBCUTANEOUS at 05:01

## 2025-01-07 RX ADMIN — SEVELAMER CARBONATE 800 MG: 800 TABLET, FILM COATED ORAL at 12:01

## 2025-01-07 RX ADMIN — IPRATROPIUM BROMIDE AND ALBUTEROL SULFATE 3 ML: 2.5; .5 SOLUTION RESPIRATORY (INHALATION) at 11:01

## 2025-01-07 RX ADMIN — HALOPERIDOL 5 MG: 5 TABLET ORAL at 08:01

## 2025-01-07 RX ADMIN — HEPARIN SODIUM 5000 UNITS: 5000 INJECTION INTRAVENOUS; SUBCUTANEOUS at 11:01

## 2025-01-07 RX ADMIN — SEVELAMER CARBONATE 800 MG: 800 TABLET, FILM COATED ORAL at 09:01

## 2025-01-07 RX ADMIN — CEFAZOLIN 1 G: 1 INJECTION, POWDER, FOR SOLUTION INTRAMUSCULAR; INTRAVENOUS at 08:01

## 2025-01-07 RX ADMIN — OXYCODONE HYDROCHLORIDE 10 MG: 10 TABLET ORAL at 08:01

## 2025-01-07 RX ADMIN — IPRATROPIUM BROMIDE AND ALBUTEROL SULFATE 3 ML: 2.5; .5 SOLUTION RESPIRATORY (INHALATION) at 03:01

## 2025-01-07 RX ADMIN — IPRATROPIUM BROMIDE AND ALBUTEROL SULFATE 3 ML: 2.5; .5 SOLUTION RESPIRATORY (INHALATION) at 12:01

## 2025-01-07 RX ADMIN — SEVELAMER CARBONATE 800 MG: 800 TABLET, FILM COATED ORAL at 05:01

## 2025-01-07 RX ADMIN — ATORVASTATIN CALCIUM 40 MG: 40 TABLET, FILM COATED ORAL at 09:01

## 2025-01-07 RX ADMIN — MUPIROCIN: 20 OINTMENT TOPICAL at 09:01

## 2025-01-07 RX ADMIN — Medication 6 MG: at 08:01

## 2025-01-07 NOTE — PT/OT/SLP PROGRESS
Occupational Therapy   Treatment    Name: Flakito Mcginnis  MRN: [id number]  Admitting Diagnosis:  Septic shock       Recommendations:     Discharge Recommendations: Low Intensity Therapy  Discharge Equipment Recommendations:  bath bench  Barriers to discharge:  Decreased caregiver support, Other (Comment) (patient requires increased level of assistance)    Assessment:     Flakito Mcginnis is a 69 y.o. male with a medical diagnosis of Septic shock.  He presents with the fallowing performance deficits affecting function are weakness, impaired endurance, impaired self care skills, impaired functional mobility, gait instability, impaired balance, decreased ROM, pain, decreased safety awareness, decreased lower extremity function, decreased upper extremity function, decreased coordination, impaired cognition, impaired coordination, impaired cardiopulmonary response to activity. Patient agreeable to tx session, patient is demonstrating progress with functional transfers, bed mobility, and self-care task, however; patient continues to have limited activity tolerance due to pain and weakness from recent hospitalization. Patient would benefit from Moderate intensity therapy intervention to address over all functional decline with mobility task, endurance, and ADLs in order to return to PLOF.     Rehab Prognosis:  Good; patient would benefit from acute skilled OT services to address these deficits and reach maximum level of function.       Plan:     Patient to be seen 3 x/week to address the above listed problems via self-care/home management, therapeutic activities, therapeutic exercises  Plan of Care Expires: 01/23/25  Plan of Care Reviewed with: patient    Subjective     Chief Complaint: non verbalized  Patient/Family Comments/goals: to return to PLOF  Pain/Comfort:  Pain Rating 1: 0/10    Objective:     Communicated with: Nurse prior to session.  Patient found up in chair with oxygen upon OT entry to room.    General  Precautions: Standard, aspiration, fall    Orthopedic Precautions:N/A  Braces: N/A  Respiratory Status: Nasal cannula, flow 3 L/min     Occupational Performance:     Functional Mobility/Transfers:  Patient completed Sit <> Stand Transfer from EOB with contact guard assistance and minimum assistance  with  4 wheeled walker   Functional Mobility: Patient ambulated from bedside chair to bathroom (10ft+10ft) with CGA to SBA with rollator     Activities of Daily Living:  Grooming: Setup A to complete oral care task in standing and required SBA to complete task in standing       [facility] 6 Click ADL: 16    Treatment & Education:  Discussed OT POC and progress  Educated patient on the importance to continue to perform exercises in order to reduce stiffness and promote joint mobility and blood flow  Addressed patient's questions and concerns within LUND scope of practice      Patient left HOB elevated with all lines intact, call button in reach, chair alarm on, nurse notified, and tele-sitter present    GOALS:   Multidisciplinary Problems       Occupational Therapy Goals          Problem: Occupational Therapy    Goal Priority Disciplines Outcome Interventions   Occupational Therapy Goal     OT, PT/OT Progressing    Description: Goals to be met by: 1/23/25 (1 mo)     Patient will increase functional independence with ADLs by performing:    UE Dressing with [place].  LE Dressing with [place].  Grooming while standing at sink with [place].  Toileting from toilet with [place] for hygiene and clothing management.   Rolling to Bilateral with [place].   Supine to sit with [place].  Step transfer with [place]  Toilet transfer to toilet with [place].                         Time Tracking:     OT Date of Treatment: 01/07/25  OT Start Time: 1215  OT Stop Time: 1235  OT Total Time (min): 20 min    Billable Minutes:Self Care/Home Management 20    OT/HAN: HAN     Number of HAN visits since last OT  visit: 2    1/7/2025

## 2025-01-07 NOTE — PROGRESS NOTES
[facility] Medicine  Progress Note    Patient Name: Flakito Mcginnis  MRN: [id number]  Patient Class: IP- Inpatient   Admission Date: 12/19/2024  Length of Stay: 19 days  Attending Physician: Jeff Morejon,*  Primary Care Provider: Jordin Robbins MD        Subjective     Principal Problem:Septic shock        HPI:  By Alicia Galloway NP    Mr. Flakito Mcginnis is a 69 y.o. man w/ HFrEF (30% 8/2024 from 20-25% 6/2024), NICM, MR, ESRD on HD, chronic respiratory failure on home 3L NC, Crohn's s/p colostomy, h/o R nephrectomy. He presented to [facility] ED from his HD center on 12/19 due to hypotension and ongoing shortness of breath. He usually receives his dialysis on T, R, S and went on Wednesday for an extra session for volume removal. He arrived on Thursday for his usually scheduled dialysis session and was directed to the ED due to hypotension. On arrival in the ED his blood pressure was 78/51. He was found to have a BNP >4900 and CXR concerning for volume overload. High sensitivity troponin 923. Critical care medicine was consulted for hypotension and admitted to MICU.     Overview/Hospital Course:  Mr. 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 ID was consulted and recommending 6 weeks of IV Cefazolin after HD, end date 2/2/25. CTS evaluated and recommending medical management at this time due to multiple co-morbidities. Tunneled catheter was removed 12/22. Repeat cultures from 12/23 with NGTD. Temporary RIJ trialysis line placed 12/24.  Course further complicated by acute hypoxemic respiratory failure likely volume overload requiring HFNC.  Oxygenation improved with volume removal with dialysis and he was weaned down to NC.  He was SD to  on 12/29.  IR was consulted for new HD line placement which was  placed on 12/31.  He was going to be discharged home after his new line placement, but sister refuses to take him home and says he needs residential NH placement. CM assisting Pending financials submission to [facility]    Interval History: patient reports issues with colostomy bag leakage overnight and was very unpleasant during the interaction this am     Review of Systems  Objective:     Vital Signs (Most Recent):  Temp: 97.8 °F (36.6 °C) (01/07/25 1113)  Pulse: 101 (01/07/25 1113)  Resp: 18 (01/07/25 1113)  BP: 106/65 (01/07/25 1113)  SpO2: 96 % (01/07/25 0457) Vital Signs (24h Range):  Temp:  [97.6 °F (36.4 °C)-98.9 °F (37.2 °C)] 97.8 °F (36.6 °C)  Pulse:  [] 101  Resp:  [16-20] 18  SpO2:  [96 %] 96 %  BP: ()/(55-69) 106/65     Weight: 56 kg (123 lb 7.3 oz)  Body mass index is 19.93 kg/m².  No intake or output data in the 24 hours ending 01/07/25 1446   Physical Exam  Constitutional:       General: He is not in acute distress.  Cardiovascular:      Rate and Rhythm: Normal rate.      Pulses: Normal pulses.   Pulmonary:      Effort: Pulmonary effort is normal. No respiratory distress (on 2L of O2 with NC).   Abdominal:      General: Bowel sounds are normal. There is no distension.      Palpations: Abdomen is soft.      Tenderness: There is no abdominal tenderness (+COLOSTOMY).   Musculoskeletal:      Right lower leg: Edema present.      Left lower leg: Edema present.   Skin:     Findings: Bruising present.   Neurological:      Mental Status: He is alert and oriented to person, place, and time. Mental status is at baseline.         Computed MELD 3.0 unavailable. One or more values for this score either were not found within the given timeframe or did not fit some other criterion.  Computed MELD-Na unavailable. One or more values for this score either were not found within the given timeframe or did not fit some other criterion.      Significant Labs:  CBC:  Recent Labs   Lab 01/06/25  0241   WBC 4.39  "  HGB 7.9*   HCT 26.1*   *     CMP:  Recent Labs   Lab 01/06/25  0241 01/07/25  0853   * 134*   K 6.0* 4.9    101   CO2 20* 26   GLU 80 99   BUN 41* 30*   CREATININE 5.3* 3.9*   CALCIUM 9.6 9.4   PROT 7.2  --    ALBUMIN 2.8* 2.6*   BILITOT 0.3  --    ALKPHOS 305*  --    AST 17  --    ALT <5*  --    ANIONGAP 9 7*     PTINR:  No results for input(s): "INR" in the last 48 hours.      Assessment and Plan     * Septic shock  Admitted to MICU 12/19 for septic shock 2/2 Staph capitis bacteremia and endocarditis from his tunneled HD line  Weaned off vasopressors in the ICU  2D echo with mass on the aortic valve suggestive of vegetation  Tunneled HD line removed 12/22  Temporary trialysis placed 12/24  ID consulted:  Suggest 6 weeks of IV Ancef with HD through 2/2/25  CTS consulted:  Suggest IV abx, no surgical intervention  IR placed new tunneled line 12/31    Chronic left shoulder pain  Patient states that he has been having shoulder pain for the past couple of weeks associated with a fall some time back. Was not able to assess due to being in the hospital and is worried that it is dislocated.       Xray L shoulder with remote fracture about the superolateral aspect of the humeral head. Suspected anterior subluxation of the humerus with respect to the glenoid   Consulted ortho, rec WBAT/ROMAT LUE and PT/OT    ACP (advance care planning)  DNR noted      Debility  Patient with Acute debility due to  acute illness . The patient's latest AMPAC (Activity Measure for Post Acute Care) Score is listed below.    AM-PAC Score - How much help does the patient need for each activity listed  Basic Mobility Total Score: 17  Turning over in bed (including adjusting bedclothes, sheets and blankets)?: A little  Sitting down on and standing up from a chair with arms (e.g., wheelchair, bedside commode, etc.): A little  Moving from lying on back to sitting on the side of the bed?: A little  Moving to and from a bed to a " chair (including a wheelchair)?: A little  Need to walk in hospital room?: A little  Climbing 3-5 steps with a railing?: A lot    Plan  - Progressive mobility protocol initated  - PT/OT consulted    Palliative care encounter  DNR noted      Acute on chronic respiratory failure with hypoxia  Patient with Hypoxic Respiratory failure which is Acute on chronic.  He is on home oxygen 3-4L. Supplemental oxygen was provided and noted-       .   Signs/symptoms of respiratory failure include- tachypnea, increased work of breathing, respiratory distress, and use of accessory muscles. Contributing diagnoses includes - CHF Labs and images were reviewed. Patient Has recent ABG, which has been reviewed. Will treat underlying causes and adjust management of respiratory failure as follows-   Continue to wean oxygen to goal SaO2 of 90%  Aggressive pulmonary toilet in setting of atelectasis of left lower lung field.   Weaned down to baseline 3L of O2 with NC    Hyperkalemia  Hyperkalemia is likely due to ESRD.The patients most recent potassium results are listed below.  Recent Labs     01/06/25  0241 01/07/25  0853   K 6.0* 4.9       Plan  - Monitor for arrhythmias with EKG and/or continuous telemetry.   - improved with HD            Bacteremia due to Staphylococcus  Seeding from tunneled HD line with AV vegetation  ID consulted:  Suggest 6 weeks of IV Ancef with HD through 2/2/25    Endocarditis  See septic shock    Anemia of chronic renal failure, stage 5  Anemia is likely due to chronic disease due to ESRD. Most recent hemoglobin and hematocrit are listed below.  Recent Labs     01/06/25  0241   HGB 7.9*   HCT 26.1*       Plan  - Monitor serial CBC: Daily  - Transfuse PRBC if patient becomes hemodynamically unstable, symptomatic or H/H drops below 7/21.  - s/p 1 unit PRBCs this admit  - Patient's anemia is currently worsening    NSTEMI (non-ST elevated myocardial infarction)  In setting of septic shock  High sensitivity troponin  troponin 923.    EKG with T wave inversions  No chest pain    Hypercholesterolemia  Chronic and stable  Continue Statin      Chronic systolic heart failure  2D Echo showing EF of 20-25% with large AV vegetation partially occluding LVOT with moderate AV regurgitation  Volume removal with HD    Crohn's disease  S/p colectomy  No acute issues      History of nephrectomy  Noted  ESRD on HD    Hypertension  Patient's blood pressure range in the last 24 hours was: BP  Min: 83/52  Max: 118/69.  Hold BP meds    ESRD on hemodialysis  Nephro following  Tunneled HD line removed 12/22  Temporary trialysis placed 12/24  IR placed new line 12/31      VTE Risk Mitigation (From admission, onward)           Ordered     heparin (porcine) injection 1,000 Units  As needed (PRN)         01/03/25 1033     heparin (porcine) injection 5,000 Units  Every 8 hours         12/29/24 1000     Place sequential compression device  Until discontinued         12/19/24 1736     IP VTE LOW RISK PATIENT  Once         12/19/24 1736                    Discharge Planning   LLUVIA: 1/9/2025     Code Status: DNR   Medical Readiness for Discharge Date: 12/31/2024  Discharge Plan A: New Nursing Home placement - correction care facility   Discharge Delays: (!) Patient and Family Barriers            Please place Justification for DME        Jeff Morejon MD  Department of Hospital Medicine   [facility] Telemetry

## 2025-01-07 NOTE — ASSESSMENT & PLAN NOTE
Hyperkalemia is likely due to ESRD.The patients most recent potassium results are listed below.  Recent Labs     01/06/25  0241 01/07/25  0853   K 6.0* 4.9       Plan  - Monitor for arrhythmias with EKG and/or continuous telemetry.   - improved with HD

## 2025-01-07 NOTE — CONSULTS
[facility] Telemetry  Orthopedics  Consult Note    Patient Name: Flakito Mcginnis  MRN: [id number]  Admission Date: 12/19/2024  Hospital Length of Stay: 18 days  Attending Provider: Jeff Morejon,*  Primary Care Provider: Jordin Robbins MD    Inpatient consult to Orthopedics  Consult performed by: KAYCEE Flower MD  Consult ordered by: Jeff Morejon MD        Subjective:     Principal Problem:Septic shock    Chief Complaint:   Chief Complaint   Patient presents with    Shortness of Breath    Hypotension     Pt arrived via EMS with c/o SOB and hypotension. Dialysis center could not do dialysis d/t low BP. Pt states has been SOB x1 wk. Denies fevers.        HPI: Flakito Mcginnis is a 69 y.o. male with PMH significant for ESRD on HD, HTN, Crohn's disease, CHF, prior NSTEMI, COPD, chronic left shoulder pain presenting with left shoulder pain. The patient was found to have a left greater tuberosity fracture after a GLF 8/19/24 and was treated non-op in a sling by Francisco Nur MD. Since then, he has had pain to the left shoulder but I am unable to see where he has followed up with anyone. Patient denies any head trauma or LOC. The patient denies any additional falls. Patient denies numbness and tingling to the left upper extremity. Denies any other musculoskeletal pain or injuries. No known history of prior left shoulder injury or surgery.      Past Medical History:   Diagnosis Date    Crohn's disease 1998    ESRD (end stage renal disease) on dialysis 10/2017    Hypertension     Obstructive uropathy        Past Surgical History:   Procedure Laterality Date    COLOSTOMY  2006    DIALYSIS FISTULA CREATION Left 02/2018    NEPHRECTOMY Right     REMOVAL OF TUNNELED CENTRAL VENOUS CATHETER (CVC) N/A 5/23/2018    Procedure: PERMCATH REMOVAL-TUNNELED CVC REMOVAL;  Surgeon: Parveen Ray MD;  Location: [facility] CATH LAB;  Service: Nephrology;  Laterality: N/A;  pt coming in @621        Review of patient's allergies indicates:   Allergen Reactions    Vancomycin analogues Anaphylaxis, Other (See Comments), Shortness Of Breath and Swelling     Light headed, see's spots      Aspirin Nausea And Vomiting and Other (See Comments)     Has crohn's disease    Other reaction(s): FLARES UP CROHNS      Has crohn's disease       Current Facility-Administered Medications   Medication    0.9%  NaCl infusion (for blood administration)    0.9% NaCl infusion    acetaminophen tablet 500 mg    albuterol-ipratropium 2.5 mg-0.5 mg/3 mL nebulizer solution 3 mL    atorvastatin tablet 40 mg    ceFAZolin injection 1 g    epoetin ludin-epbx injection 3,000 Units    haloperidoL tablet 5 mg    heparin (porcine) injection 1,000 Units    heparin (porcine) injection 5,000 Units    melatonin tablet 6 mg    mupirocin 2 % ointment    oxyCODONE immediate release tablet 5 mg    oxyCODONE immediate release tablet Tab 10 mg    sevelamer carbonate tablet 800 mg    sodium chloride 0.9% flush 10 mL     Family History       Problem Relation (Age of Onset)    Emphysema Father    Hypertension Mother          Tobacco Use    Smoking status: Former     Passive exposure: Never    Smokeless tobacco: Never   Substance and Sexual Activity    Alcohol use: Not Currently    Drug use: Never    Sexual activity: Not Currently     ROS  Constitutional: negative for fevers or chills  Eyes: negative visual changes or eye discharge  ENT: negative for ear pain or sore throat  Respiratory: negative for shortness of breath or cough  Cardiovascular: negative for chest pain or palpitations  Gastrointestinal: negative for abdominal pain, nausea, or vomiting  Genitourinary: negative for dysuria and flank pain  Neurological: negative for headaches or dizziness  Musculoskeletal: positive for left shoulder pain     Objective:     Vital Signs (Most Recent):  Temp: 97.3 °F (36.3 °C) (01/06/25 0341)  Pulse: 91 (01/06/25 1202)  Resp: 18 (01/06/25 0842)  BP: 118/69  "(01/06/25 1202)  SpO2: 97 % (01/06/25 1202) Vital Signs (24h Range):  Temp:  [97.3 °F (36.3 °C)-98.4 °F (36.9 °C)] 97.3 °F (36.3 °C)  Pulse:  [] 91  Resp:  [16-19] 18  SpO2:  [94 %-97 %] 97 %  BP: ()/(50-69) 118/69     Weight: 56 kg (123 lb 7.3 oz)  Height: 5' 6" (167.6 cm)  Body mass index is 19.93 kg/m².      Intake/Output Summary (Last 24 hours) at 1/6/2025 1414  Last data filed at 1/6/2025 1145  Gross per 24 hour   Intake 1120 ml   Output 699 ml   Net 421 ml        Ortho/SPM Exam  General:  he has severe cervical-thoracic kyphosis and is unable to lift his head up to make eye contact during the interview  Neuro: alert and oriented x3  Psych: normal mood  Head: normocephalic, atraumatic.  Eyes: no scleral icterus  Mouth: moist mucous membranes  CV: extremities warm and well perfused  Pulm: breathing comfortably, equal chest rise bilat  Skin: clean, dry, intact (any exceptions noted in below musculoskeletal exam)    MSK:    RUE:  - Skin intact throughout, no open wounds  - No swelling  - No ecchymosis, erythema, or signs of cellulitis  - NonTTP throughout  - AROM and PROM of the shoulder, elbow, wrist, and hand intact without pain  - Axillary/AIN/PIN/Radial/Median/Ulnar Nerves assessed in isolation without deficit  - SILT throughout  - Compartments soft  - Radial artery palpated   - Capillary Refill <3s    LUE:  - Skin intact throughout, no open wounds  - 2+ pitting edema of the hand   - No ecchymosis, erythema, or signs of cellulitis  - TTP of the shoulder; prominent AC joint and the humeral head projects anterior to the glenoid as compared to the contralateral side   - Pain with any ROM of the shoulder   - AROM and PROM of the elbow, wrist, and hand intact without pain  - Axillary/AIN/PIN/Radial/Median/Ulnar Nerves assessed in isolation without deficit  - SILT throughout  - Compartments soft  - Radial artery palpated   - Capillary Refill <3s    RLE:  - Skin intact throughout, no open wounds  - @+ " pitting edema distal to the mid tibia   - No ecchymosis, erythema, or signs of cellulitis  - AROM and PROM of the hip, knee, ankle, and foot intact without pain  - TA/EHL/Gastroc/FHL assessed in isolation without deficit  - SILT throughout  - Compartments soft  - DP palpated    LLE:  - Skin intact throughout, no open wounds  - @+ pitting edema distal to the mid tibia   - No ecchymosis, erythema, or signs of cellulitis  - AROM and PROM of the hip, knee, ankle, and foot intact without pain  - TA/EHL/Gastroc/FHL assessed in isolation without deficit  - SILT throughout  - Compartments soft  - DP palpated       Significant Labs:   Recent Lab Results         01/06/25  0241        Albumin 2.8              ALT <5       Anion Gap 9       AST 17       Baso # 0.05       Basophil % 1.1       BILIRUBIN TOTAL 0.3  Comment: For infants and newborns, interpretation of results should be based  on gestational age, weight and in agreement with clinical  observations.    Premature Infant recommended reference ranges:  Up to 24 hours.............<8.0 mg/dL  Up to 48 hours............<12.0 mg/dL  3-5 days..................<15.0 mg/dL  6-29 days.................<15.0 mg/dL         BUN 41       Calcium 9.6       Chloride 103       CO2 20       Creatinine 5.3       Differential Method Automated       eGFR 11.0       Eos # 0.2       Eos % 3.9       Glucose 80       Gran # (ANC) 3.1       Gran % 71.5       Hematocrit 26.1       Hemoglobin 7.9       Immature Grans (Abs) 0.02  Comment: Mild elevation in immature granulocytes is non specific and   can be seen in a variety of conditions including stress response,   acute inflammation, trauma and pregnancy. Correlation with other   laboratory and clinical findings is essential.         Immature Granulocytes 0.5       Lymph # 0.5       Lymph % 11.4       Magnesium  2.1       MCH 29.3       MCHC 30.3       MCV 97       Mono # 0.5       Mono % 11.6       MPV 12.0       nRBC 0       Phosphorus  Level 6.3       Platelet Count 147       Potassium 6.0  Comment: *No Visible Hemolysis       PROTEIN TOTAL 7.2       RBC 2.70       RDW 18.0       Sodium 132       WBC 4.39             All pertinent labs within the past 24 hours have been reviewed.    Significant Imaging: X-Ray: I have reviewed all pertinent results/findings and my personal findings are:  XR L shoulder with remote appearing fracture of the greater tuberosity. The humerus is subluxed anteriorly; however, these films are similar in appearance as compared to prior L shoulder films October 2024.   Assessment/Plan:     Chronic left shoulder pain  Flakito Mcginnis is a 69 y.o. RHD male with PMH significant for ESRD on HD, HTN, Crohn's disease, CHF, prior NSTEMI, COPD, chronic left shoulder pain presenting with left shoulder pain 2/2 a remote fracture of the greater tuberosity of the left humerus. Closed, NVI. AF, HDS over the past 48 hours. He initially sustained this injury in August 2024 and was treated non-operatively. Xrays today show anterior subluxation of the humerus in relation to the glenoid which is similar to prior films from October 2024. His left shoulder pain is due to this stable and chronic problem and we will continue to treat him non-operatively.     WBAT LUE   ROMAT LUE.   Multimodal pain control   PT/OT for progressive mobility. May treat with modalities. Avoid high impact activity.   I will have him f/u with orthopaedics as an outpatient at discharge.           RAYSA Flower MD  Orthopedics  [facility]

## 2025-01-07 NOTE — PLAN OF CARE
Problem: Adult Inpatient Plan of Care  Goal: Plan of Care Review  Outcome: Progressing  Goal: Absence of Hospital-Acquired Illness or Injury  Outcome: Progressing  Goal: Optimal Comfort and Wellbeing  Outcome: Progressing  Goal: Readiness for Transition of Care  Outcome: Progressing     Problem: CRRT (Continuous Renal Replacement Therapy)  Goal: Hemodynamic Stability  Outcome: Progressing

## 2025-01-07 NOTE — PT/OT/SLP EVAL
Speech Language Pathology Evaluation  Bedside Swallow  Discharge    Patient Name:  Flakito Mcginnis   MRN:  [id number]  1155/[id number] A    Admitting Diagnosis: Septic shock    Recommendations:                 General Recommendations:  OP GI evaluation  Diet recommendations:  Minced & Moist Diet - IDDSI Level 5, Liquid Diet Level: Thin liquids - IDDSI Level 0   Aspiration Precautions: 1 bite/sip at a time, Small bites/sips, and Standard aspiration precautions   General Precautions: Standard, fall, dental soft    Assessment:     Flakito Mcginnis is a 69 y.o. male with reports of esophageal Dysphagia for over half a year. No further skilled acute Speech Therapy services warranted at this time. Please re-consult as needed.     History:     Past Medical History:   Diagnosis Date    Crohn's disease 1998    ESRD (end stage renal disease) on dialysis 10/2017    Hypertension     Obstructive uropathy        Past Surgical History:   Procedure Laterality Date    COLOSTOMY  2006    DIALYSIS FISTULA CREATION Left 02/2018    NEPHRECTOMY Right     REMOVAL OF TUNNELED CENTRAL VENOUS CATHETER (CVC) N/A 5/23/2018    Procedure: PERMCATH REMOVAL-TUNNELED CVC REMOVAL;  Surgeon: Parveen Ray MD;  Location: [facility] CATH LAB;  Service: Nephrology;  Laterality: N/A;  pt coming in @930     MD note 1/6: HPI: Mr. Flakito Mcginnis is a 69 y.o. man w/ HFrEF (30% 8/2024 from 20-25% 6/2024), NICM, MR, ESRD on HD, chronic respiratory failure on home 3L NC, Crohn's s/p colostomy, h/o R nephrectomy. He presented to [facility] ED from his HD center on 12/19 due to hypotension and ongoing shortness of breath. He usually receives his dialysis on T, R, S and went on Wednesday for an extra session for volume removal. He arrived on Thursday for his usually scheduled dialysis session and was directed to the ED due to hypotension. On arrival in the ED his blood pressure was 78/51. He was found to have a BNP >4900 and CXR concerning for volume overload. High sensitivity troponin  "923. Critical care medicine was consulted for hypotension and admitted to MICU.   Overview/Hospital Course:  Mr. 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 ID was consulted and recommending 6 weeks of IV Cefazolin after HD, end date 2/2/25. CTS evaluated and recommending medical management at this time due to multiple co-morbidities. Tunneled catheter was removed 12/22. Repeat cultures from 12/23 with NGTD. Temporary RIJ trialysis line placed 12/24.  Course further complicated by acute hypoxemic respiratory failure likely volume overload requiring HFNC.  Oxygenation improved with volume removal with dialysis and he was weaned down to NC.  He was SD to HM on 12/29.  IR was consulted for new HD line placement which was placed on 12/31.  He was going to be discharged home after his new line placement, but sister refuses to take him home and says [facility]. CM assisting  Interval History: K at 6 this am Plan for HD today      Chest X-Rays: Right internal jugular large-bore central venous catheter with tip near the junction of the superior vena cava and right atrium.  Probable atelectatic changes involving much of the left lower lobe in a retrocardiac location.    Prior diet: Seen by ST earlier in current admission; patient reporting requiring minced and moist diet at this time.     Subjective     SLP communicated with RN prior to entry. RN cleared SLP to administer po trials. RN reporting patient on a minced and moist duet, but getting staff to bring him sandwiches when he did not like the food.     Patient sitting in chair upon SLP entry. Patient agitated.  He states, "I'm this close to telling them to give me the pen so I can sign myself out of here."       Objective:     Patient seen for a swallowing assessment. Patient with noted " "minced and moist tray and broken up turkey sandwich in front of patient sitting in chair. Patient reports no changes with swallowing since last ST assessment. He has been reporting things going down, getting stuck in his upper chest area, and then he coughs it back up. He then chews it again and swallows again. He says, "Sometimes I have to do that 3 times." He reports this has been happening for over half a year. He requests a minced and moist diet but (per RN) has been getting people to give him sandwiches. He told SLP he can eat the turkey out the middle without difficulties (or rather the same difficulties).     SLP provided patient education on SLP role/scope, ongoing recommendations, and need for GI consult which will likely happen OP. Patient then began expressing multiple frustrations with overall hospital stay and desire to leave ASAP. He reports wanting to speak with patient relations, but having difficulty getting in contact with them. He stated, "I would naseem this place if I had the money for it. All I do is sit here." SLP communicated patient complaints with charge RN and MD.     Goals:   Multidisciplinary Problems       SLP Goals       Not on file              Multidisciplinary Problems (Resolved)          Problem: SLP    Goal Priority Disciplines Outcome   SLP Goal   (Resolved)     SLP Met   Description: Speech Language Pathology Goals  Goals expected to be met by 1/3  1. Pt will tolerate minced & moist diet & thin liquids without s/s aspiration & adequate oral phase of swallow  2. Ongoing swallow assessment to determine safest & least restrictive PO consistencies                        Plan:       Plan of Care reviewed with:  patient   SLP Follow-Up:  No       Discharge recommendations:   (no ST needs; OP GI consult)   Barriers to Discharge:  None    Time Tracking:     SLP Treatment Date:   01/07/25  Speech Start Time:  1300  Speech Stop Time:  1317     Speech Total Time (min):  17 min    Billable " Minutes: Eval Swallow and Oral Function 17    01/07/2025

## 2025-01-07 NOTE — ASSESSMENT & PLAN NOTE
Flakito Mcginnis is a 69 y.o. RHD male with PMH significant for ESRD on HD, HTN, Crohn's disease, CHF, prior NSTEMI, COPD, chronic left shoulder pain presenting with left shoulder pain 2/2 a remote fracture of the greater tuberosity of the left humerus. Closed, NVI. AF, HDS over the past 48 hours. He initially sustained this injury in August 2024 and was treated non-operatively. Xrays today show anterior subluxation of the humerus in relation to the glenoid which is similar to prior films from October 2024. His left shoulder pain is due to this stable and chronic problem and we will continue to treat him non-operatively.     WBAT LUE   ROMAT LUE.   Multimodal pain control   PT/OT for progressive mobility. May treat with modalities. Avoid high impact activity.   I will have him f/u with orthopaedics as an outpatient at discharge.

## 2025-01-07 NOTE — SUBJECTIVE & OBJECTIVE
Interval History:   HD completed yesterday, however, patient was hypotensive limiting volume removal with treatment.   No distress on exam. Repeat K 4.9 from 6.0.     Review of patient's allergies indicates:   Allergen Reactions    Vancomycin analogues Anaphylaxis, Other (See Comments), Shortness Of Breath and Swelling     Light headed, see's spots      Aspirin Nausea And Vomiting and Other (See Comments)     Has crohn's disease    Other reaction(s): FLARES UP CROHNS      Has crohn's disease     Current Facility-Administered Medications   Medication Frequency    0.9%  NaCl infusion (for blood administration) Q24H PRN    0.9% NaCl infusion Once    acetaminophen tablet 500 mg Q4H PRN    albuterol-ipratropium 2.5 mg-0.5 mg/3 mL nebulizer solution 3 mL Q8H    atorvastatin tablet 40 mg Daily    ceFAZolin injection 1 g Q24H    epoetin ludin-epbx injection 3,000 Units Every Mon, Wed, Fri    haloperidoL tablet 5 mg Q6H PRN    heparin (porcine) injection 1,000 Units PRN    heparin (porcine) injection 5,000 Units Q8H    melatonin tablet 6 mg Nightly    mupirocin 2 % ointment Daily    oxyCODONE immediate release tablet 5 mg Q4H PRN    oxyCODONE immediate release tablet Tab 10 mg Q6H PRN    sevelamer carbonate tablet 800 mg TID WM    sodium chloride 0.9% flush 10 mL PRN       Objective:     Vital Signs (Most Recent):  Temp: 97.8 °F (36.6 °C) (01/07/25 1113)  Pulse: 101 (01/07/25 1113)  Resp: 18 (01/07/25 1113)  BP: 106/65 (01/07/25 1113)  SpO2: 96 % (01/07/25 1084) Vital Signs (24h Range):  Temp:  [97.6 °F (36.4 °C)-98.9 °F (37.2 °C)] 97.8 °F (36.6 °C)  Pulse:  [] 101  Resp:  [16-20] 18  SpO2:  [96 %-97 %] 96 %  BP: ()/(55-69) 106/65     Weight: 56 kg (123 lb 7.3 oz) (01/03/25 3207)  Body mass index is 19.93 kg/m².  Body surface area is 1.61 meters squared.    I/O last 3 completed shifts:  In: 1120 [P.O.:120; Other:1000]  Out: 699 [Other:699]     Physical Exam  Vitals and nursing note reviewed.   Constitutional:        Appearance: He is ill-appearing.   HENT:      Head: Normocephalic and atraumatic.      Comments: Tunneled HD line in place  Cardiovascular:      Rate and Rhythm: Normal rate and regular rhythm.   Pulmonary:      Effort: Pulmonary effort is normal. No respiratory distress.      Breath sounds: Normal breath sounds.   Abdominal:      General: There is no distension.      Palpations: Abdomen is soft.   Musculoskeletal:         General: No deformity.      Right lower leg: Edema present.      Left lower leg: Edema present.   Skin:     General: Skin is warm and dry.      Coloration: Skin is pale.   Neurological:      General: No focal deficit present.      Mental Status: He is alert and oriented to person, place, and time. Mental status is at baseline.          Significant Labs:  CBC:   Recent Labs   Lab 01/06/25  0241   WBC 4.39   RBC 2.70*   HGB 7.9*   HCT 26.1*   *   MCV 97   MCH 29.3   MCHC 30.3*     CMP:   Recent Labs   Lab 01/06/25  0241 01/07/25  0853   GLU 80 99   CALCIUM 9.6 9.4   ALBUMIN 2.8* 2.6*   PROT 7.2  --    * 134*   K 6.0* 4.9   CO2 20* 26    101   BUN 41* 30*   CREATININE 5.3* 3.9*   ALKPHOS 305*  --    ALT <5*  --    AST 17  --    BILITOT 0.3  --      All labs within the past 24 hours have been reviewed.

## 2025-01-07 NOTE — PT/OT/SLP PROGRESS
Physical Therapy Treatment    Patient Name:  Flakito Mcginnis   MRN:  [id number]    Recommendations:     Discharge Recommendations: Low Intensity Therapy  Discharge Equipment Recommendations: bath bench  Barriers to discharge: None    Assessment:     Flakito Mcginnsi is a 69 y.o. male admitted with a medical diagnosis of Septic shock.  He presents with the following impairments/functional limitations: weakness, impaired endurance, impaired self care skills, impaired functional mobility, decreased safety awareness, decreased lower extremity function, decreased upper extremity function, impaired skin, edema, impaired cardiopulmonary response to activity .Pt tolerated treatment fairly well and is progressing slowly with mobility. pt limited due to fatigue. Patient remains appropriate for continued skilled services within the acute environment and goals remain appropriate.        Rehab Prognosis: Good; patient would benefit from acute skilled PT services to address these deficits and reach maximum level of function.    Recent Surgery: * No surgery found *      Plan:     During this hospitalization, patient to be seen 3 x/week to address the identified rehab impairments via gait training, therapeutic activities, therapeutic exercises, neuromuscular re-education and progress toward the following goals:    Plan of Care Expires:  01/22/25    Subjective     Chief Complaint: none stated  Patient/Family Comments/goals: I would like to go walk  Pain/Comfort:  Pain Rating 1: 0/10  Pain Rating Post-Intervention 1: 0/10      Objective:     Communicated with RN prior to session.  Patient found up in chair with  (telemetry; oxygen; colostomy) upon PT entry to room.     General Precautions: Standard, fall, aspiration, pureed diet  Orthopedic Precautions: N/A  Braces: N/A  Respiratory Status: Room air     Functional Mobility:  Transfers:   vcs for locking brakes on rollator  Sit to Stand x 2:  contact guard assistance with rolling  walker  Gait: 65 ft x 2 trials with  CGA with Rolator. Vcs for upright posture and safety. Seated rest break between trials      AM-PAC 6 CLICK MOBILITY  Turning over in bed (including adjusting bedclothes, sheets and blankets)?: 3  Sitting down on and standing up from a chair with arms (e.g., wheelchair, bedside commode, etc.): 3  Moving from lying on back to sitting on the side of the bed?: 3  Moving to and from a bed to a chair (including a wheelchair)?: 3  Need to walk in hospital room?: 3  Climbing 3-5 steps with a railing?: 2  Basic Mobility Total Score: 17       Treatment & Education:  Therapist provided instruction and educated of patient on progress, safety, d/c, PT POC,   proper body mechanics, energy conservation, and fall prevention strategies during tasks listed above, on the effects of prolonged immobility and the importance of performing OOB activity and exercises to promote healing and reduce recovery time     Patient  facilitated therex X 15 reps seated in bedside chair B LE AROM AP, LAQ, Hip Flexion, Hip Abd/Add. Patient required skilled PTA for instruction of exercises and appropriate cues to perform exercises safely, sequencing and appropriately.   Exercises performed to develop and maintain pt's strength, endurance and flexibility.  Updated white board with appropriate PT mobility information for medical team notification   Donned an extra gown and gripping socks  Pt encouraged to ambulate in hallways 3x/day with nursing or family assistance to improve endurance.   Pt safe to ambulate in hallway with RN or PCT assistance  Call nursing/pct to transfer to chair/use bathroom. Pt stated understanding  Bedside table in front of patient and area set up for function, convenience, and safety. RN aware of patient's mobility needs and status. Questions/concerns addressed within PTA scope of practice, patient with no further questions. Time was provided for active listening, discussion of health  disposition, and discussion of safe discharge. Pt?verbalized?agreement .    Patient left up in chair with all lines intact, call button in reach, and nsg notified..    GOALS:   Multidisciplinary Problems       Physical Therapy Goals          Problem: Physical Therapy    Goal Priority Disciplines Outcome Interventions   Physical Therapy Goal     PT, PT/OT Progressing    Description: Goals to be met by: 2025     Patient will increase functional independence with mobility by performin. Sit to stand transfer with Modified [place]  2. Bed to chair transfer with Supervision using LRAD  3. Gait  x 250 feet with Supervision using LRAD.   4. Stand for 5 minutes with Supervision using LRAD while performing dynamic balance tasks  5. Lower extremity exercise program x30 reps per handout, with independence                         Time Tracking:     PT Received On: 25  PT Start Time: 1113     PT Stop Time: 1152  PT Total Time (min): 39 min     Billable Minutes: Gait Training 15, Therapeutic Activity 15 and Therapeutic Exercise 9    Treatment Type: Treatment  PT/PTA: PTA     Number of PTA visits since last PT visit: 3     2025

## 2025-01-07 NOTE — ASSESSMENT & PLAN NOTE
After looking through his dialysis notes from yesterday, it does appear as if he completed two back-to-back sessions and left dialysis yesterday at his dry weight (54.6 kg) and reports high volume stool output since his HD session. He reports that he is on 4L chronic oxygen use at home, and is currently on that dose in the ER, and reporting resolution of his dyspnea once he was placed back on his home oxygen. Chest xray appears unchanged from prior studies.       Outpatient HD Information:  -Dialysis modality: Hemodialysis  -Outpatient HD unit: [facility]  -Nephrologist: Dr. Strickland  -HD TX days: Tuesday/Thursday/Saturday  -Last HD TX prior to hospital admission: 12/18/2024  -Residual urine: Anuric   -EDW: 54.5 kg    Assessment:   - Dialysis for metabolic clearance and volume management will be provided tomorrow AM. Repeat potassium improved.   - Labs reviewed and dialysate to be adjusted to current labs.   - Continue to monitor intake and output  - Please avoid gadolinium, fleets, phos-based laxatives, NSAIDs  - Dialysis thrice weekly unless more urgent indications arise. Will evaluate RRT requirements Daily.    Anemia of ESRD   Recent Labs   Lab 01/01/25  0233 01/03/25  0811 01/06/25  0241   WBC 5.35 3.40* 4.39   HGB 8.3* 7.6* 7.9*   HCT 26.1* 24.4* 26.1*    137* 147*       Lab Results   Component Value Date    FESATURATED 12 (L) 12/20/2024    FERRITIN 2,808 (H) 12/20/2024       - Goal in ESRD is Hgb of 10-11. EPO with HD.   - if patient is on iron infusions please D/C when active infection, cautiously use EPO when hx of malignancy, high BP (SBP usually > 170mmhg).    Mineral Bone Disease in ESRD   Lab Results   Component Value Date    .8 (H) 12/20/2024    CALCIUM 9.4 01/07/2025    ALBUMIN 2.6 (L) 01/07/2025    CAION 1.22 12/25/2024    PHOS 5.1 (H) 01/07/2025       - F/U PO4, Mg, Calcium. And albumin levels daily.   - Renal diet with protein intake goal 1.5 g/kg/d with 1 L fluid  restriction   - If patient has poor oral intake, recommend nephro nutritional shakes (ex Novasource)  - Continue or restart home phos binder

## 2025-01-07 NOTE — SUBJECTIVE & OBJECTIVE
Interval History: patient reports issues with colostomy bag leakage overnight and was very unpleasant during the interaction this am     Review of Systems  Objective:     Vital Signs (Most Recent):  Temp: 97.8 °F (36.6 °C) (01/07/25 1113)  Pulse: 101 (01/07/25 1113)  Resp: 18 (01/07/25 1113)  BP: 106/65 (01/07/25 1113)  SpO2: 96 % (01/07/25 0457) Vital Signs (24h Range):  Temp:  [97.6 °F (36.4 °C)-98.9 °F (37.2 °C)] 97.8 °F (36.6 °C)  Pulse:  [] 101  Resp:  [16-20] 18  SpO2:  [96 %] 96 %  BP: ()/(55-69) 106/65     Weight: 56 kg (123 lb 7.3 oz)  Body mass index is 19.93 kg/m².  No intake or output data in the 24 hours ending 01/07/25 1446   Physical Exam  Constitutional:       General: He is not in acute distress.  Cardiovascular:      Rate and Rhythm: Normal rate.      Pulses: Normal pulses.   Pulmonary:      Effort: Pulmonary effort is normal. No respiratory distress (on 2L of O2 with NC).   Abdominal:      General: Bowel sounds are normal. There is no distension.      Palpations: Abdomen is soft.      Tenderness: There is no abdominal tenderness (+COLOSTOMY).   Musculoskeletal:      Right lower leg: Edema present.      Left lower leg: Edema present.   Skin:     Findings: Bruising present.   Neurological:      Mental Status: He is alert and oriented to person, place, and time. Mental status is at baseline.         Computed MELD 3.0 unavailable. One or more values for this score either were not found within the given timeframe or did not fit some other criterion.  Computed MELD-Na unavailable. One or more values for this score either were not found within the given timeframe or did not fit some other criterion.      Significant Labs:  CBC:  Recent Labs   Lab 01/06/25  0241   WBC 4.39   HGB 7.9*   HCT 26.1*   *     CMP:  Recent Labs   Lab 01/06/25  0241 01/07/25  0853   * 134*   K 6.0* 4.9    101   CO2 20* 26   GLU 80 99   BUN 41* 30*   CREATININE 5.3* 3.9*   CALCIUM 9.6 9.4   PROT 7.2   "--    ALBUMIN 2.8* 2.6*   BILITOT 0.3  --    ALKPHOS 305*  --    AST 17  --    ALT <5*  --    ANIONGAP 9 7*     PTINR:  No results for input(s): "INR" in the last 48 hours.    "

## 2025-01-08 PROCEDURE — 21400001 HC TELEMETRY ROOM

## 2025-01-08 PROCEDURE — 25000003 PHARM REV CODE 250: Performed by: PHYSICIAN ASSISTANT

## 2025-01-08 PROCEDURE — 63600175 PHARM REV CODE 636 W HCPCS

## 2025-01-08 PROCEDURE — 25000003 PHARM REV CODE 250

## 2025-01-08 PROCEDURE — 27000221 HC OXYGEN, UP TO 24 HOURS

## 2025-01-08 PROCEDURE — 25000003 PHARM REV CODE 250: Performed by: NURSE PRACTITIONER

## 2025-01-08 PROCEDURE — 90935 HEMODIALYSIS ONE EVALUATION: CPT

## 2025-01-08 PROCEDURE — 63600175 PHARM REV CODE 636 W HCPCS: Performed by: INTERNAL MEDICINE

## 2025-01-08 PROCEDURE — 99232 SBSQ HOSP IP/OBS MODERATE 35: CPT | Mod: ,,, | Performed by: NURSE PRACTITIONER

## 2025-01-08 PROCEDURE — 63600175 PHARM REV CODE 636 W HCPCS: Performed by: STUDENT IN AN ORGANIZED HEALTH CARE EDUCATION/TRAINING PROGRAM

## 2025-01-08 PROCEDURE — 94761 N-INVAS EAR/PLS OXIMETRY MLT: CPT

## 2025-01-08 PROCEDURE — 25000003 PHARM REV CODE 250: Performed by: INTERNAL MEDICINE

## 2025-01-08 RX ORDER — IPRATROPIUM BROMIDE AND ALBUTEROL SULFATE 2.5; .5 MG/3ML; MG/3ML
3 SOLUTION RESPIRATORY (INHALATION) EVERY 6 HOURS PRN
Status: DISCONTINUED | OUTPATIENT
Start: 2025-01-08 | End: 2025-01-08

## 2025-01-08 RX ORDER — MICONAZOLE NITRATE 2 G/100G
POWDER TOPICAL 2 TIMES DAILY
Status: DISCONTINUED | OUTPATIENT
Start: 2025-01-08 | End: 2025-01-14 | Stop reason: HOSPADM

## 2025-01-08 RX ORDER — SODIUM CHLORIDE 9 MG/ML
INJECTION, SOLUTION INTRAVENOUS ONCE
Status: COMPLETED | OUTPATIENT
Start: 2025-01-08 | End: 2025-01-08

## 2025-01-08 RX ADMIN — OXYCODONE HYDROCHLORIDE 10 MG: 10 TABLET ORAL at 10:01

## 2025-01-08 RX ADMIN — HEPARIN SODIUM 5000 UNITS: 5000 INJECTION INTRAVENOUS; SUBCUTANEOUS at 10:01

## 2025-01-08 RX ADMIN — EPOETIN ALFA-EPBX 3000 UNITS: 3000 INJECTION, SOLUTION INTRAVENOUS; SUBCUTANEOUS at 05:01

## 2025-01-08 RX ADMIN — HEPARIN SODIUM 1000 UNITS: 1000 INJECTION, SOLUTION INTRAVENOUS; SUBCUTANEOUS at 06:01

## 2025-01-08 RX ADMIN — SEVELAMER CARBONATE 800 MG: 800 TABLET, FILM COATED ORAL at 08:01

## 2025-01-08 RX ADMIN — MUPIROCIN: 20 OINTMENT TOPICAL at 10:01

## 2025-01-08 RX ADMIN — HALOPERIDOL 5 MG: 5 TABLET ORAL at 08:01

## 2025-01-08 RX ADMIN — CEFAZOLIN 1 G: 1 INJECTION, POWDER, FOR SOLUTION INTRAMUSCULAR; INTRAVENOUS at 08:01

## 2025-01-08 RX ADMIN — SODIUM CHLORIDE: 9 INJECTION, SOLUTION INTRAVENOUS at 04:01

## 2025-01-08 RX ADMIN — HEPARIN SODIUM 5000 UNITS: 5000 INJECTION INTRAVENOUS; SUBCUTANEOUS at 07:01

## 2025-01-08 RX ADMIN — ATORVASTATIN CALCIUM 40 MG: 40 TABLET, FILM COATED ORAL at 10:01

## 2025-01-08 RX ADMIN — OXYCODONE HYDROCHLORIDE 10 MG: 10 TABLET ORAL at 08:01

## 2025-01-08 NOTE — CONSULTS
MARLEN consulted for PVA; first attempt, pt asked that I return, he was on a business phone call; second attempt pt off floor to HD; re-consult if needed

## 2025-01-08 NOTE — PLAN OF CARE
Problem: Adult Inpatient Plan of Care  Goal: Plan of Care Review  Outcome: Progressing  Goal: Absence of Hospital-Acquired Illness or Injury  Outcome: Progressing  Goal: Optimal Comfort and Wellbeing  Outcome: Progressing  Goal: Readiness for Transition of Care  Outcome: Progressing     Problem: Sepsis/Septic Shock  Goal: Optimal Coping  Outcome: Progressing

## 2025-01-08 NOTE — PLAN OF CARE
Problem: Adult Inpatient Plan of Care  Goal: Plan of Care Review  Outcome: Not Progressing  Goal: Optimal Comfort and Wellbeing  Outcome: Not Progressing     Problem: Infection  Goal: Absence of Infection Signs and Symptoms  Outcome: Progressing     Problem: Fall Injury Risk  Goal: Absence of Fall and Fall-Related Injury  Outcome: Not Progressing

## 2025-01-08 NOTE — NURSING
Patient arrived in wheelchair . Both lumens of  Left HD catheter flushes  good . Dialysis treatment  started .   Report received from Primary Nurse.

## 2025-01-08 NOTE — SUBJECTIVE & OBJECTIVE
"Interval History: Patient is seen sitting up in chair this am Denied any acute complaints. Worked with PT yesterday    Review of Systems  Objective:     Vital Signs (Most Recent):  Temp: 98.9 °F (37.2 °C) (01/08/25 1152)  Pulse: 93 (01/08/25 1152)  Resp: 17 (01/08/25 1152)  BP: 101/61 (01/08/25 1152)  SpO2: 98 % (01/08/25 0827) Vital Signs (24h Range):  Temp:  [97.9 °F (36.6 °C)-98.9 °F (37.2 °C)] 98.9 °F (37.2 °C)  Pulse:  [] 93  Resp:  [16-18] 17  SpO2:  [94 %-99 %] 98 %  BP: (101-114)/(58-70) 101/61     Weight: 56 kg (123 lb 7.3 oz)  Body mass index is 19.93 kg/m².    Intake/Output Summary (Last 24 hours) at 1/8/2025 1343  Last data filed at 1/8/2025 1334  Gross per 24 hour   Intake 118 ml   Output 200 ml   Net -82 ml      Physical Exam  Constitutional:       General: He is not in acute distress.  Cardiovascular:      Rate and Rhythm: Normal rate.      Pulses: Normal pulses.   Pulmonary:      Effort: No respiratory distress.      Breath sounds: Wheezing (3L of o2 with NC) present.   Abdominal:      General: Bowel sounds are normal. There is no distension.      Tenderness: There is no abdominal tenderness (colostomy).   Musculoskeletal:      Right lower leg: Edema present.      Left lower leg: Edema present.   Skin:     General: Skin is warm.   Neurological:      Mental Status: He is alert and oriented to person, place, and time. Mental status is at baseline.         Computed MELD 3.0 unavailable. One or more values for this score either were not found within the given timeframe or did not fit some other criterion.  Computed MELD-Na unavailable. One or more values for this score either were not found within the given timeframe or did not fit some other criterion.      Significant Labs:  CBC:  No results for input(s): "WBC", "HGB", "HCT", "PLT" in the last 48 hours.  CMP:  Recent Labs   Lab 01/07/25  0853   *   K 4.9      CO2 26   GLU 99   BUN 30*   CREATININE 3.9*   CALCIUM 9.4   ALBUMIN 2.6* " "  ANIONGAP 7*     PTINR:  No results for input(s): "INR" in the last 48 hours.    "

## 2025-01-08 NOTE — ASSESSMENT & PLAN NOTE
After looking through his dialysis notes from yesterday, it does appear as if he completed two back-to-back sessions and left dialysis yesterday at his dry weight (54.6 kg) and reports high volume stool output since his HD session. He reports that he is on 4L chronic oxygen use at home, and is currently on that dose in the ER, and reporting resolution of his dyspnea once he was placed back on his home oxygen. Chest xray appears unchanged from prior studies.       Outpatient HD Information:  -Dialysis modality: Hemodialysis  -Outpatient HD unit: [facility]  -Nephrologist: Dr. Strickland  -HD TX days: Tuesday/Thursday/Saturday  -Last HD TX prior to hospital admission: 12/18/2024  -Residual urine: Anuric   -EDW: 54.5 kg    Assessment:   - Dialysis for metabolic clearance and volume management will be provided today. Labs to be drawn with HD.   - Continue to monitor intake and output  - Please avoid gadolinium, fleets, phos-based laxatives, NSAIDs  - Dialysis thrice weekly unless more urgent indications arise. Will evaluate RRT requirements Daily.    Anemia of ESRD   Recent Labs   Lab 01/03/25  0811 01/06/25  0241   WBC 3.40* 4.39   HGB 7.6* 7.9*   HCT 24.4* 26.1*   * 147*       Lab Results   Component Value Date    FESATURATED 12 (L) 12/20/2024    FERRITIN 2,808 (H) 12/20/2024       - Goal in ESRD is Hgb of 10-11. EPO with HD.   - if patient is on iron infusions please D/C when active infection, cautiously use EPO when hx of malignancy, high BP (SBP usually > 170mmhg).    Mineral Bone Disease in ESRD   Lab Results   Component Value Date    .8 (H) 12/20/2024    CALCIUM 9.4 01/07/2025    ALBUMIN 2.6 (L) 01/07/2025    CAION 1.22 12/25/2024    PHOS 5.1 (H) 01/07/2025       - F/U PO4, Mg, Calcium. And albumin levels daily.   - Renal diet with protein intake goal 1.5 g/kg/d with 1 L fluid restriction   - If patient has poor oral intake, recommend nephro nutritional shakes (ex Novasource)  -  Continue or restart home phos binder

## 2025-01-08 NOTE — PLAN OF CARE
Discharge Plan A and Plan B have been determined by review of patient's clinical status, future medical and therapeutic needs, and coverage/benefits for post-acute care in coordination with multidisciplinary team members.    01/08/25 1652   Post-Acute Status   Post-Acute Authorization Placement   Post-Acute Placement Status Pending post-acute provider review/more information requested   Coverage [employer] MANAGED MEDICARE - AETNA MEDICARE DUAL DSNP -   IV Infusion Status Set-up Complete/Auth obtained  (Via HD clinic ([facility]))   Hospital Resources/Appts/Education Provided Provided education on problems/symptoms using teachback;Provided patient/caregiver with written discharge plan information;Community resources provided   Discharge Delays (!) Patient and Family Barriers   Discharge Plan   Discharge Plan A New Nursing Home placement - [facility] care facility   Discharge Plan B Home Health;Group home     SW met w/ patient at the bedside for continued dc planning. SW requested update on patient's progress w/ obtaining financials for [facility] NH placement. Patient states that he has not received statements via mail to sister's house; patient states that he is unsure how long delivery will take. SW supported patient w/ phoning [employer] line [phone]. [employer] representative states that statements sent via mail takes 7 business days from the date of request. Patient states that he believes that today is day 4 of 7. Per representative, patient is eligible to receive statements via online portal and patient agreeable. Representative provided phone support w/ steps of setting up of online account. SW confirmed w/ patient that online account has been successfully set-up. Patient provided SW w/ Oct, Nov, Dec and January statements via email, SW provided patient w/ printed copy per patient's request. SW notified patient that statements to be sent to accepting NH ([facility]) for review and patient agreeable.  Patient also requesting that SW submit additional referral to [facility] and [place] surrounding facilities as he would like to consider those facilities also. SW sent additional referrals per patient's request.     SW phoned Janeth Marquez, enzo w/ Leah (admissions). Leah confirmed that referral received w/ updated clinicals and financials; will review and notify SW if able to accept for nursing home bed.       Patient medically ready for dc pending placement.   [facility] will continue to follow.                      VAL Haley, LMSW  [facility]  Case Management  Ext. [phone]

## 2025-01-08 NOTE — PROGRESS NOTES
[facility] - Internal Medicine [facility]  Progress Note    Patient Name: Flakito Mcginnis  MRN: [id number]  Patient Class: IP- Inpatient   Admission Date: 12/19/2024  Length of Stay: 20 days  Attending Physician: Jeff Morejon,*  Primary Care Provider: Jordin Robbins MD        Subjective     Principal Problem:Septic shock        HPI:  By Alicia Galloway NP    Mr. Flakito Mcginnis is a 69 y.o. man w/ HFrEF (30% 8/2024 from 20-25% 6/2024), NICM, MR, ESRD on HD, chronic respiratory failure on home 3L NC, Crohn's s/p colostomy, h/o R nephrectomy. He [place] [facility] [place] HD center on 12/19 due to hypotension and ongoing shortness of breath. He usually receives his dialysis on T, R, S and went on Wednesday for an extra session for volume removal. He arrived on Thursday for his usually scheduled dialysis session and was directed to the ED due to hypotension. On arrival in the ED his blood pressure was 78/51. He was found to have a BNP >4900 and CXR concerning for volume overload. High sensitivity troponin 923. Critical care medicine was consulted for hypotension and admitted to MICU.     Overview/Hospital Course:  Mr. 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 ID was consulted and recommending 6 weeks of IV Cefazolin after HD, end date 2/2/25. CTS evaluated and recommending medical management at this time due to multiple co-morbidities. Tunneled catheter was removed 12/22. Repeat cultures from 12/23 with NGTD. Temporary RIJ trialysis line placed 12/24.  Course further complicated by acute hypoxemic respiratory failure likely volume overload requiring HFNC.  Oxygenation improved with volume removal with dialysis and he was weaned down to NC.  He was SD to  on 12/29.  IR was consulted for new HD line placement which was  placed on 12/31.  He was going to be discharged home after his new line placement, but sister refuses to take him home and says he needs residential NH placement. CM assisting Pending financials submission to [facility] History: Patient is seen sitting up in chair this am Denied any acute complaints. Worked with PT yesterday    Review of Systems  Objective:     Vital Signs (Most Recent):  Temp: 98.9 °F (37.2 °C) (01/08/25 1152)  Pulse: 93 (01/08/25 1152)  Resp: 17 (01/08/25 1152)  BP: 101/61 (01/08/25 1152)  SpO2: 98 % (01/08/25 0827) Vital Signs (24h Range):  Temp:  [97.9 °F (36.6 °C)-98.9 °F (37.2 °C)] 98.9 °F (37.2 °C)  Pulse:  [] 93  Resp:  [16-18] 17  SpO2:  [94 %-99 %] 98 %  BP: (101-114)/(58-70) 101/61     Weight: 56 kg (123 lb 7.3 oz)  Body mass index is 19.93 kg/m².    Intake/Output Summary (Last 24 hours) at 1/8/2025 1343  Last data filed at 1/8/2025 1334  Gross per 24 hour   Intake 118 ml   Output 200 ml   Net -82 ml      Physical Exam  Constitutional:       General: He is not in acute distress.  Cardiovascular:      Rate and Rhythm: Normal rate.      Pulses: Normal pulses.   Pulmonary:      Effort: No respiratory distress.      Breath sounds: Wheezing (3L of o2 with NC) present.   Abdominal:      General: Bowel sounds are normal. There is no distension.      Tenderness: There is no abdominal tenderness (colostomy).   Musculoskeletal:      Right lower leg: Edema present.      Left lower leg: Edema present.   Skin:     General: Skin is warm.   Neurological:      Mental Status: He is alert and oriented to person, place, and time. Mental status is at baseline.         Computed MELD 3.0 unavailable. One or more values for this score either were not found within the given timeframe or did not fit some other criterion.  Computed MELD-Na unavailable. One or more values for this score either were not found within the given timeframe or did not fit some other criterion.      Significant  "Labs:  CBC:  No results for input(s): "WBC", "HGB", "HCT", "PLT" in the last 48 hours.  CMP:  Recent Labs   Lab 01/07/25  0853   *   K 4.9      CO2 26   GLU 99   BUN 30*   CREATININE 3.9*   CALCIUM 9.4   ALBUMIN 2.6*   ANIONGAP 7*     PTINR:  No results for input(s): "INR" in the last 48 hours.      Assessment and Plan     * Septic shock  Admitted to MICU 12/19 for septic shock 2/2 Staph capitis bacteremia and endocarditis from his tunneled HD line  Weaned off vasopressors in the ICU  2D echo with mass on the aortic valve suggestive of vegetation  Tunneled HD line removed 12/22  Temporary trialysis placed 12/24  ID consulted:  Suggest 6 weeks of IV Ancef with HD through 2/2/25  CTS consulted:  Suggest IV abx, no surgical intervention  IR placed new tunneled line 12/31    Chronic left shoulder pain  Patient states that he has been having shoulder pain for the past couple of weeks associated with a fall some time back. Was not able to assess due to being in the hospital and is worried that it is dislocated.       Xray L shoulder with remote fracture about the superolateral aspect of the humeral head. Suspected anterior subluxation of the humerus with respect to the glenoid   Consulted ortho, rec WBAT/ROMAT LUE and PT/OT    ACP (advance care planning)  DNR noted      Debility  Patient with Acute debility due to  acute illness . The patient's latest AMPAC (Activity Measure for Post Acute Care) Score is listed below.    AM-PAC Score - How much help does the patient need for each activity listed  Basic Mobility Total Score: 17  Turning over in bed (including adjusting bedclothes, sheets and blankets)?: A little  Sitting down on and standing up from a chair with arms (e.g., wheelchair, bedside commode, etc.): A little  Moving from lying on back to sitting on the side of the bed?: A little  Moving to and from a bed to a chair (including a wheelchair)?: A little  Need to walk in hospital room?: A little  Climbing " 3-5 steps with a railing?: A lot    Plan  - Progressive mobility protocol initated  - PT/OT consulted    Palliative care encounter  DNR noted      Acute on chronic respiratory failure with hypoxia  Patient with Hypoxic Respiratory failure which is Acute on chronic.  He is on home oxygen 3-4L. Supplemental oxygen was provided and noted-       .   Signs/symptoms of respiratory failure include- tachypnea, increased work of breathing, respiratory distress, and use of accessory muscles. Contributing diagnoses includes - CHF Labs and images were reviewed. Patient Has recent ABG, which has been reviewed. Will treat underlying causes and adjust management of respiratory failure as follows-   Continue to wean oxygen to goal SaO2 of 90%  Aggressive pulmonary toilet in setting of atelectasis of left lower lung field.   Weaned down to baseline 3L of O2 with NC    Hyperkalemia  Hyperkalemia is likely due to ESRD.The patients most recent potassium results are listed below.  Recent Labs     01/06/25  0241 01/07/25  0853   K 6.0* 4.9       Plan  - Monitor for arrhythmias with EKG and/or continuous telemetry.   - improved with HD            Bacteremia due to Staphylococcus  Seeding from tunneled HD line with AV vegetation  ID consulted:  Suggest 6 weeks of IV Ancef with HD through 2/2/25    Endocarditis  See septic shock    Anemia of chronic renal failure, stage 5  Anemia is likely due to chronic disease due to ESRD. Most recent hemoglobin and hematocrit are listed below.  Recent Labs     01/06/25  0241   HGB 7.9*   HCT 26.1*       Plan  - Monitor serial CBC: Daily  - Transfuse PRBC if patient becomes hemodynamically unstable, symptomatic or H/H drops below 7/21.  - s/p 1 unit PRBCs this admit  - Patient's anemia is currently stable    NSTEMI (non-ST elevated myocardial infarction)  In setting of septic shock  High sensitivity troponin troponin 923.    EKG with T wave inversions  No chest pain    Hypercholesterolemia  Chronic and  stable  Continue Statin      Chronic systolic heart failure  2D Echo showing EF of 20-25% with large AV vegetation partially occluding LVOT with moderate AV regurgitation  Volume removal with HD    Crohn's disease  S/p colectomy  No acute issues      History of nephrectomy  Noted  ESRD on HD    Hypertension  Patient's blood pressure range in the last 24 hours was: BP  Min: 83/52  Max: 118/69.  Hold BP meds    ESRD on hemodialysis  Nephro following  Tunneled HD line removed 12/22  Temporary trialysis placed 12/24  IR placed new line 12/31      VTE Risk Mitigation (From admission, onward)           Ordered     heparin (porcine) injection 1,000 Units  As needed (PRN)         01/03/25 1033     heparin (porcine) injection 5,000 Units  Every 8 hours         12/29/24 1000     Place sequential compression device  Until discontinued         12/19/24 1736     IP VTE LOW RISK PATIENT  Once         12/19/24 1736                    Discharge Planning   LLUVIA: 1/13/2025     Code Status: DNR   Medical Readiness for Discharge Date: 12/31/2024  Discharge Plan A: New Nursing Home placement - [facility] facility   Discharge Delays: (!) Patient and Family Barriers            Please place Justification for DME        Jeff Morejon MD  Department of Hospital Medicine   [facility] - Internal Medicine Telemetry

## 2025-01-08 NOTE — PROGRESS NOTES
[facility] - Internal Medicine Telemetry  Nephrology  Progress Note    Patient Name: Flakito Mcginnis  MRN: [id number]  Admission Date: 12/19/2024  Hospital Length of Stay: 20 days  Attending Provider: Jeff Morejon,*   Primary Care Physician: Jordin Robbins MD  Principal Problem:Septic shock    Subjective:     HPI: Patient is a pleasant 69 year old with ESRD on HD TThS who presents to the ER at the request of his HD unit for evaluation of hypotension. Patient completed his a session on Tuesday 12/17/24 and went back for a second session on 12/18/24. Review of his outpatient dialysis notes show that his post BUN dialysis values on 12/17 were 10 with a pre-HD BUN of 41 indicating a urea reduction percentage of 76% suggesting that he received DH on 12/17/24. There are also post HD treatment vital signs recorded on 12/18/24 at 1035 am which also show a pre-HD weight of 56.3 kg and post HD weight of 54.6 kg suggesting that he received an HD session yesterday as well and left at his estimated dry weight. He reported for his routine dialysis but was sent into the ER when he was found to be hypotensive in the HD unit. He reports feeling well with no signs or symptoms of hypotension prior to arrival, however he does report increased loose stool output since his HD session yesterday. He denies any increase in salt or fluid intake and tries to adhere to a low salt and 1L fluid restricting per day diet.    Nephrology has been consulted for management of his dialysis while he is admitted to the hospital. Labs on arrival showed stable electrolytes, venous blood gas showed a metabolic alkalosis with a pCO2 of 44, BNP elevated at >4,900 with no prior values to compare to, and troponin elevated at 923. Weight in the ER was 54 kg. Chest xray was obtained and showed no significant change in the cardiopulmonary status of the patient when compared to previous chest xray. Patient reports oxygen use of 4L at home and reports  resolution of his dyspnea once he was placed on his home oxygen dose.     Outpatient HD Information:  -Dialysis modality: Hemodialysis  -Outpatient HD unit: [facility]  -Nephrologist: Dr. Strickland  -HD TX days: Tuesday/Thursday/Saturday  -Last HD TX prior to hospital admission: 12/18/2024  -Residual urine: Anuric   -EDW: 54.5 kg      Interval History:   Plans for HD today. No distress on exam.     Review of patient's allergies indicates:   Allergen Reactions    Vancomycin analogues Anaphylaxis, Other (See Comments), Shortness Of Breath and Swelling     Light headed, see's spots      Aspirin Nausea And Vomiting and Other (See Comments)     Has crohn's disease    Other reaction(s): FLARES UP CROHNS      Has crohn's disease     Current Facility-Administered Medications   Medication Frequency    0.9%  NaCl infusion (for blood administration) Q24H PRN    acetaminophen tablet 500 mg Q4H PRN    atorvastatin tablet 40 mg Daily    ceFAZolin injection 1 g Q24H    epoetin ludin-epbx injection 3,000 Units Every Mon, Wed, Fri    haloperidoL tablet 5 mg Q6H PRN    heparin (porcine) injection 1,000 Units PRN    heparin (porcine) injection 5,000 Units Q8H    melatonin tablet 6 mg Nightly    mupirocin 2 % ointment Daily    oxyCODONE immediate release tablet 5 mg Q4H PRN    oxyCODONE immediate release tablet Tab 10 mg Q6H PRN    sevelamer carbonate tablet 800 mg TID WM    sodium chloride 0.9% flush 10 mL PRN       Objective:     Vital Signs (Most Recent):  Temp: 97.8 °F (36.6 °C) (01/08/25 1547)  Pulse: 99 (01/08/25 1547)  Resp: 17 (01/08/25 1547)  BP: (!) 99/52 (01/08/25 1547)  SpO2: 98 % (01/08/25 0827) Vital Signs (24h Range):  Temp:  [97.8 °F (36.6 °C)-98.9 °F (37.2 °C)] 97.8 °F (36.6 °C)  Pulse:  [] 99  Resp:  [16-18] 17  SpO2:  [94 %-99 %] 98 %  BP: ()/(52-70) 99/52     Weight: 56 kg (123 lb 7.3 oz) (01/03/25 0477)  Body mass index is 19.93 kg/m².  Body surface area is 1.61 meters squared.    I/O  last 3 completed shifts:  In: 264 [P.O.:264]  Out: -      Physical Exam  Vitals and nursing note reviewed.   Constitutional:       Appearance: He is ill-appearing.   HENT:      Head: Normocephalic and atraumatic.      Comments: Tunneled HD line in place  Cardiovascular:      Rate and Rhythm: Normal rate and regular rhythm.   Pulmonary:      Effort: Pulmonary effort is normal. No respiratory distress.      Breath sounds: Normal breath sounds.   Abdominal:      General: There is no distension.      Palpations: Abdomen is soft.   Musculoskeletal:         General: No deformity.      Right lower leg: Edema present.      Left lower leg: Edema present.   Skin:     General: Skin is warm and dry.      Coloration: Skin is pale.   Neurological:      General: No focal deficit present.      Mental Status: He is alert and oriented to person, place, and time. Mental status is at baseline.          Significant Labs:  CBC:   Recent Labs   Lab 01/06/25 0241   WBC 4.39   RBC 2.70*   HGB 7.9*   HCT 26.1*   *   MCV 97   MCH 29.3   MCHC 30.3*     CMP:   Recent Labs   Lab 01/06/25 0241 01/07/25  0853   GLU 80 99   CALCIUM 9.6 9.4   ALBUMIN 2.8* 2.6*   PROT 7.2  --    * 134*   K 6.0* 4.9   CO2 20* 26    101   BUN 41* 30*   CREATININE 5.3* 3.9*   ALKPHOS 305*  --    ALT <5*  --    AST 17  --    BILITOT 0.3  --      All labs within the past 24 hours have been reviewed.   Assessment/Plan:     Renal/  ESRD on hemodialysis  After looking through his dialysis notes from yesterday, it does appear as if he completed two back-to-back sessions and left dialysis yesterday at his dry weight (54.6 kg) and reports high volume stool output since his HD session. He reports that he is on 4L chronic oxygen use at home, and is currently on that dose in the ER, and reporting resolution of his dyspnea once he was placed back on his home oxygen. Chest xray appears unchanged from prior studies.       Outpatient HD Information:  -Dialysis  modality: Hemodialysis  -Outpatient HD unit: [facility] [place]  -Nephrologist: Dr. Strickland  -HD TX days: Tuesday/Thursday/Saturday  -Last HD TX prior to hospital admission: 12/18/2024  -Residual urine: Anuric   -EDW: 54.5 kg    Assessment:   - Dialysis for metabolic clearance and volume management will be provided today. Labs to be drawn with HD.   - Continue to monitor intake and output  - Please avoid gadolinium, fleets, phos-based laxatives, NSAIDs  - Dialysis thrice weekly unless more urgent indications arise. Will evaluate RRT requirements Daily.    Anemia of ESRD   Recent Labs   Lab 01/03/25  0811 01/06/25  0241   WBC 3.40* 4.39   HGB 7.6* 7.9*   HCT 24.4* 26.1*   * 147*       Lab Results   Component Value Date    FESATURATED 12 (L) 12/20/2024    FERRITIN 2,808 (H) 12/20/2024       - Goal in ESRD is Hgb of 10-11. EPO with HD.   - if patient is on iron infusions please D/C when active infection, cautiously use EPO when hx of malignancy, high BP (SBP usually > 170mmhg).    Mineral Bone Disease in ESRD   Lab Results   Component Value Date    .8 (H) 12/20/2024    CALCIUM 9.4 01/07/2025    ALBUMIN 2.6 (L) 01/07/2025    CAION 1.22 12/25/2024    PHOS 5.1 (H) 01/07/2025       - F/U PO4, Mg, Calcium. And albumin levels daily.   - Renal diet with protein intake goal 1.5 g/kg/d with 1 L fluid restriction   - If patient has poor oral intake, recommend nephro nutritional shakes (ex Novasource)  - Continue or restart home phos binder     ID  * Septic shock  - resolved, primary team following for NH placement.         Thank you for your consult. I will follow-up with patient. Please contact us if you have any additional questions.    Liza Still, ANIVAL, FNP-C  Nephrology  [facility] - Internal Medicine Telemetry

## 2025-01-08 NOTE — ASSESSMENT & PLAN NOTE
Anemia is likely due to chronic disease due to ESRD. Most recent hemoglobin and hematocrit are listed below.  Recent Labs     01/06/25  0241   HGB 7.9*   HCT 26.1*       Plan  - Monitor serial CBC: Daily  - Transfuse PRBC if patient becomes hemodynamically unstable, symptomatic or H/H drops below 7/21.  - s/p 1 unit PRBCs this admit  - Patient's anemia is currently stable

## 2025-01-08 NOTE — SUBJECTIVE & OBJECTIVE
Interval History:   Plans for HD today. No distress on exam.     Review of patient's allergies indicates:   Allergen Reactions    Vancomycin analogues Anaphylaxis, Other (See Comments), Shortness Of Breath and Swelling     Light headed, see's spots      Aspirin Nausea And Vomiting and Other (See Comments)     Has crohn's disease    Other reaction(s): FLARES UP CROHNS      Has crohn's disease     Current Facility-Administered Medications   Medication Frequency    0.9%  NaCl infusion (for blood administration) Q24H PRN    acetaminophen tablet 500 mg Q4H PRN    atorvastatin tablet 40 mg Daily    ceFAZolin injection 1 g Q24H    epoetin ludin-epbx injection 3,000 Units Every Mon, Wed, Fri    haloperidoL tablet 5 mg Q6H PRN    heparin (porcine) injection 1,000 Units PRN    heparin (porcine) injection 5,000 Units Q8H    melatonin tablet 6 mg Nightly    mupirocin 2 % ointment Daily    oxyCODONE immediate release tablet 5 mg Q4H PRN    oxyCODONE immediate release tablet Tab 10 mg Q6H PRN    sevelamer carbonate tablet 800 mg TID WM    sodium chloride 0.9% flush 10 mL PRN       Objective:     Vital Signs (Most Recent):  Temp: 97.8 °F (36.6 °C) (01/08/25 1547)  Pulse: 99 (01/08/25 1547)  Resp: 17 (01/08/25 1547)  BP: (!) 99/52 (01/08/25 1547)  SpO2: 98 % (01/08/25 0827) Vital Signs (24h Range):  Temp:  [97.8 °F (36.6 °C)-98.9 °F (37.2 °C)] 97.8 °F (36.6 °C)  Pulse:  [] 99  Resp:  [16-18] 17  SpO2:  [94 %-99 %] 98 %  BP: ()/(52-70) 99/52     Weight: 56 kg (123 lb 7.3 oz) (01/03/25 0429)  Body mass index is 19.93 kg/m².  Body surface area is 1.61 meters squared.    I/O last 3 completed shifts:  In: 264 [P.O.:264]  Out: -      Physical Exam  Vitals and nursing note reviewed.   Constitutional:       Appearance: He is ill-appearing.   HENT:      Head: Normocephalic and atraumatic.      Comments: Tunneled HD line in place  Cardiovascular:      Rate and Rhythm: Normal rate and regular rhythm.   Pulmonary:      Effort:  Pulmonary effort is normal. No respiratory distress.      Breath sounds: Normal breath sounds.   Abdominal:      General: There is no distension.      Palpations: Abdomen is soft.   Musculoskeletal:         General: No deformity.      Right lower leg: Edema present.      Left lower leg: Edema present.   Skin:     General: Skin is warm and dry.      Coloration: Skin is pale.   Neurological:      General: No focal deficit present.      Mental Status: He is alert and oriented to person, place, and time. Mental status is at baseline.          Significant Labs:  CBC:   Recent Labs   Lab 01/06/25 0241   WBC 4.39   RBC 2.70*   HGB 7.9*   HCT 26.1*   *   MCV 97   MCH 29.3   MCHC 30.3*     CMP:   Recent Labs   Lab 01/06/25  0241 01/07/25  0853   GLU 80 99   CALCIUM 9.6 9.4   ALBUMIN 2.8* 2.6*   PROT 7.2  --    * 134*   K 6.0* 4.9   CO2 20* 26    101   BUN 41* 30*   CREATININE 5.3* 3.9*   ALKPHOS 305*  --    ALT <5*  --    AST 17  --    BILITOT 0.3  --      All labs within the past 24 hours have been reviewed.

## 2025-01-09 LAB
ALBUMIN SERPL BCP-MCNC: 2.5 G/DL (ref 3.5–5.2)
ALBUMIN SERPL BCP-MCNC: 2.6 G/DL (ref 3.5–5.2)
ALBUMIN SERPL BCP-MCNC: 2.6 G/DL (ref 3.5–5.2)
ALP SERPL-CCNC: 294 U/L (ref 40–150)
ALT SERPL W/O P-5'-P-CCNC: <5 U/L (ref 10–44)
ANION GAP SERPL CALC-SCNC: 7 MMOL/L (ref 8–16)
AST SERPL-CCNC: 17 U/L (ref 10–40)
BASOPHILS # BLD AUTO: 0.05 K/UL (ref 0–0.2)
BASOPHILS NFR BLD: 1.5 % (ref 0–1.9)
BILIRUB SERPL-MCNC: 0.3 MG/DL (ref 0.1–1)
BUN SERPL-MCNC: 30 MG/DL (ref 8–23)
BUN SERPL-MCNC: 32 MG/DL (ref 8–23)
BUN SERPL-MCNC: 36 MG/DL (ref 8–23)
CALCIUM SERPL-MCNC: 9.4 MG/DL (ref 8.7–10.5)
CALCIUM SERPL-MCNC: 9.4 MG/DL (ref 8.7–10.5)
CALCIUM SERPL-MCNC: 9.6 MG/DL (ref 8.7–10.5)
CHLORIDE SERPL-SCNC: 104 MMOL/L (ref 95–110)
CHLORIDE SERPL-SCNC: 106 MMOL/L (ref 95–110)
CHLORIDE SERPL-SCNC: 106 MMOL/L (ref 95–110)
CO2 SERPL-SCNC: 20 MMOL/L (ref 23–29)
CO2 SERPL-SCNC: 21 MMOL/L (ref 23–29)
CO2 SERPL-SCNC: 21 MMOL/L (ref 23–29)
CREAT SERPL-MCNC: 4.3 MG/DL (ref 0.5–1.4)
CREAT SERPL-MCNC: 4.4 MG/DL (ref 0.5–1.4)
CREAT SERPL-MCNC: 4.6 MG/DL (ref 0.5–1.4)
DIFFERENTIAL METHOD BLD: ABNORMAL
EOSINOPHIL # BLD AUTO: 0.2 K/UL (ref 0–0.5)
EOSINOPHIL NFR BLD: 5.2 % (ref 0–8)
ERYTHROCYTE [DISTWIDTH] IN BLOOD BY AUTOMATED COUNT: 18.2 % (ref 11.5–14.5)
EST. GFR  (NO RACE VARIABLE): 13.1 ML/MIN/1.73 M^2
EST. GFR  (NO RACE VARIABLE): 13.8 ML/MIN/1.73 M^2
EST. GFR  (NO RACE VARIABLE): 14.2 ML/MIN/1.73 M^2
GLUCOSE SERPL-MCNC: 105 MG/DL (ref 70–110)
GLUCOSE SERPL-MCNC: 106 MG/DL (ref 70–110)
GLUCOSE SERPL-MCNC: 119 MG/DL (ref 70–110)
HCT VFR BLD AUTO: 24.7 % (ref 40–54)
HGB BLD-MCNC: 7.7 G/DL (ref 14–18)
IMM GRANULOCYTES # BLD AUTO: 0.01 K/UL (ref 0–0.04)
IMM GRANULOCYTES NFR BLD AUTO: 0.3 % (ref 0–0.5)
LYMPHOCYTES # BLD AUTO: 0.3 K/UL (ref 1–4.8)
LYMPHOCYTES NFR BLD: 10.1 % (ref 18–48)
MAGNESIUM SERPL-MCNC: 2 MG/DL (ref 1.6–2.6)
MCH RBC QN AUTO: 30.3 PG (ref 27–31)
MCHC RBC AUTO-ENTMCNC: 31.2 G/DL (ref 32–36)
MCV RBC AUTO: 97 FL (ref 82–98)
MONOCYTES # BLD AUTO: 0.5 K/UL (ref 0.3–1)
MONOCYTES NFR BLD: 14.4 % (ref 4–15)
NEUTROPHILS # BLD AUTO: 2.2 K/UL (ref 1.8–7.7)
NEUTROPHILS NFR BLD: 68.5 % (ref 38–73)
NRBC BLD-RTO: 0 /100 WBC
PHOSPHATE SERPL-MCNC: 4.8 MG/DL (ref 2.7–4.5)
PHOSPHATE SERPL-MCNC: 4.8 MG/DL (ref 2.7–4.5)
PLATELET # BLD AUTO: 146 K/UL (ref 150–450)
PMV BLD AUTO: 12.1 FL (ref 9.2–12.9)
POCT GLUCOSE: 114 MG/DL (ref 70–110)
POTASSIUM SERPL-SCNC: 5.2 MMOL/L (ref 3.5–5.1)
POTASSIUM SERPL-SCNC: 5.3 MMOL/L (ref 3.5–5.1)
POTASSIUM SERPL-SCNC: 5.5 MMOL/L (ref 3.5–5.1)
PROT SERPL-MCNC: 6.9 G/DL (ref 6–8.4)
RBC # BLD AUTO: 2.54 M/UL (ref 4.6–6.2)
SODIUM SERPL-SCNC: 132 MMOL/L (ref 136–145)
SODIUM SERPL-SCNC: 133 MMOL/L (ref 136–145)
SODIUM SERPL-SCNC: 134 MMOL/L (ref 136–145)
WBC # BLD AUTO: 3.26 K/UL (ref 3.9–12.7)

## 2025-01-09 PROCEDURE — 97116 GAIT TRAINING THERAPY: CPT | Mod: CQ

## 2025-01-09 PROCEDURE — 97530 THERAPEUTIC ACTIVITIES: CPT | Mod: CQ

## 2025-01-09 PROCEDURE — 94640 AIRWAY INHALATION TREATMENT: CPT

## 2025-01-09 PROCEDURE — 83735 ASSAY OF MAGNESIUM: CPT | Performed by: HOSPITALIST

## 2025-01-09 PROCEDURE — 25000003 PHARM REV CODE 250: Performed by: INTERNAL MEDICINE

## 2025-01-09 PROCEDURE — 25000242 PHARM REV CODE 250 ALT 637 W/ HCPCS: Performed by: HOSPITALIST

## 2025-01-09 PROCEDURE — 80053 COMPREHEN METABOLIC PANEL: CPT | Performed by: HOSPITALIST

## 2025-01-09 PROCEDURE — 25000003 PHARM REV CODE 250: Performed by: HOSPITALIST

## 2025-01-09 PROCEDURE — 25000003 PHARM REV CODE 250: Performed by: STUDENT IN AN ORGANIZED HEALTH CARE EDUCATION/TRAINING PROGRAM

## 2025-01-09 PROCEDURE — 97530 THERAPEUTIC ACTIVITIES: CPT

## 2025-01-09 PROCEDURE — 94761 N-INVAS EAR/PLS OXIMETRY MLT: CPT

## 2025-01-09 PROCEDURE — 21400001 HC TELEMETRY ROOM

## 2025-01-09 PROCEDURE — 63600175 PHARM REV CODE 636 W HCPCS: Performed by: STUDENT IN AN ORGANIZED HEALTH CARE EDUCATION/TRAINING PROGRAM

## 2025-01-09 PROCEDURE — 99900035 HC TECH TIME PER 15 MIN (STAT)

## 2025-01-09 PROCEDURE — 63600175 PHARM REV CODE 636 W HCPCS: Performed by: HOSPITALIST

## 2025-01-09 PROCEDURE — 36415 COLL VENOUS BLD VENIPUNCTURE: CPT | Performed by: HOSPITALIST

## 2025-01-09 PROCEDURE — 80069 RENAL FUNCTION PANEL: CPT | Performed by: HOSPITALIST

## 2025-01-09 PROCEDURE — 27000221 HC OXYGEN, UP TO 24 HOURS

## 2025-01-09 PROCEDURE — 25000003 PHARM REV CODE 250

## 2025-01-09 PROCEDURE — 85025 COMPLETE CBC W/AUTO DIFF WBC: CPT | Performed by: HOSPITALIST

## 2025-01-09 PROCEDURE — 63600175 PHARM REV CODE 636 W HCPCS: Performed by: INTERNAL MEDICINE

## 2025-01-09 PROCEDURE — 97535 SELF CARE MNGMENT TRAINING: CPT

## 2025-01-09 PROCEDURE — 99232 SBSQ HOSP IP/OBS MODERATE 35: CPT | Mod: ,,, | Performed by: NURSE PRACTITIONER

## 2025-01-09 RX ORDER — CEFAZOLIN SODIUM 1 G/3ML
INJECTION, POWDER, FOR SOLUTION INTRAMUSCULAR; INTRAVENOUS
Start: 2025-01-09 | End: 2025-01-12

## 2025-01-09 RX ORDER — MICONAZOLE NITRATE 2 G/100G
POWDER TOPICAL 2 TIMES DAILY
Qty: 85 G | Refills: 0 | Status: SHIPPED | OUTPATIENT
Start: 2025-01-09

## 2025-01-09 RX ORDER — OXYCODONE HYDROCHLORIDE 5 MG/1
5 TABLET ORAL EVERY 12 HOURS PRN
Start: 2025-01-09 | End: 2025-01-13

## 2025-01-09 RX ORDER — SODIUM CHLORIDE 9 MG/ML
INJECTION, SOLUTION INTRAVENOUS ONCE
Status: CANCELLED | OUTPATIENT
Start: 2025-01-10

## 2025-01-09 RX ORDER — ALBUTEROL SULFATE 2.5 MG/.5ML
10 SOLUTION RESPIRATORY (INHALATION) ONCE
Status: COMPLETED | OUTPATIENT
Start: 2025-01-09 | End: 2025-01-09

## 2025-01-09 RX ADMIN — MUPIROCIN: 20 OINTMENT TOPICAL at 09:01

## 2025-01-09 RX ADMIN — Medication 6 MG: at 08:01

## 2025-01-09 RX ADMIN — CEFAZOLIN 1 G: 1 INJECTION, POWDER, FOR SOLUTION INTRAMUSCULAR; INTRAVENOUS at 09:01

## 2025-01-09 RX ADMIN — OXYCODONE HYDROCHLORIDE 10 MG: 10 TABLET ORAL at 08:01

## 2025-01-09 RX ADMIN — ATORVASTATIN CALCIUM 40 MG: 40 TABLET, FILM COATED ORAL at 09:01

## 2025-01-09 RX ADMIN — HEPARIN SODIUM 5000 UNITS: 5000 INJECTION INTRAVENOUS; SUBCUTANEOUS at 01:01

## 2025-01-09 RX ADMIN — SODIUM ZIRCONIUM CYCLOSILICATE 10 G: 5 POWDER, FOR SUSPENSION ORAL at 01:01

## 2025-01-09 RX ADMIN — SEVELAMER CARBONATE 800 MG: 800 TABLET, FILM COATED ORAL at 05:01

## 2025-01-09 RX ADMIN — INSULIN HUMAN 5.6 UNITS: 100 INJECTION, SOLUTION PARENTERAL at 08:01

## 2025-01-09 RX ADMIN — MICONAZOLE NITRATE 2 % TOPICAL POWDER: at 09:01

## 2025-01-09 RX ADMIN — HEPARIN SODIUM 5000 UNITS: 5000 INJECTION INTRAVENOUS; SUBCUTANEOUS at 08:01

## 2025-01-09 RX ADMIN — SEVELAMER CARBONATE 800 MG: 800 TABLET, FILM COATED ORAL at 09:01

## 2025-01-09 RX ADMIN — SODIUM ZIRCONIUM CYCLOSILICATE 10 G: 5 POWDER, FOR SUSPENSION ORAL at 08:01

## 2025-01-09 RX ADMIN — HEPARIN SODIUM 5000 UNITS: 5000 INJECTION INTRAVENOUS; SUBCUTANEOUS at 03:01

## 2025-01-09 RX ADMIN — DEXTROSE MONOHYDRATE 50 G: 25 INJECTION, SOLUTION INTRAVENOUS at 08:01

## 2025-01-09 RX ADMIN — ALBUTEROL SULFATE 10 MG: 2.5 SOLUTION RESPIRATORY (INHALATION) at 10:01

## 2025-01-09 NOTE — ASSESSMENT & PLAN NOTE
After looking through his dialysis notes from yesterday, it does appear as if he completed two back-to-back sessions and left dialysis yesterday at his dry weight (54.6 kg) and reports high volume stool output since his HD session. He reports that he is on 4L chronic oxygen use at home, and is currently on that dose in the ER, and reporting resolution of his dyspnea once he was placed back on his home oxygen. Chest xray appears unchanged from prior studies.       Outpatient HD Information:  -Dialysis modality: Hemodialysis  -Outpatient HD unit: [facility]  -Nephrologist: Dr. Strickland  -HD TX days: Tuesday/Thursday/Saturday  -Last HD TX prior to hospital admission: 12/18/2024  -Residual urine: Anuric   -EDW: 54.5 kg    Assessment:   - No plans for repeat HD today due to high patient census and need to triage patients. Consider repeat CMP this afternoon for close monitoring of potassium. Consider medical management / Lokelma if needed. HD tomorrow AM shift.   - Continue to monitor intake and output  - Please avoid gadolinium, fleets, phos-based laxatives, NSAIDs  - Dialysis thrice weekly unless more urgent indications arise. Will evaluate RRT requirements Daily.    Anemia of ESRD   Recent Labs   Lab 01/03/25  0811 01/06/25  0241 01/09/25  0943   WBC 3.40* 4.39 3.26*   HGB 7.6* 7.9* 7.7*   HCT 24.4* 26.1* 24.7*   * 147* 146*       Lab Results   Component Value Date    FESATURATED 12 (L) 12/20/2024    FERRITIN 2,808 (H) 12/20/2024       - Goal in ESRD is Hgb of 10-11. EPO with HD. Hgb 7.7.  - if patient is on iron infusions please D/C when active infection, cautiously use EPO when hx of malignancy, high BP (SBP usually > 170mmhg).    Mineral Bone Disease in ESRD   Lab Results   Component Value Date    .8 (H) 12/20/2024    CALCIUM 9.4 01/09/2025    ALBUMIN 2.6 (L) 01/09/2025    CAION 1.22 12/25/2024    PHOS 5.1 (H) 01/07/2025       - F/U PO4, Mg, Calcium. And albumin levels daily.   -  Renal diet with protein intake goal 1.5 g/kg/d with 1 L fluid restriction   - If patient has poor oral intake, recommend nephro nutritional shakes (ex Novasource)  - Continue or restart home phos binder

## 2025-01-09 NOTE — ASSESSMENT & PLAN NOTE
Anemia is likely due to chronic disease due to ESRD. Most recent hemoglobin and hematocrit are listed below.  Recent Labs     01/09/25  0943   HGB 7.7*   HCT 24.7*       Plan  - Monitor serial CBC: Daily  - Transfuse PRBC if patient becomes hemodynamically unstable, symptomatic or H/H drops below 7/21.  - s/p 1 unit PRBCs this admit  - Patient's anemia is currently stable

## 2025-01-09 NOTE — ASSESSMENT & PLAN NOTE
Admitted to MICU 12/19 for septic shock 2/2 Staph capitis bacteremia and endocarditis from his tunneled HD line  Weaned off vasopressors in the ICU  2D echo with mass on the aortic valve suggestive of vegetation  Tunneled HD line removed 12/22  Temporary trialysis placed 12/24  ID consulted:  Suggest 6 weeks of IV Ancef with HD through 2/2/25  CTS consulted:  Suggest IV abx, no surgical intervention  IR placed new tunneled line 12/31 1/9 s/p ID eval - Septic shock on admission secondary to endocarditis in LVOT with associated S capitis bacteremia.  Had HD ([facility]) line removal on 12/22. Repeat blood cultures 12/23 are NGTD. Shock resolved. Large mobile vegetation on TTE. Transitioned from daptomycin to cefazolin based on susceptibilities. Evaluated by CTS and deemed a non surgical candidate due to risk/comorbidities.Continue cefazolin x 6 weeks of therapy from first negative blood culture. (End date: 2/2/25). s/p  IR line placement 12/31.  Waiting on new NH placement.  Pending financials to [facility]

## 2025-01-09 NOTE — PROGRESS NOTES
[facility] Medicine Telemetry  Nephrology  Progress Note    Patient Name: Flakito Mcginnis  MRN: [id number]  Admission Date: 12/19/2024  Hospital Length of Stay: 21 days  Attending Provider: Keny Estrada MD   Primary Care Physician: Jordin Robbins MD  Principal Problem:Septic shock    Subjective:     Interval History:   HD completed yesterday; only received 2 hrs and 15 min due to late patient arrival per transport. K mildly elevated on CMP this AM ( K 5.3); Other electrolytes stable.   No distress on exam.       Review of patient's allergies indicates:   Allergen Reactions    Vancomycin analogues Anaphylaxis, Other (See Comments), Shortness Of Breath and Swelling     Light headed, see's spots      Aspirin Nausea And Vomiting and Other (See Comments)     Has crohn's disease    Other reaction(s): FLARES UP CROHNS      Has crohn's disease     Current Facility-Administered Medications   Medication Frequency    0.9%  NaCl infusion (for blood administration) Q24H PRN    acetaminophen tablet 500 mg Q4H PRN    atorvastatin tablet 40 mg Daily    ceFAZolin injection 1 g Q24H    epoetin ludin-epbx injection 3,000 Units Every Mon, Wed, Fri    haloperidoL tablet 5 mg Q6H PRN    heparin (porcine) injection 1,000 Units PRN    heparin (porcine) injection 5,000 Units Q8H    melatonin tablet 6 mg Nightly    miconazole NITRATE 2 % top powder BID    mupirocin 2 % ointment Daily    oxyCODONE immediate release tablet 5 mg Q4H PRN    oxyCODONE immediate release tablet Tab 10 mg Q6H PRN    sevelamer carbonate tablet 800 mg TID WM    sodium chloride 0.9% flush 10 mL PRN       Objective:     Vital Signs (Most Recent):  Temp: 98.7 °F (37.1 °C) (01/09/25 0751)  Pulse: 99 (01/09/25 0751)  Resp: 18 (01/09/25 0751)  BP: 106/66 (01/09/25 0751)  SpO2: 95 % (01/09/25 0751) Vital Signs (24h Range):  Temp:  [97.8 °F (36.6 °C)-98.9 °F (37.2 °C)] 98.7 °F (37.1 °C)  Pulse:  [83-99] 99  Resp:  [16-18] 18  SpO2:  [95 %] 95 %  BP:  ()/(50-66) 106/66     Weight: 56 kg (123 lb 7.3 oz) (01/03/25 0429)  Body mass index is 19.93 kg/m².  Body surface area is 1.61 meters squared.    I/O last 3 completed shifts:  In: 652.9 [P.O.:118; I.V.:234.9; Other:300]  Out: 1031 [Other:531; Stool:500]     Physical Exam  Vitals and nursing note reviewed.   Constitutional:       Appearance: He is ill-appearing.   HENT:      Head: Normocephalic and atraumatic.      Comments: Tunneled HD line in place  Cardiovascular:      Rate and Rhythm: Normal rate and regular rhythm.   Pulmonary:      Effort: Pulmonary effort is normal. No respiratory distress.      Breath sounds: Normal breath sounds.   Abdominal:      General: There is no distension.      Palpations: Abdomen is soft.   Musculoskeletal:         General: No deformity.      Right lower leg: Edema present.      Left lower leg: Edema present.   Skin:     General: Skin is warm and dry.      Coloration: Skin is pale.   Neurological:      General: No focal deficit present.      Mental Status: He is alert and oriented to person, place, and time. Mental status is at baseline.          Significant Labs:  CBC:   Recent Labs   Lab 01/09/25  0943   WBC 3.26*   RBC 2.54*   HGB 7.7*   HCT 24.7*   *   MCV 97   MCH 30.3   MCHC 31.2*     CMP:   Recent Labs   Lab 01/06/25  0241 01/07/25  0853   GLU 80 99   CALCIUM 9.6 9.4   ALBUMIN 2.8* 2.6*   PROT 7.2  --    * 134*   K 6.0* 4.9   CO2 20* 26    101   BUN 41* 30*   CREATININE 5.3* 3.9*   ALKPHOS 305*  --    ALT <5*  --    AST 17  --    BILITOT 0.3  --      All labs within the past 24 hours have been reviewed.     Assessment/Plan:     Renal/  ESRD on hemodialysis  After looking through his dialysis notes from yesterday, it does appear as if he completed two back-to-back sessions and left dialysis yesterday at his dry weight (54.6 kg) and reports high volume stool output since his HD session. He reports that he is on 4L chronic oxygen use at home, and is  currently on that dose in the ER, and reporting resolution of his dyspnea once he was placed back on his home oxygen. Chest xray appears unchanged from prior studies.       Outpatient HD Information:  -Dialysis modality: Hemodialysis  -Outpatient HD unit: [facility]  -Nephrologist: Dr. Strickland  -HD TX days: Tuesday/Thursday/Saturday  -Last HD TX prior to hospital admission: 12/18/2024  -Residual urine: Anuric   -EDW: 54.5 kg    Assessment:   - No plans for repeat HD today due to high patient census and need to triage patients. Consider repeat CMP this afternoon for close monitoring of potassium. Consider medical management / Lokelma if needed. HD tomorrow AM shift.   - Continue to monitor intake and output  - Please avoid gadolinium, fleets, phos-based laxatives, NSAIDs  - Dialysis thrice weekly unless more urgent indications arise. Will evaluate RRT requirements Daily.    Anemia of ESRD   Recent Labs   Lab 01/03/25  0811 01/06/25  0241 01/09/25  0943   WBC 3.40* 4.39 3.26*   HGB 7.6* 7.9* 7.7*   HCT 24.4* 26.1* 24.7*   * 147* 146*       Lab Results   Component Value Date    FESATURATED 12 (L) 12/20/2024    FERRITIN 2,808 (H) 12/20/2024       - Goal in ESRD is Hgb of 10-11. EPO with HD. Hgb 7.7.  - if patient is on iron infusions please D/C when active infection, cautiously use EPO when hx of malignancy, high BP (SBP usually > 170mmhg).    Mineral Bone Disease in ESRD   Lab Results   Component Value Date    .8 (H) 12/20/2024    CALCIUM 9.4 01/09/2025    ALBUMIN 2.6 (L) 01/09/2025    CAION 1.22 12/25/2024    PHOS 5.1 (H) 01/07/2025       - F/U PO4, Mg, Calcium. And albumin levels daily.   - Renal diet with protein intake goal 1.5 g/kg/d with 1 L fluid restriction   - If patient has poor oral intake, recommend nephro nutritional shakes (ex Novasource)  - Continue or restart home phos binder     ID  * Septic shock  - resolved, primary team following for NH placement.         Thank you  for your consult. I will follow-up with patient. Please contact us if you have any additional questions.    Liza Still DNP, FNP-C  Nephrology  [facility] - Internal Medicine Telemetry

## 2025-01-09 NOTE — ASSESSMENT & PLAN NOTE
Patient with Acute debility due to  acute illness . The patient's latest AMPAC (Activity Measure for Post Acute Care) Score is listed below.    AM-PAC Score - How much help does the patient need for each activity listed  Basic Mobility Total Score: 17  Turning over in bed (including adjusting bedclothes, sheets and blankets)?: A little  Sitting down on and standing up from a chair with arms (e.g., wheelchair, bedside commode, etc.): A little  Moving from lying on back to sitting on the side of the bed?: A little  Moving to and from a bed to a chair (including a wheelchair)?: A little  Need to walk in hospital room?: A little  Climbing 3-5 steps with a railing?: A lot    Plan  - Progressive mobility protocol initated  - PT/OT consulted -   1/9 recs low intensity therapy

## 2025-01-09 NOTE — PT/OT/SLP PROGRESS
Physical Therapy Treatment    Patient Name:  Flakito Mcginnis   MRN:  [id number]    Recommendations:     Discharge Recommendations: Low Intensity Therapy  Discharge Equipment Recommendations: bath bench  Barriers to discharge: None    Assessment:     Flakito Mcginnis is a 69 y.o. male admitted with a medical diagnosis of Septic shock.  He presents with the following impairments/functional limitations: weakness, impaired endurance, impaired self care skills, impaired functional mobility, decreased safety awareness, decreased lower extremity function, decreased upper extremity function, impaired skin, edema, impaired cardiopulmonary response to activity .Progressing with PT Intervention. Pt Progressing with improving slowly with  gait distance. Pt would continue to benefit from skilled PT to address overall function al mobility, goals , and to return to functional baseline.  Goals remain appropriate.     Rehab Prognosis: Good; patient would benefit from acute skilled PT services to address these deficits and reach maximum level of function.    Recent Surgery: * No surgery found *      Plan:     During this hospitalization, patient to be seen 3 x/week to address the identified rehab impairments via gait training, therapeutic activities, therapeutic exercises, neuromuscular re-education and progress toward the following goals:    Plan of Care Expires:  01/22/25    Subjective     Chief Complaint: My muscles in my legs hurt when I move continuously     Pain/Comfort:  Pain Rating 1: 0/10  Location - Side 1: Right  Location - Orientation 1: generalized  Location 1: back  Pain Addressed 1: Pre-medicate for activity, Distraction, Reposition      Objective:     Communicated with RN prior to session.  Patient found up in chair with oxygen upon PT entry to room.     General Precautions: Standard, fall, aspiration  Orthopedic Precautions: N/A  Braces: N/A  Respiratory Status: Nasal cannula, flow 3 L/min     Functional  Mobility:  Transfers:   vcs for locking brakes on rollator  Sit to Stand x 2:  min assistance with rolling walker  Gait: 70 ft x 2 trials with  CGA with Rolator. Vcs for upright posture and safety. Seated rest break between trials      AM-PAC 6 CLICK MOBILITY  Turning over in bed (including adjusting bedclothes, sheets and blankets)?: 3  Sitting down on and standing up from a chair with arms (e.g., wheelchair, bedside commode, etc.): 3  Moving from lying on back to sitting on the side of the bed?: 3  Moving to and from a bed to a chair (including a wheelchair)?: 3  Need to walk in hospital room?: 3  Climbing 3-5 steps with a railing?: 2  Basic Mobility Total Score: 17       Treatment & Education:  Therapist provided instruction and educated of patient on progress, safety, d/c, PT POC,   proper body mechanics, energy conservation, and fall prevention strategies during tasks listed above, on the effects of prolonged immobility and the importance of performing OOB activity and exercises to promote healing and reduce recovery time   .  Updated white board with appropriate PT mobility information for medical team notification   Donned an extra gown and gripping socks    Pt safe to ambulate in hallway with RN or PCT assistance  Call nursing/pct to transfer to chair/use bathroom. Pt stated understanding  Bedside table in front of patient and area set up for function, convenience, and safety. RN aware of patient's mobility needs and status. Questions/concerns addressed within PTA scope of practice, patient with no further questions. Time was provided for active listening, discussion of health disposition, and discussion of safe discharge. Pt?verbalized?agreement .     Patient left up in chair with all lines intact, call button in reach, and nsg notified..  GOALS:   Multidisciplinary Problems       Physical Therapy Goals          Problem: Physical Therapy    Goal Priority Disciplines Outcome Interventions   Physical Therapy  Goal     PT, PT/OT Progressing    Description: Goals to be met by: 2025     Patient will increase functional independence with mobility by performin. Sit to stand transfer with Modified [place]  2. Bed to chair transfer with Supervision using LRAD  3. Gait  x 250 feet with Supervision using LRAD.   4. Stand for 5 minutes with Supervision using LRAD while performing dynamic balance tasks  5. Lower extremity exercise program x30 reps per handout, with independence                         Time Tracking:     PT Received On: 25  PT Start Time: 1104     PT Stop Time: 1129  PT Total Time (min): 25 min     Billable Minutes: Gait Training 25    Treatment Type: Treatment  PT/PTA: PTA     Number of PTA visits since last PT visit: 4     2025

## 2025-01-09 NOTE — ASSESSMENT & PLAN NOTE
Patient states that he has been having shoulder pain for the past couple of weeks associated with a fall some time back. Was not able to assess due to being in the hospital and is worried that it is dislocated.       Xray L shoulder with remote fracture about the superolateral aspect of the humeral head. Suspected anterior subluxation of the humerus with respect to the glenoid   Consulted ortho, rec WBAT/ROMAT LUE and PT/OT

## 2025-01-09 NOTE — ASSESSMENT & PLAN NOTE
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  Had HD ([facility]) line removal on 12/22. Repeat blood cultures 12/23 are NGTD. Shock resolved. Large mobile vegetation on TTE. Transitioned from daptomycin to cefazolin based on susceptibilities. Evaluated by CTS and deemed a non surgical candidate due to risk/comorbidities.Continue cefazolin x 6 weeks of therapy from first negative blood culture. (End date: 2/2/25). s/p  IR line placement 12/31.

## 2025-01-09 NOTE — ASSESSMENT & PLAN NOTE
Hyperkalemia is likely due to ESRD.The patients most recent potassium results are listed below.  Recent Labs     01/07/25  0853 01/09/25  0943 01/09/25  1320   K 4.9 5.3* 5.2*     Plan  - Monitor for arrhythmias with EKG and/or continuous telemetry.   - improved with HD    1/9 K of 5.3. Lokelma ordered. renal panel q 4h. 1L/24h fluid restriction

## 2025-01-09 NOTE — ASSESSMENT & PLAN NOTE
Patient with Hypoxic Respiratory failure which is Acute on chronic.  He is on home oxygen 3-4L. Supplemental oxygen was provided and noted-       .   Signs/symptoms of respiratory failure include- tachypnea, increased work of breathing, respiratory distress, and use of accessory muscles. Contributing diagnoses includes - CHF Labs and images were reviewed. Patient Has recent ABG, which has been reviewed. Will treat underlying causes and adjust management of respiratory failure as follows-   Continue to wean oxygen to goal SaO2 of 90%  Aggressive pulmonary toilet in setting of atelectasis of left lower lung field.   Weaned down to baseline 3L of O2 with NC  1/9 sats 95% on 3LNC

## 2025-01-09 NOTE — SUBJECTIVE & OBJECTIVE
Interval History:   HD completed yesterday; only received 2 hrs and 15 min due to late patient arrival per transport. K mildly elevated on CMP this AM ( K 5.3); Other electrolytes stable.   No distress on exam.       Review of patient's allergies indicates:   Allergen Reactions    Vancomycin analogues Anaphylaxis, Other (See Comments), Shortness Of Breath and Swelling     Light headed, see's spots      Aspirin Nausea And Vomiting and Other (See Comments)     Has crohn's disease    Other reaction(s): FLARES UP CROHNS      Has crohn's disease     Current Facility-Administered Medications   Medication Frequency    0.9%  NaCl infusion (for blood administration) Q24H PRN    acetaminophen tablet 500 mg Q4H PRN    atorvastatin tablet 40 mg Daily    ceFAZolin injection 1 g Q24H    epoetin ludin-epbx injection 3,000 Units Every Mon, Wed, Fri    haloperidoL tablet 5 mg Q6H PRN    heparin (porcine) injection 1,000 Units PRN    heparin (porcine) injection 5,000 Units Q8H    melatonin tablet 6 mg Nightly    miconazole NITRATE 2 % top powder BID    mupirocin 2 % ointment Daily    oxyCODONE immediate release tablet 5 mg Q4H PRN    oxyCODONE immediate release tablet Tab 10 mg Q6H PRN    sevelamer carbonate tablet 800 mg TID WM    sodium chloride 0.9% flush 10 mL PRN       Objective:     Vital Signs (Most Recent):  Temp: 98.7 °F (37.1 °C) (01/09/25 0751)  Pulse: 99 (01/09/25 0751)  Resp: 18 (01/09/25 0751)  BP: 106/66 (01/09/25 0751)  SpO2: 95 % (01/09/25 0751) Vital Signs (24h Range):  Temp:  [97.8 °F (36.6 °C)-98.9 °F (37.2 °C)] 98.7 °F (37.1 °C)  Pulse:  [83-99] 99  Resp:  [16-18] 18  SpO2:  [95 %] 95 %  BP: ()/(50-66) 106/66     Weight: 56 kg (123 lb 7.3 oz) (01/03/25 0429)  Body mass index is 19.93 kg/m².  Body surface area is 1.61 meters squared.    I/O last 3 completed shifts:  In: 652.9 [P.O.:118; I.V.:234.9; Other:300]  Out: 1031 [Other:531; Stool:500]     Physical Exam  Vitals and nursing note reviewed.    Constitutional:       Appearance: He is ill-appearing.   HENT:      Head: Normocephalic and atraumatic.      Comments: Tunneled HD line in place  Cardiovascular:      Rate and Rhythm: Normal rate and regular rhythm.   Pulmonary:      Effort: Pulmonary effort is normal. No respiratory distress.      Breath sounds: Normal breath sounds.   Abdominal:      General: There is no distension.      Palpations: Abdomen is soft.   Musculoskeletal:         General: No deformity.      Right lower leg: Edema present.      Left lower leg: Edema present.   Skin:     General: Skin is warm and dry.      Coloration: Skin is pale.   Neurological:      General: No focal deficit present.      Mental Status: He is alert and oriented to person, place, and time. Mental status is at baseline.          Significant Labs:  CBC:   Recent Labs   Lab 01/09/25  0943   WBC 3.26*   RBC 2.54*   HGB 7.7*   HCT 24.7*   *   MCV 97   MCH 30.3   MCHC 31.2*     CMP:   Recent Labs   Lab 01/06/25  0241 01/07/25  0853   GLU 80 99   CALCIUM 9.6 9.4   ALBUMIN 2.8* 2.6*   PROT 7.2  --    * 134*   K 6.0* 4.9   CO2 20* 26    101   BUN 41* 30*   CREATININE 5.3* 3.9*   ALKPHOS 305*  --    ALT <5*  --    AST 17  --    BILITOT 0.3  --      All labs within the past 24 hours have been reviewed.

## 2025-01-09 NOTE — ASSESSMENT & PLAN NOTE
Patient's blood pressure range in the last 24 hours was: BP  Min: 82/50  Max: 115/63.  Hold BP meds

## 2025-01-09 NOTE — NURSING
2.15 hours  dialysis treatment  completed . No UF removed . Both lumens of  HD catheter were flushed , heparin locked and wrapped  with tape and guaze .   Patient  BP was  Soft  during the  treatment . Primary team , Nephro team were made aware.Last  BP was 108/65(81) before  patient  left  [facility] .  Report  given to Primary Nurse. Patient was transported in wheelchair .

## 2025-01-09 NOTE — PLAN OF CARE
Discharge Plan A and Plan B have been determined by review of patient's clinical status, future medical and therapeutic needs, and coverage/benefits for post-acute care in coordination with multidisciplinary team members.    01/09/25 0938   Post-Acute Status   Post-Acute Authorization Placement   Post-Acute Placement Status Pending post-acute provider review/more information requested   Coverage [employer] MANAGED MEDICARE - AET[employer] MEDICARE DUAL DSNP -   Discharge Delays (!) Patient and Family Barriers   Discharge Plan   Discharge Plan A New Nursing Home placement - long-term care facility   Discharge Plan B Home Health;Group home     TIERA phoned [facility] to f/u on status of long-term NH referral. Per Leah (admissions), referral is pending financial review w/ business office. Leah to confirm w/ SW if able to accept following business office review. TIERA requested assistance of CHW team 1 w/ f/u w/ alternative NH referrals that remain pending.    [facility] - considering  [facility] - pending   [facility] -  pending   [facility] -  pending   [facility] -  pending   [facility] -  pending   [facility] -  pending   [facility] - declined      4:00pm  TIERA reviewed epic for update on status of ref; patient has no accepting NH. TIERA phoned [facility] x 3. Per Leah, referral remains pending w/ business office. TIERA reviewed status of referrals w/ [facility], per [facility] Team 1 has not confirmed an accepting NH but will continue f/u w/ facilities.       SW will continue to follow.                  Cole Morales, VAL, LMSW  [facility]  Case Management  Ext. [phone]

## 2025-01-09 NOTE — PLAN OF CARE
Problem: Adult Inpatient Plan of Care  Goal: Plan of Care Review  Outcome: Progressing  Goal: Absence of Hospital-Acquired Illness or Injury  Outcome: Progressing  Goal: Readiness for Transition of Care  Outcome: Progressing     Problem: Infection  Goal: Absence of Infection Signs and Symptoms  Outcome: Progressing     Problem: Sepsis/Septic Shock  Goal: Optimal Coping  Outcome: Progressing  Goal: Absence of Bleeding  Outcome: Progressing

## 2025-01-09 NOTE — PT/OT/SLP PROGRESS
Occupational Therapy   Treatment    Name: Flakito Mcginnis  MRN: [id number]  Admitting Diagnosis:  Septic shock       Recommendations:     Discharge Recommendations: Low Intensity Therapy  Discharge Equipment Recommendations:  bath bench  Barriers to discharge:  Decreased caregiver support    Assessment:     Flakito Mcginnis is a 69 y.o. male with a medical diagnosis of Septic shock.  Performance deficits affecting function are weakness, impaired endurance, decreased coordination, impaired self care skills, impaired functional mobility, gait instability, impaired balance, edema.     Rehab Prognosis:  Good; patient would benefit from acute skilled OT services to address these deficits and reach maximum level of function.       Plan:     Patient to be seen 3 x/week to address the above listed problems via self-care/home management, therapeutic activities, therapeutic exercises  Plan of Care Expires: 01/23/25  Plan of Care Reviewed with: patient    Subjective     Pain/Comfort:  Pain Rating 1: 0/10    Objective:     Communicated with: rn prior to session.  Patient found up in chair with oxygen, colostomy, peripheral IV upon OT entry to room.    General Precautions: Standard, fall, aspiration    Orthopedic Precautions:N/A  Braces: N/A  Respiratory Status: Nasal cannula, flow 3 L/min     Occupational Performance:     Bed Mobility:        Functional Mobility/Transfers:  Patient completed Sit <> Stand Transfer with contact guard assistance  with  rollator   Functional Mobility: Pt walked from chair to bathroom with SBA with rollator    Activities of Daily Living:  Grooming: supervision at sink  Upper Body Dressing: moderate assistance standing  Toileting: set up assist colostomy bag      AMPAC 6 Click ADL: 19    Treatment & Education:  Discussed OT POC and progress    Patient left up in chair with all lines intact and call button in reach    GOALS:   Multidisciplinary Problems       Occupational Therapy Goals          Problem:  Occupational Therapy    Goal Priority Disciplines Outcome Interventions   Occupational Therapy Goal     OT, PT/OT Progressing    Description: Goals to be met by: 1/23/25 (1 mo)     Patient will increase functional independence with ADLs by performing:    UE Dressing with [place].  LE Dressing with [place].  Grooming while standing at sink with [place].  Toileting from toilet with [place] for hygiene and clothing management.   Rolling to Bilateral with [place].   Supine to sit with [place].  Step transfer with [place]  Toilet transfer to toilet with [place].                         Time Tracking:     OT Date of Treatment: 01/09/25  OT Start Time: 1232  OT Stop Time: 1255  OT Total Time (min): 23 min    Billable Minutes:Self Care/Home Management 15  Therapeutic Activity 8    OT/HAN: OT     Number of HAN visits since last OT visit: 2    1/9/2025

## 2025-01-09 NOTE — ASSESSMENT & PLAN NOTE
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  Had HD ([facility]) line removal on 12/22. Repeat blood cultures 12/23 are NGTD. Shock resolved. Large mobile vegetation on TTE. Transitioned from daptomycin to cefazolin based on susceptibilities. Evaluated by CTS and deemed a non surgical candidate due to risk/comorbidities.

## 2025-01-09 NOTE — PROGRESS NOTES
[facility] - Internal Medicine [facility] Medicine  Progress Note    Patient Name: Flakito Mcginnis  MRN: [id number]  Patient Class: IP- Inpatient   Admission Date: 12/19/2024  Length of Stay: 21 days  Attending Physician: Keny Esrtada MD  Primary Care Provider: Jordin Robbins MD        Subjective     Principal Problem:Septic shock        HPI:  By Alicia Galloway NP    Mr. Flakito Mcginnis is a 69 y.o. man w/ HFrEF (30% 8/2024 from 20-25% 6/2024), NICM, MR, ESRD on HD, chronic respiratory failure on home 3L NC, Crohn's s/p colostomy, h/o R nephrectomy. [place] [facility] [place] due to hypotension and ongoing shortness of breath. He usually receives his dialysis on T, R, S and went on Wednesday for an extra session for volume removal. He arrived on Thursday for his usually scheduled dialysis session and was directed to the ED due to hypotension. On arrival in the ED his blood pressure was 78/51. He was found to have a BNP >4900 and CXR concerning for volume overload. High sensitivity troponin 923. Critical care medicine was consulted for hypotension and admitted to MICU.     Overview/Hospital Course:           1/9 s/p ID eval - Septic shock on admission secondary to endocarditis in LVOT with associated S capitis bacteremia.  Had HD (CVC) line removal on 12/22. Repeat blood cultures 12/23 are NGTD. Shock resolved. Large mobile vegetation on TTE. Transitioned from daptomycin to cefazolin based on susceptibilities. Evaluated by CTS and deemed a non surgical candidate due to risk/comorbidities.Continue cefazolin x 6 weeks of therapy from first negative blood culture. (End date: 2/2/25). s/p  IR line placement 12/31.  Waiting on new NH placement.  Pending financials to [facility]      Review of Systems:   Pain scale:  Constitutional:  fever,  chills, headache, vision loss, hearing loss, weight loss, Generalized weakness, falls, loss of smell, loss of taste, poor appetite,   sore throat  Respiratory: cough, shortness of breath.   Cardiovascular: chest pain, dizziness, palpitations, orthopnea, swelling of feet, syncope  Gastrointestinal: nausea, vomiting, abdominal pain, diarrhea, black stool,  blood in stool, change in bowel habits, constipation  Genitourinary: hematuria, dysuria, urgency, frequency  Integument/Breast: rash,  pruritis  Hematologic/Lymphatic: easy bruising, lymphadenopathy  Musculoskeletal: arthralgias , myalgias, back pain, neck pain, knee pain  Neurological: confusion, seizures, tremors, slurred speech  Behavioral/Psych:  depression, anxiety, auditory or visual hallucinations     OBJECTIVE:     Physical Exam:  Body mass index is 19.93 kg/m².    Constitutional: Appears thin built  Head: Normocephalic and atraumatic.   Neck: Normal range of motion. Neck supple. right chest HD catheter   Cardiovascular: Normal heart rate.  Regular heart rhythm.  Pulmonary/Chest: Effort normal.   Abdominal: No distension.  No tenderness. + colostomy   Musculoskeletal: Normal range of motion.+  edema.   Neurological: Alert and oriented to person, place, and time.   Skin: Skin is warm and dry.   Psychiatric: Normal mood and affect. Behavior is normal.                  Vital Signs  Temp: 98 °F (36.7 °C) (01/09/25 1129)  Pulse: 79 (01/09/25 1129)  Resp: 18 (01/09/25 1129)  BP: (!) 145/70 (01/09/25 1129)  SpO2: (!) 93 % (01/09/25 1129)     24 Hour VS Range    Temp:  [97.8 °F (36.6 °C)-98.7 °F (37.1 °C)]   Pulse:  [79-99]   Resp:  [16-18]   BP: ()/(50-70)   SpO2:  [93 %-95 %]     Intake/Output Summary (Last 24 hours) at 1/9/2025 1449  Last data filed at 1/9/2025 1347  Gross per 24 hour   Intake 754.87 ml   Output 831 ml   Net -76.13 ml         I/O This Shift:  I/O this shift:  In: 220 [P.O.:220]  Out: -     Wt Readings from Last 3 Encounters:   01/03/25 56 kg (123 lb 7.3 oz)   12/11/24 59.9 kg (132 lb)   10/19/24 59.9 kg (132 lb)       I have personally reviewed the vitals and recorded  "Intake/Output     Laboratory/Diagnostic Data:    CBC/Anemia Labs: Coags:    Recent Labs   Lab 01/03/25  0811 01/06/25  0241 01/09/25  0943   WBC 3.40* 4.39 3.26*   HGB 7.6* 7.9* 7.7*   HCT 24.4* 26.1* 24.7*   * 147* 146*   MCV 95 97 97   RDW 17.8* 18.0* 18.2*    No results for input(s): "PT", "INR", "APTT" in the last 168 hours.     Chemistries: ABG:   Recent Labs   Lab 01/03/25  0811 01/06/25  0241 01/07/25  0853 01/09/25  0943 01/09/25  1320   * 132* 134* 134* 132*   K 4.7 6.0* 4.9 5.3* 5.2*    103 101 106 104   CO2 23 20* 26 21* 21*   BUN 28* 41* 30* 30* 32*   CREATININE 4.5* 5.3* 3.9* 4.3* 4.4*   CALCIUM 9.0 9.6 9.4 9.4 9.6   PROT 6.2 7.2  --  6.9  --    BILITOT 0.3 0.3  --  0.3  --    ALKPHOS 298* 305*  --  294*  --    ALT <5* <5*  --  <5*  --    AST 17 17  --  17  --    MG 2.1 2.1  --  2.0  --    PHOS 4.5 6.3* 5.1*  --  4.8*    No results for input(s): "PH", "PCO2", "PO2", "HCO3", "POCSATURATED", "BE" in the last 168 hours.     POCT Glucose: HbA1c:    No results for input(s): "POCTGLUCOSE" in the last 168 hours. Hemoglobin A1C   Date Value Ref Range Status   12/18/2023 4.7 4.0 - 5.6 % Final     Comment:     ADA Screening Guidelines:  5.7-6.4%  Consistent with prediabetes  >or=6.5%  Consistent with diabetes    High levels of fetal hemoglobin interfere with the HbA1C  assay. Heterozygous hemoglobin variants (HbS, HgC, etc)do  not significantly interfere with this assay.   However, presence of multiple variants may affect accuracy.          Cardiac Enzymes: Ejection Fractions:    No results for input(s): "CPK", "CPKMB", "MB", "TROPONINI" in the last 72 hours. EF   Date Value Ref Range Status   12/20/2024 20 % Final          No results for input(s): "COLORU", "APPEARANCEUA", "PHUR", "SPECGRAV", "PROTEINUA", "GLUCUA", "KETONESU", "BILIRUBINUA", "OCCULTUA", "NITRITE", "UROBILINOGEN", "LEUKOCYTESUR", "RBCUA", "WBCUA", "BACTERIA", "SQUAMEPITHEL", "HYALINECASTS" in the last 48 hours.    Invalid " "input(s): "WRIGHTSUR"    Lactate (Lactic Acid) (mmol/L)   Date Value   12/19/2024 2.4 (H)     BNP (pg/mL)   Date Value   12/19/2024 >4,900 (H)     No results found for: "CRP", "SEDRATE"  No results found for: "DDIMER"  Ferritin (ng/mL)   Date Value   12/20/2024 2,808 (H)     No results found for: "LDH"  CPK (U/L)   Date Value   12/21/2024 <7 (L)     CK (U/L)   Date Value   08/22/2024 139     No results found for this or any previous visit.  SARS-CoV2 (COVID-19) Qualitative PCR (no units)   Date Value   06/12/2020 Not detected     SARS-CoV-2 RNA, Amplification, Qual (no units)   Date Value   12/19/2024 Negative       Microbiology labs for the last week  Microbiology Results (last 7 days)       ** No results found for the last 168 hours. **            Reviewed and noted in plan where applicable- Please see chart for full lab data.    Lines/Drains:       Hemodialysis Catheter 12/31/24 0818 right subclavian (Active)   Line Necessity Review CRRT/HD 01/08/25 2046   Verification by X-ray Yes 01/07/25 2049   Site Assessment No drainage;No redness;No swelling;No warmth;Not assessed 01/08/25 1847   Line Securement Device Secured with sutureless device 01/08/25 2046   Dressing Type CHG impregnated dressing/sponge 01/08/25 2046   Dressing Status Clean;Intact 01/08/25 2046   Dressing Intervention Integrity maintained 01/08/25 2046   Date on Dressing 01/06/25 01/08/25 2046   Dressing Due to be Changed 01/13/25 01/08/25 2046   Venous Patency/Care flushed w/o difficulty;heparin locked;intermittent infusion cap applied 01/08/25 1847   Arterial Patency/Care flushed w/o difficulty;heparin locked;intermittent infusion cap applied 01/08/25 1847   Waveform Not being transduced 01/02/25 0838   Number of days: 8            Peripheral IV - Single Lumen 01/04/25 1515 18 G 1 in Anterior;Right Forearm (Active)   Site Assessment Intact 01/09/25 0307   Line Securement Device Secured with sutureless device 01/08/25 0800   Extremity Assessment " Distal to IV No redness;No swelling;No warmth 01/08/25 0800   Line Status Saline locked 01/09/25 0307   Dressing Status Old drainage 01/09/25 0307   Dressing Intervention Integrity maintained 01/08/25 2318   Dressing Change Due 01/08/25 01/05/25 2000   Site Change Due 01/08/25 01/05/25 2000   Reason Not Rotated Patient refused 01/08/25 0800   Number of days: 4            Colostomy 12/19/24 2225 RLQ (Active)   Stomal Appliance 1 piece 01/08/25 0800   Stoma Appearance protruding above skin level 01/08/25 2046   Stoma Size (in) 26 01/02/25 0838   Site Assessment Clean;Intact 01/08/25 0800   Peristomal Assessment Clean;Intact 01/08/25 0800   Accessories/Skin Care barrier substance over peristomal skin;cleansed w/ water;skin barrier paste 01/02/25 0838   Stoma Function stool 01/08/25 0800   Treatment Placement checked 01/06/25 0800   Tolerance no signs/symptoms of discomfort 01/06/25 2016   Output (mL) 350 mL 01/03/25 0429   Number of days: 20       Imaging  ECG Results              Repeat EKG 12-lead (Final result)        Collection Time Result Time QRS Duration OHS QTC Calculation    12/19/24 16:03:39 12/20/24 09:16:41 112 512                     Final result by Interface, Lab In [facility] (12/20/24 09:16:46)                   Narrative:    Test Reason : R06.00,    Vent. Rate :  96 BPM     Atrial Rate :  96 BPM     P-R Int : 232 ms          QRS Dur : 112 ms      QT Int : 406 ms       P-R-T Axes :  52 -47 203 degrees    QTcB Int : 512 ms    Sinus rhythm with 1st degree A-V block  Left axis deviation  Anterior infarct (cited on or before 19-Dec-2024)  ST and T wave abnormality, consider lateral ischemia  Prolonged QT  Abnormal ECG  When compared with ECG of 19-Dec-2024 14:29,  No significant change was found  Confirmed by Dominick Grant ([id number]) on 12/20/2024 9:16:39 AM    Referred By: AAAREFERRAL SELF           Confirmed By: Dominick Grant                                     EKG 12-lead (Final result)        Collection Time  Result Time QRS Duration OHS QTC Calculation    12/19/24 14:29:52 12/19/24 14:32:53 114 560                     Final result by Interface, Lab In [facility] (12/19/24 14:32:59)                   Narrative:    Test Reason : Z13.6,    Vent. Rate : 102 BPM     Atrial Rate : 102 BPM     P-R Int : 226 ms          QRS Dur : 114 ms      QT Int : 430 ms       P-R-T Axes :  39 -55 -85 degrees    QTcB Int : 560 ms    Sinus tachycardia with 1st degree A-V block  Left anterior fascicular block  Abnormal R wave progression in the precordial leads - Possible Anterior  infarct ,age undetermined  ST and T wave abnormality, consider lateral ischemia  Prolonged QT  Abnormal ECG  No previous ECGs available  Confirmed by Franck Aguilar ([id number]) on 12/19/2024 2:32:50 PM    Referred By: AAAREFERRAL SELF           Confirmed By: Franck Aguilar                                    Results for orders placed during the hospital encounter of 12/19/24    Echo    Interpretation Summary    There is a 10 by 4 mm large mobile echogenic mass present in the LVOT suggestive of vegetation - blood cultures could be done if clinically indicated.    Left Ventricle: The left ventricle is mildly dilated. Mildly increased ventricular mass. Normal wall thickness. There is mild eccentric hypertrophy. Severe global hypokinesis present. Septal motion is abnormal. There is severely reduced systolic function with a visually estimated ejection fraction of 20 - 25%. Ejection fraction is approximately 20%. Quantitated ejection fraction is 25%. There is diastolic dysfunction.    Right Ventricle: Mild right ventricular enlargement. Wall thickness is normal. Right ventricle wall motion has global hypokinesis. Systolic function is mild-moderately reduced.    Left Atrium: Left atrium is severely dilated.    Right Atrium: Right atrium is moderately dilated.    Aortic Valve: There is moderate aortic valve sclerosis. Moderately restricted motion. There is a 10 by 4 mm large mobile  echogenic mass present in the LVOT suggestive of vegetation - blood cultures could be done if clinically indicated. There is moderate stenosis. Aortic valve area by VTI is 1.2 cm2. Aortic valve peak velocity is 2.6 m/s. Mean gradient is 16.5 mmHg. The dimensionless index is 0.37. There is mild to moderate aortic regurgitation.    Mitral Valve: There is moderate bileaflet sclerosis. There is moderate mitral annular calcification present. There is mild regurgitation.    Tricuspid Valve: There is moderate to  moderate-severe regurgitation.    Pulmonary Artery: There is mild pulmonary hypertension. The estimated pulmonary artery systolic pressure is 41 mmHg.    IVC/SVC: Intermediate venous pressure at 8 mmHg.      X-Ray Cervical Spine 2 or 3 Views  Narrative: EXAMINATION:  XR CERVICAL SPINE 2 OR 3 VIEWS    CLINICAL HISTORY:  s/p fall;    TECHNIQUE:  AP, lateral and open mouth views of the cervical spine were performed.    COMPARISON:  August 2023    FINDINGS:  Bones are markedly demineralized limiting evaluation.  Also the patient's shoulders obscure mole see entire cervical spine.  Postoperative change base of neck likely about the thyroid.  Partially visualized large-bore right vascular catheter.  Remote left rib fractures.  Impression: Nondiagnostic study due to marked osteopenia and obscuration of the cervical spine by the shoulder.  Further evaluation to exclude cervical spine trauma will be needed.    This report was flagged in Epic as abnormal.    Electronically signed by: Ketan Springer MD  Date:    01/05/2025  Time:    12:45  X-Ray Shoulder Trauma 3 view Left  Narrative: EXAMINATION:  XR SHOULDER TRAUMA 3 VIEW LEFT    CLINICAL HISTORY:  fall on shoulder, concerned for dislocation, hard to assess due to posture;    TECHNIQUE:  Three views of the left shoulder were performed.    COMPARISON  December 19, 2024    FINDINGS:  Probable remote fracture about superolateral aspect of the right humeral head.  Calcific  densities/cortical thickening again noted.  Narrowing of the subacromial space suggest chronic rotator cuff tear.  Bones are markedly demineralized.  Numeral head difficult to visualize on axillary Y-view appears similar in position compared to prior.  Unfortunately subluxation is not excluded.  Left rib deformities again noted.  Impression: Significant bony demineralization limits evaluation.  Probable remote fracture about the superolateral aspect of the humeral head.  Suspected anterior subluxation of the humerus with respect to the glenoid though not well visualized on the axillary Y-view.    This report was flagged in Epic as abnormal.    Electronically signed by: Ketan Springer MD  Date:    01/05/2025  Time:    12:43      Labs, Imaging, EKG and Diagnostic results from 1/9/2025 were reviewed.    Medications:  Medication list was reviewed and changes noted under Assessment/Plan.  No current facility-administered medications on file prior to encounter.     Current Outpatient Medications on File Prior to Encounter   Medication Sig Dispense Refill    atorvastatin (LIPITOR) 40 MG tablet Take 40 mg by mouth once daily.      carvediloL (COREG) 12.5 MG tablet Take 12.5 mg by mouth 2 (two) times daily.      sevelamer carbonate (RENVELA) 800 mg Tab Take 800 mg by mouth 3 (three) times daily with meals.       Scheduled Medications:  Current Facility-Administered Medications   Medication Dose Route Frequency    atorvastatin  40 mg Oral Daily    ceFAZolin (Ancef) IV (PEDS and ADULTS)  1 g Intravenous Q24H    epoetin ludin-ebpx (RETACRIT) injection  3,000 Units Intravenous Every Mon, Wed, Fri    heparin (porcine)  5,000 Units Subcutaneous Q8H    melatonin  6 mg Oral Nightly    miconazole NITRATE 2 %   Topical (Top) BID    mupirocin   Nasal Daily    sevelamer carbonate  800 mg Oral TID WM    sodium zirconium cyclosilicate  10 g Oral Once     PRN:   Current Facility-Administered Medications:     0.9%  NaCl infusion (for blood  administration), , Intravenous, Q24H PRN    acetaminophen, 500 mg, Oral, Q4H PRN    haloperidoL, 5 mg, Oral, Q6H PRN    heparin (porcine), 1,000 Units, Intra-Catheter, PRN    oxyCODONE, 5 mg, Oral, Q4H PRN    oxyCODONE, 10 mg, Oral, Q6H PRN    sodium chloride 0.9%, 10 mL, Intravenous, PRN  Infusions:   Estimated Creatinine Clearance: 12.6 mL/min (A) (based on SCr of 4.4 mg/dL (H)).           Assessment and Plan     * Septic shock  Admitted to MICU 12/19 for septic shock 2/2 Staph capitis bacteremia and endocarditis from his tunneled HD line  Weaned off vasopressors in the ICU  2D echo with mass on the aortic valve suggestive of vegetation  Tunneled HD line removed 12/22  Temporary trialysis placed 12/24  ID consulted:  Suggest 6 weeks of IV Ancef with HD through 2/2/25  CTS consulted:  Suggest IV abx, no surgical intervention  IR placed new tunneled line 12/31 1/9 s/p ID eval - Septic shock on admission secondary to endocarditis in LVOT with associated S capitis bacteremia.  Had HD (CVC) line removal on 12/22. Repeat blood cultures 12/23 are NGTD. Shock resolved. Large mobile vegetation on TTE. Transitioned from daptomycin to cefazolin based on susceptibilities. Evaluated by CTS and deemed a non surgical candidate due to risk/comorbidities.Continue cefazolin x 6 weeks of therapy from first negative blood culture. (End date: 2/2/25). s/p  IR line placement 12/31.  Waiting on new NH placement.  Pending financials to [facility]       Chronic left shoulder pain  Patient states that he has been having shoulder pain for the past couple of weeks associated with a fall some time back. Was not able to assess due to being in the hospital and is worried that it is dislocated.     orthopedic eval 1/7 - old greater tuberosity fracture of the left humerus.  Some mild subluxation on the scapular Y-view.  In his has some tenderness in that area.  This has been ongoing since August.  He has multiple comorbid medical conditions  that do not make him a good candidate for surgery.  The shoulder does not appear to be fully dislocated but does have some subluxation.  Continue conservative treatment given his overall medical status.     Xray L shoulder with remote fracture about the superolateral aspect of the humeral head. Suspected anterior subluxation of the humerus with respect to the glenoid    rec WBAT/ROMAT LUE and PT/OT    ACP (advance care planning)  DNR noted      Debility  Patient with Acute debility due to  acute illness . The patient's latest AMPAC (Activity Measure for Post Acute Care) Score is listed below.    AM-PAC Score - How much help does the patient need for each activity listed  Basic Mobility Total Score: 17  Turning over in bed (including adjusting bedclothes, sheets and blankets)?: A little  Sitting down on and standing up from a chair with arms (e.g., wheelchair, bedside commode, etc.): A little  Moving from lying on back to sitting on the side of the bed?: A little  Moving to and from a bed to a chair (including a wheelchair)?: A little  Need to walk in hospital room?: A little  Climbing 3-5 steps with a railing?: A lot    Plan  - Progressive mobility protocol initated  - PT/OT consulted -   1/9 recs low intensity therapy    Palliative care encounter  DNR noted      Acute on chronic respiratory failure with hypoxia  Patient with Hypoxic Respiratory failure which is Acute on chronic.  He is on home oxygen 3-4L. Supplemental oxygen was provided and noted-       .   Signs/symptoms of respiratory failure include- tachypnea, increased work of breathing, respiratory distress, and use of accessory muscles. Contributing diagnoses includes - CHF Labs and images were reviewed. Patient Has recent ABG, which has been reviewed. Will treat underlying causes and adjust management of respiratory failure as follows-   Continue to wean oxygen to goal SaO2 of 90%  Aggressive pulmonary toilet in setting of atelectasis of left lower lung  field.   Weaned down to baseline 3L of O2 with NC  1/9 sats 95% on 3LNC    Hyperkalemia  Hyperkalemia is likely due to ESRD.The patients most recent potassium results are listed below.  Recent Labs     01/07/25  0853 01/09/25  0943 01/09/25  1320   K 4.9 5.3* 5.2*     Plan  - Monitor for arrhythmias with EKG and/or continuous telemetry.   - improved with HD    1/9 K of 5.3. Lokelma ordered. renal panel q 4h. 1L/24h fluid restriction      Bacteremia due to Staphylococcus  Seeding from tunneled HD line with AV vegetation  ID consulted:  Suggest 6 weeks of IV Ancef with HD through 2/2/25 1/9 s/p ID eval - Septic shock on admission secondary to endocarditis in LVOT with associated S capitis bacteremia.  Had HD (CVC) line removal on 12/22. Repeat blood cultures 12/23 are NGTD. Shock resolved. Large mobile vegetation on TTE. Transitioned from daptomycin to cefazolin based on susceptibilities. Evaluated by CTS and deemed a non surgical candidate due to risk/comorbidities.Continue cefazolin x 6 weeks of therapy from first negative blood culture. (End date: 2/2/25). s/p  IR line placement 12/31.       Endocarditis  See septic shock  1/9 s/p ID eval - Septic shock on admission secondary to endocarditis in LVOT with associated S capitis bacteremia.  Had HD (CVC) line removal on 12/22. Repeat blood cultures 12/23 are NGTD. Shock resolved. Large mobile vegetation on TTE. Transitioned from daptomycin to cefazolin based on susceptibilities. Evaluated by CTS and deemed a non surgical candidate due to risk/comorbidities.Continue cefazolin x 6 weeks of therapy from first negative blood culture. (End date: 2/2/25). s/p  IR line placement 12/31.      Anemia of chronic renal failure, stage 5  Anemia is likely due to chronic disease due to ESRD. Most recent hemoglobin and hematocrit are listed below.  Recent Labs     01/09/25  0943   HGB 7.7*   HCT 24.7*       Plan  - Monitor serial CBC: Daily  - Transfuse PRBC if patient becomes  hemodynamically unstable, symptomatic or H/H drops below 7/21.  - s/p 1 unit PRBCs this admit  - Patient's anemia is currently stable    NSTEMI (non-ST elevated myocardial infarction)  In setting of septic shock  High sensitivity troponin troponin 923.    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  Had HD (CVC) line removal on 12/22. Repeat blood cultures 12/23 are NGTD. Shock resolved. Large mobile vegetation on TTE. Transitioned from daptomycin to cefazolin based on susceptibilities. Evaluated by CTS and deemed a non surgical candidate due to risk/comorbidities.    Crohn's disease  S/p colectomy  No acute issues      History of nephrectomy  Noted  ESRD on HD    Hypertension  Patient's blood pressure range in the last 24 hours was: BP  Min: 82/50  Max: 115/63.  Hold BP meds    ESRD on hemodialysis  Nephro following  Tunneled HD line removed 12/22  Temporary trialysis placed 12/24  IR placed new line 12/31      VTE Risk Mitigation (From admission, onward)           Ordered     heparin (porcine) injection 1,000 Units  As needed (PRN)         01/03/25 1033     heparin (porcine) injection 5,000 Units  Every 8 hours         12/29/24 1000     Place sequential compression device  Until discontinued         12/19/24 1736     IP VTE LOW RISK PATIENT  Once         12/19/24 1736                    Discharge Planning   LLUVIA: 1/13/2025     Code Status: DNR   Medical Readiness for Discharge Date: 12/31/2024  Discharge Plan A: New Nursing Home placement - senior living care facility   Discharge Delays: (!) Patient and Family Barriers            Please place Justification for DME        Keny PEREZ  MD Natalie  Department of Hospital Medicine   [facility] - Internal Medicine Telemetry

## 2025-01-09 NOTE — ASSESSMENT & PLAN NOTE
Patient states that he has been having shoulder pain for the past couple of weeks associated with a fall some time back. Was not able to assess due to being in the hospital and is worried that it is dislocated.     orthopedic eval 1/7 - old greater tuberosity fracture of the left humerus.  Some mild subluxation on the scapular Y-view.  In his has some tenderness in that area.  This has been ongoing since August.  He has multiple comorbid medical conditions that do not make him a good candidate for surgery.  The shoulder does not appear to be fully dislocated but does have some subluxation.  Continue conservative treatment given his overall medical status.     Xray L shoulder with remote fracture about the superolateral aspect of the humeral head. Suspected anterior subluxation of the humerus with respect to the glenoid    rec WBAT/ROMAT LUE and PT/OT

## 2025-01-10 LAB
ABO + RH BLD: ABNORMAL
ALBUMIN SERPL BCP-MCNC: 2.4 G/DL (ref 3.5–5.2)
ALBUMIN SERPL BCP-MCNC: 2.6 G/DL (ref 3.5–5.2)
ALBUMIN SERPL BCP-MCNC: 2.6 G/DL (ref 3.5–5.2)
ALP SERPL-CCNC: 288 U/L (ref 40–150)
ALP SERPL-CCNC: 288 U/L (ref 40–150)
ALT SERPL W/O P-5'-P-CCNC: <5 U/L (ref 10–44)
ALT SERPL W/O P-5'-P-CCNC: <5 U/L (ref 10–44)
ANION GAP SERPL CALC-SCNC: 10 MMOL/L (ref 8–16)
ANION GAP SERPL CALC-SCNC: 8 MMOL/L (ref 8–16)
AST SERPL-CCNC: 18 U/L (ref 10–40)
AST SERPL-CCNC: 18 U/L (ref 10–40)
BASOPHILS # BLD AUTO: 0.02 K/UL (ref 0–0.2)
BASOPHILS # BLD AUTO: 0.02 K/UL (ref 0–0.2)
BASOPHILS # BLD AUTO: 0.03 K/UL (ref 0–0.2)
BASOPHILS # BLD AUTO: 0.04 K/UL (ref 0–0.2)
BASOPHILS NFR BLD: 0.6 % (ref 0–1.9)
BASOPHILS NFR BLD: 0.6 % (ref 0–1.9)
BASOPHILS NFR BLD: 1.1 % (ref 0–1.9)
BASOPHILS NFR BLD: 1.3 % (ref 0–1.9)
BILIRUB SERPL-MCNC: 0.2 MG/DL (ref 0.1–1)
BILIRUB SERPL-MCNC: 0.2 MG/DL (ref 0.1–1)
BLD GP AB SCN CELLS X3 SERPL QL: ABNORMAL
BLD PROD TYP BPU: NORMAL
BLOOD GROUP ANTIBODIES SERPL: NORMAL
BLOOD UNIT EXPIRATION DATE: NORMAL
BLOOD UNIT TYPE CODE: 5100
BLOOD UNIT TYPE: NORMAL
BUN SERPL-MCNC: 16 MG/DL (ref 8–23)
BUN SERPL-MCNC: 16 MG/DL (ref 8–23)
BUN SERPL-MCNC: 24 MG/DL (ref 8–23)
BUN SERPL-MCNC: 24 MG/DL (ref 8–23)
BUN SERPL-MCNC: 43 MG/DL (ref 8–23)
BUN SERPL-MCNC: 43 MG/DL (ref 8–23)
BUN SERPL-MCNC: 44 MG/DL (ref 8–23)
BUN SERPL-MCNC: 47 MG/DL (ref 8–23)
BUN SERPL-MCNC: 47 MG/DL (ref 8–23)
CALCIUM SERPL-MCNC: 8 MG/DL (ref 8.7–10.5)
CALCIUM SERPL-MCNC: 8 MG/DL (ref 8.7–10.5)
CALCIUM SERPL-MCNC: 9 MG/DL (ref 8.7–10.5)
CALCIUM SERPL-MCNC: 9 MG/DL (ref 8.7–10.5)
CALCIUM SERPL-MCNC: 9.3 MG/DL (ref 8.7–10.5)
CALCIUM SERPL-MCNC: 9.3 MG/DL (ref 8.7–10.5)
CALCIUM SERPL-MCNC: 9.5 MG/DL (ref 8.7–10.5)
CALCIUM SERPL-MCNC: 9.9 MG/DL (ref 8.7–10.5)
CALCIUM SERPL-MCNC: 9.9 MG/DL (ref 8.7–10.5)
CHLORIDE SERPL-SCNC: 102 MMOL/L (ref 95–110)
CHLORIDE SERPL-SCNC: 102 MMOL/L (ref 95–110)
CHLORIDE SERPL-SCNC: 103 MMOL/L (ref 95–110)
CHLORIDE SERPL-SCNC: 103 MMOL/L (ref 95–110)
CHLORIDE SERPL-SCNC: 104 MMOL/L (ref 95–110)
CHLORIDE SERPL-SCNC: 105 MMOL/L (ref 95–110)
CO2 SERPL-SCNC: 18 MMOL/L (ref 23–29)
CO2 SERPL-SCNC: 20 MMOL/L (ref 23–29)
CO2 SERPL-SCNC: 24 MMOL/L (ref 23–29)
CO2 SERPL-SCNC: 24 MMOL/L (ref 23–29)
CO2 SERPL-SCNC: 26 MMOL/L (ref 23–29)
CO2 SERPL-SCNC: 26 MMOL/L (ref 23–29)
CODING SYSTEM: NORMAL
CREAT SERPL-MCNC: 2.1 MG/DL (ref 0.5–1.4)
CREAT SERPL-MCNC: 2.1 MG/DL (ref 0.5–1.4)
CREAT SERPL-MCNC: 3.1 MG/DL (ref 0.5–1.4)
CREAT SERPL-MCNC: 3.1 MG/DL (ref 0.5–1.4)
CREAT SERPL-MCNC: 4.8 MG/DL (ref 0.5–1.4)
CREAT SERPL-MCNC: 4.9 MG/DL (ref 0.5–1.4)
CREAT SERPL-MCNC: 4.9 MG/DL (ref 0.5–1.4)
CROSSMATCH INTERPRETATION: NORMAL
DIFFERENTIAL METHOD BLD: ABNORMAL
DISPENSE STATUS: NORMAL
EOSINOPHIL # BLD AUTO: 0.2 K/UL (ref 0–0.5)
EOSINOPHIL NFR BLD: 4.8 % (ref 0–8)
EOSINOPHIL NFR BLD: 5.7 % (ref 0–8)
EOSINOPHIL NFR BLD: 5.7 % (ref 0–8)
EOSINOPHIL NFR BLD: 6.3 % (ref 0–8)
ERYTHROCYTE [DISTWIDTH] IN BLOOD BY AUTOMATED COUNT: 18 % (ref 11.5–14.5)
ERYTHROCYTE [DISTWIDTH] IN BLOOD BY AUTOMATED COUNT: 18.1 % (ref 11.5–14.5)
ERYTHROCYTE [DISTWIDTH] IN BLOOD BY AUTOMATED COUNT: 18.1 % (ref 11.5–14.5)
ERYTHROCYTE [DISTWIDTH] IN BLOOD BY AUTOMATED COUNT: 18.3 % (ref 11.5–14.5)
EST. GFR  (NO RACE VARIABLE): 12.1 ML/MIN/1.73 M^2
EST. GFR  (NO RACE VARIABLE): 12.1 ML/MIN/1.73 M^2
EST. GFR  (NO RACE VARIABLE): 12.4 ML/MIN/1.73 M^2
EST. GFR  (NO RACE VARIABLE): 21 ML/MIN/1.73 M^2
EST. GFR  (NO RACE VARIABLE): 21 ML/MIN/1.73 M^2
EST. GFR  (NO RACE VARIABLE): 33.4 ML/MIN/1.73 M^2
EST. GFR  (NO RACE VARIABLE): 33.4 ML/MIN/1.73 M^2
GLUCOSE SERPL-MCNC: 61 MG/DL (ref 70–110)
GLUCOSE SERPL-MCNC: 61 MG/DL (ref 70–110)
GLUCOSE SERPL-MCNC: 71 MG/DL (ref 70–110)
GLUCOSE SERPL-MCNC: 71 MG/DL (ref 70–110)
GLUCOSE SERPL-MCNC: 76 MG/DL (ref 70–110)
GLUCOSE SERPL-MCNC: 88 MG/DL (ref 70–110)
GLUCOSE SERPL-MCNC: 88 MG/DL (ref 70–110)
GLUCOSE SERPL-MCNC: 96 MG/DL (ref 70–110)
GLUCOSE SERPL-MCNC: 96 MG/DL (ref 70–110)
HCT VFR BLD AUTO: 22.5 % (ref 40–54)
HCT VFR BLD AUTO: 22.5 % (ref 40–54)
HCT VFR BLD AUTO: 24.3 % (ref 40–54)
HCT VFR BLD AUTO: 25.1 % (ref 40–54)
HGB BLD-MCNC: 6.8 G/DL (ref 14–18)
HGB BLD-MCNC: 6.8 G/DL (ref 14–18)
HGB BLD-MCNC: 7.3 G/DL (ref 14–18)
HGB BLD-MCNC: 7.9 G/DL (ref 14–18)
IMM GRANULOCYTES # BLD AUTO: 0 K/UL (ref 0–0.04)
IMM GRANULOCYTES # BLD AUTO: 0.01 K/UL (ref 0–0.04)
IMM GRANULOCYTES NFR BLD AUTO: 0 % (ref 0–0.5)
IMM GRANULOCYTES NFR BLD AUTO: 0.3 % (ref 0–0.5)
LYMPHOCYTES # BLD AUTO: 0.2 K/UL (ref 1–4.8)
LYMPHOCYTES # BLD AUTO: 0.3 K/UL (ref 1–4.8)
LYMPHOCYTES # BLD AUTO: 0.3 K/UL (ref 1–4.8)
LYMPHOCYTES # BLD AUTO: 0.4 K/UL (ref 1–4.8)
LYMPHOCYTES NFR BLD: 11.7 % (ref 18–48)
LYMPHOCYTES NFR BLD: 8.4 % (ref 18–48)
LYMPHOCYTES NFR BLD: 9.3 % (ref 18–48)
LYMPHOCYTES NFR BLD: 9.3 % (ref 18–48)
MAGNESIUM SERPL-MCNC: 2 MG/DL (ref 1.6–2.6)
MAGNESIUM SERPL-MCNC: 2 MG/DL (ref 1.6–2.6)
MCH RBC QN AUTO: 29.4 PG (ref 27–31)
MCH RBC QN AUTO: 29.6 PG (ref 27–31)
MCH RBC QN AUTO: 30.1 PG (ref 27–31)
MCH RBC QN AUTO: 30.1 PG (ref 27–31)
MCHC RBC AUTO-ENTMCNC: 30 G/DL (ref 32–36)
MCHC RBC AUTO-ENTMCNC: 30.2 G/DL (ref 32–36)
MCHC RBC AUTO-ENTMCNC: 30.2 G/DL (ref 32–36)
MCHC RBC AUTO-ENTMCNC: 31.5 G/DL (ref 32–36)
MCV RBC AUTO: 100 FL (ref 82–98)
MCV RBC AUTO: 100 FL (ref 82–98)
MCV RBC AUTO: 93 FL (ref 82–98)
MCV RBC AUTO: 98 FL (ref 82–98)
MONOCYTES # BLD AUTO: 0.3 K/UL (ref 0.3–1)
MONOCYTES # BLD AUTO: 0.4 K/UL (ref 0.3–1)
MONOCYTES NFR BLD: 11.7 % (ref 4–15)
MONOCYTES NFR BLD: 12 % (ref 4–15)
MONOCYTES NFR BLD: 12 % (ref 4–15)
MONOCYTES NFR BLD: 13.4 % (ref 4–15)
NEUTROPHILS # BLD AUTO: 1.7 K/UL (ref 1.8–7.7)
NEUTROPHILS # BLD AUTO: 2.4 K/UL (ref 1.8–7.7)
NEUTROPHILS # BLD AUTO: 2.4 K/UL (ref 1.8–7.7)
NEUTROPHILS # BLD AUTO: 2.5 K/UL (ref 1.8–7.7)
NEUTROPHILS NFR BLD: 70.4 % (ref 38–73)
NEUTROPHILS NFR BLD: 70.6 % (ref 38–73)
NEUTROPHILS NFR BLD: 72.1 % (ref 38–73)
NEUTROPHILS NFR BLD: 72.1 % (ref 38–73)
NRBC BLD-RTO: 0 /100 WBC
NUM UNITS TRANS PACKED RBC: NORMAL
OB PNL STL: POSITIVE
PHOSPHATE SERPL-MCNC: 2.4 MG/DL (ref 2.7–4.5)
PHOSPHATE SERPL-MCNC: 3.6 MG/DL (ref 2.7–4.5)
PHOSPHATE SERPL-MCNC: 5 MG/DL (ref 2.7–4.5)
PHOSPHATE SERPL-MCNC: 5.2 MG/DL (ref 2.7–4.5)
PHOSPHATE SERPL-MCNC: 5.5 MG/DL (ref 2.7–4.5)
PLATELET # BLD AUTO: 122 K/UL (ref 150–450)
PLATELET # BLD AUTO: 122 K/UL (ref 150–450)
PLATELET # BLD AUTO: 130 K/UL (ref 150–450)
PLATELET # BLD AUTO: 145 K/UL (ref 150–450)
PMV BLD AUTO: 11.4 FL (ref 9.2–12.9)
PMV BLD AUTO: 12.1 FL (ref 9.2–12.9)
POCT GLUCOSE: 109 MG/DL (ref 70–110)
POTASSIUM SERPL-SCNC: 3.4 MMOL/L (ref 3.5–5.1)
POTASSIUM SERPL-SCNC: 4.3 MMOL/L (ref 3.5–5.1)
POTASSIUM SERPL-SCNC: 5 MMOL/L (ref 3.5–5.1)
POTASSIUM SERPL-SCNC: 5.1 MMOL/L (ref 3.5–5.1)
POTASSIUM SERPL-SCNC: 5.4 MMOL/L (ref 3.5–5.1)
PROT SERPL-MCNC: 6.4 G/DL (ref 6–8.4)
PROT SERPL-MCNC: 6.4 G/DL (ref 6–8.4)
RBC # BLD AUTO: 2.26 M/UL (ref 4.6–6.2)
RBC # BLD AUTO: 2.26 M/UL (ref 4.6–6.2)
RBC # BLD AUTO: 2.47 M/UL (ref 4.6–6.2)
RBC # BLD AUTO: 2.69 M/UL (ref 4.6–6.2)
SODIUM SERPL-SCNC: 132 MMOL/L (ref 136–145)
SODIUM SERPL-SCNC: 132 MMOL/L (ref 136–145)
SODIUM SERPL-SCNC: 133 MMOL/L (ref 136–145)
SODIUM SERPL-SCNC: 134 MMOL/L (ref 136–145)
SODIUM SERPL-SCNC: 134 MMOL/L (ref 136–145)
SODIUM SERPL-SCNC: 135 MMOL/L (ref 136–145)
SODIUM SERPL-SCNC: 135 MMOL/L (ref 136–145)
SODIUM SERPL-SCNC: 136 MMOL/L (ref 136–145)
SODIUM SERPL-SCNC: 136 MMOL/L (ref 136–145)
SPECIMEN OUTDATE: ABNORMAL
WBC # BLD AUTO: 2.38 K/UL (ref 3.9–12.7)
WBC # BLD AUTO: 3.33 K/UL (ref 3.9–12.7)
WBC # BLD AUTO: 3.33 K/UL (ref 3.9–12.7)
WBC # BLD AUTO: 3.51 K/UL (ref 3.9–12.7)

## 2025-01-10 PROCEDURE — 86870 RBC ANTIBODY IDENTIFICATION: CPT | Performed by: HOSPITALIST

## 2025-01-10 PROCEDURE — 25000003 PHARM REV CODE 250: Performed by: STUDENT IN AN ORGANIZED HEALTH CARE EDUCATION/TRAINING PROGRAM

## 2025-01-10 PROCEDURE — 82272 OCCULT BLD FECES 1-3 TESTS: CPT | Performed by: HOSPITALIST

## 2025-01-10 PROCEDURE — 21400001 HC TELEMETRY ROOM

## 2025-01-10 PROCEDURE — 36415 COLL VENOUS BLD VENIPUNCTURE: CPT | Mod: XB | Performed by: HOSPITALIST

## 2025-01-10 PROCEDURE — 84100 ASSAY OF PHOSPHORUS: CPT | Performed by: HOSPITALIST

## 2025-01-10 PROCEDURE — 90935 HEMODIALYSIS ONE EVALUATION: CPT | Mod: ,,, | Performed by: NURSE PRACTITIONER

## 2025-01-10 PROCEDURE — 80069 RENAL FUNCTION PANEL: CPT | Performed by: HOSPITALIST

## 2025-01-10 PROCEDURE — 86922 COMPATIBILITY TEST ANTIGLOB: CPT | Performed by: HOSPITALIST

## 2025-01-10 PROCEDURE — 97110 THERAPEUTIC EXERCISES: CPT

## 2025-01-10 PROCEDURE — 85025 COMPLETE CBC W/AUTO DIFF WBC: CPT | Performed by: HOSPITALIST

## 2025-01-10 PROCEDURE — 80100016 HC MAINTENANCE HEMODIALYSIS

## 2025-01-10 PROCEDURE — 80069 RENAL FUNCTION PANEL: CPT | Mod: 91 | Performed by: HOSPITALIST

## 2025-01-10 PROCEDURE — 25000003 PHARM REV CODE 250: Performed by: INTERNAL MEDICINE

## 2025-01-10 PROCEDURE — 83735 ASSAY OF MAGNESIUM: CPT | Performed by: HOSPITALIST

## 2025-01-10 PROCEDURE — 85025 COMPLETE CBC W/AUTO DIFF WBC: CPT | Mod: 91 | Performed by: HOSPITALIST

## 2025-01-10 PROCEDURE — 86902 BLOOD TYPE ANTIGEN DONOR EA: CPT | Mod: 59 | Performed by: HOSPITALIST

## 2025-01-10 PROCEDURE — 63600175 PHARM REV CODE 636 W HCPCS: Performed by: INTERNAL MEDICINE

## 2025-01-10 PROCEDURE — 80053 COMPREHEN METABOLIC PANEL: CPT | Performed by: HOSPITALIST

## 2025-01-10 PROCEDURE — 36415 COLL VENOUS BLD VENIPUNCTURE: CPT | Performed by: HOSPITALIST

## 2025-01-10 PROCEDURE — 63600175 PHARM REV CODE 636 W HCPCS: Performed by: STUDENT IN AN ORGANIZED HEALTH CARE EDUCATION/TRAINING PROGRAM

## 2025-01-10 PROCEDURE — 25000003 PHARM REV CODE 250

## 2025-01-10 PROCEDURE — P9016 RBC LEUKOCYTES REDUCED: HCPCS | Performed by: HOSPITALIST

## 2025-01-10 PROCEDURE — 63600175 PHARM REV CODE 636 W HCPCS

## 2025-01-10 PROCEDURE — 94761 N-INVAS EAR/PLS OXIMETRY MLT: CPT

## 2025-01-10 PROCEDURE — 86900 BLOOD TYPING SEROLOGIC ABO: CPT | Performed by: HOSPITALIST

## 2025-01-10 RX ORDER — HYDROCODONE BITARTRATE AND ACETAMINOPHEN 500; 5 MG/1; MG/1
TABLET ORAL
Status: DISCONTINUED | OUTPATIENT
Start: 2025-01-10 | End: 2025-01-14 | Stop reason: HOSPADM

## 2025-01-10 RX ADMIN — CEFAZOLIN 1 G: 1 INJECTION, POWDER, FOR SOLUTION INTRAMUSCULAR; INTRAVENOUS at 08:01

## 2025-01-10 RX ADMIN — Medication 6 MG: at 08:01

## 2025-01-10 RX ADMIN — HEPARIN SODIUM 5000 UNITS: 5000 INJECTION INTRAVENOUS; SUBCUTANEOUS at 08:01

## 2025-01-10 RX ADMIN — DIPHENHYDRAMINE HYDROCHLORIDE, ZINC ACETATE: 2; .1 CREAM TOPICAL at 01:01

## 2025-01-10 RX ADMIN — SEVELAMER CARBONATE 800 MG: 800 TABLET, FILM COATED ORAL at 05:01

## 2025-01-10 RX ADMIN — MICONAZOLE NITRATE 2 % TOPICAL POWDER: at 09:01

## 2025-01-10 RX ADMIN — SEVELAMER CARBONATE 800 MG: 800 TABLET, FILM COATED ORAL at 12:01

## 2025-01-10 RX ADMIN — OXYCODONE HYDROCHLORIDE 10 MG: 10 TABLET ORAL at 08:01

## 2025-01-10 RX ADMIN — OXYCODONE HYDROCHLORIDE 10 MG: 10 TABLET ORAL at 02:01

## 2025-01-10 RX ADMIN — HEPARIN SODIUM 5000 UNITS: 5000 INJECTION INTRAVENOUS; SUBCUTANEOUS at 05:01

## 2025-01-10 RX ADMIN — WHITE PETROLATUM: 1.75 OINTMENT TOPICAL at 01:01

## 2025-01-10 RX ADMIN — HEPARIN SODIUM 1000 UNITS: 1000 INJECTION, SOLUTION INTRAVENOUS; SUBCUTANEOUS at 11:01

## 2025-01-10 RX ADMIN — EPOETIN ALFA-EPBX 3000 UNITS: 3000 INJECTION, SOLUTION INTRAVENOUS; SUBCUTANEOUS at 11:01

## 2025-01-10 NOTE — PROGRESS NOTES
[facility] - Internal Medicine Telemetry  Adult Nutrition  Progress Note    SUMMARY     Recommendation/Intervention:   1) Continue current renal diet + minced and moist diet texture as tolerated per SLP recs, encourage PO intake   - Consider removing low sodium/low potassium restrictions if PO intake remains <50% at meals  2) Continue Novasource renal BID to promote adequate kcal/protein consumption  3) RD to monitor and follow-up as needed    Goals: Meet % of EEN/EPN by next RD follow-up  Nutrition Goal Status: progressing towards goal  Communication of RD Recs: other (comment) (POC)    Assessment and Plan    Nutrition Problem  Severe acute malnutrition     Related to (etiology):   Inadequate energy intake     Signs and Symptoms (as evidenced by):   10 lb weight loss x 1 week (7.5%) and mild depletion in temple, severe depletion in clavicle, and moderate depletion in scapular      Nutrition Diagnosis Status:   Continues    Malnutrition Assessment  Malnutrition Context: acute illness or injury  Malnutrition Level: severe          Weight Loss (Malnutrition): 1-2% in 1 week (10 lb weight loss x 1 week (7.5%))  Muscle Mass (Malnutrition): severe depletion       [place] Region (Muscle Loss): mild depletion  Clavicle Bone Region (Muscle Loss): severe depletion  Scapular Bone Region (Muscle Loss): moderate depletion  Dorsal Hand (Muscle Loss): well nourished     Reason for Assessment    Reason For Assessment: RD follow-up  General Information Comments: RD follow-up: Pt with 0-25% recent PO intake documented at meals. Drinking ONS. SLP following- recs minced and moist diet texture. Pt on HD, + colostomy. Malnutrition diagnosis continues.  Nutrition Discharge Planning: adequate oral intake    Nutrition/Diet History    Spiritual, Cultural Beliefs, Jewish Practices, Values that Affect Care: no  Food Allergies: NKFA  Factors Affecting Nutritional Intake: chewing difficulties/inability to chew  "food    Anthropometrics    Temp: 97.4 °F (36.3 °C)  Height Method: Stated  Height: 5' 6" (167.6 cm)  Height (inches): 66 in  Weight Method: Bed Scale  Weight: 56 kg (123 lb 7.3 oz)  Weight (lb): 123.46 lb  Ideal Body Weight (IBW), Male: 142 lb  BMI (Calculated): 19.9  BMI Grade: 18.5-24.9 - normal       Lab/Procedures/Meds    Pertinent Labs Reviewed: reviewed  Pertinent Labs Comments: Hgb: 7.3, Hct: 24.3, Na: 134, Potassium: 5.4, BUN: 47, Creatinine: 4.9, eGFR: 12.1  Pertinent Medications Reviewed: reviewed   atorvastatin  40 mg Oral Daily    ceFAZolin (Ancef) IV (PEDS and ADULTS)  1 g Intravenous Q24H    epoetin ludin-ebpx (RETACRIT) injection  3,000 Units Intravenous Every Mon, Wed, Fri    heparin (porcine)  5,000 Units Subcutaneous Q8H    melatonin  6 mg Oral Nightly    miconazole NITRATE 2 %   Topical (Top) BID    mupirocin   Nasal Daily    sevelamer carbonate  800 mg Oral TID WM     Estimated/Assessed Needs    Weight Used For Calorie Calculations: 56 kg (123 lb 7.3 oz)  Energy Calorie Requirements (kcal): 5124-2272 kcal/kg (30-35kcal/kg)  Energy Need Method: Kcal/kg  Protein Requirements: 84-112g protein (1.5-2.0g/kg)  Weight Used For Protein Calculations: 56 kg (123 lb 7.3 oz)  Fluid Requirements (mL): per MD  Estimated Fluid Requirement Method: RDA Method  RDA Method (mL): 1680  CHO Requirement: 210g/day      Nutrition Prescription Ordered    Current Diet Order: Low Sodium/Renal/Low potassium/minced and moist diet  Oral Nutrition Supplement: Novasource renal vanilla BID    Evaluation of Received Nutrient/Fluid Intake    I/O: - 410 mL since 12/27  Energy Calories Required: not meeting needs  Protein Required: not meeting needs  Fluid Required: other (see comments) (As per MD)  Comments: LBM 1/10 via colostomy  Tolerance: not tolerating (trouble swallowing)  % Intake of Estimated Energy Needs: 25 - 50 %  % Meal Intake: 0 - 25 %    Nutrition Risk    Level of Risk/Frequency of Follow-up: moderate - high "     Monitor and Evaluation    Food and Nutrient Intake: energy intake  Physical Activity and Function: nutrition-related ADLs and IADLs  Anthropometric Measurements: weight, weight change, body mass index  Biochemical Data, Medical Tests and Procedures: electrolyte and renal panel, gastrointestinal profile, glucose/endocrine profile, inflammatory profile, lipid profile  Nutrition-Focused Physical Findings: overall appearance     Nutrition Follow-Up    RD Follow-up?: Yes

## 2025-01-10 NOTE — PLAN OF CARE
Recommendation/Intervention:   1) Continue current renal diet + minced and moist diet texture as tolerated per SLP recs, encourage PO intake              - Consider removing low sodium/low potassium restrictions if PO intake remains <50% at meals  2) Continue Novasource renal BID to promote adequate kcal/protein consumption  3) RD to monitor and follow-up as needed     Goals: Meet % of EEN/EPN by next RD follow-up  Nutrition Goal Status: progressing towards goal  Communication of RD Recs: other (comment) (POC)

## 2025-01-10 NOTE — PROGRESS NOTES
01/10/25 1145   Vital Signs   Temp 97.5 °F (36.4 °C)   Temp Source Oral   Pulse 79   Heart Rate Source Monitor   Resp 18   Device (Oxygen Therapy) nasal cannula   BP (!) 101/52   MAP (mmHg) 71   BP Location Right arm   BP Method Automatic   Patient Position Lying     HD tx completed and pt tolerated well.  Tx time: 3.5hrs. Net UF: 1.1L  Transfused 1 unit of PRBC and no transfusion reaction noted.  Pt is alert,oriented and stable.  Report given to the primary RN.

## 2025-01-10 NOTE — PLAN OF CARE
AAOx4; POC reviewed & verbalizes understanding.  Admit DX: septic shock  Afebrile. No acute events on shift. No deficits noted  IV access & IVF: PIV saline locked  BM: colostomy RUQ  : oliguria, on HD  Appetite: adequate  HD today: 1.1L removed, tolerated well  Bed alarm set; fall precautions maintained.   Bed locked in lowest position, side rails up x2, call light within reach, environment clear. Encouraged pt to call nurse with any concerns.  Safety measures maintained  Stool specimen collected & sent to lab

## 2025-01-10 NOTE — ASSESSMENT & PLAN NOTE
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  Had HD ([facility]) line removal on 12/22. Repeat blood cultures 12/23 are NGTD. Shock resolved. Large mobile vegetation on TTE. Transitioned from daptomycin to cefazolin based on susceptibilities. Evaluated by CTS and deemed a non surgical candidate due to risk/comorbidities.Continue cefazolin x 6 weeks of therapy from first negative blood culture. (End date: 2/2/25). s/p  IR line placement 12/31.

## 2025-01-10 NOTE — PLAN OF CARE
[facility] - [facility] Medicine Telemetry  Discharge Reassessment    Primary Care Provider: Jordin Robbins MD    Expected Discharge Date: 1/13/2025    Reassessment (most recent)       Discharge Reassessment - 01/09/25 1600          Discharge Reassessment    Assessment Type Discharge Planning Reassessment (P)      Did the patient's condition or plan change since previous assessment? No (P)      Discharge Plan discussed with: Patient (P)      Communicated LLUVIA with patient/caregiver Yes (P)      Discharge Plan A New Nursing Home placement - [facility] care facility (P)      Discharge Plan B Home Health;Group home (P)      DME Needed Upon Discharge  none (P)      Transition of Care Barriers Social;No family/friends to help;Homeless (P)      Why the patient remains in the hospital Social issues (P)         Post-Acute Status    Post-Acute Authorization Placement (P)      Post-Acute Placement Status Pending post-acute provider review/more information requested (P)      Coverage [employer] MANAGED MEDICARE - A[employer] MEDICARE DUAL DSNP - (P)      IV Infusion Status -- (P)    via HD clinic    Hospital Resources/Appts/Education Provided Provided education on problems/symptoms using teachback (P)      Patient choice form signed by patient/caregiver List from System Post-Acute Care (P)      Discharge Delays Patient and Family Barriers (P)                  Discharge Plan A and Plan B have been determined by review of patient's clinical status, future medical and therapeutic needs, and coverage/benefits for post-acute care in coordination with multidisciplinary team members.                   Cole Morales, VAL, LMSW  [facility]  Case Management  Ext. [phone]

## 2025-01-10 NOTE — ASSESSMENT & PLAN NOTE
Anemia is likely due to chronic disease due to ESRD. Most recent hemoglobin and hematocrit are listed below.  Recent Labs     01/10/25  0258 01/10/25  0546 01/10/25  1154   HGB 6.8*  6.8* 7.3* 7.9*   HCT 22.5*  22.5* 24.3* 25.1*       Plan  - Monitor serial CBC: Daily  - Transfuse PRBC if patient becomes hemodynamically unstable, symptomatic or H/H drops below 7/21.  - s/p 1 unit PRBCs this admit  - Patient's anemia is currently stable  1/10 Hb trended down to 6.8. FOBT. Transfusion with 1 unit of PRBC .

## 2025-01-10 NOTE — PROGRESS NOTES
Jason Long - Internal Medicine [facility] Medicine  Progress Note    Patient Name: Flakito Mcginnis  MRN: [id number]  Patient Class: IP- Inpatient   Admission Date: 12/19/2024  Length of Stay: 22 days  Attending Physician: Keny Estrada MD  Primary Care Provider: Jordin Robbins MD        Subjective     Principal Problem:Septic shock        HPI:  By Alicia Galloway NP    Mr. Flakito Mcginnis is a 69 y.o. man w/ HFrEF (30% 8/2024 from 20-25% 6/2024), NICM, MR, ESRD on HD, chronic respiratory failure on home 3L NC, Crohn's s/p colostomy, h/o R nephrectomy. He presented to [facility] ED from his HD center on 12/19 due to hypotension and ongoing shortness of breath. He usually receives his dialysis on T, R, S and went on Wednesday for an extra session for volume removal. He arrived on Thursday for his usually scheduled dialysis session and was directed to the ED due to hypotension. On arrival in the ED his blood pressure was 78/51. He was found to have a BNP >4900 and CXR concerning for volume overload. High sensitivity troponin 923. Critical care medicine was consulted for hypotension and admitted to MICU.     Overview/Hospital Course:  1/9 Mr. 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 ID was consulted and recommending 6 weeks of IV Cefazolin after HD, end date 2/2/25. CTS evaluated and recommending medical management at this time due to multiple co-morbidities. Tunneled catheter was removed 12/22. Repeat cultures from 12/23 with NGTD. Temporary RIJ trialysis line placed 12/24.  Course further complicated by acute hypoxemic respiratory failure likely volume overload requiring HFNC.  Oxygenation improved with volume removal with dialysis and he was weaned down to NC.  He was SD to  on 12/29.  IR was consulted for new HD line placement which was  placed on 12/31.  He was going to be discharged home after his new line placement, but sister refuses to take him home and says he needs senior care NH placement. K of 5.3. Lokelma ordered. renal panel q 4h. 1L/24h fluid restriction CM assisting Pending financials submission to [facility]  1/10 Hb trended down to 6.8. FOBT. Transfusion with 1 unit of PRBC . K improved to 5. HD today                        Review of Systems:   Pain scale:  Constitutional:  fever,  chills, headache, vision loss, hearing loss, weight loss, Generalized weakness, falls, loss of smell, loss of taste, poor appetite,  sore throat  Respiratory: cough, shortness of breath.   Cardiovascular: chest pain, dizziness, palpitations, orthopnea, swelling of feet, syncope  Gastrointestinal: nausea, vomiting, abdominal pain, diarrhea, black stool,  blood in stool, change in bowel habits, constipation  Genitourinary: hematuria, dysuria, urgency, frequency  Integument/Breast: rash,  pruritis  Hematologic/Lymphatic: easy bruising, lymphadenopathy  Musculoskeletal: arthralgias , myalgias, back pain, neck pain, knee pain  Neurological: confusion, seizures, tremors, slurred speech  Behavioral/Psych:  depression, anxiety, auditory or visual hallucinations     OBJECTIVE:     Physical Exam:  Body mass index is 19.93 kg/m².    Constitutional: Appears thin built    Head: Normocephalic and atraumatic.   Neck: Normal range of motion. Neck supple. right chest HD catheter   Cardiovascular: Normal heart rate.  Regular heart rhythm.  Pulmonary/Chest: Effort normal.   Abdominal: No distension.  No tenderness. + colostomy   Musculoskeletal: Normal range of motion.++  edema.   Neurological: Alert and oriented to person, place, and time.   Skin: Skin is warm and dry.   Psychiatric: Normal mood and affect. Behavior is normal.       Vital Signs  Temp: 97.5 °F (36.4 °C) (01/10/25 1145)  Pulse: 79 (01/10/25 1145)  Resp: 18 (01/10/25 1145)  BP: (!) 106/55 (01/10/25  "1200)  SpO2: 95 % (01/10/25 0622)     24 Hour VS Range    Temp:  [97.4 °F (36.3 °C)-98.7 °F (37.1 °C)]   Pulse:  []   Resp:  [18-20]   BP: ()/(50-66)   SpO2:  [94 %-100 %]     Intake/Output Summary (Last 24 hours) at 1/10/2025 1359  Last data filed at 1/10/2025 1030  Gross per 24 hour   Intake 947 ml   Output 100 ml   Net 847 ml         I/O This Shift:  I/O this shift:  In: 350 [Blood:350]  Out: -     Wt Readings from Last 3 Encounters:   01/03/25 56 kg (123 lb 7.3 oz)   12/11/24 59.9 kg (132 lb)   10/19/24 59.9 kg (132 lb)       I have personally reviewed the vitals and recorded Intake/Output     Laboratory/Diagnostic Data:    CBC/Anemia Labs: Coags:    Recent Labs   Lab 01/10/25  0258 01/10/25  0546 01/10/25  1154   WBC 3.33*  3.33* 3.51* 2.38*   HGB 6.8*  6.8* 7.3* 7.9*   HCT 22.5*  22.5* 24.3* 25.1*   *  122* 145* 130*   *  100* 98 93   RDW 18.1*  18.1* 18.0* 18.3*    No results for input(s): "PT", "INR", "APTT" in the last 168 hours.     Chemistries: ABG:   Recent Labs   Lab 01/06/25  0241 01/07/25  0853 01/09/25  0943 01/09/25  1320 01/10/25  0258 01/10/25  0827 01/10/25  1154   *   < > 134*   < > 133*  133*  133*  133* 134*  134* 136  136   K 6.0*   < > 5.3*   < > 5.1  5.1  5.1  5.1  5.1 5.4*  5.4*  5.4* 3.4*  3.4*  3.4*      < > 106   < > 105  105  105  105 104  104 102  102   CO2 20*   < > 21*   < > 18*  18*  18*  18* 20*  20* 26  26   BUN 41*   < > 30*   < > 44*  44*  44*  44* 47*  47* 16  16   CREATININE 5.3*   < > 4.3*   < > 4.8*  4.8*  4.8*  4.8* 4.9*  4.9* 2.1*  2.1*   CALCIUM 9.6   < > 9.4   < > 9.5  9.5  9.5  9.5 9.9  9.9 8.0*  8.0*   PROT 7.2  --  6.9  --  6.4  6.4  --   --    BILITOT 0.3  --  0.3  --  0.2  0.2  --   --    ALKPHOS 305*  --  294*  --  288*  288*  --   --    ALT <5*  --  <5*  --  <5*  <5*  --   --    AST 17  --  17  --  18  18  --   --    MG 2.1  --  2.0  --  2.0  2.0  --   --    PHOS 6.3*   < >  " "--    < > 5.2*  5.2*  5.2* 5.5* 2.4*    < > = values in this interval not displayed.    No results for input(s): "PH", "PCO2", "PO2", "HCO3", "POCSATURATED", "BE" in the last 168 hours.     POCT Glucose: HbA1c:    Recent Labs   Lab 01/09/25  2045   POCTGLUCOSE 114*    Hemoglobin A1C   Date Value Ref Range Status   12/18/2023 4.7 4.0 - 5.6 % Final     Comment:     ADA Screening Guidelines:  5.7-6.4%  Consistent with prediabetes  >or=6.5%  Consistent with diabetes    High levels of fetal hemoglobin interfere with the HbA1C  assay. Heterozygous hemoglobin variants (HbS, HgC, etc)do  not significantly interfere with this assay.   However, presence of multiple variants may affect accuracy.          Cardiac Enzymes: Ejection Fractions:    No results for input(s): "CPK", "CPKMB", "MB", "TROPONINI" in the last 72 hours. EF   Date Value Ref Range Status   12/20/2024 20 % Final          No results for input(s): "COLORU", "APPEARANCEUA", "PHUR", "SPECGRAV", "PROTEINUA", "GLUCUA", "KETONESU", "BILIRUBINUA", "OCCULTUA", "NITRITE", "UROBILINOGEN", "LEUKOCYTESUR", "RBCUA", "WBCUA", "BACTERIA", "SQUAMEPITHEL", "HYALINECASTS" in the last 48 hours.    Invalid input(s): "WRIGHTSUR"    Lactate (Lactic Acid) (mmol/L)   Date Value   12/19/2024 2.4 (H)     BNP (pg/mL)   Date Value   12/19/2024 >4,900 (H)     No results found for: "CRP", "SEDRATE"  No results found for: "DDIMER"  Ferritin (ng/mL)   Date Value   12/20/2024 2,808 (H)     No results found for: "LDH"  CPK (U/L)   Date Value   12/21/2024 <7 (L)     CK (U/L)   Date Value   08/22/2024 139     No results found for this or any previous visit.  SARS-CoV2 (COVID-19) Qualitative PCR (no units)   Date Value   06/12/2020 Not detected     SARS-CoV-2 RNA, Amplification, Qual (no units)   Date Value   12/19/2024 Negative       Microbiology labs for the last week  Microbiology Results (last 7 days)       ** No results found for the last 168 hours. **            Reviewed and noted in plan " where applicable- Please see chart for full lab data.    Lines/Drains:       Hemodialysis Catheter 12/31/24 0818 right subclavian (Active)   Line Necessity Review CRRT/HD 01/09/25 2022   Verification by X-ray Yes 01/07/25 2049   Site Assessment No drainage;No redness;No swelling;No warmth 01/09/25 2022   Line Securement Device Secured with sutureless device 01/09/25 2022   Dressing Type CHG impregnated dressing/sponge 01/09/25 2022   Dressing Status Clean;Dry;Intact 01/09/25 2022   Dressing Intervention Integrity maintained 01/09/25 2022   Date on Dressing 01/06/25 01/08/25 2046   Dressing Due to be Changed 01/13/25 01/08/25 2046   Venous Patency/Care flushed w/o difficulty;heparin locked;intermittent infusion cap applied 01/08/25 1847   Arterial Patency/Care flushed w/o difficulty;heparin locked;intermittent infusion cap applied 01/08/25 1847   Waveform Not being transduced 01/02/25 0838   Number of days: 9            Peripheral IV - Single Lumen 01/09/25 1622 20 G Posterior;Right Forearm (Active)   Site Assessment Clean;Dry;Intact;No redness;No swelling 01/09/25 2022   Line Securement Device Secured with sutureless device 01/09/25 2022   Extremity Assessment Distal to IV No abnormal discoloration;No redness;No swelling;No warmth 01/09/25 2022   Line Status Flushed;Saline locked 01/09/25 2022   Dressing Status Clean;Dry;Intact 01/09/25 2022   Dressing Intervention Integrity maintained 01/09/25 2022   Dressing Change Due 01/13/25 01/09/25 1622   Site Change Due 01/13/25 01/09/25 2022   Reason Not Rotated Not due 01/09/25 2022   Number of days: 0            Colostomy 12/19/24 2225 RLQ (Active)   Stomal Appliance 1 piece;No Leakage;Dry;Intact 01/09/25 2022   Stoma Appearance rosebud appearance 01/09/25 2022   Stoma Size (in) 26 01/02/25 0838   Site Assessment Clean;Intact 01/09/25 2022   Peristomal Assessment Clean;Intact 01/09/25 0751   Accessories/Skin Care barrier substance over peristomal skin;cleansed w/  water;skin barrier paste 01/02/25 0838   Stoma Function stool 01/09/25 0751   Treatment Placement checked 01/06/25 0800   Tolerance no signs/symptoms of discomfort 01/06/25 2016   Output (mL) 100 mL 01/10/25 0612   Number of days: 21       Imaging  ECG Results              Repeat EKG 12-lead (Final result)        Collection Time Result Time QRS Duration OHS QTC Calculation    12/19/24 16:03:39 12/20/24 09:16:41 112 512                     Final result by Interface, Lab In [facility] (12/20/24 09:16:46)                   Narrative:    Test Reason : R06.00,    Vent. Rate :  96 BPM     Atrial Rate :  96 BPM     P-R Int : 232 ms          QRS Dur : 112 ms      QT Int : 406 ms       P-R-T Axes :  52 -47 203 degrees    QTcB Int : 512 ms    Sinus rhythm with 1st degree A-V block  Left axis deviation  Anterior infarct (cited on or before 19-Dec-2024)  ST and T wave abnormality, consider lateral ischemia  Prolonged QT  Abnormal ECG  When compared with ECG of 19-Dec-2024 14:29,  No significant change was found  Confirmed by Dominick Grant ([id number]) on 12/20/2024 9:16:39 AM    Referred By: AAAREFERRAL SELF           Confirmed By: Dominick Grant                                     EKG 12-lead (Final result)        Collection Time Result Time QRS Duration OHS QTC Calculation    12/19/24 14:29:52 12/19/24 14:32:53 114 560                     Final result by Interface, Lab In [facility] (12/19/24 14:32:59)                   Narrative:    Test Reason : Z13.6,    Vent. Rate : 102 BPM     Atrial Rate : 102 BPM     P-R Int : 226 ms          QRS Dur : 114 ms      QT Int : 430 ms       P-R-T Axes :  39 -55 -85 degrees    QTcB Int : 560 ms    Sinus tachycardia with 1st degree A-V block  Left anterior fascicular block  Abnormal R wave progression in the precordial leads - Possible Anterior  infarct ,age undetermined  ST and T wave abnormality, consider lateral ischemia  Prolonged QT  Abnormal ECG  No previous ECGs available  Confirmed by Jeff  Franck ([id number]) on 12/19/2024 2:32:50 PM    Referred By: AAAREFERRAL SELF           Confirmed By: Franck Aguilar                                    Results for orders placed during the hospital encounter of 12/19/24    Echo    Interpretation Summary    There is a 10 by 4 mm large mobile echogenic mass present in the LVOT suggestive of vegetation - blood cultures could be done if clinically indicated.    Left Ventricle: The left ventricle is mildly dilated. Mildly increased ventricular mass. Normal wall thickness. There is mild eccentric hypertrophy. Severe global hypokinesis present. Septal motion is abnormal. There is severely reduced systolic function with a visually estimated ejection fraction of 20 - 25%. Ejection fraction is approximately 20%. Quantitated ejection fraction is 25%. There is diastolic dysfunction.    Right Ventricle: Mild right ventricular enlargement. Wall thickness is normal. Right ventricle wall motion has global hypokinesis. Systolic function is mild-moderately reduced.    Left Atrium: Left atrium is severely dilated.    Right Atrium: Right atrium is moderately dilated.    Aortic Valve: There is moderate aortic valve sclerosis. Moderately restricted motion. There is a 10 by 4 mm large mobile echogenic mass present in the LVOT suggestive of vegetation - blood cultures could be done if clinically indicated. There is moderate stenosis. Aortic valve area by VTI is 1.2 cm2. Aortic valve peak velocity is 2.6 m/s. Mean gradient is 16.5 mmHg. The dimensionless index is 0.37. There is mild to moderate aortic regurgitation.    Mitral Valve: There is moderate bileaflet sclerosis. There is moderate mitral annular calcification present. There is mild regurgitation.    Tricuspid Valve: There is moderate to  moderate-severe regurgitation.    Pulmonary Artery: There is mild pulmonary hypertension. The estimated pulmonary artery systolic pressure is 41 mmHg.    IVC/SVC: Intermediate venous pressure at 8  mmHg.      X-Ray Cervical Spine 2 or 3 Views  Narrative: EXAMINATION:  XR CERVICAL SPINE 2 OR 3 VIEWS    CLINICAL HISTORY:  s/p fall;    TECHNIQUE:  AP, lateral and open mouth views of the cervical spine were performed.    COMPARISON:  August 2023    FINDINGS:  Bones are markedly demineralized limiting evaluation.  Also the patient's shoulders obscure mole see entire cervical spine.  Postoperative change base of neck likely about the thyroid.  Partially visualized large-bore right vascular catheter.  Remote left rib fractures.  Impression: Nondiagnostic study due to marked osteopenia and obscuration of the cervical spine by the shoulder.  Further evaluation to exclude cervical spine trauma will be needed.    This report was flagged in Epic as abnormal.    Electronically signed by: Ketan Springer MD  Date:    01/05/2025  Time:    12:45  X-Ray Shoulder Trauma 3 view Left  Narrative: EXAMINATION:  XR SHOULDER TRAUMA 3 VIEW LEFT    CLINICAL HISTORY:  fall on shoulder, concerned for dislocation, hard to assess due to posture;    TECHNIQUE:  Three views of the left shoulder were performed.    COMPARISON  December 19, 2024    FINDINGS:  Probable remote fracture about superolateral aspect of the right humeral head.  Calcific densities/cortical thickening again noted.  Narrowing of the subacromial space suggest chronic rotator cuff tear.  Bones are markedly demineralized.  Numeral head difficult to visualize on axillary Y-view appears similar in position compared to prior.  Unfortunately subluxation is not excluded.  Left rib deformities again noted.  Impression: Significant bony demineralization limits evaluation.  Probable remote fracture about the superolateral aspect of the humeral head.  Suspected anterior subluxation of the humerus with respect to the glenoid though not well visualized on the axillary Y-view.    This report was flagged in Epic as abnormal.    Electronically signed by: Ketan Springer  MD  Date:    01/05/2025  Time:    12:43      Labs, Imaging, EKG and Diagnostic results from 1/10/2025 were reviewed.    Medications:  Medication list was reviewed and changes noted under Assessment/Plan.  No current facility-administered medications on file prior to encounter.     Current Outpatient Medications on File Prior to Encounter   Medication Sig Dispense Refill    atorvastatin (LIPITOR) 40 MG tablet Take 40 mg by mouth once daily.      sevelamer carbonate (RENVELA) 800 mg Tab Take 800 mg by mouth 3 (three) times daily with meals.       Scheduled Medications:  Current Facility-Administered Medications   Medication Dose Route Frequency    atorvastatin  40 mg Oral Daily    ceFAZolin (Ancef) IV (PEDS and ADULTS)  1 g Intravenous Q24H    epoetin ludin-ebpx (RETACRIT) injection  3,000 Units Intravenous Every Mon, Wed, Fri    heparin (porcine)  5,000 Units Subcutaneous Q8H    melatonin  6 mg Oral Nightly    miconazole NITRATE 2 %   Topical (Top) BID    mupirocin   Nasal Daily    sevelamer carbonate  800 mg Oral TID WM     PRN:   Current Facility-Administered Medications:     0.9%  NaCl infusion (for blood administration), , Intravenous, Q24H PRN    0.9%  NaCl infusion (for blood administration), , Intravenous, Q24H PRN    acetaminophen, 500 mg, Oral, Q4H PRN    [COMPLETED] dextrose 50%, 50 g, Intravenous, Once **AND** dextrose 50%, 25 g, Intravenous, PRN **AND** [COMPLETED] insulin regular, 0.1 Units/kg, Intravenous, Once    diphenhydrAMINE-zinc acetate 2-0.1%, , Topical (Top), TID PRN    haloperidoL, 5 mg, Oral, Q6H PRN    heparin (porcine), 1,000 Units, Intra-Catheter, PRN    oxyCODONE, 5 mg, Oral, Q4H PRN    oxyCODONE, 10 mg, Oral, Q6H PRN    sodium chloride 0.9%, 10 mL, Intravenous, PRN    white petrolatum, , Topical (Top), PRN  Infusions:   Estimated Creatinine Clearance: 26.3 mL/min (A) (based on SCr of 2.1 mg/dL (H)).             Assessment and Plan     * Septic shock  Admitted to MICU 12/19 for septic shock  2/2 Staph capitis bacteremia and endocarditis from his tunneled HD line  Weaned off vasopressors in the ICU  2D echo with mass on the aortic valve suggestive of vegetation  Tunneled HD line removed 12/22  Temporary trialysis placed 12/24  ID consulted:  Suggest 6 weeks of IV Ancef with HD through 2/2/25  CTS consulted:  Suggest IV abx, no surgical intervention  IR placed new tunneled line 12/31 1/9 s/p ID eval - Septic shock on admission secondary to endocarditis in LVOT with associated S capitis bacteremia.  Had HD ([facility]) line removal on 12/22. Repeat blood cultures 12/23 are NGTD. Shock resolved. Large mobile vegetation on TTE. Transitioned from daptomycin to cefazolin based on susceptibilities. Evaluated by CTS and deemed a non surgical candidate due to risk/comorbidities.Continue cefazolin x 6 weeks of therapy from first negative blood culture. (End date: 2/2/25). s/p  IR line placement 12/31.  Waiting on new NH placement.  Pending financials to [facility]       Chronic left shoulder pain  Patient states that he has been having shoulder pain for the past couple of weeks associated with a fall some time back. Was not able to assess due to being in the hospital and is worried that it is dislocated.     orthopedic eval 1/7 - old greater tuberosity fracture of the left humerus.  Some mild subluxation on the scapular Y-view.  In his has some tenderness in that area.  This has been ongoing since August.  He has multiple comorbid medical conditions that do not make him a good candidate for surgery.  The shoulder does not appear to be fully dislocated but does have some subluxation.  Continue conservative treatment given his overall medical status.     Xray L shoulder with remote fracture about the superolateral aspect of the humeral head. Suspected anterior subluxation of the humerus with respect to the glenoid    rec WBAT/ROMAT LUE and PT/OT    ACP (advance care planning)  DNR noted      Debility  Patient with  Acute debility due to  acute illness . The patient's latest AMPAC (Activity Measure for Post Acute Care) Score is listed below.    AM-PAC Score - How much help does the patient need for each activity listed  Basic Mobility Total Score: 17  Turning over in bed (including adjusting bedclothes, sheets and blankets)?: A little  Sitting down on and standing up from a chair with arms (e.g., wheelchair, bedside commode, etc.): A little  Moving from lying on back to sitting on the side of the bed?: A little  Moving to and from a bed to a chair (including a wheelchair)?: A little  Need to walk in hospital room?: A little  Climbing 3-5 steps with a railing?: A lot    Plan  - Progressive mobility protocol initated  - PT/OT consulted -   1/9 recs low intensity therapy    Palliative care encounter  DNR noted      Acute on chronic respiratory failure with hypoxia  Patient with Hypoxic Respiratory failure which is Acute on chronic.  He is on home oxygen 3-4L. Supplemental oxygen was provided and noted-       .   Signs/symptoms of respiratory failure include- tachypnea, increased work of breathing, respiratory distress, and use of accessory muscles. Contributing diagnoses includes - CHF Labs and images were reviewed. Patient Has recent ABG, which has been reviewed. Will treat underlying causes and adjust management of respiratory failure as follows-   Continue to wean oxygen to goal SaO2 of 90%  Aggressive pulmonary toilet in setting of atelectasis of left lower lung field.   Weaned down to baseline 3L of O2 with NC  1/9 sats 95% on 3LNC    Hyperkalemia  Hyperkalemia is likely due to ESRD.The patients most recent potassium results are listed below.  Recent Labs     01/09/25  1320 01/09/25  1617 01/10/25  0001   K 5.2* 5.5* 5.0  5.0  5.0       Plan  - Monitor for arrhythmias with EKG and/or continuous telemetry.   - improved with HD    1/9 K of 5.3. Lokelma ordered. renal panel q 4h. 1L/24h fluid restriction  1/10 HD today         Bacteremia due to Staphylococcus  Seeding from tunneled HD line with AV vegetation  ID consulted:  Suggest 6 weeks of IV Ancef with HD through 2/2/25 1/9 s/p ID eval - Septic shock on admission secondary to endocarditis in LVOT with associated S capitis bacteremia.  Had HD ([facility]) line removal on 12/22. Repeat blood cultures 12/23 are NGTD. Shock resolved. Large mobile vegetation on TTE. Transitioned from daptomycin to cefazolin based on susceptibilities. Evaluated by CTS and deemed a non surgical candidate due to risk/comorbidities.Continue cefazolin x 6 weeks of therapy from first negative blood culture. (End date: 2/2/25). s/p  IR line placement 12/31.       Endocarditis  See septic shock  1/9 s/p ID eval - Septic shock on admission secondary to endocarditis in LVOT with associated S capitis bacteremia.  Had HD ([facility]) line removal on 12/22. Repeat blood cultures 12/23 are NGTD. Shock resolved. Large mobile vegetation on TTE. Transitioned from daptomycin to cefazolin based on susceptibilities. Evaluated by CTS and deemed a non surgical candidate due to risk/comorbidities.Continue cefazolin x 6 weeks of therapy from first negative blood culture. (End date: 2/2/25). s/p  IR line placement 12/31.      Anemia of chronic renal failure, stage 5  Anemia is likely due to chronic disease due to ESRD. Most recent hemoglobin and hematocrit are listed below.  Recent Labs     01/10/25  0258 01/10/25  0546 01/10/25  1154   HGB 6.8*  6.8* 7.3* 7.9*   HCT 22.5*  22.5* 24.3* 25.1*       Plan  - Monitor serial CBC: Daily  - Transfuse PRBC if patient becomes hemodynamically unstable, symptomatic or H/H drops below 7/21.  - s/p 1 unit PRBCs this admit  - Patient's anemia is currently stable  1/10 Hb trended down to 6.8. FOBT. Transfusion with 1 unit of PRBC .      NSTEMI (non-ST elevated myocardial infarction)  In setting of septic shock  High sensitivity troponin troponin 923.    EKG with T wave inversions  No chest  pain    Hypercholesterolemia  Chronic and stable  Continue Statin      Chronic systolic heart failure  2D Echo showing EF of 20-25% with large AV vegetation partially occluding LVOT with moderate AV regurgitation  Volume removal with HD  1/9 s/p ID eval - Septic shock on admission secondary to endocarditis in LVOT with associated S capitis bacteremia.  Had HD ([facility]C) line removal on 12/22. Repeat blood cultures 12/23 are NGTD. Shock resolved. Large mobile vegetation on TTE. Transitioned from daptomycin to cefazolin based on susceptibilities. Evaluated by CTS and deemed a non surgical candidate due to risk/comorbidities.    Crohn's disease  S/p colectomy  No acute issues      History of nephrectomy  Noted  ESRD on HD    Hypertension  Patient's blood pressure range in the last 24 hours was: BP  Min: 100/62  Max: 145/70.  Hold BP meds    ESRD on hemodialysis  Nephro following  Tunneled HD line removed 12/22  Temporary trialysis placed 12/24  IR placed new line 12/31      VTE Risk Mitigation (From admission, onward)           Ordered     heparin (porcine) injection 1,000 Units  As needed (PRN)         01/03/25 1033     heparin (porcine) injection 5,000 Units  Every 8 hours         12/29/24 1000     Place sequential compression device  Until discontinued         12/19/24 1736     IP VTE LOW RISK PATIENT  Once         12/19/24 1736                    Discharge Planning   LLUVIA: 1/13/2025     Code Status: DNR   Medical Readiness for Discharge Date: 12/31/2024  Discharge Plan A: New Nursing Home placement - [facility] care facility   Discharge Delays: (!) Patient and Family Barriers            Please place Justification for DME        Keny Estrada MD  Department of Hospital Medicine   [facility] - Internal Medicine Telemetry

## 2025-01-10 NOTE — PLAN OF CARE
Problem: Adult Inpatient Plan of Care  Goal: Plan of Care Review  Outcome: Progressing  Goal: Patient-Specific Goal (Individualized)  Outcome: Progressing  Goal: Absence of Hospital-Acquired Illness or Injury  Outcome: Progressing  Goal: Optimal Comfort and Wellbeing  Outcome: Progressing  Goal: Readiness for Transition of Care  Outcome: Progressing     Problem: Infection  Goal: Absence of Infection Signs and Symptoms  Outcome: Progressing     Problem: Skin Injury Risk Increased  Goal: Skin Health and Integrity  Outcome: Progressing     Problem: Sepsis/Septic Shock  Goal: Optimal Coping  Outcome: Progressing  Goal: Absence of Bleeding  Outcome: Progressing  Goal: Blood Glucose Level Within Targeted Range  Outcome: Progressing  Goal: Absence of Infection Signs and Symptoms  Outcome: Progressing  Goal: Optimal Nutrition Intake  Outcome: Progressing     Problem: Fall Injury Risk  Goal: Absence of Fall and Fall-Related Injury  Outcome: Progressing     Problem: Coping Ineffective  Goal: Effective Coping  Outcome: Progressing     Problem: Wound  Goal: Optimal Coping  Outcome: Progressing  Goal: Optimal Functional Ability  Outcome: Progressing  Goal: Absence of Infection Signs and Symptoms  Outcome: Progressing  Goal: Improved Oral Intake  Outcome: Progressing  Goal: Optimal Pain Control and Function  Outcome: Progressing  Goal: Skin Health and Integrity  Outcome: Progressing  Goal: Optimal Wound Healing  Outcome: Progressing

## 2025-01-10 NOTE — DISCHARGE SUMMARY
[facility] - Internal Medicine [facility] Medicine  Discharge Summary      Patient Name: Flakito Mcginnis  MRN: [id number]  ROSMERY: [id number]  Patient Class: IP- Inpatient  Admission Date: 12/19/2024  Hospital Length of Stay: 26 days  Discharge Date and Time:  01/14/2025 7:06 PM  Attending Physician: Keny Estrada MD   Discharging Provider: Keny Estrada MD  Primary Care Provider: Jordin Robbins MD  Hospital Medicine Team: [facility] MED A Keny Estrada MD  Primary Care Team: [facility] MED A    HPI:   By Alicia Galloway NP    Mr. Flakito Mcginnis is a 69 y.o. man w/ HFrEF (30% 8/2024 from 20-25% 6/2024), NICM, MR, ESRD on HD, chronic respiratory failure on home 3L NC, Crohn's s/p colostomy, h/o R nephrectomy. He presented to [facility] ED from his HD center on 12/19 due to hypotension and ongoing shortness of breath. He usually receives his dialysis on T, R, S and went on Wednesday for an extra session for volume removal. He arrived on Thursday for his usually scheduled dialysis session and was directed to the ED due to hypotension. On arrival in the ED his blood pressure was 78/51. He was found to have a BNP >4900 and CXR concerning for volume overload. High sensitivity troponin 923. Critical care medicine was consulted for hypotension and admitted to MICU.     * No surgery found *      Hospital Course:   1/9 Mr. 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 ID was consulted and recommending 6 weeks of IV Cefazolin after HD, end date 2/2/25. CTS evaluated and recommending medical management at this time due to multiple co-morbidities. Tunneled catheter was removed 12/22. Repeat cultures from 12/23 with NGTD. Temporary RIJ trialysis line placed 12/24.  Course further complicated by acute hypoxemic respiratory failure likely volume overload  requiring HFNC.  Oxygenation improved with volume removal with dialysis and he was weaned down to NC.  He was SD to  on 12/29.  IR was consulted for new HD line placement which was placed on 12/31.  He was going to be discharged home after his new line placement, but sister refuses to take him home and says he needs care home NH placement. K of 5.3. Lokelma ordered. renal panel q 4h. 1L/24h fluid restriction CM assisting Pending financials submission to [facility]  1/10 Hb trended down to 6.8. FOBT. Transfusion with 1 unit of PRBC . K improved to 5. HD today    1/11 persistent perirpheral edema. UF of 1L yesterday and continue UF as BP permits. nephrology f/u for HD today - refused HD today. Plan for next session Monday. mild epistaxis left  nostril - improved with pressure. Nasal saline  ordered  1/12 Hb 8.9. FOBT +. K of 5.2. refused telemetry.  monitor , reported self limiting bloody mucous discharge from rectum. no intervention per CRS. monitor Hb   1/13 K 5.5 . HD today. wound care follow up for ostomy care. Hypotension with HD resolved. asymptomatic.   1/14 K 5.1. lokelma x 1.   Discharge to NH                       Goals of Care Treatment Preferences:  Code Status: DNR    Health care agent: Sara Da Silva (sister)  Health care agent number: [phone]          What is most important right now is to focus on remaining as independent as possible, improvement in condition but with limits to invasive therapies.  Accordingly, we have decided that the best plan to meet the patient's goals includes continuing with treatment.      SDOH Screening:  The patient was screened for utility difficulties, food insecurity, transport difficulties, housing insecurity, and interpersonal safety and there were no concerns identified this admission.     Consults:   Consults (From admission, onward)          Status Ordering Provider     Inpatient consult to PICC team (MARLEN)  Once        Provider:  (Not yet assigned)     Completed MIKEL MEJIAYSJUVENAL     Inpatient consult to Orthopedics  Once        Provider:  (Not yet assigned)    Completed MIKEL MEJIAYSRODRIGOAVI     Inpatient consult to PICC team (RUSTS)  Once        Provider:  (Not yet assigned)    Completed VANESA CLAY     Inpatient consult to PICC team (RUSTS)  Once        Provider:  (Not yet assigned)    Completed VANESA CLAY     Inpatient consult to Interventional Radiology  Once        Provider:  (Not yet assigned)    Completed VANESA CLAY     Inpatient consult to Palliative Care  Once        Provider:  (Not yet assigned)    Completed MARCUS REID     Inpatient consult to Cardiothoracic Surgery  Once        Provider:  (Not yet assigned)    Completed MAGALIE MADDOX     Inpatient consult to Interventional Radiology  Once        Provider:  (Not yet assigned)    Completed JOSIAH FULTON     Inpatient consult to Infectious Diseases  Once        Provider:  (Not yet assigned)    Completed DARRYL BURLESON     Inpatient consult to Nephrology  Once        Provider:  (Not yet assigned)    Completed NOMI HANSEN     Inpatient consult to Critical Care Medicine  Once        Provider:  (Not yet assigned)    Completed NOMI HANSEN            * Septic shock  Admitted to MICU 12/19 for septic shock 2/2 Staph capitis bacteremia and endocarditis from his tunneled HD line  Weaned off vasopressors in the ICU  2D echo with mass on the aortic valve suggestive of vegetation  Tunneled HD line removed 12/22  Temporary trialysis placed 12/24  ID consulted:  Suggest 6 weeks of IV Ancef with HD through 2/2/25  CTS consulted:  Suggest IV abx, no surgical intervention  IR placed new tunneled line 12/31    1/9 s/p ID eval - Septic shock on admission secondary to endocarditis in LVOT with associated S capitis bacteremia.  Had HD ([facility]) line removal on 12/22. Repeat blood cultures 12/23 are NGTD. Shock resolved. Large mobile vegetation on TTE. Transitioned from daptomycin to  cefazolin based on susceptibilities. Evaluated by CTS and deemed a non surgical candidate due to risk/comorbidities.Continue cefazolin x 6 weeks of therapy from first negative blood culture. (End date: 2/2/25). s/p  IR line placement 12/31.  Waiting on new NH placement.  Pending financials to [facility]       Chronic left shoulder pain  Patient states that he has been having shoulder pain for the past couple of weeks associated with a fall some time back. Was not able to assess due to being in the hospital and is worried that it is dislocated.     orthopedic eval 1/7 - old greater tuberosity fracture of the left humerus.  Some mild subluxation on the scapular Y-view.  In his has some tenderness in that area.  This has been ongoing since August.  He has multiple comorbid medical conditions that do not make him a good candidate for surgery.  The shoulder does not appear to be fully dislocated but does have some subluxation.  Continue conservative treatment given his overall medical status.     Xray L shoulder with remote fracture about the superolateral aspect of the humeral head. Suspected anterior subluxation of the humerus with respect to the glenoid    rec WBAT/ROMAT LUE and PT/OT    ACP (advance care planning)  DNR noted      Debility  Patient with Acute debility due to  acute illness . The patient's latest AMPAC (Activity Measure for Post Acute Care) Score is listed below.    AM-PAC Score - How much help does the patient need for each activity listed  Basic Mobility Total Score: 17  Turning over in bed (including adjusting bedclothes, sheets and blankets)?: A little  Sitting down on and standing up from a chair with arms (e.g., wheelchair, bedside commode, etc.): A little  Moving from lying on back to sitting on the side of the bed?: A little  Moving to and from a bed to a chair (including a wheelchair)?: A little  Need to walk in hospital room?: A little  Climbing 3-5 steps with a railing?: A lot    Plan  -  Progressive mobility protocol initated  - PT/OT consulted -   1/9 recs low intensity therapy    Palliative care encounter  DNR noted      Acute on chronic respiratory failure with hypoxia  Patient with Hypoxic Respiratory failure which is Acute on chronic.  He is on home oxygen 3-4L. Supplemental oxygen was provided and noted-       .   Signs/symptoms of respiratory failure include- tachypnea, increased work of breathing, respiratory distress, and use of accessory muscles. Contributing diagnoses includes - CHF Labs and images were reviewed. Patient Has recent ABG, which has been reviewed. Will treat underlying causes and adjust management of respiratory failure as follows-   Continue to wean oxygen to goal SaO2 of 90%  Aggressive pulmonary toilet in setting of atelectasis of left lower lung field.   Weaned down to baseline 3L of O2 with NC  1/9 sats 95% on 3LNC    Hyperkalemia  Hyperkalemia is likely due to ESRD.The patients most recent potassium results are listed below.  Recent Labs     01/12/25  1537 01/12/25  1917 01/13/25  0448   K 5.0  5.0 5.2*  5.2* 5.5*  5.5*  5.5*     Plan  - Monitor for arrhythmias with EKG and/or continuous telemetry.   - improved with HD    1/9 K of 5.3. Lokelma ordered. renal panel q 4h. 1L/24h fluid restriction  1/12 K of 5.5. ordered  D 50,  insulin, albulterol concentrate, Lokelma. BMP q 4h   1/13 K 5.5 . HD today.     Bacteremia due to Staphylococcus  Seeding from tunneled HD line with AV vegetation  ID consulted:  Suggest 6 weeks of IV Ancef with HD through 2/2/25 1/9 s/p ID eval - Septic shock on admission secondary to endocarditis in LVOT with associated S capitis bacteremia.  Had HD ([facility]) line removal on 12/22. Repeat blood cultures 12/23 are NGTD. Shock resolved. Large mobile vegetation on TTE. Transitioned from daptomycin to cefazolin based on susceptibilities. Evaluated by CTS and deemed a non surgical candidate due to risk/comorbidities.Continue cefazolin x 6 weeks of  therapy from first negative blood culture. (End date: 2/2/25). s/p  IR line placement 12/31.       Endocarditis  See septic shock  1/9 s/p ID eval - Septic shock on admission secondary to endocarditis in LVOT with associated S capitis bacteremia.  Had HD ([facility]) line removal on 12/22. Repeat blood cultures 12/23 are NGTD. Shock resolved. Large mobile vegetation on TTE. Transitioned from daptomycin to cefazolin based on susceptibilities. Evaluated by CTS and deemed a non surgical candidate due to risk/comorbidities.Continue cefazolin x 6 weeks of therapy from first negative blood culture. (End date: 2/2/25). s/p  IR line placement 12/31.      Anemia of chronic renal failure, stage 5  Anemia is likely due to chronic disease due to ESRD. Most recent hemoglobin and hematocrit are listed below.  Recent Labs     01/12/25  1537 01/13/25  0448 01/14/25  0429   HGB 8.3* 8.4* 8.6*   HCT 26.8* 27.2* 28.3*       Plan  - Monitor serial CBC: Daily  - Transfuse PRBC if patient becomes hemodynamically unstable, symptomatic or H/H drops below 7/21.  - s/p 1 unit PRBCs this admit  - Patient's anemia is currently stable  1/10 Hb trended down to 6.8. FOBT. Transfusion with 1 unit of PRBC .    1/12 Hb 8.9. FOBT +.    NSTEMI (non-ST elevated myocardial infarction)  In setting of septic shock  High sensitivity troponin troponin 923.    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  Had HD ([facility]) line removal on 12/22. Repeat blood cultures 12/23 are NGTD. Shock resolved. Large mobile vegetation on TTE. Transitioned from daptomycin to cefazolin based on susceptibilities. Evaluated by CTS and deemed a non  "surgical candidate due to risk/comorbidities.    Crohn's disease  S/p colectomy  No acute issues      History of nephrectomy  Noted  ESRD on HD    Hypertension  Patient's blood pressure range in the last 24 hours was: BP  Min: 100/62  Max: 145/70.  Hold BP meds    ESRD on hemodialysis  Nephro following  Tunneled HD line removed 12/22  Temporary trialysis placed 12/24  IR placed new line 12/31      Final Active Diagnoses:    Diagnosis Date Noted POA    PRINCIPAL PROBLEM:  Septic shock [A41.9, R65.21] 12/19/2024 Yes    Chronic left shoulder pain [M25.512, G89.29] 01/05/2025 Yes    Palliative care encounter [Z51.5] 12/27/2024 Not Applicable    Debility [R53.81] 12/27/2024 Yes    ACP (advance care planning) [Z71.89] 12/27/2024 Not Applicable    Acute on chronic respiratory failure with hypoxia [J96.21] 12/26/2024 Yes    Bacteremia due to Staphylococcus [R78.81, B95.8] 12/24/2024 Yes    Hyperkalemia [E87.5] 12/24/2024 No    Endocarditis [I38] 12/20/2024 Yes    NSTEMI (non-ST elevated myocardial infarction) [I21.4] 12/19/2024 Yes    Anemia of chronic renal failure, stage 5 [N18.5, D63.1] 12/19/2024 Yes    Chronic systolic heart failure [I50.22] 12/11/2024 Yes    Hypercholesterolemia [E78.00] 12/11/2024 Yes    History of nephrectomy [Z90.5] 12/11/2023 Not Applicable    Hypertension [I10] 12/11/2023 Yes    Crohn's disease [K50.90] 12/11/2023 Yes    ESRD on hemodialysis [N18.6, Z99.2] 05/23/2018 Not Applicable      Problems Resolved During this Admission:       Discharged Condition: fair    Disposition:  Nursing home    Follow Up:    Patient Instructions:      BATH/SHOWER CHAIR FOR HOME USE     Order Specific Question Answer Comments   Height: 5' 6" (1.676 m)    Weight: 56 kg (123 lb 7.3 oz)    Does patient have medical equipment at home? wheelchair    Does patient have medical equipment at home? cane, straight    Does patient have medical equipment at home? rollator    Length of need (1-99 months): 99    Type: With back  " "      Significant Diagnostic Studies: Labs: BMP:   Recent Labs   Lab 01/13/25 0448 01/13/25 1543 01/13/25 2028 01/13/25 2337 01/14/25 0429   GLU 77  77   < > 92 76 67*  67*   *  134*   < > 134* 134* 135*  135*   K 5.5*  5.5*  5.5*   < > 4.6  4.6 4.9  4.9 5.1  5.1  5.1     102   < > 105 105 104  104   CO2 22*  22*   < > 21* 21* 23  23   BUN 61*  61*   < > 35* 37* 40*  40*   CREATININE 5.7*  5.7*   < > 3.7* 4.0* 4.1*  4.1*   CALCIUM 9.9  9.9   < > 9.1 9.3 9.6  9.6   MG 2.2  --   --   --  2.2    < > = values in this interval not displayed.   , CMP   Recent Labs   Lab 01/13/25 0448 01/13/25 1543 01/13/25 2028 01/13/25 2337 01/14/25 0429   *  134*   < > 134* 134* 135*  135*   K 5.5*  5.5*  5.5*   < > 4.6  4.6 4.9  4.9 5.1  5.1  5.1     102   < > 105 105 104  104   CO2 22*  22*   < > 21* 21* 23  23   GLU 77  77   < > 92 76 67*  67*   BUN 61*  61*   < > 35* 37* 40*  40*   CREATININE 5.7*  5.7*   < > 3.7* 4.0* 4.1*  4.1*   CALCIUM 9.9  9.9   < > 9.1 9.3 9.6  9.6   PROT 6.8  --   --   --  6.6   ALBUMIN 2.7*  --   --   --  2.6*   BILITOT 0.3  --   --   --  0.3   ALKPHOS 268*  --   --   --  268*   AST 20  --   --   --  18   ALT <5*  --   --   --  <5*   ANIONGAP 10  10   < > 8 8 8  8    < > = values in this interval not displayed.   , CBC   Recent Labs   Lab 01/12/25  1537 01/13/25  0448 01/14/25  0429   WBC 3.25* 3.82* 3.01*   HGB 8.3* 8.4* 8.6*   HCT 26.8* 27.2* 28.3*   * 140* 129*   , INR No results found for: "INR", "PROTIME", Lipid Panel   Lab Results   Component Value Date    CHOL 107 (L) 12/18/2023    HDL 40 12/18/2023    LDLCALC 50.0 (L) 12/18/2023    TRIG 85 12/18/2023    CHOLHDL 37.4 12/18/2023   , Troponin No results for input(s): "TROPONINI" in the last 168 hours., A1C: No results for input(s): "HGBA1C" in the last 4320 hours., and All labs within the past 24 hours have been reviewed  Microbiology: Blood Culture   Lab Results " "  Component Value Date    LABBLOO No growth after 5 days. 12/23/2024   , Sputum Culture No results found for: "GSRESP", "RESPIRATORYC", and Urine Culture  No results found for: "LABURIN"    X-Ray Cervical Spine 2 or 3 Views  Narrative: EXAMINATION:  XR CERVICAL SPINE 2 OR 3 VIEWS    CLINICAL HISTORY:  s/p fall;    TECHNIQUE:  AP, lateral and open mouth views of the cervical spine were performed.    COMPARISON:  August 2023    FINDINGS:  Bones are markedly demineralized limiting evaluation.  Also the patient's shoulders obscure mole see entire cervical spine.  Postoperative change base of neck likely about the thyroid.  Partially visualized large-bore right vascular catheter.  Remote left rib fractures.  Impression: Nondiagnostic study due to marked osteopenia and obscuration of the cervical spine by the shoulder.  Further evaluation to exclude cervical spine trauma will be needed.    This report was flagged in Epic as abnormal.    Electronically signed by: Ketan Springer MD  Date:    01/05/2025  Time:    12:45  X-Ray Shoulder Trauma 3 view Left  Narrative: EXAMINATION:  XR SHOULDER TRAUMA 3 VIEW LEFT    CLINICAL HISTORY:  fall on shoulder, concerned for dislocation, hard to assess due to posture;    TECHNIQUE:  Three views of the left shoulder were performed.    COMPARISON  December 19, 2024    FINDINGS:  Probable remote fracture about superolateral aspect of the right humeral head.  Calcific densities/cortical thickening again noted.  Narrowing of the subacromial space suggest chronic rotator cuff tear.  Bones are markedly demineralized.  Numeral head difficult to visualize on axillary Y-view appears similar in position compared to prior.  Unfortunately subluxation is not excluded.  Left rib deformities again noted.  Impression: Significant bony demineralization limits evaluation.  Probable remote fracture about the superolateral aspect of the humeral head.  Suspected anterior subluxation of the humerus with respect " to the glenoid though not well visualized on the axillary Y-view.    This report was flagged in Epic as abnormal.    Electronically signed by: Ketan Springer MD  Date:    01/05/2025  Time:    12:43       Pending Diagnostic Studies:       Procedure Component Value Units Date/Time    Basic metabolic panel [[id number]]     Order Status: Sent Lab Status: No result     Specimen: Blood     Basic metabolic panel [[id number]]     Order Status: Sent Lab Status: No result     Specimen: Blood     Basic metabolic panel [[id number]]     Order Status: Sent Lab Status: No result     Specimen: Blood     Basic metabolic panel [[id number]] Collected: 01/13/25 1543    Order Status: Sent Lab Status: No result     Specimen: Blood     Magnesium [[id number]] Collected: 12/28/24 0356    Order Status: Sent Lab Status: No result     Specimen: Blood     Potassium [[id number]]     Order Status: Sent Lab Status: No result     Specimen: Blood     Potassium [[id number]]     Order Status: Sent Lab Status: No result     Specimen: Blood     Potassium [[id number]]     Order Status: Sent Lab Status: No result     Specimen: Blood     Potassium [[id number]] Collected: 01/13/25 1543    Order Status: Sent Lab Status: No result     Specimen: Blood     Renal function panel [[id number]] Collected: 12/28/24 0356    Order Status: Sent Lab Status: No result     Specimen: Blood            Medications:  Reconciled Home Medications:      Medication List        START taking these medications      D5W PgBk 50 mL with ceFAZolin 2 gram SolR 2 g  Inject 2 g into the vein every Mon, Wed, Fri. for 20 days     miconazole NITRATE 2 % 2 % top powder  Commonly known as: MICOTIN  Apply topically 2 (two) times daily. Apply to underarm as needed     midodrine 10 MG tablet  Commonly known as: PROAMATINE  Take 1 tablet (10 mg total) by mouth as needed (prior to HD).     oxyCODONE 5 MG immediate release tablet  Commonly known as: ROXICODONE  Take 1 tablet (5 mg total) by mouth  every 12 (twelve) hours as needed for Pain.            CONTINUE taking these medications      atorvastatin 40 MG tablet  Commonly known as: LIPITOR  Take 40 mg by mouth once daily.     sevelamer carbonate 800 mg Tab  Commonly known as: RENVELA  Take 800 mg by mouth 3 (three) times daily with meals.            STOP taking these medications      carvediloL 12.5 MG tablet  Commonly known as: COREG              Indwelling Lines/Drains at time of discharge:   Lines/Drains/Airways       Central Venous Catheter Line  Duration                  Hemodialysis Catheter 12/31/24 0818 right subclavian 9 days              Drain  Duration                  Colostomy 12/19/24 2225 RLQ 20 days                45  minutes of time spent on discharge, including examining the patient, providing discharge instructions, arranging   follow up and documentation        Keny Estrada MD  Attending Staff Physician  [facility] Medicine  pager- [phone]  Spectralxbw - [phone]

## 2025-01-10 NOTE — ASSESSMENT & PLAN NOTE
Hyperkalemia is likely due to ESRD.The patients most recent potassium results are listed below.  Recent Labs     01/09/25  1320 01/09/25  1617 01/10/25  0001   K 5.2* 5.5* 5.0  5.0  5.0       Plan  - Monitor for arrhythmias with EKG and/or continuous telemetry.   - improved with HD    1/9 K of 5.3. Lokelma ordered. renal panel q 4h. 1L/24h fluid restriction  1/10 HD today

## 2025-01-10 NOTE — ASSESSMENT & PLAN NOTE
Patient's blood pressure range in the last 24 hours was: BP  Min: 100/62  Max: 145/70.  Hold BP meds

## 2025-01-10 NOTE — PT/OT/SLP PROGRESS
"Occupational Therapy   Treatment    Name: Flakito Mcginnis  MRN: [id number]  Admitting Diagnosis:  Septic shock       Recommendations:     Discharge Recommendations: Low Intensity Therapy  Discharge Equipment Recommendations:  bath bench  Barriers to discharge:  Decreased caregiver support    Assessment:     Flakito Mcginnis is a 69 y.o. male with a medical diagnosis of Septic shock.  He presents with the following performance deficits affecting function are weakness, impaired endurance, impaired self care skills, impaired functional mobility, impaired cardiopulmonary response to activity, decreased upper extremity function, decreased lower extremity function, decreased ROM.     Pt agreeable to therapy and tolerated fairly. Pt was cooperative and demonstrated good participation & safety awareness during therapy session. Pt would continue to benefit from skilled OT services to maximize functional independence with ADLs and functional mobility, reduce caregiver burden, and facilitate safe discharge in the least restrictive environment.      Rehab Prognosis:  Good; patient would benefit from acute skilled OT services to address these deficits and reach maximum level of function.       Plan:     Patient to be seen 3 x/week to address the above listed problems via self-care/home management, therapeutic activities, therapeutic exercises  Plan of Care Expires: 01/23/25  Plan of Care Reviewed with: patient    Subjective     Chief Complaint: "tail bone" pain  Patient/Family Comments/goals: pt agreeable to OT session  Pain/Comfort:  Pain Rating 1: 0/10    Objective:     Communicated with: nurse prior to session.  Patient found up in chair with colostomy, peripheral IV, oxygen upon OT entry to room.    General Precautions: Standard, fall, aspiration    Orthopedic Precautions:N/A  Braces: N/A  Respiratory Status: Nasal cannula, flow 6 L/min     Occupational Performance:     Functional Mobility/Transfers:  Patient completed 3 Sit <> " Stand Transfers (from chair) with stand by assistance -> CGA with rollator    Patient completed Chair Transfer Step Transfer technique with stand by assistance with rollator    Functional Mobility: pt ambulated in room & hallway to simulate household environment & community distances to improve overall strength, coordination, activity tolerance & safety awareness required for participation/independence wit MRADLs/IADLs  Pt ambulated a total of 104 ft with SBA using rollator  Pt refused RW and requested to use his personal rollator to be able to take seated breaks as needed  Pt required 1 seated break during functional mobility training    Activities of Daily Living:  Lower Body Dressing: total assistance to don socks while seated in chair    AMPA 6 Click ADL: 19    Treatment & Education:  Therapeutic exercises: pt completed the following UE exercises to maintain/improve UE ROM, strength and overall activity tolerance required for participation/independence with ADLs, IADLs & functional mobility/transfers   - Gentle shoulder shrugs x6   - R unilateral chest press against min resistance x8   - R elbow flexion against min resistance x8   - L elbow flexion (modified) to pt's tolerance x8   - L wrist flexion & extension x8   - L & R composite fist x6  -Education on energy conservation and task modification to maximize safety and (I) during ADLs and mobility/transfers  -Education on importance of OOB activity to maintain/improve overall strength, activity tolerance and promote recovery  -Pt educated to call for assistance and to transfer with hospital staff only  -Provided education regarding OT POC with pt verbalizing understanding.  Pt had no further questions & when asked whether there were any concerns pt reported none.     Patient left up in chair with all lines intact, call button in reach, and nurse (Beth) notified    GOALS:   Multidisciplinary Problems       Occupational Therapy Goals          Problem:  Occupational Therapy    Goal Priority Disciplines Outcome Interventions   Occupational Therapy Goal     OT, PT/OT Progressing    Description: Goals to be met by: 1/23/25 (1 mo)     Patient will increase functional independence with ADLs by performing:    UE Dressing with [place].  LE Dressing with [place].  Grooming while standing at sink with [place].  Toileting from toilet with [place] for hygiene and clothing management.   Rolling to Bilateral with [place].   Supine to sit with [place].  Step transfer with [place]  Toilet transfer to toilet with [place]comb.                         Time Tracking:     OT Date of Treatment: 01/10/25  OT Start Time: 1555  OT Stop Time: 1623  OT Total Time (min): 28 min    Billable Minutes:Therapeutic Exercise 28    OT/HAN: OT     Number of HAN visits since last OT visit: 2    1/10/2025

## 2025-01-10 NOTE — PLAN OF CARE
Discharge Plan A and Plan B have been determined by review of patient's clinical status, future medical and therapeutic needs, and coverage/benefits for post-acute care in coordination with multidisciplinary team members.    01/10/25 0921   Post-Acute Status   Post-Acute Authorization Placement   Post-Acute Placement Status Pending post-acute provider review/more information requested   Coverage [employer] MANAGED MEDICARE - AETNA MEDICARE DUAL DSNP -   Hospital Resources/Appts/Education Provided Provided education on problems/symptoms using teachback;Provided patient/caregiver with written discharge plan information   Patient choice form signed by patient/caregiver List from System Post-Acute Care   Discharge Delays (!) Patient and Family Barriers   Discharge Plan   Discharge Plan A New Nursing Home placement - snf care facility   Discharge Plan B Home Health;Group home     TIERA reviewed epic for update on status of snf NH referrals. Patient has no accepting NH at this time; referrals remain pending. TIERA phoned [facility] for update on business office review of financials. Per , admissions and business office staff not available due to in meeting. SW requesting update from CHW Team 1 regarding f/u w/ alternative NH options. SW provided patient w/ update at bedside and patient agreeable to plan.       11:05am  TIERA phoned [facility] for update. No answer, SW left message. TIERA sent additional Nh refs via [facility] to below listed facilities.    [facility] Aisha Zepeda  Aisha Valdez [facility]  [facility]   [facility] - pending  [facility] - pending  [facility] - pending   [facility]  - pending   [facility] - pending   [facility] - pending   [facility] - pending     11:30am  Leah confirmed that patient accepted to admit to [facility], patient will need to make copayment for prorated month of  Jan. Facility to make contact w/ patient and family for consents signing and payment. Leah asking SW to confirm HD schedule; TIERA confirmed patient's HD schedule and location (patient receives HD at clinic T/TH/Sat at [place] 7:30am shift); Ad[facility] ([facility] confirmed schedule via phone). TIERA confirmed schedule w/ Leah. TIERA reviewed option of patient's dc to facility as SNF to residential due to patient requires IV abx through 2/2/25. Leah to review if facility preference is to SNF patient for IV abx or if patient should receive IV abx on HD days at HD clinic.     3:30pm  TIERA confirmed w/ Leah that facility accepts patient w/ HD schedule of T/TH/Sat and patient to receive IV abx on HD days as ordered by ID. Per Leah, facility not able to accept today due to no bed available; planning for admit Monday 1/13 and patient will have copayment of $465 for month of Arvind due on day of dc.  TIERA met w/ patient at the bedside to review dc plan (Leah present via phone call and explained terms of residential placement w/ patient) and patient agreeable to dc plan.        [facility] to notify of dc plan and confirm that clinic is still able to accommodate IV abx w/ end date 2/2/25 on HD days. SW not able to reach staff at that time. TIERA left V for callback.     3:40pm  TIERA confirmed w/ Lucia ([facility]) that clinic is able to provide and administer IV meds per ID recs on T/TH/Sat HD days. TIERA notified Lucia of [facility] of 1/13 pending cutodial NH placement and Lucia agreeable. MD and bedside nurse notified of plan.                  Cole Morales, VAL, LMSW  [facility]  Case Management  Ext. [phone]

## 2025-01-10 NOTE — PROGRESS NOTES
[facility] NOTE     Patient currently on hemodialysis for removal of uremic toxins .     Patient seen and evaluated on hemodialysis, tolerating treatment, see HD flowsheet for vitals and assessments.      No Hypotension, chest pain, shortness of breath, cramping, nausea or vomiting.     Following patient for the management of ESRD    Target UF: 1 L as tolerated, keep MAP >65.  CM following for NH placement.   Patient requesting to end treatment early, unsure why, discussed the importance of completing full treatment as we have been unable to remove fluid during previous treatments due to low BP. However, UF set at 1 L with today's treatment and patient is tolerating well so far. Patient very unpleasant during exchange.   Hgb 7.3, pRBCs with HD today, continue EPO  Continue Sevelamer  Consider renal diet restrictions; please limit daily intake to 32 oz per day.   No lab stick/BP intake on access site  Continue to monitor intake and output, daily weights   Please avoid gadolinium, fleets, phos-based laxatives, NSAIDs  Will follow closely and continue dialysis treatments while in-patient    LONDON Still DNP, APRN, FNP-C  Department of Nephrology  [facility]  Pager: [phone]

## 2025-01-10 NOTE — PROGRESS NOTES
01/10/25 0815   Vital Signs   Temp 98.1 °F (36.7 °C)   Temp Source Oral   Pulse 92   Heart Rate Source Monitor   Resp 18   Flow (L/min) (Oxygen Therapy) 3   Device (Oxygen Therapy) nasal cannula   BP (!) 100/57   MAP (mmHg) 76   BP Location Right arm   BP Method Automatic   Patient Position Lying     Received pt via stretcher alert and oriented.  HD tx started aseptically via RIJ TDC with good flow.

## 2025-01-10 NOTE — PLAN OF CARE
CHW called NH referral:    [facility] ([phone]) - responded considering   Called to follow-up, Milly took a message for Leah to call CHW back     [facility] ([phone]) - spoke with Melani who will call CHW back with an update    [facility] ([phone]) - left message     [facility] ([phone]) - left message

## 2025-01-10 NOTE — PLAN OF CARE
[facility]     Department of Hospital Medicine     [address] [place] [address]     [phone] after hours  [phone] fax       SNF  ORDERS    Patient Name: Flakito Mcginnis  YOB: [birth year]    Admit to skilled Bed                                                  Diagnoses:  Active Hospital Problems    Diagnosis  POA    *Septic shock [A41.9, R65.21]  Yes    Chronic left shoulder pain [M25.512, G89.29]  Yes    Palliative care encounter [Z51.5]  Not Applicable    Debility [R53.81]  Yes    ACP (advance care planning) [Z71.89]  Not Applicable    Acute on chronic respiratory failure with hypoxia [J96.21]  Yes    Bacteremia due to Staphylococcus [R78.81, B95.8]  Yes    Hyperkalemia [E87.5]  No    Endocarditis [I38]  Yes    NSTEMI (non-ST elevated myocardial infarction) [I21.4]  Yes    Anemia of chronic renal failure, stage 5 [N18.5, D63.1]  Yes    Chronic systolic heart failure [I50.22]  Yes    Hypercholesterolemia [E78.00]  Yes    History of nephrectomy [Z90.5]  Not Applicable    Hypertension [I10]  Yes    Crohn's disease [K50.90]  Yes    ESRD on hemodialysis [N18.6, Z99.2]  Not Applicable      Resolved Hospital Problems   No resolved problems to display.       Patient is homebound due to:  Septic shock    Allergies:  Review of patient's allergies indicates:   Allergen Reactions    Vancomycin analogues Anaphylaxis, Other (See Comments), Shortness Of Breath and Swelling     Light headed, see's spots      Aspirin Nausea And Vomiting and Other (See Comments)     Has crohn's disease    Other reaction(s): FLARES UP CROHNS      Has crohn's disease       Vitals:     Every shift (Skilled Nursing patients)    Diet: renal low sodium -minced & moist diet & thin liquids                Acitivities:   - Up in a chair each morning as tolerated  - Ambulate with assistance to bathroom  - Scheduled walks once each shift (every 8 hours)  - May ambulate  independently  - May use walker, cane, or self-propelled wheelchair       - Weight bearing:  as tolerated      LABS:  Per facility protocol     Nursing Precautions:   - Aspiration precautions:             - Total assistance with meals            -  Upright 90 degrees befor during and after meals             -  Suction at bedside          - Fall precautions per nursing home protocol   - Decubitus precautions:        -  for positioning   - Pressure reducing foam mattress   - Turn patient every two hours. Use wedge pillows to anchor patient    CONSULTS:    Physical Therapy to evaluate and treat     Occupational Therapy to evaluate and treat     Speech Therapy  to evaluate and treat     Nutrition to evaluate and recommend diet        MISCELLANEOUS CARE:    oxygen 3L via nasal canula continuously    Colostomy care      Routine Skin for Bedridden Patients:  Apply moisture barrier cream to all    skin folds and wet areas in perineal area daily and after baths and                           all bowel movements.    Antibiotic therapy     continue cefazolin 2 gm IV -Please give after dialysis on HD days  Treat for a total of 6 weeks. End date: 2/2/25.        DIABETES CARE: NA      Medications: Discontinue all previous medication orders, if any. See new list below.       Medication List        START taking these medications      D5W PgBk 50 mL with ceFAZolin 2 gram SolR 2 g  Inject 2 g into the vein every Mon, Wed, Fri. for 20 days     miconazole NITRATE 2 % 2 % top powder  Commonly known as: MICOTIN  Apply topically 2 (two) times daily. Apply to underarm as needed     midodrine 10 MG tablet  Commonly known as: PROAMATINE  Take 1 tablet (10 mg total) by mouth as needed (prior to HD).     oxyCODONE 5 MG immediate release tablet  Commonly known as: ROXICODONE  Take 1 tablet (5 mg total) by mouth every 12 (twelve) hours as needed for Pain.            CONTINUE taking these medications      atorvastatin 40 MG tablet  Commonly  known as: LIPITOR  Take 40 mg by mouth once daily.     sevelamer carbonate 800 mg Tab  Commonly known as: RENVELA  Take 800 mg by mouth 3 (three) times daily with meals.            STOP taking these medications      carvediloL 12.5 MG tablet  Commonly known as: COREG                   _________________________________  Keny Estrada MD  01/14/2025

## 2025-01-11 LAB
ALBUMIN SERPL BCP-MCNC: 2.5 G/DL (ref 3.5–5.2)
ALBUMIN SERPL BCP-MCNC: 2.6 G/DL (ref 3.5–5.2)
ALP SERPL-CCNC: 278 U/L (ref 40–150)
ALT SERPL W/O P-5'-P-CCNC: <5 U/L (ref 10–44)
ANION GAP SERPL CALC-SCNC: 11 MMOL/L (ref 8–16)
ANION GAP SERPL CALC-SCNC: 11 MMOL/L (ref 8–16)
ANION GAP SERPL CALC-SCNC: 12 MMOL/L (ref 8–16)
ANION GAP SERPL CALC-SCNC: 8 MMOL/L (ref 8–16)
ANION GAP SERPL CALC-SCNC: 9 MMOL/L (ref 8–16)
AST SERPL-CCNC: 28 U/L (ref 10–40)
BASOPHILS # BLD AUTO: 0.05 K/UL (ref 0–0.2)
BASOPHILS NFR BLD: 1.4 % (ref 0–1.9)
BILIRUB SERPL-MCNC: 0.3 MG/DL (ref 0.1–1)
BUN SERPL-MCNC: 31 MG/DL (ref 8–23)
BUN SERPL-MCNC: 35 MG/DL (ref 8–23)
BUN SERPL-MCNC: 36 MG/DL (ref 8–23)
BUN SERPL-MCNC: 42 MG/DL (ref 8–23)
BUN SERPL-MCNC: 42 MG/DL (ref 8–23)
CALCIUM SERPL-MCNC: 9.6 MG/DL (ref 8.7–10.5)
CALCIUM SERPL-MCNC: 9.6 MG/DL (ref 8.7–10.5)
CALCIUM SERPL-MCNC: 9.7 MG/DL (ref 8.7–10.5)
CALCIUM SERPL-MCNC: 9.7 MG/DL (ref 8.7–10.5)
CALCIUM SERPL-MCNC: 9.8 MG/DL (ref 8.7–10.5)
CHLORIDE SERPL-SCNC: 101 MMOL/L (ref 95–110)
CHLORIDE SERPL-SCNC: 102 MMOL/L (ref 95–110)
CHLORIDE SERPL-SCNC: 102 MMOL/L (ref 95–110)
CHLORIDE SERPL-SCNC: 103 MMOL/L (ref 95–110)
CHLORIDE SERPL-SCNC: 105 MMOL/L (ref 95–110)
CO2 SERPL-SCNC: 19 MMOL/L (ref 23–29)
CO2 SERPL-SCNC: 21 MMOL/L (ref 23–29)
CO2 SERPL-SCNC: 23 MMOL/L (ref 23–29)
CO2 SERPL-SCNC: 24 MMOL/L (ref 23–29)
CO2 SERPL-SCNC: 25 MMOL/L (ref 23–29)
CREAT SERPL-MCNC: 3.7 MG/DL (ref 0.5–1.4)
CREAT SERPL-MCNC: 4 MG/DL (ref 0.5–1.4)
CREAT SERPL-MCNC: 4 MG/DL (ref 0.5–1.4)
CREAT SERPL-MCNC: 4.5 MG/DL (ref 0.5–1.4)
CREAT SERPL-MCNC: 4.6 MG/DL (ref 0.5–1.4)
DIFFERENTIAL METHOD BLD: ABNORMAL
EOSINOPHIL # BLD AUTO: 0.3 K/UL (ref 0–0.5)
EOSINOPHIL NFR BLD: 6.9 % (ref 0–8)
ERYTHROCYTE [DISTWIDTH] IN BLOOD BY AUTOMATED COUNT: 19.2 % (ref 11.5–14.5)
EST. GFR  (NO RACE VARIABLE): 13.1 ML/MIN/1.73 M^2
EST. GFR  (NO RACE VARIABLE): 13.4 ML/MIN/1.73 M^2
EST. GFR  (NO RACE VARIABLE): 15.4 ML/MIN/1.73 M^2
EST. GFR  (NO RACE VARIABLE): 15.4 ML/MIN/1.73 M^2
EST. GFR  (NO RACE VARIABLE): 16.9 ML/MIN/1.73 M^2
GLUCOSE SERPL-MCNC: 105 MG/DL (ref 70–110)
GLUCOSE SERPL-MCNC: 125 MG/DL (ref 70–110)
GLUCOSE SERPL-MCNC: 64 MG/DL (ref 70–110)
GLUCOSE SERPL-MCNC: 82 MG/DL (ref 70–110)
GLUCOSE SERPL-MCNC: 82 MG/DL (ref 70–110)
HCT VFR BLD AUTO: 28.1 % (ref 40–54)
HGB BLD-MCNC: 8.9 G/DL (ref 14–18)
IMM GRANULOCYTES # BLD AUTO: 0.01 K/UL (ref 0–0.04)
IMM GRANULOCYTES NFR BLD AUTO: 0.3 % (ref 0–0.5)
LYMPHOCYTES # BLD AUTO: 0.4 K/UL (ref 1–4.8)
LYMPHOCYTES NFR BLD: 9.6 % (ref 18–48)
MAGNESIUM SERPL-MCNC: 2 MG/DL (ref 1.6–2.6)
MCH RBC QN AUTO: 30.4 PG (ref 27–31)
MCHC RBC AUTO-ENTMCNC: 31.7 G/DL (ref 32–36)
MCV RBC AUTO: 96 FL (ref 82–98)
MONOCYTES # BLD AUTO: 0.5 K/UL (ref 0.3–1)
MONOCYTES NFR BLD: 12.9 % (ref 4–15)
NEUTROPHILS # BLD AUTO: 2.5 K/UL (ref 1.8–7.7)
NEUTROPHILS NFR BLD: 68.9 % (ref 38–73)
NRBC BLD-RTO: 0 /100 WBC
PHOSPHATE SERPL-MCNC: 4.6 MG/DL (ref 2.7–4.5)
PHOSPHATE SERPL-MCNC: 4.7 MG/DL (ref 2.7–4.5)
PLATELET # BLD AUTO: 134 K/UL (ref 150–450)
PMV BLD AUTO: 11.9 FL (ref 9.2–12.9)
POCT GLUCOSE: 104 MG/DL (ref 70–110)
POTASSIUM SERPL-SCNC: 4.4 MMOL/L (ref 3.5–5.1)
POTASSIUM SERPL-SCNC: 4.6 MMOL/L (ref 3.5–5.1)
POTASSIUM SERPL-SCNC: 4.6 MMOL/L (ref 3.5–5.1)
POTASSIUM SERPL-SCNC: 5.2 MMOL/L (ref 3.5–5.1)
POTASSIUM SERPL-SCNC: 5.8 MMOL/L (ref 3.5–5.1)
PROT SERPL-MCNC: 7.1 G/DL (ref 6–8.4)
RBC # BLD AUTO: 2.93 M/UL (ref 4.6–6.2)
SODIUM SERPL-SCNC: 132 MMOL/L (ref 136–145)
SODIUM SERPL-SCNC: 135 MMOL/L (ref 136–145)
SODIUM SERPL-SCNC: 136 MMOL/L (ref 136–145)
SODIUM SERPL-SCNC: 136 MMOL/L (ref 136–145)
SODIUM SERPL-SCNC: 137 MMOL/L (ref 136–145)
WBC # BLD AUTO: 3.63 K/UL (ref 3.9–12.7)

## 2025-01-11 PROCEDURE — 21400001 HC TELEMETRY ROOM

## 2025-01-11 PROCEDURE — 25000003 PHARM REV CODE 250

## 2025-01-11 PROCEDURE — 63600175 PHARM REV CODE 636 W HCPCS: Performed by: INTERNAL MEDICINE

## 2025-01-11 PROCEDURE — 80053 COMPREHEN METABOLIC PANEL: CPT | Performed by: HOSPITALIST

## 2025-01-11 PROCEDURE — 83735 ASSAY OF MAGNESIUM: CPT | Performed by: HOSPITALIST

## 2025-01-11 PROCEDURE — 25000003 PHARM REV CODE 250: Performed by: INTERNAL MEDICINE

## 2025-01-11 PROCEDURE — 85025 COMPLETE CBC W/AUTO DIFF WBC: CPT | Performed by: HOSPITALIST

## 2025-01-11 PROCEDURE — 84100 ASSAY OF PHOSPHORUS: CPT | Performed by: HOSPITALIST

## 2025-01-11 PROCEDURE — 80069 RENAL FUNCTION PANEL: CPT | Performed by: HOSPITALIST

## 2025-01-11 PROCEDURE — 80048 BASIC METABOLIC PNL TOTAL CA: CPT | Mod: 91,XB | Performed by: HOSPITALIST

## 2025-01-11 PROCEDURE — 99233 SBSQ HOSP IP/OBS HIGH 50: CPT | Mod: ,,, | Performed by: NURSE PRACTITIONER

## 2025-01-11 PROCEDURE — 94761 N-INVAS EAR/PLS OXIMETRY MLT: CPT

## 2025-01-11 PROCEDURE — 36415 COLL VENOUS BLD VENIPUNCTURE: CPT | Performed by: HOSPITALIST

## 2025-01-11 PROCEDURE — 63600175 PHARM REV CODE 636 W HCPCS: Performed by: STUDENT IN AN ORGANIZED HEALTH CARE EDUCATION/TRAINING PROGRAM

## 2025-01-11 RX ORDER — ALBUTEROL SULFATE 2.5 MG/.5ML
10 SOLUTION RESPIRATORY (INHALATION) ONCE
Status: DISCONTINUED | OUTPATIENT
Start: 2025-01-11 | End: 2025-01-11

## 2025-01-11 RX ORDER — MIDODRINE HYDROCHLORIDE 5 MG/1
10 TABLET ORAL
Status: DISCONTINUED | OUTPATIENT
Start: 2025-01-11 | End: 2025-01-14 | Stop reason: HOSPADM

## 2025-01-11 RX ADMIN — HEPARIN SODIUM 5000 UNITS: 5000 INJECTION INTRAVENOUS; SUBCUTANEOUS at 06:01

## 2025-01-11 RX ADMIN — HEPARIN SODIUM 5000 UNITS: 5000 INJECTION INTRAVENOUS; SUBCUTANEOUS at 07:01

## 2025-01-11 RX ADMIN — CEFAZOLIN 1 G: 1 INJECTION, POWDER, FOR SOLUTION INTRAMUSCULAR; INTRAVENOUS at 07:01

## 2025-01-11 RX ADMIN — MUPIROCIN: 20 OINTMENT TOPICAL at 09:01

## 2025-01-11 RX ADMIN — HEPARIN SODIUM 5000 UNITS: 5000 INJECTION INTRAVENOUS; SUBCUTANEOUS at 01:01

## 2025-01-11 RX ADMIN — OXYCODONE HYDROCHLORIDE 10 MG: 10 TABLET ORAL at 08:01

## 2025-01-11 RX ADMIN — SEVELAMER CARBONATE 800 MG: 800 TABLET, FILM COATED ORAL at 12:01

## 2025-01-11 RX ADMIN — MICONAZOLE NITRATE 2 % TOPICAL POWDER: at 07:01

## 2025-01-11 RX ADMIN — ATORVASTATIN CALCIUM 40 MG: 40 TABLET, FILM COATED ORAL at 09:01

## 2025-01-11 RX ADMIN — SEVELAMER CARBONATE 800 MG: 800 TABLET, FILM COATED ORAL at 05:01

## 2025-01-11 RX ADMIN — MICONAZOLE NITRATE 2 % TOPICAL POWDER: at 09:01

## 2025-01-11 RX ADMIN — Medication 6 MG: at 07:01

## 2025-01-11 NOTE — ASSESSMENT & PLAN NOTE
Hyperkalemia is likely due to ESRD.The patients most recent potassium results are listed below.  Recent Labs     01/12/25  1537 01/12/25  1917 01/13/25  0448   K 5.0  5.0 5.2*  5.2* 5.5*  5.5*  5.5*     Plan  - Monitor for arrhythmias with EKG and/or continuous telemetry.   - improved with HD    1/9 K of 5.3. Lokelma ordered. renal panel q 4h. 1L/24h fluid restriction  1/12 K of 5.5. ordered  D 50,  insulin, albulterol concentrate, Lokelma. BMP q 4h   1/13 K 5.5 . HD today.

## 2025-01-11 NOTE — PROGRESS NOTES
Jason Long - Internal Medicine [facility] Medicine  Progress Note    Patient Name: Flakito Mcginnis  MRN: [id number]  Patient Class: IP- Inpatient   Admission Date: 12/19/2024  Length of Stay: 23 days  Attending Physician: Keny Estrada MD  Primary Care Provider: Jordin Robbins MD        Subjective     Principal Problem:Septic shock        HPI:  By Alicia Galloway NP    Mr. Flakito Mcginnis is a 69 y.o. man w/ HFrEF (30% 8/2024 from 20-25% 6/2024), NICM, MR, ESRD on HD, chronic respiratory failure on home 3L NC, Crohn's s/p colostomy, h/o R nephrectomy. [place] [facility] [place] due to hypotension and ongoing shortness of breath. He usually receives his dialysis on T, R, S and went on Wednesday for an extra session for volume removal. He arrived on Thursday for his usually scheduled dialysis session and was directed to the ED due to hypotension. On arrival in the ED his blood pressure was 78/51. He was found to have a BNP >4900 and CXR concerning for volume overload. High sensitivity troponin 923. Critical care medicine was consulted for hypotension and admitted to MICU.     Overview/Hospital Course:  1/9 Mr. 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 ID was consulted and recommending 6 weeks of IV Cefazolin after HD, end date 2/2/25. CTS evaluated and recommending medical management at this time due to multiple co-morbidities. Tunneled catheter was removed 12/22. Repeat cultures from 12/23 with NGTD. Temporary RIJ trialysis line placed 12/24.  Course further complicated by acute hypoxemic respiratory failure likely volume overload requiring HFNC.  Oxygenation improved with volume removal with dialysis and he was weaned down to NC.  He was SD to  on 12/29.  IR was consulted for new HD line placement which was  placed on 12/31.  He was going to be discharged home after his new line placement, but sister refuses to take him home and says he needs intermediate NH placement. K of 5.3. Lokelma ordered. renal panel q 4h. 1L/24h fluid restriction CM assisting Pending financials submission to [facility]  1/10 Hb trended down to 6.8. FOBT. Transfusion with 1 unit of PRBC . K improved to 5. HD today    1/11 persistent perirpheral edema. UF of 1L yesterday and continue UF as BP permits. nephrology f/u for HD today - refused HD today. Plan for next session Monday. mild epistaxis left  nostril - improved with pressure. Nasal saline  ordered                      Review of Systems:   Pain scale:  Constitutional:  fever,  chills, headache, vision loss, hearing loss, weight loss, Generalized weakness, falls, loss of smell, loss of taste, poor appetite,  sore throat, epistaxis   Respiratory: cough, shortness of breath.   Cardiovascular: chest pain, dizziness, palpitations, orthopnea, swelling of feet, syncope  Gastrointestinal: nausea, vomiting, abdominal pain, diarrhea, black stool,  blood in stool, change in bowel habits, constipation  Genitourinary: hematuria, dysuria, urgency, frequency  Integument/Breast: rash,  pruritis  Hematologic/Lymphatic: easy bruising, lymphadenopathy  Musculoskeletal: arthralgias , myalgias, back pain, neck pain, knee pain  Neurological: confusion, seizures, tremors, slurred speech  Behavioral/Psych:  depression, anxiety, auditory or visual hallucinations     OBJECTIVE:     Physical Exam:  Body mass index is 19.93 kg/m².    Constitutional: Appears thin built    Head: Normocephalic and atraumatic.   Neck: Normal range of motion. Neck supple. right chest HD catheter   Cardiovascular: Normal heart rate.  Regular heart rhythm.  Pulmonary/Chest: Effort normal.   Abdominal: No distension.  No tenderness. + colostomy   Musculoskeletal: Normal range of motion.++  edema.   Neurological: Alert and oriented to person,  "place, and time.   Skin: Skin is warm and dry.   Psychiatric: Normal mood and affect. Behavior is normal.       Vital Signs  Temp: 98.4 °F (36.9 °C) (01/11/25 1558)  Pulse: 82 (01/11/25 1558)  Resp: 18 (01/11/25 1558)  BP: (!) 101/40 (01/11/25 1558)  SpO2: 98 % (01/11/25 1558)     24 Hour VS Range    Temp:  [97.5 °F (36.4 °C)-99.7 °F (37.6 °C)]   Pulse:  []   Resp:  [16-18]   BP: ()/(40-69)   SpO2:  [87 %-99 %]   No intake or output data in the 24 hours ending 01/11/25 1820        I/O This Shift:  No intake/output data recorded.    Wt Readings from Last 3 Encounters:   01/03/25 56 kg (123 lb 7.3 oz)   12/11/24 59.9 kg (132 lb)   10/19/24 59.9 kg (132 lb)       I have personally reviewed the vitals and recorded Intake/Output     Laboratory/Diagnostic Data:    CBC/Anemia Labs: Coags:    Recent Labs   Lab 01/10/25  0546 01/10/25  1154 01/10/25  1846 01/11/25  0420   WBC 3.51* 2.38*  --  3.63*   HGB 7.3* 7.9*  --  8.9*   HCT 24.3* 25.1*  --  28.1*   * 130*  --  134*   MCV 98 93  --  96   RDW 18.0* 18.3*  --  19.2*   OCCULTBLOOD  --   --  Positive*  --     No results for input(s): "PT", "INR", "APTT" in the last 168 hours.     Chemistries: ABG:   Recent Labs   Lab 01/09/25  0943 01/09/25  1320 01/10/25  0258 01/10/25  0827 01/10/25  1552 01/11/25  0227 01/11/25  1006 01/11/25  1235   *   < > 133*  133*  133*  133*   < > 135*  135* 137 135* 132*   K 5.3*   < > 5.1  5.1  5.1  5.1  5.1   < > 4.3  4.3  4.3 5.8* 4.4 4.6  4.6      < > 105  105  105  105   < > 103  103 105 102 101   CO2 21*   < > 18*  18*  18*  18*   < > 24  24 21* 25 19*   BUN 30*   < > 44*  44*  44*  44*   < > 24*  24* 31* 35* 36*   CREATININE 4.3*   < > 4.8*  4.8*  4.8*  4.8*   < > 3.1*  3.1* 3.7* 4.0* 4.0*   CALCIUM 9.4   < > 9.5  9.5  9.5  9.5   < > 9.0  9.0 9.6 9.7 9.6   PROT 6.9  --  6.4  6.4  --   --  7.1  --   --    BILITOT 0.3  --  0.2  0.2  --   --  0.3  --   --    ALKPHOS 294*  --  288* " " 288*  --   --  278*  --   --    ALT <5*  --  <5*  <5*  --   --  <5*  --   --    AST 17  --  18  18  --   --  28  --   --    MG 2.0  --  2.0  2.0  --   --  2.0  --   --    PHOS  --    < > 5.2*  5.2*  5.2*   < > 3.6 4.6* 4.7*  --     < > = values in this interval not displayed.    No results for input(s): "PH", "PCO2", "PO2", "HCO3", "POCSATURATED", "BE" in the last 168 hours.     POCT Glucose: HbA1c:    Recent Labs   Lab 01/09/25  2045 01/10/25  2022   POCTGLUCOSE 114* 109    Hemoglobin A1C   Date Value Ref Range Status   12/18/2023 4.7 4.0 - 5.6 % Final     Comment:     ADA Screening Guidelines:  5.7-6.4%  Consistent with prediabetes  >or=6.5%  Consistent with diabetes    High levels of fetal hemoglobin interfere with the HbA1C  assay. Heterozygous hemoglobin variants (HbS, HgC, etc)do  not significantly interfere with this assay.   However, presence of multiple variants may affect accuracy.          Cardiac Enzymes: Ejection Fractions:    No results for input(s): "CPK", "CPKMB", "MB", "TROPONINI" in the last 72 hours. EF   Date Value Ref Range Status   12/20/2024 20 % Final          No results for input(s): "COLORU", "APPEARANCEUA", "PHUR", "SPECGRAV", "PROTEINUA", "GLUCUA", "KETONESU", "BILIRUBINUA", "OCCULTUA", "NITRITE", "UROBILINOGEN", "LEUKOCYTESUR", "RBCUA", "WBCUA", "BACTERIA", "SQUAMEPITHEL", "HYALINECASTS" in the last 48 hours.    Invalid input(s): "WRIGHTSUR"    Lactate (Lactic Acid) (mmol/L)   Date Value   12/19/2024 2.4 (H)     BNP (pg/mL)   Date Value   12/19/2024 >4,900 (H)     No results found for: "CRP", "SEDRATE"  No results found for: "DDIMER"  Ferritin (ng/mL)   Date Value   12/20/2024 2,808 (H)     No results found for: "LDH"  CPK (U/L)   Date Value   12/21/2024 <7 (L)     CK (U/L)   Date Value   08/22/2024 139     No results found for this or any previous visit.  SARS-CoV2 (COVID-19) Qualitative PCR (no units)   Date Value   06/12/2020 Not detected     SARS-CoV-2 RNA, Amplification, Qual " (no units)   Date Value   12/19/2024 Negative       Microbiology labs for the last week  Microbiology Results (last 7 days)       ** No results found for the last 168 hours. **            Reviewed and noted in plan where applicable- Please see chart for full lab data.    Lines/Drains:       Hemodialysis Catheter 12/31/24 0818 right subclavian (Active)   Line Necessity Review CRRT/HD 01/09/25 2022   Verification by X-ray Yes 01/07/25 2049   Site Assessment No drainage;No redness;No swelling;No warmth 01/09/25 2022   Line Securement Device Secured with sutureless device 01/09/25 2022   Dressing Type CHG impregnated dressing/sponge 01/09/25 2022   Dressing Status Clean;Dry;Intact 01/09/25 2022   Dressing Intervention Integrity maintained 01/09/25 2022   Date on Dressing 01/06/25 01/08/25 2046   Dressing Due to be Changed 01/13/25 01/08/25 2046   Venous Patency/Care flushed w/o difficulty;heparin locked;intermittent infusion cap applied 01/08/25 1847   Arterial Patency/Care flushed w/o difficulty;heparin locked;intermittent infusion cap applied 01/08/25 1847   Waveform Not being transduced 01/02/25 0838   Number of days: 9            Peripheral IV - Single Lumen 01/09/25 1622 20 G Posterior;Right Forearm (Active)   Site Assessment Clean;Dry;Intact;No redness;No swelling 01/09/25 2022   Line Securement Device Secured with sutureless device 01/09/25 2022   Extremity Assessment Distal to IV No abnormal discoloration;No redness;No swelling;No warmth 01/09/25 2022   Line Status Flushed;Saline locked 01/09/25 2022   Dressing Status Clean;Dry;Intact 01/09/25 2022   Dressing Intervention Integrity maintained 01/09/25 2022   Dressing Change Due 01/13/25 01/09/25 1622   Site Change Due 01/13/25 01/09/25 2022   Reason Not Rotated Not due 01/09/25 2022   Number of days: 0            Colostomy 12/19/24 2225 RLQ (Active)   Stomal Appliance 1 piece;No Leakage;Dry;Intact 01/09/25 2022   Stoma Appearance rosebud appearance 01/09/25 2022    Stoma Size (in) 26 01/02/25 0838   Site Assessment Clean;Intact 01/09/25 2022   Peristomal Assessment Clean;Intact 01/09/25 0751   Accessories/Skin Care barrier substance over peristomal skin;cleansed w/ water;skin barrier paste 01/02/25 0838   Stoma Function stool 01/09/25 0751   Treatment Placement checked 01/06/25 0800   Tolerance no signs/symptoms of discomfort 01/06/25 2016   Output (mL) 100 mL 01/10/25 0612   Number of days: 21       Imaging  ECG Results              Repeat EKG 12-lead (Final result)        Collection Time Result Time QRS Duration OHS QTC Calculation    12/19/24 16:03:39 12/20/24 09:16:41 112 512                     Final result by Interface, Lab In [facility] (12/20/24 09:16:46)                   Narrative:    Test Reason : R06.00,    Vent. Rate :  96 BPM     Atrial Rate :  96 BPM     P-R Int : 232 ms          QRS Dur : 112 ms      QT Int : 406 ms       P-R-T Axes :  52 -47 203 degrees    QTcB Int : 512 ms    Sinus rhythm with 1st degree A-V block  Left axis deviation  Anterior infarct (cited on or before 19-Dec-2024)  ST and T wave abnormality, consider lateral ischemia  Prolonged QT  Abnormal ECG  When compared with ECG of 19-Dec-2024 14:29,  No significant change was found  Confirmed by Dominick Grant ([id number]) on 12/20/2024 9:16:39 AM    Referred By: AAAREFERRAL SELF           Confirmed By: Dominick Grant                                     EKG 12-lead (Final result)        Collection Time Result Time QRS Duration OHS QTC Calculation    12/19/24 14:29:52 12/19/24 14:32:53 114 560                     Final result by Interface, Lab In [facility] (12/19/24 14:32:59)                   Narrative:    Test Reason : Z13.6,    Vent. Rate : 102 BPM     Atrial Rate : 102 BPM     P-R Int : 226 ms          QRS Dur : 114 ms      QT Int : 430 ms       P-R-T Axes :  39 -55 -85 degrees    QTcB Int : 560 ms    Sinus tachycardia with 1st degree A-V block  Left anterior fascicular block  Abnormal R wave progression  in the precordial leads - Possible Anterior  infarct ,age undetermined  ST and T wave abnormality, consider lateral ischemia  Prolonged QT  Abnormal ECG  No previous ECGs available  Confirmed by Franck Aguilar ([id number]) on 12/19/2024 2:32:50 PM    Referred By: AAAREFERRAL SELF           Confirmed By: Franck Aguilar                                    Results for orders placed during the hospital encounter of 12/19/24    Echo    Interpretation Summary    There is a 10 by 4 mm large mobile echogenic mass present in the LVOT suggestive of vegetation - blood cultures could be done if clinically indicated.    Left Ventricle: The left ventricle is mildly dilated. Mildly increased ventricular mass. Normal wall thickness. There is mild eccentric hypertrophy. Severe global hypokinesis present. Septal motion is abnormal. There is severely reduced systolic function with a visually estimated ejection fraction of 20 - 25%. Ejection fraction is approximately 20%. Quantitated ejection fraction is 25%. There is diastolic dysfunction.    Right Ventricle: Mild right ventricular enlargement. Wall thickness is normal. Right ventricle wall motion has global hypokinesis. Systolic function is mild-moderately reduced.    Left Atrium: Left atrium is severely dilated.    Right Atrium: Right atrium is moderately dilated.    Aortic Valve: There is moderate aortic valve sclerosis. Moderately restricted motion. There is a 10 by 4 mm large mobile echogenic mass present in the LVOT suggestive of vegetation - blood cultures could be done if clinically indicated. There is moderate stenosis. Aortic valve area by VTI is 1.2 cm2. Aortic valve peak velocity is 2.6 m/s. Mean gradient is 16.5 mmHg. The dimensionless index is 0.37. There is mild to moderate aortic regurgitation.    Mitral Valve: There is moderate bileaflet sclerosis. There is moderate mitral annular calcification present. There is mild regurgitation.    Tricuspid Valve: There is moderate to   moderate-severe regurgitation.    Pulmonary Artery: There is mild pulmonary hypertension. The estimated pulmonary artery systolic pressure is 41 mmHg.    IVC/SVC: Intermediate venous pressure at 8 mmHg.      X-Ray Cervical Spine 2 or 3 Views  Narrative: EXAMINATION:  XR CERVICAL SPINE 2 OR 3 VIEWS    CLINICAL HISTORY:  s/p fall;    TECHNIQUE:  AP, lateral and open mouth views of the cervical spine were performed.    COMPARISON:  August 2023    FINDINGS:  Bones are markedly demineralized limiting evaluation.  Also the patient's shoulders obscure mole see entire cervical spine.  Postoperative change base of neck likely about the thyroid.  Partially visualized large-bore right vascular catheter.  Remote left rib fractures.  Impression: Nondiagnostic study due to marked osteopenia and obscuration of the cervical spine by the shoulder.  Further evaluation to exclude cervical spine trauma will be needed.    This report was flagged in Epic as abnormal.    Electronically signed by: Ketan Springer MD  Date:    01/05/2025  Time:    12:45  X-Ray Shoulder Trauma 3 view Left  Narrative: EXAMINATION:  XR SHOULDER TRAUMA 3 VIEW LEFT    CLINICAL HISTORY:  fall on shoulder, concerned for dislocation, hard to assess due to posture;    TECHNIQUE:  Three views of the left shoulder were performed.    COMPARISON  December 19, 2024    FINDINGS:  Probable remote fracture about superolateral aspect of the right humeral head.  Calcific densities/cortical thickening again noted.  Narrowing of the subacromial space suggest chronic rotator cuff tear.  Bones are markedly demineralized.  Numeral head difficult to visualize on axillary Y-view appears similar in position compared to prior.  Unfortunately subluxation is not excluded.  Left rib deformities again noted.  Impression: Significant bony demineralization limits evaluation.  Probable remote fracture about the superolateral aspect of the humeral head.  Suspected anterior subluxation of the  humerus with respect to the glenoid though not well visualized on the axillary Y-view.    This report was flagged in Epic as abnormal.    Electronically signed by: Ketan Springer MD  Date:    01/05/2025  Time:    12:43      Labs, Imaging, EKG and Diagnostic results from 1/11/2025 were reviewed.    Medications:  Medication list was reviewed and changes noted under Assessment/Plan.  No current facility-administered medications on file prior to encounter.     Current Outpatient Medications on File Prior to Encounter   Medication Sig Dispense Refill    atorvastatin (LIPITOR) 40 MG tablet Take 40 mg by mouth once daily.      sevelamer carbonate (RENVELA) 800 mg Tab Take 800 mg by mouth 3 (three) times daily with meals.       Scheduled Medications:  Current Facility-Administered Medications   Medication Dose Route Frequency    atorvastatin  40 mg Oral Daily    ceFAZolin (Ancef) IV (PEDS and ADULTS)  1 g Intravenous Q24H    epoetin ludin-ebpx (RETACRIT) injection  3,000 Units Intravenous Every Mon, Wed, Fri    heparin (porcine)  5,000 Units Subcutaneous Q8H    melatonin  6 mg Oral Nightly    miconazole NITRATE 2 %   Topical (Top) BID    mupirocin   Nasal Daily    sevelamer carbonate  800 mg Oral TID WM     PRN:   Current Facility-Administered Medications:     0.9%  NaCl infusion (for blood administration), , Intravenous, Q24H PRN    0.9%  NaCl infusion (for blood administration), , Intravenous, Q24H PRN    acetaminophen, 500 mg, Oral, Q4H PRN    diphenhydrAMINE-zinc acetate 2-0.1%, , Topical (Top), TID PRN    haloperidoL, 5 mg, Oral, Q6H PRN    heparin (porcine), 1,000 Units, Intra-Catheter, PRN    midodrine, 10 mg, Oral, PRN    oxyCODONE, 5 mg, Oral, Q4H PRN    oxyCODONE, 10 mg, Oral, Q6H PRN    sodium chloride, 2 spray, Each Nostril, Q6H PRN    sodium chloride 0.9%, 10 mL, Intravenous, PRN    white petrolatum, , Topical (Top), PRN  Infusions:   Estimated Creatinine Clearance: 13.8 mL/min (A) (based on SCr of 4 mg/dL  (H)).             Assessment and Plan     * Septic shock  Admitted to MICU 12/19 for septic shock 2/2 Staph capitis bacteremia and endocarditis from his tunneled HD line  Weaned off vasopressors in the ICU  2D echo with mass on the aortic valve suggestive of vegetation  Tunneled HD line removed 12/22  Temporary trialysis placed 12/24  ID consulted:  Suggest 6 weeks of IV Ancef with HD through 2/2/25  CTS consulted:  Suggest IV abx, no surgical intervention  IR placed new tunneled line 12/31 1/9 s/p ID eval - Septic shock on admission secondary to endocarditis in LVOT with associated S capitis bacteremia.  Had HD ([facility]) line removal on 12/22. Repeat blood cultures 12/23 are NGTD. Shock resolved. Large mobile vegetation on TTE. Transitioned from daptomycin to cefazolin based on susceptibilities. Evaluated by CTS and deemed a non surgical candidate due to risk/comorbidities.Continue cefazolin x 6 weeks of therapy from first negative blood culture. (End date: 2/2/25). s/p  IR line placement 12/31.  Waiting on new NH placement.  Pending financials to [facility]       Chronic left shoulder pain  Patient states that he has been having shoulder pain for the past couple of weeks associated with a fall some time back. Was not able to assess due to being in the hospital and is worried that it is dislocated.     orthopedic eval 1/7 - old greater tuberosity fracture of the left humerus.  Some mild subluxation on the scapular Y-view.  In his has some tenderness in that area.  This has been ongoing since August.  He has multiple comorbid medical conditions that do not make him a good candidate for surgery.  The shoulder does not appear to be fully dislocated but does have some subluxation.  Continue conservative treatment given his overall medical status.     Xray L shoulder with remote fracture about the superolateral aspect of the humeral head. Suspected anterior subluxation of the humerus with respect to the glenoid    rec  WBAT/ROMAT LUE and PT/OT    ACP (advance care planning)  DNR noted      Debility  Patient with Acute debility due to  acute illness . The patient's latest AMPAC (Activity Measure for Post Acute Care) Score is listed below.    AM-PAC Score - How much help does the patient need for each activity listed  Basic Mobility Total Score: 17  Turning over in bed (including adjusting bedclothes, sheets and blankets)?: A little  Sitting down on and standing up from a chair with arms (e.g., wheelchair, bedside commode, etc.): A little  Moving from lying on back to sitting on the side of the bed?: A little  Moving to and from a bed to a chair (including a wheelchair)?: A little  Need to walk in hospital room?: A little  Climbing 3-5 steps with a railing?: A lot    Plan  - Progressive mobility protocol initated  - PT/OT consulted -   1/9 recs low intensity therapy    Palliative care encounter  DNR noted      Acute on chronic respiratory failure with hypoxia  Patient with Hypoxic Respiratory failure which is Acute on chronic.  He is on home oxygen 3-4L. Supplemental oxygen was provided and noted-       .   Signs/symptoms of respiratory failure include- tachypnea, increased work of breathing, respiratory distress, and use of accessory muscles. Contributing diagnoses includes - CHF Labs and images were reviewed. Patient Has recent ABG, which has been reviewed. Will treat underlying causes and adjust management of respiratory failure as follows-   Continue to wean oxygen to goal SaO2 of 90%  Aggressive pulmonary toilet in setting of atelectasis of left lower lung field.   Weaned down to baseline 3L of O2 with NC  1/9 sats 95% on 3LNC    Hyperkalemia  Hyperkalemia is likely due to ESRD.The patients most recent potassium results are listed below.  Recent Labs     01/10/25  1552 01/11/25  0227 01/11/25  1006   K 4.3  4.3  4.3 5.8* 4.4     Plan  - Monitor for arrhythmias with EKG and/or continuous telemetry.   - improved with HD    1/9  K of 5.3. kelma ordered. renal panel q 4h. 1L/24h fluid restriction  1/10 HD today         Bacteremia due to Staphylococcus  Seeding from tunneled HD line with AV vegetation  ID consulted:  Suggest 6 weeks of IV Ancef with HD through 2/2/25 1/9 s/p ID eval - Septic shock on admission secondary to endocarditis in LVOT with associated S capitis bacteremia.  Had HD ([facility]) line removal on 12/22. Repeat blood cultures 12/23 are NGTD. Shock resolved. Large mobile vegetation on TTE. Transitioned from daptomycin to cefazolin based on susceptibilities. Evaluated by CTS and deemed a non surgical candidate due to risk/comorbidities.Continue cefazolin x 6 weeks of therapy from first negative blood culture. (End date: 2/2/25). s/p  IR line placement 12/31.       Endocarditis  See septic shock  1/9 s/p ID eval - Septic shock on admission secondary to endocarditis in LVOT with associated S capitis bacteremia.  Had HD ([facility]) line removal on 12/22. Repeat blood cultures 12/23 are NGTD. Shock resolved. Large mobile vegetation on TTE. Transitioned from daptomycin to cefazolin based on susceptibilities. Evaluated by CTS and deemed a non surgical candidate due to risk/comorbidities.Continue cefazolin x 6 weeks of therapy from first negative blood culture. (End date: 2/2/25). s/p  IR line placement 12/31.      Anemia of chronic renal failure, stage 5  Anemia is likely due to chronic disease due to ESRD. Most recent hemoglobin and hematocrit are listed below.  Recent Labs     01/10/25  0546 01/10/25  1154 01/11/25  0420   HGB 7.3* 7.9* 8.9*   HCT 24.3* 25.1* 28.1*       Plan  - Monitor serial CBC: Daily  - Transfuse PRBC if patient becomes hemodynamically unstable, symptomatic or H/H drops below 7/21.  - s/p 1 unit PRBCs this admit  - Patient's anemia is currently stable  1/10 Hb trended down to 6.8. FOBT. Transfusion with 1 unit of PRBC .      NSTEMI (non-ST elevated myocardial infarction)  In setting of septic shock  High sensitivity  troponin troponin 923.    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  Had HD ([facility]) line removal on 12/22. Repeat blood cultures 12/23 are NGTD. Shock resolved. Large mobile vegetation on TTE. Transitioned from daptomycin to cefazolin based on susceptibilities. Evaluated by CTS and deemed a non surgical candidate due to risk/comorbidities.    Crohn's disease  S/p colectomy  No acute issues      History of nephrectomy  Noted  ESRD on HD    Hypertension  Patient's blood pressure range in the last 24 hours was: BP  Min: 100/62  Max: 145/70.  Hold BP meds    ESRD on hemodialysis  Nephro following  Tunneled HD line removed 12/22  Temporary trialysis placed 12/24  IR placed new line 12/31      VTE Risk Mitigation (From admission, onward)           Ordered     heparin (porcine) injection 1,000 Units  As needed (PRN)         01/03/25 1033     heparin (porcine) injection 5,000 Units  Every 8 hours         12/29/24 1000     Place sequential compression device  Until discontinued         12/19/24 1736     IP VTE LOW RISK PATIENT  Once         12/19/24 1736                    Discharge Planning   LLUVIA: 1/13/2025     Code Status: DNR   Medical Readiness for Discharge Date: 12/31/2024  Discharge Plan A: New Nursing Home placement - [facility] care facility   Discharge Delays: (!) Patient and Family Barriers            Please place Justification for DME        Keny Estrada MD  Department of Hospital Medicine   [facility] - [facility] Tele[facility]

## 2025-01-11 NOTE — ASSESSMENT & PLAN NOTE
After looking through his dialysis notes from yesterday, it does appear as if he completed two back-to-back sessions and left dialysis yesterday at his dry weight (54.6 kg) and reports high volume stool output since his HD session. He reports that he is on 4L chronic oxygen use at home, and is currently on that dose in the ER, and reporting resolution of his dyspnea once he was placed back on his home oxygen. Chest xray appears unchanged from prior studies.       Outpatient HD Information:  -Dialysis modality: Hemodialysis  -Outpatient HD unit: [facility]  -Nephrologist: Dr. Strickland  -HD TX days: Tuesday/Thursday/Saturday  -Last HD TX prior to hospital admission: 12/18/2024  -Residual urine: Anuric   -EDW: 54.5 kg    Assessment:   -declined additional HD today.  Plan for next session Monday.   - Continue to monitor intake and output  - Please avoid gadolinium, fleets, phos-based laxatives, NSAIDs  - Dialysis thrice weekly unless more urgent indications arise. Will evaluate RRT requirements Daily.    Anemia of ESRD   Recent Labs   Lab 01/10/25  0546 01/10/25  1154 01/11/25  0420   WBC 3.51* 2.38* 3.63*   HGB 7.3* 7.9* 8.9*   HCT 24.3* 25.1* 28.1*   * 130* 134*       Lab Results   Component Value Date    FESATURATED 12 (L) 12/20/2024    FERRITIN 2,808 (H) 12/20/2024       - Goal in ESRD is Hgb of 10-11. EPO with HD. Hgb 8.9  -EPO with HD    Mineral Bone Disease in ESRD   Lab Results   Component Value Date    .8 (H) 12/20/2024    CALCIUM 9.7 01/11/2025    ALBUMIN 2.5 (L) 01/11/2025    CAION 1.22 12/25/2024    PHOS 4.7 (H) 01/11/2025       - F/U PO4, Mg, Calcium. And albumin levels daily.   - Renal diet with protein intake goal 1.5 g/kg/d with 1 L fluid restriction   - If patient has poor oral intake, recommend nephro nutritional shakes (ex Novasource)

## 2025-01-11 NOTE — PROGRESS NOTES
[facility] - [facility] Medicine Telemetry  Nephrology  Progress Note    Patient Name: Flakito Mcginnis  MRN: [id number]  Admission Date: 12/19/2024  Hospital Length of Stay: 23 days  Attending Provider: Keny Estrada MD   Primary Care Physician: Jordin Robbins MD  Principal Problem:Septic shock    Subjective:     HPI: Patient is a pleasant 69 year old with ESRD on HD TThS who presents to the ER at the request of his HD unit for evaluation of hypotension. Patient completed his a session on Tuesday 12/17/24 and went back for a second session on 12/18/24. Review of his outpatient dialysis notes show that his post BUN dialysis values on 12/17 were 10 with a pre-HD BUN of 41 indicating a urea reduction percentage of 76% suggesting that he received DH on 12/17/24. There are also post HD treatment vital signs recorded on 12/18/24 at 1035 am which also show a pre-HD weight of 56.3 kg and post HD weight of 54.6 kg suggesting that he received an HD session yesterday as well and left at his estimated dry weight. He reported for his routine dialysis but was sent into the ER when he was found to be hypotensive in the HD unit. He reports feeling well with no signs or symptoms of hypotension prior to arrival, however he does report increased loose stool output since his HD session yesterday. He denies any increase in salt or fluid intake and tries to adhere to a low salt and 1L fluid restricting per day diet.    Nephrology has been consulted for management of his dialysis while he is admitted to the hospital. Labs on arrival showed stable electrolytes, venous blood gas showed a metabolic alkalosis with a pCO2 of 44, BNP elevated at >4,900 with no prior values to compare to, and troponin elevated at 923. Weight in the ER was 54 kg. Chest xray was obtained and showed no significant change in the cardiopulmonary status of the patient when compared to previous chest xray. Patient reports oxygen use of 4L at home and reports  resolution of his dyspnea once he was placed on his home oxygen dose.     Outpatient HD Information:  -Dialysis modality: Hemodialysis  -Outpatient HD unit: [facility]  -Nephrologist: Dr. Strickland  -HD TX days: Tuesday/Thursday/Saturday  -Last HD TX prior to hospital admission: 12/18/2024  -Residual urine: Anuric   -EDW: 54.5 kg      Interval History:   Hyperkalemic this morning, specimen hemolyzed.  Repeat WNL.  HD completed yesterday with 1.1 net fluid removed.    He is sitting up in bed this morning, oxygenating well on 2L NC in NAD.  No distress on exam.    Hypervolemic, offered further HD for volume optimization but patient declined.      Review of patient's allergies indicates:   Allergen Reactions    Vancomycin analogues Anaphylaxis, Other (See Comments), Shortness Of Breath and Swelling     Light headed, see's spots      Aspirin Nausea And Vomiting and Other (See Comments)     Has crohn's disease    Other reaction(s): FLARES UP CROHNS      Has crohn's disease     Current Facility-Administered Medications   Medication Frequency    0.9%  NaCl infusion (for blood administration) Q24H PRN    0.9%  NaCl infusion (for blood administration) Q24H PRN    acetaminophen tablet 500 mg Q4H PRN    atorvastatin tablet 40 mg Daily    ceFAZolin injection 1 g Q24H    diphenhydrAMINE-zinc acetate 2-0.1% cream TID PRN    epoetin ludin-epbx injection 3,000 Units Every Mon, Wed, Fri    haloperidoL tablet 5 mg Q6H PRN    heparin (porcine) injection 1,000 Units PRN    heparin (porcine) injection 5,000 Units Q8H    melatonin tablet 6 mg Nightly    miconazole NITRATE 2 % top powder BID    mupirocin 2 % ointment Daily    oxyCODONE immediate release tablet 5 mg Q4H PRN    oxyCODONE immediate release tablet Tab 10 mg Q6H PRN    sevelamer carbonate tablet 800 mg TID WM    sodium chloride 0.9% flush 10 mL PRN    white petrolatum 41 % ointment PRN       Objective:     Vital Signs (Most Recent):  Temp: 97.6 °F (36.4 °C)  (01/11/25 1142)  Pulse: 68 (01/11/25 1142)  Resp: 18 (01/11/25 1142)  BP: 107/64 (01/11/25 1142)  SpO2: 97 % (01/11/25 1142) Vital Signs (24h Range):  Temp:  [97.5 °F (36.4 °C)-99.7 °F (37.6 °C)] 97.6 °F (36.4 °C)  Pulse:  [] 68  Resp:  [16-18] 18  SpO2:  [87 %-99 %] 97 %  BP: ()/(55-69) 107/64     Weight: 56 kg (123 lb 7.3 oz) (01/03/25 0429)  Body mass index is 19.93 kg/m².  Body surface area is 1.61 meters squared.    I/O last 3 completed shifts:  In: 1060 [P.O.:360; Blood:350; Other:350]  Out: 2200 [Other:2100; Stool:100]     Physical Exam  Vitals and nursing note reviewed.   Constitutional:       Appearance: He is ill-appearing.   HENT:      Head: Normocephalic and atraumatic.      Comments: Tunneled HD line in place  Cardiovascular:      Rate and Rhythm: Normal rate and regular rhythm.   Pulmonary:      Effort: Pulmonary effort is normal. No respiratory distress.      Breath sounds: Normal breath sounds.   Abdominal:      General: There is no distension.      Palpations: Abdomen is soft.   Musculoskeletal:         General: No deformity.      Right lower leg: Edema present.      Left lower leg: Edema present.   Skin:     General: Skin is warm and dry.      Coloration: Skin is pale.   Neurological:      General: No focal deficit present.      Mental Status: He is alert and oriented to person, place, and time. Mental status is at baseline.          Significant Labs:  CBC:   Recent Labs   Lab 01/11/25  0420   WBC 3.63*   RBC 2.93*   HGB 8.9*   HCT 28.1*   *   MCV 96   MCH 30.4   MCHC 31.7*     CMP:   Recent Labs   Lab 01/11/25  0227 01/11/25  1006   GLU 64* 105   CALCIUM 9.6 9.7   ALBUMIN 2.6* 2.5*   PROT 7.1  --     135*   K 5.8* 4.4   CO2 21* 25    102   BUN 31* 35*   CREATININE 3.7* 4.0*   ALKPHOS 278*  --    ALT <5*  --    AST 28  --    BILITOT 0.3  --         Assessment/Plan:     Renal/  ESRD on hemodialysis  After looking through his dialysis notes from yesterday, it does  appear as if he completed two back-to-back sessions and left dialysis yesterday at his dry weight (54.6 kg) and reports high volume stool output since his HD session. He reports that he is on 4L chronic oxygen use at home, and is currently on that dose in the ER, and reporting resolution of his dyspnea once he was placed back on his home oxygen. Chest xray appears unchanged from prior studies.       Outpatient HD Information:  -Dialysis modality: Hemodialysis  -Outpatient HD unit: [facility]  -Nephrologist: Dr. Strickland  -HD TX days: Tuesday/Thursday/Saturday  -Last HD TX prior to hospital admission: 12/18/2024  -Residual urine: Anuric   -EDW: 54.5 kg    Assessment:   -declined additional HD today.  Plan for next session Monday.   - Continue to monitor intake and output  - Please avoid gadolinium, fleets, phos-based laxatives, NSAIDs  - Dialysis thrice weekly unless more urgent indications arise. Will evaluate RRT requirements Daily.    Anemia of ESRD   Recent Labs   Lab 01/10/25  0546 01/10/25  1154 01/11/25  0420   WBC 3.51* 2.38* 3.63*   HGB 7.3* 7.9* 8.9*   HCT 24.3* 25.1* 28.1*   * 130* 134*       Lab Results   Component Value Date    FESATURATED 12 (L) 12/20/2024    FERRITIN 2,808 (H) 12/20/2024       - Goal in ESRD is Hgb of 10-11. EPO with HD. Hgb 8.9  -EPO with HD    Mineral Bone Disease in ESRD   Lab Results   Component Value Date    .8 (H) 12/20/2024    CALCIUM 9.7 01/11/2025    ALBUMIN 2.5 (L) 01/11/2025    CAION 1.22 12/25/2024    PHOS 4.7 (H) 01/11/2025       - F/U PO4, Mg, Calcium. And albumin levels daily.   - Renal diet with protein intake goal 1.5 g/kg/d with 1 L fluid restriction   - If patient has poor oral intake, recommend nephro nutritional shakes (ex Novasource)      Oncology  Anemia of chronic renal failure, stage 5  -EPO with dialysis         Michael Ann NP  Nephrology  [facility] - [facility] Medicine Tele[facility]

## 2025-01-11 NOTE — SUBJECTIVE & OBJECTIVE
Interval History:   Hyperkalemic this morning, specimen hemolyzed.  Repeat WNL.  HD completed yesterday with 1.1 net fluid removed.    He is sitting up in bed this morning, oxygenating well on 2L NC in NAD.  No distress on exam.    Hypervolemic, offered further HD for volume optimization but patient declined.      Review of patient's allergies indicates:   Allergen Reactions    Vancomycin analogues Anaphylaxis, Other (See Comments), Shortness Of Breath and Swelling     Light headed, see's spots      Aspirin Nausea And Vomiting and Other (See Comments)     Has crohn's disease    Other reaction(s): FLARES UP CROHNS      Has crohn's disease     Current Facility-Administered Medications   Medication Frequency    0.9%  NaCl infusion (for blood administration) Q24H PRN    0.9%  NaCl infusion (for blood administration) Q24H PRN    acetaminophen tablet 500 mg Q4H PRN    atorvastatin tablet 40 mg Daily    ceFAZolin injection 1 g Q24H    diphenhydrAMINE-zinc acetate 2-0.1% cream TID PRN    epoetin ludin-epbx injection 3,000 Units Every Mon, Wed, Fri    haloperidoL tablet 5 mg Q6H PRN    heparin (porcine) injection 1,000 Units PRN    heparin (porcine) injection 5,000 Units Q8H    melatonin tablet 6 mg Nightly    miconazole NITRATE 2 % top powder BID    mupirocin 2 % ointment Daily    oxyCODONE immediate release tablet 5 mg Q4H PRN    oxyCODONE immediate release tablet Tab 10 mg Q6H PRN    sevelamer carbonate tablet 800 mg TID WM    sodium chloride 0.9% flush 10 mL PRN    white petrolatum 41 % ointment PRN       Objective:     Vital Signs (Most Recent):  Temp: 97.6 °F (36.4 °C) (01/11/25 1142)  Pulse: 68 (01/11/25 1142)  Resp: 18 (01/11/25 1142)  BP: 107/64 (01/11/25 1142)  SpO2: 97 % (01/11/25 1142) Vital Signs (24h Range):  Temp:  [97.5 °F (36.4 °C)-99.7 °F (37.6 °C)] 97.6 °F (36.4 °C)  Pulse:  [] 68  Resp:  [16-18] 18  SpO2:  [87 %-99 %] 97 %  BP: ()/(55-69) 107/64     Weight: 56 kg (123 lb 7.3 oz) (01/03/25  0429)  Body mass index is 19.93 kg/m².  Body surface area is 1.61 meters squared.    I/O last 3 completed shifts:  In: 1060 [P.O.:360; Blood:350; Other:350]  Out: 2200 [Other:2100; Stool:100]     Physical Exam  Vitals and nursing note reviewed.   Constitutional:       Appearance: He is ill-appearing.   HENT:      Head: Normocephalic and atraumatic.      Comments: Tunneled HD line in place  Cardiovascular:      Rate and Rhythm: Normal rate and regular rhythm.   Pulmonary:      Effort: Pulmonary effort is normal. No respiratory distress.      Breath sounds: Normal breath sounds.   Abdominal:      General: There is no distension.      Palpations: Abdomen is soft.   Musculoskeletal:         General: No deformity.      Right lower leg: Edema present.      Left lower leg: Edema present.   Skin:     General: Skin is warm and dry.      Coloration: Skin is pale.   Neurological:      General: No focal deficit present.      Mental Status: He is alert and oriented to person, place, and time. Mental status is at baseline.          Significant Labs:  CBC:   Recent Labs   Lab 01/11/25  0420   WBC 3.63*   RBC 2.93*   HGB 8.9*   HCT 28.1*   *   MCV 96   MCH 30.4   MCHC 31.7*     CMP:   Recent Labs   Lab 01/11/25  0227 01/11/25  1006   GLU 64* 105   CALCIUM 9.6 9.7   ALBUMIN 2.6* 2.5*   PROT 7.1  --     135*   K 5.8* 4.4   CO2 21* 25    102   BUN 31* 35*   CREATININE 3.7* 4.0*   ALKPHOS 278*  --    ALT <5*  --    AST 28  --    BILITOT 0.3  --

## 2025-01-11 NOTE — ASSESSMENT & PLAN NOTE
Anemia is likely due to chronic disease due to ESRD. Most recent hemoglobin and hematocrit are listed below.  Recent Labs     01/12/25  1537 01/13/25  0448 01/14/25  0429   HGB 8.3* 8.4* 8.6*   HCT 26.8* 27.2* 28.3*       Plan  - Monitor serial CBC: Daily  - Transfuse PRBC if patient becomes hemodynamically unstable, symptomatic or H/H drops below 7/21.  - s/p 1 unit PRBCs this admit  - Patient's anemia is currently stable  1/10 Hb trended down to 6.8. FOBT. Transfusion with 1 unit of PRBC .    1/12 Hb 8.9. FOBT +.

## 2025-01-11 NOTE — PLAN OF CARE
AAOx4; POC reviewed & verbalizes understanding.  Admit DX: Septic Shock  Afebrile. No acute events on shift. No deficits noted  Epitaxis Left MD pearl aware  IV access & IVF: PIV, saline locked  BM: RUQ colostomy, soft, liquid stool noted  : oliguria on HD  Appetite: decreased appetite but drinks boost/ensure  Bed alarm set; fall precautions maintained.   Bed locked in lowest position, side rails up x2, call light within reach, environment clear. Encouraged pt to call nurse with any concerns.  Safety measures maintained

## 2025-01-12 LAB
ALBUMIN SERPL BCP-MCNC: 2.6 G/DL (ref 3.5–5.2)
ALBUMIN SERPL BCP-MCNC: 2.8 G/DL (ref 3.5–5.2)
ALBUMIN SERPL BCP-MCNC: 2.9 G/DL (ref 3.5–5.2)
ALP SERPL-CCNC: 280 U/L (ref 40–150)
ALP SERPL-CCNC: 291 U/L (ref 40–150)
ALT SERPL W/O P-5'-P-CCNC: <5 U/L (ref 10–44)
ALT SERPL W/O P-5'-P-CCNC: <5 U/L (ref 10–44)
ANION GAP SERPL CALC-SCNC: 11 MMOL/L (ref 8–16)
ANION GAP SERPL CALC-SCNC: 11 MMOL/L (ref 8–16)
ANION GAP SERPL CALC-SCNC: 15 MMOL/L (ref 8–16)
ANION GAP SERPL CALC-SCNC: 15 MMOL/L (ref 8–16)
ANION GAP SERPL CALC-SCNC: 9 MMOL/L (ref 8–16)
AST SERPL-CCNC: 20 U/L (ref 10–40)
AST SERPL-CCNC: 20 U/L (ref 10–40)
BASOPHILS # BLD AUTO: 0.04 K/UL (ref 0–0.2)
BASOPHILS # BLD AUTO: 0.04 K/UL (ref 0–0.2)
BASOPHILS # BLD AUTO: 0.05 K/UL (ref 0–0.2)
BASOPHILS NFR BLD: 1.2 % (ref 0–1.9)
BASOPHILS NFR BLD: 1.2 % (ref 0–1.9)
BASOPHILS NFR BLD: 1.5 % (ref 0–1.9)
BILIRUB SERPL-MCNC: 0.3 MG/DL (ref 0.1–1)
BILIRUB SERPL-MCNC: 0.3 MG/DL (ref 0.1–1)
BUN SERPL-MCNC: 44 MG/DL (ref 8–23)
BUN SERPL-MCNC: 46 MG/DL (ref 8–23)
BUN SERPL-MCNC: 51 MG/DL (ref 8–23)
BUN SERPL-MCNC: 52 MG/DL (ref 8–23)
BUN SERPL-MCNC: 55 MG/DL (ref 8–23)
CALCIUM SERPL-MCNC: 10.1 MG/DL (ref 8.7–10.5)
CALCIUM SERPL-MCNC: 10.3 MG/DL (ref 8.7–10.5)
CALCIUM SERPL-MCNC: 9.5 MG/DL (ref 8.7–10.5)
CALCIUM SERPL-MCNC: 9.5 MG/DL (ref 8.7–10.5)
CALCIUM SERPL-MCNC: 9.8 MG/DL (ref 8.7–10.5)
CHLORIDE SERPL-SCNC: 101 MMOL/L (ref 95–110)
CHLORIDE SERPL-SCNC: 102 MMOL/L (ref 95–110)
CHLORIDE SERPL-SCNC: 103 MMOL/L (ref 95–110)
CHLORIDE SERPL-SCNC: 104 MMOL/L (ref 95–110)
CHLORIDE SERPL-SCNC: 105 MMOL/L (ref 95–110)
CO2 SERPL-SCNC: 17 MMOL/L (ref 23–29)
CO2 SERPL-SCNC: 17 MMOL/L (ref 23–29)
CO2 SERPL-SCNC: 21 MMOL/L (ref 23–29)
CO2 SERPL-SCNC: 22 MMOL/L (ref 23–29)
CO2 SERPL-SCNC: 23 MMOL/L (ref 23–29)
CREAT SERPL-MCNC: 4.9 MG/DL (ref 0.5–1.4)
CREAT SERPL-MCNC: 5 MG/DL (ref 0.5–1.4)
CREAT SERPL-MCNC: 5 MG/DL (ref 0.5–1.4)
CREAT SERPL-MCNC: 5.2 MG/DL (ref 0.5–1.4)
CREAT SERPL-MCNC: 5.4 MG/DL (ref 0.5–1.4)
DIFFERENTIAL METHOD BLD: ABNORMAL
EOSINOPHIL # BLD AUTO: 0.2 K/UL (ref 0–0.5)
EOSINOPHIL NFR BLD: 5.8 % (ref 0–8)
EOSINOPHIL NFR BLD: 6.4 % (ref 0–8)
EOSINOPHIL NFR BLD: 6.5 % (ref 0–8)
ERYTHROCYTE [DISTWIDTH] IN BLOOD BY AUTOMATED COUNT: 18.6 % (ref 11.5–14.5)
ERYTHROCYTE [DISTWIDTH] IN BLOOD BY AUTOMATED COUNT: 18.7 % (ref 11.5–14.5)
ERYTHROCYTE [DISTWIDTH] IN BLOOD BY AUTOMATED COUNT: 18.8 % (ref 11.5–14.5)
EST. GFR  (NO RACE VARIABLE): 10.8 ML/MIN/1.73 M^2
EST. GFR  (NO RACE VARIABLE): 11.3 ML/MIN/1.73 M^2
EST. GFR  (NO RACE VARIABLE): 11.8 ML/MIN/1.73 M^2
EST. GFR  (NO RACE VARIABLE): 11.8 ML/MIN/1.73 M^2
EST. GFR  (NO RACE VARIABLE): 12.1 ML/MIN/1.73 M^2
GLUCOSE SERPL-MCNC: 103 MG/DL (ref 70–110)
GLUCOSE SERPL-MCNC: 118 MG/DL (ref 70–110)
GLUCOSE SERPL-MCNC: 132 MG/DL (ref 70–110)
GLUCOSE SERPL-MCNC: 86 MG/DL (ref 70–110)
GLUCOSE SERPL-MCNC: 91 MG/DL (ref 70–110)
HCT VFR BLD AUTO: 26.8 % (ref 40–54)
HCT VFR BLD AUTO: 29.1 % (ref 40–54)
HCT VFR BLD AUTO: 29.2 % (ref 40–54)
HGB BLD-MCNC: 8.3 G/DL (ref 14–18)
HGB BLD-MCNC: 8.9 G/DL (ref 14–18)
HGB BLD-MCNC: 9 G/DL (ref 14–18)
IMM GRANULOCYTES # BLD AUTO: 0.01 K/UL (ref 0–0.04)
IMM GRANULOCYTES # BLD AUTO: 0.01 K/UL (ref 0–0.04)
IMM GRANULOCYTES # BLD AUTO: 0.02 K/UL (ref 0–0.04)
IMM GRANULOCYTES NFR BLD AUTO: 0.3 % (ref 0–0.5)
IMM GRANULOCYTES NFR BLD AUTO: 0.3 % (ref 0–0.5)
IMM GRANULOCYTES NFR BLD AUTO: 0.6 % (ref 0–0.5)
LYMPHOCYTES # BLD AUTO: 0.3 K/UL (ref 1–4.8)
LYMPHOCYTES # BLD AUTO: 0.4 K/UL (ref 1–4.8)
LYMPHOCYTES # BLD AUTO: 0.4 K/UL (ref 1–4.8)
LYMPHOCYTES NFR BLD: 10.7 % (ref 18–48)
LYMPHOCYTES NFR BLD: 11.3 % (ref 18–48)
LYMPHOCYTES NFR BLD: 8.9 % (ref 18–48)
MAGNESIUM SERPL-MCNC: 2.1 MG/DL (ref 1.6–2.6)
MAGNESIUM SERPL-MCNC: 2.1 MG/DL (ref 1.6–2.6)
MCH RBC QN AUTO: 29.1 PG (ref 27–31)
MCH RBC QN AUTO: 29.5 PG (ref 27–31)
MCH RBC QN AUTO: 29.6 PG (ref 27–31)
MCHC RBC AUTO-ENTMCNC: 30.5 G/DL (ref 32–36)
MCHC RBC AUTO-ENTMCNC: 30.9 G/DL (ref 32–36)
MCHC RBC AUTO-ENTMCNC: 31 G/DL (ref 32–36)
MCV RBC AUTO: 95 FL (ref 82–98)
MCV RBC AUTO: 95 FL (ref 82–98)
MCV RBC AUTO: 96 FL (ref 82–98)
MONOCYTES # BLD AUTO: 0.4 K/UL (ref 0.3–1)
MONOCYTES # BLD AUTO: 0.5 K/UL (ref 0.3–1)
MONOCYTES # BLD AUTO: 0.5 K/UL (ref 0.3–1)
MONOCYTES NFR BLD: 12.6 % (ref 4–15)
MONOCYTES NFR BLD: 13.7 % (ref 4–15)
MONOCYTES NFR BLD: 13.8 % (ref 4–15)
NEUTROPHILS # BLD AUTO: 2.2 K/UL (ref 1.8–7.7)
NEUTROPHILS # BLD AUTO: 2.2 K/UL (ref 1.8–7.7)
NEUTROPHILS # BLD AUTO: 2.3 K/UL (ref 1.8–7.7)
NEUTROPHILS NFR BLD: 67.1 % (ref 38–73)
NEUTROPHILS NFR BLD: 67.6 % (ref 38–73)
NEUTROPHILS NFR BLD: 70.5 % (ref 38–73)
NRBC BLD-RTO: 0 /100 WBC
PHOSPHATE SERPL-MCNC: 5.4 MG/DL (ref 2.7–4.5)
PHOSPHATE SERPL-MCNC: 5.5 MG/DL (ref 2.7–4.5)
PLATELET # BLD AUTO: 136 K/UL (ref 150–450)
PLATELET # BLD AUTO: 144 K/UL (ref 150–450)
PLATELET # BLD AUTO: 148 K/UL (ref 150–450)
PMV BLD AUTO: 11.2 FL (ref 9.2–12.9)
PMV BLD AUTO: 11.9 FL (ref 9.2–12.9)
PMV BLD AUTO: 12 FL (ref 9.2–12.9)
POCT GLUCOSE: 227 MG/DL (ref 70–110)
POTASSIUM SERPL-SCNC: 5 MMOL/L (ref 3.5–5.1)
POTASSIUM SERPL-SCNC: 5 MMOL/L (ref 3.5–5.1)
POTASSIUM SERPL-SCNC: 5.1 MMOL/L (ref 3.5–5.1)
POTASSIUM SERPL-SCNC: 5.2 MMOL/L (ref 3.5–5.1)
POTASSIUM SERPL-SCNC: 5.5 MMOL/L (ref 3.5–5.1)
PROT SERPL-MCNC: 7.2 G/DL (ref 6–8.4)
PROT SERPL-MCNC: 7.3 G/DL (ref 6–8.4)
RBC # BLD AUTO: 2.8 M/UL (ref 4.6–6.2)
RBC # BLD AUTO: 3.05 M/UL (ref 4.6–6.2)
RBC # BLD AUTO: 3.06 M/UL (ref 4.6–6.2)
SODIUM SERPL-SCNC: 133 MMOL/L (ref 136–145)
SODIUM SERPL-SCNC: 134 MMOL/L (ref 136–145)
SODIUM SERPL-SCNC: 136 MMOL/L (ref 136–145)
SODIUM SERPL-SCNC: 136 MMOL/L (ref 136–145)
SODIUM SERPL-SCNC: 137 MMOL/L (ref 136–145)
WBC # BLD AUTO: 3.25 K/UL (ref 3.9–12.7)
WBC # BLD AUTO: 3.27 K/UL (ref 3.9–12.7)
WBC # BLD AUTO: 3.28 K/UL (ref 3.9–12.7)

## 2025-01-12 PROCEDURE — 94761 N-INVAS EAR/PLS OXIMETRY MLT: CPT

## 2025-01-12 PROCEDURE — 83735 ASSAY OF MAGNESIUM: CPT | Mod: 91 | Performed by: HOSPITALIST

## 2025-01-12 PROCEDURE — 21400001 HC TELEMETRY ROOM

## 2025-01-12 PROCEDURE — 25000003 PHARM REV CODE 250: Performed by: HOSPITALIST

## 2025-01-12 PROCEDURE — 63600175 PHARM REV CODE 636 W HCPCS: Performed by: STUDENT IN AN ORGANIZED HEALTH CARE EDUCATION/TRAINING PROGRAM

## 2025-01-12 PROCEDURE — 80048 BASIC METABOLIC PNL TOTAL CA: CPT | Mod: 91,XB | Performed by: HOSPITALIST

## 2025-01-12 PROCEDURE — 99900035 HC TECH TIME PER 15 MIN (STAT)

## 2025-01-12 PROCEDURE — 25000242 PHARM REV CODE 250 ALT 637 W/ HCPCS: Performed by: HOSPITALIST

## 2025-01-12 PROCEDURE — 27000221 HC OXYGEN, UP TO 24 HOURS

## 2025-01-12 PROCEDURE — 85025 COMPLETE CBC W/AUTO DIFF WBC: CPT | Mod: 91 | Performed by: HOSPITALIST

## 2025-01-12 PROCEDURE — 80053 COMPREHEN METABOLIC PANEL: CPT | Mod: 91 | Performed by: HOSPITALIST

## 2025-01-12 PROCEDURE — 94640 AIRWAY INHALATION TREATMENT: CPT

## 2025-01-12 PROCEDURE — 84100 ASSAY OF PHOSPHORUS: CPT | Performed by: HOSPITALIST

## 2025-01-12 PROCEDURE — 80069 RENAL FUNCTION PANEL: CPT | Performed by: HOSPITALIST

## 2025-01-12 PROCEDURE — 63600175 PHARM REV CODE 636 W HCPCS: Performed by: INTERNAL MEDICINE

## 2025-01-12 PROCEDURE — 25000003 PHARM REV CODE 250: Performed by: INTERNAL MEDICINE

## 2025-01-12 PROCEDURE — 36415 COLL VENOUS BLD VENIPUNCTURE: CPT | Mod: XB | Performed by: HOSPITALIST

## 2025-01-12 PROCEDURE — 25000003 PHARM REV CODE 250

## 2025-01-12 PROCEDURE — 63600175 PHARM REV CODE 636 W HCPCS: Performed by: HOSPITALIST

## 2025-01-12 PROCEDURE — 97116 GAIT TRAINING THERAPY: CPT | Mod: CQ

## 2025-01-12 RX ORDER — ALBUTEROL SULFATE 2.5 MG/.5ML
10 SOLUTION RESPIRATORY (INHALATION) ONCE
Status: COMPLETED | OUTPATIENT
Start: 2025-01-12 | End: 2025-01-12

## 2025-01-12 RX ORDER — MIDODRINE HYDROCHLORIDE 10 MG/1
10 TABLET ORAL
Qty: 30 TABLET | Refills: 2 | Status: SHIPPED | OUTPATIENT
Start: 2025-01-12 | End: 2026-01-12

## 2025-01-12 RX ORDER — CEFAZOLIN SODIUM 1 G/3ML
INJECTION, POWDER, FOR SOLUTION INTRAMUSCULAR; INTRAVENOUS
Start: 2025-01-12 | End: 2025-01-12 | Stop reason: HOSPADM

## 2025-01-12 RX ADMIN — SEVELAMER CARBONATE 800 MG: 800 TABLET, FILM COATED ORAL at 08:01

## 2025-01-12 RX ADMIN — INSULIN HUMAN 5.6 UNITS: 100 INJECTION, SOLUTION PARENTERAL at 05:01

## 2025-01-12 RX ADMIN — ATORVASTATIN CALCIUM 40 MG: 40 TABLET, FILM COATED ORAL at 08:01

## 2025-01-12 RX ADMIN — DEXTROSE MONOHYDRATE 50 G: 25 INJECTION, SOLUTION INTRAVENOUS at 05:01

## 2025-01-12 RX ADMIN — HEPARIN SODIUM 5000 UNITS: 5000 INJECTION INTRAVENOUS; SUBCUTANEOUS at 02:01

## 2025-01-12 RX ADMIN — OXYCODONE HYDROCHLORIDE 10 MG: 10 TABLET ORAL at 02:01

## 2025-01-12 RX ADMIN — MICONAZOLE NITRATE 2 % TOPICAL POWDER: at 09:01

## 2025-01-12 RX ADMIN — MICONAZOLE NITRATE 2 % TOPICAL POWDER: at 10:01

## 2025-01-12 RX ADMIN — SEVELAMER CARBONATE 800 MG: 800 TABLET, FILM COATED ORAL at 05:01

## 2025-01-12 RX ADMIN — MUPIROCIN: 20 OINTMENT TOPICAL at 09:01

## 2025-01-12 RX ADMIN — ALBUTEROL SULFATE 10 MG: 2.5 SOLUTION RESPIRATORY (INHALATION) at 04:01

## 2025-01-12 RX ADMIN — HEPARIN SODIUM 5000 UNITS: 5000 INJECTION INTRAVENOUS; SUBCUTANEOUS at 05:01

## 2025-01-12 RX ADMIN — Medication 6 MG: at 09:01

## 2025-01-12 RX ADMIN — SODIUM ZIRCONIUM CYCLOSILICATE 5 G: 5 POWDER, FOR SUSPENSION ORAL at 11:01

## 2025-01-12 RX ADMIN — SODIUM ZIRCONIUM CYCLOSILICATE 10 G: 5 POWDER, FOR SUSPENSION ORAL at 10:01

## 2025-01-12 RX ADMIN — OXYCODONE 5 MG: 5 TABLET ORAL at 10:01

## 2025-01-12 RX ADMIN — SEVELAMER CARBONATE 800 MG: 800 TABLET, FILM COATED ORAL at 11:01

## 2025-01-12 RX ADMIN — CEFAZOLIN 1 G: 1 INJECTION, POWDER, FOR SOLUTION INTRAMUSCULAR; INTRAVENOUS at 09:01

## 2025-01-12 NOTE — PLAN OF CARE
AAOx4; POC reviewed & verbalizes understanding.  Admit DX: Septic Shock  Afebrile. No acute events on shift. No deficits noted  IV access & IVF: PIV saline locked    BM: stool noted in RUQ colostomy, emptied  Bright red blood noted from rectum. MD aware. Colostomy changed  : oliguria on HD, pt self catherized hisself today. MD aware...educated on importance to have staff help w/ his needs & to have his bladder scanned prior to self-cath  Appetite: poor but adequate.  K+ shift: tolerated well  Bed alarm set; fall precautions maintained.   Bed locked in lowest position, side rails up x2, call light within reach, environment clear. Encouraged pt to call nurse with any concerns.  Safety measures maintained

## 2025-01-12 NOTE — PROGRESS NOTES
Jason Long - Internal Medicine [facility] Medicine  Progress Note    Patient Name: Flakito Mcginnis  MRN: [id number]  Patient Class: IP- Inpatient   Admission Date: 12/19/2024  Length of Stay: 24 days  Attending Physician: Keny Estrada MD  Primary Care Provider: Jordin Robbins MD        Subjective     Principal Problem:Septic shock        HPI:  By Alicia Galloway NP    Mr. Flakito Mcginnis is a 69 y.o. man w/ HFrEF (30% 8/2024 from 20-25% 6/2024), NICM, MR, ESRD on HD, chronic respiratory failure on home 3L NC, Crohn's s/p colostomy, h/o R nephrectomy. [place] [facility] due to hypotension and ongoing shortness of breath. He usually receives his dialysis on T, R, S and went on Wednesday for an extra session for volume removal. He arrived on Thursday for his usually scheduled dialysis session and was directed to the ED due to hypotension. On arrival in the ED his blood pressure was 78/51. He was found to have a BNP >4900 and CXR concerning for volume overload. High sensitivity troponin 923. Critical care medicine was consulted for hypotension and admitted to MICU.     Overview/Hospital Course:  1/9 Mr. 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 ID was consulted and recommending 6 weeks of IV Cefazolin after HD, end date 2/2/25. CTS evaluated and recommending medical management at this time due to multiple co-morbidities. Tunneled catheter was removed 12/22. Repeat cultures from 12/23 with NGTD. Temporary RIJ trialysis line placed 12/24.  Course further complicated by acute hypoxemic respiratory failure likely volume overload requiring HFNC.  Oxygenation improved with volume removal with dialysis and he was weaned down to NC.  He was SD to  on 12/29.  IR was consulted for new HD line placement which was  placed on 12/31.  He was going to be discharged home after his new line placement, but sister refuses to take him home and says he needs group home NH placement. K of 5.3. Lokelma ordered. renal panel q 4h. 1L/24h fluid restriction CM assisting Pending financials submission to [facility]  1/10 Hb trended down to 6.8. FOBT. Transfusion with 1 unit of PRBC . K improved to 5. HD today    1/11 persistent perirpheral edema. UF of 1L yesterday and continue UF as BP permits. nephrology f/u for HD today - refused HD today. Plan for next session Monday. mild epistaxis left  nostril - improved with pressure. Nasal saline  ordered  1/12 Hb 8.9. FOBT +. K of 5.2. refused telemetry.  monitor , reported self limiting bloody mucous discharge from rectum. no intervention per CRS. monitor Hb                       Review of Systems:   Pain scale:  Constitutional:  fever,  chills, headache, vision loss, hearing loss, weight loss, Generalized weakness, falls, loss of smell, loss of taste, poor appetite,  sore throat, epistaxis- resolved   Respiratory: cough, shortness of breath.   Cardiovascular: chest pain, dizziness, palpitations, orthopnea, swelling of feet, syncope  Gastrointestinal: nausea, vomiting, abdominal pain, diarrhea, black stool,  blood in stool, change in bowel habits, constipation  Genitourinary: hematuria, dysuria, urgency, frequency  Integument/Breast: rash,  pruritis  Hematologic/Lymphatic: easy bruising, lymphadenopathy  Musculoskeletal: arthralgias , myalgias, back pain, neck pain, knee pain  Neurological: confusion, seizures, tremors, slurred speech  Behavioral/Psych:  depression, anxiety, auditory or visual hallucinations     OBJECTIVE:     Physical Exam:  Body mass index is 19.93 kg/m².    Constitutional: Appears thin built    Head: Normocephalic and atraumatic.   Neck: Normal range of motion. Neck supple. right chest HD catheter   Cardiovascular: Normal heart rate.  Regular heart rhythm.  Pulmonary/Chest:  "Effort normal.   Abdominal: No distension.  No tenderness. + colostomy   Musculoskeletal: Normal range of motion.++  edema.   Neurological: Alert and oriented to person, place, and time.   Skin: Skin is warm and dry.   Psychiatric: Normal mood and affect. Behavior is normal.       Vital Signs  Temp: 97.6 °F (36.4 °C) (01/12/25 1644)  Pulse: 93 (01/12/25 1655)  Resp: 18 (01/12/25 1655)  BP: 112/64 (01/12/25 1644)  SpO2: 96 % (01/12/25 1655)     24 Hour VS Range    Temp:  [97.6 °F (36.4 °C)-98.4 °F (36.9 °C)]   Pulse:  []   Resp:  [16-18]   BP: (100-122)/(56-69)   SpO2:  [96 %-100 %]     Intake/Output Summary (Last 24 hours) at 1/12/2025 1818  Last data filed at 1/12/2025 0800  Gross per 24 hour   Intake 237 ml   Output 100 ml   Net 137 ml           I/O This Shift:  I/O this shift:  In: 237 [P.O.:237]  Out: -     Wt Readings from Last 3 Encounters:   01/03/25 56 kg (123 lb 7.3 oz)   12/11/24 59.9 kg (132 lb)   10/19/24 59.9 kg (132 lb)       I have personally reviewed the vitals and recorded Intake/Output     Laboratory/Diagnostic Data:    CBC/Anemia Labs: Coags:    Recent Labs   Lab 01/10/25  1846 01/11/25  0420 01/12/25  0833 01/12/25  1537   WBC  --  3.63* 3.27*  3.28* 3.25*   HGB  --  8.9* 9.0*  8.9* 8.3*   HCT  --  28.1* 29.1*  29.2* 26.8*   PLT  --  134* 148*  144* 136*   MCV  --  96 95  95 96   RDW  --  19.2* 18.8*  18.7* 18.6*   OCCULTBLOOD Positive*  --   --   --     No results for input(s): "PT", "INR", "APTT" in the last 168 hours.     Chemistries: ABG:   Recent Labs   Lab 01/10/25  0258 01/10/25  0827 01/11/25  0227 01/11/25  1006 01/11/25  1235 01/12/25  0833 01/12/25  1326 01/12/25  1537   *  133*  133*  133*   < > 137 135*   < > 134*  136 136 133*   K 5.1  5.1  5.1  5.1  5.1   < > 5.8* 4.4   < > 5.1  5.2* 5.5* 5.0  5.0     105  105  105   < > 105 102   < > 102  103 104 101   CO2 18*  18*  18*  18*   < > 21* 25   < > 23  22* 17* 21*   BUN 44*  44*  44*  44*   " "< > 31* 35*   < > 44*  46* 52* 51*   CREATININE 4.8*  4.8*  4.8*  4.8*   < > 3.7* 4.0*   < > 5.0*  4.9* 5.2* 5.0*   CALCIUM 9.5  9.5  9.5  9.5   < > 9.6 9.7   < > 10.3  10.1 9.8 9.5   PROT 6.4  6.4  --  7.1  --   --  7.3  7.2  --   --    BILITOT 0.2  0.2  --  0.3  --   --  0.3  0.3  --   --    ALKPHOS 288*  288*  --  278*  --   --  291*  280*  --   --    ALT <5*  <5*  --  <5*  --   --  <5*  <5*  --   --    AST 18  18  --  28  --   --  20  20  --   --    MG 2.0  2.0  --  2.0  --   --  2.1  2.1  --   --    PHOS 5.2*  5.2*  5.2*   < > 4.6* 4.7*  --  5.4* 5.5*  --     < > = values in this interval not displayed.    No results for input(s): "PH", "PCO2", "PO2", "HCO3", "POCSATURATED", "BE" in the last 168 hours.     POCT Glucose: HbA1c:    Recent Labs   Lab 01/09/25  2045 01/10/25  2022 01/11/25  2007   POCTGLUCOSE 114* 109 104    Hemoglobin A1C   Date Value Ref Range Status   12/18/2023 4.7 4.0 - 5.6 % Final     Comment:     ADA Screening Guidelines:  5.7-6.4%  Consistent with prediabetes  >or=6.5%  Consistent with diabetes    High levels of fetal hemoglobin interfere with the HbA1C  assay. Heterozygous hemoglobin variants (HbS, HgC, etc)do  not significantly interfere with this assay.   However, presence of multiple variants may affect accuracy.          Cardiac Enzymes: Ejection Fractions:    No results for input(s): "CPK", "CPKMB", "MB", "TROPONINI" in the last 72 hours. EF   Date Value Ref Range Status   12/20/2024 20 % Final          No results for input(s): "COLORU", "APPEARANCEUA", "PHUR", "SPECGRAV", "PROTEINUA", "GLUCUA", "KETONESU", "BILIRUBINUA", "OCCULTUA", "NITRITE", "UROBILINOGEN", "LEUKOCYTESUR", "RBCUA", "WBCUA", "BACTERIA", "SQUAMEPITHEL", "HYALINECASTS" in the last 48 hours.    Invalid input(s): "WRIGHTSUR"    Lactate (Lactic Acid) (mmol/L)   Date Value   12/19/2024 2.4 (H)     BNP (pg/mL)   Date Value   12/19/2024 >4,900 (H)     No results found for: "CRP", "SEDRATE"  No results " "found for: "DDIMER"  Ferritin (ng/mL)   Date Value   12/20/2024 2,808 (H)     No results found for: "LDH"  CPK (U/L)   Date Value   12/21/2024 <7 (L)     CK (U/L)   Date Value   08/22/2024 139     No results found for this or any previous visit.  SARS-CoV2 (COVID-19) Qualitative PCR (no units)   Date Value   06/12/2020 Not detected     SARS-CoV-2 RNA, Amplification, Qual (no units)   Date Value   12/19/2024 Negative       Microbiology labs for the last week  Microbiology Results (last 7 days)       ** No results found for the last 168 hours. **            Reviewed and noted in plan where applicable- Please see chart for full lab data.    Lines/Drains:       Hemodialysis Catheter 12/31/24 0818 right subclavian (Active)   Line Necessity Review CRRT/HD 01/09/25 2022   Verification by X-ray Yes 01/07/25 2049   Site Assessment No drainage;No redness;No swelling;No warmth 01/09/25 2022   Line Securement Device Secured with sutureless device 01/09/25 2022   Dressing Type CHG impregnated dressing/sponge 01/09/25 2022   Dressing Status Clean;Dry;Intact 01/09/25 2022   Dressing Intervention Integrity maintained 01/09/25 2022   Date on Dressing 01/06/25 01/08/25 2046   Dressing Due to be Changed 01/13/25 01/08/25 2046   Venous Patency/Care flushed w/o difficulty;heparin locked;intermittent infusion cap applied 01/08/25 1847   Arterial Patency/Care flushed w/o difficulty;heparin locked;intermittent infusion cap applied 01/08/25 1847   Waveform Not being transduced 01/02/25 0838   Number of days: 9            Peripheral IV - Single Lumen 01/09/25 1622 20 G Posterior;Right Forearm (Active)   Site Assessment Clean;Dry;Intact;No redness;No swelling 01/09/25 2022   Line Securement Device Secured with sutureless device 01/09/25 2022   Extremity Assessment Distal to IV No abnormal discoloration;No redness;No swelling;No warmth 01/09/25 2022   Line Status Flushed;Saline locked 01/09/25 2022   Dressing Status Clean;Dry;Intact 01/09/25 " 2022   Dressing Intervention Integrity maintained 01/09/25 2022   Dressing Change Due 01/13/25 01/09/25 1622   Site Change Due 01/13/25 01/09/25 2022   Reason Not Rotated Not due 01/09/25 2022   Number of days: 0            Colostomy 12/19/24 2225 RLQ (Active)   Stomal Appliance 1 piece;No Leakage;Dry;Intact 01/09/25 2022   Stoma Appearance rosebud appearance 01/09/25 2022   Stoma Size (in) 26 01/02/25 0838   Site Assessment Clean;Intact 01/09/25 2022   Peristomal Assessment Clean;Intact 01/09/25 0751   Accessories/Skin Care barrier substance over peristomal skin;cleansed w/ water;skin barrier paste 01/02/25 0838   Stoma Function stool 01/09/25 0751   Treatment Placement checked 01/06/25 0800   Tolerance no signs/symptoms of discomfort 01/06/25 2016   Output (mL) 100 mL 01/10/25 0612   Number of days: 21       Imaging  ECG Results              Repeat EKG 12-lead (Final result)        Collection Time Result Time QRS Duration OHS QTC Calculation    12/19/24 16:03:39 12/20/24 09:16:41 112 512                     Final result by Interface, Lab In [facility] (12/20/24 09:16:46)                   Narrative:    Test Reason : R06.00,    Vent. Rate :  96 BPM     Atrial Rate :  96 BPM     P-R Int : 232 ms          QRS Dur : 112 ms      QT Int : 406 ms       P-R-T Axes :  52 -47 203 degrees    QTcB Int : 512 ms    Sinus rhythm with 1st degree A-V block  Left axis deviation  Anterior infarct (cited on or before 19-Dec-2024)  ST and T wave abnormality, consider lateral ischemia  Prolonged QT  Abnormal ECG  When compared with ECG of 19-Dec-2024 14:29,  No significant change was found  Confirmed by Dominick Grant ([id number]) on 12/20/2024 9:16:39 AM    Referred By: AAAREFERRAL SELF           Confirmed By: Dominick Grant                                     EKG 12-lead (Final result)        Collection Time Result Time QRS Duration OHS QTC Calculation    12/19/24 14:29:52 12/19/24 14:32:53 114 560                     Final result by  Interface, Lab In [facility] (12/19/24 14:32:59)                   Narrative:    Test Reason : Z13.6,    Vent. Rate : 102 BPM     Atrial Rate : 102 BPM     P-R Int : 226 ms          QRS Dur : 114 ms      QT Int : 430 ms       P-R-T Axes :  39 -55 -85 degrees    QTcB Int : 560 ms    Sinus tachycardia with 1st degree A-V block  Left anterior fascicular block  Abnormal R wave progression in the precordial leads - Possible Anterior  infarct ,age undetermined  ST and T wave abnormality, consider lateral ischemia  Prolonged QT  Abnormal ECG  No previous ECGs available  Confirmed by Franck Aguilar ([id number]) on 12/19/2024 2:32:50 PM    Referred By: AAAREFERRAL SELF           Confirmed By: Franck Aguilar                                    Results for orders placed during the hospital encounter of 12/19/24    Echo    Interpretation Summary    There is a 10 by 4 mm large mobile echogenic mass present in the LVOT suggestive of vegetation - blood cultures could be done if clinically indicated.    Left Ventricle: The left ventricle is mildly dilated. Mildly increased ventricular mass. Normal wall thickness. There is mild eccentric hypertrophy. Severe global hypokinesis present. Septal motion is abnormal. There is severely reduced systolic function with a visually estimated ejection fraction of 20 - 25%. Ejection fraction is approximately 20%. Quantitated ejection fraction is 25%. There is diastolic dysfunction.    Right Ventricle: Mild right ventricular enlargement. Wall thickness is normal. Right ventricle wall motion has global hypokinesis. Systolic function is mild-moderately reduced.    Left Atrium: Left atrium is severely dilated.    Right Atrium: Right atrium is moderately dilated.    Aortic Valve: There is moderate aortic valve sclerosis. Moderately restricted motion. There is a 10 by 4 mm large mobile echogenic mass present in the LVOT suggestive of vegetation - blood cultures could be done if clinically indicated. There is moderate  stenosis. Aortic valve area by VTI is 1.2 cm2. Aortic valve peak velocity is 2.6 m/s. Mean gradient is 16.5 mmHg. The dimensionless index is 0.37. There is mild to moderate aortic regurgitation.    Mitral Valve: There is moderate bileaflet sclerosis. There is moderate mitral annular calcification present. There is mild regurgitation.    Tricuspid Valve: There is moderate to  moderate-severe regurgitation.    Pulmonary Artery: There is mild pulmonary hypertension. The estimated pulmonary artery systolic pressure is 41 mmHg.    IVC/SVC: Intermediate venous pressure at 8 mmHg.      X-Ray Cervical Spine 2 or 3 Views  Narrative: EXAMINATION:  XR CERVICAL SPINE 2 OR 3 VIEWS    CLINICAL HISTORY:  s/p fall;    TECHNIQUE:  AP, lateral and open mouth views of the cervical spine were performed.    COMPARISON:  August 2023    FINDINGS:  Bones are markedly demineralized limiting evaluation.  Also the patient's shoulders obscure mole see entire cervical spine.  Postoperative change base of neck likely about the thyroid.  Partially visualized large-bore right vascular catheter.  Remote left rib fractures.  Impression: Nondiagnostic study due to marked osteopenia and obscuration of the cervical spine by the shoulder.  Further evaluation to exclude cervical spine trauma will be needed.    This report was flagged in Epic as abnormal.    Electronically signed by: Ketan Springer MD  Date:    01/05/2025  Time:    12:45  X-Ray Shoulder Trauma 3 view Left  Narrative: EXAMINATION:  XR SHOULDER TRAUMA 3 VIEW LEFT    CLINICAL HISTORY:  fall on shoulder, concerned for dislocation, hard to assess due to posture;    TECHNIQUE:  Three views of the left shoulder were performed.    COMPARISON  December 19, 2024    FINDINGS:  Probable remote fracture about superolateral aspect of the right humeral head.  Calcific densities/cortical thickening again noted.  Narrowing of the subacromial space suggest chronic rotator cuff tear.  Bones are markedly  demineralized.  Numeral head difficult to visualize on axillary Y-view appears similar in position compared to prior.  Unfortunately subluxation is not excluded.  Left rib deformities again noted.  Impression: Significant bony demineralization limits evaluation.  Probable remote fracture about the superolateral aspect of the humeral head.  Suspected anterior subluxation of the humerus with respect to the glenoid though not well visualized on the axillary Y-view.    This report was flagged in Epic as abnormal.    Electronically signed by: Ketan Springer MD  Date:    01/05/2025  Time:    12:43      Labs, Imaging, EKG and Diagnostic results from 1/12/2025 were reviewed.    Medications:  Medication list was reviewed and changes noted under Assessment/Plan.  No current facility-administered medications on file prior to encounter.     Current Outpatient Medications on File Prior to Encounter   Medication Sig Dispense Refill    atorvastatin (LIPITOR) 40 MG tablet Take 40 mg by mouth once daily.      sevelamer carbonate (RENVELA) 800 mg Tab Take 800 mg by mouth 3 (three) times daily with meals.       Scheduled Medications:  Current Facility-Administered Medications   Medication Dose Route Frequency    atorvastatin  40 mg Oral Daily    ceFAZolin (Ancef) IV (PEDS and ADULTS)  1 g Intravenous Q24H    epoetin ludin-ebpx (RETACRIT) injection  3,000 Units Intravenous Every Mon, Wed, Fri    heparin (porcine)  5,000 Units Subcutaneous Q8H    melatonin  6 mg Oral Nightly    miconazole NITRATE 2 %   Topical (Top) BID    mupirocin   Nasal Daily    sevelamer carbonate  800 mg Oral TID WM    sodium zirconium cyclosilicate  10 g Oral TID     PRN:   Current Facility-Administered Medications:     0.9%  NaCl infusion (for blood administration), , Intravenous, Q24H PRN    0.9%  NaCl infusion (for blood administration), , Intravenous, Q24H PRN    acetaminophen, 500 mg, Oral, Q4H PRN    [COMPLETED] dextrose 50%, 50 g, Intravenous, Once **AND**  dextrose 50%, 25 g, Intravenous, PRN **AND** [COMPLETED] insulin regular, 0.1 Units/kg, Intravenous, Once    diphenhydrAMINE-zinc acetate 2-0.1%, , Topical (Top), TID PRN    haloperidoL, 5 mg, Oral, Q6H PRN    heparin (porcine), 1,000 Units, Intra-Catheter, PRN    midodrine, 10 mg, Oral, PRN    oxyCODONE, 5 mg, Oral, Q4H PRN    oxyCODONE, 10 mg, Oral, Q6H PRN    sodium chloride, 2 spray, Each Nostril, Q6H PRN    sodium chloride 0.9%, 10 mL, Intravenous, PRN    white petrolatum, , Topical (Top), PRN  Infusions:   Estimated Creatinine Clearance: 11 mL/min (A) (based on SCr of 5 mg/dL (H)).             Assessment and Plan     * Septic shock  Admitted to MICU 12/19 for septic shock 2/2 Staph capitis bacteremia and endocarditis from his tunneled HD line  Weaned off vasopressors in the ICU  2D echo with mass on the aortic valve suggestive of vegetation  Tunneled HD line removed 12/22  Temporary trialysis placed 12/24  ID consulted:  Suggest 6 weeks of IV Ancef with HD through 2/2/25  CTS consulted:  Suggest IV abx, no surgical intervention  IR placed new tunneled line 12/31 1/9 s/p ID eval - Septic shock on admission secondary to endocarditis in LVOT with associated S capitis bacteremia.  Had HD ([facility]) line removal on 12/22. Repeat blood cultures 12/23 are NGTD. Shock resolved. Large mobile vegetation on TTE. Transitioned from daptomycin to cefazolin based on susceptibilities. Evaluated by CTS and deemed a non surgical candidate due to risk/comorbidities.Continue cefazolin x 6 weeks of therapy from first negative blood culture. (End date: 2/2/25). s/p  IR line placement 12/31.  Waiting on new NH placement.  Pending financials to [facility]       Chronic left shoulder pain  Patient states that he has been having shoulder pain for the past couple of weeks associated with a fall some time back. Was not able to assess due to being in the hospital and is worried that it is dislocated.     orthopedic eval 1/7 - old greater  tuberosity fracture of the left humerus.  Some mild subluxation on the scapular Y-view.  In his has some tenderness in that area.  This has been ongoing since August.  He has multiple comorbid medical conditions that do not make him a good candidate for surgery.  The shoulder does not appear to be fully dislocated but does have some subluxation.  Continue conservative treatment given his overall medical status.     Xray L shoulder with remote fracture about the superolateral aspect of the humeral head. Suspected anterior subluxation of the humerus with respect to the glenoid    rec WBAT/ROMAT LUE and PT/OT    ACP (advance care planning)  DNR noted      Debility  Patient with Acute debility due to  acute illness . The patient's latest AMPAC (Activity Measure for Post Acute Care) Score is listed below.    AM-PAC Score - How much help does the patient need for each activity listed  Basic Mobility Total Score: 17  Turning over in bed (including adjusting bedclothes, sheets and blankets)?: A little  Sitting down on and standing up from a chair with arms (e.g., wheelchair, bedside commode, etc.): A little  Moving from lying on back to sitting on the side of the bed?: A little  Moving to and from a bed to a chair (including a wheelchair)?: A little  Need to walk in hospital room?: A little  Climbing 3-5 steps with a railing?: A lot    Plan  - Progressive mobility protocol initated  - PT/OT consulted -   1/9 recs low intensity therapy    Palliative care encounter  DNR noted      Acute on chronic respiratory failure with hypoxia  Patient with Hypoxic Respiratory failure which is Acute on chronic.  He is on home oxygen 3-4L. Supplemental oxygen was provided and noted-       .   Signs/symptoms of respiratory failure include- tachypnea, increased work of breathing, respiratory distress, and use of accessory muscles. Contributing diagnoses includes - CHF Labs and images were reviewed. Patient Has recent ABG, which has been  reviewed. Will treat underlying causes and adjust management of respiratory failure as follows-   Continue to wean oxygen to goal SaO2 of 90%  Aggressive pulmonary toilet in setting of atelectasis of left lower lung field.   Weaned down to baseline 3L of O2 with NC  1/9 sats 95% on 3LNC    Hyperkalemia  Hyperkalemia is likely due to ESRD.The patients most recent potassium results are listed below.  Recent Labs     01/12/25  0833 01/12/25  1326 01/12/25  1537   K 5.1  5.2* 5.5* 5.0  5.0     Plan  - Monitor for arrhythmias with EKG and/or continuous telemetry.   - improved with HD    1/9 K of 5.3. Lokelma ordered. renal panel q 4h. 1L/24h fluid restriction  1/12 K of 5.5. ordered  D 50,  insulin, albulterol concentrate, Lokelma. BMP q 4h       Bacteremia due to Staphylococcus  Seeding from tunneled HD line with AV vegetation  ID consulted:  Suggest 6 weeks of IV Ancef with HD through 2/2/25 1/9 s/p ID eval - Septic shock on admission secondary to endocarditis in LVOT with associated S capitis bacteremia.  Had HD ([facility]) line removal on 12/22. Repeat blood cultures 12/23 are NGTD. Shock resolved. Large mobile vegetation on TTE. Transitioned from daptomycin to cefazolin based on susceptibilities. Evaluated by CTS and deemed a non surgical candidate due to risk/comorbidities.Continue cefazolin x 6 weeks of therapy from first negative blood culture. (End date: 2/2/25). s/p  IR line placement 12/31.       Endocarditis  See septic shock  1/9 s/p ID eval - Septic shock on admission secondary to endocarditis in LVOT with associated S capitis bacteremia.  Had HD ([facility]) line removal on 12/22. Repeat blood cultures 12/23 are NGTD. Shock resolved. Large mobile vegetation on TTE. Transitioned from daptomycin to cefazolin based on susceptibilities. Evaluated by CTS and deemed a non surgical candidate due to risk/comorbidities.Continue cefazolin x 6 weeks of therapy from first negative blood culture. (End date: 2/2/25). s/p  IR line  placement 12/31.      Anemia of chronic renal failure, stage 5  Anemia is likely due to chronic disease due to ESRD. Most recent hemoglobin and hematocrit are listed below.  Recent Labs     01/10/25  1154 01/11/25  0420 01/12/25  0833   HGB 7.9* 8.9* 9.0*  8.9*   HCT 25.1* 28.1* 29.1*  29.2*       Plan  - Monitor serial CBC: Daily  - Transfuse PRBC if patient becomes hemodynamically unstable, symptomatic or H/H drops below 7/21.  - s/p 1 unit PRBCs this admit  - Patient's anemia is currently stable  1/10 Hb trended down to 6.8. FOBT. Transfusion with 1 [facility] .    1/12 Hb 8.9. FOBT +.    NSTEMI (non-ST elevated myocardial infarction)  In setting of septic shock  High sensitivity troponin troponin 923.    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  Had HD ([facility]) line removal on 12/22. Repeat blood cultures 12/23 are NGTD. Shock resolved. Large mobile vegetation on TTE. Transitioned from daptomycin to cefazolin based on susceptibilities. Evaluated by CTS and deemed a non surgical candidate due to risk/comorbidities.    Crohn's disease  S/p colectomy  No acute issues      History of nephrectomy  Noted  ESRD on HD    Hypertension  Patient's blood pressure range in the last 24 hours was: BP  Min: 100/62  Max: 145/70.  Hold BP meds    ESRD on hemodialysis  Nephro following  Tunneled HD line removed 12/22  Temporary trialysis placed 12/24  IR placed new line 12/31      VTE Risk Mitigation (From admission, onward)           Ordered     heparin (porcine) injection 1,000 Units  As needed (PRN)         01/03/25 1033     heparin (porcine) injection 5,000 Units  Every 8 hours          12/29/24 1000     Place sequential compression device  Until discontinued         12/19/24 1736     IP VTE LOW RISK PATIENT  Once         12/19/24 1736                    Discharge Planning   LLUVIA: 1/13/2025     Code Status: DNR   Medical Readiness for Discharge Date: 12/31/2024  Discharge Plan A: New Nursing Home placement - long term care facility   Discharge Delays: (!) Patient and Family Barriers            Please place Justification for DME        Keny Estrada MD  Department of Hospital Medicine   [facility] - Internal Medicine Telemetry

## 2025-01-12 NOTE — PT/OT/SLP PROGRESS
"Physical Therapy Treatment    Patient Name:  Flakito Mcginnis   MRN:  [id number]    Recommendations:     Discharge Recommendations: Low Intensity Therapy  Discharge Equipment Recommendations: bath bench  Barriers to discharge: None    Assessment:     Flakito Mcginnis is a 69 y.o. male admitted with a medical diagnosis of Septic shock.  He presents with the following impairments/functional limitations: weakness, impaired endurance, impaired self care skills, impaired functional mobility, gait instability, impaired balance, decreased safety awareness, impaired skin, impaired cardiopulmonary response to activity. Pt demonstrated improved walking tolerance this session but continues to have poor safety awareness. Pt is motivated and willing to work with PT but has poor body mechanics and postural habits that are contributing to pt's decreased safety. Continued skilled PT is recommended to improve strength, endurance and functional mobility while providing education to reduce fall risk.     Rehab Prognosis: Good; patient would benefit from acute skilled PT services to address these deficits and reach maximum level of function.    Recent Surgery: * No surgery found *      Plan:     During this hospitalization, patient to be seen 3 x/week to address the identified rehab impairments via gait training, therapeutic activities, therapeutic exercises, neuromuscular re-education and progress toward the following goals:    Plan of Care Expires:  01/22/25    Subjective     Chief Complaint: "I want to go to dialysis at my place. I don't want it here."  Patient/Family Comments/goals: Return home  Pain/Comfort:  Pain Rating 1: 0/10      Objective:     Communicated with KAYLENE Campos prior to session.  Patient found up in chair with colostomy, peripheral IV, oxygen upon PT entry to room.     General Precautions: Standard, fall, aspiration  Orthopedic Precautions: N/A  Braces: N/A  Respiratory Status: Nasal cannula, flow 3 L/min     Functional " Mobility:  Bed Mobility:     Scooting: stand by assistance  Transfers:     Sit to Stand:  stand by assistance with 4 wheeled walker  Gait: Patient gait trained FWB/WBAT: bilateral lower extremity 90 feet x2 trials on level tile with Rollator with Stand-by Assistance.  Pt with demonstarting a  reciprocal gait with decreased michaela, increased time in double stance, decreased step length, decreased stride length, decreased swing-to-stance ratio, and decreased toe-to-floor clearance.Impairments contributing to gait deviations include impaired balance, impaired coordination, impaired postural control, decreased ROM, decreased strength, and decreased endurance.  Balance: Sitting: Independent; standing: SBA-CGA      AM-PAC 6 CLICK MOBILITY  Turning over in bed (including adjusting bedclothes, sheets and blankets)?: 3  Sitting down on and standing up from a chair with arms (e.g., wheelchair, bedside commode, etc.): 3  Moving from lying on back to sitting on the side of the bed?: 3  Moving to and from a bed to a chair (including a wheelchair)?: 3  Need to walk in hospital room?: 3  Climbing 3-5 steps with a railing?: 3  Basic Mobility Total Score: 18       Treatment & Education:  Patient provided with daily orientation and goals of this PT session. They were educated to call for assistance and to transfer with hospital staff only.  Also, pt was educated on the effects of prolonged immobility and the importance of performing OOB activity and exercises to promote healing and reduce recovery time    Patient left up in chair with all lines intact, call button in reach, RN notified, and MD present..    GOALS:   Multidisciplinary Problems       Physical Therapy Goals          Problem: Physical Therapy    Goal Priority Disciplines Outcome Interventions   Physical Therapy Goal     PT, PT/OT Progressing    Description: Goals to be met by: 2025     Patient will increase functional independence with mobility by performin.  Sit to stand transfer with Modified [place]  2. Bed to chair transfer with Supervision using LRAD  3. Gait  x 250 feet with Supervision using LRAD.   4. Stand for 5 minutes with Supervision using LRAD while performing dynamic balance tasks  5. Lower extremity exercise program x30 reps per handout, with independence                         Time Tracking:     PT Received On: 01/12/25  PT Start Time: 1021     PT Stop Time: 1045  PT Total Time (min): 24 min     Billable Minutes: Gait Training 24    Treatment Type: Treatment  PT/PTA: PTA     Number of PTA visits since last PT visit: 5     01/12/2025

## 2025-01-13 LAB
ALBUMIN SERPL BCP-MCNC: 2.7 G/DL (ref 3.5–5.2)
ALP SERPL-CCNC: 268 U/L (ref 40–150)
ALT SERPL W/O P-5'-P-CCNC: <5 U/L (ref 10–44)
ANION GAP SERPL CALC-SCNC: 10 MMOL/L (ref 8–16)
ANION GAP SERPL CALC-SCNC: 10 MMOL/L (ref 8–16)
ANION GAP SERPL CALC-SCNC: 7 MMOL/L (ref 8–16)
ANION GAP SERPL CALC-SCNC: 8 MMOL/L (ref 8–16)
AST SERPL-CCNC: 20 U/L (ref 10–40)
BASOPHILS # BLD AUTO: 0.05 K/UL (ref 0–0.2)
BASOPHILS NFR BLD: 1.3 % (ref 0–1.9)
BILIRUB SERPL-MCNC: 0.3 MG/DL (ref 0.1–1)
BUN SERPL-MCNC: 29 MG/DL (ref 8–23)
BUN SERPL-MCNC: 35 MG/DL (ref 8–23)
BUN SERPL-MCNC: 61 MG/DL (ref 8–23)
BUN SERPL-MCNC: 61 MG/DL (ref 8–23)
CALCIUM SERPL-MCNC: 8.9 MG/DL (ref 8.7–10.5)
CALCIUM SERPL-MCNC: 9.1 MG/DL (ref 8.7–10.5)
CALCIUM SERPL-MCNC: 9.9 MG/DL (ref 8.7–10.5)
CALCIUM SERPL-MCNC: 9.9 MG/DL (ref 8.7–10.5)
CHLORIDE SERPL-SCNC: 102 MMOL/L (ref 95–110)
CHLORIDE SERPL-SCNC: 102 MMOL/L (ref 95–110)
CHLORIDE SERPL-SCNC: 104 MMOL/L (ref 95–110)
CHLORIDE SERPL-SCNC: 105 MMOL/L (ref 95–110)
CO2 SERPL-SCNC: 21 MMOL/L (ref 23–29)
CO2 SERPL-SCNC: 22 MMOL/L (ref 23–29)
CO2 SERPL-SCNC: 22 MMOL/L (ref 23–29)
CO2 SERPL-SCNC: 23 MMOL/L (ref 23–29)
CREAT SERPL-MCNC: 3.4 MG/DL (ref 0.5–1.4)
CREAT SERPL-MCNC: 3.7 MG/DL (ref 0.5–1.4)
CREAT SERPL-MCNC: 5.7 MG/DL (ref 0.5–1.4)
CREAT SERPL-MCNC: 5.7 MG/DL (ref 0.5–1.4)
DIFFERENTIAL METHOD BLD: ABNORMAL
EOSINOPHIL # BLD AUTO: 0.3 K/UL (ref 0–0.5)
EOSINOPHIL NFR BLD: 6.8 % (ref 0–8)
ERYTHROCYTE [DISTWIDTH] IN BLOOD BY AUTOMATED COUNT: 18.5 % (ref 11.5–14.5)
EST. GFR  (NO RACE VARIABLE): 10.1 ML/MIN/1.73 M^2
EST. GFR  (NO RACE VARIABLE): 10.1 ML/MIN/1.73 M^2
EST. GFR  (NO RACE VARIABLE): 16.9 ML/MIN/1.73 M^2
EST. GFR  (NO RACE VARIABLE): 18.8 ML/MIN/1.73 M^2
GLUCOSE SERPL-MCNC: 77 MG/DL (ref 70–110)
GLUCOSE SERPL-MCNC: 77 MG/DL (ref 70–110)
GLUCOSE SERPL-MCNC: 92 MG/DL (ref 70–110)
GLUCOSE SERPL-MCNC: 95 MG/DL (ref 70–110)
HCT VFR BLD AUTO: 27.2 % (ref 40–54)
HGB BLD-MCNC: 8.4 G/DL (ref 14–18)
IMM GRANULOCYTES # BLD AUTO: 0.01 K/UL (ref 0–0.04)
IMM GRANULOCYTES NFR BLD AUTO: 0.3 % (ref 0–0.5)
LYMPHOCYTES # BLD AUTO: 0.4 K/UL (ref 1–4.8)
LYMPHOCYTES NFR BLD: 11 % (ref 18–48)
MAGNESIUM SERPL-MCNC: 2.2 MG/DL (ref 1.6–2.6)
MCH RBC QN AUTO: 29.4 PG (ref 27–31)
MCHC RBC AUTO-ENTMCNC: 30.9 G/DL (ref 32–36)
MCV RBC AUTO: 95 FL (ref 82–98)
MONOCYTES # BLD AUTO: 0.6 K/UL (ref 0.3–1)
MONOCYTES NFR BLD: 15.2 % (ref 4–15)
NEUTROPHILS # BLD AUTO: 2.5 K/UL (ref 1.8–7.7)
NEUTROPHILS NFR BLD: 65.4 % (ref 38–73)
NRBC BLD-RTO: 0 /100 WBC
PHOSPHATE SERPL-MCNC: 5.7 MG/DL (ref 2.7–4.5)
PLATELET # BLD AUTO: 140 K/UL (ref 150–450)
PMV BLD AUTO: 11.4 FL (ref 9.2–12.9)
POCT GLUCOSE: 101 MG/DL (ref 70–110)
POTASSIUM SERPL-SCNC: 4.5 MMOL/L (ref 3.5–5.1)
POTASSIUM SERPL-SCNC: 4.5 MMOL/L (ref 3.5–5.1)
POTASSIUM SERPL-SCNC: 4.6 MMOL/L (ref 3.5–5.1)
POTASSIUM SERPL-SCNC: 4.6 MMOL/L (ref 3.5–5.1)
POTASSIUM SERPL-SCNC: 5.5 MMOL/L (ref 3.5–5.1)
PROT SERPL-MCNC: 6.8 G/DL (ref 6–8.4)
RBC # BLD AUTO: 2.86 M/UL (ref 4.6–6.2)
SARS-COV-2 RNA RESP QL NAA+PROBE: NOT DETECTED
SODIUM SERPL-SCNC: 134 MMOL/L (ref 136–145)
WBC # BLD AUTO: 3.82 K/UL (ref 3.9–12.7)

## 2025-01-13 PROCEDURE — 86580 TB INTRADERMAL TEST: CPT | Performed by: HOSPITALIST

## 2025-01-13 PROCEDURE — 87635 SARS-COV-2 COVID-19 AMP PRB: CPT | Performed by: HOSPITALIST

## 2025-01-13 PROCEDURE — 36415 COLL VENOUS BLD VENIPUNCTURE: CPT | Mod: XB | Performed by: HOSPITALIST

## 2025-01-13 PROCEDURE — 83735 ASSAY OF MAGNESIUM: CPT | Performed by: HOSPITALIST

## 2025-01-13 PROCEDURE — 84100 ASSAY OF PHOSPHORUS: CPT | Performed by: HOSPITALIST

## 2025-01-13 PROCEDURE — 85025 COMPLETE CBC W/AUTO DIFF WBC: CPT | Performed by: HOSPITALIST

## 2025-01-13 PROCEDURE — 25000003 PHARM REV CODE 250: Performed by: NURSE PRACTITIONER

## 2025-01-13 PROCEDURE — 90935 HEMODIALYSIS ONE EVALUATION: CPT

## 2025-01-13 PROCEDURE — 25000003 PHARM REV CODE 250

## 2025-01-13 PROCEDURE — 25000003 PHARM REV CODE 250: Performed by: INTERNAL MEDICINE

## 2025-01-13 PROCEDURE — 30200315 PPD INTRADERMAL TEST REV CODE 302: Performed by: HOSPITALIST

## 2025-01-13 PROCEDURE — 80048 BASIC METABOLIC PNL TOTAL CA: CPT | Mod: 91,XB | Performed by: HOSPITALIST

## 2025-01-13 PROCEDURE — 63600175 PHARM REV CODE 636 W HCPCS: Performed by: STUDENT IN AN ORGANIZED HEALTH CARE EDUCATION/TRAINING PROGRAM

## 2025-01-13 PROCEDURE — 21400001 HC TELEMETRY ROOM

## 2025-01-13 PROCEDURE — 63600175 PHARM REV CODE 636 W HCPCS: Performed by: INTERNAL MEDICINE

## 2025-01-13 PROCEDURE — 80053 COMPREHEN METABOLIC PANEL: CPT | Performed by: HOSPITALIST

## 2025-01-13 PROCEDURE — 90935 HEMODIALYSIS ONE EVALUATION: CPT | Mod: ,,, | Performed by: NURSE PRACTITIONER

## 2025-01-13 PROCEDURE — 63600175 PHARM REV CODE 636 W HCPCS

## 2025-01-13 RX ORDER — OXYCODONE HYDROCHLORIDE 5 MG/1
5 TABLET ORAL EVERY 12 HOURS PRN
Qty: 10 TABLET | Refills: 0 | Status: SHIPPED | OUTPATIENT
Start: 2025-01-13 | End: 2025-01-13

## 2025-01-13 RX ORDER — OXYCODONE HYDROCHLORIDE 5 MG/1
5 TABLET ORAL EVERY 12 HOURS PRN
Qty: 10 TABLET | Refills: 0 | Status: SHIPPED | OUTPATIENT
Start: 2025-01-13 | End: 2025-01-18

## 2025-01-13 RX ADMIN — MIDODRINE HYDROCHLORIDE 10 MG: 5 TABLET ORAL at 08:01

## 2025-01-13 RX ADMIN — SEVELAMER CARBONATE 800 MG: 800 TABLET, FILM COATED ORAL at 01:01

## 2025-01-13 RX ADMIN — HEPARIN SODIUM 5000 UNITS: 5000 INJECTION INTRAVENOUS; SUBCUTANEOUS at 08:01

## 2025-01-13 RX ADMIN — OXYCODONE HYDROCHLORIDE 10 MG: 10 TABLET ORAL at 08:01

## 2025-01-13 RX ADMIN — ATORVASTATIN CALCIUM 40 MG: 40 TABLET, FILM COATED ORAL at 01:01

## 2025-01-13 RX ADMIN — MICONAZOLE NITRATE 2 % TOPICAL POWDER: at 08:01

## 2025-01-13 RX ADMIN — MIDODRINE HYDROCHLORIDE 10 MG: 5 TABLET ORAL at 09:01

## 2025-01-13 RX ADMIN — SEVELAMER CARBONATE 800 MG: 800 TABLET, FILM COATED ORAL at 09:01

## 2025-01-13 RX ADMIN — HEPARIN SODIUM 5000 UNITS: 5000 INJECTION INTRAVENOUS; SUBCUTANEOUS at 02:01

## 2025-01-13 RX ADMIN — EPOETIN ALFA-EPBX 3000 UNITS: 3000 INJECTION, SOLUTION INTRAVENOUS; SUBCUTANEOUS at 08:01

## 2025-01-13 RX ADMIN — CEFAZOLIN 1 G: 1 INJECTION, POWDER, FOR SOLUTION INTRAMUSCULAR; INTRAVENOUS at 08:01

## 2025-01-13 RX ADMIN — TUBERCULIN PURIFIED PROTEIN DERIVATIVE 5 UNITS: 5 INJECTION, SOLUTION INTRADERMAL at 02:01

## 2025-01-13 RX ADMIN — Medication 6 MG: at 09:01

## 2025-01-13 RX ADMIN — MICONAZOLE NITRATE 2 % TOPICAL POWDER: at 09:01

## 2025-01-13 RX ADMIN — HEPARIN SODIUM 1000 UNITS: 1000 INJECTION, SOLUTION INTRAVENOUS; SUBCUTANEOUS at 11:01

## 2025-01-13 NOTE — PLAN OF CARE
"Discharge Plan A and Plan B have been determined by review of patient's clinical status, future medical and therapeutic needs, and coverage/benefits for post-acute care in coordination with multidisciplinary team members.    01/13/25 0935   Post-Acute Status   Post-Acute Authorization Placement   Post-Acute Placement Status Pending post-acute provider review/more information requested   Coverage [employer] MANAGED MEDICARE - [employer] MEDICARE DUAL DSNP -   Discharge Delays (!) Patient and Family Barriers   Discharge Plan   Discharge Plan A New Nursing Home placement - halfway care facility   Discharge Plan B Home Health;Group home     TIERA phoned [facility] [phone] regarding halfway bed, TIERA spoke w/ Leah (admissions). Leah states that patient's sister was contacted on Friday for completion of consents, no answer; consents not completed. TIERA requested that Leah forward consents to SW so that SW can provide consents to patient; patient is agreeable to self-completion of consents at bedside. SW to provide patient w/ consents once received. Leah states that due to roof leaks on last week, facility has had to move beds and unable to confirm at this time if patient's bed will be ready today; Leah to confirm bed availability this am.    TIERA phoned patient's HD clinic [phone], spoke w/ Laura. TIERA requested that Laura provide patient's chair-time letter for placement at [facility]. Laura agreeable to providing SW w/ letter; TIERA to send to [facility] once received. Laura also confirmed that patient is still able to receive IV abx in clinic on HD days.    Patient's dc pending completion of consents and halfway bed confirmation.      11:40am  TIERA f/u w/ Leah. Leah states that facility is still able to accept patient but does not have a bed available today; bed may be available "later in the week". TIERA reviewed epic, patient has 2 alternative accepting [facility] ([facility] and [facility]), facilities state " that they are willing to consider but patient's HD chair would need to be moved from [place] location to [facility] [place]. TIERA f/u on pending referral w/ [facility]. TIERA confirmed w/ [place] (admissions) that facility has long-term bed today; [place] to confirm if able to clinically and financially accept patient.       12:00pm  [place] states that [place]  is able to accept but patient's chair-time would need to be moved to [facility]. [facility]A phoned [facility], spoke w/ Laura. Laura confirmed that patient is approved for [facility] @12pm chair-time. [facility] [place] to confirm if able to accept.      3:00pm  Nadiya ([facility]) confirmed that facility able to accept today. TIERA submitted orders, 142, PASRR and additional information to NH via [employer]. Nadiya states that patient needs to make initial payment of $572.22 and patient will need to confirm responsible party prior to admit. Nadiya states that she called sister Sara to confirm that she is agreeable to being patient's responsible party but sister did not confirm, stated that she would call NH back with answer. TIERA met w/ patient at the bedside to review. Patient agreeable to [facility] and making initial payment for admit to NH Today. Patient states that his sister Sara is his only option as responsible party. Patient phoned sister w/ TIERA at bedside. Sister states that she cannot confirm at this time due to she is at Dr's appt. Sister states that she will call back.    Patient's dc to long-term NH pending sister's confirmation of responsible party and patient's completion of consents.      TIERA will continue to follow.                  Cole Morales, VAL, LMSW  [facility]  Case Management  Ext. [phone]

## 2025-01-13 NOTE — PT/OT/SLP PROGRESS
Physical Therapy      Patient Name:  Flakito Mcginnis   MRN:  [id number]    Patient not seen today secondary to Dialysis. Will follow-up again as able.

## 2025-01-13 NOTE — PROGRESS NOTES
Jason Long - Internal Medicine [facility] Medicine  Progress Note    Patient Name: Flakito Mcginnis  MRN: [id number]  Patient Class: IP- Inpatient   Admission Date: 12/19/2024  Length of Stay: 25 days  Attending Physician: Keny Estrada MD  Primary Care Provider: Jordin Robbins MD        Subjective     Principal Problem:Septic shock        HPI:  By Alicia Galloway NP    Mr. Flakito Mcginnis is a 69 y.o. man w/ HFrEF (30% 8/2024 from 20-25% 6/2024), NICM, MR, ESRD on HD, chronic respiratory failure on home 3L NC, Crohn's s/p colostomy, h/o R nephrectomy. He presented to [facility] ED from his HD center on 12/19 due to hypotension and ongoing shortness of breath. He usually receives his dialysis on T, R, S and went on Wednesday for an extra session for volume removal. He arrived on Thursday for his usually scheduled dialysis session and was directed to the ED due to hypotension. On arrival in the ED his blood pressure was 78/51. He was found to have a BNP >4900 and CXR concerning for volume overload. High sensitivity troponin 923. Critical care medicine was consulted for hypotension and admitted to MICU.     Overview/Hospital Course:  1/9 Mr. 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 ID was consulted and recommending 6 weeks of IV Cefazolin after HD, end date 2/2/25. CTS evaluated and recommending medical management at this time due to multiple co-morbidities. Tunneled catheter was removed 12/22. Repeat cultures from 12/23 with NGTD. Temporary RIJ trialysis line placed 12/24.  Course further complicated by acute hypoxemic respiratory failure likely volume overload requiring HFNC.  Oxygenation improved with volume removal with dialysis and he was weaned down to NC.  He was SD to  on 12/29.  IR was consulted for new HD line placement which was  placed on 12/31.  He was going to be discharged home after his new line placement, but sister refuses to take him home and says he needs longterm NH placement. K of 5.3. Lokelma ordered. renal panel q 4h. 1L/24h fluid restriction CM assisting Pending financials submission to [facility]  1/10 Hb trended down to 6.8. FOBT. Transfusion with 1 unit of PRBC . K improved to 5. HD today    1/11 persistent perirpheral edema. UF of 1L yesterday and continue UF as BP permits. nephrology f/u for HD today - refused HD today. Plan for next session Monday. mild epistaxis left  nostril - improved with pressure. Nasal saline  ordered  1/12 Hb 8.9. FOBT +. K of 5.2. refused telemetry.  monitor , reported self limiting bloody mucous discharge from rectum. no intervention per CRS. monitor Hb   1/13 K 5.5 . HD today. discharge to NH                      Review of Systems:   Pain scale:  Constitutional:  fever,  chills, headache, vision loss, hearing loss, weight loss, Generalized weakness, falls, loss of smell, loss of taste, poor appetite,  sore throat, epistaxis- resolved   Respiratory: cough, shortness of breath.   Cardiovascular: chest pain, dizziness, palpitations, orthopnea, swelling of feet, syncope  Gastrointestinal: nausea, vomiting, abdominal pain, diarrhea, black stool,  blood in stool, change in bowel habits, constipation  Genitourinary: hematuria, dysuria, urgency, frequency  Integument/Breast: rash,  pruritis  Hematologic/Lymphatic: easy bruising, lymphadenopathy  Musculoskeletal: arthralgias , myalgias, back pain, neck pain, knee pain  Neurological: confusion, seizures, tremors, slurred speech  Behavioral/Psych:  depression, anxiety, auditory or visual hallucinations     OBJECTIVE:     Physical Exam:  Body mass index is 19.93 kg/m².    Constitutional: Appears thin built    Head: Normocephalic and atraumatic.   Neck: Normal range of motion. Neck supple. right chest HD catheter   Cardiovascular: Normal heart rate.   "Regular heart rhythm.  Pulmonary/Chest: Effort normal.   Abdominal: No distension.  No tenderness. + colostomy   Musculoskeletal: Normal range of motion.++  edema.   Neurological: Alert and oriented to person, place, and time.   Skin: Skin is warm and dry.   Psychiatric: Normal mood and affect. Behavior is normal.       Vital Signs  Temp: 98.1 °F (36.7 °C) (01/13/25 0759)  Pulse: 90 (01/13/25 0808)  Resp: 17 (01/13/25 0812)  BP: (!) 107/59 (01/13/25 0808)  SpO2: 96 % (01/13/25 0059)     24 Hour VS Range    Temp:  [97.3 °F (36.3 °C)-98.1 °F (36.7 °C)]   Pulse:  []   Resp:  [16-18]   BP: ()/(57-66)   SpO2:  [94 %-98 %]   No intake or output data in the 24 hours ending 01/13/25 0817          I/O This Shift:  No intake/output data recorded.    Wt Readings from Last 3 Encounters:   01/13/25 56 kg (123 lb 7.3 oz)   12/11/24 59.9 kg (132 lb)   10/19/24 59.9 kg (132 lb)       I have personally reviewed the vitals and recorded Intake/Output     Laboratory/Diagnostic Data:    CBC/Anemia Labs: Coags:    Recent Labs   Lab 01/10/25  1846 01/11/25  0420 01/12/25  0833 01/12/25  1537 01/13/25  0448   WBC  --    < > 3.27*  3.28* 3.25* 3.82*   HGB  --    < > 9.0*  8.9* 8.3* 8.4*   HCT  --    < > 29.1*  29.2* 26.8* 27.2*   PLT  --    < > 148*  144* 136* 140*   MCV  --    < > 95  95 96 95   RDW  --    < > 18.8*  18.7* 18.6* 18.5*   OCCULTBLOOD Positive*  --   --   --   --     < > = values in this interval not displayed.    No results for input(s): "PT", "INR", "APTT" in the last 168 hours.     Chemistries: ABG:   Recent Labs   Lab 01/11/25  0227 01/11/25  1006 01/12/25  0833 01/12/25  1326 01/12/25  1537 01/12/25  1917 01/13/25  0448      < > 134*  136 136 133* 137 134*  134*   K 5.8*   < > 5.1  5.2* 5.5* 5.0  5.0 5.2*  5.2* 5.5*  5.5*  5.5*      < > 102  103 104 101 105 102  102   CO2 21*   < > 23  22* 17* 21* 17* 22*  22*   BUN 31*   < > 44*  46* 52* 51* 55* 61*  61*   CREATININE 3.7*   < > " "5.0*  4.9* 5.2* 5.0* 5.4* 5.7*  5.7*   CALCIUM 9.6   < > 10.3  10.1 9.8 9.5 9.5 9.9  9.9   PROT 7.1  --  7.3  7.2  --   --   --  6.8   BILITOT 0.3  --  0.3  0.3  --   --   --  0.3   ALKPHOS 278*  --  291*  280*  --   --   --  268*   ALT <5*  --  <5*  <5*  --   --   --  <5*   AST 28  --  20  20  --   --   --  20   MG 2.0  --  2.1  2.1  --   --   --  2.2   PHOS 4.6*   < > 5.4* 5.5*  --   --  5.7*    < > = values in this interval not displayed.    No results for input(s): "PH", "PCO2", "PO2", "HCO3", "POCSATURATED", "BE" in the last 168 hours.     POCT Glucose: HbA1c:    Recent Labs   Lab 01/09/25  2045 01/10/25  2022 01/11/25  2007 01/12/25  1820   POCTGLUCOSE 114* 109 104 227*    Hemoglobin A1C   Date Value Ref Range Status   12/18/2023 4.7 4.0 - 5.6 % Final     Comment:     ADA Screening Guidelines:  5.7-6.4%  Consistent with prediabetes  >or=6.5%  Consistent with diabetes    High levels of fetal hemoglobin interfere with the HbA1C  assay. Heterozygous hemoglobin variants (HbS, HgC, etc)do  not significantly interfere with this assay.   However, presence of multiple variants may affect accuracy.          Cardiac Enzymes: Ejection Fractions:    No results for input(s): "CPK", "CPKMB", "MB", "TROPONINI" in the last 72 hours. EF   Date Value Ref Range Status   12/20/2024 20 % Final          No results for input(s): "COLORU", "APPEARANCEUA", "PHUR", "SPECGRAV", "PROTEINUA", "GLUCUA", "KETONESU", "BILIRUBINUA", "OCCULTUA", "NITRITE", "UROBILINOGEN", "LEUKOCYTESUR", "RBCUA", "WBCUA", "BACTERIA", "SQUAMEPITHEL", "HYALINECASTS" in the last 48 hours.    Invalid input(s): "WRIGHTSUR"    Lactate (Lactic Acid) (mmol/L)   Date Value   12/19/2024 2.4 (H)     BNP (pg/mL)   Date Value   12/19/2024 >4,900 (H)     No results found for: "CRP", "SEDRATE"  No results found for: "DDIMER"  Ferritin (ng/mL)   Date Value   12/20/2024 2,808 (H)     No results found for: "LDH"  CPK (U/L)   Date Value   12/21/2024 <7 (L)     CK (U/L) "   Date Value   08/22/2024 139     No results found for this or any previous visit.  SARS-CoV2 (COVID-19) Qualitative PCR (no units)   Date Value   06/12/2020 Not detected     SARS-CoV-2 RNA, Amplification, Qual (no units)   Date Value   12/19/2024 Negative       Microbiology labs for the last week  Microbiology Results (last 7 days)       ** No results found for the last 168 hours. **            Reviewed and noted in plan where applicable- Please see chart for full lab data.    Lines/Drains:       Hemodialysis Catheter 12/31/24 0818 right subclavian (Active)   Line Necessity Review CRRT/HD 01/09/25 2022   Verification by X-ray Yes 01/07/25 2049   Site Assessment No drainage;No redness;No swelling;No warmth 01/09/25 2022   Line Securement Device Secured with sutureless device 01/09/25 2022   Dressing Type CHG impregnated dressing/sponge 01/09/25 2022   Dressing Status Clean;Dry;Intact 01/09/25 2022   Dressing Intervention Integrity maintained 01/09/25 2022   Date on Dressing 01/06/25 01/08/25 2046   Dressing Due to be Changed 01/13/25 01/08/25 2046   Venous Patency/Care flushed w/o difficulty;heparin locked;intermittent infusion cap applied 01/08/25 1847   Arterial Patency/Care flushed w/o difficulty;heparin locked;intermittent infusion cap applied 01/08/25 1847   Waveform Not being transduced 01/02/25 0838   Number of days: 9            Peripheral IV - Single Lumen 01/09/25 1622 20 G Posterior;Right Forearm (Active)   Site Assessment Clean;Dry;Intact;No redness;No swelling 01/09/25 2022   Line Securement Device Secured with sutureless device 01/09/25 2022   Extremity Assessment Distal to IV No abnormal discoloration;No redness;No swelling;No warmth 01/09/25 2022   Line Status Flushed;Saline locked 01/09/25 2022   Dressing Status Clean;Dry;Intact 01/09/25 2022   Dressing Intervention Integrity maintained 01/09/25 2022   Dressing Change Due 01/13/25 01/09/25 1622   Site Change Due 01/13/25 01/09/25 2022   Reason Not  Rotated Not due 01/09/25 2022   Number of days: 0            Colostomy 12/19/24 2225 RLQ (Active)   Stomal Appliance 1 piece;No Leakage;Dry;Intact 01/09/25 2022   Stoma Appearance rosebud appearance 01/09/25 2022   Stoma Size (in) 26 01/02/25 0838   Site Assessment Clean;Intact 01/09/25 2022   Peristomal Assessment Clean;Intact 01/09/25 0751   Accessories/Skin Care barrier substance over peristomal skin;cleansed w/ water;skin barrier paste 01/02/25 0838   Stoma Function stool 01/09/25 0751   Treatment Placement checked 01/06/25 0800   Tolerance no signs/symptoms of discomfort 01/06/25 2016   Output (mL) 100 mL 01/10/25 0612   Number of days: 21       Imaging  ECG Results              Repeat EKG 12-lead (Final result)        Collection Time Result Time QRS Duration OHS QTC Calculation    12/19/24 16:03:39 12/20/24 09:16:41 112 512                     Final result by Interface, Lab In [facility] (12/20/24 09:16:46)                   Narrative:    Test Reason : R06.00,    Vent. Rate :  96 BPM     Atrial Rate :  96 BPM     P-R Int : 232 ms          QRS Dur : 112 ms      QT Int : 406 ms       P-R-T Axes :  52 -47 203 degrees    QTcB Int : 512 ms    Sinus rhythm with 1st degree A-V block  Left axis deviation  Anterior infarct (cited on or before 19-Dec-2024)  ST and T wave abnormality, consider lateral ischemia  Prolonged QT  Abnormal ECG  When compared with ECG of 19-Dec-2024 14:29,  No significant change was found  Confirmed by Dominick Grant ([id number]) on 12/20/2024 9:16:39 AM    Referred By: AAAREFERRAL SELF           Confirmed By: Dominick Grant                                     EKG 12-lead (Final result)        Collection Time Result Time QRS Duration OHS QTC Calculation    12/19/24 14:29:52 12/19/24 14:32:53 114 560                     Final result by Interface, Lab In [facility] (12/19/24 14:32:59)                   Narrative:    Test Reason : Z13.6,    Vent. Rate : 102 BPM     Atrial Rate : 102 BPM     P-R Int : 226 ms           QRS Dur : 114 ms      QT Int : 430 ms       P-R-T Axes :  39 -55 -85 degrees    QTcB Int : 560 ms    Sinus tachycardia with 1st degree A-V block  Left anterior fascicular block  Abnormal R wave progression in the precordial leads - Possible Anterior  infarct ,age undetermined  ST and T wave abnormality, consider lateral ischemia  Prolonged QT  Abnormal ECG  No previous ECGs available  Confirmed by Franck Aguilar ([id number]) on 12/19/2024 2:32:50 PM    Referred By: AAAREFERRAL SELF           Confirmed By: Franck Aguilar                                    Results for orders placed during the hospital encounter of 12/19/24    Echo    Interpretation Summary    There is a 10 by 4 mm large mobile echogenic mass present in the LVOT suggestive of vegetation - blood cultures could be done if clinically indicated.    Left Ventricle: The left ventricle is mildly dilated. Mildly increased ventricular mass. Normal wall thickness. There is mild eccentric hypertrophy. Severe global hypokinesis present. Septal motion is abnormal. There is severely reduced systolic function with a visually estimated ejection fraction of 20 - 25%. Ejection fraction is approximately 20%. Quantitated ejection fraction is 25%. There is diastolic dysfunction.    Right Ventricle: Mild right ventricular enlargement. Wall thickness is normal. Right ventricle wall motion has global hypokinesis. Systolic function is mild-moderately reduced.    Left Atrium: Left atrium is severely dilated.    Right Atrium: Right atrium is moderately dilated.    Aortic Valve: There is moderate aortic valve sclerosis. Moderately restricted motion. There is a 10 by 4 mm large mobile echogenic mass present in the LVOT suggestive of vegetation - blood cultures could be done if clinically indicated. There is moderate stenosis. Aortic valve area by VTI is 1.2 cm2. Aortic valve peak velocity is 2.6 m/s. Mean gradient is 16.5 mmHg. The dimensionless index is 0.37. There is mild to moderate  aortic regurgitation.    Mitral Valve: There is moderate bileaflet sclerosis. There is moderate mitral annular calcification present. There is mild regurgitation.    Tricuspid Valve: There is moderate to  moderate-severe regurgitation.    Pulmonary Artery: There is mild pulmonary hypertension. The estimated pulmonary artery systolic pressure is 41 mmHg.    IVC/SVC: Intermediate venous pressure at 8 mmHg.      X-Ray Cervical Spine 2 or 3 Views  Narrative: EXAMINATION:  XR CERVICAL SPINE 2 OR 3 VIEWS    CLINICAL HISTORY:  s/p fall;    TECHNIQUE:  AP, lateral and open mouth views of the cervical spine were performed.    COMPARISON:  August 2023    FINDINGS:  Bones are markedly demineralized limiting evaluation.  Also the patient's shoulders obscure mole see entire cervical spine.  Postoperative change base of neck likely about the thyroid.  Partially visualized large-bore right vascular catheter.  Remote left rib fractures.  Impression: Nondiagnostic study due to marked osteopenia and obscuration of the cervical spine by the shoulder.  Further evaluation to exclude cervical spine trauma will be needed.    This report was flagged in Epic as abnormal.    Electronically signed by: Ketan Springer MD  Date:    01/05/2025  Time:    12:45  X-Ray Shoulder Trauma 3 view Left  Narrative: EXAMINATION:  XR SHOULDER TRAUMA 3 VIEW LEFT    CLINICAL HISTORY:  fall on shoulder, concerned for dislocation, hard to assess due to posture;    TECHNIQUE:  Three views of the left shoulder were performed.    COMPARISON  December 19, 2024    FINDINGS:  Probable remote fracture about superolateral aspect of the right humeral head.  Calcific densities/cortical thickening again noted.  Narrowing of the subacromial space suggest chronic rotator cuff tear.  Bones are markedly demineralized.  Numeral head difficult to visualize on axillary Y-view appears similar in position compared to prior.  Unfortunately subluxation is not excluded.  Left rib  deformities again noted.  Impression: Significant bony demineralization limits evaluation.  Probable remote fracture about the superolateral aspect of the humeral head.  Suspected anterior subluxation of the humerus with respect to the glenoid though not well visualized on the axillary Y-view.    This report was flagged in Epic as abnormal.    Electronically signed by: Ketan Sprigner MD  Date:    01/05/2025  Time:    12:43      Labs, Imaging, EKG and Diagnostic results from 1/13/2025 were reviewed.    Medications:  Medication list was reviewed and changes noted under Assessment/Plan.  No current facility-administered medications on file prior to encounter.     Current Outpatient Medications on File Prior to Encounter   Medication Sig Dispense Refill    atorvastatin (LIPITOR) 40 MG tablet Take 40 mg by mouth once daily.      sevelamer carbonate (RENVELA) 800 mg Tab Take 800 mg by mouth 3 (three) times daily with meals.       Scheduled Medications:  Current Facility-Administered Medications   Medication Dose Route Frequency    atorvastatin  40 mg Oral Daily    ceFAZolin (Ancef) IV (PEDS and ADULTS)  1 g Intravenous Q24H    epoetin ludin-ebpx (RETACRIT) injection  3,000 Units Intravenous Every Mon, Wed, Fri    heparin (porcine)  5,000 Units Subcutaneous Q8H    melatonin  6 mg Oral Nightly    miconazole NITRATE 2 %   Topical (Top) BID    mupirocin   Nasal Daily    sevelamer carbonate  800 mg Oral TID WM    sodium zirconium cyclosilicate  10 g Oral Once     PRN:   Current Facility-Administered Medications:     0.9%  NaCl infusion (for blood administration), , Intravenous, Q24H PRN    0.9%  NaCl infusion (for blood administration), , Intravenous, Q24H PRN    acetaminophen, 500 mg, Oral, Q4H PRN    [COMPLETED] dextrose 50%, 50 g, Intravenous, Once **AND** dextrose 50%, 25 g, Intravenous, PRN **AND** [COMPLETED] insulin regular, 0.1 Units/kg, Intravenous, Once    diphenhydrAMINE-zinc acetate 2-0.1%, , Topical (Top), TID PRN     haloperidoL, 5 mg, Oral, Q6H PRN    heparin (porcine), 1,000 Units, Intra-Catheter, PRN    midodrine, 10 mg, Oral, PRN    oxyCODONE, 5 mg, Oral, Q4H PRN    oxyCODONE, 10 mg, Oral, Q6H PRN    sodium chloride, 2 spray, Each Nostril, Q6H PRN    sodium chloride 0.9%, 10 mL, Intravenous, PRN    white petrolatum, , Topical (Top), PRN  Infusions:   Estimated Creatinine Clearance: 9.7 mL/min (A) (based on SCr of 5.7 mg/dL (H)).             Assessment and Plan     * Septic shock  Admitted to MICU 12/19 for septic shock 2/2 Staph capitis bacteremia and endocarditis from his tunneled HD line  Weaned off vasopressors in the ICU  2D echo with mass on the aortic valve suggestive of vegetation  Tunneled HD line removed 12/22  Temporary trialysis placed 12/24  ID consulted:  Suggest 6 weeks of IV Ancef with HD through 2/2/25  CTS consulted:  Suggest IV abx, no surgical intervention  IR placed new tunneled line 12/31 1/9 s/p ID eval - Septic shock on admission secondary to endocarditis in LVOT with associated S capitis bacteremia.  Had HD ([facility]) line removal on 12/22. Repeat blood cultures 12/23 are NGTD. Shock resolved. Large mobile vegetation on TTE. Transitioned from daptomycin to cefazolin based on susceptibilities. Evaluated by CTS and deemed a non surgical candidate due to risk/comorbidities.Continue cefazolin x 6 weeks of therapy from first negative blood culture. (End date: 2/2/25). s/p  IR line placement 12/31.  Waiting on new NH placement.  Pending financials to [facility]       Chronic left shoulder pain  Patient states that he has been having shoulder pain for the past couple of weeks associated with a fall some time back. Was not able to assess due to being in the hospital and is worried that it is dislocated.     orthopedic eval 1/7 - old greater tuberosity fracture of the left humerus.  Some mild subluxation on the scapular Y-view.  In his has some tenderness in that area.  This has been ongoing since August.   He has multiple comorbid medical conditions that do not make him a good candidate for surgery.  The shoulder does not appear to be fully dislocated but does have some subluxation.  Continue conservative treatment given his overall medical status.     Xray L shoulder with remote fracture about the superolateral aspect of the humeral head. Suspected anterior subluxation of the humerus with respect to the glenoid    rec WBAT/ROMAT LUE and PT/OT    ACP (advance care planning)  DNR noted      Debility  Patient with Acute debility due to  acute illness . The patient's latest AMPAC (Activity Measure for Post Acute Care) Score is listed below.    AM-PAC Score - How much help does the patient need for each activity listed  Basic Mobility Total Score: 17  Turning over in bed (including adjusting bedclothes, sheets and blankets)?: A little  Sitting down on and standing up from a chair with arms (e.g., wheelchair, bedside commode, etc.): A little  Moving from lying on back to sitting on the side of the bed?: A little  Moving to and from a bed to a chair (including a wheelchair)?: A little  Need to walk in hospital room?: A little  Climbing 3-5 steps with a railing?: A lot    Plan  - Progressive mobility protocol initated  - PT/OT consulted -   1/9 recs low intensity therapy    Palliative care encounter  DNR noted      Acute on chronic respiratory failure with hypoxia  Patient with Hypoxic Respiratory failure which is Acute on chronic.  He is on home oxygen 3-4L. Supplemental oxygen was provided and noted-       .   Signs/symptoms of respiratory failure include- tachypnea, increased work of breathing, respiratory distress, and use of accessory muscles. Contributing diagnoses includes - CHF Labs and images were reviewed. Patient Has recent ABG, which has been reviewed. Will treat underlying causes and adjust management of respiratory failure as follows-   Continue to wean oxygen to goal SaO2 of 90%  Aggressive pulmonary toilet in  setting of atelectasis of left lower lung field.   Weaned down to baseline 3L of O2 with NC  1/9 sats 95% on 3LNC    Hyperkalemia  Hyperkalemia is likely due to ESRD.The patients most recent potassium results are listed below.  Recent Labs     01/12/25  1537 01/12/25  1917 01/13/25  0448   K 5.0  5.0 5.2*  5.2* 5.5*  5.5*  5.5*     Plan  - Monitor for arrhythmias with EKG and/or continuous telemetry.   - improved with HD    1/9 K of 5.3. Lokelma ordered. renal panel q 4h. 1L/24h fluid restriction  1/12 K of 5.5. ordered  D 50,  insulin, albulterol concentrate, Lokelma. BMP q 4h   1/13 K 5.5 . HD today.     Bacteremia due to Staphylococcus  Seeding from tunneled HD line with AV vegetation  ID consulted:  Suggest 6 weeks of IV Ancef with HD through 2/2/25 1/9 s/p ID eval - Septic shock on admission secondary to endocarditis in LVOT with associated S capitis bacteremia.  Had HD ([facility]) line removal on 12/22. Repeat blood cultures 12/23 are NGTD. Shock resolved. Large mobile vegetation on TTE. Transitioned from daptomycin to cefazolin based on susceptibilities. Evaluated by CTS and deemed a non surgical candidate due to risk/comorbidities.Continue cefazolin x 6 weeks of therapy from first negative blood culture. (End date: 2/2/25). s/p  IR line placement 12/31.       Endocarditis  See septic shock  1/9 s/p ID eval - Septic shock on admission secondary to endocarditis in LVOT with associated S capitis bacteremia.  Had HD ([facility]) line removal on 12/22. Repeat blood cultures 12/23 are NGTD. Shock resolved. Large mobile vegetation on TTE. Transitioned from daptomycin to cefazolin based on susceptibilities. Evaluated by CTS and deemed a non surgical candidate due to risk/comorbidities.Continue cefazolin x 6 weeks of therapy from first negative blood culture. (End date: 2/2/25). s/p  IR line placement 12/31.      Anemia of chronic renal failure, stage 5  Anemia is likely due to chronic disease due to ESRD. Most recent  hemoglobin and hematocrit are listed below.  Recent Labs     01/12/25  0833 01/12/25  1537 01/13/25  0448   HGB 9.0*  8.9* 8.3* 8.4*   HCT 29.1*  29.2* 26.8* 27.2*       Plan  - Monitor serial CBC: Daily  - Transfuse PRBC if patient becomes hemodynamically unstable, symptomatic or H/H drops below 7/21.  - s/p 1 unit PRBCs this admit  - Patient's anemia is currently stable  1/10 Hb trended down to 6.8. FOBT. Transfusion with 1 unit of PRBC .    1/12 Hb 8.9. FOBT +.    NSTEMI (non-ST elevated myocardial infarction)  In setting of septic shock  High sensitivity troponin troponin 923.    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  Had HD ([facility]) line removal on 12/22. Repeat blood cultures 12/23 are NGTD. Shock resolved. Large mobile vegetation on TTE. Transitioned from daptomycin to cefazolin based on susceptibilities. Evaluated by CTS and deemed a non surgical candidate due to risk/comorbidities.    Crohn's disease  S/p colectomy  No acute issues      History of nephrectomy  Noted  ESRD on HD    Hypertension  Patient's blood pressure range in the last 24 hours was: BP  Min: 100/62  Max: 145/70.  Hold BP meds    ESRD on hemodialysis  Nephro following  Tunneled HD line removed 12/22  Temporary trialysis placed 12/24  IR placed new line 12/31      VTE Risk Mitigation (From admission, onward)           Ordered     heparin (porcine) injection 1,000 Units  As needed (PRN)         01/03/25 1033     heparin (porcine) injection 5,000 Units  Every 8 hours         12/29/24 1000     Place sequential compression device  Until discontinued         12/19/24 1736     IP VTE LOW RISK PATIENT   Once         12/19/24 1736                    Discharge Planning   LLUVIA: 1/13/2025     Code Status: DNR   Medical Readiness for Discharge Date: 12/31/2024  Discharge Plan A: New Nursing Home placement - [facility] care facility   Discharge Delays: (!) Patient and Family Barriers            Please place Justification for DME        Keny Estrada MD  Department of Hospital Medicine   [facility] Medicine Telemetry

## 2025-01-13 NOTE — PLAN OF CARE
[facility] Telemetry  Discharge Reassessment    Primary Care Provider: Jodrin Robbins MD    Expected Discharge Date: 1/13/2025    Reassessment (most recent)       Discharge Reassessment - 01/13/25 1559          Discharge Reassessment    Assessment Type Discharge Planning Reassessment (P)      Did the patient's condition or plan change since previous assessment? No (P)      Discharge Plan discussed with: Patient;Sibling (P)      Name(s) and Number(s) Sara Da Silva (Sister)  [phone] (P)      Communicated LLUVIA with patient/caregiver Yes (P)      Discharge Plan A New Nursing Home placement - nursing home care facility (P)      Discharge Plan B Home Health;Group home (P)      DME Needed Upon Discharge  none (P)      Transition of Care Barriers Social;No family/friends to help;Homeless (P)      Why the patient remains in the hospital Social issues (P)         Post-Acute Status    Post-Acute Authorization Placement (P)      Post-Acute Placement Status Pending post-acute provider review/more information requested (P)      Coverage AETNA MANAGED MEDICARE - AETNA MEDICARE DUAL DSNP - (P)      Hospital Resources/Appts/Education Provided Provided education on problems/symptoms using teachback;Provided patient/caregiver with written discharge plan information;Dilaysis schedule provided (P)      Patient choice form signed by patient/caregiver List from System Post-Acute Care (P)      Discharge Delays Patient and Family Barriers (P)                  Discharge Plan A and Plan B have been determined by review of patient's clinical status, future medical and therapeutic needs, and coverage/benefits for post-acute care in coordination with multidisciplinary team members.                       VAL Haley, LMSW  [facility]  Case Management  Ext. [phone]

## 2025-01-13 NOTE — NURSING
Dialysis tx started to the right chest perm cath per orders placed by MAAME Reddy:    Order Questions    Question Answer   Antibiotics on HD? No   Duration of Treatment 3.5 hours   Dialyzer F160NR   Dialysate Temperature (C) 36   Target  mL/min   If unable to maintain flow due to inadequate vascular access patency, patient intolerance (i.e. chest pain, access discomfort) or elevated venous pressure, adjust blood flow rate to a minimum of _____mL/min 100    mL/min   K+ Potassium per Protocol   Ca++ Calcium per Protocol   Na+ Sodium per Protocol   Bicarb Bicarbonate per Protocol   Access to be used Other (please specify)   Target UF 2L   If unable to maintain this UFR due to patient intolerance (i.e. hypotension, chest pain, muscle cramping, nausea or vomiting), adjust UFR to achieve a minimum of _______ liters of UF 0   Fluid Removal Instructions maintain SBP > 90 mmHG

## 2025-01-13 NOTE — CARE UPDATE
Unit [facility] Care Support Interaction      I have reviewed the chart of Flakito Mcginnis who is hospitalized for Septic shock. The patient [facility]        I have assisted the primary physician in management of the following:      Post Acute Order Clarification - Reviewed Patient concerned that he no longer has a bed a [facility]. Social work informed me that patient has been accepted to [facility] where he will have a bed. Appreciate social work assistance with coordination.      I have seen and examined the patient and provided the following support:     Skin - Sat in with wound care nurse to encourage patient to agree to wound care assessment. Patient not willing to have ostomy bag removed for skin eval.  Limited ostomy evaluation was completed. Education provided.        Yumiko Kaufman PA-C  Unit [facility]

## 2025-01-13 NOTE — CONSULTS
[facility] Telemetry    Wound Care     Patient Name:  Flakito Mcginnis  MRN:  [id number]  Date: 1/13/2025  Diagnosis: Septic shock     History:  Past Medical History:   Diagnosis Date    Crohn's disease 1998    ESRD (end stage renal disease) on dialysis 10/2017    Hypertension     Obstructive uropathy      Social History     Socioeconomic History    Marital status: Single   Tobacco Use    Smoking status: Former     Passive exposure: Never    Smokeless tobacco: Never   Substance and Sexual Activity    Alcohol use: Not Currently    Drug use: Never    Sexual activity: Not Currently     Social Drivers of Health     Financial Resource Strain: Low Risk  (12/20/2024)    Overall Financial Resource Strain (CARDIA)     Difficulty of Paying Living Expenses: Not very hard   Food Insecurity: No Food Insecurity (12/20/2024)    Hunger Vital Sign     Worried About Running Out of Food in the Last Year: Never true     Ran Out of Food in the Last Year: Never true   Transportation Needs: No Transportation Needs (12/20/2024)    TRANSPORTATION NEEDS     Transportation : No   Physical Activity: Inactive (12/20/2024)    Exercise Vital Sign     Days of Exercise per Week: 0 days     Minutes of Exercise per Session: 10 min   Stress: Stress Concern Present (12/20/2024)    [employer] [place] [employer] - Occupational Stress Questionnaire     Feeling of Stress : Rather much   Housing Stability: Low Risk  (12/20/2024)    Housing Stability Vital Sign     Unable to Pay for Housing in the Last Year: No     Homeless in the Last Year: No     Precautions:  Allergies as of 12/19/2024 - Reviewed 12/19/2024   Allergen Reaction Noted    Vancomycin analogues Anaphylaxis, Other (See Comments), Shortness Of Breath, and Swelling 10/18/2021    Aspirin Nausea And Vomiting and Other (See Comments) 05/09/2017       [facility] Assessment Details / Treatment:    Patient seen for wound care: New Consult   Chart reviewed for this encounter.   Labs:    WBC (K/uL)   Date Value   01/13/2025 3.82 (L)   01/12/2025 3.25 (L)     Glucose (mg/dL)   Date Value   01/13/2025 77   01/13/2025 77     Albumin (g/dL)   Date Value   01/13/2025 2.7 (L)   01/12/2025 2.6 (L)     Lexx Score: 18    Narrative:  Pt seen for WC consultation and refused assessment  Chart reviewed for this encounter.   See Flow Sheet for additional documentation and media.    Pt sitting to edge of bed, was asked to get back into bed for assessment. PA at bedside, pt complied with this request. WC Nurse and PA several times expressed the concern from the bedside nurse who reported excoriation and bleeding to peristomal site to pt. He consistently refused to remove the bag for assessment stating he just changed it yesterday and there is no reason to change it again.   Pt stated he has all the supplies he needs, continuously stated he is not concerned and refused assessment.  WC nurse explained the concern for bag not being cut to correct size and exposed liz-stomal skin to feces, pt stated he understands and stated the area of concern was his large intestine where it is connected to his small intestine, when WC nurse lightly touched, pt cx of pain, again explained this is likely exposed skin and how WC can be of assistance, Pt refused.   Pt wearing Convatec bag with clip, barrier extender to distal edges of bag, when asked about the dried area to right lateral side, pt reports this is paste. He was not acceptable to any teaching at this time.   WC will attempt at a later time/day.     RECOMMENDATIONS:  Bedside nurse assess for acute changes (purulence, increased redness/swelling, increased drainage, malodor, increased pain, pallor, necrosis) please contact physician on any acute changes.    Continue to monitor, take picture to put in chart if possible.     Discussed POC with patient and primary nurse.   See EMR for orders & patient education.     Bedside nursing to continue care & monitoring.  Bedside  nursing to maintain pressure injury prevention interventions, (PIP).     Recommendations made to primary team for above plan.    Thank you for the consult. Wound Care will continue to follow.   01/13/25 1330   WOCN Assessment   WOCN Total Time (mins) 30   Visit Date 01/13/25   Visit Time 1330   Consult Type New   WOCN Speciality Ostomy   WOCN List colostomy   Ostomy Type Colostomy   Intervention chart review;assessed;coordination of care;team conference        Colostomy 12/19/24 2225 RLQ   Placement Date/Time: (c) 12/19/24 (c) 2225   Present Prior to Hospital Arrival?: Yes  Location: RLQ   Wound Image       Stomal Appliance 1 piece   Stoma Appearance moist;pink;protruding above skin level

## 2025-01-13 NOTE — PLAN OF CARE
AAOx4; POC reviewed & verbalizes understanding.  Admit DX: Septic Shock  Afebrile. No acute events on shift. No deficits noted  IV access & IVF: PIV saline locked  BM: RUQ colostomy, brown liquid stool emptied  :  oliguria on HD  Appetite: adequate  Bed alarm set; fall precautions maintained.   Bed locked in lowest position, side rails up x2, call light within reach, environment clear. Encouraged pt to call nurse with any concerns.  Safety measures maintained  Pending discharge tomorrow

## 2025-01-13 NOTE — PROCEDURES
[facility] NOTE     Patient currently on hemodialysis for removal of uremic toxins and volume.     Patient seen and evaluated on hemodialysis, tolerating treatment, see HD flowsheet for vitals and assessments.      Ultrafiltration goal is 1-2L     Labs have been reviewed and the dialysate bath has been adjusted.     Assessment/Plan:  Seen on HD, having some hypotension.  I have adjusted his Na bath, UF profiling to aid in increased plasma refill rate.  Midodrine midway through treatment.  Dialysate has been adjusted to AM labs.  Continue EPO with HD  Phos binders with meals       MAURICIO Mac, FNP-BC  Nephrology  Pager:  [phone]

## 2025-01-13 NOTE — PLAN OF CARE
Problem: Hemodialysis  Goal: Safe, Effective Therapy Delivery  Outcome: Progressing  Goal: Effective Tissue Perfusion  Outcome: Progressing  Goal: Absence of Infection Signs and Symptoms  Outcome: Progressing    Dialysis tx ended early (30mins) per verbal from MAAME Reddy. Right chest perm cath heparin locked, capped, sterile dressing changed.    B/p low during tx, pt asymptomatic, x2 doses of the prn midodrine given and fluid bolus. B/p remain low at the end of tx. MAAME Reddy and Dr. Estrada made aware.    Net fluid removed: 500 ml     Report give to primary nurse. Pt transported by wheelchair from ERNST to room 1153

## 2025-01-13 NOTE — PROGRESS NOTES
"   01/13/25 0059   Vital Signs   Pulse 94   SpO2 96 %   Flow (L/min) (Oxygen Therapy) 3   Device (Oxygen Therapy) nasal cannula with humidification   /66   MAP (mmHg) 80   BP Location Right arm   BP Method Automatic   Patient Position Sitting     Pt  observed sitting on the side of the bed despite being a fall risk. Pt stated he was having trouble breathing. Nurse offered to sit patient in chair where he can safely sit up as pt has CHF and laying down causes him to have trouble breathing.     Pt refused to allow nurse to sit him in chair and stated" I don't give a damn I can sit on the side of the bed I am not going to fall! Nurse tried therapeutic communication and the pt was not receptive of it.    Pt refusing meds, glucose monitoring, and labs to be drawn Risk & Benefits explained, Jocelyn Leo DO made aware.  Pt AAOx4,  sitting on the side of bed bed, NAD, respirations E/UL, updated on POC, wheels locked and in low position, call bell within reach. Plan of care on going.             "

## 2025-01-13 NOTE — NURSING
Pt arrived to [facility] without portable cardiac monitoring. Pt refuses cardiac monitoring at this time. Pt made aware that monitoring was ordered by the provider and the importance of having it connected

## 2025-01-13 NOTE — PT/OT/SLP PROGRESS
Occupational Therapy      Patient Name:  Flakito Mcginnis   MRN:  [id number]    Patient not seen today secondary to JOSSUE for dialysis in AM . OT will follow-up on 01/14/2025.    1/13/2025

## 2025-01-14 VITALS
DIASTOLIC BLOOD PRESSURE: 61 MMHG | RESPIRATION RATE: 18 BRPM | BODY MASS INDEX: 19.84 KG/M2 | HEIGHT: 66 IN | WEIGHT: 123.44 LBS | HEART RATE: 96 BPM | SYSTOLIC BLOOD PRESSURE: 104 MMHG | OXYGEN SATURATION: 100 % | TEMPERATURE: 97 F

## 2025-01-14 LAB
ALBUMIN SERPL BCP-MCNC: 2.6 G/DL (ref 3.5–5.2)
ALP SERPL-CCNC: 268 U/L (ref 40–150)
ALT SERPL W/O P-5'-P-CCNC: <5 U/L (ref 10–44)
ANION GAP SERPL CALC-SCNC: 8 MMOL/L (ref 8–16)
AST SERPL-CCNC: 18 U/L (ref 10–40)
BASOPHILS # BLD AUTO: 0.04 K/UL (ref 0–0.2)
BASOPHILS NFR BLD: 1.3 % (ref 0–1.9)
BILIRUB SERPL-MCNC: 0.3 MG/DL (ref 0.1–1)
BUN SERPL-MCNC: 37 MG/DL (ref 8–23)
BUN SERPL-MCNC: 40 MG/DL (ref 8–23)
BUN SERPL-MCNC: 40 MG/DL (ref 8–23)
CALCIUM SERPL-MCNC: 9.3 MG/DL (ref 8.7–10.5)
CALCIUM SERPL-MCNC: 9.6 MG/DL (ref 8.7–10.5)
CALCIUM SERPL-MCNC: 9.6 MG/DL (ref 8.7–10.5)
CHLORIDE SERPL-SCNC: 104 MMOL/L (ref 95–110)
CHLORIDE SERPL-SCNC: 104 MMOL/L (ref 95–110)
CHLORIDE SERPL-SCNC: 105 MMOL/L (ref 95–110)
CO2 SERPL-SCNC: 21 MMOL/L (ref 23–29)
CO2 SERPL-SCNC: 23 MMOL/L (ref 23–29)
CO2 SERPL-SCNC: 23 MMOL/L (ref 23–29)
CREAT SERPL-MCNC: 4 MG/DL (ref 0.5–1.4)
CREAT SERPL-MCNC: 4.1 MG/DL (ref 0.5–1.4)
CREAT SERPL-MCNC: 4.1 MG/DL (ref 0.5–1.4)
DIFFERENTIAL METHOD BLD: ABNORMAL
EOSINOPHIL # BLD AUTO: 0.2 K/UL (ref 0–0.5)
EOSINOPHIL NFR BLD: 6.6 % (ref 0–8)
ERYTHROCYTE [DISTWIDTH] IN BLOOD BY AUTOMATED COUNT: 18.6 % (ref 11.5–14.5)
EST. GFR  (NO RACE VARIABLE): 15 ML/MIN/1.73 M^2
EST. GFR  (NO RACE VARIABLE): 15 ML/MIN/1.73 M^2
EST. GFR  (NO RACE VARIABLE): 15.4 ML/MIN/1.73 M^2
GLUCOSE SERPL-MCNC: 67 MG/DL (ref 70–110)
GLUCOSE SERPL-MCNC: 67 MG/DL (ref 70–110)
GLUCOSE SERPL-MCNC: 76 MG/DL (ref 70–110)
HCT VFR BLD AUTO: 28.3 % (ref 40–54)
HGB BLD-MCNC: 8.6 G/DL (ref 14–18)
IMM GRANULOCYTES # BLD AUTO: 0.01 K/UL (ref 0–0.04)
IMM GRANULOCYTES NFR BLD AUTO: 0.3 % (ref 0–0.5)
LYMPHOCYTES # BLD AUTO: 0.3 K/UL (ref 1–4.8)
LYMPHOCYTES NFR BLD: 11.3 % (ref 18–48)
MAGNESIUM SERPL-MCNC: 2.2 MG/DL (ref 1.6–2.6)
MCH RBC QN AUTO: 29 PG (ref 27–31)
MCHC RBC AUTO-ENTMCNC: 30.4 G/DL (ref 32–36)
MCV RBC AUTO: 95 FL (ref 82–98)
MONOCYTES # BLD AUTO: 0.4 K/UL (ref 0.3–1)
MONOCYTES NFR BLD: 12.6 % (ref 4–15)
NEUTROPHILS # BLD AUTO: 2 K/UL (ref 1.8–7.7)
NEUTROPHILS NFR BLD: 67.9 % (ref 38–73)
NRBC BLD-RTO: 0 /100 WBC
PHOSPHATE SERPL-MCNC: 4.7 MG/DL (ref 2.7–4.5)
PLATELET # BLD AUTO: 129 K/UL (ref 150–450)
PMV BLD AUTO: 11.7 FL (ref 9.2–12.9)
POTASSIUM SERPL-SCNC: 4.9 MMOL/L (ref 3.5–5.1)
POTASSIUM SERPL-SCNC: 4.9 MMOL/L (ref 3.5–5.1)
POTASSIUM SERPL-SCNC: 5.1 MMOL/L (ref 3.5–5.1)
PROT SERPL-MCNC: 6.6 G/DL (ref 6–8.4)
RBC # BLD AUTO: 2.97 M/UL (ref 4.6–6.2)
SODIUM SERPL-SCNC: 134 MMOL/L (ref 136–145)
SODIUM SERPL-SCNC: 135 MMOL/L (ref 136–145)
SODIUM SERPL-SCNC: 135 MMOL/L (ref 136–145)
WBC # BLD AUTO: 3.01 K/UL (ref 3.9–12.7)

## 2025-01-14 PROCEDURE — 99232 SBSQ HOSP IP/OBS MODERATE 35: CPT | Mod: ,,, | Performed by: NURSE PRACTITIONER

## 2025-01-14 PROCEDURE — 84100 ASSAY OF PHOSPHORUS: CPT | Performed by: HOSPITALIST

## 2025-01-14 PROCEDURE — 97530 THERAPEUTIC ACTIVITIES: CPT | Mod: CO

## 2025-01-14 PROCEDURE — 83735 ASSAY OF MAGNESIUM: CPT | Performed by: HOSPITALIST

## 2025-01-14 PROCEDURE — 85025 COMPLETE CBC W/AUTO DIFF WBC: CPT | Performed by: HOSPITALIST

## 2025-01-14 PROCEDURE — 25000003 PHARM REV CODE 250: Performed by: HOSPITALIST

## 2025-01-14 PROCEDURE — 36415 COLL VENOUS BLD VENIPUNCTURE: CPT | Performed by: HOSPITALIST

## 2025-01-14 PROCEDURE — 97535 SELF CARE MNGMENT TRAINING: CPT | Mod: CO

## 2025-01-14 PROCEDURE — 97116 GAIT TRAINING THERAPY: CPT

## 2025-01-14 PROCEDURE — 80053 COMPREHEN METABOLIC PANEL: CPT | Performed by: HOSPITALIST

## 2025-01-14 PROCEDURE — 25000003 PHARM REV CODE 250

## 2025-01-14 RX ORDER — SODIUM CHLORIDE 9 MG/ML
INJECTION, SOLUTION INTRAVENOUS ONCE
Status: CANCELLED | OUTPATIENT
Start: 2025-01-15

## 2025-01-14 RX ADMIN — SODIUM ZIRCONIUM CYCLOSILICATE 10 G: 5 POWDER, FOR SUSPENSION ORAL at 08:01

## 2025-01-14 RX ADMIN — ATORVASTATIN CALCIUM 40 MG: 40 TABLET, FILM COATED ORAL at 08:01

## 2025-01-14 RX ADMIN — SEVELAMER CARBONATE 800 MG: 800 TABLET, FILM COATED ORAL at 08:01

## 2025-01-14 NOTE — NURSING
Patient bed alarm refused. Call light placed within reach. Patient instructed to call staff for help.

## 2025-01-14 NOTE — PT/OT/SLP PROGRESS
Physical Therapy  6th Visit    Flakito Mcginnis   [id number]    Time Tracking:     PT Received On: 01/14/25   PT Start Time: 1151   PT Stop Time: 1217   PT Total Time (min): 26 min    Billable Minutes: Gait Training 15       Recommendations:     Therapy Intensity Recommendations at Discharge: Low Intensity Therapy     Equipment Needed After Discharge: bath bench    Barriers to Discharge: None    Patient Information:     Recent Surgery: * No surgery found *      Diagnosis: Septic shock    Length of Stay: 26 days    General Precautions: Standard, fall, aspiration  Orthopedic Precautions: N/A  Brace: N/A    Assessment:     Flakito Mcginnis tolerated treatment well today. Upon arrival, Flkaito was seated in bedside chair and receptive to start session. He ambulated 60ft x 2 with rollator while on 3L of O2 and required Stand-By (A). Gait was slow but no LOB experienced, 2-minute seated rest break required secondary to BLE weakness. Education provided to patient on performing LE therex throughout the day including; LAQs, seated marches, and ankle pumps. Re-assessed all goals today, remain appropriate to continue x 2 weeks (1/28/24). His discharge recommendations remain appropriate for low intensity D/C. Discussed PT role, POC, and goals with patient and/or family; verbalized understanding. Flakito Mcginnis will continue to benefit from acute PT services to promote mobility during this admission and improve return to PLOF.    Problem List: weakness, impaired endurance, impaired functional mobility, gait instability, impaired balance, impaired self care skills, impaired cardiopulmonary response to activity, decreased upper extremity function, decreased lower extremity function    Rehab Prognosis: Good; patient would benefit from acute skilled PT services to address these deficits and reach maximum level of function.    Plan:     Patient to be seen 3 x/week to address the above listed problems via gait training, therapeutic  "activities, therapeutic exercises, neuromuscular re-education    Plan of Care Expires: 01/22/25  Plan of Care reviewed with: patient    Subjective:     Communicated with RN prior to treatment, appropriate to see for treatment.    Pt found sitting up in bedside chair upon PT entry to room, agreeable to treatment.    Patient commenting: "We can start in 6 minutes"    Does this patient have any cultural, spiritual, Adventist conflicts given the current situation? Patient/family has no barriers to learning. Patient/family verbalizes understanding of his/her program and goals and demonstrates them correctly. No cultural, spiritual, or educational needs identified.    Objective:     Patient found with: colostomy, oxygen    Pain:  Pain Rating 1: 0/10  Pain Rating Post-Intervention 1: 0/10    Functional Mobility:    Bed Mobility:  NT; patient sitting up out of bed before and after session    Transfers:  Sit to Stand: Stand-By Assist from bedside chair with Rollator x 1 trial(s); stood from rollator seat in hallway x 1 trial(s)      Gait:  60 feet x 2, with rollator while on 3L of O2 and required Stand-By (A). Gait was slow but no LOB experienced, 2-minute seated rest break required secondary to BLE weakness.    Assist level: Stand-By Assist  Device: Rollator    Balance:  Static Sit: Stand-By Assist at edge of chair    Static Stand: Stand-By Assist with Rollator      AM-PAC 6 CLICK MOBILITY  Turning over in bed (including adjusting bedclothes, sheets and blankets)?: 3  Sitting down on and standing up from a chair with arms (e.g., wheelchair, bedside commode, etc.): 3  Moving from lying on back to sitting on the side of the bed?: 3  Moving to and from a bed to a chair (including a wheelchair)?: 3  Need to walk in hospital room?: 3  Climbing 3-5 steps with a railing?: 2  Basic Mobility Total Score: 17    Patient was left sitting up in bedside chair with all lines intact and call button in reach.    GOALS:   Multidisciplinary " Problems       Physical Therapy Goals          Problem: Physical Therapy    Goal Priority Disciplines Outcome Interventions   Physical Therapy Goal     PT, PT/OT Progressing    Description: Goals to be met by: 2025   Goals were re-assessed by PT during 6th visit on 25. Goals remain appropriate to continue x 2 weeks (25):    Patient will increase functional independence with mobility by performin. Sit to stand transfer with Modified [place] - Not met  2. Bed to chair transfer with Supervision using LRAD - Not met  3. Gait  x 250 feet with Supervision using LRAD. - Not met  4. Stand for 5 minutes with Supervision using LRAD while performing dynamic balance tasks - Not met  5. Lower extremity exercise program x30 reps per handout, with independence - Not met                         Douglas Betts, SPT  2025

## 2025-01-14 NOTE — PLAN OF CARE
Problem: Adult Inpatient Plan of Care  Goal: Plan of Care Review  1/14/2025 0728 by Naomy Doyle RN  Outcome: Progressing  1/14/2025 0700 by Naomy Doyle RN  Outcome: Progressing  Goal: Patient-Specific Goal (Individualized)  1/14/2025 0728 by Naomy Doyle RN  Outcome: Progressing  1/14/2025 0700 by Naomy Doyle RN  Outcome: Progressing  Goal: Absence of Hospital-Acquired Illness or Injury  1/14/2025 0728 by Naomy Doyle RN  Outcome: Progressing  1/14/2025 0700 by Naomy Doyle RN  Outcome: Progressing  Goal: Optimal Comfort and Wellbeing  1/14/2025 0728 by Naomy Doyle RN  Outcome: Progressing  1/14/2025 0700 by Naomy Doyle RN  Outcome: Progressing  Goal: Readiness for Transition of Care  1/14/2025 0728 by Naomy Doyle RN  Outcome: Progressing  1/14/2025 0700 by Naomy Doyle RN  Outcome: Progressing     Problem: Infection  Goal: Absence of Infection Signs and Symptoms  1/14/2025 0728 by Naomy Doyle RN  Outcome: Progressing  1/14/2025 0700 by Naomy Doyle RN  Outcome: Progressing     Problem: Skin Injury Risk Increased  Goal: Skin Health and Integrity  1/14/2025 0728 by Naomy Doyle RN  Outcome: Progressing  1/14/2025 0700 by Naomy Doyle RN  Outcome: Progressing     Problem: Sepsis/Septic Shock  Goal: Optimal Coping  1/14/2025 0728 by Naomy Doyle RN  Outcome: Progressing  1/14/2025 0700 by Naomy Doyle RN  Outcome: Progressing  Goal: Absence of Bleeding  1/14/2025 0728 by Naomy Doyle RN  Outcome: Progressing  1/14/2025 0700 by Naomy Doyle RN  Outcome: Progressing  Goal: Blood Glucose Level Within Targeted Range  1/14/2025 0728 by Naomy Doyle RN  Outcome: Progressing  1/14/2025 0700 by Naomy Doyle  KAYLENE DALAL  Outcome: Progressing  Goal: Absence of Infection Signs and Symptoms  1/14/2025 0728 by Naomy Doyle RN  Outcome: Progressing  1/14/2025 0700 by Naomy Doyle RN  Outcome: Progressing  Goal: Optimal Nutrition Intake  1/14/2025 0728 by Naomy Doyle RN  Outcome: Progressing  1/14/2025 0700 by Naomy Doyle RN  Outcome: Progressing

## 2025-01-14 NOTE — PLAN OF CARE
Discharge Plan A and Plan B have been determined by review of patient's clinical status, future medical and therapeutic needs, and coverage/benefits for post-acute care in coordination with multidisciplinary team members.    01/14/25 1357   Post-Acute Status   Post-Acute Authorization Placement   Post-Acute Placement Status Set-up Complete/Auth obtained   Coverage AETNA MANAGED MEDICARE - AETNA MEDICARE DUAL DSNP -   Hospital Resources/Appts/Education Provided Provided education on problems/symptoms using teachback;Provided patient/caregiver with written discharge plan information   Patient choice form signed by patient/caregiver List from System Post-Acute Care   Discharge Plan   Discharge Plan A New Nursing Home placement - [facility] care facility;Skilled Nursing Facility   Discharge Plan B Home Health;Group home     [facility]A confirmed w Nadiya ([facility]) that consents were sifned w/ patient at the bedside and sister Sara supported patient w/ making initial payment for [facility] NH placement. Nadiya confirmed that orders sent by [facility]A this am have been approved for SNF to [facility] care. Patient to receive SNF services for completion of IV abx and will transition to [facility] upon completion of IV abx. [facility]ERA reviewed w/ patient at the beside and patient agreeable to dc plan.     MD confirmd that patient remains medically ready for dc today. [facility]ERA placed [facility] orders for patient transport to [facility]. Patient notified and agreeable to dc plan. Report to be called to [phone]  117.    Post-acute set-up complete.                  Cole Morales, MSW, LMSW  [facility]  Case Management  Ext. [phone]

## 2025-01-14 NOTE — PLAN OF CARE
Recommendations   1) Continue Minced and Moist along w/ renal modifiers- recommend removing low potassium and low sodium modifiers- texture per SLP  2. Continue Novasource Renal BID for optimization of caloric intake  3. RD following      Goals: Meet % of EEN/EPN by next RD follow-up  Nutrition Goal Status: progressing towards goal  Communication of RD Recs:  (POC)

## 2025-01-14 NOTE — PLAN OF CARE
Problem: Adult Inpatient Plan of Care  Goal: Plan of Care Review  Outcome: Progressing  Goal: Patient-Specific Goal (Individualized)  Outcome: Progressing  Goal: Absence of Hospital-Acquired Illness or Injury  Outcome: Progressing  Goal: Optimal Comfort and Wellbeing  Outcome: Progressing  Goal: Readiness for Transition of Care  Outcome: Progressing     Problem: Sepsis/Septic Shock  Goal: Optimal Coping  Outcome: Progressing  Goal: Absence of Bleeding  Outcome: Progressing  Goal: Blood Glucose Level Within Targeted Range  Outcome: Progressing  Goal: Absence of Infection Signs and Symptoms  Outcome: Progressing  Goal: Optimal Nutrition Intake  Outcome: Progressing     Problem: CRRT (Continuous Renal Replacement Therapy)  Goal: Safe, Effective Therapy Delivery  Outcome: Progressing  Goal: Hemodynamic Stability  Outcome: Progressing  Goal: Body Temperature Maintained in Desired Range  Outcome: Progressing  Goal: Absence of Infection Signs and Symptoms  Outcome: Progressing     Problem: Hemodialysis  Goal: Safe, Effective Therapy Delivery  Outcome: Progressing  Goal: Effective Tissue Perfusion  Outcome: Progressing  Goal: Absence of Infection Signs and Symptoms  Outcome: Progressing

## 2025-01-14 NOTE — NURSING
Pt free of falls/trauma/injuries. Bed in low position, wheels locked, and call light within reach. Skin integrity remains unchanged. PIV kept because oncoming facility will administer IV Cefazolin after HD. Discharge orders/instructions from AVS given and reviewed with pt, verbalized understanding. Report called to Mandy. No distress noted/reported at this time. Patient transported safely from premises.

## 2025-01-14 NOTE — PROGRESS NOTES
"[facility] - Internal Medicine Telemetry  Adult Nutrition  Progress Note    SUMMARY       Recommendations   1) Continue Minced and Moist along w/ renal modifiers- recommend removing low potassium and low sodium modifiers- texture per SLP  2. Continue Novasource Renal BID for optimization of caloric intake  3. RD following     Goals: Meet % of EEN/EPN by next RD follow-up  Nutrition Goal Status: progressing towards goal  Communication of RD Recs:  (POC)    Assessment and Plan    Nutrition Problem  Severe acute malnutrition     Related to (etiology):   Inadequate energy intake     Signs and Symptoms (as evidenced by):   10 lb weight loss x 1 week (7.5%) and mild depletion in temple, severe depletion in clavicle, and moderate depletion in scapular      Nutrition Diagnosis Status:   Continues     Malnutrition Assessment  Malnutrition Context: acute illness or injury  Malnutrition Level: severe          Weight Loss (Malnutrition): 1-2% in 1 week (10 lb weight loss x 1 week (7.5%))  Muscle Mass (Malnutrition): severe depletion       [place] (Muscle Loss): mild depletion  Clavicle Bone Region (Muscle Loss): severe depletion  Scapular Bone Region (Muscle Loss): moderate depletion  Dorsal Hand (Muscle Loss): well nourished       Reason for Assessment    Reason For Assessment: RD follow-up  General Information Comments: Spoke w/ pt at bedside, pt w/ overall good appetite. Recommendations provided in POC for diet order modifications. 75% meal consumption noted along with supplemental intake. Pt is on HD- colostomy present. Pt continues w/ malnutrition- see PES statement for details. RD following. LBM noted- 1/14  Nutrition Discharge Planning: adequate oral intake    Nutrition/Diet History    Spiritual, Cultural Beliefs, Yarsani Practices, Values that Affect Care: no  Food Allergies: NKFA  Factors Affecting Nutritional Intake: chewing difficulties/inability to chew food    Anthropometrics    Height: 5' 6" (167.6 " cm)  Height (inches): 66 in  Height Method: Stated  Weight: 56 kg (123 lb 7.3 oz)  Weight (lb): 123.46 lb  Weight Method: Bed Scale  Ideal Body Weight (IBW), Male: 142 lb  BMI (Calculated): 19.9  BMI Grade: 18.5-24.9 - normal       Lab/Procedures/Meds    Pertinent Labs Reviewed: reviewed  Pertinent Labs Comments: Hgb: 7.3, Hct: 24.3, Na: 134, Potassium: 5.4, BUN: 47, Creatinine: 4.9, eGFR: 12.1  Pertinent Medications Reviewed: reviewed    Estimated/Assessed Needs    Weight Used For Calorie Calculations: 56 kg (123 lb 7.3 oz)  Energy Calorie Requirements (kcal): 3959-0023 kcal/kg (30-35kcal/kg)  Energy Need Method: Kcal/kg  Protein Requirements: 84-112g protein (1.5-2.0g/kg)  Weight Used For Protein Calculations: 56 kg (123 lb 7.3 oz)  Fluid Requirements (mL): per MD  Estimated Fluid Requirement Method: RDA Method  RDA Method (mL): 1680  CHO Requirement: 210g/day      Nutrition Prescription Ordered    Current Diet Order: Low Sodium/Renal/Low potassium/minced and moist diet  Oral Nutrition Supplement: Novasource renal vanilla BID    Evaluation of Received Nutrient/Fluid Intake    I/O: - 2.5 mL since 12/31  Energy Calories Required: meeting needs  Protein Required:  meeting needs  Fluid Required: other (see comments) (As per MD)  Comments: LBM 1/14 via colostomy  Tolerance: not tolerating (trouble swallowing)  % Intake of Estimated Energy Needs: 75 - 100 %  % Meal Intake: 75 - 100 %    Nutrition Risk    Level of Risk/Frequency of Follow-up: moderate - high     Monitor and Evaluation    Food and Nutrient Intake: energy intake  Physical Activity and Function: nutrition-related ADLs and IADLs  Anthropometric Measurements: weight, weight change, body mass index  Biochemical Data, Medical Tests and Procedures: electrolyte and renal panel, gastrointestinal profile, glucose/endocrine profile, inflammatory profile, lipid profile  Nutrition-Focused Physical Findings: overall appearance     Nutrition Follow-Up    RD Follow-up?:  Yes

## 2025-01-14 NOTE — PROGRESS NOTES
Jason Long - Internal Medicine [facility] Medicine  Progress Note    Patient Name: Flakito Mcginnis  MRN: [id number]  Patient Class: IP- Inpatient   Admission Date: 12/19/2024  Length of Stay: 26 days  Attending Physician: Keny Estrada MD  Primary Care Provider: Jordin Robbins MD        Subjective     Principal Problem:Septic shock        HPI:  By Alicia Galloway NP    Mr. Flakito Mcginnis is a 69 y.o. man w/ HFrEF (30% 8/2024 from 20-25% 6/2024), NICM, MR, ESRD on HD, chronic respiratory failure on home 3L NC, Crohn's s/p colostomy, h/o R nephrectomy. [place] [facility] due to hypotension and ongoing shortness of breath. He usually receives his dialysis on T, R, S and went on Wednesday for an extra session for volume removal. He arrived on Thursday for his usually scheduled dialysis session and was directed to the ED due to hypotension. On arrival in the ED his blood pressure was 78/51. He was found to have a BNP >4900 and CXR concerning for volume overload. High sensitivity troponin 923. Critical care medicine was consulted for hypotension and admitted to MICU.     Overview/Hospital Course:  1/9 Mr. 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 ID was consulted and recommending 6 weeks of IV Cefazolin after HD, end date 2/2/25. CTS evaluated and recommending medical management at this time due to multiple co-morbidities. Tunneled catheter was removed 12/22. Repeat cultures from 12/23 with NGTD. Temporary RIJ trialysis line placed 12/24.  Course further complicated by acute hypoxemic respiratory failure likely volume overload requiring HFNC.  Oxygenation improved with volume removal with dialysis and he was weaned down to NC.  He was SD to  on 12/29.  IR was consulted for new HD line placement which was  placed on 12/31.  He was going to be discharged home after his new line placement, but sister refuses to take him home and says he needs alf NH placement. K of 5.3. Lokelma ordered. renal panel q 4h. 1L/24h fluid restriction CM assisting Pending financials submission to [facility]  1/10 Hb trended down to 6.8. FOBT. Transfusion with 1 unit of PRBC . K improved to 5. HD today    1/11 persistent perirpheral edema. UF of 1L yesterday and continue UF as BP permits. nephrology f/u for HD today - refused HD today. Plan for next session Monday. mild epistaxis left  nostril - improved with pressure. Nasal saline  ordered  1/12 Hb 8.9. FOBT +. K of 5.2. refused telemetry.  monitor , reported self limiting bloody mucous discharge from rectum. no intervention per CRS. monitor Hb   1/13 K 5.5 . HD today. wound care follow up for ostomy care. Hypotension with HD resolved. asymptomatic.   1/14 K 5.1. lokelma x 1.   Discharge to NH                      Review of Systems:   Pain scale:  Constitutional:  fever,  chills, headache, vision loss, hearing loss, weight loss, Generalized weakness, falls, loss of smell, loss of taste, poor appetite,  sore throat, epistaxis- resolved   Respiratory: cough, shortness of breath.   Cardiovascular: chest pain, dizziness, palpitations, orthopnea, swelling of feet, syncope  Gastrointestinal: nausea, vomiting, abdominal pain, diarrhea, black stool,  blood in stool, change in bowel habits, constipation  Genitourinary: hematuria, dysuria, urgency, frequency  Integument/Breast: rash,  pruritis  Hematologic/Lymphatic: easy bruising, lymphadenopathy  Musculoskeletal: arthralgias , myalgias, back pain, neck pain, knee pain  Neurological: confusion, seizures, tremors, slurred speech  Behavioral/Psych:  depression, anxiety, auditory or visual hallucinations     OBJECTIVE:     Physical Exam:  Body mass index is 19.93 kg/m².    Constitutional: Appears thin built    Head: Normocephalic and atraumatic.  "  Neck: Normal range of motion. Neck supple. right chest HD catheter   Cardiovascular: Normal heart rate.  Regular heart rhythm.  Pulmonary/Chest: Effort normal.   Abdominal: No distension.  No tenderness. + colostomy   Musculoskeletal: Normal range of motion.++  edema.   Neurological: Alert and oriented to person, place, and time.   Skin: Skin is warm and dry.   Psychiatric: Normal mood and affect. Behavior is normal.       Vital Signs  Temp: 97.3 °F (36.3 °C) (01/14/25 1152)  Pulse: 79 (01/14/25 1152)  Resp: 18 (01/14/25 1152)  BP: 125/83 (01/14/25 1152)  SpO2: 100 % (01/14/25 0756)     24 Hour VS Range    Temp:  [97.3 °F (36.3 °C)-97.8 °F (36.6 °C)]   Pulse:  [79-94]   Resp:  [16-18]   BP: ()/(49-83)   SpO2:  [83 %-100 %]     Intake/Output Summary (Last 24 hours) at 1/14/2025 1348  Last data filed at 1/14/2025 0900  Gross per 24 hour   Intake 360 ml   Output --   Net 360 ml             I/O This Shift:  I/O this shift:  In: 360 [P.O.:360]  Out: -     Wt Readings from Last 3 Encounters:   01/13/25 56 kg (123 lb 7.3 oz)   12/11/24 59.9 kg (132 lb)   10/19/24 59.9 kg (132 lb)       I have personally reviewed the vitals and recorded Intake/Output     Laboratory/Diagnostic Data:    CBC/Anemia Labs: Coags:    Recent Labs   Lab 01/10/25  1846 01/11/25  0420 01/12/25  1537 01/13/25  0448 01/14/25  0429   WBC  --    < > 3.25* 3.82* 3.01*   HGB  --    < > 8.3* 8.4* 8.6*   HCT  --    < > 26.8* 27.2* 28.3*   PLT  --    < > 136* 140* 129*   MCV  --    < > 96 95 95   RDW  --    < > 18.6* 18.5* 18.6*   OCCULTBLOOD Positive*  --   --   --   --     < > = values in this interval not displayed.    No results for input(s): "PT", "INR", "APTT" in the last 168 hours.     Chemistries: ABG:   Recent Labs   Lab 01/12/25  0833 01/12/25  1326 01/12/25  1537 01/13/25  0448 01/13/25  1543 01/13/25  2028 01/13/25  2337 01/14/25  0429   *  136 136   < > 134*  134*   < > 134* 134* 135*  135*   K 5.1  5.2* 5.5*   < > 5.5*  5.5*  " "5.5*   < > 4.6  4.6 4.9  4.9 5.1  5.1  5.1     103 104   < > 102  102   < > 105 105 104  104   CO2 23  22* 17*   < > 22*  22*   < > 21* 21* 23  23   BUN 44*  46* 52*   < > 61*  61*   < > 35* 37* 40*  40*   CREATININE 5.0*  4.9* 5.2*   < > 5.7*  5.7*   < > 3.7* 4.0* 4.1*  4.1*   CALCIUM 10.3  10.1 9.8   < > 9.9  9.9   < > 9.1 9.3 9.6  9.6   PROT 7.3  7.2  --   --  6.8  --   --   --  6.6   BILITOT 0.3  0.3  --   --  0.3  --   --   --  0.3   ALKPHOS 291*  280*  --   --  268*  --   --   --  268*   ALT <5*  <5*  --   --  <5*  --   --   --  <5*   AST 20  20  --   --  20  --   --   --  18   MG 2.1  2.1  --   --  2.2  --   --   --  2.2   PHOS 5.4* 5.5*  --  5.7*  --   --   --  4.7*    < > = values in this interval not displayed.    No results for input(s): "PH", "PCO2", "PO2", "HCO3", "POCSATURATED", "BE" in the last 168 hours.     POCT Glucose: HbA1c:    Recent Labs   Lab 01/09/25  2045 01/10/25  2022 01/11/25  2007 01/12/25  1820 01/13/25 2006   POCTGLUCOSE 114* 109 104 227* 101    Hemoglobin A1C   Date Value Ref Range Status   12/18/2023 4.7 4.0 - 5.6 % Final     Comment:     ADA Screening Guidelines:  5.7-6.4%  Consistent with prediabetes  >or=6.5%  Consistent with diabetes    High levels of fetal hemoglobin interfere with the HbA1C  assay. Heterozygous hemoglobin variants (HbS, HgC, etc)do  not significantly interfere with this assay.   However, presence of multiple variants may affect accuracy.          Cardiac Enzymes: Ejection Fractions:    No results for input(s): "CPK", "CPKMB", "MB", "TROPONINI" in the last 72 hours. EF   Date Value Ref Range Status   12/20/2024 20 % Final          No results for input(s): "COLORU", "APPEARANCEUA", "PHUR", "SPECGRAV", "PROTEINUA", "GLUCUA", "KETONESU", "BILIRUBINUA", "OCCULTUA", "NITRITE", "UROBILINOGEN", "LEUKOCYTESUR", "RBCUA", "WBCUA", "BACTERIA", "SQUAMEPITHEL", "HYALINECASTS" in the last 48 hours.    Invalid input(s): "WRIGHTSUR"    Lactate " "(Lactic Acid) (mmol/L)   Date Value   12/19/2024 2.4 (H)     BNP (pg/mL)   Date Value   12/19/2024 >4,900 (H)     No results found for: "CRP", "SEDRATE"  No results found for: "DDIMER"  Ferritin (ng/mL)   Date Value   12/20/2024 2,808 (H)     No results found for: "LDH"  CPK (U/L)   Date Value   12/21/2024 <7 (L)     CK (U/L)   Date Value   08/22/2024 139     No results found for this or any previous visit.  SARS-CoV2 (COVID-19) Qualitative PCR (no units)   Date Value   01/13/2025 Not Detected   06/12/2020 Not detected     SARS-CoV-2 RNA, Amplification, Qual (no units)   Date Value   12/19/2024 Negative       Microbiology labs for the last week  Microbiology Results (last 7 days)       ** No results found for the last 168 hours. **            Reviewed and noted in plan where applicable- Please see chart for full lab data.    Lines/Drains:       Hemodialysis Catheter 12/31/24 0818 right subclavian (Active)   Line Necessity Review CRRT/HD 01/09/25 2022   Verification by X-ray Yes 01/07/25 2049   Site Assessment No drainage;No redness;No swelling;No warmth 01/09/25 2022   Line Securement Device Secured with sutureless device 01/09/25 2022   Dressing Type CHG impregnated dressing/sponge 01/09/25 2022   Dressing Status Clean;Dry;Intact 01/09/25 2022   Dressing Intervention Integrity maintained 01/09/25 2022   Date on Dressing 01/06/25 01/08/25 2046   Dressing Due to be Changed 01/13/25 01/08/25 2046   Venous Patency/Care flushed w/o difficulty;heparin locked;intermittent infusion cap applied 01/08/25 1847   Arterial Patency/Care flushed w/o difficulty;heparin locked;intermittent infusion cap applied 01/08/25 1847   Waveform Not being transduced 01/02/25 0838   Number of days: 9            Peripheral IV - Single Lumen 01/09/25 1622 20 G Posterior;Right Forearm (Active)   Site Assessment Clean;Dry;Intact;No redness;No swelling 01/09/25 2022   Line Securement Device Secured with sutureless device 01/09/25 2022   Extremity " Assessment Distal to IV No abnormal discoloration;No redness;No swelling;No warmth 01/09/25 2022   Line Status Flushed;Saline locked 01/09/25 2022   Dressing Status Clean;Dry;Intact 01/09/25 2022   Dressing Intervention Integrity maintained 01/09/25 2022   Dressing Change Due 01/13/25 01/09/25 1622   Site Change Due 01/13/25 01/09/25 2022   Reason Not Rotated Not due 01/09/25 2022   Number of days: 0            Colostomy 12/19/24 2225 RLQ (Active)   Stomal Appliance 1 piece;No Leakage;Dry;Intact 01/09/25 2022   Stoma Appearance rosebud appearance 01/09/25 2022   Stoma Size (in) 26 01/02/25 0838   Site Assessment Clean;Intact 01/09/25 2022   Peristomal Assessment Clean;Intact 01/09/25 0751   Accessories/Skin Care barrier substance over peristomal skin;cleansed w/ water;skin barrier paste 01/02/25 0838   Stoma Function stool 01/09/25 0751   Treatment Placement checked 01/06/25 0800   Tolerance no signs/symptoms of discomfort 01/06/25 2016   Output (mL) 100 mL 01/10/25 0612   Number of days: 21       Imaging  ECG Results              Repeat EKG 12-lead (Final result)        Collection Time Result Time QRS Duration OHS QTC Calculation    12/19/24 16:03:39 12/20/24 09:16:41 112 512                     Final result by Interface, Lab In [facility] (12/20/24 09:16:46)                   Narrative:    Test Reason : R06.00,    Vent. Rate :  96 BPM     Atrial Rate :  96 BPM     P-R Int : 232 ms          QRS Dur : 112 ms      QT Int : 406 ms       P-R-T Axes :  52 -47 203 degrees    QTcB Int : 512 ms    Sinus rhythm with 1st degree A-V block  Left axis deviation  Anterior infarct (cited on or before 19-Dec-2024)  ST and T wave abnormality, consider lateral ischemia  Prolonged QT  Abnormal ECG  When compared with ECG of 19-Dec-2024 14:29,  No significant change was found  Confirmed by Dominick Grant ([id number]) on 12/20/2024 9:16:39 AM    Referred By: AAAREFERRAL SELF           Confirmed By: Dominick Grant                                      EKG 12-lead (Final result)        Collection Time Result Time QRS Duration OHS QTC Calculation    12/19/24 14:29:52 12/19/24 14:32:53 114 560                     Final result by Interface, Lab In [facility] (12/19/24 14:32:59)                   Narrative:    Test Reason : Z13.6,    Vent. Rate : 102 BPM     Atrial Rate : 102 BPM     P-R Int : 226 ms          QRS Dur : 114 ms      QT Int : 430 ms       P-R-T Axes :  39 -55 -85 degrees    QTcB Int : 560 ms    Sinus tachycardia with 1st degree A-V block  Left anterior fascicular block  Abnormal R wave progression in the precordial leads - Possible Anterior  infarct ,age undetermined  ST and T wave abnormality, consider lateral ischemia  Prolonged QT  Abnormal ECG  No previous ECGs available  Confirmed by Franck Aguilar ([id number]) on 12/19/2024 2:32:50 PM    Referred By: AAAREFERRAL SELF           Confirmed By: Franck Aguilar                                    Results for orders placed during the hospital encounter of 12/19/24    Echo    Interpretation Summary    There is a 10 by 4 mm large mobile echogenic mass present in the LVOT suggestive of vegetation - blood cultures could be done if clinically indicated.    Left Ventricle: The left ventricle is mildly dilated. Mildly increased ventricular mass. Normal wall thickness. There is mild eccentric hypertrophy. Severe global hypokinesis present. Septal motion is abnormal. There is severely reduced systolic function with a visually estimated ejection fraction of 20 - 25%. Ejection fraction is approximately 20%. Quantitated ejection fraction is 25%. There is diastolic dysfunction.    Right Ventricle: Mild right ventricular enlargement. Wall thickness is normal. Right ventricle wall motion has global hypokinesis. Systolic function is mild-moderately reduced.    Left Atrium: Left atrium is severely dilated.    Right Atrium: Right atrium is moderately dilated.    Aortic Valve: There is moderate aortic valve sclerosis. Moderately  restricted motion. There is a 10 by 4 mm large mobile echogenic mass present in the LVOT suggestive of vegetation - blood cultures could be done if clinically indicated. There is moderate stenosis. Aortic valve area by VTI is 1.2 cm2. Aortic valve peak velocity is 2.6 m/s. Mean gradient is 16.5 mmHg. The dimensionless index is 0.37. There is mild to moderate aortic regurgitation.    Mitral Valve: There is moderate bileaflet sclerosis. There is moderate mitral annular calcification present. There is mild regurgitation.    Tricuspid Valve: There is moderate to  moderate-severe regurgitation.    Pulmonary Artery: There is mild pulmonary hypertension. The estimated pulmonary artery systolic pressure is 41 mmHg.    IVC/SVC: Intermediate venous pressure at 8 mmHg.      X-Ray Cervical Spine 2 or 3 Views  Narrative: EXAMINATION:  XR CERVICAL SPINE 2 OR 3 VIEWS    CLINICAL HISTORY:  s/p fall;    TECHNIQUE:  AP, lateral and open mouth views of the cervical spine were performed.    COMPARISON:  August 2023    FINDINGS:  Bones are markedly demineralized limiting evaluation.  Also the patient's shoulders obscure mole see entire cervical spine.  Postoperative change base of neck likely about the thyroid.  Partially visualized large-bore right vascular catheter.  Remote left rib fractures.  Impression: Nondiagnostic study due to marked osteopenia and obscuration of the cervical spine by the shoulder.  Further evaluation to exclude cervical spine trauma will be needed.    This report was flagged in Epic as abnormal.    Electronically signed by: Ketan Springer MD  Date:    01/05/2025  Time:    12:45  X-Ray Shoulder Trauma 3 view Left  Narrative: EXAMINATION:  XR SHOULDER TRAUMA 3 VIEW LEFT    CLINICAL HISTORY:  fall on shoulder, concerned for dislocation, hard to assess due to posture;    TECHNIQUE:  Three views of the left shoulder were performed.    COMPARISON  December 19, 2024    FINDINGS:  Probable remote fracture about  superolateral aspect of the right humeral head.  Calcific densities/cortical thickening again noted.  Narrowing of the subacromial space suggest chronic rotator cuff tear.  Bones are markedly demineralized.  Numeral head difficult to visualize on axillary Y-view appears similar in position compared to prior.  Unfortunately subluxation is not excluded.  Left rib deformities again noted.  Impression: Significant bony demineralization limits evaluation.  Probable remote fracture about the superolateral aspect of the humeral head.  Suspected anterior subluxation of the humerus with respect to the glenoid though not well visualized on the axillary Y-view.    This report was flagged in Epic as abnormal.    Electronically signed by: Ketan Springer MD  Date:    01/05/2025  Time:    12:43      Labs, Imaging, EKG and Diagnostic results from 1/14/2025 were reviewed.    Medications:  Medication list was reviewed and changes noted under Assessment/Plan.  No current facility-administered medications on file prior to encounter.     Current Outpatient Medications on File Prior to Encounter   Medication Sig Dispense Refill    atorvastatin (LIPITOR) 40 MG tablet Take 40 mg by mouth once daily.      sevelamer carbonate (RENVELA) 800 mg Tab Take 800 mg by mouth 3 (three) times daily with meals.       Scheduled Medications:  Current Facility-Administered Medications   Medication Dose Route Frequency    atorvastatin  40 mg Oral Daily    ceFAZolin (Ancef) IV (PEDS and ADULTS)  1 g Intravenous Q24H    epoetin ludin-ebpx (RETACRIT) injection  3,000 Units Intravenous Every Mon, Wed, Fri    heparin (porcine)  5,000 Units Subcutaneous Q8H    melatonin  6 mg Oral Nightly    miconazole NITRATE 2 %   Topical (Top) BID    mupirocin   Nasal Daily    sevelamer carbonate  800 mg Oral TID WM     PRN:   Current Facility-Administered Medications:     0.9%  NaCl infusion (for blood administration), , Intravenous, Q24H PRN    0.9%  NaCl infusion (for blood  administration), , Intravenous, Q24H PRN    acetaminophen, 500 mg, Oral, Q4H PRN    diphenhydrAMINE-zinc acetate 2-0.1%, , Topical (Top), TID PRN    haloperidoL, 5 mg, Oral, Q6H PRN    heparin (porcine), 1,000 Units, Intra-Catheter, PRN    midodrine, 10 mg, Oral, PRN    oxyCODONE, 5 mg, Oral, Q4H PRN    oxyCODONE, 10 mg, Oral, Q6H PRN    sodium chloride, 2 spray, Each Nostril, Q6H PRN    sodium chloride 0.9%, 10 mL, Intravenous, PRN    white petrolatum, , Topical (Top), PRN  Infusions:   Estimated Creatinine Clearance: 13.5 mL/min (A) (based on SCr of 4.1 mg/dL (H)).             Assessment and Plan     * Septic shock  Admitted to MICU 12/19 for septic shock 2/2 Staph capitis bacteremia and endocarditis from his tunneled HD line  Weaned off vasopressors in the ICU  2D echo with mass on the aortic valve suggestive of vegetation  Tunneled HD line removed 12/22  Temporary trialysis placed 12/24  ID consulted:  Suggest 6 weeks of IV Ancef with HD through 2/2/25  CTS consulted:  Suggest IV abx, no surgical intervention  IR placed new tunneled line 12/31 1/9 s/p ID eval - Septic shock on admission secondary to endocarditis in LVOT with associated S capitis bacteremia.  Had HD ([facility]) line removal on 12/22. Repeat blood cultures 12/23 are NGTD. Shock resolved. Large mobile vegetation on TTE. Transitioned from daptomycin to cefazolin based on susceptibilities. Evaluated by CTS and deemed a non surgical candidate due to risk/comorbidities.Continue cefazolin x 6 weeks of therapy from first negative blood culture. (End date: 2/2/25). s/p  IR line placement 12/31.  Waiting on new NH placement.  Pending financials to [facility]       Chronic left shoulder pain  Patient states that he has been having shoulder pain for the past couple of weeks associated with a fall some time back. Was not able to assess due to being in the hospital and is worried that it is dislocated.     orthopedic eval 1/7 - old greater tuberosity fracture  of the left humerus.  Some mild subluxation on the scapular Y-view.  In his has some tenderness in that area.  This has been ongoing since August.  He has multiple comorbid medical conditions that do not make him a good candidate for surgery.  The shoulder does not appear to be fully dislocated but does have some subluxation.  Continue conservative treatment given his overall medical status.     Xray L shoulder with remote fracture about the superolateral aspect of the humeral head. Suspected anterior subluxation of the humerus with respect to the glenoid    rec WBAT/ROMAT LUE and PT/OT    ACP (advance care planning)  DNR noted      Debility  Patient with Acute debility due to  acute illness . The patient's latest AMPAC (Activity Measure for Post Acute Care) Score is listed below.    AM-PAC Score - How much help does the patient need for each activity listed  Basic Mobility Total Score: 17  Turning over in bed (including adjusting bedclothes, sheets and blankets)?: A little  Sitting down on and standing up from a chair with arms (e.g., wheelchair, bedside commode, etc.): A little  Moving from lying on back to sitting on the side of the bed?: A little  Moving to and from a bed to a chair (including a wheelchair)?: A little  Need to walk in hospital room?: A little  Climbing 3-5 steps with a railing?: A lot    Plan  - Progressive mobility protocol initated  - PT/OT consulted -   1/9 recs low intensity therapy    Palliative care encounter  DNR noted      Acute on chronic respiratory failure with hypoxia  Patient with Hypoxic Respiratory failure which is Acute on chronic.  He is on home oxygen 3-4L. Supplemental oxygen was provided and noted-       .   Signs/symptoms of respiratory failure include- tachypnea, increased work of breathing, respiratory distress, and use of accessory muscles. Contributing diagnoses includes - CHF Labs and images were reviewed. Patient Has recent ABG, which has been reviewed. Will treat  underlying causes and adjust management of respiratory failure as follows-   Continue to wean oxygen to goal SaO2 of 90%  Aggressive pulmonary toilet in setting of atelectasis of left lower lung field.   Weaned down to baseline 3L of O2 with NC  1/9 sats 95% on 3LNC    Hyperkalemia  Hyperkalemia is likely due to ESRD.The patients most recent potassium results are listed below.  Recent Labs     01/12/25  1537 01/12/25  1917 01/13/25  0448   K 5.0  5.0 5.2*  5.2* 5.5*  5.5*  5.5*     Plan  - Monitor for arrhythmias with EKG and/or continuous telemetry.   - improved with HD    1/9 K of 5.3. Lokelma ordered. renal panel q 4h. 1L/24h fluid restriction  1/12 K of 5.5. ordered  D 50,  insulin, albulterol concentrate, Lokelma. BMP q 4h   1/13 K 5.5 . HD today.     Bacteremia due to Staphylococcus  Seeding from tunneled HD line with AV vegetation  ID consulted:  Suggest 6 weeks of IV Ancef with HD through 2/2/25 1/9 s/p ID eval - Septic shock on admission secondary to endocarditis in LVOT with associated S capitis bacteremia.  Had HD ([facility]) line removal on 12/22. Repeat blood cultures 12/23 are NGTD. Shock resolved. Large mobile vegetation on TTE. Transitioned from daptomycin to cefazolin based on susceptibilities. Evaluated by CTS and deemed a non surgical candidate due to risk/comorbidities.Continue cefazolin x 6 weeks of therapy from first negative blood culture. (End date: 2/2/25). s/p  IR line placement 12/31.       Endocarditis  See septic shock  1/9 s/p ID eval - Septic shock on admission secondary to endocarditis in LVOT with associated S capitis bacteremia.  Had HD ([facility]) line removal on 12/22. Repeat blood cultures 12/23 are NGTD. Shock resolved. Large mobile vegetation on TTE. Transitioned from daptomycin to cefazolin based on susceptibilities. Evaluated by CTS and deemed a non surgical candidate due to risk/comorbidities.Continue cefazolin x 6 weeks of therapy from first negative blood culture. (End date:  2/2/25). s/p  IR line placement 12/31.      Anemia of chronic renal failure, stage 5  Anemia is likely due to chronic disease due to ESRD. Most recent hemoglobin and hematocrit are listed below.  Recent Labs     01/12/25  1537 01/13/25  0448 01/14/25  0429   HGB 8.3* 8.4* 8.6*   HCT 26.8* 27.2* 28.3*       Plan  - Monitor serial CBC: Daily  - Transfuse PRBC if patient becomes hemodynamically unstable, symptomatic or H/H drops below 7/21.  - s/p 1 unit PRBCs this admit  - Patient's anemia is currently stable  1/10 Hb trended down to 6.8. FOBT. Transfusion with 1 unit of PRBC .    1/12 Hb 8.9. FOBT +.    NSTEMI (non-ST elevated myocardial infarction)  In setting of septic shock  High sensitivity troponin troponin 923.    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  Had HD ([facility]) line removal on 12/22. Repeat blood cultures 12/23 are NGTD. Shock resolved. Large mobile vegetation on TTE. Transitioned from daptomycin to cefazolin based on susceptibilities. Evaluated by CTS and deemed a non surgical candidate due to risk/comorbidities.    Crohn's disease  S/p colectomy  No acute issues      History of nephrectomy  Noted  ESRD on HD    Hypertension  Patient's blood pressure range in the last 24 hours was: BP  Min: 100/62  Max: 145/70.  Hold BP meds    ESRD on hemodialysis  Nephro following  Tunneled HD line removed 12/22  Temporary trialysis placed 12/24  IR placed new line 12/31      VTE Risk Mitigation (From admission, onward)           Ordered     heparin (porcine) injection 1,000 Units  As needed (PRN)         01/03/25 1033     heparin (porcine) injection 5,000 Units  Every 8  hours         12/29/24 1000     Place sequential compression device  Until discontinued         12/19/24 1736     IP VTE LOW RISK PATIENT  Once         12/19/24 1736                    Discharge Planning   LLUVIA: 1/14/2025     Code Status: DNR   Medical Readiness for Discharge Date: 12/31/2024  Discharge Plan A: New Nursing Home placement - intermediate care facility   Discharge Delays: (!) Patient and Family Barriers            Please place Justification for DME        Keny Estrada MD  Department of Hospital Medicine   [facility]

## 2025-01-14 NOTE — PROGRESS NOTES
[facility]  Nephrology  Progress Note    Patient Name: Flakito Mcginnis  MRN: [id number]  Admission Date: 12/19/2024  Hospital Length of Stay: 26 days  Attending Provider: Keny sEtrada MD   Primary Care Physician: Jordin Robbins MD  Principal Problem:Septic shock    Subjective:     Interval History:   HD completed yesterday with 0.5 L removed. No distress on exam. Still pending NH placement.     Review of patient's allergies indicates:   Allergen Reactions    Vancomycin analogues Anaphylaxis, Other (See Comments), Shortness Of Breath and Swelling     Light headed, see's spots      Aspirin Nausea And Vomiting and Other (See Comments)     Has crohn's disease    Other reaction(s): FLARES UP CROHNS      Has crohn's disease     Current Facility-Administered Medications   Medication Frequency    0.9%  NaCl infusion (for blood administration) Q24H PRN    0.9%  NaCl infusion (for blood administration) Q24H PRN    acetaminophen tablet 500 mg Q4H PRN    atorvastatin tablet 40 mg Daily    ceFAZolin injection 1 g Q24H    diphenhydrAMINE-zinc acetate 2-0.1% cream TID PRN    epoetin ludin-epbx injection 3,000 Units Every Mon, Wed, Fri    haloperidoL tablet 5 mg Q6H PRN    heparin (porcine) injection 1,000 Units PRN    heparin (porcine) injection 5,000 Units Q8H    melatonin tablet 6 mg Nightly    miconazole NITRATE 2 % top powder BID    midodrine tablet 10 mg PRN    mupirocin 2 % ointment Daily    oxyCODONE immediate release tablet 5 mg Q4H PRN    oxyCODONE immediate release tablet Tab 10 mg Q6H PRN    sevelamer carbonate tablet 800 mg TID WM    sodium chloride 0.65 % nasal spray 2 spray Q6H PRN    sodium chloride 0.9% flush 10 mL PRN    white petrolatum 41 % ointment PRN       Objective:     Vital Signs (Most Recent):  Temp: 97.7 °F (36.5 °C) (01/14/25 0756)  Pulse: 89 (01/14/25 0756)  Resp: 18 (01/14/25 0756)  BP: (!) 93/54 (01/14/25 0756)  SpO2: 100 % (01/14/25 0756) Vital Signs (24h  Range):  Temp:  [97.4 °F (36.3 °C)-97.8 °F (36.6 °C)] 97.7 °F (36.5 °C)  Pulse:  [84-94] 89  Resp:  [16-18] 18  SpO2:  [83 %-100 %] 100 %  BP: ()/(49-63) 93/54     Weight: 56 kg (123 lb 7.3 oz) (01/13/25 0552)  Body mass index is 19.93 kg/m².  Body surface area is 1.61 meters squared.    I/O last 3 completed shifts:  In: 550 [I.V.:250; Other:300]  Out: 1187 [Other:1187]     Physical Exam  Vitals and nursing note reviewed.   Constitutional:       Appearance: He is ill-appearing.   HENT:      Head: Normocephalic and atraumatic.      Comments: Tunneled HD line in place  Cardiovascular:      Rate and Rhythm: Normal rate and regular rhythm.   Pulmonary:      Effort: Pulmonary effort is normal. No respiratory distress.      Breath sounds: Normal breath sounds.   Abdominal:      General: There is no distension.      Palpations: Abdomen is soft.   Musculoskeletal:         General: No deformity.      Right lower leg: Edema present.      Left lower leg: Edema present.   Skin:     General: Skin is warm and dry.      Coloration: Skin is pale.   Neurological:      General: No focal deficit present.      Mental Status: He is alert and oriented to person, place, and time. Mental status is at baseline.          Significant Labs:  CBC:   Recent Labs   Lab 01/14/25  0429   WBC 3.01*   RBC 2.97*   HGB 8.6*   HCT 28.3*   *   MCV 95   MCH 29.0   MCHC 30.4*     CMP:   Recent Labs   Lab 01/14/25  0429   GLU 67*  67*   CALCIUM 9.6  9.6   ALBUMIN 2.6*   PROT 6.6   *  135*   K 5.1  5.1  5.1   CO2 23  23     104   BUN 40*  40*   CREATININE 4.1*  4.1*   ALKPHOS 268*   ALT <5*   AST 18   BILITOT 0.3     All labs within the past 24 hours have been reviewed.     Assessment/Plan:     Renal/  ESRD on hemodialysis  After looking through his dialysis notes from yesterday, it does appear as if he completed two back-to-back sessions and left dialysis yesterday at his dry weight (54.6 kg) and reports high volume stool  output since his HD session. He reports that he is on 4L chronic oxygen use at home, and is currently on that dose in the ER, and reporting resolution of his dyspnea once he was placed back on his home oxygen. Chest xray appears unchanged from prior studies.       Outpatient HD Information:  -Dialysis modality: Hemodialysis  -Outpatient HD unit: [facility]  -Nephrologist: Dr. Strickland  -HD TX days: Tuesday/Thursday/Saturday  -Last HD TX prior to hospital admission: 12/18/2024  -Residual urine: Anuric   -EDW: 54.5 kg    Assessment:   - HD tomorrow for clearance and volume management.   - Continue to monitor intake and output  - Please avoid gadolinium, fleets, phos-based laxatives, NSAIDs  - Dialysis thrice weekly unless more urgent indications arise. Will evaluate RRT requirements Daily.    Anemia of ESRD   Recent Labs   Lab 01/12/25  1537 01/13/25  0448 01/14/25  0429   WBC 3.25* 3.82* 3.01*   HGB 8.3* 8.4* 8.6*   HCT 26.8* 27.2* 28.3*   * 140* 129*       Lab Results   Component Value Date    FESATURATED 12 (L) 12/20/2024    FERRITIN 2,808 (H) 12/20/2024       - Goal in ESRD is Hgb of 10-11. EPO with HD. Hgb 8.6.  - EPO with HD    Mineral Bone Disease in ESRD   Lab Results   Component Value Date    .8 (H) 12/20/2024    CALCIUM 9.6 01/14/2025    CALCIUM 9.6 01/14/2025    ALBUMIN 2.6 (L) 01/14/2025    CAION 1.22 12/25/2024    PHOS 4.7 (H) 01/14/2025       - F/U PO4, Mg, Calcium. And albumin levels daily.   - Renal diet with protein intake goal 1.5 g/kg/d with 1 L fluid restriction   - If patient has poor oral intake, recommend nephro nutritional shakes (ex Novasource)      ID  * Septic shock  - resolved, primary team following for NH placement.         Thank you for your consult. I will follow-up with patient. Please contact us if you have any additional questions.    Liza Still DNP, FNP-C  Nephrology  [facility]

## 2025-01-14 NOTE — PT/OT/SLP PROGRESS
Occupational Therapy   Treatment    Name: Flakito Mcginnis  MRN: [id number]  Admitting Diagnosis:  Septic shock       Recommendations:     Discharge Recommendations: Low Intensity Therapy  Discharge Equipment Recommendations:  bath bench  Barriers to discharge:  Decreased caregiver support    Assessment:     Flakito Mcginnis is a 69 y.o. male with a medical diagnosis of Septic shock.  In today's session, patient is agreeable to therapy and completed self-care task seated at EOB with increased time. Performance deficits affecting function are weakness, impaired endurance, decreased ROM, impaired functional mobility, gait instability, impaired balance, impaired skin, impaired self care skills.     Rehab Prognosis:  Good; patient would benefit from acute skilled OT services to address these deficits and reach maximum level of function.       Plan:     Patient to be seen 3 x/week to address the above listed problems via self-care/home management, therapeutic activities, therapeutic exercises  Plan of Care Expires: 01/23/25  Plan of Care Reviewed with: patient    Subjective     Chief Complaint: N/A  Patient/Family Comments/goals: Patient reports he has to change out his colostomy bag.  Pain/Comfort:  Pain Rating 1: 0/10    Objective:     Communicated with: Nurse prior to session.  Patient found sitting edge of bed with colostomy, oxygen upon OT entry to room.    A client care conference was completed by the OTR and the LUND prior to treatment by the LUND to discuss the patient's POC and current status.    General Precautions: Standard, fall, aspiration    Orthopedic Precautions:N/A  Braces: N/A  Respiratory Status: Nasal cannula, flow 3 L/min    Functional Mobility/Transfers:  Patient completed x3 Sit <> Stand Transfers from EOB and from rollator seat with stand by assistance with rollator   Functional Mobility: Patient engaged in functional mobility training to simulate a household distance. From EOB > bathroom > bedside  chair with SBA with rollator  to maximize functional endurance and standing balance required for home/community mobility and occupational engagement. No noted LOB.     Activities of Daily Living:  Grooming: stand by assistance to prepare, clean, and change colostomy bag seated at EOB. (Increased time) Patient required assistance to hold container in place to empty out the bag. Patient reports he usually completes this task standing in front of the toilet.  Lower Body Dressing: maximal assistance to doff/elkin undergarments and pants seated on rollator seat. Patient unable to perform figure-4 dressing technique.  Toileting: supervision for liz-care standing in front of the toilet.    [facility] 6 Click ADL: 19    Treatment & Education:  Patient educated on OOB mobility to improve overall activity tolerance and promote recovery.  Patient sat at EOB and completed ADL task to promote core engagement, static/dynamic sitting balance, and skin integrity.   Patient educated on energy conservation techniques upon discharge to return home to maintain occupational roles.  Addressed questions and /or concerns within LUND scope of practice.  Patient educated on role of occupational therapy, POC, and safety during ADLs and functional mobility. Patient and OT discussed importance of safe, continued mobility to optimize daily living skills. Patient verbalized understanding. Patient given instruction to call for medical staff/nurse for assistance.     Patient left up in chair with call button in reach and nurse notified    GOALS:   Multidisciplinary Problems       Occupational Therapy Goals          Problem: Occupational Therapy    Goal Priority Disciplines Outcome Interventions   Occupational Therapy Goal     OT, PT/OT Progressing    Description: Goals to be met by: 1/23/25 (1 mo)     Patient will increase functional independence with ADLs by performing:    UE Dressing with [place].  LE Dressing with [place].  Grooming while  standing at sink with [place].  Toileting from toilet with [place] for hygiene and clothing management.   Rolling to Bilateral with [place].   Supine to sit with [place].  Step transfer with [place]  Toilet transfer to toilet with [place].                         Time Tracking:     OT Date of Treatment: 01/14/25  OT Start Time: 1057 (1st start time 9:43)  OT Stop Time: 1148 (1st stop time 9:53)  OT Total Time (min): 51 min+ 10 min= 61 mins    Billable Minutes:Self Care/Home Management 45  Therapeutic Activity 16    OT/HAN: HAN     Number of HAN visits since last OT visit: 1 1/14/2025

## 2025-01-14 NOTE — SUBJECTIVE & OBJECTIVE
Interval History:   HD completed yesterday with 0.5 L removed. No distress on exam. Still pending NH placement.     Review of patient's allergies indicates:   Allergen Reactions    Vancomycin analogues Anaphylaxis, Other (See Comments), Shortness Of Breath and Swelling     Light headed, see's spots      Aspirin Nausea And Vomiting and Other (See Comments)     Has crohn's disease    Other reaction(s): FLARES UP CROHNS      Has crohn's disease     Current Facility-Administered Medications   Medication Frequency    0.9%  NaCl infusion (for blood administration) Q24H PRN    0.9%  NaCl infusion (for blood administration) Q24H PRN    acetaminophen tablet 500 mg Q4H PRN    atorvastatin tablet 40 mg Daily    ceFAZolin injection 1 g Q24H    diphenhydrAMINE-zinc acetate 2-0.1% cream TID PRN    epoetin ludin-epbx injection 3,000 Units Every Mon, Wed, Fri    haloperidoL tablet 5 mg Q6H PRN    heparin (porcine) injection 1,000 Units PRN    heparin (porcine) injection 5,000 Units Q8H    melatonin tablet 6 mg Nightly    miconazole NITRATE 2 % top powder BID    midodrine tablet 10 mg PRN    mupirocin 2 % ointment Daily    oxyCODONE immediate release tablet 5 mg Q4H PRN    oxyCODONE immediate release tablet Tab 10 mg Q6H PRN    sevelamer carbonate tablet 800 mg TID WM    sodium chloride 0.65 % nasal spray 2 spray Q6H PRN    sodium chloride 0.9% flush 10 mL PRN    white petrolatum 41 % ointment PRN       Objective:     Vital Signs (Most Recent):  Temp: 97.7 °F (36.5 °C) (01/14/25 0756)  Pulse: 89 (01/14/25 0756)  Resp: 18 (01/14/25 0756)  BP: (!) 93/54 (01/14/25 0756)  SpO2: 100 % (01/14/25 0756) Vital Signs (24h Range):  Temp:  [97.4 °F (36.3 °C)-97.8 °F (36.6 °C)] 97.7 °F (36.5 °C)  Pulse:  [84-94] 89  Resp:  [16-18] 18  SpO2:  [83 %-100 %] 100 %  BP: ()/(49-63) 93/54     Weight: 56 kg (123 lb 7.3 oz) (01/13/25 0552)  Body mass index is 19.93 kg/m².  Body surface area is 1.61 meters squared.    I/O last 3 completed  shifts:  In: 550 [I.V.:250; Other:300]  Out: 1187 [Other:1187]     Physical Exam  Vitals and nursing note reviewed.   Constitutional:       Appearance: He is ill-appearing.   HENT:      Head: Normocephalic and atraumatic.      Comments: Tunneled HD line in place  Cardiovascular:      Rate and Rhythm: Normal rate and regular rhythm.   Pulmonary:      Effort: Pulmonary effort is normal. No respiratory distress.      Breath sounds: Normal breath sounds.   Abdominal:      General: There is no distension.      Palpations: Abdomen is soft.   Musculoskeletal:         General: No deformity.      Right lower leg: Edema present.      Left lower leg: Edema present.   Skin:     General: Skin is warm and dry.      Coloration: Skin is pale.   Neurological:      General: No focal deficit present.      Mental Status: He is alert and oriented to person, place, and time. Mental status is at baseline.          Significant Labs:  CBC:   Recent Labs   Lab 01/14/25  0429   WBC 3.01*   RBC 2.97*   HGB 8.6*   HCT 28.3*   *   MCV 95   MCH 29.0   MCHC 30.4*     CMP:   Recent Labs   Lab 01/14/25  0429   GLU 67*  67*   CALCIUM 9.6  9.6   ALBUMIN 2.6*   PROT 6.6   *  135*   K 5.1  5.1  5.1   CO2 23  23     104   BUN 40*  40*   CREATININE 4.1*  4.1*   ALKPHOS 268*   ALT <5*   AST 18   BILITOT 0.3     All labs within the past 24 hours have been reviewed.

## 2025-01-14 NOTE — ASSESSMENT & PLAN NOTE
After looking through his dialysis notes from yesterday, it does appear as if he completed two back-to-back sessions and left dialysis yesterday at his dry weight (54.6 kg) and reports high volume stool output since his HD session. He reports that he is on 4L chronic oxygen use at home, and is currently on that dose in the ER, and reporting resolution of his dyspnea once he was placed back on his home oxygen. Chest xray appears unchanged from prior studies.       Outpatient HD Information:  -Dialysis modality: Hemodialysis  -Outpatient HD unit: [facility]  -Nephrologist: Dr. Strickland  -HD TX days: Tuesday/Thursday/Saturday  -Last HD TX prior to hospital admission: 12/18/2024  -Residual urine: Anuric   -EDW: 54.5 kg    Assessment:   - HD tomorrow for clearance and volume management.   - Continue to monitor intake and output  - Please avoid gadolinium, fleets, phos-based laxatives, NSAIDs  - Dialysis thrice weekly unless more urgent indications arise. Will evaluate RRT requirements Daily.    Anemia of ESRD   Recent Labs   Lab 01/12/25  1537 01/13/25  0448 01/14/25  0429   WBC 3.25* 3.82* 3.01*   HGB 8.3* 8.4* 8.6*   HCT 26.8* 27.2* 28.3*   * 140* 129*       Lab Results   Component Value Date    FESATURATED 12 (L) 12/20/2024    FERRITIN 2,808 (H) 12/20/2024       - Goal in ESRD is Hgb of 10-11. EPO with HD. Hgb 8.6.  - EPO with HD    Mineral Bone Disease in ESRD   Lab Results   Component Value Date    .8 (H) 12/20/2024    CALCIUM 9.6 01/14/2025    CALCIUM 9.6 01/14/2025    ALBUMIN 2.6 (L) 01/14/2025    CAION 1.22 12/25/2024    PHOS 4.7 (H) 01/14/2025       - F/U PO4, Mg, Calcium. And albumin levels daily.   - Renal diet with protein intake goal 1.5 g/kg/d with 1 L fluid restriction   - If patient has poor oral intake, recommend nephro nutritional shakes (ex Novasource)

## 2025-01-15 NOTE — PLAN OF CARE
[facility]  Discharge Final Note    Primary Care Provider: Jordin Robbins MD    Expected Discharge Date: 1/14/2025    Final Discharge Note (most recent)       Final Note - 01/15/25 0907          Final Note    Assessment Type Final Discharge Note (P)      Anticipated Discharge Disposition [facility] (P)      What phone number can be called within the next 1-3 days to see how you are doing after discharge? [phone] (P)      Hospital Resources/Appts/Education Provided Provided education on problems/symptoms using teachback;Provided patient/caregiver with written discharge plan information (P)         Post-Acute Status    Post-Acute Authorization Placement (P)      Post-Acute Placement Status Set-up Complete/Auth obtained (P)      Coverage [employer] MANAGED MEDICARE - AET[employer] MEDICARE DUAL DSNP - (P)                    Future Appointments   Date Time Provider Department Center   1/16/2025  1:45 PM RAYSA Solis MD [facility]        Important Message from Medicare  Important Message from Medicare regarding Discharge Appeal Rights: Given to patient/caregiver, Explained to patient/caregiver, Signed/date by patient/caregiver     Date IMM was signed: 01/14/25  Time IMM was signed: 1455      Patient discharged to [facility] to [facility].  Patient discharged to NH via  van transport.               VAL Haley, LMSW  [facility]  Case Management  Ext. [phone]

## 2025-01-16 ENCOUNTER — OFFICE VISIT (OUTPATIENT)
Dept: SPORTS MEDICINE | Facility: CLINIC | Age: 70
End: 2025-01-16
Payer: MEDICARE

## 2025-01-16 ENCOUNTER — HOSPITAL ENCOUNTER (OUTPATIENT)
Dept: RADIOLOGY | Facility: HOSPITAL | Age: 70
Discharge: HOME | End: 2025-01-16
Attending: ORTHOPAEDIC SURGERY
Payer: MEDICARE

## 2025-01-16 VITALS
DIASTOLIC BLOOD PRESSURE: 52 MMHG | HEART RATE: 98 BPM | SYSTOLIC BLOOD PRESSURE: 94 MMHG | HEIGHT: 66 IN | BODY MASS INDEX: 19.93 KG/M2

## 2025-01-16 DIAGNOSIS — G89.29 CHRONIC LEFT SHOULDER PAIN: ICD-10-CM

## 2025-01-16 DIAGNOSIS — M25.512 LEFT SHOULDER PAIN, UNSPECIFIED CHRONICITY: ICD-10-CM

## 2025-01-16 DIAGNOSIS — M25.512 CHRONIC LEFT SHOULDER PAIN: ICD-10-CM

## 2025-01-16 DIAGNOSIS — M24.412 CHRONIC DISLOCATION OF LEFT SHOULDER: Primary | ICD-10-CM

## 2025-01-16 PROCEDURE — 3074F SYST BP LT 130 MM HG: CPT | Mod: CPTII,S$GLB,, | Performed by: ORTHOPAEDIC SURGERY

## 2025-01-16 PROCEDURE — 3008F BODY MASS INDEX DOCD: CPT | Mod: CPTII,S$GLB,, | Performed by: ORTHOPAEDIC SURGERY

## 2025-01-16 PROCEDURE — 1125F AMNT PAIN NOTED PAIN PRSNT: CPT | Mod: CPTII,S$GLB,, | Performed by: ORTHOPAEDIC SURGERY

## 2025-01-16 PROCEDURE — 3066F NEPHROPATHY DOC TX: CPT | Mod: CPTII,S$GLB,, | Performed by: ORTHOPAEDIC SURGERY

## 2025-01-16 PROCEDURE — 1101F PT FALLS ASSESS-DOCD LE1/YR: CPT | Mod: CPTII,S$GLB,, | Performed by: ORTHOPAEDIC SURGERY

## 2025-01-16 PROCEDURE — 3078F DIAST BP <80 MM HG: CPT | Mod: CPTII,S$GLB,, | Performed by: ORTHOPAEDIC SURGERY

## 2025-01-16 PROCEDURE — 1111F DSCHRG MED/CURRENT MED MERGE: CPT | Mod: CPTII,S$GLB,, | Performed by: ORTHOPAEDIC SURGERY

## 2025-01-16 PROCEDURE — 99204 OFFICE O/P NEW MOD 45 MIN: CPT | Mod: S$GLB,,, | Performed by: ORTHOPAEDIC SURGERY

## 2025-01-16 PROCEDURE — 73030 X-RAY EXAM OF SHOULDER: CPT | Mod: 26,LT,, | Performed by: RADIOLOGY

## 2025-01-16 PROCEDURE — 99999 PR PBB SHADOW E&M-EST. PATIENT-LVL III: CPT | Mod: PBBFAC,,, | Performed by: ORTHOPAEDIC SURGERY

## 2025-01-16 PROCEDURE — 73030 X-RAY EXAM OF SHOULDER: CPT | Mod: TC,LT

## 2025-01-16 PROCEDURE — 1159F MED LIST DOCD IN RCRD: CPT | Mod: CPTII,S$GLB,, | Performed by: ORTHOPAEDIC SURGERY

## 2025-01-16 PROCEDURE — 3288F FALL RISK ASSESSMENT DOCD: CPT | Mod: CPTII,S$GLB,, | Performed by: ORTHOPAEDIC SURGERY

## 2025-01-16 NOTE — PROGRESS NOTES
CC: Left shoulder pain    69 y.o. Male presents as a new patient to me. Patient is right hand dominant.  The patient has sustained a fall in August of 2024 with injury to his left shoulder.  This was an anterior fracture dislocation of the glenohumeral joint.  No initial treatment.  He was more recently seen and evaluated by Ortho Trauma during a recent readmission at [facility] for the same issue.  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  He was discharge to skilled nursing home on 1/15/25.  He comes into clinic today with pain and functional loss involving the left upper extremity.  He reports limited movement of left shoulder. Denies numbness, tingling, radicular pain. He takes Percocet 10mg twice daily. Here today to discuss diagnosis and treatment options.     PMHx notable for Crohn's, colostomy bag, ESRD on dialysis, HTN, continuous supplemental oxygen.   Negative for tobacco.   Negative for diabetes. Last A1C: 4.7 12/18/23    Pain Score:   8    PAST MEDICAL HISTORY:   Past Medical History:   Diagnosis Date    Crohn's disease 1998    ESRD (end stage renal disease) on dialysis 10/2017    Hypertension     Obstructive uropathy      PAST SURGICAL HISTORY:  Past Surgical History:   Procedure Laterality Date    COLOSTOMY  2006    DIALYSIS FISTULA CREATION Left 02/2018    NEPHRECTOMY Right     REMOVAL OF TUNNELED CENTRAL VENOUS CATHETER (CVC) N/A 5/23/2018    Procedure: PERMCATH REMOVAL-TUNNELED CVC REMOVAL;  Surgeon: Parveen Ray MD;  Location: [facility] CATH LAB;  Service: Nephrology;  Laterality: N/A;  pt coming in @930     FAMILY HISTORY:  Family History   Problem Relation Name Age of Onset    Hypertension Mother      Emphysema Father       MEDICATIONS:    Current Outpatient Medications:     atorvastatin (LIPITOR) 40 MG tablet, Take 40 mg by mouth once daily., Disp: , Rfl:     D5W PgBk 50 mL with ceFAZolin 2 gram SolR 2 g, Inject 2 g into the vein every Mon, Wed, Fri. for  "20 days, Disp: , Rfl:     miconazole NITRATE 2 % (MICOTIN) 2 % top powder, Apply topically 2 (two) times daily. Apply to underarm as needed, Disp: 85 g, Rfl: 0    midodrine (PROAMATINE) 10 MG tablet, Take 1 tablet (10 mg total) by mouth as needed (prior to HD)., Disp: 30 tablet, Rfl: 2    oxyCODONE (ROXICODONE) 5 MG immediate release tablet, Take 1 tablet (5 mg total) by mouth every 12 (twelve) hours as needed for Pain., Disp: 10 tablet, Rfl: 0    sevelamer carbonate (RENVELA) 800 mg Tab, Take 800 mg by mouth 3 (three) times daily with meals., Disp: , Rfl:     ALLERGIES:  Review of patient's allergies indicates:   Allergen Reactions    Vancomycin analogues Anaphylaxis, Other (See Comments), Shortness Of Breath and Swelling     Light headed, see's spots      Aspirin Nausea And Vomiting and Other (See Comments)     Has crohn's disease    Other reaction(s): FLARES UP CROHNS      Has crohn's disease     REVIEW OF SYSTEMS:  Constitution: Negative. Negative for chills, fever and night sweats.    Hematologic/Lymphatic: Negative for bleeding problem. Does not bruise/bleed easily.   Skin: Negative for dry skin, itching and rash.   Musculoskeletal: Negative for falls. Positive for left shoulder pain and muscle weakness.     All other review of symptoms were reviewed and found to be noncontributory.    PHYSICAL EXAMINATION:  Vitals:  BP (!) 94/52   Pulse 98   Ht 5' 6" (1.676 m)   BMI 19.93 kg/m²    General: Well-developed well-nourished 69 y.o. malein no acute distress   Cardiovascular: Regular rhythm by palpation of distal pulse, normal color and temperature, no concerning varicosities on symptomatic side   Lungs: No labored breathing or wheezing appreciated   Neuro: Alert and oriented ×3   Psychiatric: well oriented to person, place and time, demonstrates normal mood and affect   Skin: No rashes, lesions or ulcers, normal temperature, turgor, and texture on uninvolved extremity    Ortho/SPM Exam  Examination of the left " shoulder demonstrates anterior glenohumeral prominence consistent with static chronic anterior shoulder dislocation.  No skin issues or breakdown.  This is mildly tender for him.  Limited shoulder active and passive range of motion for that reason.  Limited active elbow flexion but he has sensation intact to light touch over the lateral antebrachial cutaneous nerve distribution.  He is able to demonstrate active wrist extension against gravity with some resistance.  Intact digit MCP extension.  Intact AIN pinch.  Intact ulnar intrinsic function as well.  Intact active forearm supination and pronation with some weakness.  Denies any numbness or tingling into the left hand.  Intact cervical neck range of motion    IMAGING:  Xrays including AP, Outlet and Axillary Lateral of Left shoulder are reviewed by me:   Chronic anterior glenohumeral fracture dislocation with malunited greater tuberosity fracture    ASSESSMENT:      ICD-10-CM ICD-9-CM   1. Chronic dislocation of left shoulder  M24.412 718.31   2. Chronic left shoulder pain  M25.512 719.41    G89.29 338.29       PLAN:     The patient comes in with a chronically anteriorly dislocated shoulder with malunion of greater tuberosity fracture fragment.  Multiple significant medical comorbidities to include end-stage renal disease on dialysis and continuous supplemental oxygen.  Colostomy bag in place.  The patient is a very poor operative candidate.  Wheelchair-bound.  From that standpoint I do think his best option would be to consider continued conservative care.  From a surgical standpoint a reverse shoulder arthroplasty would be high-risk in my opinion for potential intraoperative fracture, aseptic loosening of components, infection, neurovascular injury.  The other option would be shoulder resection arthroplasty but this would not improve his shoulder function.  Both surgeries potentially may not improve his elbow range of motion as well.  The loss of elbow flexion  could be related to neurologic injury from the chronic dislocation but certainly it is encouraging that he has no sensory deficits on exam.  With all options and consideration I think continued observation for this issue is his best course.  He is in agreement.  May continue some therapy to the left upper extremity which can be of some benefit for activities of daily living.    Procedures

## 2025-02-09 ENCOUNTER — HOSPITAL ENCOUNTER (INPATIENT)
Facility: HOSPITAL | Age: 70
LOS: 10 days | Discharge: HOME | DRG: 871 | End: 2025-02-19
Attending: STUDENT IN AN ORGANIZED HEALTH CARE EDUCATION/TRAINING PROGRAM | Admitting: EMERGENCY MEDICINE
Payer: MEDICARE

## 2025-02-09 DIAGNOSIS — I50.9 CHF (CONGESTIVE HEART FAILURE): ICD-10-CM

## 2025-02-09 DIAGNOSIS — E87.5 HYPERKALEMIA: ICD-10-CM

## 2025-02-09 DIAGNOSIS — R09.02 HYPOXIC: ICD-10-CM

## 2025-02-09 DIAGNOSIS — J96.01 ACUTE HYPOXIC RESPIRATORY FAILURE: Primary | ICD-10-CM

## 2025-02-09 DIAGNOSIS — R07.9 CHEST PAIN: ICD-10-CM

## 2025-02-09 DIAGNOSIS — R94.31 ABNORMAL EKG: ICD-10-CM

## 2025-02-09 DIAGNOSIS — J96.21 ACUTE ON CHRONIC RESPIRATORY FAILURE WITH HYPOXIA: ICD-10-CM

## 2025-02-09 DIAGNOSIS — R94.31 QT PROLONGATION: ICD-10-CM

## 2025-02-09 PROBLEM — N18.6 ESRD (END STAGE RENAL DISEASE): Status: RESOLVED | Noted: 2017-08-23 | Resolved: 2025-02-09

## 2025-02-09 PROBLEM — R79.89 ELEVATED TROPONIN: Status: ACTIVE | Noted: 2025-02-09

## 2025-02-09 PROBLEM — N18.6 ESRD (END STAGE RENAL DISEASE): Status: ACTIVE | Noted: 2017-08-23

## 2025-02-09 LAB
AC: 22
ALBUMIN SERPL BCP-MCNC: 3.3 G/DL (ref 3.5–5.2)
ALP SERPL-CCNC: 237 U/L (ref 40–150)
ALT SERPL W/O P-5'-P-CCNC: 5 U/L (ref 10–44)
ANION GAP SERPL CALC-SCNC: 11 MMOL/L (ref 8–16)
ANION GAP SERPL CALC-SCNC: 12 MMOL/L (ref 8–16)
AST SERPL-CCNC: 29 U/L (ref 10–40)
BASOPHILS # BLD AUTO: 0.08 K/UL (ref 0–0.2)
BASOPHILS NFR BLD: 1.4 % (ref 0–1.9)
BILIRUB SERPL-MCNC: 0.4 MG/DL (ref 0.1–1)
BIPAP: 0
BNP SERPL-MCNC: >4900 PG/ML (ref 0–99)
BUN SERPL-MCNC: 37 MG/DL (ref 6–30)
BUN SERPL-MCNC: 38 MG/DL (ref 8–23)
BUN SERPL-MCNC: 38 MG/DL (ref 8–23)
CALCIUM SERPL-MCNC: 9.3 MG/DL (ref 8.7–10.5)
CALCIUM SERPL-MCNC: 9.9 MG/DL (ref 8.7–10.5)
CHLORIDE SERPL-SCNC: 100 MMOL/L (ref 95–110)
CHLORIDE SERPL-SCNC: 101 MMOL/L (ref 95–110)
CHLORIDE SERPL-SCNC: 101 MMOL/L (ref 95–110)
CO2 SERPL-SCNC: 19 MMOL/L (ref 23–29)
CO2 SERPL-SCNC: 21 MMOL/L (ref 23–29)
CORRECTED TEMPERATURE (PCO2): 35.1 MMHG
CORRECTED TEMPERATURE (PCO2): 41.5 MMHG
CORRECTED TEMPERATURE (PH): 7.41
CORRECTED TEMPERATURE (PH): 7.47
CORRECTED TEMPERATURE (PO2): 243 MMHG
CORRECTED TEMPERATURE (PO2): 33.3 MMHG
CREAT SERPL-MCNC: 4.5 MG/DL (ref 0.5–1.4)
CREAT SERPL-MCNC: 4.8 MG/DL (ref 0.5–1.4)
CREAT SERPL-MCNC: 5.3 MG/DL (ref 0.5–1.4)
DIFFERENTIAL METHOD BLD: ABNORMAL
EOSINOPHIL # BLD AUTO: 0.1 K/UL (ref 0–0.5)
EOSINOPHIL NFR BLD: 2.5 % (ref 0–8)
ERYTHROCYTE [DISTWIDTH] IN BLOOD BY AUTOMATED COUNT: 18.3 % (ref 11.5–14.5)
EST. GFR  (NO RACE VARIABLE): 12.4 ML/MIN/1.73 M^2
EST. GFR  (NO RACE VARIABLE): 13.4 ML/MIN/1.73 M^2
FIO2: 21 %
FIO2: 21 %
FIO2: 80 %
GLUCOSE SERPL-MCNC: 235 MG/DL (ref 70–110)
GLUCOSE SERPL-MCNC: 242 MG/DL (ref 70–110)
GLUCOSE SERPL-MCNC: 86 MG/DL (ref 70–110)
HCT VFR BLD AUTO: 32.3 % (ref 40–54)
HCT VFR BLD CALC: 25 %PCV (ref 36–54)
HCT VFR BLD CALC: 31.7 %
HCV AB SERPL QL IA: NORMAL
HGB BLD-MCNC: 10.4 G/DL
HGB BLD-MCNC: 9.6 G/DL (ref 14–18)
HIV 1+2 AB+HIV1 P24 AG SERPL QL IA: NORMAL
IMM GRANULOCYTES # BLD AUTO: 0.03 K/UL (ref 0–0.04)
IMM GRANULOCYTES NFR BLD AUTO: 0.5 % (ref 0–0.5)
INFLUENZA A, MOLECULAR: NOT DETECTED
INFLUENZA B, MOLECULAR: NOT DETECTED
LDH SERPL L TO P-CCNC: 2.1 MMOL/L
LYMPHOCYTES # BLD AUTO: 0.6 K/UL (ref 1–4.8)
LYMPHOCYTES NFR BLD: 10.7 % (ref 18–48)
MCH RBC QN AUTO: 30.4 PG (ref 27–31)
MCHC RBC AUTO-ENTMCNC: 29.7 G/DL (ref 32–36)
MCV RBC AUTO: 102 FL (ref 82–98)
MONOCYTES # BLD AUTO: 0.5 K/UL (ref 0.3–1)
MONOCYTES NFR BLD: 8.7 % (ref 4–15)
NEUTROPHILS # BLD AUTO: 4.2 K/UL (ref 1.8–7.7)
NEUTROPHILS NFR BLD: 76.2 % (ref 38–73)
NRBC BLD-RTO: 0 /100 WBC
PCO2 BLDA: 35.1 MMHG
PCO2 BLDA: 41.5 MMHG (ref 35–45)
PEEP: 5
PH SMN: 7.41 [PH] (ref 7.3–7.45)
PH SMN: 7.47 [PH]
PLATELET # BLD AUTO: 156 K/UL (ref 150–450)
PMV BLD AUTO: 11 FL (ref 9.2–12.9)
PO2 BLDA: 243 MMHG (ref 80–100)
PO2 BLDA: 33.3 MMHG
POC BASE DEFICIT: 1.2 MMOL/L (ref -2–2)
POC BASE DEFICIT: 1.9 MMOL/L
POC COHB: 12.4 % (ref 0–9)
POC HCO3: 25.5 MMOL/L
POC HCO3: 25.5 MMOL/L (ref 24–28)
POC IONIZED CALCIUM: 1.18 MMOL/L (ref 1.06–1.42)
POC METHB: 0.9 % (ref 0–3)
POC O2HB: 67.1 %
POC PERFORMED BY: ABNORMAL
POC PERFORMED BY: ABNORMAL
POC PERFORMED BY: NORMAL
POC SATURATED O2: 100.4 % (ref 95–100)
POC SATURATED O2: 77.4 %
POC TCO2 (MEASURED): 24 MMOL/L (ref 23–29)
POC TEMPERATURE: 37 C
POCT GLUCOSE: 139 MG/DL (ref 70–110)
POCT GLUCOSE: 252 MG/DL (ref 70–110)
POTASSIUM BLD-SCNC: 6.5 MMOL/L (ref 3.5–5.1)
POTASSIUM SERPL-SCNC: 6.7 MMOL/L (ref 3.5–5.1)
POTASSIUM SERPL-SCNC: 8.3 MMOL/L (ref 3.5–5.1)
PROT SERPL-MCNC: 7.9 G/DL (ref 6–8.4)
RBC # BLD AUTO: 3.16 M/UL (ref 4.6–6.2)
RSV AG BY MOLECULAR METHOD: DETECTED
SAMPLE: ABNORMAL
SARS-COV-2 RNA RESP QL NAA+PROBE: NOT DETECTED
SODIUM BLD-SCNC: 133 MMOL/L (ref 136–145)
SODIUM SERPL-SCNC: 132 MMOL/L (ref 136–145)
SODIUM SERPL-SCNC: 133 MMOL/L (ref 136–145)
SPECIMEN SOURCE: ABNORMAL
SPECIMEN SOURCE: ABNORMAL
SPECIMEN SOURCE: NORMAL
TROPONIN I SERPL DL<=0.01 NG/ML-MCNC: 43 NG/L (ref 0–35)
TROPONIN I SERPL DL<=0.01 NG/ML-MCNC: 44 NG/L (ref 0–35)
VT: 0.36
WBC # BLD AUTO: 5.53 K/UL (ref 3.9–12.7)

## 2025-02-09 PROCEDURE — 25000003 PHARM REV CODE 250: Performed by: EMERGENCY MEDICINE

## 2025-02-09 PROCEDURE — 93010 ELECTROCARDIOGRAM REPORT: CPT | Mod: 76,,, | Performed by: INTERNAL MEDICINE

## 2025-02-09 PROCEDURE — 0241U SARS-COV2 (COVID) WITH FLU/RSV BY PCR: CPT | Performed by: PHYSICIAN ASSISTANT

## 2025-02-09 PROCEDURE — 5A1945Z RESPIRATORY VENTILATION, 24-96 CONSECUTIVE HOURS: ICD-10-PCS | Performed by: STUDENT IN AN ORGANIZED HEALTH CARE EDUCATION/TRAINING PROGRAM

## 2025-02-09 PROCEDURE — 94002 VENT MGMT INPAT INIT DAY: CPT

## 2025-02-09 PROCEDURE — 87449 NOS EACH ORGANISM AG IA: CPT

## 2025-02-09 PROCEDURE — 96374 THER/PROPH/DIAG INJ IV PUSH: CPT | Mod: 59

## 2025-02-09 PROCEDURE — 25000003 PHARM REV CODE 250: Performed by: PHYSICIAN ASSISTANT

## 2025-02-09 PROCEDURE — 80048 BASIC METABOLIC PNL TOTAL CA: CPT

## 2025-02-09 PROCEDURE — 93005 ELECTROCARDIOGRAM TRACING: CPT

## 2025-02-09 PROCEDURE — 83050 HGB METHEMOGLOBIN QUAN: CPT

## 2025-02-09 PROCEDURE — 87389 HIV-1 AG W/HIV-1&-2 AB AG IA: CPT | Performed by: PHYSICIAN ASSISTANT

## 2025-02-09 PROCEDURE — 96365 THER/PROPH/DIAG IV INF INIT: CPT

## 2025-02-09 PROCEDURE — 96375 TX/PRO/DX INJ NEW DRUG ADDON: CPT

## 2025-02-09 PROCEDURE — 96368 THER/DIAG CONCURRENT INF: CPT

## 2025-02-09 PROCEDURE — 63600175 PHARM REV CODE 636 W HCPCS: Performed by: PHYSICIAN ASSISTANT

## 2025-02-09 PROCEDURE — 87040 BLOOD CULTURE FOR BACTERIA: CPT | Mod: 59 | Performed by: PHYSICIAN ASSISTANT

## 2025-02-09 PROCEDURE — 96366 THER/PROPH/DIAG IV INF ADDON: CPT

## 2025-02-09 PROCEDURE — 99900026 HC AIRWAY MAINTENANCE (STAT)

## 2025-02-09 PROCEDURE — 94660 CPAP INITIATION&MGMT: CPT | Mod: XB

## 2025-02-09 PROCEDURE — 27000221 HC OXYGEN, UP TO 24 HOURS

## 2025-02-09 PROCEDURE — 83880 ASSAY OF NATRIURETIC PEPTIDE: CPT | Performed by: PHYSICIAN ASSISTANT

## 2025-02-09 PROCEDURE — 80047 BASIC METABLC PNL IONIZED CA: CPT

## 2025-02-09 PROCEDURE — 94640 AIRWAY INHALATION TREATMENT: CPT

## 2025-02-09 PROCEDURE — 85025 COMPLETE CBC W/AUTO DIFF WBC: CPT | Performed by: PHYSICIAN ASSISTANT

## 2025-02-09 PROCEDURE — 5A09357 ASSISTANCE WITH RESPIRATORY VENTILATION, LESS THAN 24 CONSECUTIVE HOURS, CONTINUOUS POSITIVE AIRWAY PRESSURE: ICD-10-PCS | Performed by: STUDENT IN AN ORGANIZED HEALTH CARE EDUCATION/TRAINING PROGRAM

## 2025-02-09 PROCEDURE — 96376 TX/PRO/DX INJ SAME DRUG ADON: CPT

## 2025-02-09 PROCEDURE — 87641 MR-STAPH DNA AMP PROBE: CPT

## 2025-02-09 PROCEDURE — 99900035 HC TECH TIME PER 15 MIN (STAT)

## 2025-02-09 PROCEDURE — 25000242 PHARM REV CODE 250 ALT 637 W/ HCPCS: Performed by: PHYSICIAN ASSISTANT

## 2025-02-09 PROCEDURE — 31500 INSERT EMERGENCY AIRWAY: CPT

## 2025-02-09 PROCEDURE — 63600175 PHARM REV CODE 636 W HCPCS

## 2025-02-09 PROCEDURE — 27100171 HC OXYGEN HIGH FLOW UP TO 24 HOURS

## 2025-02-09 PROCEDURE — 99291 CRITICAL CARE FIRST HOUR: CPT

## 2025-02-09 PROCEDURE — 82962 GLUCOSE BLOOD TEST: CPT

## 2025-02-09 PROCEDURE — 36600 WITHDRAWAL OF ARTERIAL BLOOD: CPT

## 2025-02-09 PROCEDURE — 80053 COMPREHEN METABOLIC PANEL: CPT | Performed by: PHYSICIAN ASSISTANT

## 2025-02-09 PROCEDURE — 27000190 HC CPAP FULL FACE MASK W/VALVE

## 2025-02-09 PROCEDURE — 25000003 PHARM REV CODE 250: Performed by: STUDENT IN AN ORGANIZED HEALTH CARE EDUCATION/TRAINING PROGRAM

## 2025-02-09 PROCEDURE — 5A0935A ASSISTANCE WITH RESPIRATORY VENTILATION, LESS THAN 24 CONSECUTIVE HOURS, HIGH NASAL FLOW/VELOCITY: ICD-10-PCS | Performed by: STUDENT IN AN ORGANIZED HEALTH CARE EDUCATION/TRAINING PROGRAM

## 2025-02-09 PROCEDURE — 94761 N-INVAS EAR/PLS OXIMETRY MLT: CPT | Mod: XB

## 2025-02-09 PROCEDURE — 93010 ELECTROCARDIOGRAM REPORT: CPT | Mod: ,,, | Performed by: INTERNAL MEDICINE

## 2025-02-09 PROCEDURE — 99223 1ST HOSP IP/OBS HIGH 75: CPT | Mod: ,,, | Performed by: INTERNAL MEDICINE

## 2025-02-09 PROCEDURE — 86803 HEPATITIS C AB TEST: CPT | Performed by: PHYSICIAN ASSISTANT

## 2025-02-09 PROCEDURE — 20000000 HC ICU ROOM

## 2025-02-09 PROCEDURE — 84484 ASSAY OF TROPONIN QUANT: CPT | Performed by: PHYSICIAN ASSISTANT

## 2025-02-09 PROCEDURE — 63600175 PHARM REV CODE 636 W HCPCS: Performed by: STUDENT IN AN ORGANIZED HEALTH CARE EDUCATION/TRAINING PROGRAM

## 2025-02-09 PROCEDURE — 82375 ASSAY CARBOXYHB QUANT: CPT

## 2025-02-09 PROCEDURE — 0BH17EZ INSERTION OF ENDOTRACHEAL AIRWAY INTO TRACHEA, VIA NATURAL OR ARTIFICIAL OPENING: ICD-10-PCS | Performed by: STUDENT IN AN ORGANIZED HEALTH CARE EDUCATION/TRAINING PROGRAM

## 2025-02-09 PROCEDURE — 82803 BLOOD GASES ANY COMBINATION: CPT

## 2025-02-09 PROCEDURE — 83605 ASSAY OF LACTIC ACID: CPT

## 2025-02-09 RX ORDER — ENOXAPARIN SODIUM 100 MG/ML
30 INJECTION SUBCUTANEOUS EVERY 24 HOURS
Status: DISCONTINUED | OUTPATIENT
Start: 2025-02-09 | End: 2025-02-09

## 2025-02-09 RX ORDER — PHENYLEPHRINE HCL IN 0.9% NACL 1 MG/10 ML
SYRINGE (ML) INTRAVENOUS
Status: COMPLETED
Start: 2025-02-09 | End: 2025-02-09

## 2025-02-09 RX ORDER — MUPIROCIN 20 MG/G
OINTMENT TOPICAL 2 TIMES DAILY
Status: CANCELLED | OUTPATIENT
Start: 2025-02-09 | End: 2025-02-14

## 2025-02-09 RX ORDER — FENTANYL CITRATE-0.9 % NACL/PF 10 MCG/ML
0-250 PLASTIC BAG, INJECTION (ML) INTRAVENOUS CONTINUOUS
Status: DISCONTINUED | OUTPATIENT
Start: 2025-02-09 | End: 2025-02-11

## 2025-02-09 RX ORDER — FUROSEMIDE 10 MG/ML
80 INJECTION INTRAMUSCULAR; INTRAVENOUS
Status: COMPLETED | OUTPATIENT
Start: 2025-02-09 | End: 2025-02-09

## 2025-02-09 RX ORDER — ALBUTEROL SULFATE 2.5 MG/.5ML
10 SOLUTION RESPIRATORY (INHALATION)
Status: COMPLETED | OUTPATIENT
Start: 2025-02-09 | End: 2025-02-09

## 2025-02-09 RX ORDER — ROCURONIUM BROMIDE 10 MG/ML
50 INJECTION, SOLUTION INTRAVENOUS
Status: COMPLETED | OUTPATIENT
Start: 2025-02-09 | End: 2025-02-09

## 2025-02-09 RX ORDER — DEXTROSE 50 % IN WATER (D50W) INTRAVENOUS SYRINGE
25
Status: ACTIVE | OUTPATIENT
Start: 2025-02-09 | End: 2025-02-10

## 2025-02-09 RX ORDER — HEPARIN SODIUM 5000 [USP'U]/ML
5000 INJECTION, SOLUTION INTRAVENOUS; SUBCUTANEOUS EVERY 8 HOURS
Status: DISCONTINUED | OUTPATIENT
Start: 2025-02-09 | End: 2025-02-19 | Stop reason: HOSPADM

## 2025-02-09 RX ORDER — NOREPINEPHRINE BITARTRATE 0.02 MG/ML
0-.2 INJECTION, SOLUTION INTRAVENOUS CONTINUOUS
Status: DISCONTINUED | OUTPATIENT
Start: 2025-02-09 | End: 2025-02-13

## 2025-02-09 RX ORDER — CALCIUM GLUCONATE 20 MG/ML
1 INJECTION, SOLUTION INTRAVENOUS EVERY 10 MIN PRN
Status: DISCONTINUED | OUTPATIENT
Start: 2025-02-09 | End: 2025-02-11

## 2025-02-09 RX ORDER — SODIUM CHLORIDE 9 MG/ML
INJECTION, SOLUTION INTRAVENOUS
Status: CANCELLED | OUTPATIENT
Start: 2025-02-09

## 2025-02-09 RX ORDER — NOREPINEPHRINE BITARTRATE/D5W 4MG/250ML
0-.2 PLASTIC BAG, INJECTION (ML) INTRAVENOUS CONTINUOUS
Status: DISCONTINUED | OUTPATIENT
Start: 2025-02-09 | End: 2025-02-09

## 2025-02-09 RX ORDER — PROPOFOL 10 MG/ML
0-50 INJECTION, EMULSION INTRAVENOUS CONTINUOUS
Status: DISCONTINUED | OUTPATIENT
Start: 2025-02-09 | End: 2025-02-11

## 2025-02-09 RX ORDER — ACETAMINOPHEN 325 MG/1
650 TABLET ORAL EVERY 6 HOURS PRN
Status: DISCONTINUED | OUTPATIENT
Start: 2025-02-09 | End: 2025-02-19 | Stop reason: HOSPADM

## 2025-02-09 RX ORDER — DEXTROSE 50 % IN WATER (D50W) INTRAVENOUS SYRINGE
25
Status: ACTIVE | OUTPATIENT
Start: 2025-02-10 | End: 2025-02-11

## 2025-02-09 RX ORDER — LORAZEPAM 2 MG/ML
0.5 INJECTION INTRAMUSCULAR
Status: COMPLETED | OUTPATIENT
Start: 2025-02-09 | End: 2025-02-09

## 2025-02-09 RX ORDER — ETOMIDATE 2 MG/ML
16 INJECTION INTRAVENOUS
Status: COMPLETED | OUTPATIENT
Start: 2025-02-09 | End: 2025-02-09

## 2025-02-09 RX ORDER — SODIUM CHLORIDE 0.9 % (FLUSH) 0.9 %
10 SYRINGE (ML) INJECTION
Status: DISCONTINUED | OUTPATIENT
Start: 2025-02-09 | End: 2025-02-19 | Stop reason: HOSPADM

## 2025-02-09 RX ORDER — CALCIUM GLUCONATE 20 MG/ML
1 INJECTION, SOLUTION INTRAVENOUS
Status: COMPLETED | OUTPATIENT
Start: 2025-02-09 | End: 2025-02-09

## 2025-02-09 RX ORDER — FAMOTIDINE 20 MG/1
20 TABLET, FILM COATED ORAL DAILY
Status: DISCONTINUED | OUTPATIENT
Start: 2025-02-10 | End: 2025-02-10

## 2025-02-09 RX ORDER — SODIUM CHLORIDE 9 MG/ML
INJECTION, SOLUTION INTRAVENOUS ONCE
Status: CANCELLED | OUTPATIENT
Start: 2025-02-09 | End: 2025-02-09

## 2025-02-09 RX ADMIN — ETOMIDATE 16 MG: 2 INJECTION INTRAVENOUS at 08:02

## 2025-02-09 RX ADMIN — ALBUTEROL SULFATE 10 MG: 2.5 SOLUTION RESPIRATORY (INHALATION) at 08:02

## 2025-02-09 RX ADMIN — PROPOFOL 10 MCG/KG/MIN: 10 INJECTION, EMULSION INTRAVENOUS at 08:02

## 2025-02-09 RX ADMIN — LORAZEPAM 0.5 MG: 2 INJECTION INTRAMUSCULAR; INTRAVENOUS at 07:02

## 2025-02-09 RX ADMIN — HEPARIN SODIUM 5000 UNITS: 5000 INJECTION INTRAVENOUS; SUBCUTANEOUS at 11:02

## 2025-02-09 RX ADMIN — SODIUM ZIRCONIUM CYCLOSILICATE 10 G: 10 POWDER, FOR SUSPENSION ORAL at 11:02

## 2025-02-09 RX ADMIN — FUROSEMIDE 80 MG: 10 INJECTION, SOLUTION INTRAVENOUS at 08:02

## 2025-02-09 RX ADMIN — DEXTROSE MONOHYDRATE 50 G: 25 INJECTION, SOLUTION INTRAVENOUS at 09:02

## 2025-02-09 RX ADMIN — DEXTROSE MONOHYDRATE 25 G: 25 INJECTION, SOLUTION INTRAVENOUS at 10:02

## 2025-02-09 RX ADMIN — CALCIUM GLUCONATE 1 G: 20 INJECTION, SOLUTION INTRAVENOUS at 09:02

## 2025-02-09 RX ADMIN — Medication 25 MCG/HR: at 09:02

## 2025-02-09 RX ADMIN — ROCURONIUM BROMIDE 50 MG: 10 INJECTION INTRAVENOUS at 08:02

## 2025-02-09 RX ADMIN — INSULIN HUMAN 5.6 UNITS: 100 INJECTION, SOLUTION PARENTERAL at 10:02

## 2025-02-10 LAB
ALBUMIN SERPL BCP-MCNC: 2.6 G/DL (ref 3.5–5.2)
ALBUMIN SERPL BCP-MCNC: 2.8 G/DL (ref 3.5–5.2)
ALLENS TEST: ABNORMAL
ALP SERPL-CCNC: 182 U/L (ref 40–150)
ALT SERPL W/O P-5'-P-CCNC: <5 U/L (ref 10–44)
ANION GAP SERPL CALC-SCNC: 4 MMOL/L (ref 8–16)
ANION GAP SERPL CALC-SCNC: 5 MMOL/L (ref 8–16)
ANION GAP SERPL CALC-SCNC: 5 MMOL/L (ref 8–16)
ANION GAP SERPL CALC-SCNC: 6 MMOL/L (ref 8–16)
ANION GAP SERPL CALC-SCNC: 8 MMOL/L (ref 8–16)
ANION GAP SERPL CALC-SCNC: 8 MMOL/L (ref 8–16)
ASCENDING AORTA: 3.42 CM
AST SERPL-CCNC: 16 U/L (ref 10–40)
AV AREA BY CONTINUOUS VTI: 1.1 CM2
AV INDEX (PROSTH): 0.34
AV LVOT MEAN GRADIENT: 2 MMHG
AV LVOT PEAK GRADIENT: 5 MMHG
AV MEAN GRADIENT: 23 MMHG
AV PEAK GRADIENT: 41 MMHG
AV VALVE AREA BY VELOCITY RATIO: 1.1 CM²
AV VALVE AREA: 1.1 CM2
AV VELOCITY RATIO: 0.34
BASOPHILS # BLD AUTO: 0.03 K/UL (ref 0–0.2)
BASOPHILS # BLD AUTO: 0.05 K/UL (ref 0–0.2)
BASOPHILS NFR BLD: 1 % (ref 0–1.9)
BASOPHILS NFR BLD: 1.6 % (ref 0–1.9)
BILIRUB SERPL-MCNC: 0.3 MG/DL (ref 0.1–1)
BSA FOR ECHO PROCEDURE: 1.61 M2
BUN SERPL-MCNC: 10 MG/DL (ref 8–23)
BUN SERPL-MCNC: 11 MG/DL (ref 8–23)
BUN SERPL-MCNC: 12 MG/DL (ref 8–23)
BUN SERPL-MCNC: 16 MG/DL (ref 8–23)
BUN SERPL-MCNC: 30 MG/DL (ref 8–23)
BUN SERPL-MCNC: 30 MG/DL (ref 8–23)
C DIFF GDH STL QL: NEGATIVE
C DIFF TOX A+B STL QL IA: NEGATIVE
CALCIUM SERPL-MCNC: 8.5 MG/DL (ref 8.7–10.5)
CALCIUM SERPL-MCNC: 8.6 MG/DL (ref 8.7–10.5)
CALCIUM SERPL-MCNC: 8.7 MG/DL (ref 8.7–10.5)
CALCIUM SERPL-MCNC: 8.7 MG/DL (ref 8.7–10.5)
CALCIUM SERPL-MCNC: 8.9 MG/DL (ref 8.7–10.5)
CALCIUM SERPL-MCNC: 8.9 MG/DL (ref 8.7–10.5)
CALCIUM SERPL-MCNC: 9 MG/DL (ref 8.7–10.5)
CALCIUM SERPL-MCNC: 9.1 MG/DL (ref 8.7–10.5)
CHLORIDE SERPL-SCNC: 105 MMOL/L (ref 95–110)
CHLORIDE SERPL-SCNC: 105 MMOL/L (ref 95–110)
CHLORIDE SERPL-SCNC: 107 MMOL/L (ref 95–110)
CHLORIDE SERPL-SCNC: 108 MMOL/L (ref 95–110)
CHLORIDE SERPL-SCNC: 109 MMOL/L (ref 95–110)
CO2 SERPL-SCNC: 23 MMOL/L (ref 23–29)
CO2 SERPL-SCNC: 25 MMOL/L (ref 23–29)
CREAT SERPL-MCNC: 1.6 MG/DL (ref 0.5–1.4)
CREAT SERPL-MCNC: 1.7 MG/DL (ref 0.5–1.4)
CREAT SERPL-MCNC: 1.8 MG/DL (ref 0.5–1.4)
CREAT SERPL-MCNC: 2 MG/DL (ref 0.5–1.4)
CREAT SERPL-MCNC: 2 MG/DL (ref 0.5–1.4)
CREAT SERPL-MCNC: 2.1 MG/DL (ref 0.5–1.4)
CREAT SERPL-MCNC: 3.8 MG/DL (ref 0.5–1.4)
CREAT SERPL-MCNC: 3.8 MG/DL (ref 0.5–1.4)
CV ECHO LV RWT: 0.34 CM
DELSYS: ABNORMAL
DIFFERENTIAL METHOD BLD: ABNORMAL
DIFFERENTIAL METHOD BLD: ABNORMAL
DOP CALC AO PEAK VEL: 3.2 M/S
DOP CALC AO VTI: 53.3 CM
DOP CALC LVOT AREA: 3.1 CM2
DOP CALC LVOT DIAMETER: 2 CM
DOP CALC LVOT PEAK VEL: 1.1 M/S
DOP CALC LVOT STROKE VOLUME: 57.1 CM3
DOP CALCLVOT PEAK VEL VTI: 18.2 CM
E/E' RATIO: 24 M/S
ECHO EF ESTIMATED: 21 %
ECHO LV POSTERIOR WALL: 1 CM (ref 0.6–1.1)
EJECTION FRACTION: 23 %
EOSINOPHIL # BLD AUTO: 0.1 K/UL (ref 0–0.5)
EOSINOPHIL # BLD AUTO: 0.1 K/UL (ref 0–0.5)
EOSINOPHIL NFR BLD: 3.5 % (ref 0–8)
EOSINOPHIL NFR BLD: 4.7 % (ref 0–8)
ERYTHROCYTE [DISTWIDTH] IN BLOOD BY AUTOMATED COUNT: 18.3 % (ref 11.5–14.5)
ERYTHROCYTE [DISTWIDTH] IN BLOOD BY AUTOMATED COUNT: 18.3 % (ref 11.5–14.5)
ERYTHROCYTE [SEDIMENTATION RATE] IN BLOOD BY WESTERGREN METHOD: 22 MM/H
EST. GFR  (NO RACE VARIABLE): 16.4 ML/MIN/1.73 M^2
EST. GFR  (NO RACE VARIABLE): 16.4 ML/MIN/1.73 M^2
EST. GFR  (NO RACE VARIABLE): 33.4 ML/MIN/1.73 M^2
EST. GFR  (NO RACE VARIABLE): 35.5 ML/MIN/1.73 M^2
EST. GFR  (NO RACE VARIABLE): 35.5 ML/MIN/1.73 M^2
EST. GFR  (NO RACE VARIABLE): 40.2 ML/MIN/1.73 M^2
EST. GFR  (NO RACE VARIABLE): 43.1 ML/MIN/1.73 M^2
EST. GFR  (NO RACE VARIABLE): 46.4 ML/MIN/1.73 M^2
FIO2: 50
FRACTIONAL SHORTENING: 10.2 % (ref 28–44)
GLUCOSE SERPL-MCNC: 103 MG/DL (ref 70–110)
GLUCOSE SERPL-MCNC: 103 MG/DL (ref 70–110)
GLUCOSE SERPL-MCNC: 164 MG/DL (ref 70–110)
GLUCOSE SERPL-MCNC: 164 MG/DL (ref 70–110)
GLUCOSE SERPL-MCNC: 74 MG/DL (ref 70–110)
GLUCOSE SERPL-MCNC: 75 MG/DL (ref 70–110)
GLUCOSE SERPL-MCNC: 75 MG/DL (ref 70–110)
GLUCOSE SERPL-MCNC: 76 MG/DL (ref 70–110)
HCO3 UR-SCNC: 24.6 MMOL/L (ref 24–28)
HCT VFR BLD AUTO: 24.4 % (ref 40–54)
HCT VFR BLD AUTO: 26.4 % (ref 40–54)
HCT VFR BLD CALC: 23 %PCV (ref 36–54)
HGB BLD-MCNC: 7.5 G/DL (ref 14–18)
HGB BLD-MCNC: 7.9 G/DL (ref 14–18)
IMM GRANULOCYTES # BLD AUTO: 0.01 K/UL (ref 0–0.04)
IMM GRANULOCYTES # BLD AUTO: 0.01 K/UL (ref 0–0.04)
IMM GRANULOCYTES NFR BLD AUTO: 0.3 % (ref 0–0.5)
IMM GRANULOCYTES NFR BLD AUTO: 0.3 % (ref 0–0.5)
INTERVENTRICULAR SEPTUM: 1 CM (ref 0.6–1.1)
IVC DIAMETER: 1.93 CM
LA MAJOR: 6.8 CM
LA MINOR: 6.5 CM
LA WIDTH: 4.1 CM
LEFT ATRIUM SIZE: 4.6 CM
LEFT ATRIUM VOLUME INDEX MOD: 58 ML/M2
LEFT ATRIUM VOLUME INDEX: 65 ML/M2
LEFT ATRIUM VOLUME MOD: 95 ML
LEFT ATRIUM VOLUME: 107 CM3
LEFT INTERNAL DIMENSION IN SYSTOLE: 5.3 CM (ref 2.1–4)
LEFT VENTRICLE DIASTOLIC VOLUME INDEX: 102.87 ML/M2
LEFT VENTRICLE DIASTOLIC VOLUME: 169.73 ML
LEFT VENTRICLE MASS INDEX: 145.4 G/M2
LEFT VENTRICLE SYSTOLIC VOLUME INDEX: 80.8 ML/M2
LEFT VENTRICLE SYSTOLIC VOLUME: 133.29 ML
LEFT VENTRICULAR INTERNAL DIMENSION IN DIASTOLE: 5.9 CM (ref 3.5–6)
LEFT VENTRICULAR MASS: 239.9 G
LV LATERAL E/E' RATIO: 20.4 M/S
LV SEPTAL E/E' RATIO: 28.6 M/S
LYMPHOCYTES # BLD AUTO: 0.4 K/UL (ref 1–4.8)
LYMPHOCYTES # BLD AUTO: 0.4 K/UL (ref 1–4.8)
LYMPHOCYTES NFR BLD: 12.5 % (ref 18–48)
LYMPHOCYTES NFR BLD: 13.1 % (ref 18–48)
MAGNESIUM SERPL-MCNC: 1.9 MG/DL (ref 1.6–2.6)
MAGNESIUM SERPL-MCNC: 2.1 MG/DL (ref 1.6–2.6)
MCH RBC QN AUTO: 29.8 PG (ref 27–31)
MCH RBC QN AUTO: 31 PG (ref 27–31)
MCHC RBC AUTO-ENTMCNC: 29.9 G/DL (ref 32–36)
MCHC RBC AUTO-ENTMCNC: 30.7 G/DL (ref 32–36)
MCV RBC AUTO: 100 FL (ref 82–98)
MCV RBC AUTO: 101 FL (ref 82–98)
MODE: ABNORMAL
MONOCYTES # BLD AUTO: 0.4 K/UL (ref 0.3–1)
MONOCYTES # BLD AUTO: 0.4 K/UL (ref 0.3–1)
MONOCYTES NFR BLD: 11.8 % (ref 4–15)
MONOCYTES NFR BLD: 13.6 % (ref 4–15)
MRSA SCREEN BY PCR: NOT DETECTED
MV PEAK E VEL: 1.43 M/S
NEUTROPHILS # BLD AUTO: 2 K/UL (ref 1.8–7.7)
NEUTROPHILS # BLD AUTO: 2.2 K/UL (ref 1.8–7.7)
NEUTROPHILS NFR BLD: 67.9 % (ref 38–73)
NEUTROPHILS NFR BLD: 69.7 % (ref 38–73)
NRBC BLD-RTO: 0 /100 WBC
NRBC BLD-RTO: 0 /100 WBC
OHS CV RV/LV RATIO: 0.75 CM
OHS QRS DURATION: 110 MS
OHS QRS DURATION: 132 MS
OHS QRS DURATION: 152 MS
OHS QTC CALCULATION: 476 MS
OHS QTC CALCULATION: 495 MS
OHS QTC CALCULATION: 513 MS
PCO2 BLDA: 46.7 MMHG (ref 35–45)
PEEP: 5
PH SMN: 7.33 [PH] (ref 7.35–7.45)
PHOSPHATE SERPL-MCNC: 3.2 MG/DL (ref 2.7–4.5)
PHOSPHATE SERPL-MCNC: 4 MG/DL (ref 2.7–4.5)
PISA TR MAX VEL: 3.6 M/S
PLATELET # BLD AUTO: 125 K/UL (ref 150–450)
PLATELET # BLD AUTO: 136 K/UL (ref 150–450)
PMV BLD AUTO: 10.5 FL (ref 9.2–12.9)
PMV BLD AUTO: 11 FL (ref 9.2–12.9)
PO2 BLDA: 60 MMHG (ref 80–100)
POC BE: -1 MMOL/L
POC IONIZED CALCIUM: 1.29 MMOL/L (ref 1.06–1.42)
POC SATURATED O2: 89 % (ref 95–100)
POC TCO2: 26 MMOL/L (ref 23–27)
POCT GLUCOSE: 100 MG/DL (ref 70–110)
POCT GLUCOSE: 117 MG/DL (ref 70–110)
POCT GLUCOSE: 170 MG/DL (ref 70–110)
POCT GLUCOSE: 188 MG/DL (ref 70–110)
POCT GLUCOSE: 190 MG/DL (ref 70–110)
POCT GLUCOSE: 239 MG/DL (ref 70–110)
POCT GLUCOSE: 400 MG/DL (ref 70–110)
POCT GLUCOSE: 70 MG/DL (ref 70–110)
POCT GLUCOSE: 76 MG/DL (ref 70–110)
POCT GLUCOSE: 76 MG/DL (ref 70–110)
POCT GLUCOSE: 80 MG/DL (ref 70–110)
POCT GLUCOSE: 84 MG/DL (ref 70–110)
POCT GLUCOSE: 84 MG/DL (ref 70–110)
POCT GLUCOSE: 85 MG/DL (ref 70–110)
POCT GLUCOSE: 93 MG/DL (ref 70–110)
POTASSIUM BLD-SCNC: 5.6 MMOL/L (ref 3.5–5.1)
POTASSIUM SERPL-SCNC: 5.1 MMOL/L (ref 3.5–5.1)
POTASSIUM SERPL-SCNC: 5.2 MMOL/L (ref 3.5–5.1)
POTASSIUM SERPL-SCNC: 5.6 MMOL/L (ref 3.5–5.1)
POTASSIUM SERPL-SCNC: 5.7 MMOL/L (ref 3.5–5.1)
PROT SERPL-MCNC: 5.9 G/DL (ref 6–8.4)
RA MAJOR: 5.77 CM
RA WIDTH: 5.48 CM
RBC # BLD AUTO: 2.42 M/UL (ref 4.6–6.2)
RBC # BLD AUTO: 2.65 M/UL (ref 4.6–6.2)
RIGHT ATRIAL AREA: 28.1 CM2
RIGHT VENTRICLE DIASTOLIC BASEL DIMENSION: 4.4 CM
RV TISSUE DOPPLER FREE WALL SYSTOLIC VELOCITY 1 (APICAL 4 CHAMBER VIEW): 8.86 CM/S
SAMPLE: ABNORMAL
SINUS: 3.54 CM
SITE: ABNORMAL
SODIUM BLD-SCNC: 132 MMOL/L (ref 136–145)
SODIUM SERPL-SCNC: 136 MMOL/L (ref 136–145)
SODIUM SERPL-SCNC: 137 MMOL/L (ref 136–145)
STJ: 2.42 CM
TDI LATERAL: 0.07 M/S
TDI SEPTAL: 0.05 M/S
TDI: 0.06 M/S
TR MAX PG: 52 MMHG
TRICUSPID ANNULAR PLANE SYSTOLIC EXCURSION: 1.39 CM
TROPONIN I SERPL DL<=0.01 NG/ML-MCNC: 41 NG/L (ref 0–35)
TV PEAK GRADIENT: 50 MMHG
VT: 360
WBC # BLD AUTO: 2.95 K/UL (ref 3.9–12.7)
WBC # BLD AUTO: 3.14 K/UL (ref 3.9–12.7)
Z-SCORE OF LEFT VENTRICULAR DIMENSION IN END DIASTOLE: 2.42
Z-SCORE OF LEFT VENTRICULAR DIMENSION IN END SYSTOLE: 4.85

## 2025-02-10 PROCEDURE — 85025 COMPLETE CBC W/AUTO DIFF WBC: CPT | Performed by: INTERNAL MEDICINE

## 2025-02-10 PROCEDURE — 83735 ASSAY OF MAGNESIUM: CPT | Mod: 91 | Performed by: STUDENT IN AN ORGANIZED HEALTH CARE EDUCATION/TRAINING PROGRAM

## 2025-02-10 PROCEDURE — 27000207 HC ISOLATION

## 2025-02-10 PROCEDURE — 90945 DIALYSIS ONE EVALUATION: CPT

## 2025-02-10 PROCEDURE — 25000003 PHARM REV CODE 250: Performed by: STUDENT IN AN ORGANIZED HEALTH CARE EDUCATION/TRAINING PROGRAM

## 2025-02-10 PROCEDURE — 20000000 HC ICU ROOM

## 2025-02-10 PROCEDURE — 25000003 PHARM REV CODE 250

## 2025-02-10 PROCEDURE — 5A1D70Z PERFORMANCE OF URINARY FILTRATION, INTERMITTENT, LESS THAN 6 HOURS PER DAY: ICD-10-PCS | Performed by: INTERNAL MEDICINE

## 2025-02-10 PROCEDURE — 99900026 HC AIRWAY MAINTENANCE (STAT)

## 2025-02-10 PROCEDURE — 63600175 PHARM REV CODE 636 W HCPCS

## 2025-02-10 PROCEDURE — 94761 N-INVAS EAR/PLS OXIMETRY MLT: CPT | Mod: XB

## 2025-02-10 PROCEDURE — 85025 COMPLETE CBC W/AUTO DIFF WBC: CPT | Mod: 91

## 2025-02-10 PROCEDURE — 27100171 HC OXYGEN HIGH FLOW UP TO 24 HOURS

## 2025-02-10 PROCEDURE — 87186 SC STD MICRODIL/AGAR DIL: CPT

## 2025-02-10 PROCEDURE — 83735 ASSAY OF MAGNESIUM: CPT

## 2025-02-10 PROCEDURE — 87205 SMEAR GRAM STAIN: CPT

## 2025-02-10 PROCEDURE — 99291 CRITICAL CARE FIRST HOUR: CPT | Mod: ,,,

## 2025-02-10 PROCEDURE — 94003 VENT MGMT INPAT SUBQ DAY: CPT

## 2025-02-10 PROCEDURE — 63600175 PHARM REV CODE 636 W HCPCS: Performed by: INTERNAL MEDICINE

## 2025-02-10 PROCEDURE — 99233 SBSQ HOSP IP/OBS HIGH 50: CPT | Mod: ,,, | Performed by: INTERNAL MEDICINE

## 2025-02-10 PROCEDURE — 80069 RENAL FUNCTION PANEL: CPT | Performed by: STUDENT IN AN ORGANIZED HEALTH CARE EDUCATION/TRAINING PROGRAM

## 2025-02-10 PROCEDURE — 82803 BLOOD GASES ANY COMBINATION: CPT

## 2025-02-10 PROCEDURE — 99900035 HC TECH TIME PER 15 MIN (STAT)

## 2025-02-10 PROCEDURE — 27200966 HC CLOSED SUCTION SYSTEM

## 2025-02-10 PROCEDURE — 84100 ASSAY OF PHOSPHORUS: CPT

## 2025-02-10 PROCEDURE — 36600 WITHDRAWAL OF ARTERIAL BLOOD: CPT

## 2025-02-10 PROCEDURE — 80048 BASIC METABOLIC PNL TOTAL CA: CPT | Mod: XB

## 2025-02-10 PROCEDURE — 80053 COMPREHEN METABOLIC PANEL: CPT

## 2025-02-10 PROCEDURE — 87070 CULTURE OTHR SPECIMN AEROBIC: CPT

## 2025-02-10 PROCEDURE — 99292 CRITICAL CARE ADDL 30 MIN: CPT | Mod: ,,,

## 2025-02-10 PROCEDURE — 25000003 PHARM REV CODE 250: Performed by: INTERNAL MEDICINE

## 2025-02-10 PROCEDURE — 63600175 PHARM REV CODE 636 W HCPCS: Performed by: STUDENT IN AN ORGANIZED HEALTH CARE EDUCATION/TRAINING PROGRAM

## 2025-02-10 PROCEDURE — 84484 ASSAY OF TROPONIN QUANT: CPT

## 2025-02-10 RX ORDER — FAMOTIDINE 20 MG/1
20 TABLET, FILM COATED ORAL DAILY
Status: DISCONTINUED | OUTPATIENT
Start: 2025-02-11 | End: 2025-02-12

## 2025-02-10 RX ORDER — DEXTROSE MONOHYDRATE 100 MG/ML
INJECTION, SOLUTION INTRAVENOUS CONTINUOUS
Status: DISCONTINUED | OUTPATIENT
Start: 2025-02-10 | End: 2025-02-11

## 2025-02-10 RX ORDER — FAMOTIDINE 20 MG/1
20 TABLET, FILM COATED ORAL EVERY OTHER DAY
Status: DISCONTINUED | OUTPATIENT
Start: 2025-02-11 | End: 2025-02-10

## 2025-02-10 RX ORDER — FAMOTIDINE 20 MG/1
20 TABLET, FILM COATED ORAL DAILY
Status: DISCONTINUED | OUTPATIENT
Start: 2025-02-11 | End: 2025-02-10

## 2025-02-10 RX ORDER — MAGNESIUM SULFATE HEPTAHYDRATE 40 MG/ML
2 INJECTION, SOLUTION INTRAVENOUS
Status: DISPENSED | OUTPATIENT
Start: 2025-02-10 | End: 2025-02-11

## 2025-02-10 RX ORDER — HYDROCODONE BITARTRATE AND ACETAMINOPHEN 500; 5 MG/1; MG/1
TABLET ORAL CONTINUOUS
Status: ACTIVE | OUTPATIENT
Start: 2025-02-10 | End: 2025-02-11

## 2025-02-10 RX ADMIN — FAMOTIDINE 20 MG: 20 TABLET ORAL at 08:02

## 2025-02-10 RX ADMIN — SODIUM CHLORIDE: 9 INJECTION, SOLUTION INTRAVENOUS at 02:02

## 2025-02-10 RX ADMIN — INSULIN HUMAN 5.8 UNITS: 100 INJECTION, SOLUTION PARENTERAL at 08:02

## 2025-02-10 RX ADMIN — DEXTROSE MONOHYDRATE 50 G: 25 INJECTION, SOLUTION INTRAVENOUS at 07:02

## 2025-02-10 RX ADMIN — DEXTROSE MONOHYDRATE: 100 INJECTION, SOLUTION INTRAVENOUS at 06:02

## 2025-02-10 RX ADMIN — PIPERACILLIN SODIUM AND TAZOBACTAM SODIUM 4.5 G: 4; .5 INJECTION, POWDER, FOR SOLUTION INTRAVENOUS at 08:02

## 2025-02-10 RX ADMIN — HEPARIN SODIUM 5000 UNITS: 5000 INJECTION INTRAVENOUS; SUBCUTANEOUS at 09:02

## 2025-02-10 RX ADMIN — HEPARIN SODIUM 5000 UNITS: 5000 INJECTION INTRAVENOUS; SUBCUTANEOUS at 01:02

## 2025-02-10 RX ADMIN — DEXTROSE MONOHYDRATE 25 G: 25 INJECTION, SOLUTION INTRAVENOUS at 04:02

## 2025-02-10 RX ADMIN — AZITHROMYCIN MONOHYDRATE 500 MG: 500 INJECTION, POWDER, LYOPHILIZED, FOR SOLUTION INTRAVENOUS at 12:02

## 2025-02-10 RX ADMIN — NOREPINEPHRINE BITARTRATE 0.04 MCG/KG/MIN: 0.02 INJECTION, SOLUTION INTRAVENOUS at 01:02

## 2025-02-10 RX ADMIN — MAGNESIUM SULFATE HEPTAHYDRATE 2 G: 40 INJECTION, SOLUTION INTRAVENOUS at 05:02

## 2025-02-10 RX ADMIN — INSULIN HUMAN 5.8 UNITS: 100 INJECTION, SOLUTION PARENTERAL at 12:02

## 2025-02-10 RX ADMIN — PIPERACILLIN SODIUM AND TAZOBACTAM SODIUM 4.5 G: 4; .5 INJECTION, POWDER, FOR SOLUTION INTRAVENOUS at 01:02

## 2025-02-10 RX ADMIN — HEPARIN SODIUM 5000 UNITS: 5000 INJECTION INTRAVENOUS; SUBCUTANEOUS at 05:02

## 2025-02-10 RX ADMIN — PIPERACILLIN SODIUM AND TAZOBACTAM SODIUM 4.5 G: 4; .5 INJECTION, POWDER, FOR SOLUTION INTRAVENOUS at 11:02

## 2025-02-10 RX ADMIN — NOREPINEPHRINE BITARTRATE 0.1 MCG/KG/MIN: 0.02 INJECTION, SOLUTION INTRAVENOUS at 06:02

## 2025-02-10 RX ADMIN — DEXTROSE MONOHYDRATE 50 G: 25 INJECTION, SOLUTION INTRAVENOUS at 12:02

## 2025-02-10 NOTE — ASSESSMENT & PLAN NOTE
Creatinine 4.8 on admit. S/p R Nephrectomy (due to retained uretal stent per chart review) and HD MWF    Plan:   Lab Results   Component Value Date    CREATININE 1.7 (H) 02/10/2025     - On iHD MWF.   - Nephrology consulted, appreciate recs  - SLED overnight with improvement in potassium   - Avoid nephrotoxic agents such as NSAIDs, gadolinium and IV radiocontrast.  - Renally dose meds to current GFR.  - Maintain MAP > 65.

## 2025-02-10 NOTE — CARE UPDATE
Patient is now requiring Levophed to maintain MAP greater than 65 mmHg prior to the start of HD. Due to the patient's requirement of Levophed, I have swapped his orders from HD over to SLED.

## 2025-02-10 NOTE — HPI
69-year-old male past medical history nonischemic cardiomyopathy EF most recently 20-25%, ESRD on HD MWF, chronic respiratory failure on 3 L nasal cannula at home who presents for worsening shortness of breath times 2 days. Patient mentioned to his nursing home staff that he was feeling dyspnic and EMS was called. Upon arrival to the ER he was intubated so history was provided from speaking to his sister on the phone and with the ER team. Infectious work up tested positive for RSV. Nephrology was consulted for management of his hemodialysis and for hyperkalemia of 8.3, his ECG does reveal some peaked T-waves. As per the sister, he has been complaining of increased shortness of breath, fatigue, and decreased urine output for the past two days. The sister does note that several residents of his nursing home have tested positive for respiratory virus recently.

## 2025-02-10 NOTE — ASSESSMENT & PLAN NOTE
"Admit with hemoglobin 9.6 Baseline ~8-9      Lab Results   Component Value Date    IRON 26 (L) 12/20/2024    TIBC 209 (L) 12/20/2024    FERRITIN 2,808 (H) 12/20/2024     Lab Results   Component Value Date    FOLATE 8.0 12/20/2024     Lab Results   Component Value Date    [id number] 770 12/20/2024     No results found for: "RETICCTPCT"    Likely secondary to anemia of chronic disease    Plan:   -   Lab Results   Component Value Date    HGB 9.6 (L) 02/09/2025     - Daily CBC   - Transfuse hgb <7    "

## 2025-02-10 NOTE — ASSESSMENT & PLAN NOTE
K 8.3 on admission. Shifted with IV Lasix, Insulin/Dextrose, and Calcium Gluconate in the ED.     - Improvement with SLED, will hold on shifting again  - BMP Q4H

## 2025-02-10 NOTE — ED NOTES
PT intubated at this time by MD Alvarado and MD Kilpatrick w/o difficulty. Color change at this time. Visible chest rise and fall with bilateral lung sounds noted. Will continue to monitor pt

## 2025-02-10 NOTE — ASSESSMENT & PLAN NOTE
HsTrop 46 on admission. EKG sinus tachycardia w/1st degree AV Block, T wave abnormality. Likely Type 2 Demand ischemia in the setting of volume overload    - Trop peaked at 43, downtrending   - Cardiac Monitoring

## 2025-02-10 NOTE — SUBJECTIVE & OBJECTIVE
Interval History/Significant Events: NAEON. Remains on D10 gtt. CRRT overnight. Prop, Fent and Levo 0.06 this morning. 30/5 on the vent. Improvement in potassium following SLED.     Review of Systems   Unable to perform ROS: Intubated     Objective:     Vital Signs (Most Recent):  Temp: 98.4 °F (36.9 °C) (02/10/25 1300)  Pulse: 93 (02/10/25 1432)  Resp: (!) 26 (02/10/25 1432)  BP: (!) 100/59 (02/10/25 1432)  SpO2: (!) 94 % (02/10/25 1432) Vital Signs (24h Range):  Temp:  [94.8 °F (34.9 °C)-99.5 °F (37.5 °C)] 98.4 °F (36.9 °C)  Pulse:  [] 93  Resp:  [7-38] 26  SpO2:  [86 %-100 %] 94 %  BP: ()/(39-68) 100/59   Weight: 58 kg (127 lb 13.9 oz)  Body mass index is 20.64 kg/m².      Intake/Output Summary (Last 24 hours) at 2/10/2025 1435  Last data filed at 2/10/2025 1325  Gross per 24 hour   Intake 2792.22 ml   Output 3655 ml   Net -862.78 ml          Physical Exam  Vitals and nursing note reviewed.   Constitutional:       Appearance: He is ill-appearing.      Interventions: He is sedated, intubated and restrained.   HENT:      Head: Normocephalic.   Eyes:      Pupils: Pupils are equal, round, and reactive to light.   Cardiovascular:      Rate and Rhythm: Normal rate and regular rhythm.      Pulses: Normal pulses.      Heart sounds: Murmur heard.   Pulmonary:      Effort: He is intubated.      Breath sounds: Normal air entry. Rales present.   Abdominal:      General: The ostomy site is clean.      Palpations: Abdomen is soft.   Skin:     General: Skin is warm and moist.      Coloration: Skin is pale.   Neurological:      GCS: GCS eye subscore is 2. GCS verbal subscore is 1. GCS motor subscore is 4.            Vents:  Vent Mode: A/C (02/10/25 1036)  Ventilator Initiated: Yes (02/09/25 2044)  Set Rate: 22 BPM (02/10/25 1432)  Vt Set: 360 mL (02/10/25 1432)  PEEP/CPAP: 5 cmH20 (02/10/25 1432)  Oxygen Concentration (%): 30 (02/10/25 1432)  Peak Airway Pressure: 29 cmH20 (02/10/25 1432)  Plateau Pressure: 0 cmH20  (02/10/25 1432)  Total Ve: 9.69 L/m (02/10/25 1432)  Negative Inspiratory Force (cm H2O): 0 (02/10/25 1432)  F/VT Ratio<105 (RSBI): (!) 64.84 (02/10/25 1432)  Lines/Drains/Airways       Central Venous Catheter Line  Duration                  Hemodialysis Catheter 12/31/24 0818 right subclavian 41 days              Drain  Duration                  Colostomy 12/19/24 2225 RLQ 52 days         NG/OG Tube 02/09/25 2103 14 Fr. Right mouth <1 day              Airway  Duration                  Airway - Non-Surgical 02/09/25 2030 Endotracheal Tube <1 day              Peripheral Intravenous Line  Duration                  Peripheral IV - Single Lumen 02/09/25 1925 20 G Posterior;Right Hand <1 day         Peripheral IV - Single Lumen 02/09/25 1949 20 G Anterior;Distal;Right Upper Arm <1 day         Peripheral IV - Single Lumen 02/10/25 0206 18 G Anterior;Left Forearm <1 day                  Significant Labs:    CBC/Anemia Profile:  Recent Labs   Lab 02/09/25 1949 02/09/25  2310 02/10/25  0109 02/10/25  0252 02/10/25  0439   WBC 5.53  --   --  3.14* 2.95*   HGB 9.6*  --   --  7.5* 7.9*   HCT 32.3*   < > 23* 24.4* 26.4*     --   --  125* 136*   *  --   --  101* 100*   RDW 18.3*  --   --  18.3* 18.3*    < > = values in this interval not displayed.        Chemistries:  Recent Labs   Lab 02/09/25 1949 02/09/25  2304 02/10/25  0252 02/10/25  0814 02/10/25  1142   *   < > 136  136 136 137   K 8.3*   < > 5.1  5.1 5.1 5.2*      < > 105  105 107 108   CO2 19*   < > 23  23 23 25   BUN 38*   < > 30*  30* 16 12   CREATININE 4.8*   < > 3.8*  3.8* 2.1* 1.7*   CALCIUM 9.9   < > 8.7  8.7 8.5* 8.6*   ALBUMIN 3.3*  --  2.6*  --   --    PROT 7.9  --  5.9*  --   --    BILITOT 0.4  --  0.3  --   --    ALKPHOS 237*  --  182*  --   --    ALT 5*  --  <5*  --   --    AST 29  --  16  --   --    MG  --   --  1.9  --   --    PHOS  --   --  4.0  --   --     < > = values in this interval not displayed.       All pertinent  labs within the past 24 hours have been reviewed.    Significant Imaging:  I have reviewed all pertinent imaging results/findings within the past 24 hours.

## 2025-02-10 NOTE — ASSESSMENT & PLAN NOTE
Systolic and Diastolic Dysfunction. Most recent TTE (12/10/24) with EF 20-25% with severe global hypokinesis. EKG sinus tachycardia with 1st degree AV Block (same from prior EKG) and T-wave abnormality. BNP >4600. HsTrop 46. CXR with right lung base w/blunting suggesting small pleural effusion    - Plan for fluid removal with HD tonight  - Trend troponin to r/o ischemia etiology  - Fluid restriction at 1500 cc with strict I/Os and daily weights    - Maintain on telemetry and daily EKGs   - Up to date risk stratification : TSH, Lipids, HbA1c with optimization of risk factors is necessary  - Check Electrolytes, keep Mag >2 & K+ >4  - SCDs, TEDs, Nursing communication to elevated LE   - Ambulate as tolerated

## 2025-02-10 NOTE — PLAN OF CARE
MICU DAILY GOALS     Family/Goals of care/Code Status   Code Status: Full Code    24H Vital Sign Range  Temp:  [94.8 °F (34.9 °C)-98.4 °F (36.9 °C)]   Pulse:  []   Resp:  [7-38]   BP: ()/(44-68)   SpO2:  [86 %-100 %]      Shift Events (include procedures and significant events)   Propofol, Fentanyl, Levophed, and D10 continue to infuse. CRRT initiated overnight. Umer hugger for hypothermia.     AWAKE RASS: Goal -    Actual - RASS (Sam Agitation-Sedation Scale): moderate sedation    Restraint necessity: Treatment interference   BREATHE SBT: Not attempted    Coordinate A & B, analgesics/sedatives Pain: managed   SAT: Not attempted   Delirium CAM-ICU:     Early(intubated/ Progressive (non-intubated) Mobility MOVE Screen (INTUBATED ONLY): Not attempted    Activity: Activity Management: Rolling - L1   Feeding/Nutrition Diet order:  ,     Thrombus DVT prophylaxis: VTE Core Measure: Pharmacological prophylaxis initiated/maintained   HOB Elevation Head of Bed (HOB) Positioning: HOB at 30-45 degrees   Ulcer Prophylaxis GI: yes   Glucose control Persistent hypoglycemia      Skin Skin assessment:     Sacrum intact/not altered? No  Heels intact/not altered? Yes  Surgical wound? No    CHECK ONE!   (no altered skin or altered skin) and sub boxes:  [] No Altered Skin Integrity Present    []Prevention Measures Documented    [x] Altered Skin Integrity Present or Discovered   [x] LDA present in EPIC, daily doc completed              [x] LDA added if not in EPIC (describe wound).                    When describing wound, do not stage, use descriptive words only.    [x] Wound Image Taken (required on admit,                   transfer/discharge and every Tuesday)    Wound Care Consulted? Yes   Bowel Function colostomy     Indwelling Catheter Necessity      Urethral Catheter 02/09/25 2100 Temperature probe;Straight-tip 16 Fr.-Reason for Continuing Urinary Catheterization: Critically ill in ICU and requiring hourly  monitoring of intake/output          De-escalation Antibiotics No        VS and assessment per flow sheet, patient progressing towards goals as tolerated, plan of care reviewed with   Mcginnis , all concerns addressed, will continue to monitor.

## 2025-02-10 NOTE — PROGRESS NOTES
Lines reversed     02/10/25 0554 02/10/25 0616   Treatment   Treatment Type  --  SLED   Treatment Status Blood returned;Clotting Restart;Order change   Dialysis Machine Number  --  [id number]   Dialyzer Time (hours) 3.39 0   BVP (Liters) 31.8 L 0 L   Solutions Labeled and Current   --  Yes   Access  --  Right;Tunneled Cath;IJ   Catheter Dressing Intact   --  Yes   Alarms Engaged  --  Yes   CRRT Comments Venous chamber starting to clot; pt rinsed back SLED restarted; K+ bath changed to 4K as ordered        Received request via: Pharmacy    Was the patient seen in the last year in this department? Yes    Does the patient have an active prescription (recently filled or refills available) for medication(s) requested? No

## 2025-02-10 NOTE — PROGRESS NOTES
[facility]  Critical Care Medicine  Progress Note    Patient Name: Flakito Mcginnis  MRN: [id number]  Admission Date: 2/9/2025  Hospital Length of Stay: 1 days  Code Status: Full Code  Attending Provider: Jose Mendieta MD  Primary Care Provider: Jordin Robbins MD   Principal Problem: Acute hypoxic respiratory failure    Subjective:     HPI:  69yoM w/hx of HFrEF (EF 20-25%, 12/2024), NICM, MR, ESRD on HD, chronic respiratory failure (on 3L NC at home), Crohn's disease s/p colostomy, R nephrectomy who presents to the hospital from [facility] from acute onset of SOB. Given the acuity of patient's condition, history was obtained from the chart. Per the ED provider note, patient has had increased sputum production, cough, wheezing, and bilateral lower extremity edema. His last dialysis session was on Friday (2/7/25) but there is concern that he may not have completed a full session as patient reports feeling volume overloaded. He denies chest pain or fever. He was recently admitted to the hospital septic shock secondary to staph capitis bacteremia and endocarditis. Completed 6wk course of IV Cefazolin on 2/2/25.     In the ED, patient was hypoxic with increased WOB. Initially placed on BiPAP without much improvement in hypoxia so was intubated. Afebrile, other VSS. Labs were notable for Hgb 9.6, Hct 32.3, Na 132, K 8.3, Bicarb 19, BUN/Cr 38/4.8, , BNP >4900, HsTrop 44. Flu/COVID negative. RSV positive. VBG 7.46/35.1/33.3/25.5. CXR with ongoing vs recurrent small right pleural effusion. EKG with known 1st degree AV block and T-wave abnormality. Administered calcium gluconate, IV insulin/dextrose, and IV lasix for hyperkalemia. Nephrology consulted. Admitted to the MICU for further management and workup.       Hospital/ICU Course:  Mr. Mcginnis was admitted to the MICU for acute on chronic hypoxic respiratory failure. Started on zosyn and azithromycin. Required low dose vasopressors to tolerate  SLED. D10 infusion for hypoglycemia. Echo with EF 20-25%, similar to previous study.     Interval History/Significant Events: NAEON. Remains on D10 gtt. CRRT overnight. Prop, Fent and Levo 0.06 this morning. 30/5 on the vent. Improvement in potassium following SLED.     Review of Systems   Unable to perform ROS: Intubated     Objective:     Vital Signs (Most Recent):  Temp: 98.4 °F (36.9 °C) (02/10/25 1300)  Pulse: 93 (02/10/25 1432)  Resp: (!) 26 (02/10/25 1432)  BP: (!) 100/59 (02/10/25 1432)  SpO2: (!) 94 % (02/10/25 1432) Vital Signs (24h Range):  Temp:  [94.8 °F (34.9 °C)-99.5 °F (37.5 °C)] 98.4 °F (36.9 °C)  Pulse:  [] 93  Resp:  [7-38] 26  SpO2:  [86 %-100 %] 94 %  BP: ()/(39-68) 100/59   Weight: 58 kg (127 lb 13.9 oz)  Body mass index is 20.64 kg/m².      Intake/Output Summary (Last 24 hours) at 2/10/2025 1435  Last data filed at 2/10/2025 1325  Gross per 24 hour   Intake 2792.22 ml   Output 3655 ml   Net -862.78 ml          Physical Exam  Vitals and nursing note reviewed.   Constitutional:       Appearance: He is ill-appearing.      Interventions: He is sedated, intubated and restrained.   HENT:      Head: Normocephalic.   Eyes:      Pupils: Pupils are equal, round, and reactive to light.   Cardiovascular:      Rate and Rhythm: Normal rate and regular rhythm.      Pulses: Normal pulses.      Heart sounds: Murmur heard.   Pulmonary:      Effort: He is intubated.      Breath sounds: Normal air entry. Rales present.   Abdominal:      General: The ostomy site is clean.      Palpations: Abdomen is soft.   Skin:     General: Skin is warm and moist.      Coloration: Skin is pale.   Neurological:      GCS: GCS eye subscore is 2. GCS verbal subscore is 1. GCS motor subscore is 4.            Vents:  Vent Mode: A/C (02/10/25 1036)  Ventilator Initiated: Yes (02/09/25 2044)  Set Rate: 22 BPM (02/10/25 1432)  Vt Set: 360 mL (02/10/25 1432)  PEEP/CPAP: 5 cmH20 (02/10/25 1432)  Oxygen Concentration (%): 30  (02/10/25 1432)  Peak Airway Pressure: 29 cmH20 (02/10/25 1432)  Plateau Pressure: 0 cmH20 (02/10/25 1432)  Total Ve: 9.69 L/m (02/10/25 1432)  Negative Inspiratory Force (cm H2O): 0 (02/10/25 1432)  F/VT Ratio<105 (RSBI): (!) 64.84 (02/10/25 1432)  Lines/Drains/Airways       Central Venous Catheter Line  Duration                  Hemodialysis Catheter 12/31/24 0818 right subclavian 41 days              Drain  Duration                  Colostomy 12/19/24 2225 RLQ 52 days         NG/OG Tube 02/09/25 2103 14 Fr. Right mouth <1 day              Airway  Duration                  Airway - Non-Surgical 02/09/25 2030 Endotracheal Tube <1 day              Peripheral Intravenous Line  Duration                  Peripheral IV - Single Lumen 02/09/25 1925 20 G Posterior;Right Hand <1 day         Peripheral IV - Single Lumen 02/09/25 1949 20 G Anterior;Distal;Right Upper Arm <1 day         Peripheral IV - Single Lumen 02/10/25 0206 18 G Anterior;Left Forearm <1 day                  Significant Labs:    CBC/Anemia Profile:  Recent Labs   Lab 02/09/25 1949 02/09/25  2310 02/10/25  0109 02/10/25  0252 02/10/25  0439   WBC 5.53  --   --  3.14* 2.95*   HGB 9.6*  --   --  7.5* 7.9*   HCT 32.3*   < > 23* 24.4* 26.4*     --   --  125* 136*   *  --   --  101* 100*   RDW 18.3*  --   --  18.3* 18.3*    < > = values in this interval not displayed.        Chemistries:  Recent Labs   Lab 02/09/25 1949 02/09/25  2304 02/10/25  0252 02/10/25  0814 02/10/25  1142   *   < > 136  136 136 137   K 8.3*   < > 5.1  5.1 5.1 5.2*      < > 105  105 107 108   CO2 19*   < > 23  23 23 25   BUN 38*   < > 30*  30* 16 12   CREATININE 4.8*   < > 3.8*  3.8* 2.1* 1.7*   CALCIUM 9.9   < > 8.7  8.7 8.5* 8.6*   ALBUMIN 3.3*  --  2.6*  --   --    PROT 7.9  --  5.9*  --   --    BILITOT 0.4  --  0.3  --   --    ALKPHOS 237*  --  182*  --   --    ALT 5*  --  <5*  --   --    AST 29  --  16  --   --    MG  --   --  1.9  --   --    PHOS   --   --  4.0  --   --     < > = values in this interval not displayed.       All pertinent labs within the past 24 hours have been reviewed.    Significant Imaging:  I have reviewed all pertinent imaging results/findings within the past 24 hours.    ABG  Recent Labs   Lab 02/10/25  0109   PH 7.329*   PO2 60*   PCO2 46.7*   HCO3 24.6   BE -1     Assessment/Plan:     Pulmonary  * Acute hypoxic respiratory failure  Patient with Hypoxic Respiratory failure which is Acute on chronic.  he is on home oxygen at 3 LPM. Supplemental oxygen was provided and noted- Vent Mode: A/C  Oxygen Concentration (%):  [] 30  Resp Rate Total:  [22 br/min-25 br/min] 24 br/min  Vt Set:  [360 mL] 360 mL  PEEP/CPAP:  [5 cmH20] 5 cmH20  Mean Airway Pressure:  [9.4 stD29-91 cmH20] 12 cmH20    .   Signs/symptoms of respiratory failure include- increased work of breathing and respiratory distress. Contributing diagnoses includes - CHF and Pleural effusion Labs and images were reviewed. Patient Has not had a recent ABG. Will treat underlying causes and adjust management of respiratory failure as follows-     Likely secondary to RSV vs possibly pulmonary edema/volume overload    - s/p intubation in the ED. On ventilator.   - Continue supplemental O2  - Broad spectrum abx, de-escalate when appropriate  - F/u respiratory cx     Cardiac/Vascular  Elevated troponin  HsTrop 46 on admission. EKG sinus tachycardia w/1st degree AV Block, T wave abnormality. Likely Type 2 Demand ischemia in the setting of volume overload    - Trop peaked at 43, downtrending   - Cardiac Monitoring     Chronic systolic heart failure  Systolic and Diastolic Dysfunction. Most recent TTE (12/10/24) with EF 20-25% with severe global hypokinesis. EKG sinus tachycardia with 1st degree AV Block (same from prior EKG) and T-wave abnormality. BNP >4600. HsTrop 46. CXR with right lung base w/blunting suggesting small pleural effusion    - Fluid removal with HD   - Troponin peak at  "43, downtrending  - Fluid restriction at 1500 cc with strict I/Os and daily weights    - Maintain on telemetry and daily EKGs   - Up to date risk stratification : TSH, Lipids, HbA1c with optimization of risk factors is necessary  - Check Electrolytes, keep Mag >2 & K+ >4  - SCDs, TEDs, Nursing communication to elevated LE   - Ambulate as tolerated     Renal/  Hyperkalemia  K 8.3 on admission. Shifted with IV Lasix, Insulin/Dextrose, and Calcium Gluconate in the ED.     - Improvement with SLED, will hold on shifting again  - BMP Q4H     ESRD on hemodialysis  Creatinine 4.8 on admit. S/p R Nephrectomy (due to retained uretal stent per chart review) and HD MWF    Plan:   Lab Results   Component Value Date    CREATININE 1.7 (H) 02/10/2025     - On iHD MWF.   - Nephrology consulted, appreciate recs  - SLED overnight with improvement in potassium   - Avoid nephrotoxic agents such as NSAIDs, gadolinium and IV radiocontrast.  - Renally dose meds to current GFR.  - Maintain MAP > 65.     Oncology  Anemia of chronic renal failure, stage 5  Admit with hemoglobin 9.6 Baseline ~8-9      Lab Results   Component Value Date    IRON 26 (L) 12/20/2024    TIBC 209 (L) 12/20/2024    FERRITIN 2,808 (H) 12/20/2024     Lab Results   Component Value Date    FOLATE 8.0 12/20/2024     Lab Results   Component Value Date    YOIKSCKI71 770 12/20/2024     No results found for: "RETICCTPCT"    Likely secondary to anemia of chronic disease    Plan:   -   Lab Results   Component Value Date    HGB 9.6 (L) 02/09/2025     - Daily CBC   - Transfuse hgb <7         Critical Care Daily Checklist:    A: Awake: RASS Goal/Actual Goal:    Actual:     B: Spontaneous Breathing Trial Performed?     C: SAT & SBT Coordinated?  Yes                      D: Delirium: CAM-ICU     E: Early Mobility Performed? No   F: Feeding Goal: Goals: 1. Initiate tube feeds within 24-48 hours of admission    2. Advance tube feeds to goal rate within 72 hours of " initiation.  Status: Nutrition Goal Status: new   Current Diet Order   Procedures    Diet NPO      AS: Analgesia/Sedation Fent, Prop    T: Thromboembolic Prophylaxis Hepain SQ   H: HOB > 300 Yes   U: Stress Ulcer Prophylaxis (if needed) Famotidine   G: Glucose Control D5 drip    B: Bowel Function Stool Occurrence: 1   I: Indwelling Catheter (Lines & Alberts) Necessity Yes   D: De-escalation of Antimicrobials/Pharmacotherapies No    Plan for the day/ETD CRRT, echo, SAT/SBT    Code Status:  Family/Goals of Care: Full Code       I have discussed this patient's plan of care with Dr. Mendieta    Attestation to follow       Critical Care Time: 45 minutes  Critical secondary to Patient has a condition that poses threat to life and bodily function: Hypoxic respiratory failure      Critical care was time spent personally by me on the following activities: development of treatment plan with patient or surrogate and bedside caregivers, discussions with consultants, evaluation of patient's response to treatment, examination of patient, ordering and performing treatments and interventions, ordering and review of laboratory studies, ordering and review of radiographic studies, pulse oximetry, re-evaluation of patient's condition. This critical care time did not overlap with that of any other provider or involve time for any procedures.     Sofia Rodrigues PA-C  Critical Care Medicine  [facility] - Medical ICU

## 2025-02-10 NOTE — ASSESSMENT & PLAN NOTE
Creatinine 4.8 on admit  Plan:   Lab Results   Component Value Date    CREATININE 4.8 (H) 02/09/2025     - On iHD MWF.   - Nephrology consulted, appreciate recs   - Plan for HD tonight for clearance of hyperkalemia  - Avoid nephrotoxic agents such as NSAIDs, gadolinium and IV radiocontrast.  - Renally dose meds to current GFR.  - Maintain MAP > 65.

## 2025-02-10 NOTE — SUBJECTIVE & OBJECTIVE
Interval History: Unconscious. On Ventilator with PEEP of 5 and SO2 of 50%. Seen on SLED with UF of 400. SLED started at 5 am today. K was high yesterday so satrted on the 3K bath for initially which then switched to the 4K bath. He is requiring NE of 0.06 to maintain the MAP >65.    Review of patient's allergies indicates:   Allergen Reactions    Vancomycin analogues Anaphylaxis, Other (See Comments), Shortness Of Breath and Swelling     Light headed, see's spots      Aspirin Nausea And Vomiting and Other (See Comments)     Has crohn's disease    Other reaction(s): FLARES UP CROHNS      Has crohn's disease     Current Facility-Administered Medications   Medication Frequency    0.9%  NaCl infusion (CRRT USE ONLY) Continuous    acetaminophen tablet 650 mg Q6H PRN    azithromycin (ZITHROMAX) 500 mg in 0.9% NaCl 250 mL IVPB (admixture device) Q24H    calcium gluconate 1 g in NS IVPB (premixed) Q10 Min PRN    dextrose 10 % infusion Continuous    dextrose 10% bolus 125 mL 125 mL Once    dextrose 50 % in water (D50W) injection 25 g PRN    dextrose 50% injection 25 g PRN    [START ON 2/11/2025] famotidine tablet 20 mg Daily    fentaNYL 2500 mcg in 0.9% sodium chloride 250 mL infusion premix Continuous    heparin (porcine) injection 5,000 Units Q8H    magnesium sulfate 2g in water 50mL IVPB (premix) PRN    NORepinephrine 4 mg in sodium chloride 0.9% 250 mL infusion (premix) Continuous    piperacillin-tazobactam (ZOSYN) 4.5 g in D5W 100 mL IVPB (MB+) Q8H    propofol (DIPRIVAN) 10 mg/mL infusion Continuous    sodium chloride 0.9% flush 10 mL PRN    sodium phosphate 20.01 mmol in D5W 250 mL IVPB PRN    sodium phosphate 30 mmol in D5W 250 mL IVPB PRN    sodium phosphate 39.99 mmol in D5W 250 mL IVPB PRN       Objective:     Vital Signs (Most Recent):  Temp: 98.4 °F (36.9 °C) (02/10/25 1300)  Pulse: 93 (02/10/25 1432)  Resp: (!) 26 (02/10/25 1432)  BP: (!) 100/59 (02/10/25 1432)  SpO2: (!) 94 % (02/10/25 1432) Vital Signs (24h  Range):  Temp:  [94.8 °F (34.9 °C)-99.5 °F (37.5 °C)] 98.4 °F (36.9 °C)  Pulse:  [] 93  Resp:  [7-38] 26  SpO2:  [86 %-100 %] 94 %  BP: ()/(39-68) 100/59     Weight: 58 kg (127 lb 13.9 oz) (02/09/25 2009)  Body mass index is 20.64 kg/m².  Body surface area is 1.64 meters squared.    I/O last 3 completed shifts:  In: 1272.2 [I.V.:872.3; IV Piggyback:399.9]  Out: 1301 [Other:1101; Stool:200]     Physical Exam  HENT:      Head: Normocephalic and atraumatic.      Mouth/Throat:      Mouth: Mucous membranes are dry.   Abdominal:      General: Abdomen is flat.      Palpations: Abdomen is soft.   Musculoskeletal:      Right lower leg: No edema.      Left lower leg: No edema.   Skin:     General: Skin is dry.          Significant Labs:  BMP:   Recent Labs   Lab 02/10/25  0252 02/10/25  0814 02/10/25  1142     103   < > 75     136   < > 137   K 5.1  5.1   < > 5.2*     105   < > 108   CO2 23  23   < > 25   BUN 30*  30*   < > 12   CREATININE 3.8*  3.8*   < > 1.7*   CALCIUM 8.7  8.7   < > 8.6*   MG 1.9  --   --     < > = values in this interval not displayed.     CBC:   Recent Labs   Lab 02/10/25  0439   WBC 2.95*   RBC 2.65*   HGB 7.9*   HCT 26.4*   *   *   MCH 29.8   MCHC 29.9*     CMP:   Recent Labs   Lab 02/10/25  0252 02/10/25  0814 02/10/25  1142     103   < > 75   CALCIUM 8.7  8.7   < > 8.6*   ALBUMIN 2.6*  --   --    PROT 5.9*  --   --      136   < > 137   K 5.1  5.1   < > 5.2*   CO2 23  23   < > 25     105   < > 108   BUN 30*  30*   < > 12   CREATININE 3.8*  3.8*   < > 1.7*   ALKPHOS 182*  --   --    ALT <5*  --   --    AST 16  --   --    BILITOT 0.3  --   --     < > = values in this interval not displayed.     All labs within the past 24 hours have been reviewed.

## 2025-02-10 NOTE — ASSESSMENT & PLAN NOTE
Patient is ESRD on HD MWF. As per his sister he completed a full session of HD on Friday but she is uncertain if he was able to obtain his dry weight. I did call and discuss his lab results with his sister Sara Da Silva over the phone who provided consent for dialysis while he is admitted.     Recommendations  - Completed 8 hours of SLED  - Informed the diaysis nurse to stop the SLED for now  - Repeat the labs in the evening - if any critical values on the evening labs kindly contact the nephrology fellow on call to consider restarting dialysis - otherwise we will evaluate him for dialysis in the morning tomorrow  -1L fluid restriction  -2g salt diet   -daily RFP, magnesium, and phosphorus levels

## 2025-02-10 NOTE — PROGRESS NOTES
02/10/25 1710   Treatment   Treatment Type SLED   Treatment Status Discontinued treatment;Blood returned;Blood lost;Clotting   Dialysis Machine Number [id number]   Dialyzer Time (hours) 10.4   BVP (Liters) 119.9 L   Solutions Labeled and Current  Yes   Access Tunneled Cath;Right;IJ   Catheter Dressing Intact  Yes   Alarms Engaged Yes   CRRT Comments Tx completed.

## 2025-02-10 NOTE — ASSESSMENT & PLAN NOTE
Patient with Hypoxic Respiratory failure which is Acute on chronic.  he is on home oxygen at 3 LPM. Supplemental oxygen was provided and noted- Vent Mode: A/C  Oxygen Concentration (%):  [] 30  Resp Rate Total:  [22 br/min-25 br/min] 24 br/min  Vt Set:  [360 mL] 360 mL  PEEP/CPAP:  [5 cmH20] 5 cmH20  Mean Airway Pressure:  [9.4 wvM14-60 cmH20] 12 cmH20    .   Signs/symptoms of respiratory failure include- increased work of breathing and respiratory distress. Contributing diagnoses includes - CHF and Pleural effusion Labs and images were reviewed. Patient Has not had a recent ABG. Will treat underlying causes and adjust management of respiratory failure as follows-     Likely secondary to RSV vs possibly pulmonary edema/volume overload    - s/p intubation in the ED. On ventilator.   - Continue supplemental O2  - Broad spectrum abx, de-escalate when appropriate  - F/u respiratory cx

## 2025-02-10 NOTE — ED NOTES
I-STAT Chem-8+ Results:   Value Reference Range   Sodium 133 136-145 mmol/L   Potassium  6.5 3.5-5.1 mmol/L   Chloride 100  mmol/L   Ionized Calcium 1.18 1.06-1.42 mmol/L   CO2 (measured) 24 23-29 mmol/L   Glucose 242  mg/dL   BUN 37 6-30 mg/dL   Creatinine 5.3 0.5-1.4 mg/dL   Hematocrit 25 36-54%

## 2025-02-10 NOTE — H&P
[facility]  Critical Care Medicine  History & Physical    Patient Name: Flakito Mcginnis  MRN: [id number]  Admission Date: 2/9/2025  Hospital Length of Stay: 1 days  Code Status: Full Code  Attending Physician: Jose Mendieta MD   Primary Care Provider: Jordin Robbins MD   Principal Problem: Acute hypoxic respiratory failure    Subjective:     HPI:  69yoM w/hx of HFrEF (EF 20-25%, 12/2024), NICM, MR, ESRD on HD, chronic respiratory failure (on 3L NC at home), Crohn's disease s/p colostomy, R nephrectomy who presents to the hospital from [facility] from acute onset of SOB. Given the acuity of patient's condition, history was obtained from the chart. Per the ED provider note, patient has had increased sputum production, cough, wheezing, and bilateral lower extremity edema. His last dialysis session was on Friday (2/7/25) but there is concern that he may not have completed a full session as patient reports feeling volume overloaded. He denies chest pain or fever. He was recently admitted to the hospital septic shock secondary to staph capitis bacteremia and endocarditis. Completed 6wk course of IV Cefazolin on 2/2/25.     In the ED, patient was hypoxic with increased WOB. Initially placed on BiPAP without much improvement in hypoxia so was intubated. Afebrile, other VSS. Labs were notable for Hgb 9.6, Hct 32.3, Na 132, K 8.3, Bicarb 19, BUN/Cr 38/4.8, , BNP >4900, HsTrop 44. Flu/COVID negative. RSV positive. VBG 7.46/35.1/33.3/25.5. CXR with ongoing vs recurrent small right pleural effusion. EKG with known 1st degree AV block and T-wave abnormality. Administered calcium gluconate, IV insulin/dextrose, and IV lasix for hyperkalemia. Nephrology consulted. Admitted to the MICU for further management and workup.       Hospital/ICU Course:  No notes on file     Past Medical History:   Diagnosis Date    Crohn's disease 1998    ESRD (end stage renal disease) on dialysis 10/2017    Hypertension      Obstructive uropathy        Past Surgical History:   Procedure Laterality Date    COLOSTOMY  2006    DIALYSIS FISTULA CREATION Left 02/2018    NEPHRECTOMY Right     REMOVAL OF TUNNELED CENTRAL VENOUS CATHETER (CVC) N/A 5/23/2018    Procedure: PERMCATH REMOVAL-TUNNELED CVC REMOVAL;  Surgeon: Parveen Ray MD;  Location: [facility] CATH LAB;  Service: Nephrology;  Laterality: N/A;  pt coming in @930       Review of patient's allergies indicates:   Allergen Reactions    Vancomycin analogues Anaphylaxis, Other (See Comments), Shortness Of Breath and Swelling     Light headed, see's spots      Aspirin Nausea And Vomiting and Other (See Comments)     Has crohn's disease    Other reaction(s): FLARES UP CROHNS      Has crohn's disease       Family History       Problem Relation (Age of Onset)    Emphysema Father    Hypertension Mother          Tobacco Use    Smoking status: Former     Passive exposure: Never    Smokeless tobacco: Never   Substance and Sexual Activity    Alcohol use: Not Currently    Drug use: Never    Sexual activity: Not Currently      Review of Systems   Unable to perform ROS: Intubated     Objective:     Vital Signs (Most Recent):  Temp: 97.9 °F (36.6 °C) (02/09/25 2250)  Pulse: 87 (02/09/25 2250)  Resp: (!) 22 (02/09/25 2250)  BP: (!) 106/57 (02/09/25 2250)  SpO2: 99 % (02/09/25 2250) Vital Signs (24h Range):  Temp:  [95.9 °F (35.5 °C)-98.4 °F (36.9 °C)] 97.9 °F (36.6 °C)  Pulse:  [] 87  Resp:  [20-38] 22  SpO2:  [86 %-100 %] 99 %  BP: ()/(54-68) 106/57   Weight: 56 kg (123 lb 7.3 oz)  Body mass index is 19.93 kg/m².      Intake/Output Summary (Last 24 hours) at 2/9/2025 2308  Last data filed at 2/9/2025 2133  Gross per 24 hour   Intake 50 ml   Output --   Net 50 ml          Physical Exam  Vitals and nursing note reviewed.   Constitutional:       General: He is not in acute distress.     Appearance: Normal appearance. He is ill-appearing and diaphoretic. He is not toxic-appearing.       Interventions: He is sedated, intubated and restrained.   HENT:      Head: Normocephalic.   Eyes:      Pupils: Pupils are equal, round, and reactive to light.   Cardiovascular:      Rate and Rhythm: Normal rate and regular rhythm.      Pulses: Normal pulses.   Pulmonary:      Effort: He is intubated.      Breath sounds: Normal air entry.   Abdominal:      General: The ostomy site is clean.   Skin:     General: Skin is warm and moist.      Coloration: Skin is pale.   Neurological:      Mental Status: He is lethargic.            Vents:  Ventilator Initiated: Yes (02/09/25 2044)  Set Rate: 22 BPM (02/09/25 2052)  Vt Set: 360 mL (02/09/25 2052)  PEEP/CPAP: 5 cmH20 (02/09/25 2052)  Oxygen Concentration (%): 40 (02/09/25 2215)  Peak Airway Pressure: 25 cmH20 (02/09/25 2052)  Plateau Pressure: 0 cmH20 (02/09/25 2052)  Total Ve: 8.14 L/m (02/09/25 2052)  Negative Inspiratory Force (cm H2O): 0 (02/09/25 2052)  F/VT Ratio<105 (RSBI): (!) 60.11 (02/09/25 2052)  Lines/Drains/Airways       Central Venous Catheter Line  Duration                  Hemodialysis Catheter 12/31/24 0818 right subclavian 40 days              Drain  Duration                  Colostomy 12/19/24 2225 RLQ 52 days         NG/OG Tube 02/09/25 2103 14 Fr. Right mouth <1 day         Urethral Catheter 02/09/25 2100 Temperature probe;Straight-tip 16 Fr. <1 day              Airway  Duration                  Airway - Non-Surgical 02/09/25 2030 Endotracheal Tube <1 day              Peripheral Intravenous Line  Duration                  Peripheral IV - Single Lumen 02/09/25 1925 20 G Posterior;Right Hand <1 day         Peripheral IV - Single Lumen 02/09/25 1949 20 G Anterior;Distal;Right Upper Arm <1 day                  Significant Labs:    CBC/Anemia Profile:  Recent Labs   Lab 02/09/25 1935 02/09/25 1949   WBC  --  5.53   HGB  --  9.6*   HCT 31.7 32.3*   PLT  --  156   MCV  --  102*   RDW  --  18.3*        Chemistries:  Recent Labs   Lab 02/09/25 1949   *    K 8.3*      CO2 19*   BUN 38*   CREATININE 4.8*   CALCIUM 9.9   ALBUMIN 3.3*   PROT 7.9   BILITOT 0.4   ALKPHOS 237*   ALT 5*   AST 29       All pertinent labs within the past 24 hours have been reviewed.    Significant Imaging: I have reviewed all pertinent imaging results/findings within the past 24 hours.  Assessment/Plan:     Pulmonary  * Acute hypoxic respiratory failure  Patient with Hypoxic Respiratory failure which is Acute on chronic.  he is on home oxygen at 3 LPM. Supplemental oxygen was provided and noted- Oxygen Concentration (%):  [] 40  Resp Rate Total:  [22 br/min-24 br/min] 22 br/min  Vt Set:  [360 mL] 360 mL  PEEP/CPAP:  [5 cmH20] 5 cmH20  Mean Airway Pressure:  [10 cmH20] 10 cmH20    .   Signs/symptoms of respiratory failure include- increased work of breathing and respiratory distress. Contributing diagnoses includes - CHF and Pleural effusion Labs and images were reviewed. Patient Has not had a recent ABG. Will treat underlying causes and adjust management of respiratory failure as follows-     Likely secondary to RSV vs possibly pulmonary edema/volume overload    - s/p intubation in the ED. On ventilator.   - Continue supplemental O2  - Broad spectrum abx, de-escalate when appropriate  - F/u respiratory cx     Cardiac/Vascular  Elevated troponin  HsTrop 46 on admission. EKG sinus tachycardia w/1st degree AV Block, T wave abnormality. Likely Type 2 Demand ischemia in the setting of volume overload    - Trend Trop to rule out ischemia   - Cardiac Monitoring     Chronic systolic heart failure  Systolic and Diastolic Dysfunction. Most recent TTE (12/10/24) with EF 20-25% with severe global hypokinesis. EKG sinus tachycardia with 1st degree AV Block (same from prior EKG) and T-wave abnormality. BNP >4600. HsTrop 46. CXR with right lung base w/blunting suggesting small pleural effusion    - Plan for fluid removal with HD tonight  - Trend troponin to r/o ischemia etiology  - Fluid  "restriction at 1500 cc with strict I/Os and daily weights    - Maintain on telemetry and daily EKGs   - Up to date risk stratification : TSH, Lipids, HbA1c with optimization of risk factors is necessary  - Check Electrolytes, keep Mag >2 & K+ >4  - SCDs, TEDs, Nursing communication to elevated LE   - Ambulate as tolerated     Renal/  Hyperkalemia  K 8.3 on admission. Shifted with IV Lasix, Insulin/Dextrose, and Calcium Gluconate in the ED.     - Nephrology consulted, plan for HD tonight for clearance  - BMP Q4H     ESRD on hemodialysis  Creatinine 4.8 on admit    Plan:   Lab Results   Component Value Date    CREATININE 4.8 (H) 02/09/2025     - On iHD MWF.   - Nephrology consulted, appreciate recs   - Plan for HD tonight for clearance of hyperkalemia  - Avoid nephrotoxic agents such as NSAIDs, gadolinium and IV radiocontrast.  - Renally dose meds to current GFR.  - Maintain MAP > 65.     Oncology  Anemia of chronic renal failure, stage 5  Admit with hemoglobin 9.6 Baseline ~8-9      Lab Results   Component Value Date    IRON 26 (L) 12/20/2024    TIBC 209 (L) 12/20/2024    FERRITIN 2,808 (H) 12/20/2024     Lab Results   Component Value Date    FOLATE 8.0 12/20/2024     Lab Results   Component Value Date    DPIYTMBP46 770 12/20/2024     No results found for: "RETICCTPCT"    Likely secondary to anemia of chronic disease    Plan:   -   Lab Results   Component Value Date    HGB 9.6 (L) 02/09/2025     - Daily CBC   - Transfuse hgb <7          Critical Care Daily Checklist:    A: Awake: RASS Goal/Actual Goal:  -1  Actual:  -1   B: Spontaneous Breathing Trial Performed?     C: SAT & SBT Coordinated?                        D: Delirium: CAM-ICU     E: Early Mobility Performed? No   F: Feeding Goal:    Status:     Current Diet Order   Procedures    Diet NPO      AS: Analgesia/Sedation fentanyl   T: Thromboembolic Prophylaxis Subcut heparin   H: HOB > 300 Yes   U: Stress Ulcer Prophylaxis (if needed) pepcid   G: Glucose " Control Goal 140-180   B: Bowel Function     I: Indwelling Catheter (Lines & Alberts) Necessity Tunneled HD line   D: De-escalation of Antimicrobials/Pharmacotherapies Continue Abx    Plan for the day/ETD CCM admission  HD    Code Status:  Family/Goals of Care: Full Code  sister Ruiz, updated on POC     Critical Care Time: 70 minutes  Critical secondary to Patient has a condition that poses threat to life and bodily function: Severe Respiratory Distress and hyperkalemia    Critical care was time spent personally by me on the following activities: development of treatment plan with patient or surrogate and bedside caregivers, discussions with consultants, evaluation of patient's response to treatment, examination of patient, ordering and performing treatments and interventions, ordering and review of laboratory studies, ordering and review of radiographic studies, pulse oximetry, re-evaluation of patient's condition. This critical care time did not overlap with that of any other provider or involve time for any procedures.     Xiao Nelson-Connor, ANIVAL  Critical Care Medicine  [facility] - Medical ICU

## 2025-02-10 NOTE — SUBJECTIVE & OBJECTIVE
Past Medical History:   Diagnosis Date    Crohn's disease 1998    ESRD (end stage renal disease) on dialysis 10/2017    Hypertension     Obstructive uropathy        Past Surgical History:   Procedure Laterality Date    COLOSTOMY  2006    DIALYSIS FISTULA CREATION Left 02/2018    NEPHRECTOMY Right     REMOVAL OF TUNNELED CENTRAL VENOUS CATHETER (CVC) N/A 5/23/2018    Procedure: PERMCATH REMOVAL-TUNNELED CVC REMOVAL;  Surgeon: Parveen Ray MD;  Location: [facility] CATH LAB;  Service: Nephrology;  Laterality: N/A;  pt coming in @930       Review of patient's allergies indicates:   Allergen Reactions    Vancomycin analogues Anaphylaxis, Other (See Comments), Shortness Of Breath and Swelling     Light headed, see's spots      Aspirin Nausea And Vomiting and Other (See Comments)     Has crohn's disease    Other reaction(s): FLARES UP CROHNS      Has crohn's disease       Family History       Problem Relation (Age of Onset)    Emphysema Father    Hypertension Mother          Tobacco Use    Smoking status: Former     Passive exposure: Never    Smokeless tobacco: Never   Substance and Sexual Activity    Alcohol use: Not Currently    Drug use: Never    Sexual activity: Not Currently      Review of Systems   Unable to perform ROS: Intubated     Objective:     Vital Signs (Most Recent):  Temp: 97.9 °F (36.6 °C) (02/09/25 2250)  Pulse: 87 (02/09/25 2250)  Resp: (!) 22 (02/09/25 2250)  BP: (!) 106/57 (02/09/25 2250)  SpO2: 99 % (02/09/25 2250) Vital Signs (24h Range):  Temp:  [95.9 °F (35.5 °C)-98.4 °F (36.9 °C)] 97.9 °F (36.6 °C)  Pulse:  [] 87  Resp:  [20-38] 22  SpO2:  [86 %-100 %] 99 %  BP: ()/(54-68) 106/57   Weight: 56 kg (123 lb 7.3 oz)  Body mass index is 19.93 kg/m².      Intake/Output Summary (Last 24 hours) at 2/9/2025 2302  Last data filed at 2/9/2025 2133  Gross per 24 hour   Intake 50 ml   Output --   Net 50 ml          Physical Exam  Vitals and nursing note reviewed.   Constitutional:       General: He is  not in acute distress.     Appearance: Normal appearance. He is ill-appearing and diaphoretic. He is not toxic-appearing.      Interventions: He is sedated, intubated and restrained.   HENT:      Head: Normocephalic.   Eyes:      Pupils: Pupils are equal, round, and reactive to light.   Cardiovascular:      Rate and Rhythm: Normal rate and regular rhythm.      Pulses: Normal pulses.   Pulmonary:      Effort: He is intubated.      Breath sounds: Normal air entry.   Abdominal:      General: The ostomy site is clean.   Skin:     General: Skin is warm and moist.      Coloration: Skin is pale.   Neurological:      Mental Status: He is lethargic.            Vents:  Ventilator Initiated: Yes (02/09/25 2044)  Set Rate: 22 BPM (02/09/25 2052)  Vt Set: 360 mL (02/09/25 2052)  PEEP/CPAP: 5 cmH20 (02/09/25 2052)  Oxygen Concentration (%): 40 (02/09/25 2215)  Peak Airway Pressure: 25 cmH20 (02/09/25 2052)  Plateau Pressure: 0 cmH20 (02/09/25 2052)  Total Ve: 8.14 L/m (02/09/25 2052)  Negative Inspiratory Force (cm H2O): 0 (02/09/25 2052)  F/VT Ratio<105 (RSBI): (!) 60.11 (02/09/25 2052)  Lines/Drains/Airways       Central Venous Catheter Line  Duration                  Hemodialysis Catheter 12/31/24 0818 right subclavian 40 days              Drain  Duration                  Colostomy 12/19/24 2225 RLQ 52 days         NG/OG Tube 02/09/25 2103 14 Fr. Right mouth <1 day         Urethral Catheter 02/09/25 2100 Temperature probe;Straight-tip 16 Fr. <1 day              Airway  Duration                  Airway - Non-Surgical 02/09/25 2030 Endotracheal Tube <1 day              Peripheral Intravenous Line  Duration                  Peripheral IV - Single Lumen 02/09/25 1925 20 G Posterior;Right Hand <1 day         Peripheral IV - Single Lumen 02/09/25 1949 20 G Anterior;Distal;Right Upper Arm <1 day                  Significant Labs:    CBC/Anemia Profile:  Recent Labs   Lab 02/09/25  1935 02/09/25 1949   WBC  --  5.53   HGB  --  9.6*    HCT 31.7 32.3*   PLT  --  156   MCV  --  102*   RDW  --  18.3*        Chemistries:  Recent Labs   Lab 02/09/25 1949   *   K 8.3*      CO2 19*   BUN 38*   CREATININE 4.8*   CALCIUM 9.9   ALBUMIN 3.3*   PROT 7.9   BILITOT 0.4   ALKPHOS 237*   ALT 5*   AST 29       All pertinent labs within the past 24 hours have been reviewed.    Significant Imaging: I have reviewed all pertinent imaging results/findings within the past 24 hours.

## 2025-02-10 NOTE — HPI
69yoM w/hx of HFrEF (EF 20-25%, 12/2024), NICM, MR, ESRD on HD, chronic respiratory failure (on 3L NC at home), Crohn's disease s/p colostomy, R nephrectomy who presents to the hospital from [facility] from acute onset of SOB. Given the acuity of patient's condition, history was obtained from the chart. Per the ED provider note, patient has had increased sputum production, cough, wheezing, and bilateral lower extremity edema. His last dialysis session was on Friday (2/7/25) but there is concern that he may not have completed a full session as patient reports feeling volume overloaded. He denies chest pain or fever. He was recently admitted to the hospital septic shock secondary to staph capitis bacteremia and endocarditis. Completed 6wk course of IV Cefazolin on 2/2/25.     In the ED, patient was hypoxic with increased WOB. Initially placed on BiPAP without much improvement in hypoxia so was intubated. Afebrile, other VSS. Labs were notable for Hgb 9.6, Hct 32.3, Na 132, K 8.3, Bicarb 19, BUN/Cr 38/4.8, , BNP >4900, HsTrop 44. Flu/COVID negative. RSV positive. VBG 7.46/35.1/33.3/25.5. CXR with ongoing vs recurrent small right pleural effusion. EKG with known 1st degree AV block and T-wave abnormality. Administered calcium gluconate, IV insulin/dextrose, and IV lasix for hyperkalemia. Nephrology consulted. Admitted to the MICU for further management and workup.

## 2025-02-10 NOTE — PROGRESS NOTES
[facility]  Nephrology  Progress Note    Patient Name: Flakito Mcginnis  MRN: [id number]  Admission Date: 2/9/2025  Hospital Length of Stay: 1 days  Attending Provider: Jose Mendieta MD   Primary Care Physician: Jordin Robbins MD  Principal Problem:Acute hypoxic respiratory failure    Subjective:     HPI: 69-year-old male past medical history nonischemic cardiomyopathy EF most recently 20-25%, ESRD on HD MWF, chronic respiratory failure on 3 L nasal cannula at home who presents for worsening shortness of breath times 2 days. Patient mentioned to his nursing home staff that he was feeling dyspnic and EMS was called. Upon arrival to the ER he was intubated so history was provided from speaking to his sister on the phone and with the ER team. Infectious work up tested positive for RSV. Nephrology was consulted for management of his hemodialysis and for hyperkalemia of 8.3, his ECG does reveal some peaked T-waves. As per the sister, he has been complaining of increased shortness of breath, fatigue, and decreased urine output for the past two days. The sister does note that several residents of his nursing home have tested positive for respiratory virus recently.     Interval History: Unconscious. On Ventilator with PEEP of 5 and SO2 of 50%. Seen on SLED with UF of 400. SLED started at 5 am today. K was high yesterday so satrted on the 3K bath for initially which then switched to the 4K bath. He is requiring NE of 0.06 to maintain the MAP >65.    Review of patient's allergies indicates:   Allergen Reactions    Vancomycin analogues Anaphylaxis, Other (See Comments), Shortness Of Breath and Swelling     Light headed, see's spots      Aspirin Nausea And Vomiting and Other (See Comments)     Has crohn's disease    Other reaction(s): FLARES UP CROHNS      Has crohn's disease     Current Facility-Administered Medications   Medication Frequency    0.9%  NaCl infusion (CRRT USE ONLY) Continuous    acetaminophen tablet  650 mg Q6H PRN    azithromycin (ZITHROMAX) 500 mg in 0.9% NaCl 250 mL IVPB (admixture device) Q24H    calcium gluconate 1 g in NS IVPB (premixed) Q10 Min PRN    dextrose 10 % infusion Continuous    dextrose 10% bolus 125 mL 125 mL Once    dextrose 50 % in water (D50W) injection 25 g PRN    dextrose 50% injection 25 g PRN    [START ON 2/11/2025] famotidine tablet 20 mg Daily    fentaNYL 2500 mcg in 0.9% sodium chloride 250 mL infusion premix Continuous    heparin (porcine) injection 5,000 Units Q8H    magnesium sulfate 2g in water 50mL IVPB (premix) PRN    NORepinephrine 4 mg in sodium chloride 0.9% 250 mL infusion (premix) Continuous    piperacillin-tazobactam (ZOSYN) 4.5 g in D5W 100 mL IVPB (MB+) Q8H    propofol (DIPRIVAN) 10 mg/mL infusion Continuous    sodium chloride 0.9% flush 10 mL PRN    sodium phosphate 20.01 mmol in D5W 250 mL IVPB PRN    sodium phosphate 30 mmol in D5W 250 mL IVPB PRN    sodium phosphate 39.99 mmol in D5W 250 mL IVPB PRN       Objective:     Vital Signs (Most Recent):  Temp: 98.4 °F (36.9 °C) (02/10/25 1300)  Pulse: 93 (02/10/25 1432)  Resp: (!) 26 (02/10/25 1432)  BP: (!) 100/59 (02/10/25 1432)  SpO2: (!) 94 % (02/10/25 1432) Vital Signs (24h Range):  Temp:  [94.8 °F (34.9 °C)-99.5 °F (37.5 °C)] 98.4 °F (36.9 °C)  Pulse:  [] 93  Resp:  [7-38] 26  SpO2:  [86 %-100 %] 94 %  BP: ()/(39-68) 100/59     Weight: 58 kg (127 lb 13.9 oz) (02/09/25 2349)  Body mass index is 20.64 kg/m².  Body surface area is 1.64 meters squared.    I/O last 3 completed shifts:  In: 1272.2 [I.V.:872.3; IV Piggyback:399.9]  Out: 1301 [Other:1101; Stool:200]     Physical Exam  HENT:      Head: Normocephalic and atraumatic.      Mouth/Throat:      Mouth: Mucous membranes are dry.   Abdominal:      General: Abdomen is flat.      Palpations: Abdomen is soft.   Musculoskeletal:      Right lower leg: No edema.      Left lower leg: No edema.   Skin:     General: Skin is dry.          Significant Labs:  BMP:    Recent Labs   Lab 02/10/25  0252 02/10/25  0814 02/10/25  1142     103   < > 75     136   < > 137   K 5.1  5.1   < > 5.2*     105   < > 108   CO2 23  23   < > 25   BUN 30*  30*   < > 12   CREATININE 3.8*  3.8*   < > 1.7*   CALCIUM 8.7  8.7   < > 8.6*   MG 1.9  --   --     < > = values in this interval not displayed.     CBC:   Recent Labs   Lab 02/10/25  0439   WBC 2.95*   RBC 2.65*   HGB 7.9*   HCT 26.4*   *   *   MCH 29.8   MCHC 29.9*     CMP:   Recent Labs   Lab 02/10/25  0252 02/10/25  0814 02/10/25  1142     103   < > 75   CALCIUM 8.7  8.7   < > 8.6*   ALBUMIN 2.6*  --   --    PROT 5.9*  --   --      136   < > 137   K 5.1  5.1   < > 5.2*   CO2 23  23   < > 25     105   < > 108   BUN 30*  30*   < > 12   CREATININE 3.8*  3.8*   < > 1.7*   ALKPHOS 182*  --   --    ALT <5*  --   --    AST 16  --   --    BILITOT 0.3  --   --     < > = values in this interval not displayed.     All labs within the past 24 hours have been reviewed.   Assessment/Plan:     Renal/  ESRD on hemodialysis  Patient is ESRD on HD MW. As per his sister he completed a full session of HD on Friday but she is uncertain if he was able to obtain his dry weight. I did call and discuss his lab results with his sister Sara Da Silva over the phone who provided consent for dialysis while he is admitted.     Recommendations  - Completed 8 hours of SLED  - Informed the diaysis nurse to stop the SLED for now  - Repeat the labs in the evening - if any critical values on the evening labs kindly contact the nephrology fellow on call to consider restarting dialysis - otherwise we will evaluate him for dialysis in the morning tomorrow  -1L fluid restriction  -2g salt diet   -daily RFP, magnesium, and phosphorus levels        Thank you for your consult. I will follow-up with patient. Please contact us if you have any additional questions.    Chadwick Oneill MD  Nephrology  [facility] -  Medical ICU

## 2025-02-10 NOTE — ASSESSMENT & PLAN NOTE
Patient with Hypoxic Respiratory failure which is Acute on chronic.  he is on home oxygen at 3 LPM. Supplemental oxygen was provided and noted- Oxygen Concentration (%):  [] 40  Resp Rate Total:  [22 br/min-24 br/min] 22 br/min  Vt Set:  [360 mL] 360 mL  PEEP/CPAP:  [5 cmH20] 5 cmH20  Mean Airway Pressure:  [10 cmH20] 10 cmH20    .   Signs/symptoms of respiratory failure include- increased work of breathing and respiratory distress. Contributing diagnoses includes - CHF and Pleural effusion Labs and images were reviewed. Patient Has not had a recent ABG. Will treat underlying causes and adjust management of respiratory failure as follows-     Likely secondary to RSV vs possibly pulmonary edema/volume overload    - s/p intubation in the ED. On ventilator.   - Continue supplemental O2  - Broad spectrum abx, de-escalate when appropriate  - F/u respiratory cx

## 2025-02-10 NOTE — SUBJECTIVE & OBJECTIVE
Past Medical History:   Diagnosis Date    Crohn's disease 1998    ESRD (end stage renal disease) on dialysis 10/2017    Hypertension     Obstructive uropathy        Past Surgical History:   Procedure Laterality Date    COLOSTOMY  2006    DIALYSIS FISTULA CREATION Left 02/2018    NEPHRECTOMY Right     REMOVAL OF TUNNELED CENTRAL VENOUS CATHETER (CVC) N/A 5/23/2018    Procedure: PERMCATH REMOVAL-TUNNELED CVC REMOVAL;  Surgeon: Parveen Ray MD;  Location: [facility] CATH LAB;  Service: Nephrology;  Laterality: N/A;  pt coming in @930       Review of patient's allergies indicates:   Allergen Reactions    Vancomycin analogues Anaphylaxis, Other (See Comments), Shortness Of Breath and Swelling     Light headed, see's spots      Aspirin Nausea And Vomiting and Other (See Comments)     Has crohn's disease    Other reaction(s): FLARES UP CROHNS      Has crohn's disease     Current Facility-Administered Medications   Medication Frequency    azithromycin (ZITHROMAX) 500 mg in 0.9% NaCl 250 mL IVPB (admixture device) Q24H    calcium gluconate 1 g in NS IVPB (premixed) Q10 Min PRN    dextrose 50 % in water (D50W) injection 25 g ED 1 Time    dextrose 50% injection 25 g PRN    And    insulin regular injection 5.6 Units 0.056 mL Once    fentaNYL 2500 mcg in 0.9% sodium chloride 250 mL infusion premix Continuous    NORepinephrine 4 mg in sodium chloride 0.9% 250 mL infusion (premix) Continuous    [START ON 2/10/2025] piperacillin-tazobactam (ZOSYN) 4.5 g in D5W 100 mL IVPB (MB+) Q12H    propofol (DIPRIVAN) 10 mg/mL infusion Continuous    sodium zirconium cyclosilicate packet 10 g Once     Current Outpatient Medications   Medication    atorvastatin (LIPITOR) 40 MG tablet    miconazole NITRATE 2 % (MICOTIN) 2 % top powder    midodrine (PROAMATINE) 10 MG tablet    sevelamer carbonate (RENVELA) 800 mg Tab     Family History       Problem Relation (Age of Onset)    Emphysema Father    Hypertension Mother          Tobacco Use    Smoking  status: Former     Passive exposure: Never    Smokeless tobacco: Never   Substance and Sexual Activity    Alcohol use: Not Currently    Drug use: Never    Sexual activity: Not Currently     Review of Systems   Reason unable to perform ROS: Patient intubated and sedated.     Objective:     Vital Signs (Most Recent):  Temp: 98.4 °F (36.9 °C) (02/09/25 2215)  Pulse: 101 (02/09/25 2215)  Resp: (!) 24 (02/09/25 2215)  BP: (!) 120/57 (02/09/25 2215)  SpO2: 97 % (02/09/25 2215) Vital Signs (24h Range):  Temp:  [95.9 °F (35.5 °C)-98.4 °F (36.9 °C)] 98.4 °F (36.9 °C)  Pulse:  [] 101  Resp:  [20-38] 24  SpO2:  [86 %-100 %] 97 %  BP: ()/(54-68) 120/57     Weight: 56 kg (123 lb 7.3 oz) (02/09/25 2016)  Body mass index is 19.93 kg/m².  Body surface area is 1.61 meters squared.    No intake/output data recorded.     Physical Exam  Constitutional:       Appearance: He is ill-appearing.   HENT:      Right Ear: External ear normal.      Left Ear: External ear normal.      Nose: Nose normal.      Mouth/Throat:      Mouth: Mucous membranes are moist.      Comments: ET tube  Cardiovascular:      Rate and Rhythm: Normal rate and regular rhythm.      Heart sounds: Murmur heard.   Pulmonary:      Breath sounds: Wheezing present.   Abdominal:      General: Abdomen is flat.      Palpations: Abdomen is soft.   Musculoskeletal:      Right lower leg: Edema present.      Left lower leg: Edema present.      Comments: Wound over left foot and toes.    Skin:     Capillary Refill: Capillary refill takes less than 2 seconds.          Significant Labs:  CBC:   Recent Labs   Lab 02/09/25 1949   WBC 5.53   RBC 3.16*   HGB 9.6*   HCT 32.3*      *   MCH 30.4   MCHC 29.7*     CMP:   Recent Labs   Lab 02/09/25 1949   GLU 86   CALCIUM 9.9   ALBUMIN 3.3*   PROT 7.9   *   K 8.3*   CO2 19*      BUN 38*   CREATININE 4.8*   ALKPHOS 237*   ALT 5*   AST 29   BILITOT 0.4     All labs within the past 24 hours have been  reviewed.    Significant Imaging:  X-Ray: Reviewed

## 2025-02-10 NOTE — ASSESSMENT & PLAN NOTE
Patient is ESRD on HD MWF. As per his sister he completed a full session of HD on Friday but she is uncertain if he was able to obtain his dry weight. I did call and discuss his lab results with his sister Sara Da Silva over the phone who provided consent for dialysis while he is admitted.     Recommendations  -Hemodialysis tonight for clearance of his hyperkalemia, removal of metabolic toxins and volume removal.  -Repeat potassium levels 2 hours after his HD is completed.   -Shift his potassium until HD can be initiated.   -If his blood pressure drops on dialysis or if he requires pressor support, please contact nephrology on call so that we may adjust his dialysis.    -1L fluid restriction  -2g salt diet   -daily RFP, magnesium, and phosphorus levels  -Patient may need repeat HD session in the morning.

## 2025-02-10 NOTE — CONSULTS
[facility] - Medical ICU  Adult Nutrition  Consult Note    SUMMARY     Recommendations    Initiate Novasource Renal @ trickle, Slowly advancing to goal rate of  25 mL/hr to provide 600 mL total volume, 1220 kcal, 55 g CHO, 54 protein, 0 fiber, 432 mL free water, 60% DRI  FWF per MD  Propofol currently providing an additional 222 kcals (@ 8.4 mL/hr)    As pressor requirements increase, rec'd trickle feeds for best tolerance. Avoid holding TF for residuals less than 500mL unless associated with other s/s of intolerance such as N/V/D, abd pain/distention.    Consult RD if propofol weaned and discontinued for alternative recs    Monitor labs, meds, weight, tolerance    Goals:   1. Initiate tube feeds within 24-48 hours of admission      2. Advance tube feeds to goal rate within 72 hours of initiation.    Nutrition Goal Status: new  Communication of RD Recs: other (comment) (poc)    Assessment and Plan    Nutrition Problem  Inadequate Enteral nutrition infusion    Related to (etiology):   NPO status    Signs and Symptoms (as evidenced by):   No EN infusing at this time     Interventions/Recommendations (treatment strategy):  Collaboration with other providers  Enteral Nutrition     Nutrition Diagnosis Status:   New       Malnutrition Assessment  Pending NFPE    Reason for Assessment    Reason For Assessment: consult  Diagnosis: pulmonary disease  General Information Comments: 69yoM w/hx of HFrEF (EF 20-25%, 12/2024), NICM, MR, ESRD on HD, chronic respiratory failure (on 3L NC at home), Crohn's disease s/p colostomy, R nephrectomy who presents to the hospital from [facility] from acute onset of SOB. RD consulted for intubated patient. Intubated/sedated. CRRT/ SLED. PI to buttocks. No nutrition at this time. 17# wt loss noted since 2023 (not significant), no recent wt loss noted. BMI 20.64. RD to monitor and complete NFPE at follow up as appropriate. -- Patinet discussed at MICU rounds.  Nutrition Discharge  "Planning: pending clinical course    Nutrition Related Social Determinants of Health: SDOH: None Identified    Food Insecurity: No Food Insecurity (12/20/2024)    Hunger Vital Sign     Worried About Running Out of Food in the Last Year: Never true     Ran Out of Food in the Last Year: Never true     Nutrition/Diet History    Spiritual, Cultural Beliefs, Adventism Practices, Values that Affect Care: no  Food Allergies: NKFA    Anthropometrics    Height: 5' 6" (167.6 cm)  Height (inches): 66 in  Height Method: Estimated  Weight: 58 kg (127 lb 13.9 oz)  Weight (lb): 127.87 lb  Weight Method: Bed Scale  Ideal Body Weight (IBW), Male: 142 lb  % Ideal Body Weight, Male (lb): 86.94 %  BMI (Calculated): 20.6  BMI Grade: less than 23 (older than 65 years) - underweight       Lab/Procedures/Meds    Pertinent Labs Reviewed: reviewed  Pertinent Labs Comments: Creatinine: 2.1, GFR: 3.4  Pertinent Medications Reviewed: reviewed  Pertinent Medications Comments: Insulin, famotidine, heparin, abx, fentanyl, norepinephrine, propofol    Estimated/Assessed Needs    Weight Used For Calorie Calculations: 58 kg (127 lb 13.9 oz)  Energy Calorie Requirements (kcal): 1587 ([facility])  Energy Need Method: [facility]  Protein Requirements: 58-69 (1-1.2 g/kg)  Weight Used For Protein Calculations: 58 kg (127 lb 13.9 oz)     Estimated Fluid Requirement Method: RDA Method  RDA Method (mL): 1587         Nutrition Prescription Ordered    Current Diet Order: NPO    Evaluation of Received Nutrient/Fluid Intake    Lipid Calories (kcals): 222 kcals (Propofol @ 8.4 mL/hr)  I/O: -857 mL since admit  Comments: LBM 2/9 (loose)  % Intake of Estimated Energy Needs: 0 - 25 %  % Meal Intake: NPO    Nutrition Risk    Level of Risk/Frequency of Follow-up: moderate - high (f/u 1-2x/week)       Monitor and Evaluation    Food and Nutrient Intake: enteral nutrition intake  Food and Nutrient Adminstration: enteral and parenteral nutrition " administration  Anthropometric Measurements: height/length, weight, weight change, body mass index  Biochemical Data, Medical Tests and Procedures: electrolyte and renal panel, gastrointestinal profile, glucose/endocrine profile, inflammatory profile, lipid profile  Nutrition-Focused Physical Findings: overall appearance, extremities, muscles and bones, head and eyes, skin       Nutrition Follow-Up    RD Follow-up?: Yes

## 2025-02-10 NOTE — CONSULTS
[facility] - Emergency Dept  Nephrology  Consult Note    Patient Name: Flakito Mcginnis  MRN: [id number]  Admission Date: 2/9/2025  Hospital Length of Stay: 0 days  Attending Provider: Dao Alvarado MD   Primary Care Physician: Jordin Robbins MD  Principal Problem:<principal problem not specified>    Inpatient consult to Nephrology  Consult performed by: Isaac Cabezas MD  Consult ordered by: Torri Velazquez PA-C  Reason for consult: ESRD        Subjective:     HPI: 69-year-old male past medical history nonischemic cardiomyopathy EF most recently 20-25%, ESRD on HD MWF, chronic respiratory failure on 3 L nasal cannula at home who presents for worsening shortness of breath times 2 days. Patient mentioned to his nursing home staff that he was feeling dyspnic and EMS was called. Upon arrival to the ER he was intubated so history was provided from speaking to his sister on the phone and with the ER team. Infectious work up tested positive for RSV. Nephrology was consulted for management of his hemodialysis and for hyperkalemia of 8.3, his ECG does reveal some peaked T-waves. As per the sister, he has been complaining of increased shortness of breath, fatigue, and decreased urine output for the past two days. The sister does note that several residents of his nursing home have tested positive for respiratory virus recently.     Past Medical History:   Diagnosis Date    Crohn's disease 1998    ESRD (end stage renal disease) on dialysis 10/2017    Hypertension     Obstructive uropathy        Past Surgical History:   Procedure Laterality Date    COLOSTOMY  2006    DIALYSIS FISTULA CREATION Left 02/2018    NEPHRECTOMY Right     REMOVAL OF TUNNELED CENTRAL VENOUS CATHETER (CVC) N/A 5/23/2018    Procedure: PERMCATH REMOVAL-TUNNELED CVC REMOVAL;  Surgeon: Parveen Ray MD;  Location: [facility] CATH LAB;  Service: Nephrology;  Laterality: N/A;  pt coming in @930       Review of patient's allergies indicates:   Allergen  Reactions    Vancomycin analogues Anaphylaxis, Other (See Comments), Shortness Of Breath and Swelling     Light headed, see's spots      Aspirin Nausea And Vomiting and Other (See Comments)     Has crohn's disease    Other reaction(s): FLARES UP CROHNS      Has crohn's disease     Current Facility-Administered Medications   Medication Frequency    azithromycin (ZITHROMAX) 500 mg in 0.9% NaCl 250 mL IVPB (admixture device) Q24H    calcium gluconate 1 g in NS IVPB (premixed) Q10 Min PRN    dextrose 50 % in water (D50W) injection 25 g ED 1 Time    dextrose 50% injection 25 g PRN    And    insulin regular injection 5.6 Units 0.056 mL Once    fentaNYL 2500 mcg in 0.9% sodium chloride 250 mL infusion premix Continuous    NORepinephrine 4 mg in sodium chloride 0.9% 250 mL infusion (premix) Continuous    [START ON 2/10/2025] piperacillin-tazobactam (ZOSYN) 4.5 g in D5W 100 mL IVPB (MB+) Q12H    propofol (DIPRIVAN) 10 mg/mL infusion Continuous    sodium zirconium cyclosilicate packet 10 g Once     Current Outpatient Medications   Medication    atorvastatin (LIPITOR) 40 MG tablet    miconazole NITRATE 2 % (MICOTIN) 2 % top powder    midodrine (PROAMATINE) 10 MG tablet    sevelamer carbonate (RENVELA) 800 mg Tab     Family History       Problem Relation (Age of Onset)    Emphysema Father    Hypertension Mother          Tobacco Use    Smoking status: Former     Passive exposure: Never    Smokeless tobacco: Never   Substance and Sexual Activity    Alcohol use: Not Currently    Drug use: Never    Sexual activity: Not Currently     Review of Systems   Reason unable to perform ROS: Patient intubated and sedated.     Objective:     Vital Signs (Most Recent):  Temp: 98.4 °F (36.9 °C) (02/09/25 2215)  Pulse: 101 (02/09/25 2215)  Resp: (!) 24 (02/09/25 2215)  BP: (!) 120/57 (02/09/25 2215)  SpO2: 97 % (02/09/25 2215) Vital Signs (24h Range):  Temp:  [95.9 °F (35.5 °C)-98.4 °F (36.9 °C)] 98.4 °F (36.9 °C)  Pulse:  [] 101  Resp:   [20-38] 24  SpO2:  [86 %-100 %] 97 %  BP: ()/(54-68) 120/57     Weight: 56 kg (123 lb 7.3 oz) (02/09/25 2016)  Body mass index is 19.93 kg/m².  Body surface area is 1.61 meters squared.    No intake/output data recorded.     Physical Exam  Constitutional:       Appearance: He is ill-appearing.   HENT:      Right Ear: External ear normal.      Left Ear: External ear normal.      Nose: Nose normal.      Mouth/Throat:      Mouth: Mucous membranes are moist.      Comments: ET tube  Cardiovascular:      Rate and Rhythm: Normal rate and regular rhythm.      Heart sounds: Murmur heard.   Pulmonary:      Breath sounds: Wheezing present.   Abdominal:      General: Abdomen is flat.      Palpations: Abdomen is soft.   Musculoskeletal:      Right lower leg: Edema present.      Left lower leg: Edema present.      Comments: Wound over left foot and toes.    Skin:     Capillary Refill: Capillary refill takes less than 2 seconds.          Significant Labs:  CBC:   Recent Labs   Lab 02/09/25 1949   WBC 5.53   RBC 3.16*   HGB 9.6*   HCT 32.3*      *   MCH 30.4   MCHC 29.7*     CMP:   Recent Labs   Lab 02/09/25 1949   GLU 86   CALCIUM 9.9   ALBUMIN 3.3*   PROT 7.9   *   K 8.3*   CO2 19*      BUN 38*   CREATININE 4.8*   ALKPHOS 237*   ALT 5*   AST 29   BILITOT 0.4     All labs within the past 24 hours have been reviewed.    Significant Imaging:  X-Ray: Reviewed    Assessment/Plan:     Pulmonary  Acute hypoxic respiratory failure  Suspect mostly 2/2 to RSV pneumonia. After discussing with his sister, the patient was complaining of shortness of breath despite completing a full session of HD on Friday.     Infectious management as per primary team  HD today for volume removal.     Cardiac/Vascular  Chronic systolic heart failure  Management per primary team.      Renal/  ESRD on hemodialysis  Patient is ESRD on HD MWF. As per his sister he completed a full session of HD on Friday but she is uncertain if  he was able to obtain his dry weight. I did call and discuss his lab results with his sister Sara Da Silva over the phone who provided consent for dialysis while he is admitted.     Recommendations  -Hemodialysis tonight for clearance of his hyperkalemia, removal of metabolic toxins and volume removal.  -Repeat potassium levels 2 hours after his HD is completed.   -Shift his potassium until HD can be initiated.   -If his blood pressure drops on dialysis or if he requires pressor support, please contact nephrology on call so that we may adjust his dialysis.    -1L fluid restriction  -2g salt diet   -daily RFP, magnesium, and phosphorus levels  -Patient may need repeat HD session in the morning.         Thank you for your consult. I will follow-up with patient. Please contact us if you have any additional questions.    Isaac Cabezas MD  Nephrology  [facility] - Emergency Dept

## 2025-02-10 NOTE — PROGRESS NOTES
02/10/25 0208   Treatment   Treatment Type SLED   Treatment Status New start   Dialysis Machine Number [id number]   Dialyzer Time (hours) 0   BVP (Liters) 0 L   Solutions Labeled and Current  Yes   Access Right;Tunneled Cath;IJ   Catheter Dressing Intact  Yes   Alarms Engaged Yes   CRRT Comments SLED started   Prescription   Time (Hours) Continuous   Dialysate K + (mEq/L) 3  (3K for 4 hours then will switch to 4K)   Dialysate CA + (mEq/L) 2.25   Dialysate HCO3 - (Bicarb) (mEq/L) 30   Dialysate Na + (mEq/L) 138   Cartridge Type   (R300)   Dialysate Flow Rate (mL/min) 200   UF Goal Rate 400 mL/hr   CRRT Hourly Documentation   Blood Flow (mL/min) 150   UF Rate 200 cc/hr   Arterial Pressure (mmHg) -30 mmHg   Venous Pressure (mmHg) 40 mmHg   Effluent Pressure (EP) (mmHg) 20 mmHg     Continuous SLED started per MD orders. VS stable to start tx. Starting UF set at 200 with a goal of 400. Report given to primary nurse.

## 2025-02-10 NOTE — ASSESSMENT & PLAN NOTE
K 8.3 on admission. Shifted with IV Lasix, Insulin/Dextrose, and Calcium Gluconate in the ED.     - Nephrology consulted, plan for HD tonight for clearance  - BMP Q4H

## 2025-02-10 NOTE — ASSESSMENT & PLAN NOTE
Suspect mostly 2/2 to RSV pneumonia. After discussing with his sister, the patient was complaining of shortness of breath despite completing a full session of HD on Friday.     Infectious management as per primary team  HD today for volume removal.

## 2025-02-10 NOTE — ED PROVIDER NOTES
Encounter Date: 2/9/2025       History     Chief Complaint   Patient presents with    Shortness of Breath     Increasing shortness all day long.  Last dialysis on Friday, may or may not have been completed.  Increasing dependent edema.  PMH ESRD, HTN, CHF,     69 y.o. man w/ HFrEF (30% 8/2024 from 20-25% 6/2024), NICM, MR, ESRD on HD, chronic respiratory failure on home 3L NC, Crohn's s/p colostomy, h/o R nephrectomy, endocarditis presents to ED from [facility] with sudden onset of shortness of breath this afternoon.  He has associated increased productive cough with clear mucus.  He also notes wheezing.  He has a history of chronic respiratory failure and uses oxygen at home.  He goes to dialysis Monday Wednesday and Friday.  He denies missed sessions.  He still makes a small amount of urine.  He feels that he is volume overloaded at the moment.  He has swelling in his legs bilaterally. He denies history of smoking.  He was recently admitted on 12/19/2024-01/14/2025 for septic shock, endocarditis and Staphylococcus bacteremia. He denies fever or chest pain.        Review of patient's allergies indicates:   Allergen Reactions    Vancomycin analogues Anaphylaxis, Other (See Comments), Shortness Of Breath and Swelling     Light headed, see's spots      Aspirin Nausea And Vomiting and Other (See Comments)     Has crohn's disease    Other reaction(s): FLARES UP CROHNS      Has crohn's disease     Past Medical History:   Diagnosis Date    Crohn's disease 1998    ESRD (end stage renal disease) on dialysis 10/2017    Hypertension     Obstructive uropathy      Past Surgical History:   Procedure Laterality Date    COLOSTOMY  2006    DIALYSIS FISTULA CREATION Left 02/2018    NEPHRECTOMY Right     REMOVAL OF TUNNELED CENTRAL VENOUS CATHETER (CVC) N/A 5/23/2018    Procedure: PERMCATH REMOVAL-TUNNELED CVC REMOVAL;  Surgeon: Parveen Ray MD;  Location: [facility] CATH LAB;  Service: Nephrology;  Laterality: N/A;  pt coming  in @930     Family History   Problem Relation Name Age of Onset    Hypertension Mother      Emphysema Father       Social History     Tobacco Use    Smoking status: Former     Passive exposure: Never    Smokeless tobacco: Never   Substance Use Topics    Alcohol use: Not Currently    Drug use: Never     Review of Systems   Constitutional:  Negative for fever.   Respiratory:  Positive for cough, shortness of breath and wheezing.    Cardiovascular:  Positive for leg swelling. Negative for chest pain.       Physical Exam     Initial Vitals [25 1844]   BP Pulse Resp Temp SpO2   111/66 96 20 97.8 °F (36.6 °C) 97 %      MAP       --         Physical Exam    Nursing note and vitals reviewed.  Constitutional: He appears well-developed and well-nourished. He is not diaphoretic. He appears cachectic. He appears ill. No distress.   Chronically ill appearance   HENT:   Head: Normocephalic and atraumatic.   Nose: Nose normal.   Eyes: Conjunctivae and EOM are normal.   Neck: Neck supple.   Cardiovascular:  Normal rate.           Pulmonary/Chest: Accessory muscle usage present. Tachypnea noted. He is in respiratory distress. He has rales.   On nasal cannula.  Poor air movement.  Trace rhonchi   Musculoskeletal:      Cervical back: Neck supple.      Right lower le+ Edema present.      Left lower le+ Edema present.      Comments: Hyperpigmentation and erythema to feet bilaterally though patient states this is chronic.  Pulses obtained by Doppler bilaterally     Neurological: He is alert and oriented to person, place, and time. GCS eye subscore is 4. GCS verbal subscore is 5. GCS motor subscore is 6.   Alert.  Conversational.  Responds appropriately to all questions.  Interactive during exam   Skin: No rash noted.   Psychiatric: He has a normal mood and affect. Thought content normal.         ED Course   Procedures  Labs Reviewed   CBC W/ AUTO DIFFERENTIAL - Abnormal       Result Value    WBC 5.53      RBC 3.16 (*)      Hemoglobin 9.6 (*)     Hematocrit 32.3 (*)      (*)     MCH 30.4      MCHC 29.7 (*)     RDW 18.3 (*)     Platelets 156      MPV 11.0      Immature Granulocytes 0.5      Gran # (ANC) 4.2      Immature Grans (Abs) 0.03      Lymph # 0.6 (*)     Mono # 0.5      Eos # 0.1      Baso # 0.08      nRBC 0      Gran % 76.2 (*)     Lymph % 10.7 (*)     Mono % 8.7      Eosinophil % 2.5      Basophil % 1.4      Differential Method Automated     COMPREHENSIVE METABOLIC PANEL - Abnormal    Sodium 132 (*)     Potassium 8.3 (*)     Chloride 101      CO2 19 (*)     Glucose 86      BUN 38 (*)     Creatinine 4.8 (*)     Calcium 9.9      Total Protein 7.9      Albumin 3.3 (*)     Total Bilirubin 0.4      Alkaline Phosphatase 237 (*)     AST 29      ALT 5 (*)     eGFR 12.4 (*)     Anion Gap 12     TROPONIN I HIGH SENSITIVITY - Abnormal    Troponin I High Sensitivity 43 (*)    TROPONIN I HIGH SENSITIVITY - Abnormal    Troponin I High Sensitivity 44 (*)    B-TYPE NATRIURETIC PEPTIDE - Abnormal    BNP >4,900 (*)    SARS-COV2 (COVID) WITH FLU/RSV BY PCR - Abnormal    SARS-CoV2 (COVID-19) Qualitative PCR Not Detected      Influenza A, Molecular Not Detected      Influenza B, Molecular Not Detected      RSV Ag by Molecular Method Detected (*)    POCT GLUCOSE - Abnormal    POCT Glucose 139 (*)    POCT GLUCOSE - Abnormal    POCT Glucose 252 (*)    ISTAT PROCEDURE - Abnormal    POC Glucose 242 (*)     POC BUN 37 (*)     POC Creatinine 5.3 (*)     POC Sodium 133 (*)     POC Potassium 6.5 (*)     POC Chloride 100      POC TCO2 (MEASURED) 24      POC Ionized Calcium 1.18      POC Hematocrit 25 (*)     Sample CASPER     HEPATITIS C ANTIBODY    Hepatitis C Ab Non-reactive      Narrative:     Release to patient->Immediate   HIV 1 / 2 ANTIBODY    HIV 1/2 Ag/Ab Non-reactive      Narrative:     Release to patient->Immediate   ISTAT CHEM8   ISTAT CHEM8        ECG Results              EKG 12-lead (Final result)        Collection Time Result Time QRS  Duration OHS QTC Calculation    02/09/25 20:47:45 02/10/25 14:03:08 152 513                     Final result by Interface, Lab In [facility] (02/10/25 14:03:14)                   Narrative:    Test Reason : R07.9,    Vent. Rate :  98 BPM     Atrial Rate : 107 BPM     P-R Int :    ms          QRS Dur : 152 ms      QT Int : 402 ms       P-R-T Axes :    -64 115 degrees    QTcB Int : 513 ms    Probably sinus rhythm with PACs  Left axis deviation  Left bundle branch block  Abnormal ECG  When compared with ECG of 09-Feb-2025 19:24,  No significant change was found  Confirmed by Marcus Acosta ([id number]) on 2/10/2025 2:03:05 PM    Referred By:            Confirmed By: Marcus Acosta                                     EKG 12-lead (Final result)        Collection Time Result Time QRS Duration OHS QTC Calculation    02/09/25 19:24:18 02/10/25 14:06:30 132 495                     Final result by Interface, Lab In [facility] (02/10/25 14:06:37)                   Narrative:    Test Reason : R94.31,    Vent. Rate :  98 BPM     Atrial Rate : 105 BPM     P-R Int :    ms          QRS Dur : 132 ms      QT Int : 388 ms       P-R-T Axes :    -58 123 degrees    QTcB Int : 495 ms    Sinus tachycardia with sinus arrhythmia and 1st degree AV block  Left axis deviation  Left bundle branch block  Abnormal ECG  When compared with ECG of 24-Dec-2024 06:47,  Left bundle branch block is now Present  Confirmed by Marcus Acosta ([id number]) on 2/10/2025 2:06:29 PM    Referred By:            Confirmed By: Marcus Acosta                                  Imaging Results              X-Ray Chest AP Portable (Final result)  Result time 02/09/25 21:59:58      Final result by Thompson Santana MD (02/09/25 21:59:58)                   Impression:      See above comments.  No detrimental change.      Electronically signed by: Thompson Santana  Date:    02/09/2025  Time:    21:59               Narrative:    EXAMINATION:  XR CHEST AP PORTABLE    CLINICAL HISTORY:  ETT  check;    TECHNIQUE:  Single frontal view of the chest was performed.    COMPARISON:  02/09/2025    FINDINGS:  Cardiac silhouette is enlarged similar to the prior study.    Right IJ central venous catheter with tip near the cavoatrial junction.    NG tube is present with tip overlying the stomach.    Endotracheal tube present tip above the vito.  Positioning is adequate.    No large effusion.  No evidence of pneumothorax.  No acute osseous abnormality.    Mild hazy density at the right lung base could be associated with minimal infiltrate or small layering effusion.    Remote rib fractures on the left.    No acute osseous abnormality.    Overall mild improvement at the right lung base with no detrimental change.                                       X-Ray Chest AP Portable (Final result)  Result time 02/09/25 20:13:15      Final result by Gualberto Payne MD (02/09/25 20:13:15)                   Impression:      Suspected ongoing versus recurrent small right pleural effusion with right basilar atelectasis/infiltrate.  Improved aeration of the left lung base.    Grossly stable other chronic findings in the body of the report.      Electronically signed by: Gualberto Payne MD  Date:    02/09/2025  Time:    20:13               Narrative:    EXAMINATION:  XR CHEST AP PORTABLE    CLINICAL HISTORY:  Chest Pain;    TECHNIQUE:  Single frontal view of the chest was performed.    COMPARISON:  Chest radiograph 12/26/2024, report only from outside facility chest CT 09/04/2024    FINDINGS:  Monitoring leads overlie the chest.  Patient is somewhat rotated.  Bilateral lung apices are partially obscured by overlying chin soft tissues.  Resolution is limited by body habitus with underpenetration.    Right IJ CVC tip overlies the upper right atrium.  Cardiomediastinal silhouette is midline and prominent with similar calcification and tortuosity of the aorta and moderately enlarged heart.  Hilar contours are stable.    Grossly similar  hazy opacification of the right lung base with blunting of the costophrenic angle suggesting small pleural effusion, with probable right basilar atelectasis/infiltrate.  Improved aeration of the left lung base.  No sizable pleural effusion or consolidation on the left.  Scattered areas of platelike scarring versus atelectasis bilaterally.  No definite pneumothorax.    No acute osseous process seen.  PA and lateral views can be obtained.                                       Medications   propofol (DIPRIVAN) 10 mg/mL infusion (25 mcg/kg/min × 56 kg Intravenous Verify Only 2/10/25 1806)   NORepinephrine 4 mg in sodium chloride 0.9% 250 mL infusion (premix) (0.1 mcg/kg/min × 56 kg Intravenous New Bag 2/10/25 1848)   dextrose 50% injection 50 g (50 g Intravenous Given 2/9/25 2116)     And   dextrose 50% injection 25 g ( Intravenous Canceled Entry 2/10/25 0442)     And   insulin regular injection 5.6 Units 0.056 mL (5.6 Units Intravenous Given 2/9/25 2240)   calcium gluconate 1 g in NS IVPB (premixed) (0 g Intravenous Stopped 2/9/25 2133)     And   calcium gluconate 1 g in NS IVPB (premixed) (has no administration in time range)   fentaNYL 2500 mcg in 0.9% sodium chloride 250 mL infusion premix (0 mcg/hr Intravenous Stopped 2/9/25 2331)   dextrose 50 % in water (D50W) injection 25 g (25 g Intravenous Not Given 2/9/25 2215)   azithromycin (ZITHROMAX) 500 mg in 0.9% NaCl 250 mL IVPB (admixture device) (0 mg Intravenous Stopped 2/10/25 0126)   sodium chloride 0.9% flush 10 mL (has no administration in time range)   acetaminophen tablet 650 mg (has no administration in time range)   heparin (porcine) injection 5,000 Units (5,000 Units Subcutaneous Given 2/10/25 1328)   dextrose 50% injection 50 g (50 g Intravenous Given 2/10/25 0056)     And   dextrose 50 % in water (D50W) injection 25 g (25 g Intravenous Given 2/10/25 0444)     And   insulin regular injection 5.8 Units 0.058 mL (5.8 Units Intravenous Given 2/10/25 0050)    0.9%  NaCl infusion (CRRT USE ONLY) ( Intravenous Stopped 2/10/25 1655)   sodium phosphate 20.01 mmol in D5W 250 mL IVPB (has no administration in time range)   sodium phosphate 30 mmol in D5W 250 mL IVPB (has no administration in time range)   sodium phosphate 39.99 mmol in D5W 250 mL IVPB (has no administration in time range)   magnesium sulfate 2g in water 50mL IVPB (premix) (0 g Intravenous Stopped 2/10/25 0739)   dextrose 10% bolus 125 mL 125 mL (125 mLs Intravenous Not Given 2/10/25 0545)   dextrose 10 % infusion ( Intravenous Verify Only 2/10/25 1806)   piperacillin-tazobactam (ZOSYN) 4.5 g in D5W 100 mL IVPB (MB+) (0 g Intravenous Stopped 2/10/25 1544)   famotidine tablet 20 mg (has no administration in time range)   dextrose 50% injection 50 g (has no administration in time range)     And   dextrose 50% injection 25 g (has no administration in time range)     And   insulin regular injection 5.8 Units 0.058 mL (has no administration in time range)   LORazepam injection 0.5 mg (0.5 mg Intravenous Given 2/9/25 1958)   furosemide injection 80 mg (80 mg Intravenous Given 2/9/25 2056)   etomidate injection 16 mg (16 mg Intravenous Given 2/9/25 2028)   rocuronium injection 50 mg (50 mg Intravenous Given 2/9/25 2029)   PHENYLephrine HCl in 0.9% NaCl 1 mg/10 mL (100 mcg/mL) syringe (  Return to Cabinet 2/9/25 2030)   albuterol sulfate nebulizer solution 10 mg (10 mg Nebulization Given 2/9/25 2052)   sodium zirconium cyclosilicate packet 10 g (10 g Oral Given 2/9/25 2300)   fentanyl IV bolus from bag/infusion 75 mcg (75 mcg Intravenous Bolus from Bag 2/9/25 2209)     Medical Decision Making  69 y.o. man w/ HFrEF (30% 8/2024 from 20-25% 6/2024), NICM, MR, ESRD on HD, chronic respiratory failure on home 3L NC, Crohn's s/p colostomy, h/o R nephrectomy, endocarditis presents to ED from [facility] with sudden onset of shortness of breath this afternoon.  Ill appearance with signs of respiratory distress.  Hypoxic in the 70s on NC on presentation.    Patient is alert, oriented to person and place, and conversational.  Respiratory consulted and placed on high flow NC.     Attending physician evaluated patient who appears lethargic and less responsive.  Initially placed on BiPAP though oxygen levels remain in the mid 80s.  Clinically patient appears to be decompensating given change in mentation.  Attending, Dr. Alvarado and resident  successfully intubated patient.    Prior to intubation, family contacted regarding patient's wishes for resuscitation.  Per patient and his family he would like to be intubated and would like life-saving measure such as CPR     Bedside echo performed by attending with concern for decrease in baseline EF. Concern for cardiogenic shock.  Cardiology consulted and evaluated patient.  Cardiology recommends admission to MICU    Hyperkalemia noted (8.3). EKG show indeterminate rhythm with lateral ST depressions. Nephrology consulted for emergent dialysis.  Calcium gluconate, Lasix, insulin and albuterol given    RSV positive    Case discussed with MICU    At this time, I will sign out patient to Dr. Dent due to shift change. Pending cardiology, MICU and nephrology recommendations at this time     Amount and/or Complexity of Data Reviewed  Labs: ordered. Decision-making details documented in ED Course.  Radiology: ordered.  ECG/medicine tests:  Decision-making details documented in ED Course.    Risk  OTC drugs.  Prescription drug management.  Decision regarding hospitalization.               ED Course as of 02/10/25 1921   Sun Feb 09, 2025 2131 WBC: 5.53 [HM]   2132 EKG 12-lead  EKG independently interpreted by me shows suspect atrial fibrillation, rate 98, no STEMI, ST depressions laterally that are slightly worse than prior [BD]   2133 Hemoglobin(!): 9.6 [HM]   2133 Hematocrit(!): 32.3 [HM]   2133 Sodium(!): 132 [HM]   2133 Potassium(!!): 8.3 [HM]   2133 CO2(!): 19 [HM]   2133  BUN(!): 38 [HM]   2133 Creatinine(!): 4.8 [HM]   2133 ALP(!): 237 [HM]   2133 Troponin I High Sensitivity(!): 43 [HM]   2133 RSV Ag by Molecular Method(!): Detected [HM]      ED Course User Index  [BD] Dao Alvarado MD  [HM] Torri Velazquez PA-C                           Clinical Impression:  Final diagnoses:  [R07.9] Chest pain  [R94.31] Abnormal EKG  [R09.02] Hypoxic  [J96.21] Acute on chronic respiratory failure with hypoxia          ED Disposition Condition    Admit                 Torri Velazquez PA-C  02/10/25 1921

## 2025-02-10 NOTE — PROVIDER PROGRESS NOTES - EMERGENCY DEPT.
Encounter Date: 2/9/2025    ED Physician Progress Notes          Intubation    Date/Time: 2/9/2025 10:32 PM  Location procedure was performed: [facility] EMERGENCY DEPARTMENT    Performed by: Omer Kilpatrick MD  Authorized by: Dao Alvarado MD  Consent Done: Emergent Situation  Indications: respiratory failure  Intubation method: video-assisted  Patient status: sedated  Preoxygenation: nonrebreather mask  Pretreatment medications: none  Sedatives: etomidate  Paralytic: rocuronium  Laryngoscope size: Mac 3  Tube size: 7.0 mm  Tube type: cuffed  Number of attempts: 1  Cords visualized: yes  Post-procedure assessment: ETCO2 monitor and chest rise  Breath sounds: rales/crackles  Cuff inflated: yes  ETT to lip: 22 cm  Tube secured with: ETT arreaga  Chest x-ray interpreted by radiologist.  Chest x-ray findings: endotracheal tube in appropriate position  Patient tolerance: Patient tolerated the procedure well with no immediate complications  Complications: No  Specimens: No  Implants: No

## 2025-02-10 NOTE — ED TRIAGE NOTES
Flakito Mcginnis, a 69 y.o. male presents to the ED w/ complaint of shortness of breath all day. Pt on dialysis and last session was Friday     Triage note:  Chief Complaint   Patient presents with    Shortness of Breath     Increasing shortness all day long.  Last dialysis on Friday, may or may not have been completed.  Increasing dependent edema.  PMH ESRD, HTN, CHF,     Review of patient's allergies indicates:   Allergen Reactions    Vancomycin analogues Anaphylaxis, Other (See Comments), Shortness Of Breath and Swelling     Light headed, see's spots      Aspirin Nausea And Vomiting and Other (See Comments)     Has crohn's disease    Other reaction(s): FLARES UP CROHNS      Has crohn's disease     Past Medical History:   Diagnosis Date    Crohn's disease 1998    ESRD (end stage renal disease) on dialysis 10/2017    Hypertension     Obstructive uropathy

## 2025-02-10 NOTE — ASSESSMENT & PLAN NOTE
HsTrop 46 on admission. EKG sinus tachycardia w/1st degree AV Block, T wave abnormality. Likely Type 2 Demand ischemia in the setting of volume overload    - Trend Trop to rule out ischemia   - Cardiac Monitoring

## 2025-02-10 NOTE — CONSULTS
Consult received, patient to be admitted to MICU. Full H&P to follow.    Una Wylie MD  Internal Medicine, PGY-2  02/09/2025 10:15 PM      Will get labs, 2 trops, d-dimer, US right arm and hydrate

## 2025-02-10 NOTE — PLAN OF CARE
Recommendations    Initiate Novasource Renal @ trickle, Slowly advancing to goal rate of  25 mL/hr to provide 600 mL total volume, 1220 kcal, 55 g CHO, 54 protein, 0 fiber, 432 mL free water, 60% DRI  FWF per MD  Propofol currently providing an additional 222 kcals (@ 8.4 mL/hr)    As pressor requirements increase, rec'd trickle feeds for best tolerance. Avoid holding TF for residuals less than 500mL unless associated with other s/s of intolerance such as N/V/D, abd pain/distention.    Consult RD if propofol weaned and discontinued for alternative recs    Monitor labs, meds, weight, tolerance    Goals:   1. Initiate tube feeds within 24-48 hours of admission      2. Advance tube feeds to goal rate within 72 hours of initiation.    Nutrition Goal Status: new  Communication of RD Recs: other (comment) (poc)

## 2025-02-10 NOTE — ASSESSMENT & PLAN NOTE
Systolic and Diastolic Dysfunction. Most recent TTE (12/10/24) with EF 20-25% with severe global hypokinesis. EKG sinus tachycardia with 1st degree AV Block (same from prior EKG) and T-wave abnormality. BNP >4600. HsTrop 46. CXR with right lung base w/blunting suggesting small pleural effusion    - Fluid removal with HD   - Troponin peak at 43, downtrending  - Fluid restriction at 1500 cc with strict I/Os and daily weights    - Maintain on telemetry and daily EKGs   - Up to date risk stratification : TSH, Lipids, HbA1c with optimization of risk factors is necessary  - Check Electrolytes, keep Mag >2 & K+ >4  - SCDs, TEDs, Nursing communication to elevated LE   - Ambulate as tolerated

## 2025-02-10 NOTE — HOSPITAL COURSE
Mr. Mcginnis was admitted to the MICU for acute on chronic hypoxemic respiratory failure 2/2 RSV and Klebisella pneumonia on mechanical ventilation. Required low dose vasopressors to tolerate SLED. D10 infusion for hypoglycemia. Echo with EF 20-25%, similar to previous study. Extubated to NC on 2/10 but required HFNC for ongoing hypoxemia. CRRT for volume removal per nephrology. Completing course of Rocephin for Klebsiella pneumonia. Weaned to home 3L on 2/14. Hospital course complicated by delirium and intermittent hypoglycemia. Delirium improving and hypoglycemia improved with increased PO intake. Patient stable for step down.

## 2025-02-10 NOTE — CARE UPDATE
69-year-old male past medical history nonischemic cardiomyopathy EF most recently 20-25%, ESRD on HD, chronic respiratory failure on 3 L nasal cannula at home who presents for worsening shortness of breath times 2 days.  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  CTS recommended medical management given his multiple comorbidities and frailty.  Appears to have completed antibiotics on February 2nd.  On arrival he was hemodynamically stable but hypoxic requiring high-flow nasal cannula.  Shortly after he was intubated.  Cardiology consulted for concerns of cardiogenic shock.  His lab work notable for lactic acid of 2.1, normal LFTs and T bili.  After consult was placed his RSV came back which was reported as positive therefore emergency room staff discontinued the cardiology consult.  On my exam his lower extremities are warm.  Bedside echo grossly unchanged from prior.  EF approximately 20-30%, aortic valve mass remains present with associated moderate AI.  IVC was 2.0 cm while on vent.    Overall his presentation for hypoxic respiratory failure requiring mechanical ventilation is more concerning for viral pneumonia with RSV.  Additionally he has remained normotensive and not tachycardic.    If patient, develops pressor requirements not related to sedation would recommend central venous access to help differentiate a mixed shock picture.  He does currently have a PICC line through which a single port and propofol is infusing so unable to use at this time.    Case discussed with on-call ICU attending

## 2025-02-11 LAB
ALBUMIN SERPL BCP-MCNC: 2.4 G/DL (ref 3.5–5.2)
ALLENS TEST: ABNORMAL
ALLENS TEST: ABNORMAL
ALP SERPL-CCNC: 182 U/L (ref 40–150)
ALT SERPL W/O P-5'-P-CCNC: <5 U/L (ref 10–44)
ANION GAP SERPL CALC-SCNC: 6 MMOL/L (ref 8–16)
ANION GAP SERPL CALC-SCNC: 7 MMOL/L (ref 8–16)
ANION GAP SERPL CALC-SCNC: 8 MMOL/L (ref 8–16)
ANION GAP SERPL CALC-SCNC: 8 MMOL/L (ref 8–16)
AST SERPL-CCNC: 24 U/L (ref 10–40)
BASOPHILS # BLD AUTO: 0.11 K/UL (ref 0–0.2)
BASOPHILS NFR BLD: 1.6 % (ref 0–1.9)
BILIRUB SERPL-MCNC: 0.5 MG/DL (ref 0.1–1)
BUN SERPL-MCNC: 10 MG/DL (ref 8–23)
BUN SERPL-MCNC: 10 MG/DL (ref 8–23)
BUN SERPL-MCNC: 11 MG/DL (ref 8–23)
BUN SERPL-MCNC: 11 MG/DL (ref 8–23)
BUN SERPL-MCNC: 13 MG/DL (ref 8–23)
BUN SERPL-MCNC: 15 MG/DL (ref 8–23)
BUN SERPL-MCNC: 18 MG/DL (ref 8–23)
CALCIUM SERPL-MCNC: 8.9 MG/DL (ref 8.7–10.5)
CALCIUM SERPL-MCNC: 9 MG/DL (ref 8.7–10.5)
CALCIUM SERPL-MCNC: 9.1 MG/DL (ref 8.7–10.5)
CALCIUM SERPL-MCNC: 9.1 MG/DL (ref 8.7–10.5)
CALCIUM SERPL-MCNC: 9.4 MG/DL (ref 8.7–10.5)
CALCIUM SERPL-MCNC: 9.6 MG/DL (ref 8.7–10.5)
CALCIUM SERPL-MCNC: 9.6 MG/DL (ref 8.7–10.5)
CHLORIDE SERPL-SCNC: 106 MMOL/L (ref 95–110)
CHLORIDE SERPL-SCNC: 107 MMOL/L (ref 95–110)
CHLORIDE SERPL-SCNC: 108 MMOL/L (ref 95–110)
CHLORIDE SERPL-SCNC: 111 MMOL/L (ref 95–110)
CO2 SERPL-SCNC: 20 MMOL/L (ref 23–29)
CO2 SERPL-SCNC: 20 MMOL/L (ref 23–29)
CO2 SERPL-SCNC: 21 MMOL/L (ref 23–29)
CO2 SERPL-SCNC: 22 MMOL/L (ref 23–29)
CREAT SERPL-MCNC: 1.5 MG/DL (ref 0.5–1.4)
CREAT SERPL-MCNC: 1.5 MG/DL (ref 0.5–1.4)
CREAT SERPL-MCNC: 1.7 MG/DL (ref 0.5–1.4)
CREAT SERPL-MCNC: 1.7 MG/DL (ref 0.5–1.4)
CREAT SERPL-MCNC: 2.2 MG/DL (ref 0.5–1.4)
CREAT SERPL-MCNC: 2.6 MG/DL (ref 0.5–1.4)
DELSYS: ABNORMAL
DELSYS: ABNORMAL
DIFFERENTIAL METHOD BLD: ABNORMAL
EOSINOPHIL # BLD AUTO: 0.2 K/UL (ref 0–0.5)
EOSINOPHIL NFR BLD: 2.7 % (ref 0–8)
ERYTHROCYTE [DISTWIDTH] IN BLOOD BY AUTOMATED COUNT: 18.4 % (ref 11.5–14.5)
ERYTHROCYTE [SEDIMENTATION RATE] IN BLOOD BY WESTERGREN METHOD: 22 MM/H
EST. GFR  (NO RACE VARIABLE): 25.9 ML/MIN/1.73 M^2
EST. GFR  (NO RACE VARIABLE): 31.6 ML/MIN/1.73 M^2
EST. GFR  (NO RACE VARIABLE): 43.1 ML/MIN/1.73 M^2
EST. GFR  (NO RACE VARIABLE): 43.1 ML/MIN/1.73 M^2
EST. GFR  (NO RACE VARIABLE): 50.1 ML/MIN/1.73 M^2
EST. GFR  (NO RACE VARIABLE): 50.1 ML/MIN/1.73 M^2
FIO2: 30
GLUCOSE SERPL-MCNC: 110 MG/DL (ref 70–110)
GLUCOSE SERPL-MCNC: 120 MG/DL (ref 70–110)
GLUCOSE SERPL-MCNC: 120 MG/DL (ref 70–110)
GLUCOSE SERPL-MCNC: 39 MG/DL (ref 70–110)
GLUCOSE SERPL-MCNC: 71 MG/DL (ref 70–110)
GLUCOSE SERPL-MCNC: 71 MG/DL (ref 70–110)
GLUCOSE SERPL-MCNC: 78 MG/DL (ref 70–110)
HCO3 UR-SCNC: 23.4 MMOL/L (ref 24–28)
HCO3 UR-SCNC: 24.6 MMOL/L (ref 24–28)
HCT VFR BLD AUTO: 28.4 % (ref 40–54)
HCT VFR BLD CALC: 28 %PCV (ref 36–54)
HGB BLD-MCNC: 8.7 G/DL (ref 14–18)
IMM GRANULOCYTES # BLD AUTO: 0.03 K/UL (ref 0–0.04)
IMM GRANULOCYTES NFR BLD AUTO: 0.4 % (ref 0–0.5)
LYMPHOCYTES # BLD AUTO: 0.7 K/UL (ref 1–4.8)
LYMPHOCYTES NFR BLD: 10.5 % (ref 18–48)
MAGNESIUM SERPL-MCNC: 2 MG/DL (ref 1.6–2.6)
MAGNESIUM SERPL-MCNC: 2.1 MG/DL (ref 1.6–2.6)
MCH RBC QN AUTO: 30.7 PG (ref 27–31)
MCHC RBC AUTO-ENTMCNC: 30.6 G/DL (ref 32–36)
MCV RBC AUTO: 100 FL (ref 82–98)
MIN VOL: 8
MODE: ABNORMAL
MONOCYTES # BLD AUTO: 0.7 K/UL (ref 0.3–1)
MONOCYTES NFR BLD: 9.7 % (ref 4–15)
NEUTROPHILS # BLD AUTO: 5.2 K/UL (ref 1.8–7.7)
NEUTROPHILS NFR BLD: 75.1 % (ref 38–73)
NRBC BLD-RTO: 0 /100 WBC
PCO2 BLDA: 45.8 MMHG (ref 35–45)
PCO2 BLDA: 47 MMHG (ref 35–45)
PEEP: 5
PH SMN: 7.3 [PH] (ref 7.35–7.45)
PH SMN: 7.34 [PH] (ref 7.35–7.45)
PHOSPHATE SERPL-MCNC: 3.5 MG/DL (ref 2.7–4.5)
PHOSPHATE SERPL-MCNC: 4 MG/DL (ref 2.7–4.5)
PHOSPHATE SERPL-MCNC: 4 MG/DL (ref 2.7–4.5)
PIP: 24
PLATELET # BLD AUTO: 141 K/UL (ref 150–450)
PMV BLD AUTO: 11.3 FL (ref 9.2–12.9)
PO2 BLDA: 31 MMHG (ref 40–60)
PO2 BLDA: 70 MMHG (ref 80–100)
POC BE: -1 MMOL/L
POC BE: -3 MMOL/L
POC IONIZED CALCIUM: 1.47 MMOL/L (ref 1.06–1.42)
POC SATURATED O2: 55 % (ref 95–100)
POC SATURATED O2: 92 % (ref 95–100)
POC TCO2: 25 MMOL/L (ref 23–27)
POC TCO2: 26 MMOL/L (ref 24–29)
POCT GLUCOSE: 105 MG/DL (ref 70–110)
POCT GLUCOSE: 106 MG/DL (ref 70–110)
POCT GLUCOSE: 109 MG/DL (ref 70–110)
POCT GLUCOSE: 119 MG/DL (ref 70–110)
POCT GLUCOSE: 130 MG/DL (ref 70–110)
POCT GLUCOSE: 164 MG/DL (ref 70–110)
POCT GLUCOSE: 91 MG/DL (ref 70–110)
POTASSIUM BLD-SCNC: 5 MMOL/L (ref 3.5–5.1)
POTASSIUM SERPL-SCNC: 4.4 MMOL/L (ref 3.5–5.1)
POTASSIUM SERPL-SCNC: 5.3 MMOL/L (ref 3.5–5.1)
POTASSIUM SERPL-SCNC: 5.7 MMOL/L (ref 3.5–5.1)
POTASSIUM SERPL-SCNC: 5.9 MMOL/L (ref 3.5–5.1)
POTASSIUM SERPL-SCNC: 6 MMOL/L (ref 3.5–5.1)
PROT SERPL-MCNC: 6 G/DL (ref 6–8.4)
RBC # BLD AUTO: 2.83 M/UL (ref 4.6–6.2)
SAMPLE: ABNORMAL
SAMPLE: ABNORMAL
SITE: ABNORMAL
SITE: ABNORMAL
SODIUM BLD-SCNC: 138 MMOL/L (ref 136–145)
SODIUM SERPL-SCNC: 133 MMOL/L (ref 136–145)
SODIUM SERPL-SCNC: 135 MMOL/L (ref 136–145)
SODIUM SERPL-SCNC: 137 MMOL/L (ref 136–145)
SODIUM SERPL-SCNC: 139 MMOL/L (ref 136–145)
SP02: 97
TRIGL SERPL-MCNC: 39 MG/DL (ref 30–150)
VT: 360
WBC # BLD AUTO: 6.94 K/UL (ref 3.9–12.7)

## 2025-02-11 PROCEDURE — 80053 COMPREHEN METABOLIC PANEL: CPT

## 2025-02-11 PROCEDURE — 80069 RENAL FUNCTION PANEL: CPT | Mod: 91 | Performed by: STUDENT IN AN ORGANIZED HEALTH CARE EDUCATION/TRAINING PROGRAM

## 2025-02-11 PROCEDURE — 90945 DIALYSIS ONE EVALUATION: CPT

## 2025-02-11 PROCEDURE — 99900026 HC AIRWAY MAINTENANCE (STAT)

## 2025-02-11 PROCEDURE — 27100171 HC OXYGEN HIGH FLOW UP TO 24 HOURS

## 2025-02-11 PROCEDURE — 94003 VENT MGMT INPAT SUBQ DAY: CPT

## 2025-02-11 PROCEDURE — 36600 WITHDRAWAL OF ARTERIAL BLOOD: CPT

## 2025-02-11 PROCEDURE — 25000003 PHARM REV CODE 250

## 2025-02-11 PROCEDURE — 84100 ASSAY OF PHOSPHORUS: CPT

## 2025-02-11 PROCEDURE — 25000003 PHARM REV CODE 250: Performed by: STUDENT IN AN ORGANIZED HEALTH CARE EDUCATION/TRAINING PROGRAM

## 2025-02-11 PROCEDURE — 20000000 HC ICU ROOM

## 2025-02-11 PROCEDURE — 94761 N-INVAS EAR/PLS OXIMETRY MLT: CPT

## 2025-02-11 PROCEDURE — 83735 ASSAY OF MAGNESIUM: CPT | Mod: 91 | Performed by: STUDENT IN AN ORGANIZED HEALTH CARE EDUCATION/TRAINING PROGRAM

## 2025-02-11 PROCEDURE — 99900035 HC TECH TIME PER 15 MIN (STAT)

## 2025-02-11 PROCEDURE — 80100008 HC CRRT DAILY MAINTENANCE

## 2025-02-11 PROCEDURE — 94799 UNLISTED PULMONARY SVC/PX: CPT

## 2025-02-11 PROCEDURE — 99291 CRITICAL CARE FIRST HOUR: CPT | Mod: ,,,

## 2025-02-11 PROCEDURE — 82803 BLOOD GASES ANY COMBINATION: CPT

## 2025-02-11 PROCEDURE — 25000003 PHARM REV CODE 250: Performed by: INTERNAL MEDICINE

## 2025-02-11 PROCEDURE — 85025 COMPLETE CBC W/AUTO DIFF WBC: CPT

## 2025-02-11 PROCEDURE — 63600175 PHARM REV CODE 636 W HCPCS

## 2025-02-11 PROCEDURE — 80048 BASIC METABOLIC PNL TOTAL CA: CPT | Mod: 91,XB

## 2025-02-11 PROCEDURE — 83735 ASSAY OF MAGNESIUM: CPT | Performed by: STUDENT IN AN ORGANIZED HEALTH CARE EDUCATION/TRAINING PROGRAM

## 2025-02-11 PROCEDURE — 27000207 HC ISOLATION

## 2025-02-11 PROCEDURE — 5A0945A ASSISTANCE WITH RESPIRATORY VENTILATION, 24-96 CONSECUTIVE HOURS, HIGH NASAL FLOW/VELOCITY: ICD-10-PCS | Performed by: STUDENT IN AN ORGANIZED HEALTH CARE EDUCATION/TRAINING PROGRAM

## 2025-02-11 PROCEDURE — 80069 RENAL FUNCTION PANEL: CPT | Performed by: STUDENT IN AN ORGANIZED HEALTH CARE EDUCATION/TRAINING PROGRAM

## 2025-02-11 PROCEDURE — 63600175 PHARM REV CODE 636 W HCPCS: Performed by: INTERNAL MEDICINE

## 2025-02-11 PROCEDURE — 83735 ASSAY OF MAGNESIUM: CPT | Mod: 91

## 2025-02-11 PROCEDURE — 25000003 PHARM REV CODE 250: Performed by: NURSE PRACTITIONER

## 2025-02-11 PROCEDURE — 99233 SBSQ HOSP IP/OBS HIGH 50: CPT | Mod: ,,, | Performed by: INTERNAL MEDICINE

## 2025-02-11 PROCEDURE — 84478 ASSAY OF TRIGLYCERIDES: CPT | Performed by: INTERNAL MEDICINE

## 2025-02-11 RX ORDER — MUPIROCIN 20 MG/G
OINTMENT TOPICAL 2 TIMES DAILY
Status: DISPENSED | OUTPATIENT
Start: 2025-02-11 | End: 2025-02-16

## 2025-02-11 RX ORDER — CALCIUM GLUCONATE 20 MG/ML
1 INJECTION, SOLUTION INTRAVENOUS EVERY 10 MIN PRN
Status: DISCONTINUED | OUTPATIENT
Start: 2025-02-11 | End: 2025-02-19 | Stop reason: HOSPADM

## 2025-02-11 RX ORDER — MAGNESIUM SULFATE HEPTAHYDRATE 40 MG/ML
2 INJECTION, SOLUTION INTRAVENOUS
Status: ACTIVE | OUTPATIENT
Start: 2025-02-11 | End: 2025-02-12

## 2025-02-11 RX ORDER — CALCIUM GLUCONATE 20 MG/ML
1 INJECTION, SOLUTION INTRAVENOUS EVERY 10 MIN PRN
Status: DISCONTINUED | OUTPATIENT
Start: 2025-02-11 | End: 2025-02-11

## 2025-02-11 RX ORDER — LEVOTHYROXINE SODIUM 50 UG/1
50 TABLET ORAL
Status: DISCONTINUED | OUTPATIENT
Start: 2025-02-12 | End: 2025-02-12

## 2025-02-11 RX ORDER — HYDROCODONE BITARTRATE AND ACETAMINOPHEN 500; 5 MG/1; MG/1
TABLET ORAL CONTINUOUS
Status: DISCONTINUED | OUTPATIENT
Start: 2025-02-11 | End: 2025-02-12

## 2025-02-11 RX ORDER — HYDROXYZINE HYDROCHLORIDE 25 MG/1
25 TABLET, FILM COATED ORAL 3 TIMES DAILY PRN
Status: DISCONTINUED | OUTPATIENT
Start: 2025-02-11 | End: 2025-02-19 | Stop reason: HOSPADM

## 2025-02-11 RX ORDER — MAGNESIUM SULFATE HEPTAHYDRATE 40 MG/ML
2 INJECTION, SOLUTION INTRAVENOUS
Status: DISCONTINUED | OUTPATIENT
Start: 2025-02-11 | End: 2025-02-11

## 2025-02-11 RX ORDER — HYDROCODONE BITARTRATE AND ACETAMINOPHEN 500; 5 MG/1; MG/1
TABLET ORAL CONTINUOUS
Status: DISCONTINUED | OUTPATIENT
Start: 2025-02-11 | End: 2025-02-11

## 2025-02-11 RX ORDER — CALCIUM GLUCONATE 20 MG/ML
1 INJECTION, SOLUTION INTRAVENOUS ONCE
Status: COMPLETED | OUTPATIENT
Start: 2025-02-11 | End: 2025-02-11

## 2025-02-11 RX ORDER — CALCIUM GLUCONATE 20 MG/ML
1 INJECTION, SOLUTION INTRAVENOUS ONCE
Status: DISCONTINUED | OUTPATIENT
Start: 2025-02-11 | End: 2025-02-11

## 2025-02-11 RX ADMIN — NOREPINEPHRINE BITARTRATE 0.2 MCG/KG/MIN: 0.02 INJECTION, SOLUTION INTRAVENOUS at 01:02

## 2025-02-11 RX ADMIN — CALCIUM GLUCONATE 1 G: 20 INJECTION, SOLUTION INTRAVENOUS at 11:02

## 2025-02-11 RX ADMIN — AZITHROMYCIN MONOHYDRATE 500 MG: 500 INJECTION, POWDER, LYOPHILIZED, FOR SOLUTION INTRAVENOUS at 11:02

## 2025-02-11 RX ADMIN — Medication 125 MCG/HR: at 03:02

## 2025-02-11 RX ADMIN — PIPERACILLIN SODIUM AND TAZOBACTAM SODIUM 4.5 G: 4; .5 INJECTION, POWDER, FOR SOLUTION INTRAVENOUS at 12:02

## 2025-02-11 RX ADMIN — PIPERACILLIN SODIUM AND TAZOBACTAM SODIUM 4.5 G: 4; .5 INJECTION, POWDER, FOR SOLUTION INTRAVENOUS at 03:02

## 2025-02-11 RX ADMIN — DEXTROSE MONOHYDRATE 25 G: 25 INJECTION, SOLUTION INTRAVENOUS at 01:02

## 2025-02-11 RX ADMIN — HEPARIN SODIUM 5000 UNITS: 5000 INJECTION INTRAVENOUS; SUBCUTANEOUS at 10:02

## 2025-02-11 RX ADMIN — FAMOTIDINE 20 MG: 20 TABLET, FILM COATED ORAL at 08:02

## 2025-02-11 RX ADMIN — INSULIN HUMAN 5 UNITS: 100 INJECTION, SOLUTION PARENTERAL at 11:02

## 2025-02-11 RX ADMIN — HYDROXYZINE HYDROCHLORIDE 25 MG: 25 TABLET, FILM COATED ORAL at 10:02

## 2025-02-11 RX ADMIN — HEPARIN SODIUM 5000 UNITS: 5000 INJECTION INTRAVENOUS; SUBCUTANEOUS at 02:02

## 2025-02-11 RX ADMIN — DEXTROSE MONOHYDRATE 50 G: 25 INJECTION, SOLUTION INTRAVENOUS at 11:02

## 2025-02-11 RX ADMIN — SODIUM ZIRCONIUM CYCLOSILICATE 10 G: 5 POWDER, FOR SUSPENSION ORAL at 08:02

## 2025-02-11 RX ADMIN — SODIUM ZIRCONIUM CYCLOSILICATE 10 G: 5 POWDER, FOR SUSPENSION ORAL at 09:02

## 2025-02-11 RX ADMIN — NOREPINEPHRINE BITARTRATE 0.2 MCG/KG/MIN: 0.02 INJECTION, SOLUTION INTRAVENOUS at 07:02

## 2025-02-11 RX ADMIN — SODIUM CHLORIDE: 9 INJECTION, SOLUTION INTRAVENOUS at 07:02

## 2025-02-11 RX ADMIN — SODIUM CHLORIDE: 9 INJECTION, SOLUTION INTRAVENOUS at 09:02

## 2025-02-11 RX ADMIN — SODIUM ZIRCONIUM CYCLOSILICATE 10 G: 5 POWDER, FOR SUSPENSION ORAL at 02:02

## 2025-02-11 RX ADMIN — HEPARIN SODIUM 5000 UNITS: 5000 INJECTION INTRAVENOUS; SUBCUTANEOUS at 06:02

## 2025-02-11 RX ADMIN — PIPERACILLIN SODIUM AND TAZOBACTAM SODIUM 4.5 G: 4; .5 INJECTION, POWDER, FOR SOLUTION INTRAVENOUS at 08:02

## 2025-02-11 RX ADMIN — AZITHROMYCIN MONOHYDRATE 500 MG: 500 INJECTION, POWDER, LYOPHILIZED, FOR SOLUTION INTRAVENOUS at 12:02

## 2025-02-11 NOTE — PROGRESS NOTES
[facility] - Medical [facility]  Nephrology  Progress Note    Patient Name: Flakito Mcginnis  MRN: [id number]  Admission Date: 2/9/2025  Hospital Length of Stay: 2 days  Attending Provider: Jose Mendieta MD   Primary Care Physician: Jordin Robbins MD  Principal Problem:Acute hypoxic respiratory failure    Subjective:     HPI: 69-year-old male past medical history nonischemic cardiomyopathy EF most recently 20-25%, ESRD on HD MWF, chronic respiratory failure on 3 L nasal cannula at home who presents for worsening shortness of breath times 2 days. Patient mentioned to his nursing home staff that he was feeling dyspnic and EMS was called. Upon arrival to the ER he was intubated so history was provided from speaking to his sister on the phone and with the ER team. Infectious work up tested positive for RSV. Nephrology was consulted for management of his hemodialysis and for hyperkalemia of 8.3, his ECG does reveal some peaked T-waves. As per the sister, he has been complaining of increased shortness of breath, fatigue, and decreased urine output for the past two days. The sister does note that several residents of his nursing home have tested positive for respiratory virus recently.     Interval History: On vent. Alert. Opening eyes to the voice. No over night events    Review of patient's allergies indicates:   Allergen Reactions    Vancomycin analogues Anaphylaxis, Other (See Comments), Shortness Of Breath and Swelling     Light headed, see's spots      Aspirin Nausea And Vomiting and Other (See Comments)     Has crohn's disease    Other reaction(s): FLARES UP CROHNS      Has crohn's disease     Current Facility-Administered Medications   Medication Frequency    0.9%  NaCl infusion (CRRT USE ONLY) Continuous    acetaminophen tablet 650 mg Q6H PRN    azithromycin (ZITHROMAX) 500 mg in 0.9% NaCl 250 mL IVPB (admixture device) Q24H    calcium gluconate 1 g in NS IVPB (premixed) Q10 Min PRN    dextrose 50% injection 25 g PRN     famotidine tablet 20 mg Daily    heparin (porcine) injection 5,000 Units Q8H    [START ON 2/12/2025] levothyroxine tablet 50 mcg Before breakfast    magnesium sulfate 2g in water 50mL IVPB (premix) PRN    mupirocin 2 % ointment BID    NORepinephrine 4 mg in sodium chloride 0.9% 250 mL infusion (premix) Continuous    piperacillin-tazobactam (ZOSYN) 4.5 g in D5W 100 mL IVPB (MB+) Q8H    sodium chloride 0.9% flush 10 mL PRN    sodium zirconium cyclosilicate packet 10 g TID       Objective:     Vital Signs (Most Recent):  Temp: 97 °F (36.1 °C) (02/11/25 1500)  Pulse: 83 (02/11/25 1500)  Resp: 14 (02/11/25 1500)  BP: (!) 115/57 (02/11/25 1500)  SpO2: (!) 93 % (02/11/25 1500) Vital Signs (24h Range):  Temp:  [97 °F (36.1 °C)-98 °F (36.7 °C)] 97 °F (36.1 °C)  Pulse:  [] 83  Resp:  [10-28] 14  SpO2:  [90 %-100 %] 93 %  BP: ()/(50-69) 115/57     Weight: 58 kg (127 lb 13.9 oz) (02/09/25 2349)  Body mass index is 20.64 kg/m².  Body surface area is 1.64 meters squared.    I/O last 3 completed shifts:  In: 5508.2 [I.V.:4331.7; NG/GT:250; IV Piggyback:926.6]  Out: 4433 [Other:3933; Stool:500]     Physical Exam     Physical Exam:  General: Alert. On Ventilator with PEEP of 5 and SaO2 of 30%  CV- Normal S1, S2   Abdomen- NTND soft  Extrem- BL cyanosis. Exteremities are cold. Skin is scaly.    Significant Labs:  All labs within the past 24 hours have been reviewed.     Assessment/Plan:     Renal/  ESRD on hemodialysis  Patient is ESRD on HD MWF. As per his sister he completed a full session of HD on Friday but she is uncertain if he was able to obtain his dry weight. I did call and discuss his lab results with his sister Sara Da Silva over the phone who provided consent for dialysis while he is admitted.     Recommendations  - SLED done for 8 hours then the machine clotted.  - Discussed with the attending  - Repeat the labs in the evening - if any critical values on the evening labs kindly contact the nephrology  fellow on call to consider restarting dialysis - otherwise we will evaluate him for dialysis in the morning tomorrow  -1L fluid restriction  -2g salt. Low potassium diet   -daily RFP, magnesium, and phosphorus levels        Thank you for your consult. I will follow-up with patient. Please contact us if you have any additional questions.    Chadwick Oneill MD  Nephrology  [facility] - Medical ICU

## 2025-02-11 NOTE — PROGRESS NOTES
02/11/25 0900   Treatment   Treatment Type SLED   Treatment Status Restart   Dialysis Machine Number [id number]   Solutions Labeled and Current  Yes   Access Tunneled Cath;Right;IJ   Catheter Dressing Intact  Yes   Alarms Engaged Yes   CRRT Comments Sled tx initiated     Report given to primary nurse.

## 2025-02-11 NOTE — PROGRESS NOTES
02/11/25 1600   Treatment   Treatment Status Blood lost;Clotting   Dialyzer Time (hours) 6.12   BVP (Liters) 52.2 L   CRRT Comments System clotted     .

## 2025-02-11 NOTE — ASSESSMENT & PLAN NOTE
Patient with Hypoxic Respiratory failure which is Acute on chronic.  he is on home oxygen at 3 LPM. Supplemental oxygen was provided and noted- Vent Mode: A/C  Oxygen Concentration (%):  [30] 30  Resp Rate Total:  [8.4 br/min-29 br/min] 29 br/min  Vt Set:  [360 mL] 360 mL  PEEP/CPAP:  [5 cmH20] 5 cmH20  Pressure Support:  [5 cmH20-10 cmH20] 5 cmH20  Mean Airway Pressure:  [7.2 cmH20-10 cmH20] 7.9 cmH20    .   Signs/symptoms of respiratory failure include- increased work of breathing and respiratory distress. Contributing diagnoses includes - CHF and Pleural effusion Labs and images were reviewed. Patient Has not had a recent ABG. Will treat underlying causes and adjust management of respiratory failure as follows-     Likely secondary to RSV vs possibly pulmonary edema/volume overload    - Extubated to NC on 2/11  - Continue supplemental O2  - Broad spectrum abx, de-escalate when appropriate  - F/u final Resp Cx

## 2025-02-11 NOTE — ASSESSMENT & PLAN NOTE
Creatinine 4.8 on admit  Plan:   Lab Results   Component Value Date    CREATININE 1.5 (H) 02/11/2025    CREATININE 1.5 (H) 02/11/2025     - On iHD MWF.   - Nephrology consulted, appreciate recs  - Avoid nephrotoxic agents such as NSAIDs, gadolinium and IV radiocontrast.  - Renally dose meds to current GFR.  - Maintain MAP > 65.

## 2025-02-11 NOTE — PROGRESS NOTES
[facility]  Critical Care Medicine  Progress Note    Patient Name: Flakito Mcginnis  MRN: [id number]  Admission Date: 2/9/2025  Hospital Length of Stay: 2 days  Code Status: Full Code  Attending Provider: Jose Mendieta MD  Primary Care Provider: Jordin Robbins MD   Principal Problem: Acute hypoxic respiratory failure    Subjective:     HPI:  69yoM w/hx of HFrEF (EF 20-25%, 12/2024), NICM, MR, ESRD on HD, chronic respiratory failure (on 3L NC at home), Crohn's disease s/p colostomy, R nephrectomy who presents to the hospital from [facility] from acute onset of SOB. Given the acuity of patient's condition, history was obtained from the chart. Per the ED provider note, patient has had increased sputum production, cough, wheezing, and bilateral lower extremity edema. His last dialysis session was on Friday (2/7/25) but there is concern that he may not have completed a full session as patient reports feeling volume overloaded. He denies chest pain or fever. He was recently admitted to the hospital septic shock secondary to staph capitis bacteremia and endocarditis. Completed 6wk course of IV Cefazolin on 2/2/25.     In the ED, patient was hypoxic with increased WOB. Initially placed on BiPAP without much improvement in hypoxia so was intubated. Afebrile, other VSS. Labs were notable for Hgb 9.6, Hct 32.3, Na 132, K 8.3, Bicarb 19, BUN/Cr 38/4.8, , BNP >4900, HsTrop 44. Flu/COVID negative. RSV positive. VBG 7.46/35.1/33.3/25.5. CXR with ongoing vs recurrent small right pleural effusion. EKG with known 1st degree AV block and T-wave abnormality. Administered calcium gluconate, IV insulin/dextrose, and IV lasix for hyperkalemia. Nephrology consulted. Admitted to the MICU for further management and workup.       Hospital/ICU Course:  Mr. Mcginnis was admitted to the MICU for acute on chronic hypoxic respiratory failure. Started on zosyn and azithromycin. Shifted potassium potassium throughout  admission. Required low dose vasopressors to tolerate SLED. D10 infusion for hypoglycemia. Echo with EF 20-25%, similar to previous study. Extubated to NC on 2/10.     Interval History/Significant Events: NAEON. No SLED overnight. Shifted K. Passed SAT/SBT, will extubate.     Review of Systems   Unable to perform ROS: Intubated     Objective:     Vital Signs (Most Recent):  Temp: 97 °F (36.1 °C) (02/11/25 0900)  Pulse: 81 (02/11/25 1409)  Resp: 13 (02/11/25 1400)  BP: (!) 101/51 (02/11/25 1400)  SpO2: 98 % (02/11/25 1400) Vital Signs (24h Range):  Temp:  [97 °F (36.1 °C)-98 °F (36.7 °C)] 97 °F (36.1 °C)  Pulse:  [] 81  Resp:  [10-28] 13  SpO2:  [90 %-100 %] 98 %  BP: ()/(50-69) 101/51   Weight: 58 kg (127 lb 13.9 oz)  Body mass index is 20.64 kg/m².      Intake/Output Summary (Last 24 hours) at 2/11/2025 1522  Last data filed at 2/11/2025 1442  Gross per 24 hour   Intake 4618.81 ml   Output 1850 ml   Net 2768.81 ml          Physical Exam  Vitals and nursing note reviewed.   Constitutional:       Appearance: He is ill-appearing.      Interventions: He is sedated, intubated and restrained.   HENT:      Head: Normocephalic.   Eyes:      Pupils: Pupils are equal, round, and reactive to light.   Cardiovascular:      Rate and Rhythm: Normal rate and regular rhythm.      Pulses: Normal pulses.      Heart sounds: Murmur heard.   Pulmonary:      Effort: He is intubated.      Breath sounds: Normal air entry. Rales present.   Abdominal:      General: The ostomy site is clean.      Palpations: Abdomen is soft.   Skin:     General: Skin is warm and moist.      Coloration: Skin is pale.   Neurological:      GCS: GCS eye subscore is 4. GCS verbal subscore is 1. GCS motor subscore is 6.            Vents:  Vent Mode: A/C (02/11/25 1230)  Ventilator Initiated: Yes (02/09/25 2044)  Set Rate: 22 BPM (02/11/25 0846)  Vt Set: 360 mL (02/11/25 0846)  Pressure Support: 5 cmH20 (02/11/25 1330)  PEEP/CPAP: 5 cmH20 (02/11/25  1330)  Oxygen Concentration (%): 30 (02/11/25 1330)  Peak Airway Pressure: 11 cmH20 (02/11/25 1330)  Plateau Pressure: 0 cmH20 (02/11/25 1330)  Total Ve: 1.63 L/m (02/11/25 1330)  Negative Inspiratory Force (cm H2O): 0 (02/11/25 1330)  F/VT Ratio<105 (RSBI): (!) 18.49 (02/11/25 1230)  Lines/Drains/Airways       Central Venous Catheter Line  Duration                  Hemodialysis Catheter 12/31/24 0818 right subclavian 42 days              Drain  Duration                  Colostomy 12/19/24 2225 RLQ 53 days              Peripheral Intravenous Line  Duration                  Peripheral IV - Single Lumen 02/09/25 1925 20 G Posterior;Right Hand 1 day         Peripheral IV - Single Lumen 02/10/25 0206 18 G Anterior;Left Forearm 1 day                  Significant Labs:    CBC/Anemia Profile:  Recent Labs   Lab 02/10/25  0252 02/10/25  0439 02/11/25  0326   WBC 3.14* 2.95* 6.94   HGB 7.5* 7.9* 8.7*   HCT 24.4* 26.4* 28.4*   * 136* 141*   * 100* 100*   RDW 18.3* 18.3* 18.4*        Chemistries:  Recent Labs   Lab 02/09/25  1949 02/09/25  2304 02/10/25  0252 02/10/25  0814 02/10/25  1702 02/10/25  2055 02/11/25  0013 02/11/25  0326 02/11/25  0821 02/11/25  1236   *   < > 136  136   < > 137  137   < > 137 133*  133*  133* 135* 139  139   K 8.3*   < > 5.1  5.1   < > 5.7*  5.6*   < > 5.7* 5.9*  5.9*  5.9* 6.0* 4.4  4.4  4.4      < > 105  105   < > 109  109   < > 108 106  106  106 107 111*  111*   CO2 19*   < > 23  23   < > 23  23   < > 22* 21*  21*  21* 22* 22*  22*   BUN 38*   < > 30*  30*   < > 11  10   < > 13 15  15  15 18 10  10   CREATININE 4.8*   < > 3.8*  3.8*   < > 1.8*  1.6*   < > 2.2* 2.2*  2.2*  2.2* 2.6* 1.5*  1.5*   CALCIUM 9.9   < > 8.7  8.7   < > 9.1  9.0   < > 8.9 9.0  9.0  9.0 9.4 9.1  9.1   ALBUMIN 3.3*  --  2.6*  --  2.8*  --  2.4* 2.4*  2.4*  --   --    PROT 7.9  --  5.9*  --   --   --   --  6.0  --   --    BILITOT 0.4  --  0.3  --   --   --   --   0.5  --   --    ALKPHOS 237*  --  182*  --   --   --   --  182*  --   --    ALT 5*  --  <5*  --   --   --   --  <5*  --   --    AST 29  --  16  --   --   --   --  24  --   --    MG  --    < > 1.9  --  2.1  --  2.1 2.0  2.0  --   --    PHOS  --    < > 4.0  --  3.2  --  3.5 4.0  4.0  --   --     < > = values in this interval not displayed.       All pertinent labs within the past 24 hours have been reviewed.    Significant Imaging:  I have reviewed all pertinent imaging results/findings within the past 24 hours.    ABG  Recent Labs   Lab 02/11/25  0844   PH 7.338*   PO2 31*   PCO2 45.8*   HCO3 24.6   BE -1     Assessment/Plan:     Pulmonary  * Acute hypoxic respiratory failure  Patient with Hypoxic Respiratory failure which is Acute on chronic.  he is on home oxygen at 3 LPM. Supplemental oxygen was provided and noted- Vent Mode: A/C  Oxygen Concentration (%):  [30] 30  Resp Rate Total:  [8.4 br/min-29 br/min] 29 br/min  Vt Set:  [360 mL] 360 mL  PEEP/CPAP:  [5 cmH20] 5 cmH20  Pressure Support:  [5 cmH20-10 cmH20] 5 cmH20  Mean Airway Pressure:  [7.2 cmH20-10 cmH20] 7.9 cmH20    .   Signs/symptoms of respiratory failure include- increased work of breathing and respiratory distress. Contributing diagnoses includes - CHF and Pleural effusion Labs and images were reviewed. Patient Has not had a recent ABG. Will treat underlying causes and adjust management of respiratory failure as follows-     Likely secondary to RSV vs possibly pulmonary edema/volume overload    - Extubated to NC on 2/11  - Continue supplemental O2  - Broad spectrum abx, de-escalate when appropriate  - F/u final Resp Cx    Cardiac/Vascular  Elevated troponin  HsTrop 46 on admission. EKG sinus tachycardia w/1st degree AV Block, T wave abnormality. Likely Type 2 Demand ischemia in the setting of volume overload    - Trop peaked at 43, downtrending   - Cardiac Monitoring     Chronic systolic heart failure  Systolic and Diastolic Dysfunction. Most recent  "TTE (12/10/24) with EF 20-25% with severe global hypokinesis. EKG sinus tachycardia with 1st degree AV Block (same from prior EKG) and T-wave abnormality. BNP >4600. HsTrop 46. CXR with right lung base w/blunting suggesting small pleural effusion    - Fluid removal with HD   - Troponin peak at 43, downtrending  - Fluid restriction at 1500 cc with strict I/Os and daily weights    - Maintain on telemetry and daily EKGs   - Up to date risk stratification : TSH, Lipids, HbA1c with optimization of risk factors is necessary  - Check Electrolytes, keep Mag >2 & K+ >4  - SCDs, TEDs, Nursing communication to elevated LE   - Ambulate as tolerated     Renal/  Hyperkalemia  K 8.3 on admission. Shifted with IV Lasix, Insulin/Dextrose, and Calcium Gluconate in the ED.     - Improvement with SLED, will hold on shifting again  - BMP Q4H     ESRD on hemodialysis  Creatinine 4.8 on admit. S/p R Nephrectomy (due to retained uretal stent per chart review) and HD MWF    Plan:   Lab Results   Component Value Date    CREATININE 1.5 (H) 02/11/2025    CREATININE 1.5 (H) 02/11/2025     - On iHD MWF.   - Nephrology consulted, appreciate recs  - SLED per nephro  - Avoid nephrotoxic agents such as NSAIDs, gadolinium and IV radiocontrast.  - Renally dose meds to current GFR.  - Maintain MAP > 65.     Oncology  Anemia of chronic renal failure, stage 5  Admit with hemoglobin 9.6 Baseline ~8-9      Lab Results   Component Value Date    IRON 26 (L) 12/20/2024    TIBC 209 (L) 12/20/2024    FERRITIN 2,808 (H) 12/20/2024     Lab Results   Component Value Date    FOLATE 8.0 12/20/2024     Lab Results   Component Value Date    [id number] 770 12/20/2024     No results found for: "RETICCTPCT"    Likely secondary to anemia of chronic disease    Plan:   -   Lab Results   Component Value Date    HGB 9.6 (L) 02/09/2025     - Daily CBC   - Transfuse hgb <7         Critical Care Daily Checklist:    A: Awake: RASS Goal/Actual Goal:    Actual:     B: Spontaneous " Breathing Trial Performed?     C: SAT & SBT Coordinated?  passed                      D: Delirium: CAM-ICU     E: Early Mobility Performed? Yes   F: Feeding Goal: Goals: 1. Initiate tube feeds within 24-48 hours of admission    2. Advance tube feeds to goal rate within 72 hours of initiation.  Status: Nutrition Goal Status: new   Current Diet Order   Procedures    Diet NPO      AS: Analgesia/Sedation Discontinued    T: Thromboembolic Prophylaxis SQ Heparin    H: HOB > 300 Yes   U: Stress Ulcer Prophylaxis (if needed) Famotidine   G: Glucose Control D10 gtt   B: Bowel Function Stool Occurrence: 1   I: Indwelling Catheter (Lines & Alberts) Necessity Yes   D: De-escalation of Antimicrobials/Pharmacotherapies Yes    Plan for the day/ETD Extubate, SLED    Code Status:  Family/Goals of Care: Full Code       I have discussed this patient's plan of care with Dr. Anam Monroyation to follow       Critical Care Time: 45 minutes  Critical secondary to Patient has a condition that poses threat to life and bodily function: renal failure and hyperkalemia       Critical care was time spent personally by me on the following activities: development of treatment plan with patient or surrogate and bedside caregivers, discussions with consultants, evaluation of patient's response to treatment, examination of patient, ordering and performing treatments and interventions, ordering and review of laboratory studies, ordering and review of radiographic studies, pulse oximetry, re-evaluation of patient's condition. This critical care time did not overlap with that of any other provider or involve time for any procedures.     Sofia Rodrigues PA-C  Critical Care Medicine  [facility] - Medical ICU

## 2025-02-11 NOTE — ASSESSMENT & PLAN NOTE
Creatinine 4.8 on admit. S/p R Nephrectomy (due to retained uretal stent per chart review) and HD MWF    Plan:   Lab Results   Component Value Date    CREATININE 1.5 (H) 02/11/2025    CREATININE 1.5 (H) 02/11/2025     - On iHD MWF.   - Nephrology consulted, appreciate recs  - SLED per nephro  - Avoid nephrotoxic agents such as NSAIDs, gadolinium and IV radiocontrast.  - Renally dose meds to current GFR.  - Maintain MAP > 65.

## 2025-02-11 NOTE — SUBJECTIVE & OBJECTIVE
Interval History/Significant Events: NAEON. No SLED overnight. Shifted K. Passed SAT/SBT, will extubate.     Review of Systems   Unable to perform ROS: Intubated     Objective:     Vital Signs (Most Recent):  Temp: 97 °F (36.1 °C) (02/11/25 0900)  Pulse: 81 (02/11/25 1409)  Resp: 13 (02/11/25 1400)  BP: (!) 101/51 (02/11/25 1400)  SpO2: 98 % (02/11/25 1400) Vital Signs (24h Range):  Temp:  [97 °F (36.1 °C)-98 °F (36.7 °C)] 97 °F (36.1 °C)  Pulse:  [] 81  Resp:  [10-28] 13  SpO2:  [90 %-100 %] 98 %  BP: ()/(50-69) 101/51   Weight: 58 kg (127 lb 13.9 oz)  Body mass index is 20.64 kg/m².      Intake/Output Summary (Last 24 hours) at 2/11/2025 1522  Last data filed at 2/11/2025 1442  Gross per 24 hour   Intake 4618.81 ml   Output 1850 ml   Net 2768.81 ml          Physical Exam  Vitals and nursing note reviewed.   Constitutional:       Appearance: He is ill-appearing.      Interventions: He is sedated, intubated and restrained.   HENT:      Head: Normocephalic.   Eyes:      Pupils: Pupils are equal, round, and reactive to light.   Cardiovascular:      Rate and Rhythm: Normal rate and regular rhythm.      Pulses: Normal pulses.      Heart sounds: Murmur heard.   Pulmonary:      Effort: He is intubated.      Breath sounds: Normal air entry. Rales present.   Abdominal:      General: The ostomy site is clean.      Palpations: Abdomen is soft.   Skin:     General: Skin is warm and moist.      Coloration: Skin is pale.   Neurological:      GCS: GCS eye subscore is 4. GCS verbal subscore is 1. GCS motor subscore is 6.            Vents:  Vent Mode: A/C (02/11/25 1230)  Ventilator Initiated: Yes (02/09/25 2044)  Set Rate: 22 BPM (02/11/25 0846)  Vt Set: 360 mL (02/11/25 0846)  Pressure Support: 5 cmH20 (02/11/25 1330)  PEEP/CPAP: 5 cmH20 (02/11/25 1330)  Oxygen Concentration (%): 30 (02/11/25 1330)  Peak Airway Pressure: 11 cmH20 (02/11/25 1330)  Plateau Pressure: 0 cmH20 (02/11/25 1330)  Total Ve: 1.63 L/m (02/11/25  1330)  Negative Inspiratory Force (cm H2O): 0 (02/11/25 1330)  F/VT Ratio<105 (RSBI): (!) 18.49 (02/11/25 1230)  Lines/Drains/Airways       Central Venous Catheter Line  Duration                  Hemodialysis Catheter 12/31/24 0818 right subclavian 42 days              Drain  Duration                  Colostomy 12/19/24 2225 RLQ 53 days              Peripheral Intravenous Line  Duration                  Peripheral IV - Single Lumen 02/09/25 1925 20 G Posterior;Right Hand 1 day         Peripheral IV - Single Lumen 02/10/25 0206 18 G Anterior;Left Forearm 1 day                  Significant Labs:    CBC/Anemia Profile:  Recent Labs   Lab 02/10/25  0252 02/10/25  0439 02/11/25  0326   WBC 3.14* 2.95* 6.94   HGB 7.5* 7.9* 8.7*   HCT 24.4* 26.4* 28.4*   * 136* 141*   * 100* 100*   RDW 18.3* 18.3* 18.4*        Chemistries:  Recent Labs   Lab 02/09/25  1949 02/09/25  2304 02/10/25  0252 02/10/25  0814 02/10/25  1702 02/10/25  2055 02/11/25  0013 02/11/25  0326 02/11/25  0821 02/11/25  1236   *   < > 136  136   < > 137  137   < > 137 133*  133*  133* 135* 139  139   K 8.3*   < > 5.1  5.1   < > 5.7*  5.6*   < > 5.7* 5.9*  5.9*  5.9* 6.0* 4.4  4.4  4.4      < > 105  105   < > 109  109   < > 108 106  106  106 107 111*  111*   CO2 19*   < > 23  23   < > 23  23   < > 22* 21*  21*  21* 22* 22*  22*   BUN 38*   < > 30*  30*   < > 11  10   < > 13 15  15  15 18 10  10   CREATININE 4.8*   < > 3.8*  3.8*   < > 1.8*  1.6*   < > 2.2* 2.2*  2.2*  2.2* 2.6* 1.5*  1.5*   CALCIUM 9.9   < > 8.7  8.7   < > 9.1  9.0   < > 8.9 9.0  9.0  9.0 9.4 9.1  9.1   ALBUMIN 3.3*  --  2.6*  --  2.8*  --  2.4* 2.4*  2.4*  --   --    PROT 7.9  --  5.9*  --   --   --   --  6.0  --   --    BILITOT 0.4  --  0.3  --   --   --   --  0.5  --   --    ALKPHOS 237*  --  182*  --   --   --   --  182*  --   --    ALT 5*  --  <5*  --   --   --   --  <5*  --   --    AST 29  --  16  --   --   --   --  24  --    --    MG  --    < > 1.9  --  2.1  --  2.1 2.0  2.0  --   --    PHOS  --    < > 4.0  --  3.2  --  3.5 4.0  4.0  --   --     < > = values in this interval not displayed.       All pertinent labs within the past 24 hours have been reviewed.    Significant Imaging:  I have reviewed all pertinent imaging results/findings within the past 24 hours.

## 2025-02-11 NOTE — SUBJECTIVE & OBJECTIVE
Interval History: On vent. Alert. Opening eyes to the voice. No over night events    Review of patient's allergies indicates:   Allergen Reactions    Vancomycin analogues Anaphylaxis, Other (See Comments), Shortness Of Breath and Swelling     Light headed, see's spots      Aspirin Nausea And Vomiting and Other (See Comments)     Has crohn's disease    Other reaction(s): FLARES UP CROHNS      Has crohn's disease     Current Facility-Administered Medications   Medication Frequency    0.9%  NaCl infusion (CRRT USE ONLY) Continuous    acetaminophen tablet 650 mg Q6H PRN    azithromycin (ZITHROMAX) 500 mg in 0.9% NaCl 250 mL IVPB (admixture device) Q24H    calcium gluconate 1 g in NS IVPB (premixed) Q10 Min PRN    dextrose 50% injection 25 g PRN    famotidine tablet 20 mg Daily    heparin (porcine) injection 5,000 Units Q8H    [START ON 2/12/2025] levothyroxine tablet 50 mcg Before breakfast    magnesium sulfate 2g in water 50mL IVPB (premix) PRN    mupirocin 2 % ointment BID    NORepinephrine 4 mg in sodium chloride 0.9% 250 mL infusion (premix) Continuous    piperacillin-tazobactam (ZOSYN) 4.5 g in D5W 100 mL IVPB (MB+) Q8H    sodium chloride 0.9% flush 10 mL PRN    sodium zirconium cyclosilicate packet 10 g TID       Objective:     Vital Signs (Most Recent):  Temp: 97 °F (36.1 °C) (02/11/25 1500)  Pulse: 83 (02/11/25 1500)  Resp: 14 (02/11/25 1500)  BP: (!) 115/57 (02/11/25 1500)  SpO2: (!) 93 % (02/11/25 1500) Vital Signs (24h Range):  Temp:  [97 °F (36.1 °C)-98 °F (36.7 °C)] 97 °F (36.1 °C)  Pulse:  [] 83  Resp:  [10-28] 14  SpO2:  [90 %-100 %] 93 %  BP: ()/(50-69) 115/57     Weight: 58 kg (127 lb 13.9 oz) (02/09/25 2349)  Body mass index is 20.64 kg/m².  Body surface area is 1.64 meters squared.    I/O last 3 completed shifts:  In: 5508.2 [I.V.:4331.7; NG/GT:250; IV Piggyback:926.6]  Out: 4433 [Other:3933; Stool:500]     Physical Exam     Physical Exam:  General: Alert. On Ventilator with PEEP of 5 and  SaO2 of 30%  CV- Normal S1, S2   Abdomen- NTND soft  Extrem- BL cyanosis. Exteremities are cold. Skin is scaly.    Significant Labs:  All labs within the past 24 hours have been reviewed.

## 2025-02-11 NOTE — NURSING
MICU DAILY GOALS     Family/Goals of care/Code Status   Code Status: Full Code    24H Vital Sign Range  Temp:  [94.8 °F (34.9 °C)-99.5 °F (37.5 °C)]   Pulse:  []   Resp:  [7-38]   BP: ()/(39-68)   SpO2:  [86 %-100 %]      Shift Events (include procedures and significant events)   No acute events throughout shift; sister updated via phone; remains on CRT-> requiring levophed. K back up 5.7; restarting CRT with lower K bath. TF initiated. Poss SAT/Sbt jo.     AWAKE RASS: Goal -    Actual - RASS (Sam Agitation-Sedation Scale): light sedation    Restraint necessity: Treatment interference   BREATHE SBT: Not attempted    Coordinate A & B, analgesics/sedatives Pain: managed   SAT: Pass   Delirium CAM-ICU:     Early(intubated/ Progressive (non-intubated) Mobility MOVE Screen (INTUBATED ONLY): Not attempted    Activity: Activity Management: Patient unable to perform activities   Feeding/Nutrition Diet order: Diet/Nutrition Received: NPO,     Thrombus DVT prophylaxis: VTE Core Measure: Pharmacological prophylaxis initiated/maintained   HOB Elevation Head of Bed (HOB) Positioning: HOB at 30-45 degrees   Ulcer Prophylaxis GI: yes   Glucose control managed     Skin Skin assessment:     Sacrum intact/not altered? Yes  Heels intact/not altered? Yes  Surgical wound? No    CHECK ONE!   (no altered skin or altered skin) and sub boxes:  [x] No Altered Skin Integrity Present    [x]Prevention Measures Documented    [] Altered Skin Integrity Present or Discovered   [] LDA present in EPIC, daily doc completed              [] LDA added if not in EPIC (describe wound).                    When describing wound, do not stage, use descriptive words only.    [] Wound Image Taken (required on admit,                   transfer/discharge and every Tuesday)    Wound Care Consulted? No   Bowel Function no issues    Indwelling Catheter Necessity [REMOVED]      Urethral Catheter 02/09/25 2100 Temperature probe;Straight-tip 16  Fr.-Reason for Continuing Urinary Catheterization: Critically ill in ICU and requiring hourly monitoring of intake/output         Hemodialysis Catheter 12/31/24 0818 right subclavian-Line Necessity Review: CRRT/HD     De-escalation Antibiotics No        VS and assessment per flow sheet, patient progressing towards goals as tolerated, plan of care reviewed with family, all concerns addressed, will continue to monitor.

## 2025-02-11 NOTE — ASSESSMENT & PLAN NOTE
Patient is ESRD on HD MWF. As per his sister he completed a full session of HD on Friday but she is uncertain if he was able to obtain his dry weight. I did call and discuss his lab results with his sister Sara Da Silva over the phone who provided consent for dialysis while he is admitted.     Recommendations  - SLED done for 8 hours then the machine clotted.  - Discussed with the attending  - Repeat the labs in the evening - if any critical values on the evening labs kindly contact the nephrology fellow on call to consider restarting dialysis - otherwise we will evaluate him for dialysis in the morning tomorrow  -1L fluid restriction  -2g salt. Low potassium diet   -daily RFP, magnesium, and phosphorus levels

## 2025-02-11 NOTE — NURSING
MICU DAILY GOALS     Family/Goals of care/Code Status   Code Status: Full Code    24H Vital Sign Range  Temp:  [97 °F (36.1 °C)-98 °F (36.7 °C)]   Pulse:  []   Resp:  [10-28]   BP: ()/(50-69)   SpO2:  [90 %-100 %]      Shift Events (include procedures and significant events)   No acute events throughout shift: Patient extubated to nasal cannula; escalated to comfort flow; poor reserve. Doing well. ABG/Cxray performed. Clotted off CRRT. Potassium was 6 -> started sled and shifted K x1. Awaiting to see if restart. Patient verbalized potassium typically runs high between treatments.     AWAKE RASS: Goal -    Actual - RASS (Sam Agitation-Sedation Scale): alert and calm    Restraint necessity: Not necessary   BREATHE SBT: Pass    Coordinate A & B, analgesics/sedatives Pain: managed   SAT: Pass   Delirium CAM-ICU:     Early(intubated/ Progressive (non-intubated) Mobility MOVE Screen (INTUBATED ONLY): Pass    Activity: Activity Management: Patient unable to perform activities   Feeding/Nutrition Diet order: Diet/Nutrition Received: NPO,     Thrombus DVT prophylaxis: VTE Core Measure: Pharmacological prophylaxis initiated/maintained   HOB Elevation Head of Bed (HOB) Positioning: HOB at 30-45 degrees   Ulcer Prophylaxis GI: yes   Glucose control managed     Skin Skin assessment:     Sacrum intact/not altered? Yes  Heels intact/not altered? Yes  Surgical wound? No    CHECK ONE!   (no altered skin or altered skin) and sub boxes:  [x] No Altered Skin Integrity Present    [x]Prevention Measures Documented    [] Altered Skin Integrity Present or Discovered   [] LDA present in EPIC, daily doc completed              [] LDA added if not in EPIC (describe wound).                    When describing wound, do not stage, use descriptive words only.    [] Wound Image Taken (required on admit,                   transfer/discharge and every Tuesday)    Wound Care Consulted? No   Bowel Function no issues    Indwelling  Catheter Necessity [REMOVED]      Urethral Catheter 02/09/25 2100 Temperature probe;Straight-tip 16 Fr.-Reason for Continuing Urinary Catheterization: Critically ill in ICU and requiring hourly monitoring of intake/output         Hemodialysis Catheter 12/31/24 0818 right subclavian-Line Necessity Review: CRRT/HD     De-escalation Antibiotics No        VS and assessment per flow sheet, patient progressing towards goals as tolerated, plan of care reviewed with family, all concerns addressed, will continue to monitor.

## 2025-02-12 LAB
ALBUMIN SERPL BCP-MCNC: 2.3 G/DL (ref 3.5–5.2)
ALBUMIN SERPL BCP-MCNC: 2.3 G/DL (ref 3.5–5.2)
ALBUMIN SERPL BCP-MCNC: 2.5 G/DL (ref 3.5–5.2)
ALBUMIN SERPL BCP-MCNC: 2.5 G/DL (ref 3.5–5.2)
ALP SERPL-CCNC: 185 U/L (ref 40–150)
ALT SERPL W/O P-5'-P-CCNC: 6 U/L (ref 10–44)
ANION GAP SERPL CALC-SCNC: 10 MMOL/L (ref 8–16)
ANION GAP SERPL CALC-SCNC: 6 MMOL/L (ref 8–16)
ANION GAP SERPL CALC-SCNC: 7 MMOL/L (ref 8–16)
ANION GAP SERPL CALC-SCNC: 8 MMOL/L (ref 8–16)
AST SERPL-CCNC: 31 U/L (ref 10–40)
BASOPHILS # BLD AUTO: 0.05 K/UL (ref 0–0.2)
BASOPHILS NFR BLD: 0.8 % (ref 0–1.9)
BILIRUB SERPL-MCNC: 0.5 MG/DL (ref 0.1–1)
BUN SERPL-MCNC: 10 MG/DL (ref 8–23)
BUN SERPL-MCNC: 12 MG/DL (ref 8–23)
BUN SERPL-MCNC: 13 MG/DL (ref 8–23)
BUN SERPL-MCNC: 14 MG/DL (ref 8–23)
BUN SERPL-MCNC: 7 MG/DL (ref 8–23)
BUN SERPL-MCNC: 8 MG/DL (ref 8–23)
CALCIUM SERPL-MCNC: 10 MG/DL (ref 8.7–10.5)
CALCIUM SERPL-MCNC: 10.1 MG/DL (ref 8.7–10.5)
CALCIUM SERPL-MCNC: 10.1 MG/DL (ref 8.7–10.5)
CALCIUM SERPL-MCNC: 10.4 MG/DL (ref 8.7–10.5)
CALCIUM SERPL-MCNC: 8.9 MG/DL (ref 8.7–10.5)
CALCIUM SERPL-MCNC: 9.5 MG/DL (ref 8.7–10.5)
CALCIUM SERPL-MCNC: 9.9 MG/DL (ref 8.7–10.5)
CHLORIDE SERPL-SCNC: 107 MMOL/L (ref 95–110)
CHLORIDE SERPL-SCNC: 108 MMOL/L (ref 95–110)
CHLORIDE SERPL-SCNC: 109 MMOL/L (ref 95–110)
CHLORIDE SERPL-SCNC: 109 MMOL/L (ref 95–110)
CHLORIDE SERPL-SCNC: 110 MMOL/L (ref 95–110)
CHLORIDE SERPL-SCNC: 111 MMOL/L (ref 95–110)
CHLORIDE SERPL-SCNC: 111 MMOL/L (ref 95–110)
CO2 SERPL-SCNC: 21 MMOL/L (ref 23–29)
CO2 SERPL-SCNC: 21 MMOL/L (ref 23–29)
CO2 SERPL-SCNC: 22 MMOL/L (ref 23–29)
CO2 SERPL-SCNC: 23 MMOL/L (ref 23–29)
CREAT SERPL-MCNC: 1 MG/DL (ref 0.5–1.4)
CREAT SERPL-MCNC: 1.1 MG/DL (ref 0.5–1.4)
CREAT SERPL-MCNC: 1.3 MG/DL (ref 0.5–1.4)
CREAT SERPL-MCNC: 1.5 MG/DL (ref 0.5–1.4)
CREAT SERPL-MCNC: 1.6 MG/DL (ref 0.5–1.4)
CREAT SERPL-MCNC: 1.6 MG/DL (ref 0.5–1.4)
CREAT SERPL-MCNC: 1.8 MG/DL (ref 0.5–1.4)
DIFFERENTIAL METHOD BLD: ABNORMAL
EOSINOPHIL # BLD AUTO: 0.1 K/UL (ref 0–0.5)
EOSINOPHIL NFR BLD: 1.6 % (ref 0–8)
ERYTHROCYTE [DISTWIDTH] IN BLOOD BY AUTOMATED COUNT: 18.3 % (ref 11.5–14.5)
EST. GFR  (NO RACE VARIABLE): 40.2 ML/MIN/1.73 M^2
EST. GFR  (NO RACE VARIABLE): 46.4 ML/MIN/1.73 M^2
EST. GFR  (NO RACE VARIABLE): 46.4 ML/MIN/1.73 M^2
EST. GFR  (NO RACE VARIABLE): 50.1 ML/MIN/1.73 M^2
EST. GFR  (NO RACE VARIABLE): 59.5 ML/MIN/1.73 M^2
EST. GFR  (NO RACE VARIABLE): >60 ML/MIN/1.73 M^2
GLUCOSE SERPL-MCNC: 71 MG/DL (ref 70–110)
GLUCOSE SERPL-MCNC: 75 MG/DL (ref 70–110)
GLUCOSE SERPL-MCNC: 76 MG/DL (ref 70–110)
GLUCOSE SERPL-MCNC: 76 MG/DL (ref 70–110)
GLUCOSE SERPL-MCNC: 77 MG/DL (ref 70–110)
GLUCOSE SERPL-MCNC: 93 MG/DL (ref 70–110)
GLUCOSE SERPL-MCNC: 97 MG/DL (ref 70–110)
GLUCOSE SERPL-MCNC: 98 MG/DL (ref 70–110)
GLUCOSE SERPL-MCNC: 98 MG/DL (ref 70–110)
HCT VFR BLD AUTO: 28.9 % (ref 40–54)
HGB BLD-MCNC: 8.8 G/DL (ref 14–18)
IMM GRANULOCYTES # BLD AUTO: 0.01 K/UL (ref 0–0.04)
IMM GRANULOCYTES NFR BLD AUTO: 0.2 % (ref 0–0.5)
LYMPHOCYTES # BLD AUTO: 0.6 K/UL (ref 1–4.8)
LYMPHOCYTES NFR BLD: 9 % (ref 18–48)
MAGNESIUM SERPL-MCNC: 1.9 MG/DL (ref 1.6–2.6)
MAGNESIUM SERPL-MCNC: 1.9 MG/DL (ref 1.6–2.6)
MAGNESIUM SERPL-MCNC: 2.4 MG/DL (ref 1.6–2.6)
MCH RBC QN AUTO: 30.9 PG (ref 27–31)
MCHC RBC AUTO-ENTMCNC: 30.4 G/DL (ref 32–36)
MCV RBC AUTO: 101 FL (ref 82–98)
MONOCYTES # BLD AUTO: 0.5 K/UL (ref 0.3–1)
MONOCYTES NFR BLD: 8.6 % (ref 4–15)
NEUTROPHILS # BLD AUTO: 4.9 K/UL (ref 1.8–7.7)
NEUTROPHILS NFR BLD: 79.8 % (ref 38–73)
NRBC BLD-RTO: 0 /100 WBC
PHOSPHATE SERPL-MCNC: 2.8 MG/DL (ref 2.7–4.5)
PHOSPHATE SERPL-MCNC: 3.2 MG/DL (ref 2.7–4.5)
PHOSPHATE SERPL-MCNC: 3.2 MG/DL (ref 2.7–4.5)
PHOSPHATE SERPL-MCNC: 4.5 MG/DL (ref 2.7–4.5)
PLATELET # BLD AUTO: 106 K/UL (ref 150–450)
PMV BLD AUTO: 11.3 FL (ref 9.2–12.9)
POCT GLUCOSE: 79 MG/DL (ref 70–110)
POCT GLUCOSE: 80 MG/DL (ref 70–110)
POCT GLUCOSE: 81 MG/DL (ref 70–110)
POTASSIUM SERPL-SCNC: 4.3 MMOL/L (ref 3.5–5.1)
POTASSIUM SERPL-SCNC: 4.4 MMOL/L (ref 3.5–5.1)
POTASSIUM SERPL-SCNC: 4.6 MMOL/L (ref 3.5–5.1)
POTASSIUM SERPL-SCNC: 4.7 MMOL/L (ref 3.5–5.1)
POTASSIUM SERPL-SCNC: 4.8 MMOL/L (ref 3.5–5.1)
POTASSIUM SERPL-SCNC: 5 MMOL/L (ref 3.5–5.1)
POTASSIUM SERPL-SCNC: 5 MMOL/L (ref 3.5–5.1)
PROT SERPL-MCNC: 6 G/DL (ref 6–8.4)
RBC # BLD AUTO: 2.85 M/UL (ref 4.6–6.2)
SODIUM SERPL-SCNC: 136 MMOL/L (ref 136–145)
SODIUM SERPL-SCNC: 137 MMOL/L (ref 136–145)
SODIUM SERPL-SCNC: 138 MMOL/L (ref 136–145)
SODIUM SERPL-SCNC: 138 MMOL/L (ref 136–145)
SODIUM SERPL-SCNC: 139 MMOL/L (ref 136–145)
SODIUM SERPL-SCNC: 141 MMOL/L (ref 136–145)
SODIUM SERPL-SCNC: 141 MMOL/L (ref 136–145)
WBC # BLD AUTO: 6.08 K/UL (ref 3.9–12.7)

## 2025-02-12 PROCEDURE — 80069 RENAL FUNCTION PANEL: CPT | Performed by: STUDENT IN AN ORGANIZED HEALTH CARE EDUCATION/TRAINING PROGRAM

## 2025-02-12 PROCEDURE — 63600175 PHARM REV CODE 636 W HCPCS

## 2025-02-12 PROCEDURE — 25000003 PHARM REV CODE 250: Performed by: STUDENT IN AN ORGANIZED HEALTH CARE EDUCATION/TRAINING PROGRAM

## 2025-02-12 PROCEDURE — 25000003 PHARM REV CODE 250: Performed by: INTERNAL MEDICINE

## 2025-02-12 PROCEDURE — 25000003 PHARM REV CODE 250: Performed by: NURSE PRACTITIONER

## 2025-02-12 PROCEDURE — 85025 COMPLETE CBC W/AUTO DIFF WBC: CPT

## 2025-02-12 PROCEDURE — 83735 ASSAY OF MAGNESIUM: CPT | Mod: 91 | Performed by: STUDENT IN AN ORGANIZED HEALTH CARE EDUCATION/TRAINING PROGRAM

## 2025-02-12 PROCEDURE — 94761 N-INVAS EAR/PLS OXIMETRY MLT: CPT

## 2025-02-12 PROCEDURE — 84100 ASSAY OF PHOSPHORUS: CPT

## 2025-02-12 PROCEDURE — 25000003 PHARM REV CODE 250

## 2025-02-12 PROCEDURE — 90945 DIALYSIS ONE EVALUATION: CPT

## 2025-02-12 PROCEDURE — 99291 CRITICAL CARE FIRST HOUR: CPT | Mod: ,,,

## 2025-02-12 PROCEDURE — 99900035 HC TECH TIME PER 15 MIN (STAT)

## 2025-02-12 PROCEDURE — 27000207 HC ISOLATION

## 2025-02-12 PROCEDURE — 63600175 PHARM REV CODE 636 W HCPCS: Performed by: INTERNAL MEDICINE

## 2025-02-12 PROCEDURE — 80100008 HC CRRT DAILY MAINTENANCE

## 2025-02-12 PROCEDURE — 27100171 HC OXYGEN HIGH FLOW UP TO 24 HOURS

## 2025-02-12 PROCEDURE — 99232 SBSQ HOSP IP/OBS MODERATE 35: CPT | Mod: ,,, | Performed by: INTERNAL MEDICINE

## 2025-02-12 PROCEDURE — 80048 BASIC METABOLIC PNL TOTAL CA: CPT | Mod: 91,XB

## 2025-02-12 PROCEDURE — 20000000 HC ICU ROOM

## 2025-02-12 PROCEDURE — 80053 COMPREHEN METABOLIC PANEL: CPT

## 2025-02-12 RX ORDER — MAGNESIUM SULFATE HEPTAHYDRATE 40 MG/ML
2 INJECTION, SOLUTION INTRAVENOUS ONCE
Status: COMPLETED | OUTPATIENT
Start: 2025-02-12 | End: 2025-02-12

## 2025-02-12 RX ORDER — FAMOTIDINE 20 MG/1
20 TABLET, FILM COATED ORAL DAILY
Status: DISCONTINUED | OUTPATIENT
Start: 2025-02-12 | End: 2025-02-13

## 2025-02-12 RX ORDER — LEVOTHYROXINE SODIUM 50 UG/1
50 TABLET ORAL
Status: DISCONTINUED | OUTPATIENT
Start: 2025-02-13 | End: 2025-02-19 | Stop reason: HOSPADM

## 2025-02-12 RX ADMIN — HEPARIN SODIUM 5000 UNITS: 5000 INJECTION INTRAVENOUS; SUBCUTANEOUS at 06:02

## 2025-02-12 RX ADMIN — FAMOTIDINE 20 MG: 20 TABLET ORAL at 09:02

## 2025-02-12 RX ADMIN — HYDROXYZINE HYDROCHLORIDE 25 MG: 25 TABLET, FILM COATED ORAL at 09:02

## 2025-02-12 RX ADMIN — HEPARIN SODIUM 5000 UNITS: 5000 INJECTION INTRAVENOUS; SUBCUTANEOUS at 02:02

## 2025-02-12 RX ADMIN — PIPERACILLIN SODIUM AND TAZOBACTAM SODIUM 4.5 G: 4; .5 INJECTION, POWDER, FOR SOLUTION INTRAVENOUS at 04:02

## 2025-02-12 RX ADMIN — HEPARIN SODIUM 5000 UNITS: 5000 INJECTION INTRAVENOUS; SUBCUTANEOUS at 09:02

## 2025-02-12 RX ADMIN — LEVOTHYROXINE SODIUM 50 MCG: 0.05 TABLET ORAL at 06:02

## 2025-02-12 RX ADMIN — PIPERACILLIN SODIUM AND TAZOBACTAM SODIUM 4.5 G: 4; .5 INJECTION, POWDER, FOR SOLUTION INTRAVENOUS at 06:02

## 2025-02-12 RX ADMIN — NOREPINEPHRINE BITARTRATE 0.08 MCG/KG/MIN: 0.02 INJECTION, SOLUTION INTRAVENOUS at 02:02

## 2025-02-12 RX ADMIN — ACETAMINOPHEN 650 MG: 325 TABLET ORAL at 09:02

## 2025-02-12 RX ADMIN — MAGNESIUM SULFATE HEPTAHYDRATE 2 G: 40 INJECTION, SOLUTION INTRAVENOUS at 09:02

## 2025-02-12 NOTE — PROGRESS NOTES
[facility] - Medical ICU  Nephrology  Progress Note    Patient Name: Flakito Mcginnis  MRN: [id number]  Admission Date: 2/9/2025  Hospital Length of Stay: 3 days  Attending Provider: Jose Mendieta MD   Primary Care Physician: Jordin Robbins MD  Principal Problem:Acute hypoxic respiratory failure    Subjective:     HPI: 69-year-old male past medical history nonischemic cardiomyopathy EF most recently 20-25%, ESRD on HD MWF, chronic respiratory failure on 3 L nasal cannula at home who presents for worsening shortness of breath times 2 days. Patient mentioned to his nursing home staff that he was feeling dyspnic and EMS was called. Upon arrival to the ER he was intubated so history was provided from speaking to his sister on the phone and with the ER team. Infectious work up tested positive for RSV. Nephrology was consulted for management of his hemodialysis and for hyperkalemia of 8.3, his ECG does reveal some peaked T-waves. As per the sister, he has been complaining of increased shortness of breath, fatigue, and decreased urine output for the past two days. The sister does note that several residents of his nursing home have tested positive for respiratory virus recently.     Interval History: He is extubated yesterday. Dialysis was done yesterday for hyperkalemia. Clotted multiple times. He stated that he gets hi potassium levels between the dialysis treatments. He has low BP and requiring. Eating and drinking. Complaining of the neck pain from the intubation    Review of patient's allergies indicates:   Allergen Reactions    Vancomycin analogues Anaphylaxis, Other (See Comments), Shortness Of Breath and Swelling     Light headed, see's spots      Aspirin Nausea And Vomiting and Other (See Comments)     Has crohn's disease    Other reaction(s): FLARES UP CROHNS      Has crohn's disease     Current Facility-Administered Medications   Medication Frequency    0.9%  NaCl infusion (CRRT USE ONLY) Continuous     acetaminophen tablet 650 mg Q6H PRN    calcium gluconate 1 g in NS IVPB (premixed) Q10 Min PRN    dextrose 50% injection 25 g PRN    famotidine tablet 20 mg Daily    heparin (porcine) injection 5,000 Units Q8H    hydrOXYzine HCL tablet 25 mg TID PRN    [START ON 2/13/2025] levothyroxine tablet 50 mcg Before breakfast    magnesium sulfate 2g in water 50mL IVPB (premix) PRN    magnesium sulfate 2g in water 50mL IVPB (premix) Once    mupirocin 2 % ointment BID    NORepinephrine 4 mg in sodium chloride 0.9% 250 mL infusion (premix) Continuous    piperacillin-tazobactam (ZOSYN) 4.5 g in D5W 100 mL IVPB (MB+) Q12H    sodium chloride 0.9% flush 10 mL PRN    sodium phosphate 20.1 mmol in D5W 250 mL IVPB PRN    sodium phosphate 30 mmol in D5W 250 mL IVPB PRN    sodium phosphate 39.9 mmol in D5W 250 mL IVPB PRN       Objective:     Vital Signs (Most Recent):  Temp: 98 °F (36.7 °C) (02/12/25 0430)  Pulse: 76 (02/12/25 0600)  Resp: (!) 27 (02/12/25 0600)  BP: (!) 116/58 (02/12/25 0600)  SpO2: 95 % (02/12/25 0600) Vital Signs (24h Range):  Temp:  [97 °F (36.1 °C)-98 °F (36.7 °C)] 98 °F (36.7 °C)  Pulse:  [] 76  Resp:  [10-38] 27  SpO2:  [82 %-100 %] 95 %  BP: ()/(50-79) 116/58     Weight: 58 kg (127 lb 13.9 oz) (02/09/25 2349)  Body mass index is 20.64 kg/m².  Body surface area is 1.64 meters squared.    I/O last 3 completed shifts:  In: 5647.2 [I.V.:4412.4; NG/GT:250; IV Piggyback:984.8]  Out: 2283 [Other:1633; Stool:650]     Physical Exam  Constitutional:       Appearance: Normal appearance.   HENT:      Head: Normocephalic and atraumatic.      Mouth/Throat:      Mouth: Mucous membranes are moist.   Pulmonary:      Effort: Pulmonary effort is normal.   Abdominal:      General: Abdomen is flat.      Palpations: Abdomen is soft.   Musculoskeletal:      Right lower leg: No edema.      Left lower leg: No edema.   Neurological:      Mental Status: He is alert.     Dry scaly skin       Significant Labs:  All labs within  the past 24 hours have been reviewed.     Assessment/Plan:     Renal/  ESRD on hemodialysis  Patient is ESRD on HD MWF. As per his sister he completed a full session of HD on Friday but she is uncertain if he was able to obtain his dry weight. I did call and discuss his lab results with his sister Sara Da Silva over the phone who provided consent for dialysis while he is admitted.     Recommendations  - Labs stable - No dialysis indicated   - 1L fluid restriction  - 2g salt. Low potassium diet   - daily RFP, magnesium, and phosphorus levels        Thank you for your consult. I will follow-up with patient. Please contact us if you have any additional questions.    Chadwick Oneill MD  Nephrology  [facility] - Medical ICU

## 2025-02-12 NOTE — PROGRESS NOTES
02/12/25 0401   Treatment   Treatment Type SLED   Treatment Status Blood returned;Discontinued treatment   Dialysis Machine Number [id number]   Dialyzer Time (hours) 7.05   BVP (Liters) 79.1 L   CRRT Comments 8 hr SLED completed; blood rinsedback via primary RN. Lines flushed and capped via primary RN   CRRT Hourly Documentation   Total UF (Hourly Cleared) (mL) 561

## 2025-02-12 NOTE — PROGRESS NOTES
[facility] Medical [facility]  Critical Care Medicine  Progress Note    Patient Name: Flakito Mcginnis  MRN: [id number]  Admission Date: 2/9/2025  Hospital Length of Stay: 3 days  Code Status: Full Code  Attending Provider: Jose Mendieta MD  Primary Care Provider: Jordin Robbins MD   Principal Problem: Acute hypoxic respiratory failure    Subjective:     HPI:  69yoM w/hx of HFrEF (EF 20-25%, 12/2024), NICM, MR, ESRD on HD, chronic respiratory failure (on 3L NC at home), Crohn's disease s/p colostomy, R nephrectomy who presents to the hospital from [facility] from acute onset of SOB. Given the acuity of patient's condition, history was obtained from the chart. Per the ED provider note, patient has had increased sputum production, cough, wheezing, and bilateral lower extremity edema. His last dialysis session was on Friday (2/7/25) but there is concern that he may not have completed a full session as patient reports feeling volume overloaded. He denies chest pain or fever. He was recently admitted to the hospital septic shock secondary to staph capitis bacteremia and endocarditis. Completed 6wk course of IV Cefazolin on 2/2/25.     In the ED, patient was hypoxic with increased WOB. Initially placed on BiPAP without much improvement in hypoxia so was intubated. Afebrile, other VSS. Labs were notable for Hgb 9.6, Hct 32.3, Na 132, K 8.3, Bicarb 19, BUN/Cr 38/4.8, , BNP >4900, HsTrop 44. Flu/COVID negative. RSV positive. VBG 7.46/35.1/33.3/25.5. CXR with ongoing vs recurrent small right pleural effusion. EKG with known 1st degree AV block and T-wave abnormality. Administered calcium gluconate, IV insulin/dextrose, and IV lasix for hyperkalemia. Nephrology consulted. Admitted to the MICU for further management and workup.       Hospital/ICU Course:  Mr. Mcginnis was admitted to the MICU for acute on chronic hypoxic respiratory failure. Started on zosyn and azithromycin. Shifted potassium potassium throughout  admission. Required low dose vasopressors to tolerate SLED. D10 infusion for hypoglycemia. Echo with EF 20-25%, similar to previous study. Extubated to NC on 2/10 but then placed on comfort flow for . CRRT per nephrology.     Interval History/Significant Events: Remains on comfort flow, O2 requirements decreasing    Review of Systems   Respiratory:  Positive for cough and shortness of breath.    Cardiovascular:  Negative for chest pain.   Gastrointestinal:  Negative for abdominal distention and abdominal pain.     Objective:     Vital Signs (Most Recent):  Temp: 98 °F (36.7 °C) (02/12/25 0430)  Pulse: 82 (02/12/25 1501)  Resp: (!) 27 (02/12/25 0600)  BP: (!) 116/58 (02/12/25 0600)  SpO2: 95 % (02/12/25 0600) Vital Signs (24h Range):  Temp:  [97.4 °F (36.3 °C)-98 °F (36.7 °C)] 98 °F (36.7 °C)  Pulse:  [68-95] 82  Resp:  [11-38] 27  SpO2:  [82 %-100 %] 95 %  BP: ()/(50-79) 116/58   Weight: 58 kg (127 lb 13.9 oz)  Body mass index is 20.64 kg/m².      Intake/Output Summary (Last 24 hours) at 2/12/2025 1557  Last data filed at 2/12/2025 0837  Gross per 24 hour   Intake 2184.65 ml   Output 911 ml   Net 1273.65 ml          Physical Exam  Vitals and nursing note reviewed.   Constitutional:       General: He is awake. He is in acute distress.      Appearance: He is ill-appearing.      Interventions: Nasal cannula in place.      Comments: Comfort flow in place   HENT:      Mouth/Throat:      Mouth: Mucous membranes are dry.   Eyes:      Extraocular Movements: Extraocular movements intact.      Pupils: Pupils are equal, round, and reactive to light.   Cardiovascular:      Rate and Rhythm: Regular rhythm. Tachycardia present.   Pulmonary:      Effort: Pulmonary effort is normal. Tachypnea present. No respiratory distress.      Breath sounds: Normal breath sounds.   Abdominal:      General: There is no distension.      Palpations: Abdomen is soft.      Tenderness: There is no abdominal tenderness.   Skin:     Coloration:  Skin is pale.   Neurological:      Mental Status: He is alert and oriented to person, place, and time.      GCS: GCS eye subscore is 4. GCS verbal subscore is 5. GCS motor subscore is 6.      Cranial Nerves: No cranial nerve deficit.      Motor: Weakness present.   Psychiatric:         Behavior: Behavior is cooperative.            Vents:  Vent Mode: A/C (02/11/25 1230)  Ventilator Initiated: Yes (02/09/25 2044)  Set Rate: 22 BPM (02/11/25 0846)  Vt Set: 360 mL (02/11/25 0846)  Pressure Support: 5 cmH20 (02/11/25 1330)  PEEP/CPAP: 5 cmH20 (02/11/25 1330)  Oxygen Concentration (%): 60 (02/12/25 1120)  Peak Airway Pressure: 11 cmH20 (02/11/25 1330)  Plateau Pressure: 0 cmH20 (02/11/25 1330)  Total Ve: 1.63 L/m (02/11/25 1330)  Negative Inspiratory Force (cm H2O): 0 (02/11/25 1330)  F/VT Ratio<105 (RSBI): (!) 18.49 (02/11/25 1230)  Lines/Drains/Airways       Central Venous Catheter Line  Duration                  Hemodialysis Catheter 12/31/24 0818 right subclavian 43 days              Drain  Duration                  Colostomy 12/19/24 2225 RLQ 54 days              Peripheral Intravenous Line  Duration                  Peripheral IV - Single Lumen 02/11/25 1656 20 G 1 3/4 in Anterior;Right Forearm <1 day                  Significant Labs:    CBC/Anemia Profile:  Recent Labs   Lab 02/11/25  0326 02/11/25  1701 02/12/25  0412   WBC 6.94  --  6.08   HGB 8.7*  --  8.8*   HCT 28.4* 28* 28.9*   *  --  106*   *  --  101*   RDW 18.4*  --  18.3*        Chemistries:  Recent Labs   Lab 02/11/25  0326 02/11/25  0821 02/11/25  1625 02/11/25  2252 02/12/25  0412 02/12/25  0828 02/12/25  1231   *  133*  133*   < > 139  139 139  141  141 137  137  137 138 139   K 5.9*  5.9*  5.9*   < > 5.3*  5.3*  5.3* 4.3  4.4  4.4  4.4  4.4 4.6  4.6  4.6  4.6 4.7  4.7 4.8  4.8     106  106   < > 111*  111* 109  111*  111* 108  108  108 109 110   CO2 21*  21*  21*   < > 20*  20* 22*  23  23 23   23  23 22* 21*   BUN 15  15  15   < > 11  11 8  8  8 7*  7*  7* 8 10   CREATININE 2.2*  2.2*  2.2*   < > 1.7*  1.7* 1.0  1.0  1.0 1.1  1.1  1.1 1.3 1.5*   CALCIUM 9.0  9.0  9.0   < > 9.6  9.6 8.9  8.9  8.9 9.5  9.5  9.5 9.9 10.0   ALBUMIN 2.4*  2.4*  --   --  2.3* 2.5*  2.5*  --   --    PROT 6.0  --   --   --  6.0  --   --    BILITOT 0.5  --   --   --  0.5  --   --    ALKPHOS 182*  --   --   --  185*  --   --    ALT <5*  --   --   --  6*  --   --    AST 24  --   --   --  31  --   --    MG 2.0  2.0  --  2.0 1.9 1.9  --   --    PHOS 4.0  4.0  --   --  2.8 3.2  3.2  --   --     < > = values in this interval not displayed.       All pertinent labs within the past 24 hours have been reviewed.    Significant Imaging:  I have reviewed all pertinent imaging results/findings within the past 24 hours.    ABG  Recent Labs   Lab 02/11/25  1701   PH 7.305*   PO2 70*   PCO2 47.0*   HCO3 23.4*   BE -3*     Assessment/Plan:     Pulmonary  * Acute hypoxic respiratory failure  Patient with Hypoxic Respiratory failure which is Acute on chronic.  he is on home oxygen at 3 LPM. Supplemental oxygen was provided and noted- Oxygen Concentration (%):  [60-80] 60    .   Signs/symptoms of respiratory failure include- increased work of breathing and respiratory distress. Contributing diagnoses includes - CHF and Pleural effusion Labs and images were reviewed. Patient Has not had a recent ABG. Will treat underlying causes and adjust management of respiratory failure as follows-     Likely secondary to RSV vs possibly pulmonary edema/volume overload    - Extubated to comfort flow on 2/11  - Continue supplemental O2  - Broad spectrum abx, de-escalate when appropriate  - F/u final Resp Cx    Cardiac/Vascular  Elevated troponin  HsTrop 46 on admission. EKG sinus tachycardia w/1st degree AV Block, T wave abnormality. Likely Type 2 Demand ischemia in the setting of volume overload    - Trop peaked at 43, downtrending   -  "Cardiac Monitoring     Chronic systolic heart failure  Systolic and Diastolic Dysfunction. Most recent TTE (12/10/24) with EF 20-25% with severe global hypokinesis. EKG sinus tachycardia with 1st degree AV Block (same from prior EKG) and T-wave abnormality. BNP >4600. HsTrop 46. CXR with right lung base w/blunting suggesting small pleural effusion    - Fluid removal with HD   - Troponin peak at 43, downtrending  - Fluid restriction at 1500 cc with strict I/Os and daily weights    - Maintain on telemetry and daily EKGs   - Up to date risk stratification : TSH, Lipids, HbA1c with optimization of risk factors is necessary  - Check Electrolytes, keep Mag >2 & K+ >4  - SCDs, TEDs, Nursing communication to elevated LE   - Ambulate as tolerated     Renal/  Hyperkalemia  K 8.3 on admission. Shifted with IV Lasix, Insulin/Dextrose, and Calcium Gluconate in the ED.     - Improvement with SLED  - BMP Q4H     ESRD on hemodialysis  Creatinine 4.8 on admit. S/p R Nephrectomy (due to retained uretal stent per chart review) and HD MWF    Plan:   Lab Results   Component Value Date    CREATININE 1.5 (H) 02/12/2025     - On iHD MWF.   - Nephrology consulted, appreciate recs  - CRRT/HD per nephro  - Avoid nephrotoxic agents such as NSAIDs, gadolinium and IV radiocontrast.  - Renally dose meds to current GFR.  - Maintain MAP > 65.     Oncology  Anemia of chronic renal failure, stage 5  Admit with hemoglobin 9.6 Baseline ~8-9      Lab Results   Component Value Date    IRON 26 (L) 12/20/2024    TIBC 209 (L) 12/20/2024    FERRITIN 2,808 (H) 12/20/2024     Lab Results   Component Value Date    FOLATE 8.0 12/20/2024     Lab Results   Component Value Date    [id number] 770 12/20/2024     No results found for: "RETICCTPCT"    Likely secondary to anemia of chronic disease    Plan:   -   Lab Results   Component Value Date    HGB 8.8 (L) 02/12/2025     - Daily CBC   - Transfuse hgb <7         Critical Care Daily Checklist:    A: Awake: RASS " Goal/Actual Goal:    Actual:     B: Spontaneous Breathing Trial Performed?     C: SAT & SBT Coordinated?                        D: Delirium: CAM-ICU     E: Early Mobility Performed? No   F: Feeding Goal: Goals: 1. Initiate tube feeds within 24-48 hours of admission    2. Advance tube feeds to goal rate within 72 hours of initiation.  Status: Nutrition Goal Status: new   Current Diet Order   Procedures    Diet Clear Liquid Standard Tray     Order Specific Question:   Tray type:     Answer:   Standard Tray      AS: Analgesia/Sedation PRN   T: Thromboembolic Prophylaxis Heparin subcut   H: HOB > 300 Yes   U: Stress Ulcer Prophylaxis (if needed) pepcid   G: Glucose Control Goal 140-180   B: Bowel Function Stool Occurrence: 1   I: Indwelling Catheter (Lines & Alberts) Necessity HD catheter   D: De-escalation of Antimicrobials/Pharmacotherapies Continue Zosyn    Plan for the day/ETD Wean O2 as tolerated  Wean levophed as tolerated    Code Status:  Family/Goals of Care: Full Code       Critical Care Time: 60 minutes  Critical secondary to Patient has a condition that poses threat to life and bodily function: respiratory failure      Critical care was time spent personally by me on the following activities: development of treatment plan with patient or surrogate and bedside caregivers, discussions with consultants, evaluation of patient's response to treatment, examination of patient, ordering and performing treatments and interventions, ordering and review of laboratory studies, ordering and review of radiographic studies, pulse oximetry, re-evaluation of patient's condition. This critical care time did not overlap with that of any other provider or involve time for any procedures.     Xiao Ackerman, ANIVAL  Critical Care Medicine  [facility] - Medical ICU

## 2025-02-12 NOTE — ASSESSMENT & PLAN NOTE
Patient with Hypoxic Respiratory failure which is Acute on chronic.  he is on home oxygen at 3 LPM. Supplemental oxygen was provided and noted- Oxygen Concentration (%):  [60-80] 60    .   Signs/symptoms of respiratory failure include- increased work of breathing and respiratory distress. Contributing diagnoses includes - CHF and Pleural effusion Labs and images were reviewed. Patient Has not had a recent ABG. Will treat underlying causes and adjust management of respiratory failure as follows-     Likely secondary to RSV vs possibly pulmonary edema/volume overload    - Extubated to comfort flow on 2/11  - Continue supplemental O2  - Broad spectrum abx, de-escalate when appropriate  - F/u final Resp Cx

## 2025-02-12 NOTE — PLAN OF CARE
[facility]  Initial Discharge Assessment       Primary Care Provider: Jordin Robbins MD    Admission Diagnosis: Hyperkalemia [E87.5]  CHF (congestive heart failure) [I50.9]  Abnormal EKG [R94.31]  Chest pain [R07.9]    Admission Date: 2/9/2025  Expected Discharge Date: 2/15/2025    Transition of Care Barriers: None    Payor: AET[employer] MANAGED MEDICARE / Plan: AETNA MEDICARE DUAL DSNP / Product Type: Medicare Advantage /     Extended Emergency Contact Information  Primary Emergency Contact: Sara Da Silva   [place]  Mobile Phone: [phone]  Relation: Sister    Discharge Plan A: Return to nursing home  Discharge Plan B: Return to Nursing Home      [id number] - [place] - [facility] AT [facility] & [address]  [place] [address]  Phone: [phone] Fax: [phone]      Initial Assessment (most recent)       Adult Discharge Assessment - 02/11/25 1759          Discharge Assessment    Assessment Type Discharge Planning Assessment     Confirmed/corrected address, phone number and insurance Yes     Confirmed Demographics Correct on Facesheet     Source of Information family     If unable to respond/provide information was family/caregiver contacted? Yes     Contact Name/Number sister Ramesh # [phone]     Does patient/caregiver understand observation status Yes     Communicated LLUVIA with patient/caregiver Date not available/Unable to determine     Reason For Admission Acute Hypoxic Respiratory Failure     People in Home facility resident     Facility Arrived From: [facility]     Do you expect to return to your current living situation? Yes     Do you have help at home or someone to help you manage your care at home? Yes     Who are your caregiver(s) and their phone number(s)? [facility] staff ph# [phone]     Prior to hospitilization cognitive status:  Alert/Oriented     Current cognitive status: Unable to Assess     Walking or Climbing Stairs Difficulty yes     Walking or Climbing Stairs ambulation difficulty, requires equipment     Mobility Management ,   Cane, rolling walker, w/c and oxygen    Dressing/Bathing Difficulty yes     Dressing/Bathing bathing difficulty, assistance 1 person     Dressing/Bathing Management Problems rising L arm     Home Accessibility wheelchair accessible     Home Layout Able to live on 1st floor     Equipment Currently Used at Home walker, rolling;wheelchair;cane, quad;oxygen     Readmission within 30 days? Yes     Patient currently being followed by outpatient case management? No     Do you currently have service(s) that help you manage your care at home? No     Do you take prescription medications? Yes     Do you have prescription coverage? Yes     Coverage A[employer]na Managed Medicare - Aetna Medicare Dual DSNP     Do you have any problems affording any of your prescribed medications? No     Is the patient taking medications as prescribed? yes     Who is going to help you get home at discharge? EMS     How do you get to doctors appointments? agency     Are you on dialysis? Yes     [facility]     Do you take coumadin? No     Discharge Plan A Return to nursing home     Discharge Plan B Return to Nursing Home     DME Needed Upon Discharge  none     Discharge Plan discussed with: Sibling     Name(s) and Number(s) Sara Da Silva, sister ph# [phone]     Transition of Care Barriers None        Physical Activity    On average, how many minutes do you engage in exercise at this level? 0 min        Financial Resource Strain    How hard is it for you to pay for the very basics like food, housing, medical care, and heating? Not very hard        Housing Stability    In the last 12 months, was there a time when you were not able to pay the mortgage or rent on time? No     At any time in the past  "12 months, were you homeless or living in a shelter (including now)? No        Transportation Needs    Has the lack of transportation kept you from medical appointments, meetings, work or from getting things needed for daily living? No        Stress    Do you feel stress - tense, restless, nervous, or anxious, or unable to sleep at night because your mind is troubled all the time - these days? Patient unable to answer        Social Isolation    How often do you feel lonely or isolated from those around you?  Never        Alcohol Use    Q1: How often do you have a drink containing alcohol? Never     Q2: How many drinks containing alcohol do you have on a typical day when you are drinking? Patient does not drink     Q3: How often do you have six or more drinks on one occasion? Never        OTHER    Name(s) of People in Home patient is care home care at [facility].                     Readmission Assessment (most recent)       Readmission Assessment - 02/11/25 6743          Readmission    Was this a planned readmission? No     Why were you hospitalized in the last 30 days? Septic Shock     Why were you readmitted? Alarmed about signs/symptoms     When you left the hospital how did you feel? Weak     When you left the hospital where did you go? Nursing Home   [facility]    Did patient/caregiver refused recommended DC plan? No     Tell me about what happened between when you left the hospital and the day you returned. Patient became short of breath     When did you start not feeling well? " a few days ago"     Did you try to manage your symptoms your self? No     Did you try to see or did see a doctor or nurse before you came? Yes     Were you seen? Yes     Did you have  a follow-up appointment on discharge? --   MD at [facility]                     Patient is a resident at [facility].  Patient was previously admitted to [facility] 28 days ago for septic Shock.  Patient has " ESRD and receives dialysis at [facility] on MWF.  Patient admitted to MICU for a sudden on set of SOB.  Patient extubated today and now on high flow nasal cannula.  Once pt is medically stable, patient will return back to [facility].  SW completed DPA with sister (Sara Da Silva) because patient was lethargic due to extubated.  CM/SW will continue to follow while on the MICU.      Discharge Plan A and Plan B have been determined by review of patient's clinical status, future medical and therapeutic needs, and coverage/benefits for post-acute care in coordination with multidisciplinary team members.    Estelle Retana, FAB  [facility]  [phone]

## 2025-02-12 NOTE — ASSESSMENT & PLAN NOTE
"Admit with hemoglobin 9.6 Baseline ~8-9      Lab Results   Component Value Date    IRON 26 (L) 12/20/2024    TIBC 209 (L) 12/20/2024    FERRITIN 2,808 (H) 12/20/2024     Lab Results   Component Value Date    FOLATE 8.0 12/20/2024     Lab Results   Component Value Date    [id number] 770 12/20/2024     No results found for: "RETICCTPCT"    Likely secondary to anemia of chronic disease    Plan:   -   Lab Results   Component Value Date    HGB 8.8 (L) 02/12/2025     - Daily CBC   - Transfuse hgb <7    "

## 2025-02-12 NOTE — SUBJECTIVE & OBJECTIVE
Interval History/Significant Events: Remains on comfort flow, O2 requirements decreasing    Review of Systems   Respiratory:  Positive for cough and shortness of breath.    Cardiovascular:  Negative for chest pain.   Gastrointestinal:  Negative for abdominal distention and abdominal pain.     Objective:     Vital Signs (Most Recent):  Temp: 98 °F (36.7 °C) (02/12/25 0430)  Pulse: 82 (02/12/25 1501)  Resp: (!) 27 (02/12/25 0600)  BP: (!) 116/58 (02/12/25 0600)  SpO2: 95 % (02/12/25 0600) Vital Signs (24h Range):  Temp:  [97.4 °F (36.3 °C)-98 °F (36.7 °C)] 98 °F (36.7 °C)  Pulse:  [68-95] 82  Resp:  [11-38] 27  SpO2:  [82 %-100 %] 95 %  BP: ()/(50-79) 116/58   Weight: 58 kg (127 lb 13.9 oz)  Body mass index is 20.64 kg/m².      Intake/Output Summary (Last 24 hours) at 2/12/2025 1557  Last data filed at 2/12/2025 0837  Gross per 24 hour   Intake 2184.65 ml   Output 911 ml   Net 1273.65 ml          Physical Exam  Vitals and nursing note reviewed.   Constitutional:       General: He is awake. He is in acute distress.      Appearance: He is ill-appearing.      Interventions: Nasal cannula in place.      Comments: Comfort flow in place   HENT:      Mouth/Throat:      Mouth: Mucous membranes are dry.   Eyes:      Extraocular Movements: Extraocular movements intact.      Pupils: Pupils are equal, round, and reactive to light.   Cardiovascular:      Rate and Rhythm: Regular rhythm. Tachycardia present.   Pulmonary:      Effort: Pulmonary effort is normal. Tachypnea present. No respiratory distress.      Breath sounds: Normal breath sounds.   Abdominal:      General: There is no distension.      Palpations: Abdomen is soft.      Tenderness: There is no abdominal tenderness.   Skin:     Coloration: Skin is pale.   Neurological:      Mental Status: He is alert and oriented to person, place, and time.      GCS: GCS eye subscore is 4. GCS verbal subscore is 5. GCS motor subscore is 6.      Cranial Nerves: No cranial nerve  deficit.      Motor: Weakness present.   Psychiatric:         Behavior: Behavior is cooperative.            Vents:  Vent Mode: A/C (02/11/25 1230)  Ventilator Initiated: Yes (02/09/25 2044)  Set Rate: 22 BPM (02/11/25 0846)  Vt Set: 360 mL (02/11/25 0846)  Pressure Support: 5 cmH20 (02/11/25 1330)  PEEP/CPAP: 5 cmH20 (02/11/25 1330)  Oxygen Concentration (%): 60 (02/12/25 1120)  Peak Airway Pressure: 11 cmH20 (02/11/25 1330)  Plateau Pressure: 0 cmH20 (02/11/25 1330)  Total Ve: 1.63 L/m (02/11/25 1330)  Negative Inspiratory Force (cm H2O): 0 (02/11/25 1330)  F/VT Ratio<105 (RSBI): (!) 18.49 (02/11/25 1230)  Lines/Drains/Airways       Central Venous Catheter Line  Duration                  Hemodialysis Catheter 12/31/24 0818 right subclavian 43 days              Drain  Duration                  Colostomy 12/19/24 2225 RLQ 54 days              Peripheral Intravenous Line  Duration                  Peripheral IV - Single Lumen 02/11/25 1656 20 G 1 3/4 in Anterior;Right Forearm <1 day                  Significant Labs:    CBC/Anemia Profile:  Recent Labs   Lab 02/11/25  0326 02/11/25  1701 02/12/25  0412   WBC 6.94  --  6.08   HGB 8.7*  --  8.8*   HCT 28.4* 28* 28.9*   *  --  106*   *  --  101*   RDW 18.4*  --  18.3*        Chemistries:  Recent Labs   Lab 02/11/25  0326 02/11/25  0821 02/11/25  1625 02/11/25  2252 02/12/25  0412 02/12/25  0828 02/12/25  1231   *  133*  133*   < > 139  139 139  141  141 137  137  137 138 139   K 5.9*  5.9*  5.9*   < > 5.3*  5.3*  5.3* 4.3  4.4  4.4  4.4  4.4 4.6  4.6  4.6  4.6 4.7  4.7 4.8  4.8     106  106   < > 111*  111* 109  111*  111* 108  108  108 109 110   CO2 21*  21*  21*   < > 20*  20* 22*  23  23 23  23  23 22* 21*   BUN 15  15  15   < > 11  11 8  8  8 7*  7*  7* 8 10   CREATININE 2.2*  2.2*  2.2*   < > 1.7*  1.7* 1.0  1.0  1.0 1.1  1.1  1.1 1.3 1.5*   CALCIUM 9.0  9.0  9.0   < > 9.6  9.6 8.9  8.9   8.9 9.5  9.5  9.5 9.9 10.0   ALBUMIN 2.4*  2.4*  --   --  2.3* 2.5*  2.5*  --   --    PROT 6.0  --   --   --  6.0  --   --    BILITOT 0.5  --   --   --  0.5  --   --    ALKPHOS 182*  --   --   --  185*  --   --    ALT <5*  --   --   --  6*  --   --    AST 24  --   --   --  31  --   --    MG 2.0  2.0  --  2.0 1.9 1.9  --   --    PHOS 4.0  4.0  --   --  2.8 3.2  3.2  --   --     < > = values in this interval not displayed.       All pertinent labs within the past 24 hours have been reviewed.    Significant Imaging:  I have reviewed all pertinent imaging results/findings within the past 24 hours.

## 2025-02-12 NOTE — ASSESSMENT & PLAN NOTE
K 8.3 on admission. Shifted with IV Lasix, Insulin/Dextrose, and Calcium Gluconate in the ED.     - Improvement with SLED  - BMP Q4H

## 2025-02-12 NOTE — PROGRESS NOTES
02/12/25 0401   Treatment   Treatment Type SLED   Treatment Status Blood returned;Discontinued treatment   Dialysis Machine Number [id number]   Dialyzer Time (hours) 7.05   BVP (Liters) 79.1 L   CRRT Comments 6 hr SLED completed; blood rinsedback via primary RN. Lines flushed and capped via primary RN   CRRT Hourly Documentation   Total UF (Hourly Cleared) (mL) 561

## 2025-02-12 NOTE — ASSESSMENT & PLAN NOTE
Patient is ESRD on HD MWF. As per his sister he completed a full session of HD on Friday but she is uncertain if he was able to obtain his dry weight. I did call and discuss his lab results with his sister Sara Da Silva over the phone who provided consent for dialysis while he is admitted.     Recommendations  - Labs stable - No dialysis indicated   - 1L fluid restriction  - 2g salt. Low potassium diet   - daily RFP, magnesium, and phosphorus levels

## 2025-02-12 NOTE — SUBJECTIVE & OBJECTIVE
Interval History: He is extubated yesterday. Dialysis was done yesterday for hyperkalemia. Clotted multiple times. He stated that he gets hi potassium levels between the dialysis treatments. He has low BP and requiring. Eating and drinking. Complaining of the neck pain from the intubation    Review of patient's allergies indicates:   Allergen Reactions    Vancomycin analogues Anaphylaxis, Other (See Comments), Shortness Of Breath and Swelling     Light headed, see's spots      Aspirin Nausea And Vomiting and Other (See Comments)     Has crohn's disease    Other reaction(s): FLARES UP CROHNS      Has crohn's disease     Current Facility-Administered Medications   Medication Frequency    0.9%  NaCl infusion (CRRT USE ONLY) Continuous    acetaminophen tablet 650 mg Q6H PRN    calcium gluconate 1 g in NS IVPB (premixed) Q10 Min PRN    dextrose 50% injection 25 g PRN    famotidine tablet 20 mg Daily    heparin (porcine) injection 5,000 Units Q8H    hydrOXYzine HCL tablet 25 mg TID PRN    [START ON 2/13/2025] levothyroxine tablet 50 mcg Before breakfast    magnesium sulfate 2g in water 50mL IVPB (premix) PRN    magnesium sulfate 2g in water 50mL IVPB (premix) Once    mupirocin 2 % ointment BID    NORepinephrine 4 mg in sodium chloride 0.9% 250 mL infusion (premix) Continuous    piperacillin-tazobactam (ZOSYN) 4.5 g in D5W 100 mL IVPB (MB+) Q12H    sodium chloride 0.9% flush 10 mL PRN    sodium phosphate 20.1 mmol in D5W 250 mL IVPB PRN    sodium phosphate 30 mmol in D5W 250 mL IVPB PRN    sodium phosphate 39.9 mmol in D5W 250 mL IVPB PRN       Objective:     Vital Signs (Most Recent):  Temp: 98 °F (36.7 °C) (02/12/25 0430)  Pulse: 76 (02/12/25 0600)  Resp: (!) 27 (02/12/25 0600)  BP: (!) 116/58 (02/12/25 0600)  SpO2: 95 % (02/12/25 0600) Vital Signs (24h Range):  Temp:  [97 °F (36.1 °C)-98 °F (36.7 °C)] 98 °F (36.7 °C)  Pulse:  [] 76  Resp:  [10-38] 27  SpO2:  [82 %-100 %] 95 %  BP: ()/(50-79) 116/58      Weight: 58 kg (127 lb 13.9 oz) (02/09/25 2349)  Body mass index is 20.64 kg/m².  Body surface area is 1.64 meters squared.    I/O last 3 completed shifts:  In: 5647.2 [I.V.:4412.4; NG/GT:250; IV Piggyback:984.8]  Out: 2283 [Other:1633; Stool:650]     Physical Exam  Constitutional:       Appearance: Normal appearance.   HENT:      Head: Normocephalic and atraumatic.      Mouth/Throat:      Mouth: Mucous membranes are moist.   Pulmonary:      Effort: Pulmonary effort is normal.   Abdominal:      General: Abdomen is flat.      Palpations: Abdomen is soft.   Musculoskeletal:      Right lower leg: No edema.      Left lower leg: No edema.   Neurological:      Mental Status: He is alert.     Dry scaly skin       Significant Labs:  All labs within the past 24 hours have been reviewed.

## 2025-02-12 NOTE — ASSESSMENT & PLAN NOTE
Creatinine 4.8 on admit. S/p R Nephrectomy (due to retained uretal stent per chart review) and HD MWF    Plan:   Lab Results   Component Value Date    CREATININE 1.5 (H) 02/12/2025     - On iHD MWF.   - Nephrology consulted, appreciate recs  - CRRT/HD per nephro  - Avoid nephrotoxic agents such as NSAIDs, gadolinium and IV radiocontrast.  - Renally dose meds to current GFR.  - Maintain MAP > 65.

## 2025-02-12 NOTE — PROGRESS NOTES
02/11/25 1958   Treatment   Treatment Type SLED   Treatment Status Restart   Dialysis Machine Number [id number]   Solutions Labeled and Current  Yes   Access Tunneled Cath;Right   Catheter Dressing Intact  Yes   Alarms Engaged Yes   CRRT Comments 6 HR SLED intiaited. POC rev w primary RN   Prescription   Time (Hours) 6   Dialysate K + (mEq/L) Other (Comment)  (3K 3 hrs 4 K 3 hrs)   Dialysate CA + (mEq/L) 2.5   Dialysate HCO3 - (Bicarb) (mEq/L) 30   Dialysate Na + (mEq/L) 140   Cartridge Type Other   Dialysate Flow Rate (mL/min) 200   UF Goal Rate 200 mL/hr   CRRT Hourly Documentation   Blood Flow (mL/min) 200   UF Rate 200 cc/hr   Arterial Pressure (mmHg) -110 mmHg   Venous Pressure (mmHg) 60 mmHg   Effluent Pressure (EP) (mmHg) 10 mmHg

## 2025-02-12 NOTE — PROGRESS NOTES
02/12/25 0045   Treatment   Treatment Status Order change   CRRT Comments changed to 4K bath via primary RN

## 2025-02-12 NOTE — PROGRESS NOTES
02/12/25 0111   Treatment   Treatment Type SLED   Treatment Status Daily equipment check   Dialysis Machine Number [id number]   Dialyzer Time (hours) 5.16   BVP (Liters) 58.9 L   Solutions Labeled and Current  Yes   Access Tunneled Cath;Right   Catheter Dressing Intact  Yes   Alarms Engaged Yes   CRRT Comments daily check completed.   Prescription   Time (Hours) 6   Dialysate K + (mEq/L) 4   Dialysate CA + (mEq/L) 2.5   Dialysate HCO3 - (Bicarb) (mEq/L) 30   Dialysate Na + (mEq/L) 140   Cartridge Type Other   Dialysate Flow Rate (mL/min) 200   UF Goal Rate 200 mL/hr   CRRT Hourly Documentation   Blood Flow (mL/min) 200   UF Rate 200 cc/hr   Arterial Pressure (mmHg) -110 mmHg   Venous Pressure (mmHg) 170 mmHg   Effluent Pressure (EP) (mmHg) 10 mmHg

## 2025-02-13 LAB
ALBUMIN SERPL BCP-MCNC: 2.3 G/DL (ref 3.5–5.2)
ALBUMIN SERPL BCP-MCNC: 2.4 G/DL (ref 3.5–5.2)
ALBUMIN SERPL BCP-MCNC: 2.4 G/DL (ref 3.5–5.2)
ALLENS TEST: ABNORMAL
ALP SERPL-CCNC: 159 U/L (ref 40–150)
ALT SERPL W/O P-5'-P-CCNC: 5 U/L (ref 10–44)
ANION GAP SERPL CALC-SCNC: 6 MMOL/L (ref 8–16)
ANION GAP SERPL CALC-SCNC: 7 MMOL/L (ref 8–16)
ANION GAP SERPL CALC-SCNC: 8 MMOL/L (ref 8–16)
ANION GAP SERPL CALC-SCNC: 8 MMOL/L (ref 8–16)
ANION GAP SERPL CALC-SCNC: 9 MMOL/L (ref 8–16)
ANION GAP SERPL CALC-SCNC: 9 MMOL/L (ref 8–16)
AST SERPL-CCNC: 26 U/L (ref 10–40)
BACTERIA SPEC AEROBE CULT: ABNORMAL
BACTERIA SPEC AEROBE CULT: ABNORMAL
BASOPHILS # BLD AUTO: 0.03 K/UL (ref 0–0.2)
BASOPHILS NFR BLD: 0.9 % (ref 0–1.9)
BILIRUB SERPL-MCNC: 0.4 MG/DL (ref 0.1–1)
BUN SERPL-MCNC: 15 MG/DL (ref 8–23)
BUN SERPL-MCNC: 15 MG/DL (ref 8–23)
BUN SERPL-MCNC: 16 MG/DL (ref 8–23)
BUN SERPL-MCNC: 17 MG/DL (ref 8–23)
BUN SERPL-MCNC: 18 MG/DL (ref 8–23)
BUN SERPL-MCNC: 20 MG/DL (ref 8–23)
BUN SERPL-MCNC: 21 MG/DL (ref 8–23)
CALCIUM SERPL-MCNC: 10.3 MG/DL (ref 8.7–10.5)
CALCIUM SERPL-MCNC: 10.3 MG/DL (ref 8.7–10.5)
CALCIUM SERPL-MCNC: 8.9 MG/DL (ref 8.7–10.5)
CALCIUM SERPL-MCNC: 8.9 MG/DL (ref 8.7–10.5)
CALCIUM SERPL-MCNC: 9.2 MG/DL (ref 8.7–10.5)
CALCIUM SERPL-MCNC: 9.6 MG/DL (ref 8.7–10.5)
CALCIUM SERPL-MCNC: 9.8 MG/DL (ref 8.7–10.5)
CALCIUM SERPL-MCNC: 9.9 MG/DL (ref 8.7–10.5)
CALCIUM SERPL-MCNC: 9.9 MG/DL (ref 8.7–10.5)
CHLORIDE SERPL-SCNC: 108 MMOL/L (ref 95–110)
CHLORIDE SERPL-SCNC: 109 MMOL/L (ref 95–110)
CHLORIDE SERPL-SCNC: 111 MMOL/L (ref 95–110)
CHLORIDE SERPL-SCNC: 111 MMOL/L (ref 95–110)
CHLORIDE SERPL-SCNC: 112 MMOL/L (ref 95–110)
CO2 SERPL-SCNC: 19 MMOL/L (ref 23–29)
CO2 SERPL-SCNC: 20 MMOL/L (ref 23–29)
CO2 SERPL-SCNC: 21 MMOL/L (ref 23–29)
CO2 SERPL-SCNC: 22 MMOL/L (ref 23–29)
CO2 SERPL-SCNC: 22 MMOL/L (ref 23–29)
CREAT SERPL-MCNC: 1.6 MG/DL (ref 0.5–1.4)
CREAT SERPL-MCNC: 1.6 MG/DL (ref 0.5–1.4)
CREAT SERPL-MCNC: 1.9 MG/DL (ref 0.5–1.4)
CREAT SERPL-MCNC: 2 MG/DL (ref 0.5–1.4)
CREAT SERPL-MCNC: 2.3 MG/DL (ref 0.5–1.4)
CREAT SERPL-MCNC: 2.4 MG/DL (ref 0.5–1.4)
DELSYS: ABNORMAL
DIFFERENTIAL METHOD BLD: ABNORMAL
EOSINOPHIL # BLD AUTO: 0.1 K/UL (ref 0–0.5)
EOSINOPHIL NFR BLD: 3.4 % (ref 0–8)
ERYTHROCYTE [DISTWIDTH] IN BLOOD BY AUTOMATED COUNT: 17.8 % (ref 11.5–14.5)
EST. GFR  (NO RACE VARIABLE): 28.5 ML/MIN/1.73 M^2
EST. GFR  (NO RACE VARIABLE): 30 ML/MIN/1.73 M^2
EST. GFR  (NO RACE VARIABLE): 35.5 ML/MIN/1.73 M^2
EST. GFR  (NO RACE VARIABLE): 37.7 ML/MIN/1.73 M^2
EST. GFR  (NO RACE VARIABLE): 46.4 ML/MIN/1.73 M^2
EST. GFR  (NO RACE VARIABLE): 46.4 ML/MIN/1.73 M^2
GLUCOSE SERPL-MCNC: 109 MG/DL (ref 70–110)
GLUCOSE SERPL-MCNC: 70 MG/DL (ref 70–110)
GLUCOSE SERPL-MCNC: 72 MG/DL (ref 70–110)
GLUCOSE SERPL-MCNC: 72 MG/DL (ref 70–110)
GLUCOSE SERPL-MCNC: 79 MG/DL (ref 70–110)
GLUCOSE SERPL-MCNC: 87 MG/DL (ref 70–110)
GLUCOSE SERPL-MCNC: 88 MG/DL (ref 70–110)
GLUCOSE SERPL-MCNC: 96 MG/DL (ref 70–110)
GLUCOSE SERPL-MCNC: 96 MG/DL (ref 70–110)
GRAM STN SPEC: ABNORMAL
HCO3 UR-SCNC: 24 MMOL/L (ref 24–28)
HCT VFR BLD AUTO: 27.3 % (ref 40–54)
HGB BLD-MCNC: 8.4 G/DL (ref 14–18)
IMM GRANULOCYTES # BLD AUTO: 0.01 K/UL (ref 0–0.04)
IMM GRANULOCYTES NFR BLD AUTO: 0.3 % (ref 0–0.5)
LYMPHOCYTES # BLD AUTO: 0.3 K/UL (ref 1–4.8)
LYMPHOCYTES NFR BLD: 9.6 % (ref 18–48)
MAGNESIUM SERPL-MCNC: 2.1 MG/DL (ref 1.6–2.6)
MAGNESIUM SERPL-MCNC: 2.2 MG/DL (ref 1.6–2.6)
MAGNESIUM SERPL-MCNC: 2.3 MG/DL (ref 1.6–2.6)
MCH RBC QN AUTO: 30.7 PG (ref 27–31)
MCHC RBC AUTO-ENTMCNC: 30.8 G/DL (ref 32–36)
MCV RBC AUTO: 100 FL (ref 82–98)
MONOCYTES # BLD AUTO: 0.3 K/UL (ref 0.3–1)
MONOCYTES NFR BLD: 9.6 % (ref 4–15)
NEUTROPHILS # BLD AUTO: 2.5 K/UL (ref 1.8–7.7)
NEUTROPHILS NFR BLD: 76.2 % (ref 38–73)
NRBC BLD-RTO: 0 /100 WBC
PCO2 BLDA: 41.9 MMHG (ref 35–45)
PH SMN: 7.37 [PH] (ref 7.35–7.45)
PHOSPHATE SERPL-MCNC: 3.3 MG/DL (ref 2.7–4.5)
PHOSPHATE SERPL-MCNC: 3.9 MG/DL (ref 2.7–4.5)
PHOSPHATE SERPL-MCNC: 4.7 MG/DL (ref 2.7–4.5)
PLATELET # BLD AUTO: 98 K/UL (ref 150–450)
PMV BLD AUTO: 10.7 FL (ref 9.2–12.9)
PO2 BLDA: 23 MMHG (ref 40–60)
POC BE: -1 MMOL/L
POC SATURATED O2: 38 % (ref 95–100)
POC TCO2: 25 MMOL/L (ref 24–29)
POCT GLUCOSE: 102 MG/DL (ref 70–110)
POCT GLUCOSE: 115 MG/DL (ref 70–110)
POCT GLUCOSE: 69 MG/DL (ref 70–110)
POCT GLUCOSE: 94 MG/DL (ref 70–110)
POTASSIUM SERPL-SCNC: 4.9 MMOL/L (ref 3.5–5.1)
POTASSIUM SERPL-SCNC: 5 MMOL/L (ref 3.5–5.1)
POTASSIUM SERPL-SCNC: 5.1 MMOL/L (ref 3.5–5.1)
POTASSIUM SERPL-SCNC: 5.2 MMOL/L (ref 3.5–5.1)
POTASSIUM SERPL-SCNC: 5.3 MMOL/L (ref 3.5–5.1)
POTASSIUM SERPL-SCNC: 5.3 MMOL/L (ref 3.5–5.1)
PROT SERPL-MCNC: 5.5 G/DL (ref 6–8.4)
RBC # BLD AUTO: 2.74 M/UL (ref 4.6–6.2)
SAMPLE: ABNORMAL
SITE: ABNORMAL
SODIUM SERPL-SCNC: 135 MMOL/L (ref 136–145)
SODIUM SERPL-SCNC: 136 MMOL/L (ref 136–145)
SODIUM SERPL-SCNC: 137 MMOL/L (ref 136–145)
SODIUM SERPL-SCNC: 139 MMOL/L (ref 136–145)
WBC # BLD AUTO: 3.23 K/UL (ref 3.9–12.7)

## 2025-02-13 PROCEDURE — 97530 THERAPEUTIC ACTIVITIES: CPT

## 2025-02-13 PROCEDURE — 25000003 PHARM REV CODE 250: Performed by: INTERNAL MEDICINE

## 2025-02-13 PROCEDURE — 80053 COMPREHEN METABOLIC PANEL: CPT

## 2025-02-13 PROCEDURE — 99291 CRITICAL CARE FIRST HOUR: CPT | Mod: ,,,

## 2025-02-13 PROCEDURE — 25000003 PHARM REV CODE 250: Performed by: NURSE PRACTITIONER

## 2025-02-13 PROCEDURE — 63600175 PHARM REV CODE 636 W HCPCS: Performed by: INTERNAL MEDICINE

## 2025-02-13 PROCEDURE — 3E04317 INTRODUCTION OF OTHER THROMBOLYTIC INTO CENTRAL VEIN, PERCUTANEOUS APPROACH: ICD-10-PCS | Performed by: STUDENT IN AN ORGANIZED HEALTH CARE EDUCATION/TRAINING PROGRAM

## 2025-02-13 PROCEDURE — 63600175 PHARM REV CODE 636 W HCPCS

## 2025-02-13 PROCEDURE — 99900035 HC TECH TIME PER 15 MIN (STAT)

## 2025-02-13 PROCEDURE — 83735 ASSAY OF MAGNESIUM: CPT | Mod: 91 | Performed by: STUDENT IN AN ORGANIZED HEALTH CARE EDUCATION/TRAINING PROGRAM

## 2025-02-13 PROCEDURE — 63600175 PHARM REV CODE 636 W HCPCS: Mod: JZ,TB | Performed by: STUDENT IN AN ORGANIZED HEALTH CARE EDUCATION/TRAINING PROGRAM

## 2025-02-13 PROCEDURE — 27000207 HC ISOLATION

## 2025-02-13 PROCEDURE — 25000003 PHARM REV CODE 250

## 2025-02-13 PROCEDURE — 97165 OT EVAL LOW COMPLEX 30 MIN: CPT

## 2025-02-13 PROCEDURE — 80048 BASIC METABOLIC PNL TOTAL CA: CPT | Mod: XB

## 2025-02-13 PROCEDURE — 80069 RENAL FUNCTION PANEL: CPT | Performed by: STUDENT IN AN ORGANIZED HEALTH CARE EDUCATION/TRAINING PROGRAM

## 2025-02-13 PROCEDURE — 25000003 PHARM REV CODE 250: Performed by: STUDENT IN AN ORGANIZED HEALTH CARE EDUCATION/TRAINING PROGRAM

## 2025-02-13 PROCEDURE — 82803 BLOOD GASES ANY COMBINATION: CPT

## 2025-02-13 PROCEDURE — 94799 UNLISTED PULMONARY SVC/PX: CPT

## 2025-02-13 PROCEDURE — 97535 SELF CARE MNGMENT TRAINING: CPT

## 2025-02-13 PROCEDURE — 20000000 HC ICU ROOM

## 2025-02-13 PROCEDURE — 84100 ASSAY OF PHOSPHORUS: CPT

## 2025-02-13 PROCEDURE — 94761 N-INVAS EAR/PLS OXIMETRY MLT: CPT

## 2025-02-13 PROCEDURE — 83735 ASSAY OF MAGNESIUM: CPT

## 2025-02-13 PROCEDURE — 99233 SBSQ HOSP IP/OBS HIGH 50: CPT | Mod: ,,, | Performed by: INTERNAL MEDICINE

## 2025-02-13 PROCEDURE — 27100171 HC OXYGEN HIGH FLOW UP TO 24 HOURS

## 2025-02-13 PROCEDURE — 90945 DIALYSIS ONE EVALUATION: CPT

## 2025-02-13 PROCEDURE — 85025 COMPLETE CBC W/AUTO DIFF WBC: CPT

## 2025-02-13 RX ORDER — CEFTRIAXONE 2 G/1
2 INJECTION, POWDER, FOR SOLUTION INTRAMUSCULAR; INTRAVENOUS
Status: DISPENSED | OUTPATIENT
Start: 2025-02-13 | End: 2025-02-16

## 2025-02-13 RX ORDER — HYDROCODONE BITARTRATE AND ACETAMINOPHEN 500; 5 MG/1; MG/1
TABLET ORAL CONTINUOUS
Status: ACTIVE | OUTPATIENT
Start: 2025-02-13 | End: 2025-02-14

## 2025-02-13 RX ORDER — IBUPROFEN 200 MG
16 TABLET ORAL
Status: DISCONTINUED | OUTPATIENT
Start: 2025-02-13 | End: 2025-02-19 | Stop reason: HOSPADM

## 2025-02-13 RX ORDER — TALC
9 POWDER (GRAM) TOPICAL NIGHTLY PRN
Status: DISCONTINUED | OUTPATIENT
Start: 2025-02-13 | End: 2025-02-16

## 2025-02-13 RX ORDER — MAGNESIUM SULFATE HEPTAHYDRATE 40 MG/ML
2 INJECTION, SOLUTION INTRAVENOUS
Status: ACTIVE | OUTPATIENT
Start: 2025-02-13 | End: 2025-02-14

## 2025-02-13 RX ORDER — IBUPROFEN 200 MG
24 TABLET ORAL
Status: DISCONTINUED | OUTPATIENT
Start: 2025-02-13 | End: 2025-02-19 | Stop reason: HOSPADM

## 2025-02-13 RX ORDER — GLUCAGON 1 MG
1 KIT INJECTION
Status: DISCONTINUED | OUTPATIENT
Start: 2025-02-13 | End: 2025-02-19 | Stop reason: HOSPADM

## 2025-02-13 RX ORDER — FAMOTIDINE 20 MG/1
20 TABLET, FILM COATED ORAL EVERY OTHER DAY
Status: DISCONTINUED | OUTPATIENT
Start: 2025-02-15 | End: 2025-02-13

## 2025-02-13 RX ORDER — FAMOTIDINE 20 MG/1
20 TABLET, FILM COATED ORAL DAILY
Status: DISCONTINUED | OUTPATIENT
Start: 2025-02-14 | End: 2025-02-14

## 2025-02-13 RX ADMIN — ALTEPLASE 4 MG: 2.2 INJECTION, POWDER, LYOPHILIZED, FOR SOLUTION INTRAVENOUS at 06:02

## 2025-02-13 RX ADMIN — HEPARIN SODIUM 5000 UNITS: 5000 INJECTION INTRAVENOUS; SUBCUTANEOUS at 09:02

## 2025-02-13 RX ADMIN — PIPERACILLIN SODIUM AND TAZOBACTAM SODIUM 4.5 G: 4; .5 INJECTION, POWDER, FOR SOLUTION INTRAVENOUS at 04:02

## 2025-02-13 RX ADMIN — HEPARIN SODIUM 5000 UNITS: 5000 INJECTION INTRAVENOUS; SUBCUTANEOUS at 06:02

## 2025-02-13 RX ADMIN — SODIUM ZIRCONIUM CYCLOSILICATE 5 G: 5 POWDER, FOR SUSPENSION ORAL at 12:02

## 2025-02-13 RX ADMIN — SODIUM CHLORIDE: 9 INJECTION, SOLUTION INTRAVENOUS at 02:02

## 2025-02-13 RX ADMIN — FAMOTIDINE 20 MG: 20 TABLET ORAL at 08:02

## 2025-02-13 RX ADMIN — LEVOTHYROXINE SODIUM 50 MCG: 0.05 TABLET ORAL at 06:02

## 2025-02-13 RX ADMIN — HEPARIN SODIUM 5000 UNITS: 5000 INJECTION INTRAVENOUS; SUBCUTANEOUS at 03:02

## 2025-02-13 RX ADMIN — MUPIROCIN: 20 OINTMENT TOPICAL at 08:02

## 2025-02-13 RX ADMIN — Medication 9 MG: at 12:02

## 2025-02-13 RX ADMIN — CEFTRIAXONE 2 G: 2 INJECTION, POWDER, FOR SOLUTION INTRAMUSCULAR; INTRAVENOUS at 03:02

## 2025-02-13 RX ADMIN — MUPIROCIN: 20 OINTMENT TOPICAL at 09:02

## 2025-02-13 NOTE — ASSESSMENT & PLAN NOTE
"Admit with hemoglobin 9.6 Baseline ~8-9      Lab Results   Component Value Date    IRON 26 (L) 12/20/2024    TIBC 209 (L) 12/20/2024    FERRITIN 2,808 (H) 12/20/2024     Lab Results   Component Value Date    FOLATE 8.0 12/20/2024     Lab Results   Component Value Date    [id number] 770 12/20/2024     No results found for: "RETICCTPCT"    Likely secondary to anemia of chronic disease    Plan:   -   Lab Results   Component Value Date    HGB 8.4 (L) 02/13/2025     - Daily CBC   - Transfuse hgb <7    "

## 2025-02-13 NOTE — PROGRESS NOTES
[facility] - Medical ICU  Critical Care Medicine  Progress Note    Patient Name: Flakito Mcginnis  MRN: [id number]  Admission Date: 2/9/2025  Hospital Length of Stay: 4 days  Code Status: Full Code  Attending Provider: Jose Mendieta MD  Primary Care Provider: Jordin Robbins MD   Principal Problem: Acute hypoxic respiratory failure    Subjective:     HPI:  69yoM w/hx of HFrEF (EF 20-25%, 12/2024), NICM, MR, ESRD on HD, chronic respiratory failure (on 3L NC at home), Crohn's disease s/p colostomy, R nephrectomy who presents to the hospital from [facility] from acute onset of SOB. Given the acuity of patient's condition, history was obtained from the chart. Per the ED provider note, patient has had increased sputum production, cough, wheezing, and bilateral lower extremity edema. His last dialysis session was on Friday (2/7/25) but there is concern that he may not have completed a full session as patient reports feeling volume overloaded. He denies chest pain or fever. He was recently admitted to the hospital septic shock secondary to staph capitis bacteremia and endocarditis. Completed 6wk course of IV Cefazolin on 2/2/25.     In the ED, patient was hypoxic with increased WOB. Initially placed on BiPAP without much improvement in hypoxia so was intubated. Afebrile, other VSS. Labs were notable for Hgb 9.6, Hct 32.3, Na 132, K 8.3, Bicarb 19, BUN/Cr 38/4.8, , BNP >4900, HsTrop 44. Flu/COVID negative. RSV positive. VBG 7.46/35.1/33.3/25.5. CXR with ongoing vs recurrent small right pleural effusion. EKG with known 1st degree AV block and T-wave abnormality. Administered calcium gluconate, IV insulin/dextrose, and IV lasix for hyperkalemia. Nephrology consulted. Admitted to the MICU for further management and workup.       Hospital/ICU Course:  Mr. Mcginnis was admitted to the MICU for acute on chronic hypoxic respiratory failure. Started on zosyn and azithromycin. Shifted potassium potassium throughout  admission. Required low dose vasopressors to tolerate SLED. D10 infusion for hypoglycemia. Echo with EF 20-25%, similar to previous study. Extubated to NC on 2/10 but then placed on comfort flow for . CRRT per nephrology.     Interval History/Significant Events: weaned off vasopressors. Comfort flow requirements decreasing. CRRT for fluid removal today.     Review of Systems   Respiratory:  Positive for cough and shortness of breath.    Cardiovascular:  Negative for chest pain.   Gastrointestinal:  Negative for abdominal distention and abdominal pain.     Objective:     Vital Signs (Most Recent):  Temp: 98 °F (36.7 °C) (02/13/25 1101)  Pulse: 92 (02/13/25 1201)  Resp: (!) 28 (02/13/25 1201)  BP: 135/65 (02/13/25 1201)  SpO2: 99 % (02/13/25 1201) Vital Signs (24h Range):  Temp:  [97 °F (36.1 °C)-98 °F (36.7 °C)] 98 °F (36.7 °C)  Pulse:  [] 92  Resp:  [16-49] 28  SpO2:  [90 %-99 %] 99 %  BP: (111-154)/(58-95) 135/65   Weight: 58 kg (127 lb 13.9 oz)  Body mass index is 20.64 kg/m².      Intake/Output Summary (Last 24 hours) at 2/13/2025 1350  Last data filed at 2/13/2025 1201  Gross per 24 hour   Intake 444.95 ml   Output 350 ml   Net 94.95 ml          Physical Exam  Vitals and nursing note reviewed.   Constitutional:       General: He is awake.      Appearance: He is ill-appearing.      Interventions: Nasal cannula in place.      Comments: Comfort flow in place   HENT:      Mouth/Throat:      Mouth: Mucous membranes are dry.   Eyes:      Extraocular Movements: Extraocular movements intact.      Pupils: Pupils are equal, round, and reactive to light.   Cardiovascular:      Rate and Rhythm: Normal rate and regular rhythm.   Pulmonary:      Effort: Pulmonary effort is normal. Tachypnea present. No respiratory distress.      Breath sounds: Normal breath sounds.   Abdominal:      General: There is no distension.      Palpations: Abdomen is soft.      Tenderness: There is no abdominal tenderness.   Skin:      Coloration: Skin is pale.   Neurological:      Mental Status: He is alert and oriented to person, place, and time.      GCS: GCS eye subscore is 4. GCS verbal subscore is 5. GCS motor subscore is 6.      Cranial Nerves: No cranial nerve deficit.      Motor: Weakness present.   Psychiatric:         Behavior: Behavior is cooperative.              Lines/Drains/Airways       Central Venous Catheter Line  Duration                  Hemodialysis Catheter 12/31/24 0818 right subclavian 44 days              Drain  Duration                  Colostomy 12/19/24 2225 RLQ 55 days              Peripheral Intravenous Line  Duration                  Peripheral IV - Single Lumen 02/12/25 1120 20 G Anterior;Right Upper Arm 1 day                  Significant Labs:    CBC/Anemia Profile:  Recent Labs   Lab 02/11/25  1701 02/12/25  0412 02/13/25  0245   WBC  --  6.08 3.23*   HGB  --  8.8* 8.4*   HCT 28* 28.9* 27.3*   PLT  --  106* 98*   MCV  --  101* 100*   RDW  --  18.3* 17.8*        Chemistries:  Recent Labs   Lab 02/12/25  0412 02/12/25  0828 02/12/25  1655 02/12/25  1948 02/13/25  0245 02/13/25  0814 02/13/25  1234     137  137   < > 136  138   < > 137  137 139 136   K 4.6  4.6  4.6  4.6   < > 4.7  4.8  4.8   < > 4.9  4.9  4.9 5.3*  5.3* 5.2*  5.2*     108  108   < > 107  108   < > 108  108 109 108   CO2 23  23  23   < > 23  22*   < > 21*  21* 21* 21*   BUN 7*  7*  7*   < > 13  12   < > 17  17 20 21   CREATININE 1.1  1.1  1.1   < > 1.6*  1.6*   < > 2.0*  2.0* 2.3* 2.4*   CALCIUM 9.5  9.5  9.5   < > 10.1  10.1   < > 9.9  9.9 10.3 9.8   ALBUMIN 2.5*  2.5*  --  2.3*  --  2.3*  --   --    PROT 6.0  --   --   --  5.5*  --   --    BILITOT 0.5  --   --   --  0.4  --   --    ALKPHOS 185*  --   --   --  159*  --   --    ALT 6*  --   --   --  5*  --   --    AST 31  --   --   --  26  --   --    MG 1.9  --  2.4  --  2.3  --   --    PHOS 3.2  3.2  --  4.5  --  4.7*  --   --     < > = values in this  interval not displayed.       All pertinent labs within the past 24 hours have been reviewed.    Significant Imaging:  I have reviewed all pertinent imaging results/findings within the past 24 hours.    ABG  Recent Labs   Lab 02/11/25  1701   PH 7.305*   PO2 70*   PCO2 47.0*   HCO3 23.4*   BE -3*     Assessment/Plan:     Pulmonary  * Acute hypoxic respiratory failure  Patient with Hypoxic Respiratory failure which is Acute on chronic.  he is on home oxygen at 3 LPM. Supplemental oxygen was provided and noted- Oxygen Concentration (%):  [35-45] 35    .   Signs/symptoms of respiratory failure include- increased work of breathing and respiratory distress. Contributing diagnoses includes - CHF and Pleural effusion Labs and images were reviewed. Patient Has not had a recent ABG. Will treat underlying causes and adjust management of respiratory failure as follows-     Likely secondary to RSV vs possibly pulmonary edema/volume overload    - Extubated to comfort flow on 2/11  - Continue supplemental O2  - klebsiella in respiratory culture, antibiotics deescalated to CTX    Cardiac/Vascular  Elevated troponin  HsTrop 46 on admission. EKG sinus tachycardia w/1st degree AV Block, T wave abnormality. Likely Type 2 Demand ischemia in the setting of volume overload    - Trop peaked at 43, downtrending   - Cardiac Monitoring     Chronic systolic heart failure  Systolic and Diastolic Dysfunction. Most recent TTE (12/10/24) with EF 20-25% with severe global hypokinesis. EKG sinus tachycardia with 1st degree AV Block (same from prior EKG) and T-wave abnormality. BNP >4600. HsTrop 46. CXR with right lung base w/blunting suggesting small pleural effusion    - Fluid removal with HD   - Troponin peak at 43, downtrending  - Fluid restriction at 1500 cc with strict I/Os and daily weights    - Maintain on telemetry and daily EKGs   - Up to date risk stratification : TSH, Lipids, HbA1c with optimization of risk factors is necessary  - Check  "Electrolytes, keep Mag >2 & K+ >4  - SCDs, TEDs, Nursing communication to elevated LE   - Ambulate as tolerated     Renal/  Hyperkalemia  K 8.3 on admission. Shifted with IV Lasix, Insulin/Dextrose, and Calcium Gluconate in the ED.     - Improvement with SLED  - BMP Q4H     ESRD on hemodialysis  Creatinine 4.8 on admit. S/p R Nephrectomy (due to retained uretal stent per chart review) and HD MWF    Plan:   Lab Results   Component Value Date    CREATININE 2.4 (H) 02/13/2025     - On iHD MWF.   - Nephrology consulted, appreciate recs  - CRRT/HD per nephro  - Avoid nephrotoxic agents such as NSAIDs, gadolinium and IV radiocontrast.  - Renally dose meds to current GFR.  - Maintain MAP > 65.     Oncology  Anemia of chronic renal failure, stage 5  Admit with hemoglobin 9.6 Baseline ~8-9      Lab Results   Component Value Date    IRON 26 (L) 12/20/2024    TIBC 209 (L) 12/20/2024    FERRITIN 2,808 (H) 12/20/2024     Lab Results   Component Value Date    FOLATE 8.0 12/20/2024     Lab Results   Component Value Date    [id number] 770 12/20/2024     No results found for: "RETICCTPCT"    Likely secondary to anemia of chronic disease    Plan:   -   Lab Results   Component Value Date    HGB 8.4 (L) 02/13/2025     - Daily CBC   - Transfuse hgb <7         Critical Care Daily Checklist:    A: Awake: RASS Goal/Actual Goal:    Actual:     B: Spontaneous Breathing Trial Performed?     C: SAT & SBT Coordinated?                        D: Delirium: CAM-ICU     E: Early Mobility Performed? No   F: Feeding Goal: Goals: 1. Initiate tube feeds within 24-48 hours of admission    2. Advance tube feeds to goal rate within 72 hours of initiation.  Status: Nutrition Goal Status: new   Current Diet Order   Procedures    Diet Cardiac Fluid - 2000mL     Order Specific Question:   Fluid restriction:     Answer:   Fluid - 2000mL      AS: Analgesia/Sedation PRN   T: Thromboembolic Prophylaxis Subcut heparin   H: HOB > 300 Yes   U: Stress Ulcer " Prophylaxis (if needed)    G: Glucose Control    B: Bowel Function Stool Occurrence: 1   I: Indwelling Catheter (Lines & Alberts) Necessity HD catheter   D: De-escalation of Antimicrobials/Pharmacotherapies Yes    Plan for the day/ETD CRRT for fluid removal  Wean O2 as tolerated    Code Status:  Family/Goals of Care: Full Code       Critical Care Time: 60 minutes  Critical secondary to Patient has a condition that poses threat to life and bodily function: respiratory failure      Critical care was time spent personally by me on the following activities: development of treatment plan with patient or surrogate and bedside caregivers, discussions with consultants, evaluation of patient's response to treatment, examination of patient, ordering and performing treatments and interventions, ordering and review of laboratory studies, ordering and review of radiographic studies, pulse oximetry, re-evaluation of patient's condition. This critical care time did not overlap with that of any other provider or involve time for any procedures.     Xiao Ackerman, ANIVAL  Critical Care Medicine  [facility] - Medical ICU

## 2025-02-13 NOTE — SUBJECTIVE & OBJECTIVE
Interval History/Significant Events: weaned off vasopressors. Comfort flow requirements decreasing. CRRT for fluid removal today.     Review of Systems   Respiratory:  Positive for cough and shortness of breath.    Cardiovascular:  Negative for chest pain.   Gastrointestinal:  Negative for abdominal distention and abdominal pain.     Objective:     Vital Signs (Most Recent):  Temp: 98 °F (36.7 °C) (02/13/25 1101)  Pulse: 92 (02/13/25 1201)  Resp: (!) 28 (02/13/25 1201)  BP: 135/65 (02/13/25 1201)  SpO2: 99 % (02/13/25 1201) Vital Signs (24h Range):  Temp:  [97 °F (36.1 °C)-98 °F (36.7 °C)] 98 °F (36.7 °C)  Pulse:  [] 92  Resp:  [16-49] 28  SpO2:  [90 %-99 %] 99 %  BP: (111-154)/(58-95) 135/65   Weight: 58 kg (127 lb 13.9 oz)  Body mass index is 20.64 kg/m².      Intake/Output Summary (Last 24 hours) at 2/13/2025 1350  Last data filed at 2/13/2025 1201  Gross per 24 hour   Intake 444.95 ml   Output 350 ml   Net 94.95 ml          Physical Exam  Vitals and nursing note reviewed.   Constitutional:       General: He is awake.      Appearance: He is ill-appearing.      Interventions: Nasal cannula in place.      Comments: Comfort flow in place   HENT:      Mouth/Throat:      Mouth: Mucous membranes are dry.   Eyes:      Extraocular Movements: Extraocular movements intact.      Pupils: Pupils are equal, round, and reactive to light.   Cardiovascular:      Rate and Rhythm: Normal rate and regular rhythm.   Pulmonary:      Effort: Pulmonary effort is normal. Tachypnea present. No respiratory distress.      Breath sounds: Normal breath sounds.   Abdominal:      General: There is no distension.      Palpations: Abdomen is soft.      Tenderness: There is no abdominal tenderness.   Skin:     Coloration: Skin is pale.   Neurological:      Mental Status: He is alert and oriented to person, place, and time.      GCS: GCS eye subscore is 4. GCS verbal subscore is 5. GCS motor subscore is 6.      Cranial Nerves: No cranial  nerve deficit.      Motor: Weakness present.   Psychiatric:         Behavior: Behavior is cooperative.              Lines/Drains/Airways       Central Venous Catheter Line  Duration                  Hemodialysis Catheter 12/31/24 0818 right subclavian 44 days              Drain  Duration                  Colostomy 12/19/24 2225 RLQ 55 days              Peripheral Intravenous Line  Duration                  Peripheral IV - Single Lumen 02/12/25 1120 20 G Anterior;Right Upper Arm 1 day                  Significant Labs:    CBC/Anemia Profile:  Recent Labs   Lab 02/11/25  1701 02/12/25  0412 02/13/25  0245   WBC  --  6.08 3.23*   HGB  --  8.8* 8.4*   HCT 28* 28.9* 27.3*   PLT  --  106* 98*   MCV  --  101* 100*   RDW  --  18.3* 17.8*        Chemistries:  Recent Labs   Lab 02/12/25  0412 02/12/25  0828 02/12/25  1655 02/12/25  1948 02/13/25  0245 02/13/25  0814 02/13/25  1234     137  137   < > 136  138   < > 137  137 139 136   K 4.6  4.6  4.6  4.6   < > 4.7  4.8  4.8   < > 4.9  4.9  4.9 5.3*  5.3* 5.2*  5.2*     108  108   < > 107  108   < > 108  108 109 108   CO2 23  23  23   < > 23  22*   < > 21*  21* 21* 21*   BUN 7*  7*  7*   < > 13  12   < > 17  17 20 21   CREATININE 1.1  1.1  1.1   < > 1.6*  1.6*   < > 2.0*  2.0* 2.3* 2.4*   CALCIUM 9.5  9.5  9.5   < > 10.1  10.1   < > 9.9  9.9 10.3 9.8   ALBUMIN 2.5*  2.5*  --  2.3*  --  2.3*  --   --    PROT 6.0  --   --   --  5.5*  --   --    BILITOT 0.5  --   --   --  0.4  --   --    ALKPHOS 185*  --   --   --  159*  --   --    ALT 6*  --   --   --  5*  --   --    AST 31  --   --   --  26  --   --    MG 1.9  --  2.4  --  2.3  --   --    PHOS 3.2  3.2  --  4.5  --  4.7*  --   --     < > = values in this interval not displayed.       All pertinent labs within the past 24 hours have been reviewed.    Significant Imaging:  I have reviewed all pertinent imaging results/findings within the past 24 hours.

## 2025-02-13 NOTE — PLAN OF CARE
Problem: Physical Therapy  Goal: Physical Therapy Goal  Description: Goals to be met by: 3/1/2025    Patient will increase functional independence with mobility by performin. Supine to sit with MInimal Assistance  2. Sit to stand transfer with Stand-by Assistance using RW  3. Gait  x 25 feet with Stand-by Assistance using Rolling Walker.     Outcome: Progressing     Eval completed. Goals appropriate.

## 2025-02-13 NOTE — PROGRESS NOTES
02/13/25 1408   Treatment   Treatment Type SLED   Treatment Status New start   Dialysis Machine Number [id number]   Dialyzer Time (hours) 0   BVP (Liters) 0 L   Solutions Labeled and Current  Yes   Access Tunneled Cath;Right;IJ   Catheter Dressing Intact  Yes   Alarms Engaged Yes   CRRT Comments sled started   Prescription   Time (Hours) 12   Dialysate K + (mEq/L) 4   Dialysate CA + (mEq/L) 2.25   Dialysate HCO3 - (Bicarb) (mEq/L) 30   Dialysate Na + (mEq/L) 140   Cartridge Type Other  (r300)   Dialysate Flow Rate (mL/min) 200   UF Goal Rate 450 mL/hr   CRRT Hourly Documentation   Blood Flow (mL/min) 150   UF Rate 450 cc/hr   Arterial Pressure (mmHg) -90 mmHg   Venous Pressure (mmHg) 20 mmHg   Effluent Pressure (EP) (mmHg) 40 mmHg   Total UF (Hourly Cleared) (mL) 0     SLED initiated as ordered. RIJ PC aspirated, flushed, and accessed using aseptic technique. Lines connected and secured.

## 2025-02-13 NOTE — ASSESSMENT & PLAN NOTE
Patient with Hypoxic Respiratory failure which is Acute on chronic.  he is on home oxygen at 3 LPM. Supplemental oxygen was provided and noted- Oxygen Concentration (%):  [35-45] 35    .   Signs/symptoms of respiratory failure include- increased work of breathing and respiratory distress. Contributing diagnoses includes - CHF and Pleural effusion Labs and images were reviewed. Patient Has not had a recent ABG. Will treat underlying causes and adjust management of respiratory failure as follows-     Likely secondary to RSV vs possibly pulmonary edema/volume overload    - Extubated to comfort flow on 2/11  - Continue supplemental O2  - klebsiella in respiratory culture, antibiotics deescalated to CTX

## 2025-02-13 NOTE — PT/OT/SLP EVAL
Occupational Therapy   Co-Evaluation    Name: Flakito Mcginnis  MRN: [id number]  Admitting Diagnosis: Acute on chronic respiratory failure with hypoxia  Recent Surgery: * No surgery found *      Recommendations:     Discharge Recommendations: Moderate Intensity Therapy  Discharge Equipment Recommendations:  bedside commode  Barriers to discharge:  None    Assessment:     Flakito Mcginnis is a 69 y.o. male with a medical diagnosis of Acute on chronic respiratory failure with hypoxia.  He presents functioning below baseline. Performance deficits affecting function: weakness, impaired endurance, impaired self care skills, impaired functional mobility, gait instability, decreased upper extremity function, decreased lower extremity function, impaired cardiopulmonary response to activity.  Pt benefits from co-treatment with PT to accommodate pt's activity tolerance and progression with therapy.      Rehab Prognosis: Good; patient would benefit from acute skilled OT services to address these deficits and reach maximum level of function.       Plan:     Patient to be seen 3 x/week to address the above listed problems via self-care/home management, therapeutic activities, therapeutic exercises, neuromuscular re-education  Plan of Care Expires: 03/01/25  Plan of Care Reviewed with: patient    Subjective     Chief Complaint: weakness  Patient/Family Comments/goals: to get better and go home    Occupational Profile:  Living Environment:  Pt admitted from a SNF. Per chart review, the plan is for the patient to transition from SNF to long-term placement. Unsure what mobility the patient was performing at SNF. Patient uses DME as follows: walker, rolling, wheelchair, cane, quad, oxygen.      Patient reports they will have assistance from nursing home staff upon discharge.     Pain/Comfort:  Pain Rating 1: 0/10  Pain Rating Post-Intervention 1: 0/10    Patients cultural, spiritual, Gnosticism conflicts given the current situation:  no    Objective:     Communicated with: RN prior to session.  Patient found supine with telemetry, pulse ox (continuous), oxygen upon OT entry to room.    General Precautions: Standard, fall, contact  Orthopedic Precautions: N/A  Braces: N/A  Respiratory Status: Airvo, flow 30 L/min, concentration 36%    Occupational Performance:    Bed Mobility:    Patient completed Scooting/Bridging with contact guard assistance  Patient completed Supine to Sit with maximal assistance and 2 persons  Patient completed Sit to Supine with total assistance and 2 persons    Functional Mobility/Transfers:  Patient completed Sit <> Stand Transfer with minimum assistance  with  no assistive device   Functional Mobility: unable to take sidesteps    Activities of Daily Living:  Grooming: maximal assistance    Upper Body Dressing: total assistance    Lower Body Dressing: total assistance      Cognitive/Visual Perceptual:  Alert and oriented; follows commands    Physical Exam:  R lateral lean on forearm for comfort  R UE AAROM WFL   L UE NT 2* pain from previous fx    AMPAC 6 Click ADL:  AMPAC Total Score: 17    Treatment & Education:  Pt ed on OT POC  Pt sat EOB with CGA while engaged in occupations of choice  Pt ed on ROM ex's daily for increased overall strength and endurance to reduce risk of hospital acquired weakness  Pt ed on safety with ADL and fall risk  Reinforced use of call button to contact nursing staff for assistance with mobility    Patient left HOB elevated with all lines intact, call button in reach, and RN notified    GOALS:   Multidisciplinary Problems       Occupational Therapy Goals          Problem: Occupational Therapy    Goal Priority Disciplines Outcome Interventions   Occupational Therapy Goal     OT, PT/OT Progressing    Description:   Patient will increase functional independence with ADLs by performing:      Goals to be met by 03-04-25  UBD: Min A  Groom in stand: SBA  Transfers : S with RW  Pt. To be Min A with  HEP to improve ROM  Pt. To tolerate UIC x 3 hours daily                       DME Justifications:  No DME recommended requiring DME justifications    History:     Past Medical History:   Diagnosis Date    Crohn's disease 1998    ESRD (end stage renal disease) on dialysis 10/2017    Hypertension     Obstructive uropathy          Past Surgical History:   Procedure Laterality Date    COLOSTOMY  2006    DIALYSIS FISTULA CREATION Left 02/2018    NEPHRECTOMY Right     REMOVAL OF TUNNELED CENTRAL VENOUS CATHETER (CVC) N/A 5/23/2018    Procedure: PERMCATH REMOVAL-TUNNELED CVC REMOVAL;  Surgeon: Parveen Ray MD;  Location: [facility] CATH LAB;  Service: Nephrology;  Laterality: N/A;  pt coming in @930       Time Tracking:     OT Date of Treatment: 02/18/25  OT Start Time: 0923  OT Stop Time: 0954  OT Total Time (min): 31 min    Billable Minutes:Eval  Self-care   TA  2/18/2025

## 2025-02-13 NOTE — PT/OT/SLP EVAL
Physical Therapy Evaluation    Patient Name:  Flakito Mcginnis   MRN:  [id number]  Admit Date: 2/9/2025  Admitting Diagnosis:  Acute hypoxic respiratory failure  Length of Stay: 4 days  Recent Surgery: * No surgery found *      Recommendations:     Discharge Recommendations:  Moderate Intensity Therapy   Discharge Equipment Recommendations: none       Assessment:     Flakito Mcginnis is a 69 y.o. male admitted with a medical diagnosis of Acute hypoxic respiratory failure. Pt on airvo with SpO2 WFL but with mildly increased WOB. Pt was able to stand and take a few side steps with 1 person assistance. Will continue to progress mobility per patient's tolerance. Patient currently demonstrates a need for Moderate Intensity Therapy on a daily basis post acute secondary to a decline in functional status.       Problem List: weakness, impaired endurance, impaired functional mobility, gait instability, impaired balance, impaired cardiopulmonary response to activity  Rehab Prognosis: Fair; patient would benefit from acute skilled PT services to address these deficits and reach maximum level of function.      Plan:     During this hospitalization, patient to be seen 3 x/week to address the identified rehab impairments via gait training, therapeutic activities, therapeutic exercises, neuromuscular re-education and progress towards the established goals.    Plan of Care Expires:  03/12/25    Subjective   Communicated with RN prior to session.  Patient found HOB elevated upon PT entry to room, agreeable to evaluation. Flakito Mcginnis's alone during session.    Chief Complaint: Shortness of Breath (Increasing shortness all day long.  Last dialysis on Friday, may or may not have been completed.  Increasing dependent edema.  PMH ESRD, HTN, CHF,)    Patient/Family Comments/goals: to get better   Pain/Comfort:  Pain Rating 1: 0/10  Pain Rating Post-Intervention 1: 0/10    Living Environment:  Pt admitted from a SNF. Per chart review, the  "plan is for the patient to transition from SNF to snf placement. Unsure what mobility the patient was performing at SNF. Patient uses DME as follows: walker, rolling, wheelchair, cane, quad, oxygen.     Patient reports they will have assistance from nursing home staff upon discharge.    Objective:   Patient found with: telemetry, pulse ox (continuous), blood pressure cuff, central line, peripheral IV, colostomy, oxygen     General Precautions: Standard, Cardiac fall, droplet   Orthopedic Precautions:N/A   Braces: N/A   Oxygen Device: airvo  Vitals: /65 (BP Location: Right arm, Patient Position: Lying)   Pulse 92   Temp 98 °F (36.7 °C) (Oral)   Resp (!) 28   Ht 5' 6" (1.676 m)   Wt 58 kg (127 lb 13.9 oz)   SpO2 99%   BMI 20.64 kg/m²     Exams:  Cognition:   Alert, Cooperative, Tangential   Ox4  Command following: Follows two commands  Fluency: clear/fluent    RLE ROM: WFL  RLE Strength: grossly 4/5  LLE ROM: WFL  LLE Strength: grossly 4/5      Outcome Measures:  AM-PAC 6 CLICK MOBILITY  Turning over in bed (including adjusting bedclothes, sheets and blankets)?: 2  Sitting down on and standing up from a chair with arms (e.g., wheelchair, bedside commode, etc.): 3  Moving from lying on back to sitting on the side of the bed?: 2  Moving to and from a bed to a chair (including a wheelchair)?: 3  Need to walk in hospital room?: 2  Climbing 3-5 steps with a railing?: 1  Basic Mobility Total Score: 13     Functional Mobility:  Additional staff present: OT   Bed Mobility:  Supine to Sit: maximal assistance; HOB elevated  Scooting anteriorly to EOB to have both feet planted on floor: contact guard assistance  Sit to Supine: total assistance and 2 persons; HOB flat    Sitting Balance at Edge of Bed:  Assistance Level Required: SBA-CGA  Time: 8 minutes   Postural deviations noted: R lean to forearm for comfort   Facilitated: midline orientation   Comments:   Pt with decreased function of L UE due to an old " fx  Worked on activity tolerance sitting EOB, worked on tolerance to positional change, and worked on sitting balance     Transfers:   Sit <> Stand Transfer: minimum assistance with no assistive device from EOB      Gait:   Patient ambulated: 4 side steps to the right    Patient required: minimal assist  Gait Deviation(s): unsteady gait, decreased step length, narrow base of support, and decreased michaela  Impairments due to: impaired balance, decreased strength, and decreased endurance  Comments:   Pt generally unsteady  Mild SOB with SpO2 stable  Verbal cueing for upright posture and advancement of B LE      Therapeutic Activities, Exercises, & Education:   Educated pt on PT role/POC  Pt verbalized understanding         Patient left HOB elevated with all lines intact, call button in reach, and RN notified.    GOALS:   Multidisciplinary Problems       Physical Therapy Goals          Problem: Physical Therapy    Goal Priority Disciplines Outcome Interventions   Physical Therapy Goal     PT, PT/OT Progressing    Description: Goals to be met by: 3/1/2025    Patient will increase functional independence with mobility by performin. Supine to sit with MInimal Assistance  2. Sit to stand transfer with Stand-by Assistance using RW  3. Gait  x 25 feet with Stand-by Assistance using Rolling Walker.                          History:     Past Medical History:   Diagnosis Date    Crohn's disease     ESRD (end stage renal disease) on dialysis 10/2017    Hypertension     Obstructive uropathy        Past Surgical History:   Procedure Laterality Date    COLOSTOMY  2006    DIALYSIS FISTULA CREATION Left 2018    NEPHRECTOMY Right     REMOVAL OF TUNNELED CENTRAL VENOUS CATHETER (CVC) N/A 2018    Procedure: PERMCATH REMOVAL-TUNNELED CVC REMOVAL;  Surgeon: Parveen Ray MD;  Location: [facility] CATH LAB;  Service: Nephrology;  Laterality: N/A;  pt coming in @930       Time Tracking:     PT Received On: 25  PT Start  Time: 0925     PT Stop Time: 0953  PT Total Time (min): 28 min     Billable Minutes: Evaluation 5, Gait Training 8, and Therapeutic Activity 15

## 2025-02-13 NOTE — PROGRESS NOTES
[facility] - Medical ICU  Nephrology  Progress Note    Patient Name: Flakito Mcginnis  MRN: [id number]  Admission Date: 2/9/2025  Hospital Length of Stay: 4 days  Attending Provider: Jose Mendieta MD   Primary Care Physician: Jordin Robbins MD  Principal Problem:Acute hypoxic respiratory failure    Subjective:     HPI: 69-year-old male past medical history nonischemic cardiomyopathy EF most recently 20-25%, ESRD on HD MWF, chronic respiratory failure on 3 L nasal cannula at home who presents for worsening shortness of breath times 2 days. Patient mentioned to his nursing home staff that he was feeling dyspnic and EMS was called. Upon arrival to the ER he was intubated so history was provided from speaking to his sister on the phone and with the ER team. Infectious work up tested positive for RSV. Nephrology was consulted for management of his hemodialysis and for hyperkalemia of 8.3, his ECG does reveal some peaked T-waves. As per the sister, he has been complaining of increased shortness of breath, fatigue, and decreased urine output for the past two days. The sister does note that several residents of his nursing home have tested positive for respiratory virus recently.     Interval History: On high flow oxygen. Communicating well. Talking full sentences. Last SLED was on 2/11. His Oxygen requirement reduced and NE discontinued today. No over night events. Pt is complaining of insomnia and took melatonin but that did not help with the sleep.    Review of patient's allergies indicates:   Allergen Reactions    Vancomycin analogues Anaphylaxis, Other (See Comments), Shortness Of Breath and Swelling     Light headed, see's spots      Aspirin Nausea And Vomiting and Other (See Comments)     Has crohn's disease    Other reaction(s): FLARES UP CROHNS      Has crohn's disease     Current Facility-Administered Medications   Medication Frequency    0.9%  NaCl infusion (CRRT USE ONLY) Continuous    acetaminophen tablet 650 mg  Q6H PRN    calcium gluconate 1 g in NS IVPB (premixed) Q10 Min PRN    dextrose 50% injection 12.5 g PRN    dextrose 50% injection 25 g PRN    [START ON 2/15/2025] famotidine tablet 20 mg Every Other Day    glucagon (human recombinant) injection 1 mg PRN    glucose chewable tablet 16 g PRN    glucose chewable tablet 24 g PRN    heparin (porcine) injection 5,000 Units Q8H    hydrOXYzine HCL tablet 25 mg TID PRN    levothyroxine tablet 50 mcg Before breakfast    magnesium sulfate 2g in water 50mL IVPB (premix) PRN    melatonin tablet 9 mg Nightly PRN    mupirocin 2 % ointment BID    piperacillin-tazobactam (ZOSYN) 4.5 g in D5W 100 mL IVPB (MB+) Q12H    sodium chloride 0.9% flush 10 mL PRN    sodium phosphate 20.1 mmol in D5W 250 mL IVPB PRN    sodium phosphate 30 mmol in D5W 250 mL IVPB PRN    sodium phosphate 39.9 mmol in D5W 250 mL IVPB PRN    sodium zirconium cyclosilicate packet 5 g Once       Objective:     Vital Signs (Most Recent):  Temp: 98 °F (36.7 °C) (02/13/25 0701)  Pulse: 95 (02/13/25 0901)  Resp: 16 (02/13/25 0901)  BP: (!) 154/74 (02/13/25 0901)  SpO2: (!) 94 % (02/13/25 0901) Vital Signs (24h Range):  Temp:  [97 °F (36.1 °C)-98 °F (36.7 °C)] 98 °F (36.7 °C)  Pulse:  [] 95  Resp:  [16-45] 16  SpO2:  [90 %-100 %] 94 %  BP: (108-154)/(53-95) 154/74     Weight: 58 kg (127 lb 13.9 oz) (02/09/25 2349)  Body mass index is 20.64 kg/m².  Body surface area is 1.64 meters squared.    I/O last 3 completed shifts:  In: 2192 [I.V.:1717; IV Piggyback:474.9]  Out: 1261 [Other:561; Stool:700]     Physical Exam  HENT:      Head: Normocephalic and atraumatic.      Mouth/Throat:      Mouth: Mucous membranes are dry.   Abdominal:      General: Abdomen is flat.      Palpations: Abdomen is soft.   Musculoskeletal:      Right lower leg: No edema.      Left lower leg: No edema.   Neurological:      Mental Status: He is alert.          Significant Labs:  BMP:   Recent Labs   Lab 02/13/25  0245 02/13/25  0814   GLU 72  72  79     137 139   K 4.9  4.9  4.9 5.3*  5.3*     108 109   CO2 21*  21* 21*   BUN 17  17 20   CREATININE 2.0*  2.0* 2.3*   CALCIUM 9.9  9.9 10.3   MG 2.3  --      CBC:   Recent Labs   Lab 02/13/25  0245   WBC 3.23*   RBC 2.74*   HGB 8.4*   HCT 27.3*   PLT 98*   *   MCH 30.7   MCHC 30.8*     All labs within the past 24 hours have been reviewed.   Assessment/Plan:     Pulmonary  * Acute hypoxic respiratory failure  Suspect mostly 2/2 to RSV pneumonia. After discussing with his sister, the patient was complaining of shortness of breath despite completing a full session of HD on Friday.     Infectious management as per primary team  HD today for volume removal.     Cardiac/Vascular  Chronic systolic heart failure  Management per primary team.      Renal/  ESRD on hemodialysis  Patient is ESRD on HD MWF. As per his sister he completed a full session of HD on Friday but she is uncertain if he was able to obtain his dry weight. I did call and discuss his lab results with his sister Sara Da Silva over the phone who provided consent for dialysis while he is admitted.     Recommendations  SLED today with total UF of 2-3 L    Question Answer   Duration of treatment 12 hours   Dialyzer: R300   Dialysate Temperature (C) 36.5   Blood Flow Rate (mL/min) 150 mL/min   K+ 4 MEQ/L   Ca++ 2.25 MEQ/L   Na+ 140 MEQ/L   Bicarb 30 meq   Type of therapy: SLED (Slow Low-efficiency Dialysis)    cc/min   Ultra-filtration rate (cc/hr) 250-450 with goal of total output of 2-3 L as the BP tolerates.     - 1L fluid restriction  - 2g salt. Low potassium diet   - daily RFP, magnesium, and phosphorus levels        Thank you for your consult. I will follow-up with patient. Please contact us if you have any additional questions.    Chadwick Oneill MD  Nephrology  [facility] - Medical ICU

## 2025-02-13 NOTE — SUBJECTIVE & OBJECTIVE
Interval History: On high flow oxygen. Communicating well. Talking full sentences. Last SLED was on 2/11. His Oxygen requirement reduced and NE discontinued today. No over night events. Pt is complaining of insomnia and took melatonin but that did not help with the sleep.    Review of patient's allergies indicates:   Allergen Reactions    Vancomycin analogues Anaphylaxis, Other (See Comments), Shortness Of Breath and Swelling     Light headed, see's spots      Aspirin Nausea And Vomiting and Other (See Comments)     Has crohn's disease    Other reaction(s): FLARES UP CROHNS      Has crohn's disease     Current Facility-Administered Medications   Medication Frequency    0.9%  NaCl infusion (CRRT USE ONLY) Continuous    acetaminophen tablet 650 mg Q6H PRN    calcium gluconate 1 g in NS IVPB (premixed) Q10 Min PRN    dextrose 50% injection 12.5 g PRN    dextrose 50% injection 25 g PRN    [START ON 2/15/2025] famotidine tablet 20 mg Every Other Day    glucagon (human recombinant) injection 1 mg PRN    glucose chewable tablet 16 g PRN    glucose chewable tablet 24 g PRN    heparin (porcine) injection 5,000 Units Q8H    hydrOXYzine HCL tablet 25 mg TID PRN    levothyroxine tablet 50 mcg Before breakfast    magnesium sulfate 2g in water 50mL IVPB (premix) PRN    melatonin tablet 9 mg Nightly PRN    mupirocin 2 % ointment BID    piperacillin-tazobactam (ZOSYN) 4.5 g in D5W 100 mL IVPB (MB+) Q12H    sodium chloride 0.9% flush 10 mL PRN    sodium phosphate 20.1 mmol in D5W 250 mL IVPB PRN    sodium phosphate 30 mmol in D5W 250 mL IVPB PRN    sodium phosphate 39.9 mmol in D5W 250 mL IVPB PRN    sodium zirconium cyclosilicate packet 5 g Once       Objective:     Vital Signs (Most Recent):  Temp: 98 °F (36.7 °C) (02/13/25 0701)  Pulse: 95 (02/13/25 0901)  Resp: 16 (02/13/25 0901)  BP: (!) 154/74 (02/13/25 0901)  SpO2: (!) 94 % (02/13/25 0901) Vital Signs (24h Range):  Temp:  [97 °F (36.1 °C)-98 °F (36.7 °C)] 98 °F (36.7  °C)  Pulse:  [] 95  Resp:  [16-45] 16  SpO2:  [90 %-100 %] 94 %  BP: (108-154)/(53-95) 154/74     Weight: 58 kg (127 lb 13.9 oz) (02/09/25 2349)  Body mass index is 20.64 kg/m².  Body surface area is 1.64 meters squared.    I/O last 3 completed shifts:  In: 2192 [I.V.:1717; IV Piggyback:474.9]  Out: 1261 [Other:561; Stool:700]     Physical Exam  HENT:      Head: Normocephalic and atraumatic.      Mouth/Throat:      Mouth: Mucous membranes are dry.   Abdominal:      General: Abdomen is flat.      Palpations: Abdomen is soft.   Musculoskeletal:      Right lower leg: No edema.      Left lower leg: No edema.   Neurological:      Mental Status: He is alert.          Significant Labs:  BMP:   Recent Labs   Lab 02/13/25 0245 02/13/25 0814   GLU 72  72 79     137 139   K 4.9  4.9  4.9 5.3*  5.3*     108 109   CO2 21*  21* 21*   BUN 17  17 20   CREATININE 2.0*  2.0* 2.3*   CALCIUM 9.9  9.9 10.3   MG 2.3  --      CBC:   Recent Labs   Lab 02/13/25 0245   WBC 3.23*   RBC 2.74*   HGB 8.4*   HCT 27.3*   PLT 98*   *   MCH 30.7   MCHC 30.8*     All labs within the past 24 hours have been reviewed.

## 2025-02-13 NOTE — ASSESSMENT & PLAN NOTE
Creatinine 4.8 on admit. S/p R Nephrectomy (due to retained uretal stent per chart review) and HD MWF    Plan:   Lab Results   Component Value Date    CREATININE 2.4 (H) 02/13/2025     - On iHD MWF.   - Nephrology consulted, appreciate recs  - CRRT/HD per nephro  - Avoid nephrotoxic agents such as NSAIDs, gadolinium and IV radiocontrast.  - Renally dose meds to current GFR.  - Maintain MAP > 65.

## 2025-02-13 NOTE — NURSING
MICU DAILY GOALS     Family/Goals of care/Code Status   Code Status: Full Code    24H Vital Sign Range  Temp:  [97.6 °F (36.4 °C)-98 °F (36.7 °C)]   Pulse:  [68-95]   Resp:  [11-40]   BP: ()/(50-79)   SpO2:  [82 %-100 %]      Shift Events (include procedures and significant events)   No Acute events noted this shift. Levo requirement decreased, levo off. O2 requirement decreased from 30 lpm at 80% FIO2 on Airvo High Flow O2 to 30 lpm at 40% FIO2 on Arivo High Flow O2.     AWAKE RASS: Goal -    Actual - RASS (Sam Agitation-Sedation Scale): alert and calm    Restraint necessity: Not necessary   BREATHE SBT: Not intubated    Coordinate A & B, analgesics/sedatives Pain: managed   SAT: Not intubated   Delirium CAM-ICU:     Early(intubated/ Progressive (non-intubated) Mobility MOVE Screen (INTUBATED ONLY): Not intubated    Activity: Activity Management: Rolling - L1   Feeding/Nutrition Diet order: Diet/Nutrition Received: clear liquid,     Thrombus DVT prophylaxis: VTE Core Measure: Pharmacological prophylaxis initiated/maintained   HOB Elevation Head of Bed (HOB) Positioning: HOB at 30-45 degrees   Ulcer Prophylaxis GI: yes   Glucose control managed Glycemic Management: blood glucose monitored   Skin Skin assessment:     Sacrum intact/not altered? No  Heels intact/not altered? Yes  Surgical wound? No    CHECK ONE!   (no altered skin or altered skin) and sub boxes:  [] No Altered Skin Integrity Present    []Prevention Measures Documented    [x] Altered Skin Integrity Present or Discovered   [x] LDA present in EPIC, daily doc completed              [] LDA added if not in EPIC (describe wound).                    When describing wound, do not stage, use descriptive words only.    [] Wound Image Taken (required on admit,                   transfer/discharge and every Tuesday)    Wound Care Consulted? No   Bowel Function Ostomy     Indwelling Catheter Necessity [REMOVED]      Urethral Catheter 02/09/25 2100  Temperature probe;Straight-tip 16 Fr.-Reason for Continuing Urinary Catheterization: Critically ill in ICU and requiring hourly monitoring of intake/output         Hemodialysis Catheter 12/31/24 0818 right subclavian-Line Necessity Review: CRRT/HD     De-escalation Antibiotics Yes        VS and assessment per flow sheet, patient progressing towards goals as tolerated, plan of care reviewed with  Patient , all concerns addressed, will continue to monitor.

## 2025-02-13 NOTE — ASSESSMENT & PLAN NOTE
Patient is ESRD on HD MWF. As per his sister he completed a full session of HD on Friday but she is uncertain if he was able to obtain his dry weight. I did call and discuss his lab results with his sister Sara Da Silva over the phone who provided consent for dialysis while he is admitted.     Recommendations  SLED today with total UF of 2-3 L    Question Answer   Duration of treatment 12 hours   Dialyzer: R300   Dialysate Temperature (C) 36.5   Blood Flow Rate (mL/min) 150 mL/min   K+ 4 MEQ/L   Ca++ 2.25 MEQ/L   Na+ 140 MEQ/L   Bicarb 30 meq   Type of therapy: SLED (Slow Low-efficiency Dialysis)    cc/min   Ultra-filtration rate (cc/hr) 250-450 with goal of total output of 2-3 L as the BP tolerates.     - 1L fluid restriction  - 2g salt. Low potassium diet   - daily RFP, magnesium, and phosphorus levels

## 2025-02-14 PROBLEM — G93.41 ENCEPHALOPATHY, METABOLIC: Status: ACTIVE | Noted: 2025-02-14

## 2025-02-14 LAB
ALBUMIN SERPL BCP-MCNC: 2.5 G/DL (ref 3.5–5.2)
ALBUMIN SERPL BCP-MCNC: 2.5 G/DL (ref 3.5–5.2)
ALBUMIN SERPL BCP-MCNC: 2.6 G/DL (ref 3.5–5.2)
ALP SERPL-CCNC: 163 U/L (ref 40–150)
ALT SERPL W/O P-5'-P-CCNC: 6 U/L (ref 10–44)
ANION GAP SERPL CALC-SCNC: 10 MMOL/L (ref 8–16)
ANION GAP SERPL CALC-SCNC: 6 MMOL/L (ref 8–16)
ANION GAP SERPL CALC-SCNC: 7 MMOL/L (ref 8–16)
ANION GAP SERPL CALC-SCNC: 8 MMOL/L (ref 8–16)
AST SERPL-CCNC: 27 U/L (ref 10–40)
BACTERIA BLD CULT: NORMAL
BACTERIA BLD CULT: NORMAL
BASOPHILS # BLD AUTO: 0.03 K/UL (ref 0–0.2)
BASOPHILS NFR BLD: 0.9 % (ref 0–1.9)
BILIRUB SERPL-MCNC: 0.3 MG/DL (ref 0.1–1)
BUN SERPL-MCNC: 10 MG/DL (ref 8–23)
BUN SERPL-MCNC: 7 MG/DL (ref 8–23)
BUN SERPL-MCNC: <3 MG/DL (ref 8–23)
CALCIUM SERPL-MCNC: 7.6 MG/DL (ref 8.7–10.5)
CALCIUM SERPL-MCNC: 7.9 MG/DL (ref 8.7–10.5)
CALCIUM SERPL-MCNC: 8.8 MG/DL (ref 8.7–10.5)
CALCIUM SERPL-MCNC: 8.9 MG/DL (ref 8.7–10.5)
CHLORIDE SERPL-SCNC: 109 MMOL/L (ref 95–110)
CHLORIDE SERPL-SCNC: 111 MMOL/L (ref 95–110)
CHLORIDE SERPL-SCNC: 112 MMOL/L (ref 95–110)
CHLORIDE SERPL-SCNC: 112 MMOL/L (ref 95–110)
CO2 SERPL-SCNC: 20 MMOL/L (ref 23–29)
CO2 SERPL-SCNC: 20 MMOL/L (ref 23–29)
CO2 SERPL-SCNC: 21 MMOL/L (ref 23–29)
CO2 SERPL-SCNC: 24 MMOL/L (ref 23–29)
CREAT SERPL-MCNC: 0.4 MG/DL (ref 0.5–1.4)
CREAT SERPL-MCNC: 1.1 MG/DL (ref 0.5–1.4)
CREAT SERPL-MCNC: 1.4 MG/DL (ref 0.5–1.4)
DIFFERENTIAL METHOD BLD: ABNORMAL
EOSINOPHIL # BLD AUTO: 0.2 K/UL (ref 0–0.5)
EOSINOPHIL NFR BLD: 4.4 % (ref 0–8)
ERYTHROCYTE [DISTWIDTH] IN BLOOD BY AUTOMATED COUNT: 17.7 % (ref 11.5–14.5)
EST. GFR  (NO RACE VARIABLE): 54.4 ML/MIN/1.73 M^2
EST. GFR  (NO RACE VARIABLE): >60 ML/MIN/1.73 M^2
GLUCOSE SERPL-MCNC: 121 MG/DL (ref 70–110)
GLUCOSE SERPL-MCNC: 92 MG/DL (ref 70–110)
GLUCOSE SERPL-MCNC: 93 MG/DL (ref 70–110)
GLUCOSE SERPL-MCNC: 98 MG/DL (ref 70–110)
HCT VFR BLD AUTO: 25.3 % (ref 40–54)
HGB BLD-MCNC: 7.7 G/DL (ref 14–18)
IMM GRANULOCYTES # BLD AUTO: 0.01 K/UL (ref 0–0.04)
IMM GRANULOCYTES NFR BLD AUTO: 0.3 % (ref 0–0.5)
LYMPHOCYTES # BLD AUTO: 0.4 K/UL (ref 1–4.8)
LYMPHOCYTES NFR BLD: 12 % (ref 18–48)
MAGNESIUM SERPL-MCNC: 1.8 MG/DL (ref 1.6–2.6)
MAGNESIUM SERPL-MCNC: 2 MG/DL (ref 1.6–2.6)
MCH RBC QN AUTO: 30.1 PG (ref 27–31)
MCHC RBC AUTO-ENTMCNC: 30.4 G/DL (ref 32–36)
MCV RBC AUTO: 99 FL (ref 82–98)
MONOCYTES # BLD AUTO: 0.4 K/UL (ref 0.3–1)
MONOCYTES NFR BLD: 10.8 % (ref 4–15)
NEUTROPHILS # BLD AUTO: 2.5 K/UL (ref 1.8–7.7)
NEUTROPHILS NFR BLD: 71.6 % (ref 38–73)
NRBC BLD-RTO: 0 /100 WBC
OHS QRS DURATION: 102 MS
OHS QTC CALCULATION: 526 MS
PHOSPHATE SERPL-MCNC: 7.4 MG/DL (ref 2.7–4.5)
PHOSPHATE SERPL-MCNC: <1 MG/DL (ref 2.7–4.5)
PHOSPHATE SERPL-MCNC: <1 MG/DL (ref 2.7–4.5)
PLATELET # BLD AUTO: 112 K/UL (ref 150–450)
PMV BLD AUTO: 10.9 FL (ref 9.2–12.9)
POCT GLUCOSE: 124 MG/DL (ref 70–110)
POCT GLUCOSE: 90 MG/DL (ref 70–110)
POTASSIUM SERPL-SCNC: 4.4 MMOL/L (ref 3.5–5.1)
POTASSIUM SERPL-SCNC: 4.9 MMOL/L (ref 3.5–5.1)
PROT SERPL-MCNC: 5.7 G/DL (ref 6–8.4)
RBC # BLD AUTO: 2.56 M/UL (ref 4.6–6.2)
SODIUM SERPL-SCNC: 139 MMOL/L (ref 136–145)
SODIUM SERPL-SCNC: 139 MMOL/L (ref 136–145)
SODIUM SERPL-SCNC: 140 MMOL/L (ref 136–145)
SODIUM SERPL-SCNC: 142 MMOL/L (ref 136–145)
WBC # BLD AUTO: 3.42 K/UL (ref 3.9–12.7)

## 2025-02-14 PROCEDURE — 20000000 HC ICU ROOM

## 2025-02-14 PROCEDURE — 99291 CRITICAL CARE FIRST HOUR: CPT | Mod: ,,, | Performed by: NURSE PRACTITIONER

## 2025-02-14 PROCEDURE — 93005 ELECTROCARDIOGRAM TRACING: CPT

## 2025-02-14 PROCEDURE — 99900035 HC TECH TIME PER 15 MIN (STAT)

## 2025-02-14 PROCEDURE — 85025 COMPLETE CBC W/AUTO DIFF WBC: CPT

## 2025-02-14 PROCEDURE — 80048 BASIC METABOLIC PNL TOTAL CA: CPT | Mod: 91,XB

## 2025-02-14 PROCEDURE — 27100171 HC OXYGEN HIGH FLOW UP TO 24 HOURS

## 2025-02-14 PROCEDURE — 80100008 HC CRRT DAILY MAINTENANCE

## 2025-02-14 PROCEDURE — 94799 UNLISTED PULMONARY SVC/PX: CPT

## 2025-02-14 PROCEDURE — 63600175 PHARM REV CODE 636 W HCPCS: Performed by: NURSE PRACTITIONER

## 2025-02-14 PROCEDURE — 25000003 PHARM REV CODE 250: Performed by: STUDENT IN AN ORGANIZED HEALTH CARE EDUCATION/TRAINING PROGRAM

## 2025-02-14 PROCEDURE — 80053 COMPREHEN METABOLIC PANEL: CPT

## 2025-02-14 PROCEDURE — 93010 ELECTROCARDIOGRAM REPORT: CPT | Mod: ,,, | Performed by: INTERNAL MEDICINE

## 2025-02-14 PROCEDURE — 84100 ASSAY OF PHOSPHORUS: CPT

## 2025-02-14 PROCEDURE — 27000207 HC ISOLATION

## 2025-02-14 PROCEDURE — 80069 RENAL FUNCTION PANEL: CPT | Performed by: STUDENT IN AN ORGANIZED HEALTH CARE EDUCATION/TRAINING PROGRAM

## 2025-02-14 PROCEDURE — 99232 SBSQ HOSP IP/OBS MODERATE 35: CPT | Mod: ,,, | Performed by: INTERNAL MEDICINE

## 2025-02-14 PROCEDURE — 83735 ASSAY OF MAGNESIUM: CPT | Mod: 91 | Performed by: STUDENT IN AN ORGANIZED HEALTH CARE EDUCATION/TRAINING PROGRAM

## 2025-02-14 PROCEDURE — 25000003 PHARM REV CODE 250: Performed by: INTERNAL MEDICINE

## 2025-02-14 PROCEDURE — 94761 N-INVAS EAR/PLS OXIMETRY MLT: CPT

## 2025-02-14 PROCEDURE — 25000003 PHARM REV CODE 250: Performed by: NURSE PRACTITIONER

## 2025-02-14 PROCEDURE — 63600175 PHARM REV CODE 636 W HCPCS: Performed by: INTERNAL MEDICINE

## 2025-02-14 PROCEDURE — 90945 DIALYSIS ONE EVALUATION: CPT

## 2025-02-14 PROCEDURE — 80048 BASIC METABOLIC PNL TOTAL CA: CPT | Mod: XB

## 2025-02-14 PROCEDURE — 63600175 PHARM REV CODE 636 W HCPCS

## 2025-02-14 RX ORDER — PANTOPRAZOLE SODIUM 40 MG/1
40 TABLET, DELAYED RELEASE ORAL DAILY
Status: DISCONTINUED | OUTPATIENT
Start: 2025-02-15 | End: 2025-02-19 | Stop reason: HOSPADM

## 2025-02-14 RX ORDER — OLANZAPINE 10 MG/2ML
5 INJECTION, POWDER, FOR SOLUTION INTRAMUSCULAR ONCE
Status: COMPLETED | OUTPATIENT
Start: 2025-02-14 | End: 2025-02-14

## 2025-02-14 RX ORDER — SODIUM CHLORIDE 9 MG/ML
INJECTION, SOLUTION INTRAVENOUS
Status: CANCELLED | OUTPATIENT
Start: 2025-02-14

## 2025-02-14 RX ORDER — SODIUM CHLORIDE 9 MG/ML
INJECTION, SOLUTION INTRAVENOUS ONCE
Status: CANCELLED | OUTPATIENT
Start: 2025-02-14 | End: 2025-02-14

## 2025-02-14 RX ADMIN — CEFTRIAXONE 2 G: 2 INJECTION, POWDER, FOR SOLUTION INTRAMUSCULAR; INTRAVENOUS at 03:02

## 2025-02-14 RX ADMIN — HYDROXYZINE HYDROCHLORIDE 25 MG: 25 TABLET, FILM COATED ORAL at 09:02

## 2025-02-14 RX ADMIN — OLANZAPINE 5 MG: 10 INJECTION, POWDER, FOR SOLUTION INTRAMUSCULAR at 06:02

## 2025-02-14 RX ADMIN — HEPARIN SODIUM 5000 UNITS: 5000 INJECTION INTRAVENOUS; SUBCUTANEOUS at 03:02

## 2025-02-14 RX ADMIN — HEPARIN SODIUM 5000 UNITS: 5000 INJECTION INTRAVENOUS; SUBCUTANEOUS at 09:02

## 2025-02-14 RX ADMIN — HEPARIN SODIUM 5000 UNITS: 5000 INJECTION INTRAVENOUS; SUBCUTANEOUS at 06:02

## 2025-02-14 RX ADMIN — SODIUM PHOSPHATE, MONOBASIC, MONOHYDRATE AND SODIUM PHOSPHATE, DIBASIC, ANHYDROUS 39.9 MMOL: 142; 276 INJECTION, SOLUTION INTRAVENOUS at 09:02

## 2025-02-14 RX ADMIN — MUPIROCIN: 20 OINTMENT TOPICAL at 08:02

## 2025-02-14 RX ADMIN — LEVOTHYROXINE SODIUM 50 MCG: 0.05 TABLET ORAL at 06:02

## 2025-02-14 RX ADMIN — MUPIROCIN: 20 OINTMENT TOPICAL at 09:02

## 2025-02-14 RX ADMIN — FAMOTIDINE 20 MG: 20 TABLET ORAL at 08:02

## 2025-02-14 NOTE — ASSESSMENT & PLAN NOTE
Systolic and Diastolic Dysfunction. Most recent TTE (12/10/24) with EF 20-25% with severe global hypokinesis. EKG sinus tachycardia with 1st degree AV Block (same from prior EKG) and T-wave abnormality. BNP >4600. HsTrop 46. CXR with right lung base w/blunting suggesting small pleural effusion    - Will need to discuss GDMT as tolerated with hemodynamics   - Volume removal with HD   - Troponin peak at 43, downtrending  - Fluid restriction at 1500 cc with strict I/Os and daily weights    - Maintain on telemetry and daily EKGs   - Up to date risk stratification : TSH, Lipids, HbA1c with optimization of risk factors is necessary  - Check Electrolytes, keep Mag >2 & K+ >4  - SCDs, TEDs, Nursing communication to elevated LE   - Ambulate as tolerated

## 2025-02-14 NOTE — ASSESSMENT & PLAN NOTE
Creatinine 4.8 on admit. S/p R Nephrectomy (due to retained uretal stent per chart review) and HD MWF    Plan:   Lab Results   Component Value Date    CREATININE 0.4 (L) 02/14/2025    CREATININE 0.4 (L) 02/14/2025    CREATININE 0.4 (L) 02/14/2025     - On iHD MWF.   - Nephrology following. Appreciate assistance  - CRRT/HD per nephro  - Avoid nephrotoxic agents such as NSAIDs, gadolinium and IV radiocontrast.  - Renally dose meds to current GFR.  - Maintain MAP > 65

## 2025-02-14 NOTE — SUBJECTIVE & OBJECTIVE
Interval History: He had SLED last night, tolerated it well apart from clotted twice (multiple previous clotting as well). No other concerns from the nurse. Oxygen requirement reduced. BP is well maintained. Primary team will consider stepping him down.     Review of patient's allergies indicates:   Allergen Reactions    Vancomycin analogues Anaphylaxis, Other (See Comments), Shortness Of Breath and Swelling     Light headed, see's spots      Aspirin Nausea And Vomiting and Other (See Comments)     Has crohn's disease    Other reaction(s): FLARES UP CROHNS      Has crohn's disease     Current Facility-Administered Medications   Medication Frequency    0.9%  NaCl infusion (CRRT USE ONLY) Continuous    acetaminophen tablet 650 mg Q6H PRN    calcium gluconate 1 g in NS IVPB (premixed) Q10 Min PRN    cefTRIAXone injection 2 g Q24H    dextrose 50% injection 12.5 g PRN    dextrose 50% injection 25 g PRN    famotidine tablet 20 mg Daily    glucagon (human recombinant) injection 1 mg PRN    glucose chewable tablet 16 g PRN    glucose chewable tablet 24 g PRN    heparin (porcine) injection 5,000 Units Q8H    hydrOXYzine HCL tablet 25 mg TID PRN    levothyroxine tablet 50 mcg Before breakfast    magnesium sulfate 2g in water 50mL IVPB (premix) PRN    melatonin tablet 9 mg Nightly PRN    mupirocin 2 % ointment BID    sodium chloride 0.9% flush 10 mL PRN    sodium phosphate 20.1 mmol in D5W 250 mL IVPB PRN    sodium phosphate 30 mmol in D5W 250 mL IVPB PRN    sodium phosphate 39.9 mmol in D5W 250 mL IVPB PRN       Objective:     Vital Signs (Most Recent):  Temp: 97.6 °F (36.4 °C) (02/14/25 0701)  Pulse: 110 (02/14/25 0846)  Resp: (!) 31 (02/14/25 0846)  BP: (!) 142/67 (02/14/25 0701)  SpO2: 99 % (02/14/25 0846) Vital Signs (24h Range):  Temp:  [97.6 °F (36.4 °C)-98 °F (36.7 °C)] 97.6 °F (36.4 °C)  Pulse:  [] 110  Resp:  [20-49] 31  SpO2:  [90 %-100 %] 99 %  BP: (132-162)/(60-99) 142/67     Weight: 58 kg (127 lb 13.9 oz)  (02/09/25 4784)  Body mass index is 20.64 kg/m².  Body surface area is 1.64 meters squared.    I/O last 3 completed shifts:  In: 1555.8 [P.O.:490; I.V.:868.3; IV Piggyback:197.6]  Out: 4449 [Other:4299; Stool:150]     Physical Exam  HENT:      Head: Normocephalic and atraumatic.      Mouth/Throat:      Mouth: Mucous membranes are moist.   Cardiovascular:      Rate and Rhythm: Normal rate.   Pulmonary:      Effort: Pulmonary effort is normal.      Comments: On high flow oxygen.   Abdominal:      General: Abdomen is flat.      Palpations: Abdomen is soft.   Musculoskeletal:      Right lower leg: No edema.      Left lower leg: No edema.   Neurological:      Mental Status: He is alert.          Significant Labs:  BMP:   Recent Labs   Lab 02/14/25 0322 02/14/25  0741   GLU 93  93  93 92     142  142 139   K 4.4  4.4  4.4  4.4 4.9  4.9   *  111*  111* 112*   CO2 24  24  24 21*   BUN <3*  <3*  <3* 7*   CREATININE 0.4*  0.4*  0.4* 1.1   CALCIUM 7.9*  7.9*  7.9* 8.8   MG 1.8  --      CBC:   Recent Labs   Lab 02/14/25 0322   WBC 3.42*   RBC 2.56*   HGB 7.7*   HCT 25.3*   *   MCV 99*   MCH 30.1   MCHC 30.4*     All labs within the past 24 hours have been reviewed.

## 2025-02-14 NOTE — PROGRESS NOTES
02/13/25 1819   Treatment   Treatment Type SLED   Treatment Status Clotting;Blood lost   Dialyzer Time (hours) 4.08   BVP (Liters) 45 L   CRRT Hourly Documentation   Blood Flow (mL/min) 200   UF Rate 450 cc/hr   Arterial Pressure (mmHg) 100 mmHg   Venous Pressure (mmHg) 160 mmHg   Effluent Pressure (EP) (mmHg) 520 mmHg  (rinsed back)   Total UF (Hourly Cleared) (mL) 123     System clotted. Unable to return blood. Both lumens sluggish to flush. Notified Dr. Oneill with nephrology. Will TPA both lumens of HD catheter.

## 2025-02-14 NOTE — ASSESSMENT & PLAN NOTE
"Admit with hemoglobin 9.6 Baseline ~8-9    Lab Results   Component Value Date    IRON 26 (L) 12/20/2024    TIBC 209 (L) 12/20/2024    FERRITIN 2,808 (H) 12/20/2024     Lab Results   Component Value Date    FOLATE 8.0 12/20/2024     Lab Results   Component Value Date    NNQDNSTG08 770 12/20/2024     No results found for: "RETICCTPCT"    Likely secondary to anemia of chronic disease    Plan:   -   Lab Results   Component Value Date    HGB 7.7 (L) 02/14/2025       - Daily CBC   - Transfuse hgb <7    - Maintain type and screen   "

## 2025-02-14 NOTE — PROGRESS NOTES
02/14/25 1510   Vital Signs   Temp 97.6 °F (36.4 °C)   Temp Source Oral   Pulse 104   Heart Rate Source Monitor   Resp (!) 24   Flow (L/min) (Oxygen Therapy) 5   Device (Oxygen Therapy) high flow nasal cannula   BP (!) 154/75   MAP (mmHg) 108   BP Location Right forearm   BP Method Automatic   Patient Position Lying     Pt is awake and stable for tx.  POC discussed with the pt.  Bedside HD1:1 tx started aseptically via RIJ TDC without issue.

## 2025-02-14 NOTE — PLAN OF CARE
MICU DAILY GOALS     Family/Goals of care/Code Status   Code Status: Full Code    24H Vital Sign Range  Temp:  [97 °F (36.1 °C)-98 °F (36.7 °C)]   Pulse:  []   Resp:  [16-49]   BP: (111-159)/(58-95)   SpO2:  [90 %-100 %]      Shift Events (include procedures and significant events)   No acute events throughout shift. SLED initiated x12 hours    AWAKE RASS: Goal -    Actual - RASS (Sam Agitation-Sedation Scale): alert and calm    Restraint necessity: Not necessary   BREATHE SBT: Not intubated    Coordinate A & B, analgesics/sedatives Pain: managed   SAT: Not intubated   Delirium CAM-ICU:     Early(intubated/ Progressive (non-intubated) Mobility MOVE Screen (INTUBATED ONLY): Not intubated    Activity: Activity Management: Rolling - L1   Feeding/Nutrition Diet order: Diet/Nutrition Received: clear liquid,     Thrombus DVT prophylaxis: VTE Core Measure: Pharmacological prophylaxis initiated/maintained   HOB Elevation Head of Bed (HOB) Positioning: HOB elevated   Ulcer Prophylaxis GI: yes   Glucose control managed Glycemic Management: blood glucose monitored   Skin Skin assessment:     Sacrum intact/not altered? Yes  Heels intact/not altered? Yes  Surgical wound? No    CHECK ONE!   (no altered skin or altered skin) and sub boxes:  [] No Altered Skin Integrity Present    []Prevention Measures Documented    [] Altered Skin Integrity Present or Discovered   [] LDA present in EPIC, daily doc completed              [] LDA added if not in EPIC (describe wound).                    When describing wound, do not stage, use descriptive words only.    [] Wound Image Taken (required on admit,                   transfer/discharge and every Tuesday)    Wound Care Consulted? No   Bowel Function no issues    Indwelling Catheter Necessity [REMOVED]      Urethral Catheter 02/09/25 2100 Temperature probe;Straight-tip 16 Fr.-Reason for Continuing Urinary Catheterization: Critically ill in ICU and requiring hourly monitoring of  intake/output         Hemodialysis Catheter 12/31/24 0818 right subclavian-Line Necessity Review: CRRT/HD     De-escalation Antibiotics No        VS and assessment per flow sheet, patient progressing towards goals as tolerated, plan of care reviewed with family, all concerns addressed, will continue to monitor.

## 2025-02-14 NOTE — PROGRESS NOTES
[facility] - Medical ICU  Nephrology  Progress Note    Patient Name: Flakito Mcginnis  MRN: [id number]  Admission Date: 2/9/2025  Hospital Length of Stay: 5 days  Attending Provider: Jose Mendieat MD   Primary Care Physician: Jordin Robbins MD  Principal Problem:Acute hypoxic respiratory failure    Subjective:     HPI: 69-year-old male past medical history nonischemic cardiomyopathy EF most recently 20-25%, ESRD on HD MWF, chronic respiratory failure on 3 L nasal cannula at home who presents for worsening shortness of breath times 2 days. Patient mentioned to his nursing home staff that he was feeling dyspnic and EMS was called. Upon arrival to the ER he was intubated so history was provided from speaking to his sister on the phone and with the ER team. Infectious work up tested positive for RSV. Nephrology was consulted for management of his hemodialysis and for hyperkalemia of 8.3, his ECG does reveal some peaked T-waves. As per the sister, he has been complaining of increased shortness of breath, fatigue, and decreased urine output for the past two days. The sister does note that several residents of his nursing home have tested positive for respiratory virus recently.     Interval History: He had SLED last night, tolerated it well apart from clotted twice (multiple previous clotting as well). No other concerns from the nurse. Oxygen requirement reduced. BP is well maintained. Primary team will consider stepping him down.     Review of patient's allergies indicates:   Allergen Reactions    Vancomycin analogues Anaphylaxis, Other (See Comments), Shortness Of Breath and Swelling     Light headed, see's spots      Aspirin Nausea And Vomiting and Other (See Comments)     Has crohn's disease    Other reaction(s): FLARES UP CROHNS      Has crohn's disease     Current Facility-Administered Medications   Medication Frequency    0.9%  NaCl infusion (CRRT USE ONLY) Continuous    acetaminophen tablet 650 mg Q6H PRN    calcium  gluconate 1 g in NS IVPB (premixed) Q10 Min PRN    cefTRIAXone injection 2 g Q24H    dextrose 50% injection 12.5 g PRN    dextrose 50% injection 25 g PRN    famotidine tablet 20 mg Daily    glucagon (human recombinant) injection 1 mg PRN    glucose chewable tablet 16 g PRN    glucose chewable tablet 24 g PRN    heparin (porcine) injection 5,000 Units Q8H    hydrOXYzine HCL tablet 25 mg TID PRN    levothyroxine tablet 50 mcg Before breakfast    magnesium sulfate 2g in water 50mL IVPB (premix) PRN    melatonin tablet 9 mg Nightly PRN    mupirocin 2 % ointment BID    sodium chloride 0.9% flush 10 mL PRN    sodium phosphate 20.1 mmol in D5W 250 mL IVPB PRN    sodium phosphate 30 mmol in D5W 250 mL IVPB PRN    sodium phosphate 39.9 mmol in D5W 250 mL IVPB PRN       Objective:     Vital Signs (Most Recent):  Temp: 97.6 °F (36.4 °C) (02/14/25 0701)  Pulse: 110 (02/14/25 0846)  Resp: (!) 31 (02/14/25 0846)  BP: (!) 142/67 (02/14/25 0701)  SpO2: 99 % (02/14/25 0846) Vital Signs (24h Range):  Temp:  [97.6 °F (36.4 °C)-98 °F (36.7 °C)] 97.6 °F (36.4 °C)  Pulse:  [] 110  Resp:  [20-49] 31  SpO2:  [90 %-100 %] 99 %  BP: (132-162)/(60-99) 142/67     Weight: 58 kg (127 lb 13.9 oz) (02/09/25 2349)  Body mass index is 20.64 kg/m².  Body surface area is 1.64 meters squared.    I/O last 3 completed shifts:  In: 1555.8 [P.O.:490; I.V.:868.3; IV Piggyback:197.6]  Out: 4449 [Other:4299; Stool:150]     Physical Exam  HENT:      Head: Normocephalic and atraumatic.      Mouth/Throat:      Mouth: Mucous membranes are moist.   Cardiovascular:      Rate and Rhythm: Normal rate.   Pulmonary:      Effort: Pulmonary effort is normal.      Comments: On high flow oxygen.   Abdominal:      General: Abdomen is flat.      Palpations: Abdomen is soft.   Musculoskeletal:      Right lower leg: No edema.      Left lower leg: No edema.   Neurological:      Mental Status: He is alert.          Significant Labs:  BMP:   Recent Labs   Lab 02/14/25  3562  02/14/25  0741   GLU 93  93  93 92     142  142 139   K 4.4  4.4  4.4  4.4 4.9  4.9   *  111*  111* 112*   CO2 24  24  24 21*   BUN <3*  <3*  <3* 7*   CREATININE 0.4*  0.4*  0.4* 1.1   CALCIUM 7.9*  7.9*  7.9* 8.8   MG 1.8  --      CBC:   Recent Labs   Lab 02/14/25  0322   WBC 3.42*   RBC 2.56*   HGB 7.7*   HCT 25.3*   *   MCV 99*   MCH 30.1   MCHC 30.4*     All labs within the past 24 hours have been reviewed.     Assessment/Plan:     Renal/  ESRD on hemodialysis  Patient is ESRD on HD TTS (as per the OP notes). As per his sister he completed a full session of HD on Friday but she is uncertain if he was able to obtain his dry weight.    Recommendations  - Dialysis today     Order Questions    Question Answer   Antibiotics on HD? No   Duration of Treatment 3 hours   Dialyzer [id number]   Dialysate Temperature (C) 36   Target  mL/min   If unable to maintain flow due to inadequate vascular access patency, patient intolerance (i.e. chest pain, access discomfort) or elevated venous pressure, adjust blood flow rate to a minimum of _____mL/min 100    mL/min   K+ Potassium per Protocol   Ca++ Calcium per Protocol   Na+ Sodium per Protocol   Bicarb Bicarbonate per Protocol   Access to be used AVF   Needle gauge 15 gauge   Location Arm   Laterality Left   Target UF 2L     - Monitor potassium level  - Consider adding lokalma   - 1L fluid restriction  - 2g salt. Low potassium diet   - daily RFP, magnesium, and phosphorus levels        Thank you for your consult. I will follow-up with patient. Please contact us if you have any additional questions.    Chadwick Oneill MD  Nephrology  [facility] - Medical ICU

## 2025-02-14 NOTE — PROGRESS NOTES
02/13/25 2117   Treatment   Treatment Type SLED   Treatment Status Restart   Dialysis Machine Number [id number]   Solutions Labeled and Current  Yes   Access Tunneled Cath;Right   Catheter Dressing Intact  Yes   Alarms Engaged Yes   CRRT Comments SLED rst post cath kala. dwell x 2 hr. Arterial and venous ports aspirated 10 cc blood and discarded w ease. flushed w10 cc NS /wo diff.   Prescription   Time (Hours) 12   Dialysate K + (mEq/L) 4   Dialysate CA + (mEq/L) 2.25   Dialysate HCO3 - (Bicarb) (mEq/L) 30   Dialysate Na + (mEq/L) 140   Cartridge Type Other   Dialysate Flow Rate (mL/min) 200   UF Goal Rate 450 mL/hr   CRRT Hourly Documentation   Blood Flow (mL/min) 200   UF Rate 450 cc/hr   Arterial Pressure (mmHg) -40 mmHg   Venous Pressure (mmHg) 10 mmHg   Effluent Pressure (EP) (mmHg) 40 mmHg

## 2025-02-14 NOTE — ASSESSMENT & PLAN NOTE
Patient with Hypoxic Respiratory failure which is Acute on chronic.  he is on home oxygen at 3 LPM. Supplemental oxygen was provided and noted- Oxygen Concentration (%):  [30-40] 40    Signs/symptoms of respiratory failure include- increased work of breathing and respiratory distress. Contributing diagnoses includes - CHF and Pleural effusion Labs and images were reviewed. Patient Has not had a recent ABG. Will treat underlying causes and adjust management of respiratory failure as follows-     Likely secondary to RSV vs possibly pulmonary edema/volume overload    - Extubated to comfort flow on 2/11  - Continue supplemental O2  - Wean FiO2 for SpO2 > 92%   - Supportive care for RSV  - Klebsiella in respiratory culture, antibiotics deescalated to CTX  - Volume removal per RRT. Appreciate nephrology assistance

## 2025-02-14 NOTE — SUBJECTIVE & OBJECTIVE
Interval History/Significant Events: RRT overnight with worsening delirium this AM. Attempting to get out of bed unassisted and unstable. Weaning oxygen support. Hypoxia improving.     Review of Systems   Unable to perform ROS: Mental status change     Objective:     Vital Signs (Most Recent):  Temp: 97.8 °F (36.6 °C) (02/14/25 0305)  Pulse: 103 (02/14/25 0505)  Resp: (!) 42 (02/14/25 0505)  BP: (!) 162/99 (02/14/25 0505)  SpO2: 98 % (02/14/25 0505) Vital Signs (24h Range):  Temp:  [97.6 °F (36.4 °C)-98 °F (36.7 °C)] 97.8 °F (36.6 °C)  Pulse:  [] 103  Resp:  [16-49] 42  SpO2:  [90 %-100 %] 98 %  BP: (117-162)/(60-99) 162/99   Weight: 58 kg (127 lb 13.9 oz)  Body mass index is 20.64 kg/m².      Intake/Output Summary (Last 24 hours) at 2/14/2025 0613  Last data filed at 2/14/2025 0517  Gross per 24 hour   Intake 1475.23 ml   Output 4237 ml   Net -2761.77 ml          Physical Exam  Vitals reviewed.   Constitutional:       General: He is not in acute distress.     Appearance: He is ill-appearing.   HENT:      Mouth/Throat:      Mouth: Mucous membranes are dry.      Pharynx: Oropharynx is clear. No oropharyngeal exudate.   Eyes:      General: No scleral icterus.     Pupils: Pupils are equal, round, and reactive to light.   Cardiovascular:      Rate and Rhythm: Regular rhythm. Tachycardia present.      Pulses: Normal pulses.   Pulmonary:      Effort: Pulmonary effort is normal.      Breath sounds: Rales present.   Abdominal:      General: Bowel sounds are normal. There is no distension.      Palpations: Abdomen is soft.   Musculoskeletal:      Right lower leg: No edema.      Left lower leg: No edema.   Skin:     General: Skin is warm and dry.      Capillary Refill: Capillary refill takes less than 2 seconds.      Findings: Bruising present.      Comments: Discoloration to bilateral toes   Neurological:      General: No focal deficit present.      Mental Status: He is alert. He is disoriented.      GCS: GCS eye  subscore is 4. GCS verbal subscore is 4. GCS motor subscore is 6.      Comments: Rambling and repetitive            Vents:  Vent Mode: A/C (02/11/25 1230)  Ventilator Initiated: Yes (02/09/25 2044)  Set Rate: 22 BPM (02/11/25 0846)  Vt Set: 360 mL (02/11/25 0846)  Pressure Support: 5 cmH20 (02/11/25 1330)  PEEP/CPAP: 5 cmH20 (02/11/25 1330)  Oxygen Concentration (%): 40 (02/14/25 0456)  Peak Airway Pressure: 11 cmH20 (02/11/25 1330)  Plateau Pressure: 0 cmH20 (02/11/25 1330)  Total Ve: 1.63 L/m (02/11/25 1330)  Negative Inspiratory Force (cm H2O): 0 (02/11/25 1330)  F/VT Ratio<105 (RSBI): (!) 18.49 (02/11/25 1230)  Lines/Drains/Airways       Central Venous Catheter Line  Duration                  Hemodialysis Catheter 12/31/24 0818 right subclavian 44 days              Drain  Duration                  Colostomy 12/19/24 2225 RLQ 56 days              Peripheral Intravenous Line  Duration                  Peripheral IV - Single Lumen 02/12/25 1120 20 G Anterior;Right Upper Arm 1 day                  Significant Labs:    CBC/Anemia Profile:  Recent Labs   Lab 02/13/25  0245 02/14/25  0322   WBC 3.23* 3.42*   HGB 8.4* 7.7*   HCT 27.3* 25.3*   PLT 98* 112*   * 99*   RDW 17.8* 17.7*        Chemistries:  Recent Labs   Lab 02/13/25  0245 02/13/25  0814 02/13/25  1520 02/13/25  2114 02/14/25  0024 02/14/25  0322     137   < > 139  139 135*  137 140 142  142  142   K 4.9  4.9  4.9   < > 5.0  5.0  5.0 5.1  5.2*  5.2* 4.4  4.4 4.4  4.4  4.4  4.4     108   < > 111*  111* 109  112* 112* 111*  111*  111*   CO2 21*  21*   < > 22*  22* 20*  19* 20* 24  24  24   BUN 17  17   < > 15  15 18  17 <3* <3*  <3*  <3*   CREATININE 2.0*  2.0*   < > 1.6*  1.6* 2.0*  2.0* 0.4* 0.4*  0.4*  0.4*   CALCIUM 9.9  9.9   < > 8.9  8.9 9.6  9.2 7.6* 7.9*  7.9*  7.9*   ALBUMIN 2.3*  --  2.4* 2.4*  --  2.5*  2.5*   PROT 5.5*  --   --   --   --  5.7*   BILITOT 0.4  --   --   --   --  0.3   ALKPHOS  159*  --   --   --   --  163*   ALT 5*  --   --   --   --  6*   AST 26  --   --   --   --  27   MG 2.3  --  2.1 2.2  --  1.8   PHOS 4.7*  --  3.3 3.9  --  <1.0*  <1.0*    < > = values in this interval not displayed.       All pertinent labs within the past 24 hours have been reviewed.    Significant Imaging:  I have reviewed all pertinent imaging results/findings within the past 24 hours.

## 2025-02-14 NOTE — PLAN OF CARE
MICU DAILY GOALS     Family/Goals of care/Code Status   Code Status: Full Code    24H Vital Sign Range  Temp:  [97.6 °F (36.4 °C)-98 °F (36.7 °C)]   Pulse:  []   Resp:  [16-49]   BP: (117-162)/(60-99)   SpO2:  [90 %-100 %]      Shift Events (include procedures and significant events)   No acute events throughout shift, pt started SLED, clotted x1 restarted and maintained without interruption, K+ trending down. Will continue to monitor.     AWAKE RASS: Goal -    Actual - RASS (Sam Agitation-Sedation Scale): alert and calm    Restraint necessity: Removing medical devices   BREATHE SBT: Not intubated    Coordinate A & B, analgesics/sedatives Pain: managed   SAT: Not intubated   Delirium CAM-ICU:     Early(intubated/ Progressive (non-intubated) Mobility MOVE Screen (INTUBATED ONLY): Not intubated    Activity: Activity Management: Rolling - L1   Feeding/Nutrition Diet order: Diet/Nutrition Received: clear liquid,     Thrombus DVT prophylaxis: VTE Core Measure: Pharmacological prophylaxis initiated/maintained   HOB Elevation Head of Bed (HOB) Positioning: HOB elevated   Ulcer Prophylaxis GI: yes   Glucose control managed Glycemic Management: blood glucose monitored   Skin Skin assessment:     Sacrum intact/not altered? No  Heels intact/not altered? No  Surgical wound? No    CHECK ONE!   (no altered skin or altered skin) and sub boxes:  [] No Altered Skin Integrity Present    [x]Prevention Measures Documented    [x] Altered Skin Integrity Present or Discovered   [x] LDA present in EPIC, daily doc completed              [x] LDA added if not in EPIC (describe wound).                    When describing wound, do not stage, use descriptive words only.    [x] Wound Image Taken (required on admit,                   transfer/discharge and every Tuesday)    Wound Care Consulted? Yes   Bowel Function no issues    Indwelling Catheter Necessity [REMOVED]      Urethral Catheter 02/09/25 2100 Temperature probe;Straight-tip  16 Fr.-Reason for Continuing Urinary Catheterization: Critically ill in ICU and requiring hourly monitoring of intake/output         Hemodialysis Catheter 12/31/24 0818 right subclavian-Line Necessity Review: CRRT/HD     De-escalation Antibiotics Yes        VS and assessment per flow sheet, patient progressing towards goals as tolerated, plan of care reviewed with    , all concerns addressed, will continue to monitor.

## 2025-02-14 NOTE — ASSESSMENT & PLAN NOTE
- Delirium precautions   - Aspiration precautions   - Fall precautions   - Avoid sedating agents if possible   - Encourage nutrition   - PT/ OT / SLP  - Replace electrolytes to keep Mg > 2, Phos > 3, and  K > 4

## 2025-02-14 NOTE — ASSESSMENT & PLAN NOTE
Patient is ESRD on HD TTS (as per the OP notes). As per his sister he completed a full session of HD on Friday but she is uncertain if he was able to obtain his dry weight.    Recommendations  - Dialysis today   - Chart chaeck over the weekend  Order Questions    Question Answer   Antibiotics on HD? No   Duration of Treatment 3 hours   Dialyzer [id number]   Dialysate Temperature (C) 36   Target  mL/min   If unable to maintain flow due to inadequate vascular access patency, patient intolerance (i.e. chest pain, access discomfort) or elevated venous pressure, adjust blood flow rate to a minimum of _____mL/min 100    mL/min   K+ Potassium per Protocol   Ca++ Calcium per Protocol   Na+ Sodium per Protocol   Bicarb Bicarbonate per Protocol   Access to be used AVF   Needle gauge 15 gauge   Location Arm   Laterality Left   Target UF 2L     - Monitor potassium level  - Consider adding lokalma   - 1L fluid restriction  - 2g salt. Low potassium diet   - daily RFP, magnesium, and phosphorus levels

## 2025-02-14 NOTE — PROGRESS NOTES
02/14/25 0002   Treatment   Treatment Type SLED   Treatment Status Clotting;Blood lost;Restart;Daily equipment check   Dialysis Machine Number [id number]   Dialyzer Time (hours) 1.3   BVP (Liters) 15.9 L   Solutions Labeled and Current  Yes   Access Tunneled Cath;Right   Catheter Dressing Intact  Yes   Alarms Engaged Yes   CRRT Comments clotted and unable to return blood per primary RN; Phoned Dr Oneill. Rst again and if clots call back for possible anticoagulation. SLED rst; POC rev w primary RN. Daily check completed.   Prescription   Time (Hours) 12   Dialysate K + (mEq/L) 4   Dialysate CA + (mEq/L) 2.25   Dialysate HCO3 - (Bicarb) (mEq/L) 30   Dialysate Na + (mEq/L) 140   Cartridge Type Other   Dialysate Flow Rate (mL/min) 200   UF Goal Rate 450 mL/hr   CRRT Hourly Documentation   Blood Flow (mL/min) 200   UF Rate 450 cc/hr   Arterial Pressure (mmHg) -60 mmHg   Venous Pressure (mmHg) 30 mmHg   Effluent Pressure (EP) (mmHg) 30 mmHg

## 2025-02-14 NOTE — PROGRESS NOTES
[facility] - Medical ICU  Critical Care Medicine  Progress Note    Patient Name: Flakito Mcginnis  MRN: [id number]  Admission Date: 2/9/2025  Hospital Length of Stay: 5 days  Code Status: Full Code  Attending Provider: Jose Mendieta MD  Primary Care Provider: Jordin Robbins MD   Principal Problem: Acute hypoxic respiratory failure    Subjective:     HPI:  69yoM w/hx of HFrEF (EF 20-25%, 12/2024), NICM, MR, ESRD on HD, chronic respiratory failure (on 3L NC at home), Crohn's disease s/p colostomy, R nephrectomy who presents to the hospital from [facility] from acute onset of SOB. Given the acuity of patient's condition, history was obtained from the chart. Per the ED provider note, patient has had increased sputum production, cough, wheezing, and bilateral lower extremity edema. His last dialysis session was on Friday (2/7/25) but there is concern that he may not have completed a full session as patient reports feeling volume overloaded. He denies chest pain or fever. He was recently admitted to the hospital septic shock secondary to staph capitis bacteremia and endocarditis. Completed 6wk course of IV Cefazolin on 2/2/25.     In the ED, patient was hypoxic with increased WOB. Initially placed on BiPAP without much improvement in hypoxia so was intubated. Afebrile, other VSS. Labs were notable for Hgb 9.6, Hct 32.3, Na 132, K 8.3, Bicarb 19, BUN/Cr 38/4.8, , BNP >4900, HsTrop 44. Flu/COVID negative. RSV positive. VBG 7.46/35.1/33.3/25.5. CXR with ongoing vs recurrent small right pleural effusion. EKG with known 1st degree AV block and T-wave abnormality. Administered calcium gluconate, IV insulin/dextrose, and IV lasix for hyperkalemia. Nephrology consulted. Admitted to the MICU for further management and workup.       Hospital/ICU Course:  Mr. Mcginnis was admitted to the MICU for acute on chronic hypoxic respiratory failure. Started on zosyn and azithromycin. Shifted potassium potassium throughout  admission. Required low dose vasopressors to tolerate SLED. D10 infusion for hypoglycemia. Echo with EF 20-25%, similar to previous study. Extubated to NC on 2/10 but then placed on comfort flow for . CRRT per nephrology. Remains of vasopressor support. Sputum culture + for Klebsiella. Completing course of Rocephin. Hospital course complicated by delirium.     Interval History/Significant Events: RRT overnight with worsening delirium this AM. Attempting to get out of bed unassisted and unstable. Weaning oxygen support. Hypoxia improving.     Review of Systems   Unable to perform ROS: Mental status change     Objective:     Vital Signs (Most Recent):  Temp: 97.8 °F (36.6 °C) (02/14/25 0305)  Pulse: 103 (02/14/25 0505)  Resp: (!) 42 (02/14/25 0505)  BP: (!) 162/99 (02/14/25 0505)  SpO2: 98 % (02/14/25 0505) Vital Signs (24h Range):  Temp:  [97.6 °F (36.4 °C)-98 °F (36.7 °C)] 97.8 °F (36.6 °C)  Pulse:  [] 103  Resp:  [16-49] 42  SpO2:  [90 %-100 %] 98 %  BP: (117-162)/(60-99) 162/99   Weight: 58 kg (127 lb 13.9 oz)  Body mass index is 20.64 kg/m².      Intake/Output Summary (Last 24 hours) at 2/14/2025 0613  Last data filed at 2/14/2025 0517  Gross per 24 hour   Intake 1475.23 ml   Output 4237 ml   Net -2761.77 ml          Physical Exam  Vitals reviewed.   Constitutional:       General: He is not in acute distress.     Appearance: He is ill-appearing.   HENT:      Mouth/Throat:      Mouth: Mucous membranes are dry.      Pharynx: Oropharynx is clear. No oropharyngeal exudate.   Eyes:      General: No scleral icterus.     Pupils: Pupils are equal, round, and reactive to light.   Cardiovascular:      Rate and Rhythm: Regular rhythm. Tachycardia present.      Pulses: Normal pulses.   Pulmonary:      Effort: Pulmonary effort is normal.      Breath sounds: Rales present.   Abdominal:      General: Bowel sounds are normal. There is no distension.      Palpations: Abdomen is soft.   Musculoskeletal:      Right lower leg:  No edema.      Left lower leg: No edema.   Skin:     General: Skin is warm and dry.      Capillary Refill: Capillary refill takes less than 2 seconds.      Findings: Bruising present.      Comments: Discoloration to bilateral toes   Neurological:      General: No focal deficit present.      Mental Status: He is alert. He is disoriented.      GCS: GCS eye subscore is 4. GCS verbal subscore is 4. GCS motor subscore is 6.      Comments: Rambling and repetitive            Vents:  Vent Mode: A/C (02/11/25 1230)  Ventilator Initiated: Yes (02/09/25 2044)  Set Rate: 22 BPM (02/11/25 0846)  Vt Set: 360 mL (02/11/25 0846)  Pressure Support: 5 cmH20 (02/11/25 1330)  PEEP/CPAP: 5 cmH20 (02/11/25 1330)  Oxygen Concentration (%): 40 (02/14/25 0456)  Peak Airway Pressure: 11 cmH20 (02/11/25 1330)  Plateau Pressure: 0 cmH20 (02/11/25 1330)  Total Ve: 1.63 L/m (02/11/25 1330)  Negative Inspiratory Force (cm H2O): 0 (02/11/25 1330)  F/VT Ratio<105 (RSBI): (!) 18.49 (02/11/25 1230)  Lines/Drains/Airways       Central Venous Catheter Line  Duration                  Hemodialysis Catheter 12/31/24 0818 right subclavian 44 days              Drain  Duration                  Colostomy 12/19/24 2225 RLQ 56 days              Peripheral Intravenous Line  Duration                  Peripheral IV - Single Lumen 02/12/25 1120 20 G Anterior;Right Upper Arm 1 day                  Significant Labs:    CBC/Anemia Profile:  Recent Labs   Lab 02/13/25  0245 02/14/25  0322   WBC 3.23* 3.42*   HGB 8.4* 7.7*   HCT 27.3* 25.3*   PLT 98* 112*   * 99*   RDW 17.8* 17.7*        Chemistries:  Recent Labs   Lab 02/13/25  0245 02/13/25  0814 02/13/25  1520 02/13/25  2114 02/14/25  0024 02/14/25  0322     137   < > 139  139 135*  137 140 142  142  142   K 4.9  4.9  4.9   < > 5.0  5.0  5.0 5.1  5.2*  5.2* 4.4  4.4 4.4  4.4  4.4  4.4     108   < > 111*  111* 109  112* 112* 111*  111*  111*   CO2 21*  21*   < > 22*  22* 20*   19* 20* 24  24  24   BUN 17  17   < > 15  15 18  17 <3* <3*  <3*  <3*   CREATININE 2.0*  2.0*   < > 1.6*  1.6* 2.0*  2.0* 0.4* 0.4*  0.4*  0.4*   CALCIUM 9.9  9.9   < > 8.9  8.9 9.6  9.2 7.6* 7.9*  7.9*  7.9*   ALBUMIN 2.3*  --  2.4* 2.4*  --  2.5*  2.5*   PROT 5.5*  --   --   --   --  5.7*   BILITOT 0.4  --   --   --   --  0.3   ALKPHOS 159*  --   --   --   --  163*   ALT 5*  --   --   --   --  6*   AST 26  --   --   --   --  27   MG 2.3  --  2.1 2.2  --  1.8   PHOS 4.7*  --  3.3 3.9  --  <1.0*  <1.0*    < > = values in this interval not displayed.       All pertinent labs within the past 24 hours have been reviewed.    Significant Imaging:  I have reviewed all pertinent imaging results/findings within the past 24 hours.    ABG  Recent Labs   Lab 02/13/25  1121   PH 7.366   PO2 23*   PCO2 41.9   HCO3 24.0   BE -1     Assessment/Plan:     Neuro  Encephalopathy, metabolic  - Delirium precautions   - Aspiration precautions   - Fall precautions   - Avoid sedating agents if possible   - Encourage nutrition   - PT/ OT / SLP  - Replace electrolytes to keep Mg > 2, Phos > 3, and  K > 4    Pulmonary  * Acute hypoxic respiratory failure  Patient with Hypoxic Respiratory failure which is Acute on chronic.  he is on home oxygen at 3 LPM. Supplemental oxygen was provided and noted- Oxygen Concentration (%):  [30-40] 40    Signs/symptoms of respiratory failure include- increased work of breathing and respiratory distress. Contributing diagnoses includes - CHF and Pleural effusion Labs and images were reviewed. Patient Has not had a recent ABG. Will treat underlying causes and adjust management of respiratory failure as follows-     Likely secondary to RSV vs possibly pulmonary edema/volume overload    - Extubated to comfort flow on 2/11  - Continue supplemental O2  - Wean FiO2 for SpO2 > 92%   - Supportive care for RSV  - Klebsiella in respiratory culture, antibiotics deescalated to CTX  - Volume removal per  RRT. Appreciate nephrology assistance    Cardiac/Vascular  Elevated troponin  HsTrop 46 on admission. EKG sinus tachycardia w/1st degree AV Block, T wave abnormality. Likely Type 2 Demand ischemia in the setting of volume overload    - Trop peaked at 43, downtrending   - Cardiac Monitoring     Chronic systolic heart failure  Systolic and Diastolic Dysfunction. Most recent TTE (12/10/24) with EF 20-25% with severe global hypokinesis. EKG sinus tachycardia with 1st degree AV Block (same from prior EKG) and T-wave abnormality. BNP >4600. HsTrop 46. CXR with right lung base w/blunting suggesting small pleural effusion    - Will need to discuss GDMT as tolerated with hemodynamics   - Volume removal with HD   - Troponin peak at 43, downtrending  - Fluid restriction at 1500 cc with strict I/Os and daily weights    - Maintain on telemetry and daily EKGs   - Up to date risk stratification : TSH, Lipids, HbA1c with optimization of risk factors is necessary  - Check Electrolytes, keep Mag >2 & K+ >4  - SCDs, TEDs, Nursing communication to elevated LE   - Ambulate as tolerated     Renal/  Hyperkalemia  K 8.3 on admission. Shifted with IV Lasix, Insulin/Dextrose, and Calcium Gluconate in the ED.     - Improvement with SLED  - BMP Q4H     ESRD on hemodialysis  Creatinine 4.8 on admit. S/p R Nephrectomy (due to retained uretal stent per chart review) and HD MWF    Plan:   Lab Results   Component Value Date    CREATININE 0.4 (L) 02/14/2025    CREATININE 0.4 (L) 02/14/2025    CREATININE 0.4 (L) 02/14/2025     - On iHD MWF.   - Nephrology following. Appreciate assistance  - CRRT/HD per nephro  - Avoid nephrotoxic agents such as NSAIDs, gadolinium and IV radiocontrast.  - Renally dose meds to current GFR.  - Maintain MAP > 65    Oncology  Anemia of chronic renal failure, stage 5  Admit with hemoglobin 9.6 Baseline ~8-9    Lab Results   Component Value Date    IRON 26 (L) 12/20/2024    TIBC 209 (L) 12/20/2024    FERRITIN 2,808 (H)  "12/20/2024     Lab Results   Component Value Date    FOLATE 8.0 12/20/2024     Lab Results   Component Value Date    [id number] 770 12/20/2024     No results found for: "RETICCTPCT"    Likely secondary to anemia of chronic disease    Plan:   -   Lab Results   Component Value Date    HGB 7.7 (L) 02/14/2025       - Daily CBC   - Transfuse hgb <7    - Maintain type and screen        Critical Care Daily Checklist:    A: Awake: RASS Goal/Actual Goal:    Actual:     B: Spontaneous Breathing Trial Performed?     C: SAT & SBT Coordinated?  N/A                      D: Delirium: CAM-ICU     E: Early Mobility Performed? No   F: Feeding Goal: Goals: 1. Initiate tube feeds within 24-48 hours of admission    2. Advance tube feeds to goal rate within 72 hours of initiation.  Status: Nutrition Goal Status: new   Current Diet Order   Procedures    Diet Cardiac Fluid - 2000mL     Order Specific Question:   Fluid restriction:     Answer:   Fluid - 2000mL      AS: Analgesia/Sedation    T: Thromboembolic Prophylaxis Yes   H: HOB > 300 Yes   U: Stress Ulcer Prophylaxis (if needed)    G: Glucose Control 140-180 goal    B: Bowel Function Stool Occurrence: 1   I: Indwelling Catheter (Lines & Albrets) Necessity CVC, PIV    D: De-escalation of Antimicrobials/Pharmacotherapies Continue CTX     Plan for the day/ETD Delirium precautions. Replace electrolytes. Abx    Code Status:  Family/Goals of Care: Full Code       Critical Care Time: 45 minutes  Critical secondary to Patient has a condition that poses threat to life and bodily function: acute hypoxemic respiratory failure     Plan of care discussed with attending physician. Attestation to follow.       Critical care was time spent personally by me on the following activities: development of treatment plan with patient or surrogate and bedside caregivers, discussions with consultants, evaluation of patient's response to treatment, examination of patient, ordering and performing treatments and " interventions, ordering and review of laboratory studies, ordering and review of radiographic studies, pulse oximetry, re-evaluation of patient's condition. This critical care time did not overlap with that of any other provider or involve time for any procedures.     Blayne Lucero NP  Critical Care Medicine  [facility] - Medical ICU

## 2025-02-14 NOTE — PLAN OF CARE
MICU DAILY GOALS     Family/Goals of care/Code Status   Code Status: Full Code    24H Vital Sign Range  Temp:  [97.5 °F (36.4 °C)-97.8 °F (36.6 °C)]   Pulse:  []   Resp:  [20-42]   BP: (124-175)/(63-99)   SpO2:  [91 %-100 %]      Shift Events (include procedures and significant events)   Restraints discontinued; O2 weaned to his home level at 3Lnc; currently tolerating 3hrs of HD. Still needs frequent reorientation to place and situation.     AWAKE RASS: Goal -    Actual - RASS (Sam Agitation-Sedation Scale): restless    Restraint necessity:  discontinued   BREATHE SBT: Not intubated    Coordinate A & B, analgesics/sedatives Pain: managed   SAT: Not intubated   Delirium CAM-ICU:     Early(intubated/ Progressive (non-intubated) Mobility MOVE Screen (INTUBATED ONLY): Not intubated    Activity: Activity Management: Back in bed   Feeding/Nutrition Diet order: Diet/Nutrition Received: 2 gram sodium,     Thrombus DVT prophylaxis: VTE Core Measure: Pharmacological prophylaxis initiated/maintained   HOB Elevation Head of Bed (HOB) Positioning: HOB elevated   Ulcer Prophylaxis GI: yes   Glucose control managed Glycemic Management: blood glucose monitored   Skin Skin assessment:     Sacrum intact/not altered? No  Heels intact/not altered? Yes  Surgical wound? No    CHECK ONE!   (no altered skin or altered skin) and sub boxes:  [] No Altered Skin Integrity Present    []Prevention Measures Documented    [x] Altered Skin Integrity Present or Discovered   [x] LDA present in EPIC, daily doc completed              [] LDA added if not in EPIC (describe wound).                    When describing wound, do not stage, use descriptive words only.    [] Wound Image Taken (required on admit,                   transfer/discharge and every Tuesday)    Wound Care Consulted? No   Bowel Function no issues    Indwelling Catheter Necessity [REMOVED]      Urethral Catheter 02/09/25 2100 Temperature probe;Straight-tip 16 Fr.-Reason for  Continuing Urinary Catheterization: Critically ill in ICU and requiring hourly monitoring of intake/output         Hemodialysis Catheter 12/31/24 0818 right subclavian-Line Necessity Review: CRRT/HD     De-escalation Antibiotics No        VS and assessment per flow sheet, patient progressing towards goals as tolerated, plan of care reviewed with  the patient and updated his sister on the phone , all concerns addressed, will continue to monitor.

## 2025-02-14 NOTE — PROGRESS NOTES
02/14/25 0634   Treatment   Treatment Type SLED   Treatment Status Clotting;Blood lost;Discontinued treatment   Dialysis Machine Number [id number]   Dialyzer Time (hours) 7.13   BVP (Liters) 80.7 L   CRRT Comments clotted; unable to return blood.  Dr Oneill phoned. OK to d/c tx and made aware of additional clotting.  PRimary RN flushed CVC and capped.

## 2025-02-15 PROBLEM — E43 SEVERE MALNUTRITION: Status: ACTIVE | Noted: 2025-02-15

## 2025-02-15 PROBLEM — J15.0 KLEBSIELLA PNEUMONIA: Status: ACTIVE | Noted: 2025-02-15

## 2025-02-15 PROBLEM — E87.5 HYPERKALEMIA: Status: RESOLVED | Noted: 2024-12-24 | Resolved: 2025-02-15

## 2025-02-15 PROBLEM — B33.8 RSV (RESPIRATORY SYNCYTIAL VIRUS INFECTION): Status: ACTIVE | Noted: 2025-02-15

## 2025-02-15 PROBLEM — Z93.3 COLOSTOMY PRESENT ON ADMISSION: Status: ACTIVE | Noted: 2025-02-15

## 2025-02-15 LAB
ALBUMIN SERPL BCP-MCNC: 2.6 G/DL (ref 3.5–5.2)
ALP SERPL-CCNC: 161 U/L (ref 40–150)
ALT SERPL W/O P-5'-P-CCNC: 7 U/L (ref 10–44)
ANION GAP SERPL CALC-SCNC: 10 MMOL/L (ref 8–16)
ANION GAP SERPL CALC-SCNC: 11 MMOL/L (ref 8–16)
ANION GAP SERPL CALC-SCNC: 11 MMOL/L (ref 8–16)
AST SERPL-CCNC: 28 U/L (ref 10–40)
BASOPHILS # BLD AUTO: 0.03 K/UL (ref 0–0.2)
BASOPHILS NFR BLD: 1 % (ref 0–1.9)
BILIRUB SERPL-MCNC: 0.3 MG/DL (ref 0.1–1)
BUN SERPL-MCNC: 7 MG/DL (ref 8–23)
CALCIUM SERPL-MCNC: 8.9 MG/DL (ref 8.7–10.5)
CHLORIDE SERPL-SCNC: 107 MMOL/L (ref 95–110)
CO2 SERPL-SCNC: 21 MMOL/L (ref 23–29)
CREAT SERPL-MCNC: 1 MG/DL (ref 0.5–1.4)
CREAT SERPL-MCNC: 1.4 MG/DL (ref 0.5–1.4)
CREAT SERPL-MCNC: 1.4 MG/DL (ref 0.5–1.4)
DIFFERENTIAL METHOD BLD: ABNORMAL
EOSINOPHIL # BLD AUTO: 0.1 K/UL (ref 0–0.5)
EOSINOPHIL NFR BLD: 3.8 % (ref 0–8)
ERYTHROCYTE [DISTWIDTH] IN BLOOD BY AUTOMATED COUNT: 17.6 % (ref 11.5–14.5)
EST. GFR  (NO RACE VARIABLE): 54.4 ML/MIN/1.73 M^2
EST. GFR  (NO RACE VARIABLE): 54.4 ML/MIN/1.73 M^2
EST. GFR  (NO RACE VARIABLE): >60 ML/MIN/1.73 M^2
GLUCOSE SERPL-MCNC: 84 MG/DL (ref 70–110)
GLUCOSE SERPL-MCNC: 95 MG/DL (ref 70–110)
GLUCOSE SERPL-MCNC: 95 MG/DL (ref 70–110)
HCT VFR BLD AUTO: 23 % (ref 40–54)
HGB BLD-MCNC: 7.1 G/DL (ref 14–18)
IMM GRANULOCYTES # BLD AUTO: 0.01 K/UL (ref 0–0.04)
IMM GRANULOCYTES NFR BLD AUTO: 0.3 % (ref 0–0.5)
LYMPHOCYTES # BLD AUTO: 0.5 K/UL (ref 1–4.8)
LYMPHOCYTES NFR BLD: 16.5 % (ref 18–48)
MAGNESIUM SERPL-MCNC: 1.9 MG/DL (ref 1.6–2.6)
MAGNESIUM SERPL-MCNC: 1.9 MG/DL (ref 1.6–2.6)
MCH RBC QN AUTO: 30.9 PG (ref 27–31)
MCHC RBC AUTO-ENTMCNC: 30.9 G/DL (ref 32–36)
MCV RBC AUTO: 100 FL (ref 82–98)
MONOCYTES # BLD AUTO: 0.3 K/UL (ref 0.3–1)
MONOCYTES NFR BLD: 8.9 % (ref 4–15)
NEUTROPHILS # BLD AUTO: 2.2 K/UL (ref 1.8–7.7)
NEUTROPHILS NFR BLD: 69.5 % (ref 38–73)
NRBC BLD-RTO: 0 /100 WBC
OHS QRS DURATION: 108 MS
OHS QTC CALCULATION: 497 MS
PHOSPHATE SERPL-MCNC: 3.6 MG/DL (ref 2.7–4.5)
PHOSPHATE SERPL-MCNC: 3.8 MG/DL (ref 2.7–4.5)
PHOSPHATE SERPL-MCNC: 3.8 MG/DL (ref 2.7–4.5)
PLATELET # BLD AUTO: 104 K/UL (ref 150–450)
PMV BLD AUTO: 10.8 FL (ref 9.2–12.9)
POCT GLUCOSE: 148 MG/DL (ref 70–110)
POCT GLUCOSE: 215 MG/DL (ref 70–110)
POCT GLUCOSE: 38 MG/DL (ref 70–110)
POCT GLUCOSE: 44 MG/DL (ref 70–110)
POCT GLUCOSE: 48 MG/DL (ref 70–110)
POCT GLUCOSE: 49 MG/DL (ref 70–110)
POCT GLUCOSE: 72 MG/DL (ref 70–110)
POCT GLUCOSE: 73 MG/DL (ref 70–110)
POCT GLUCOSE: 84 MG/DL (ref 70–110)
POCT GLUCOSE: 86 MG/DL (ref 70–110)
POCT GLUCOSE: 94 MG/DL (ref 70–110)
POTASSIUM SERPL-SCNC: 3.9 MMOL/L (ref 3.5–5.1)
PROT SERPL-MCNC: 5.8 G/DL (ref 6–8.4)
RBC # BLD AUTO: 2.3 M/UL (ref 4.6–6.2)
SODIUM SERPL-SCNC: 138 MMOL/L (ref 136–145)
SODIUM SERPL-SCNC: 139 MMOL/L (ref 136–145)
SODIUM SERPL-SCNC: 139 MMOL/L (ref 136–145)
WBC # BLD AUTO: 3.15 K/UL (ref 3.9–12.7)

## 2025-02-15 PROCEDURE — 93005 ELECTROCARDIOGRAM TRACING: CPT

## 2025-02-15 PROCEDURE — 27000221 HC OXYGEN, UP TO 24 HOURS

## 2025-02-15 PROCEDURE — 63600175 PHARM REV CODE 636 W HCPCS

## 2025-02-15 PROCEDURE — 99233 SBSQ HOSP IP/OBS HIGH 50: CPT | Mod: ,,, | Performed by: PHYSICIAN ASSISTANT

## 2025-02-15 PROCEDURE — 85025 COMPLETE CBC W/AUTO DIFF WBC: CPT

## 2025-02-15 PROCEDURE — 63600175 PHARM REV CODE 636 W HCPCS: Performed by: INTERNAL MEDICINE

## 2025-02-15 PROCEDURE — 80069 RENAL FUNCTION PANEL: CPT | Performed by: STUDENT IN AN ORGANIZED HEALTH CARE EDUCATION/TRAINING PROGRAM

## 2025-02-15 PROCEDURE — 83735 ASSAY OF MAGNESIUM: CPT | Mod: 91 | Performed by: STUDENT IN AN ORGANIZED HEALTH CARE EDUCATION/TRAINING PROGRAM

## 2025-02-15 PROCEDURE — 99900035 HC TECH TIME PER 15 MIN (STAT)

## 2025-02-15 PROCEDURE — 83735 ASSAY OF MAGNESIUM: CPT | Performed by: STUDENT IN AN ORGANIZED HEALTH CARE EDUCATION/TRAINING PROGRAM

## 2025-02-15 PROCEDURE — 20000000 HC ICU ROOM

## 2025-02-15 PROCEDURE — 93010 ELECTROCARDIOGRAM REPORT: CPT | Mod: ,,, | Performed by: INTERNAL MEDICINE

## 2025-02-15 PROCEDURE — 25000003 PHARM REV CODE 250

## 2025-02-15 PROCEDURE — 27000207 HC ISOLATION

## 2025-02-15 PROCEDURE — 80053 COMPREHEN METABOLIC PANEL: CPT

## 2025-02-15 PROCEDURE — 84100 ASSAY OF PHOSPHORUS: CPT

## 2025-02-15 PROCEDURE — 94761 N-INVAS EAR/PLS OXIMETRY MLT: CPT

## 2025-02-15 PROCEDURE — 25000003 PHARM REV CODE 250: Performed by: INTERNAL MEDICINE

## 2025-02-15 PROCEDURE — 25000003 PHARM REV CODE 250: Performed by: NURSE PRACTITIONER

## 2025-02-15 RX ORDER — HALOPERIDOL 5 MG/ML
2.5 INJECTION INTRAMUSCULAR ONCE
Status: COMPLETED | OUTPATIENT
Start: 2025-02-15 | End: 2025-02-15

## 2025-02-15 RX ORDER — QUETIAPINE FUMARATE 25 MG/1
25 TABLET, FILM COATED ORAL NIGHTLY PRN
Status: DISCONTINUED | OUTPATIENT
Start: 2025-02-15 | End: 2025-02-19 | Stop reason: HOSPADM

## 2025-02-15 RX ADMIN — MUPIROCIN: 20 OINTMENT TOPICAL at 09:02

## 2025-02-15 RX ADMIN — HEPARIN SODIUM 5000 UNITS: 5000 INJECTION INTRAVENOUS; SUBCUTANEOUS at 10:02

## 2025-02-15 RX ADMIN — HEPARIN SODIUM 5000 UNITS: 5000 INJECTION INTRAVENOUS; SUBCUTANEOUS at 06:02

## 2025-02-15 RX ADMIN — HEPARIN SODIUM 5000 UNITS: 5000 INJECTION INTRAVENOUS; SUBCUTANEOUS at 02:02

## 2025-02-15 RX ADMIN — Medication 9 MG: at 11:02

## 2025-02-15 RX ADMIN — HALOPERIDOL LACTATE 2.5 MG: 5 INJECTION, SOLUTION INTRAMUSCULAR at 03:02

## 2025-02-15 RX ADMIN — LEVOTHYROXINE SODIUM 50 MCG: 0.05 TABLET ORAL at 06:02

## 2025-02-15 RX ADMIN — DEXTROSE MONOHYDRATE 25 G: 25 INJECTION, SOLUTION INTRAVENOUS at 09:02

## 2025-02-15 RX ADMIN — CEFTRIAXONE 2 G: 2 INJECTION, POWDER, FOR SOLUTION INTRAMUSCULAR; INTRAVENOUS at 04:02

## 2025-02-15 NOTE — ASSESSMENT & PLAN NOTE
- Delirium precautions   - Aspiration precautions   - Fall precautions   - Avoid sedating agents if possible   - Encourage nutrition   - repeat EKG; if QT not prolonged with order PRN seroquel

## 2025-02-15 NOTE — PROGRESS NOTES
[facility] - Medical [facility]  Critical Care Medicine  Progress Note    Patient Name: Flakito Mcginnis  MRN: [id number]  Admission Date: 2/9/2025  Hospital Length of Stay: 6 days  Code Status: Full Code  Attending Provider: Chika Simmons MD  Primary Care Provider: Jordin Robbins MD   Principal Problem: Acute hypoxic respiratory failure    Subjective:     HPI:  69yoM w/hx of HFrEF (EF 20-25%, 12/2024), NICM, MR, ESRD on HD, chronic respiratory failure (on 3L NC at home), Crohn's disease s/p colostomy, R nephrectomy who presents to the hospital from [facility] from acute onset of SOB. Given the acuity of patient's condition, history was obtained from the chart. Per the ED provider note, patient has had increased sputum production, cough, wheezing, and bilateral lower extremity edema. His last dialysis session was on Friday (2/7/25) but there is concern that he may not have completed a full session as patient reports feeling volume overloaded. He denies chest pain or fever. He was recently admitted to the hospital septic shock secondary to staph capitis bacteremia and endocarditis. Completed 6wk course of IV Cefazolin on 2/2/25.     In the ED, patient was hypoxic with increased WOB. Initially placed on BiPAP without much improvement in hypoxia so was intubated. Afebrile, other VSS. Labs were notable for Hgb 9.6, Hct 32.3, Na 132, K 8.3, Bicarb 19, BUN/Cr 38/4.8, , BNP >4900, HsTrop 44. Flu/COVID negative. RSV positive. VBG 7.46/35.1/33.3/25.5. CXR with ongoing vs recurrent small right pleural effusion. EKG with known 1st degree AV block and T-wave abnormality. Administered calcium gluconate, IV insulin/dextrose, and IV lasix for hyperkalemia. Nephrology consulted. Admitted to the MICU for further management and workup.       Hospital/ICU Course:  Mr. Mcginnis was admitted to the MICU for acute on chronic hypoxemic respiratory failure 2/2 RSV and Klebisella pneumonia on mechanical ventilation. Required low dose  vasopressors to tolerate SLED. D10 infusion for hypoglycemia. Echo with EF 20-25%, similar to previous study. Extubated to NC on 2/10 but required HFNC for ongoing hypoxemia. CRRT for volume removal per nephrology. Completing course of Rocephin. Weaned to home 3L on 2/14. Hospital course complicated by delirium and intermittent hypoglycemia.     Interval History/Significant Events: Ongoing delirium requiring 2.5 mg of haloperidol this morning. Tolerated HD trial yesterday afternoon. Intermittently hypoglycemic likely due to poor PO intake.     Review of Systems   Unable to perform ROS: Mental status change     Objective:     Vital Signs (Most Recent):  Temp: 97.8 °F (36.6 °C) (02/15/25 1500)  Pulse: 102 (02/15/25 1500)  Resp: (!) 23 (02/15/25 1500)  BP: (!) 141/69 (02/15/25 1500)  SpO2: 100 % (02/15/25 1500) Vital Signs (24h Range):  Temp:  [97.5 °F (36.4 °C)-98 °F (36.7 °C)] 97.8 °F (36.6 °C)  Pulse:  [] 102  Resp:  [15-65] 23  SpO2:  [92 %-100 %] 100 %  BP: (118-175)/(61-96) 141/69   Weight: 50.5 kg (111 lb 5.3 oz)  Body mass index is 17.97 kg/m².      Intake/Output Summary (Last 24 hours) at 2/15/2025 1541  Last data filed at 2/14/2025 1843  Gross per 24 hour   Intake --   Output 2650 ml   Net -2650 ml          Physical Exam  Vitals reviewed.   Constitutional:       General: He is not in acute distress.     Appearance: He is ill-appearing.   HENT:      Mouth/Throat:      Mouth: Mucous membranes are dry.      Pharynx: Oropharynx is clear. No oropharyngeal exudate.   Eyes:      General: No scleral icterus.     Pupils: Pupils are equal, round, and reactive to light.   Cardiovascular:      Rate and Rhythm: Normal rate and regular rhythm.      Pulses: Normal pulses.   Pulmonary:      Effort: Accessory muscle usage present.      Breath sounds: Rales present.      Comments: On 3L NC  Abdominal:      General: Bowel sounds are normal. There is no distension.      Palpations: Abdomen is soft.      Comments: Colostomy  in place   Musculoskeletal:      Right lower leg: No edema.      Left lower leg: No edema.   Skin:     General: Skin is warm and dry.      Capillary Refill: Capillary refill takes less than 2 seconds.      Findings: Bruising present.      Comments: Discoloration to bilateral toes   Neurological:      General: No focal deficit present.      Mental Status: He is lethargic and disoriented.      Comments: Rambling and repetitive            Vents: 3L NC    Lines/Drains/Airways       Central Venous Catheter Line  Duration                  Hemodialysis Catheter 12/31/24 0818 right subclavian 46 days              Drain  Duration                  Colostomy 12/19/24 2225 RLQ 57 days              Peripheral Intravenous Line  Duration                  Peripheral IV - Single Lumen 02/12/25 1120 20 G Anterior;Right Upper Arm 3 days                  Significant Labs:    CBC/Anemia Profile:  Recent Labs   Lab 02/14/25  0322 02/15/25  0429   WBC 3.42* 3.15*   HGB 7.7* 7.1*   HCT 25.3* 23.0*   * 104*   MCV 99* 100*   RDW 17.7* 17.6*        Chemistries:  Recent Labs   Lab 02/14/25  0322 02/14/25  0741 02/14/25  1325 02/15/25  0014 02/15/25  0429     142  142   < > 139 138 139  139   K 4.4  4.4  4.4  4.4   < > 4.9 3.9 3.9  3.9   *  111*  111*   < > 109 107 107  107   CO2 24  24  24   < > 20* 21* 21*  21*   BUN <3*  <3*  <3*   < > 10 7* 7*  7*   CREATININE 0.4*  0.4*  0.4*   < > 1.4 1.0 1.4  1.4   CALCIUM 7.9*  7.9*  7.9*   < > 8.9 8.9 8.9  8.9   ALBUMIN 2.5*  2.5*  --  2.6* 2.6* 2.6*  2.6*   PROT 5.7*  --   --   --  5.8*   BILITOT 0.3  --   --   --  0.3   ALKPHOS 163*  --   --   --  161*   ALT 6*  --   --   --  7*   AST 27  --   --   --  28   MG 1.8  --  2.0 1.9 1.9   PHOS <1.0*  <1.0*  --  7.4* 3.6 3.8  3.8    < > = values in this interval not displayed.       All pertinent labs within the past 24 hours have been reviewed.    Significant Imaging:  I have reviewed all pertinent imaging  results/findings within the past 24 hours.    ABG  Recent Labs   Lab 02/13/25  1121   PH 7.366   PO2 23*   PCO2 41.9   HCO3 24.0   BE -1     Assessment/Plan:     Neuro  Encephalopathy, metabolic  - Delirium precautions   - Aspiration precautions   - Fall precautions   - Avoid sedating agents if possible   - Encourage nutrition   - repeat EKG; if QT not prolonged with order PRN seroquel    Pulmonary  * Acute hypoxic respiratory failure  Patient with Hypoxic Respiratory failure which is Acute on chronic.  he is on home oxygen at 3 LPM. Supplemental oxygen was provided and noted-      Signs/symptoms of respiratory failure include- increased work of breathing and respiratory distress. Contributing diagnoses includes - CHF, Pleural effusion, and Pneumonia Labs and images were reviewed. Patient Has not had a recent ABG. Will treat underlying causes and adjust management of respiratory failure as follows-     Likely secondary to RSV and Klebsiella pneumonia. Also pulmonary edema/volume overload like contributing.     - Extubated to comfort flow on 2/11  - Continue supplemental O2  - Wean FiO2 for SpO2 > 92%   - Supportive care for RSV  - Klebsiella in respiratory culture. Treating with ceftriaxone. Stop date in place.   - Volume removal per RRT. Appreciate nephrology assistance    Klebsiella pneumonia  Completing with ceftriaxone. Stop date in place. See respiratory failure.     Cardiac/Vascular  Chronic systolic heart failure  Systolic and Diastolic Dysfunction. Most recent TTE (12/10/24) with EF 20-25% with severe global hypokinesis. EKG sinus tachycardia with 1st degree AV Block (same from prior EKG) and T-wave abnormality. BNP >4600. HsTrop 46. CXR with right lung base w/blunting suggesting small pleural effusion    - Will need to discuss GDMT as tolerated with hemodynamics   - Volume removal with HD   - Troponin peak at 43, downtrending  - Fluid restriction at 1500 cc with strict I/Os and daily weights    - Maintain  "on telemetry and daily EKGs   - Up to date risk stratification : TSH, Lipids, HbA1c with optimization of risk factors is necessary  - Check Electrolytes, keep Mag >2 & K+ >4  - SCDs, TEDs, Nursing communication to elevated LE   - Ambulate as tolerated     Renal/  Vascular dialysis catheter in place  Right chest tunneled dialysis catheter present. Sterile dressing in place.     ESRD on hemodialysis  Creatinine 4.8 on admit. S/p R Nephrectomy (due to retained uretal stent per chart review) and HD MWF    Plan:   Lab Results   Component Value Date    CREATININE 1.4 02/15/2025    CREATININE 1.4 02/15/2025     - On iHD MWF.   - Nephrology following. Appreciate assistance  - CRRT/HD per nephro  - Avoid nephrotoxic agents such as NSAIDs, gadolinium and IV radiocontrast.  - Renally dose meds to current GFR.  - Maintain MAP > 65    ID  RSV (respiratory syncytial virus infection)  See respiratory failure. Droplet precautions in place.     Oncology  Anemia of chronic renal failure, stage 5  Admit with hemoglobin 9.6 Baseline ~8-9    Lab Results   Component Value Date    IRON 26 (L) 12/20/2024    TIBC 209 (L) 12/20/2024    FERRITIN 2,808 (H) 12/20/2024     Lab Results   Component Value Date    FOLATE 8.0 12/20/2024     Lab Results   Component Value Date    [id number] 770 12/20/2024     No results found for: "RETICCTPCT"    Likely secondary to anemia of chronic disease    Plan:   -   Lab Results   Component Value Date    HGB 7.7 (L) 02/14/2025       - Daily CBC   - Transfuse hgb <7    - Maintain type and screen     Endocrine  Severe malnutrition  Nutrition consulted. Most recent weight and BMI monitored-     Measurements:  Wt Readings from Last 1 Encounters:   02/15/25 50.5 kg (111 lb 5.3 oz)   Body mass index is 17.97 kg/m².    Patient has been screened and assessed by RD. Last seen while intubated. Will re-consult.     Malnutrition Type:  Context:    Level:      Malnutrition Characteristic Summary:   "     Interventions/Recommendations (treatment strategy):         GI  Colostomy present on admission  Noted.     Palliative Care  ACP (advance care planning)  See ACP note by MICHEAL Salmeron PA-C on 2/15. Palliative care consulted for assistance.     Discussed with Dr. Simmons. Spoke with sister, Sara, over the telephone.     I have spent 35 min with this patient, with over 50% of this time spent coordinating care and speaking with the family       Elvia Salmeron PA-C  Critical Care Medicine  [facility] - Medical ICU

## 2025-02-15 NOTE — ASSESSMENT & PLAN NOTE
Creatinine 4.8 on admit. S/p R Nephrectomy (due to retained uretal stent per chart review) and HD MWF    Plan:   Lab Results   Component Value Date    CREATININE 1.4 02/15/2025    CREATININE 1.4 02/15/2025     - On iHD MWF.   - Nephrology following. Appreciate assistance  - CRRT/HD per nephro  - Avoid nephrotoxic agents such as NSAIDs, gadolinium and IV radiocontrast.  - Renally dose meds to current GFR.  - Maintain MAP > 65

## 2025-02-15 NOTE — SUBJECTIVE & OBJECTIVE
Interval History/Significant Events: Ongoing delirium requiring 2.5 mg of haloperidol this morning. Tolerated HD trial yesterday afternoon. Intermittently hypoglycemic likely due to poor PO intake.     Review of Systems   Unable to perform ROS: Mental status change     Objective:     Vital Signs (Most Recent):  Temp: 97.8 °F (36.6 °C) (02/15/25 1500)  Pulse: 102 (02/15/25 1500)  Resp: (!) 23 (02/15/25 1500)  BP: (!) 141/69 (02/15/25 1500)  SpO2: 100 % (02/15/25 1500) Vital Signs (24h Range):  Temp:  [97.5 °F (36.4 °C)-98 °F (36.7 °C)] 97.8 °F (36.6 °C)  Pulse:  [] 102  Resp:  [15-65] 23  SpO2:  [92 %-100 %] 100 %  BP: (118-175)/(61-96) 141/69   Weight: 50.5 kg (111 lb 5.3 oz)  Body mass index is 17.97 kg/m².      Intake/Output Summary (Last 24 hours) at 2/15/2025 1541  Last data filed at 2/14/2025 1843  Gross per 24 hour   Intake --   Output 2650 ml   Net -2650 ml          Physical Exam  Vitals reviewed.   Constitutional:       General: He is not in acute distress.     Appearance: He is ill-appearing.   HENT:      Mouth/Throat:      Mouth: Mucous membranes are dry.      Pharynx: Oropharynx is clear. No oropharyngeal exudate.   Eyes:      General: No scleral icterus.     Pupils: Pupils are equal, round, and reactive to light.   Cardiovascular:      Rate and Rhythm: Normal rate and regular rhythm.      Pulses: Normal pulses.   Pulmonary:      Effort: Accessory muscle usage present.      Breath sounds: Rales present.      Comments: On 3L NC  Abdominal:      General: Bowel sounds are normal. There is no distension.      Palpations: Abdomen is soft.      Comments: Colostomy in place   Musculoskeletal:      Right lower leg: No edema.      Left lower leg: No edema.   Skin:     General: Skin is warm and dry.      Capillary Refill: Capillary refill takes less than 2 seconds.      Findings: Bruising present.      Comments: Discoloration to bilateral toes   Neurological:      General: No focal deficit present.       Mental Status: He is lethargic and disoriented.      Comments: Rambling and repetitive            Vents: 3L NC    Lines/Drains/Airways       Central Venous Catheter Line  Duration                  Hemodialysis Catheter 12/31/24 0818 right subclavian 46 days              Drain  Duration                  Colostomy 12/19/24 2225 RLQ 57 days              Peripheral Intravenous Line  Duration                  Peripheral IV - Single Lumen 02/12/25 1120 20 G Anterior;Right Upper Arm 3 days                  Significant Labs:    CBC/Anemia Profile:  Recent Labs   Lab 02/14/25  0322 02/15/25  0429   WBC 3.42* 3.15*   HGB 7.7* 7.1*   HCT 25.3* 23.0*   * 104*   MCV 99* 100*   RDW 17.7* 17.6*        Chemistries:  Recent Labs   Lab 02/14/25 0322 02/14/25  0741 02/14/25  1325 02/15/25  0014 02/15/25  0429     142  142   < > 139 138 139  139   K 4.4  4.4  4.4  4.4   < > 4.9 3.9 3.9  3.9   *  111*  111*   < > 109 107 107  107   CO2 24  24  24   < > 20* 21* 21*  21*   BUN <3*  <3*  <3*   < > 10 7* 7*  7*   CREATININE 0.4*  0.4*  0.4*   < > 1.4 1.0 1.4  1.4   CALCIUM 7.9*  7.9*  7.9*   < > 8.9 8.9 8.9  8.9   ALBUMIN 2.5*  2.5*  --  2.6* 2.6* 2.6*  2.6*   PROT 5.7*  --   --   --  5.8*   BILITOT 0.3  --   --   --  0.3   ALKPHOS 163*  --   --   --  161*   ALT 6*  --   --   --  7*   AST 27  --   --   --  28   MG 1.8  --  2.0 1.9 1.9   PHOS <1.0*  <1.0*  --  7.4* 3.6 3.8  3.8    < > = values in this interval not displayed.       All pertinent labs within the past 24 hours have been reviewed.    Significant Imaging:  I have reviewed all pertinent imaging results/findings within the past 24 hours.

## 2025-02-15 NOTE — PROGRESS NOTES
02/14/25 1810   Vital Signs   Temp 97.7 °F (36.5 °C)   Temp Source Oral   Pulse 91   Heart Rate Source Monitor   Resp (!) 21   SpO2 100 %   Pulse Oximetry Type Continuous   Device (Oxygen Therapy) high flow nasal cannula   BP (!) 152/72   MAP (mmHg) 104   BP Location Right forearm   BP Method Automatic   Patient Position Lying     HD tx completed and pt tolerated well.  Dialyzed for 3hrs with net UF of 2L as ordered.  Pt is awake and alert with confusion. VSS  Report given to the primary RN.

## 2025-02-15 NOTE — CONSULTS
Brief consult acknowledgement note    Palliative medicine consult acknowledged and will be seen on Monday morning unless needed sooner.     Please call palliative MD on call for discussion or immediate assistance.     Thank you for the opportunity to care for this patient and family.     Please call with questions.     Jose Castellon MD  Palliative Medicine   [facility]  [phone] (cell)

## 2025-02-15 NOTE — PLAN OF CARE
MICU DAILY GOALS     Family/Goals of care/Code Status   Code Status: Full Code    24H Vital Sign Range  Temp:  [97.5 °F (36.4 °C)-98 °F (36.7 °C)]   Pulse:  []   Resp:  [13-65]   BP: (118-152)/(59-89)   SpO2:  [92 %-100 %]      Shift Events (include procedures and significant events)   No acute events throughout shift. Palliative consulted.    AWAKE RASS: Goal -    Actual - RASS (Sam Agitation-Sedation Scale): very agitated    Restraint necessity: Not necessary   BREATHE SBT: Not intubated    Coordinate A & B, analgesics/sedatives Pain: managed   SAT: Not intubated   Delirium CAM-ICU:     Early(intubated/ Progressive (non-intubated) Mobility MOVE Screen (INTUBATED ONLY): Not intubated    Activity: Activity Management: Arm raise - L1   Feeding/Nutrition Diet order: Diet/Nutrition Received: restrict fluids,     Thrombus DVT prophylaxis: VTE Core Measure: Pharmacological prophylaxis initiated/maintained   HOB Elevation Head of Bed (HOB) Positioning: HOB elevated   Ulcer Prophylaxis GI: yes   Glucose control managed Glycemic Management: blood glucose monitored   Skin Skin assessment:     Sacrum intact/not altered? No  Heels intact/not altered? Yes  Surgical wound? No    CHECK ONE!   (no altered skin or altered skin) and sub boxes:  [x] No Altered Skin Integrity Present    []Prevention Measures Documented    [] Altered Skin Integrity Present or Discovered   [] LDA present in EPIC, daily doc completed              [] LDA added if not in EPIC (describe wound).                    When describing wound, do not stage, use descriptive words only.    [] Wound Image Taken (required on admit,                   transfer/discharge and every Tuesday)    Wound Care Consulted? No   Bowel Function ostomy     Indwelling Catheter Necessity [REMOVED]      Urethral Catheter 02/09/25 2100 Temperature probe;Straight-tip 16 Fr.-Reason for Continuing Urinary Catheterization: Critically ill in ICU and requiring hourly monitoring of  intake/output         Hemodialysis Catheter 12/31/24 0818 right subclavian-Line Necessity Review: CRRT/HD     De-escalation Antibiotics No        VS and assessment per flow sheet, patient progressing towards goals as tolerated, plan of care reviewed with patient and sister , all concerns addressed, will continue to monitor.

## 2025-02-15 NOTE — ACP (ADVANCE CARE PLANNING)
Advance Care Planning     Date: 02/15/2025    Spoke with sister, Sara, over the telephone to provide a clinical update. Expressed overall concerns for patient's long term trajectory with his complex medical history, specifically HFrEF (EF 20%), ESRD on dialysis, severe malnutrition, colostomy, and frequent hospitalizations. We discussed patient's wishes at the end of life. Sister states he has a Living Will that states he does not want to be kept alive on machines if felt there was no meaningful chance of recovery. I recommended against CPR in the event of cardiac arrest as CPR will not provide any additional benefit and can potentially cause discomfort. Sister became upset with that recommendation. I apologized for upsetting her and tried to continue conversation but she discontinued the call.     Will consult palliative care for assistance with ongoing GOCs.        A total of 25 min was spent on advance care planning, goals of care discussion, emotional support, formulating and communicating prognosis and exploring burden/benefit of various approaches of treatment. This discussion occurred on a fully voluntary basis with the verbal consent of the patient and/or family.      Elvia Salmeron PA-C  Critical Care Medicine  2/15/2025   3:35 PM

## 2025-02-15 NOTE — PLAN OF CARE
MICU DAILY GOALS     Family/Goals of care/Code Status   Code Status: Full Code    24H Vital Sign Range  Temp:  [97.5 °F (36.4 °C)-98 °F (36.7 °C)]   Pulse:  []   Resp:  [19-65]   BP: (118-175)/(61-96)   SpO2:  [91 %-100 %]      Shift Events (include procedures and significant events)   Severely agitated overnight received 2.5mg IVP haldol with mild improvement. Intermittently oriented. Improved O2 requirements on 2L NC sats 96%    AWAKE RASS: Goal -    Actual - RASS (Sam Agitation-Sedation Scale): very agitated    Restraint necessity: Not necessary   BREATHE SBT: Not intubated    Coordinate A & B, analgesics/sedatives Pain: managed   SAT: Not intubated   Delirium CAM-ICU:     Early(intubated/ Progressive (non-intubated) Mobility MOVE Screen (INTUBATED ONLY): Not intubated    Activity: Activity Management: Rolling - L1   Feeding/Nutrition Diet order: Diet/Nutrition Received: restrict fluids,     Thrombus DVT prophylaxis: VTE Core Measure: Pharmacological prophylaxis initiated/maintained   HOB Elevation Head of Bed (HOB) Positioning: HOB at 30 degrees   Ulcer Prophylaxis GI: yes   Glucose control managed Glycemic Management: blood glucose monitored   Skin Skin assessment:     Sacrum intact/not altered? Yes  Heels intact/not altered? Yes  Surgical wound? No    CHECK ONE!   (no altered skin or altered skin) and sub boxes:  [] No Altered Skin Integrity Present    []Prevention Measures Documented    [] Altered Skin Integrity Present or Discovered   [] LDA present in EPIC, daily doc completed              [] LDA added if not in EPIC (describe wound).                    When describing wound, do not stage, use descriptive words only.    [] Wound Image Taken (required on admit,                   transfer/discharge and every Tuesday)    Wound Care Consulted? No   Bowel Function colostomy    Indwelling Catheter Necessity [REMOVED]      Urethral Catheter 02/09/25 2100 Temperature probe;Straight-tip 16 Fr.-Reason for  Continuing Urinary Catheterization: Critically ill in ICU and requiring hourly monitoring of intake/output         Hemodialysis Catheter 12/31/24 0876 right subclavian-Line Necessity Review: CRRT/HD     De-escalation Antibiotics Yes

## 2025-02-15 NOTE — ASSESSMENT & PLAN NOTE
Patient with Hypoxic Respiratory failure which is Acute on chronic.  he is on home oxygen at 3 LPM. Supplemental oxygen was provided and noted-      Signs/symptoms of respiratory failure include- increased work of breathing and respiratory distress. Contributing diagnoses includes - CHF, Pleural effusion, and Pneumonia Labs and images were reviewed. Patient Has not had a recent ABG. Will treat underlying causes and adjust management of respiratory failure as follows-     Likely secondary to RSV and Klebsiella pneumonia. Also pulmonary edema/volume overload like contributing.     - Extubated to comfort flow on 2/11  - Continue supplemental O2  - Wean FiO2 for SpO2 > 92%   - Supportive care for RSV  - Klebsiella in respiratory culture. Treating with ceftriaxone. Stop date in place.   - Volume removal per RRT. Appreciate nephrology assistance

## 2025-02-15 NOTE — CLINICAL REVIEW
Nephrology Chart Review      Intake/Output Summary (Last 24 hours) at 2/15/2025 1041  Last data filed at 2/14/2025 1843  Gross per 24 hour   Intake 376.33 ml   Output 2650 ml   Net -2273.67 ml       Vitals:    02/15/25 0800 02/15/25 0811 02/15/25 0900 02/15/25 1000   BP: (!) 141/73  132/64 134/68   BP Location:   Right forearm    Patient Position:   Lying    Pulse: 107 84 85 83   Resp: (!) 37  (!) 23 (!) 29   Temp:       TempSrc:       SpO2: (!) 93%  98% 100%   Weight:       Height:           Recent Labs   Lab 02/14/25  1325 02/15/25  0014 02/15/25  0429    138 139  139   K 4.9 3.9 3.9  3.9    107 107  107   CO2 20* 21* 21*  21*   BUN 10 7* 7*  7*   CREATININE 1.4 1.0 1.4  1.4   CALCIUM 8.9 8.9 8.9  8.9   PHOS 7.4* 3.6 3.8  3.8       Labs are stable for today, last HD done yesterday with 2 liters net UF removed.        Shakira Munoz MD  Nephrology Fellow

## 2025-02-15 NOTE — ASSESSMENT & PLAN NOTE
Nutrition consulted. Most recent weight and BMI monitored-     Measurements:  Wt Readings from Last 1 Encounters:   02/15/25 50.5 kg (111 lb 5.3 oz)   Body mass index is 17.97 kg/m².    Patient has been screened and assessed by RD. Last seen while intubated. Will re-consult.     Malnutrition Type:  Context:    Level:      Malnutrition Characteristic Summary:       Interventions/Recommendations (treatment strategy):

## 2025-02-16 PROBLEM — R41.0 DELIRIUM: Status: ACTIVE | Noted: 2025-02-14

## 2025-02-16 PROBLEM — D69.6 THROMBOCYTOPENIA: Status: ACTIVE | Noted: 2025-02-16

## 2025-02-16 PROBLEM — E03.9 HYPOTHYROIDISM: Status: ACTIVE | Noted: 2025-02-16

## 2025-02-16 PROBLEM — I50.23 ACUTE ON CHRONIC SYSTOLIC HEART FAILURE: Status: ACTIVE | Noted: 2024-12-11

## 2025-02-16 PROBLEM — E83.39 HYPERPHOSPHATEMIA: Status: ACTIVE | Noted: 2025-02-16

## 2025-02-16 LAB
ALBUMIN SERPL BCP-MCNC: 2.5 G/DL (ref 3.5–5.2)
ALBUMIN SERPL BCP-MCNC: 2.5 G/DL (ref 3.5–5.2)
ALP SERPL-CCNC: 170 U/L (ref 40–150)
ALT SERPL W/O P-5'-P-CCNC: 7 U/L (ref 10–44)
ANION GAP SERPL CALC-SCNC: 8 MMOL/L (ref 8–16)
ANION GAP SERPL CALC-SCNC: 8 MMOL/L (ref 8–16)
AST SERPL-CCNC: 25 U/L (ref 10–40)
BASOPHILS # BLD AUTO: 0.02 K/UL (ref 0–0.2)
BASOPHILS NFR BLD: 0.6 % (ref 0–1.9)
BILIRUB SERPL-MCNC: 0.2 MG/DL (ref 0.1–1)
BUN SERPL-MCNC: 15 MG/DL (ref 8–23)
BUN SERPL-MCNC: 15 MG/DL (ref 8–23)
CALCIUM SERPL-MCNC: 8.9 MG/DL (ref 8.7–10.5)
CALCIUM SERPL-MCNC: 8.9 MG/DL (ref 8.7–10.5)
CHLORIDE SERPL-SCNC: 108 MMOL/L (ref 95–110)
CHLORIDE SERPL-SCNC: 108 MMOL/L (ref 95–110)
CO2 SERPL-SCNC: 25 MMOL/L (ref 23–29)
CO2 SERPL-SCNC: 25 MMOL/L (ref 23–29)
CREAT SERPL-MCNC: 2.4 MG/DL (ref 0.5–1.4)
CREAT SERPL-MCNC: 2.4 MG/DL (ref 0.5–1.4)
DIFFERENTIAL METHOD BLD: ABNORMAL
EOSINOPHIL # BLD AUTO: 0.2 K/UL (ref 0–0.5)
EOSINOPHIL NFR BLD: 7.2 % (ref 0–8)
ERYTHROCYTE [DISTWIDTH] IN BLOOD BY AUTOMATED COUNT: 17.5 % (ref 11.5–14.5)
EST. GFR  (NO RACE VARIABLE): 28.5 ML/MIN/1.73 M^2
EST. GFR  (NO RACE VARIABLE): 28.5 ML/MIN/1.73 M^2
GLUCOSE SERPL-MCNC: 79 MG/DL (ref 70–110)
GLUCOSE SERPL-MCNC: 79 MG/DL (ref 70–110)
HCT VFR BLD AUTO: 23 % (ref 40–54)
HGB BLD-MCNC: 7 G/DL (ref 14–18)
IMM GRANULOCYTES # BLD AUTO: 0 K/UL (ref 0–0.04)
IMM GRANULOCYTES NFR BLD AUTO: 0 % (ref 0–0.5)
LYMPHOCYTES # BLD AUTO: 0.6 K/UL (ref 1–4.8)
LYMPHOCYTES NFR BLD: 18.6 % (ref 18–48)
MAGNESIUM SERPL-MCNC: 2 MG/DL (ref 1.6–2.6)
MCH RBC QN AUTO: 30.2 PG (ref 27–31)
MCHC RBC AUTO-ENTMCNC: 30.4 G/DL (ref 32–36)
MCV RBC AUTO: 99 FL (ref 82–98)
MONOCYTES # BLD AUTO: 0.3 K/UL (ref 0.3–1)
MONOCYTES NFR BLD: 10.7 % (ref 4–15)
NEUTROPHILS # BLD AUTO: 2 K/UL (ref 1.8–7.7)
NEUTROPHILS NFR BLD: 62.9 % (ref 38–73)
NRBC BLD-RTO: 0 /100 WBC
OHS QRS DURATION: 106 MS
OHS QTC CALCULATION: 484 MS
PHOSPHATE SERPL-MCNC: 4.8 MG/DL (ref 2.7–4.5)
PHOSPHATE SERPL-MCNC: 4.8 MG/DL (ref 2.7–4.5)
PLATELET # BLD AUTO: 130 K/UL (ref 150–450)
PMV BLD AUTO: 10.9 FL (ref 9.2–12.9)
POCT GLUCOSE: 136 MG/DL (ref 70–110)
POCT GLUCOSE: 256 MG/DL (ref 70–110)
POCT GLUCOSE: 88 MG/DL (ref 70–110)
POTASSIUM SERPL-SCNC: 4.3 MMOL/L (ref 3.5–5.1)
POTASSIUM SERPL-SCNC: 4.3 MMOL/L (ref 3.5–5.1)
PROT SERPL-MCNC: 5.7 G/DL (ref 6–8.4)
RBC # BLD AUTO: 2.32 M/UL (ref 4.6–6.2)
SODIUM SERPL-SCNC: 141 MMOL/L (ref 136–145)
SODIUM SERPL-SCNC: 141 MMOL/L (ref 136–145)
WBC # BLD AUTO: 3.18 K/UL (ref 3.9–12.7)

## 2025-02-16 PROCEDURE — 25000003 PHARM REV CODE 250

## 2025-02-16 PROCEDURE — 63600175 PHARM REV CODE 636 W HCPCS

## 2025-02-16 PROCEDURE — 83735 ASSAY OF MAGNESIUM: CPT | Performed by: STUDENT IN AN ORGANIZED HEALTH CARE EDUCATION/TRAINING PROGRAM

## 2025-02-16 PROCEDURE — 85025 COMPLETE CBC W/AUTO DIFF WBC: CPT

## 2025-02-16 PROCEDURE — 94760 N-INVAS EAR/PLS OXIMETRY 1: CPT

## 2025-02-16 PROCEDURE — 27000221 HC OXYGEN, UP TO 24 HOURS

## 2025-02-16 PROCEDURE — 94761 N-INVAS EAR/PLS OXIMETRY MLT: CPT

## 2025-02-16 PROCEDURE — 80053 COMPREHEN METABOLIC PANEL: CPT

## 2025-02-16 PROCEDURE — 27000207 HC ISOLATION

## 2025-02-16 PROCEDURE — 63600175 PHARM REV CODE 636 W HCPCS: Performed by: PHYSICIAN ASSISTANT

## 2025-02-16 PROCEDURE — 99900035 HC TECH TIME PER 15 MIN (STAT)

## 2025-02-16 PROCEDURE — 20600001 HC STEP DOWN PRIVATE ROOM

## 2025-02-16 PROCEDURE — 84100 ASSAY OF PHOSPHORUS: CPT

## 2025-02-16 PROCEDURE — 25000003 PHARM REV CODE 250: Performed by: INTERNAL MEDICINE

## 2025-02-16 PROCEDURE — 99233 SBSQ HOSP IP/OBS HIGH 50: CPT | Mod: ,,, | Performed by: PHYSICIAN ASSISTANT

## 2025-02-16 PROCEDURE — 25000003 PHARM REV CODE 250: Performed by: PHYSICIAN ASSISTANT

## 2025-02-16 RX ORDER — TALC
9 POWDER (GRAM) TOPICAL NIGHTLY
Status: DISCONTINUED | OUTPATIENT
Start: 2025-02-16 | End: 2025-02-19 | Stop reason: HOSPADM

## 2025-02-16 RX ORDER — MIDODRINE HYDROCHLORIDE 5 MG/1
10 TABLET ORAL
Status: DISCONTINUED | OUTPATIENT
Start: 2025-02-16 | End: 2025-02-19 | Stop reason: HOSPADM

## 2025-02-16 RX ORDER — CEFTRIAXONE 2 G/1
2 INJECTION, POWDER, FOR SOLUTION INTRAMUSCULAR; INTRAVENOUS ONCE
Status: COMPLETED | OUTPATIENT
Start: 2025-02-16 | End: 2025-02-16

## 2025-02-16 RX ADMIN — HEPARIN SODIUM 5000 UNITS: 5000 INJECTION INTRAVENOUS; SUBCUTANEOUS at 05:02

## 2025-02-16 RX ADMIN — PANTOPRAZOLE SODIUM 40 MG: 40 TABLET, DELAYED RELEASE ORAL at 08:02

## 2025-02-16 RX ADMIN — HEPARIN SODIUM 5000 UNITS: 5000 INJECTION INTRAVENOUS; SUBCUTANEOUS at 10:02

## 2025-02-16 RX ADMIN — CEFTRIAXONE 2 G: 2 INJECTION, POWDER, FOR SOLUTION INTRAMUSCULAR; INTRAVENOUS at 05:02

## 2025-02-16 RX ADMIN — DEXTROSE MONOHYDRATE 12.5 G: 25 INJECTION, SOLUTION INTRAVENOUS at 05:02

## 2025-02-16 RX ADMIN — MUPIROCIN: 20 OINTMENT TOPICAL at 08:02

## 2025-02-16 RX ADMIN — Medication 9 MG: at 10:02

## 2025-02-16 RX ADMIN — HEPARIN SODIUM 5000 UNITS: 5000 INJECTION INTRAVENOUS; SUBCUTANEOUS at 01:02

## 2025-02-16 RX ADMIN — LEVOTHYROXINE SODIUM 50 MCG: 0.05 TABLET ORAL at 05:02

## 2025-02-16 NOTE — ASSESSMENT & PLAN NOTE
Patient's most recent phosphorus results are listed below.   Recent Labs     02/16/25  0334 02/17/25  0323 02/18/25  0540   PHOS 4.8*  4.8* 4.7*  4.7* 4.1     Plan  - Will treat hyperphosphatemia with HD per Nephro  - Patient's hyperphosphatemia is worsening. Will continue current treatment  -resume home sevelamer

## 2025-02-16 NOTE — SUBJECTIVE & OBJECTIVE
Interval History/Significant Events: Mentation much improved. Weaned to home 3L NC. Stable for step down.     Review of Systems   Respiratory:  Positive for cough and shortness of breath.    Gastrointestinal:  Negative for abdominal pain.     Objective:     Vital Signs (Most Recent):  Temp: 98.1 °F (36.7 °C) (02/16/25 1500)  Pulse: 90 (02/16/25 1700)  Resp: (!) 98 (02/16/25 1500)  BP: 128/77 (02/16/25 1700)  SpO2: 99 % (02/16/25 1700) Vital Signs (24h Range):  Temp:  [97.9 °F (36.6 °C)-98.3 °F (36.8 °C)] 98.1 °F (36.7 °C)  Pulse:  [] 90  Resp:  [] 98  SpO2:  [94 %-100 %] 99 %  BP: (113-140)/(57-77) 128/77   Weight: 52.5 kg (115 lb 11.9 oz)  Body mass index is 18.68 kg/m².      Intake/Output Summary (Last 24 hours) at 2/16/2025 1713  Last data filed at 2/16/2025 1709  Gross per 24 hour   Intake 1202 ml   Output 1000 ml   Net 202 ml          Physical Exam  Vitals reviewed.   Constitutional:       General: He is not in acute distress.     Appearance: He is ill-appearing.   HENT:      Mouth/Throat:      Mouth: Mucous membranes are dry.      Pharynx: Oropharynx is clear. No oropharyngeal exudate.   Eyes:      General: No scleral icterus.     Pupils: Pupils are equal, round, and reactive to light.   Cardiovascular:      Rate and Rhythm: Normal rate and regular rhythm.      Pulses: Normal pulses.   Pulmonary:      Effort: Accessory muscle usage present.      Breath sounds: Rales present.      Comments: On 3L NC  Abdominal:      General: Bowel sounds are normal. There is no distension.      Palpations: Abdomen is soft.      Comments: Colostomy in place   Musculoskeletal:      Right lower leg: No edema.      Left lower leg: No edema.   Skin:     General: Skin is warm and dry.      Capillary Refill: Capillary refill takes less than 2 seconds.      Findings: Bruising present.      Comments: Discoloration to bilateral toes   Neurological:      General: No focal deficit present.      Mental Status: He is alert.  Mental status is at baseline.            Vents: 3L NC    Lines/Drains/Airways       Central Venous Catheter Line  Duration                  Hemodialysis Catheter 12/31/24 0818 right subclavian 47 days              Drain  Duration                  Colostomy 12/19/24 2225 RLQ 58 days              Peripheral Intravenous Line  Duration                  Peripheral IV - Single Lumen 02/12/25 1120 20 G Anterior;Right Upper Arm 4 days                  Significant Labs:    CBC/Anemia Profile:  Recent Labs   Lab 02/15/25  0429 02/16/25  0334   WBC 3.15* 3.18*   HGB 7.1* 7.0*   HCT 23.0* 23.0*   * 130*   * 99*   RDW 17.6* 17.5*        Chemistries:  Recent Labs   Lab 02/15/25  0014 02/15/25  0429 02/16/25  0334    139  139 141  141   K 3.9 3.9  3.9 4.3  4.3    107  107 108  108   CO2 21* 21*  21* 25  25   BUN 7* 7*  7* 15  15   CREATININE 1.0 1.4  1.4 2.4*  2.4*   CALCIUM 8.9 8.9  8.9 8.9  8.9   ALBUMIN 2.6* 2.6*  2.6* 2.5*  2.5*   PROT  --  5.8* 5.7*   BILITOT  --  0.3 0.2   ALKPHOS  --  161* 170*   ALT  --  7* 7*   AST  --  28 25   MG 1.9 1.9 2.0   PHOS 3.6 3.8  3.8 4.8*  4.8*       All pertinent labs within the past 24 hours have been reviewed.    Significant Imaging:  I have reviewed all pertinent imaging results/findings within the past 24 hours.

## 2025-02-16 NOTE — ASSESSMENT & PLAN NOTE
Malnutrition Type:  Context: acute illness or injury  Level: severe    Related to (etiology):   Inadequate protein- calorie intake    Signs and Symptoms (as evidenced by):   Significant wt loss (-5% x 1-2weeks), and moderate to severe muscle/fat wasting    Malnutrition Characteristic Summary:  Weight Loss (Malnutrition): greater than 2% in 1 week (-5% x 1-2 weeks)  Subcutaneous Fat (Malnutrition):  (mild to moderate)  Muscle Mass (Malnutrition): severe depletion    Interventions/Recommendations (treatment strategy):  Collaboration of nutritional care with other providers.       Nutrition Diagnosis Status:   New

## 2025-02-16 NOTE — ASSESSMENT & PLAN NOTE
"Patient has Systolic (HFrEF) heart failure that is Acute on chronic. On presentation their CHF was decompensated. Evidence of decompensated CHF on presentation includes: edema. The etiology of their decompensation is likely multi-factorial . Most recent BNP and echo results are listed below.  No results for input(s): "BNP" in the last 72 hours.  Latest ECHO  Results for orders placed during the hospital encounter of 02/09/25    Echo    Interpretation Summary    Left Ventricle: The left ventricle is mildly dilated. Mildly increased ventricular mass. Normal wall thickness. There is eccentric hypertrophy. Regional wall motion abnormalities present. See diagram for wall motion findings. Septal motion is abnormal. There is severely reduced systolic function with a visually estimated ejection fraction of 20 - 25%. Ejection fraction is approximately 23%. There is normal diastolic function.    Right Ventricle: Mild right ventricular enlargement. Wall thickness is normal. Systolic function is moderately reduced.    Right Atrium: Right atrium is moderately dilated.    Aortic Valve: There is moderate aortic valve sclerosis. There is annular calcification present. Moderately restricted motion. There is moderate to severe stenosis. Aortic valve area by VTI is 1.1 cm2. Aortic valve peak velocity is 3.2 m/s. Mean gradient is 23 mmHg. The dimensionless index is 0.34. There is mild to moderate aortic regurgitation.    Mitral Valve: There is moderate bileaflet sclerosis. There is moderate mitral annular calcification present. There is mild regurgitation.    Tricuspid Valve: Mildly thickened leaflets. There is moderate to moderate severe regurgitation.    IVC/SVC: Patient is ventilated, cannot use IVC diameter to estimate right atrial pressure.    Pericardium: Left pleural effusion with a large density within the effusion.    Current Heart Failure Medications       Plan  - Monitor strict I&Os and daily weights.    - Place on " telemetry  - Low sodium diet  - Place on fluid restriction of 1.5 L.   - Cardiology has not been consulted  - The patient's volume status is improving but not at their baseline as indicated by edema  -GDMT as tolerated with hemodynamics

## 2025-02-16 NOTE — ASSESSMENT & PLAN NOTE
Nutrition consulted. Most recent weight and BMI monitored-     Measurements:  Wt Readings from Last 1 Encounters:   02/16/25 52.5 kg (115 lb 11.9 oz)   Body mass index is 18.68 kg/m².    Patient has been screened and assessed by RD.    Malnutrition Type:  Context: acute illness or injury  Level: severe    Malnutrition Characteristic Summary:  Weight Loss (Malnutrition): greater than 2% in 1 week (-5% x 1-2 weeks)  Subcutaneous Fat (Malnutrition):  (mild to moderate)  Muscle Mass (Malnutrition): severe depletion    Interventions/Recommendations (treatment strategy):  1.)

## 2025-02-16 NOTE — PLAN OF CARE
MICU DAILY GOALS     Family/Goals of care/Code Status   Code Status: Full Code    24H Vital Sign Range  Temp:  [97.5 °F (36.4 °C)-98.3 °F (36.8 °C)]   Pulse:  []   Resp:  [13-37]   BP: (116-146)/(57-89)   SpO2:  [93 %-100 %]      Shift Events (include procedures and significant events)   No acute events throughout shift    AWAKE RASS: Goal -    Actual - RASS (Sam Agitation-Sedation Scale): very agitated    Restraint necessity: Not necessary   BREATHE SBT: Not intubated    Coordinate A & B, analgesics/sedatives Pain: managed   SAT: Not intubated   Delirium CAM-ICU:     Early(intubated/ Progressive (non-intubated) Mobility MOVE Screen (INTUBATED ONLY): Not intubated    Activity: Activity Management: Arm raise - L1, Rolling - L1   Feeding/Nutrition Diet order: Diet/Nutrition Received: mechanical/dental soft,     Thrombus DVT prophylaxis: VTE Core Measure: Pharmacological prophylaxis initiated/maintained   HOB Elevation Head of Bed (HOB) Positioning: HOB at 30-45 degrees   Ulcer Prophylaxis GI: yes   Glucose control managed Glycemic Management: blood glucose monitored   Skin Skin assessment:     Sacrum intact/not altered? No  Heels intact/not altered? Yes  Surgical wound? No    CHECK ONE!   (no altered skin or altered skin) and sub boxes:  [] No Altered Skin Integrity Present    []Prevention Measures Documented    [x] Altered Skin Integrity Present or Discovered   [x] LDA present in EPIC, daily doc completed              [] LDA added if not in EPIC (describe wound).                    When describing wound, do not stage, use descriptive words only.    [] Wound Image Taken (required on admit,                   transfer/discharge and every Tuesday)    Wound Care Consulted? Yes   Bowel Function no issues    Indwelling Catheter Necessity [REMOVED]      Urethral Catheter 02/09/25 2100 Temperature probe;Straight-tip 16 Fr.-Reason for Continuing Urinary Catheterization: Critically ill in ICU and requiring hourly  monitoring of intake/output         Hemodialysis Catheter 12/31/24 0818 right subclavian-Line Necessity Review: CRRT/HD  yes   De-escalation Antibiotics Yes        VS and assessment per flow sheet, patient progressing towards goals as tolerated, plan of care reviewed with family  , all concerns addressed, will continue to monitor.

## 2025-02-16 NOTE — HPI
Per CCM:  69yoM w/hx of HFrEF (EF 20-25%, 12/2024), NICM, MR, ESRD on HD, chronic respiratory failure (on 3L NC at home), Crohn's disease s/p colostomy, R nephrectomy who presents to the hospital from [facility] from acute onset of SOB. Given the acuity of patient's condition, history was obtained from the chart. Per the ED provider note, patient has had increased sputum production, cough, wheezing, and bilateral lower extremity edema. His last dialysis session was on Friday (2/7/25) but there is concern that he may not have completed a full session as patient reports feeling volume overloaded. He denies chest pain or fever. He was recently admitted to the hospital septic shock secondary to staph capitis bacteremia and endocarditis. Completed 6wk course of IV Cefazolin on 2/2/25.      In the ED, patient was hypoxic with increased WOB. Initially placed on BiPAP without much improvement in hypoxia so was intubated. Afebrile, other VSS. Labs were notable for Hgb 9.6, Hct 32.3, Na 132, K 8.3, Bicarb 19, BUN/Cr 38/4.8, , BNP >4900, HsTrop 44. Flu/COVID negative. RSV positive. VBG 7.46/35.1/33.3/25.5. CXR with ongoing vs recurrent small right pleural effusion. EKG with known 1st degree AV block and T-wave abnormality. Administered calcium gluconate, IV insulin/dextrose, and IV lasix for hyperkalemia. Nephrology consulted. Admitted to the MICU for further management and workup.

## 2025-02-16 NOTE — PLAN OF CARE
MICU DAILY GOALS     Family/Goals of care/Code Status   Code Status: Full Code    24H Vital Sign Range  Temp:  [97.9 °F (36.6 °C)-98.3 °F (36.8 °C)]   Pulse:  []   Resp:  []   BP: (113-140)/(57-76)   SpO2:  [94 %-100 %]      Shift Events (include procedures and significant events)   Patient able to help feed himself, use phone and call bell    AWAKE RASS: Goal -    Actual - RASS (Sam Agitation-Sedation Scale): very agitated    Restraint necessity: Not necessary   BREATHE SBT: Not intubated    Coordinate A & B, analgesics/sedatives Pain: managed   SAT: Not intubated   Delirium CAM-ICU:     Early(intubated/ Progressive (non-intubated) Mobility MOVE Screen (INTUBATED ONLY): Not intubated    Activity: Activity Management: Rolling - L1   Feeding/Nutrition Diet order: Diet/Nutrition Received: mechanical/dental soft,     Thrombus DVT prophylaxis: VTE Core Measure: Pharmacological prophylaxis initiated/maintained   HOB Elevation Head of Bed (HOB) Positioning: HOB elevated   Ulcer Prophylaxis GI: yes   Glucose control managed Glycemic Management: blood glucose monitored   Skin Skin assessment:     Sacrum intact/not altered? No  Heels intact/not altered? Yes  Surgical wound? No    CHECK ONE!   (no altered skin or altered skin) and sub boxes:  [] No Altered Skin Integrity Present    []Prevention Measures Documented    [] Altered Skin Integrity Present or Discovered   [] LDA present in EPIC, daily doc completed              [] LDA added if not in EPIC (describe wound).                    When describing wound, do not stage, use descriptive words only.    [] Wound Image Taken (required on admit,                   transfer/discharge and every Tuesday)    Wound Care Consulted? No   Bowel Function ostomy     Indwelling Catheter Necessity [REMOVED]      Urethral Catheter 02/09/25 2100 Temperature probe;Straight-tip 16 Fr.-Reason for Continuing Urinary Catheterization: Critically ill in ICU and requiring hourly  monitoring of intake/output         Hemodialysis Catheter 12/31/24 0857 right subclavian-Line Necessity Review: CRRT/HD     De-escalation Antibiotics No        VS and assessment per flow sheet, patient progressing towards goals as tolerated, plan of care reviewed with sister and patient, concerns addressed, will continue to monitor.

## 2025-02-16 NOTE — CLINICAL REVIEW
Nephrology Chart Review  RODRIGO DILL, no acute needs of RRT.    Plan/Recommendations;      Intake/Output Summary (Last 24 hours) at 2/16/2025 1122  Last data filed at 2/16/2025 1045  Gross per 24 hour   Intake 662 ml   Output 500 ml   Net 162 ml       Vitals:    02/16/25 0700 02/16/25 0701 02/16/25 0900 02/16/25 1000   BP:  (!) 120/57 (!) 122/59 (!) 124/59   BP Location:  Right arm Right arm    Patient Position:  Lying Lying    Pulse: 86 87 86 85   Resp:  18 17 20   Temp:       TempSrc:  Axillary     SpO2:  97% 100% 100%   Weight:       Height:           Recent Labs   Lab 02/15/25  0014 02/15/25  0429 02/16/25  0334    139  139 141  141   K 3.9 3.9  3.9 4.3  4.3    107  107 108  108   CO2 21* 21*  21* 25  25   BUN 7* 7*  7* 15  15   CREATININE 1.0 1.4  1.4 2.4*  2.4*   CALCIUM 8.9 8.9  8.9 8.9  8.9   PHOS 3.6 3.8  3.8 4.8*  4.8*       Will assess on monday for HD needs.    Please call Nephrology as needed.  Will continue to follow.        Shakira Munoz MD  Nephrology Fellow  [facility] [place]

## 2025-02-16 NOTE — HOSPITAL COURSE
Admitted to MICU on 02/09 with acute on chronic hypoxemic respiratory failure requiring intubation in setting of RSV infection and Klebsiella pneumoniae and acute on chronic systolic heart failure, hyperkalemia, and shock requiring pressor support, particularly to tolerate SLED. 2/9 blood cultures no growth after 5 days. Endotracheal aspirate culture with pansensitive Klebsiella pneumoniae. He received 3 doses of IV azithromycin 500 mg.  He received IV Zosyn 2/09- 2/12; he was deescalated to Rocephin on 02/13-2/16 completing a total 7 day course of antibiotics.  C diff testing was negative.  He briefly required D10 infusion for hypoglycemia (2/10-2/11).  2/09 echo with regional wall motion abnormalities; abnormal septal motion, severely reduced systolic function with EF 20-25%, normal diastolic function, right ventricle moderately reduced systolic function, moderately dilated right atrium, moderate aortic valve sclerosis, moderate severe tricuspid valve regurgitation.  Nephrology was consulted for HD and volume management as patient largely anuric.  Fentanyl and propofol discontinued 2/11.  Initially extubated to nasal cannula on 2/11, however on 02/12, needing Airvo and deemed high risk for re-intubation.  Nephrology was asked to increase ultrafiltration..  Norepinephrine weaned off 2/12.  Weaned from Airvo to nasal cannula 2/14. Tolerated HD trial 2/14. Intermittent hypoglycemia attributed to poor p.o. intake. Course complicated by delirium.  Deemed stable for step-down on 02/16; on home 3 L via nasal cannula at the time and delirium noted to be improving.      Stepped down to hospital medicine 2/18. On home 3L via NC at rest. Delirium resolved, oriented to PPT.  HD continued to improve volume status.  Incentive spirometry initiated.  Pt eager to return to SNF. Discharged to [facility].

## 2025-02-16 NOTE — ASSESSMENT & PLAN NOTE
RSV infection   Klebsiella pneumoniae   Acute on chronic systolic heart failure    Patient with Hypoxic Respiratory failure which is Acute on chronic.  he is on home oxygen at 3 LPM. Supplemental oxygen was provided and noted-      .   Signs/symptoms of respiratory failure include- tachypnea, increased work of breathing, and respiratory distress. Contributing diagnoses includes - CHF, Pneumonia, and RSV  Labs and images were reviewed. Patient Has recent ABG, which has been reviewed. Will treat underlying causes and adjust management of respiratory failure as follows-     -Complete antimicrobial therapy for Klebsiella pneumoniae  -continue supportive care prn for RSV  -IS, flutter valve prn  -HD per Nephro for volume removal

## 2025-02-16 NOTE — PROVIDER TRANSFER
ICU Transfer of Care Note  Critical Care Medicine    Admit Date: 2/9/2025  LOS: 7    CC: Acute hypoxic respiratory failure    Code Status: Full Code         HPI and Hospital Course:     HPI:  69yoM w/hx of HFrEF (EF 20-25%, 12/2024), NICM, MR, ESRD on HD, chronic respiratory failure (on 3L NC at home), Crohn's disease s/p colostomy, R nephrectomy who presents to the hospital from [facility] from acute onset of SOB. Given the acuity of patient's condition, history was obtained from the chart. Per the ED provider note, patient has had increased sputum production, cough, wheezing, and bilateral lower extremity edema. His last dialysis session was on Friday (2/7/25) but there is concern that he may not have completed a full session as patient reports feeling volume overloaded. He denies chest pain or fever. He was recently admitted to the hospital septic shock secondary to staph capitis bacteremia and endocarditis. Completed 6wk course of IV Cefazolin on 2/2/25.     In the ED, patient was hypoxic with increased WOB. Initially placed on BiPAP without much improvement in hypoxia so was intubated. Afebrile, other VSS. Labs were notable for Hgb 9.6, Hct 32.3, Na 132, K 8.3, Bicarb 19, BUN/Cr 38/4.8, , BNP >4900, HsTrop 44. Flu/COVID negative. RSV positive. VBG 7.46/35.1/33.3/25.5. CXR with ongoing vs recurrent small right pleural effusion. EKG with known 1st degree AV block and T-wave abnormality. Administered calcium gluconate, IV insulin/dextrose, and IV lasix for hyperkalemia. Nephrology consulted. Admitted to the MICU for further management and workup.       Hospital/ICU Course:  Mr. Mcginnis was admitted to the MICU for acute on chronic hypoxemic respiratory failure 2/2 RSV and Klebisella pneumonia on mechanical ventilation. Required low dose vasopressors to tolerate SLED. D10 infusion for hypoglycemia. Echo with EF 20-25%, similar to previous study. Extubated to NC on 2/10 but required HFNC for ongoing  hypoxemia. CRRT for volume removal per nephrology. Completing course of Rocephin for Klebsiella pneumonia. Weaned to home 3L on 2/14. Hospital course complicated by delirium and intermittent hypoglycemia. Delirium improving and hypoglycemia improved with increased PO intake. Patient stable for step down.       To Follow Up:     --ceftriaxone for Klebsiella pneumonia. Stop date in place  --dialysis per nephrology. Passed HD trial on 2/14  --palliative care consulted for GOC  --PT/OT/SLP  --nutrition consulted  --CT chest abd/pelvis for malignancy work up      Discharge Plan:     tbd    Call with questions.     Elvia Salmeron PA-C  Critical Care Medicine  2/16/2025   12:17 PM

## 2025-02-16 NOTE — ASSESSMENT & PLAN NOTE
- Delirium precautions   - Aspiration precautions   - Fall precautions   - Avoid sedating agents if possible   - Encourage nutrition   - repeat EKG prn; if QT not prolonged  PRN seroquel as needed    -at baseline mentation 2/18

## 2025-02-16 NOTE — ASSESSMENT & PLAN NOTE
Patient with Hypoxic Respiratory failure which is Acute on chronic.  he is on home oxygen at 3 LPM. Supplemental oxygen was provided and noted-      Signs/symptoms of respiratory failure include- increased work of breathing and respiratory distress. Contributing diagnoses includes - CHF, Pleural effusion, and Pneumonia Labs and images were reviewed. Patient Has not had a recent ABG. Will treat underlying causes and adjust management of respiratory failure as follows-     Likely secondary to RSV and Klebsiella pneumonia. Also pulmonary edema/volume overload like contributing.     - Extubated to comfort flow on 2/11  - Wean FiO2 for SpO2 > 92%   - Klebsiella in respiratory culture. Treating with ceftriaxone. Stop date in place.   - Volume removal per RRT. Appreciate nephrology assistance  - CT chest pending

## 2025-02-16 NOTE — ASSESSMENT & PLAN NOTE
Creatinine 4.8 on admit. S/p R Nephrectomy (due to retained uretal stent per chart review) and HD MWF    Plan:   Lab Results   Component Value Date    CREATININE 2.4 (H) 02/16/2025    CREATININE 2.4 (H) 02/16/2025     - On iHD MWF.   - Nephrology following. Appreciate assistance  - HD per nephro  - Avoid nephrotoxic agents such as NSAIDs, gadolinium and IV radiocontrast.  - Renally dose meds to current GFR.  - Maintain MAP > 65

## 2025-02-16 NOTE — ASSESSMENT & PLAN NOTE
Anemia is likely due to chronic disease due to ESRD. Most recent hemoglobin and hematocrit are listed below.  Recent Labs     02/16/25  0334 02/17/25  0323 02/18/25  0540   HGB 7.0* 7.1* 8.3*   HCT 23.0* 23.5* 27.6*     Plan  - Monitor serial CBC: Daily  - Transfuse PRBC if patient becomes hemodynamically unstable, symptomatic or H/H drops below 7/21.  - Patient has not received any PRBC transfusions to date  - Patient's anemia is currently stable

## 2025-02-16 NOTE — CONSULTS
[facility]  Adult Nutrition  Consult Note    SUMMARY     Recommendations    1.) Recommend continuing with Moist and Minced Diet as tolerated, fluid per MD.     2.) Recommend Boost Plus TID to help meet needs.     3.) Recommend SLP swallow eval to determine most appropriate texture.     4.) RD to monitor wt, PO intake, skin, labs.      Goals: To meet % of EEN/EPN by next RD f/u   Nutrition Goal Status: progressing towards goal  Communication of RD Recs: other (comment) (poc)    Assessment and Plan    Endocrine  Severe protein-calorie malnutrition  Malnutrition Type:  Context: acute illness or injury  Level: severe    Related to (etiology):   Inadequate protein- calorie intake    Signs and Symptoms (as evidenced by):   Significant wt loss (-5% x 1-2weeks), and moderate to severe muscle/fat wasting    Malnutrition Characteristic Summary:  Weight Loss (Malnutrition): greater than 2% in 1 week (-5% x 1-2 weeks)  Subcutaneous Fat (Malnutrition):  (mild to moderate)  Muscle Mass (Malnutrition): severe depletion    Interventions/Recommendations (treatment strategy):  Collaboration of nutritional care with other providers.       Nutrition Diagnosis Status:   New       Malnutrition Assessment  Malnutrition Context: acute illness or injury  Malnutrition Level: severe  Skin (Micronutrient): none  Nails (Micronutrient): none       Weight Loss (Malnutrition): greater than 2% in 1 week (-5% x 1-2 weeks)  Subcutaneous Fat (Malnutrition):  (mild to moderate)  Muscle Mass (Malnutrition): severe depletion   Orbital Region (Subcutaneous Fat Loss): moderate depletion  Upper Arm Region (Subcutaneous Fat Loss): mild depletion  Thoracic and Lumbar Region: moderate depletion   Taoism Region (Muscle Loss): severe depletion  Clavicle Bone Region (Muscle Loss): severe depletion  Clavicle and Acromion Bone Region (Muscle Loss): severe depletion  Dorsal Hand (Muscle Loss):  (unable to assess)     Reason for  "Assessment    Reason For Assessment: consult  Diagnosis: pulmonary disease (Acute hypoxic respiratory failure)    General Information Comments: RD consulted for malnutrition. Pt is already followed by RD team. TF canceled on 2/12. Diet was advanced. RD spoke with pt and pt's RN. RN stated that they have to cut up foods and that pt is used to softer foods. Pt stated that they typically eats "pureed foods and soft foods like meatloaf". RD agreed to downgrade diet- pt needs swallow eval. RD noted significant wt loss since admit: -5%. Trace edema noted. RD was able to perform NFPE- RD feels pt meets the criteria for severe malnutrition in the context of acute illness.     Nutrition Discharge Planning: Regular Diet, fluid per MD- texture per SLP    Nutrition Related Social Determinants of Health: SDOH: None Identified     Nutrition/Diet History    Spiritual, Cultural Beliefs, [employer] Practices, Values that Affect Care: no  Food Allergies: NKFA    Anthropometrics    Height: 5' 6" (167.6 cm)  Height (inches): 66 in  Height Method: Estimated  Weight: 52.5 kg (115 lb 11.9 oz)  Weight (lb): 115.74 lb  Weight Method: Bed Scale  Ideal Body Weight (IBW), Male: 142 lb  % Ideal Body Weight, Male (lb): 86.94 %  BMI (Calculated): 18.7  BMI Grade: less than 23 (older than 65 years) - underweight    Lab/Procedures/Meds    Pertinent Labs Reviewed: reviewed  Pertinent Labs Comments: Cr: 2.4, GFR: 28.5, phos: 4.8, ALP: 170, PRO: 5.7  Pertinent Medications Reviewed: reviewed  Pertinent Medications Comments: Heparin, abx, levothyroxine, pantoprazole    Estimated/Assessed Needs    Weight Used For Calorie Calculations: 52.5 kg (115 lb 11.9 oz)  Energy Calorie Requirements (kcal): 1575- 1838 kcal  Energy Need Method: Kcal/kg (30-35 kcal/kg)    Protein Requirements: 63g (1.2g/kg)  Weight Used For Protein Calculations: 52.5 kg (115 lb 11.9 oz)    Fluid Requirements (mL): as per MD or RDA  Estimated Fluid Requirement Method: RDA Method  RDA " Method (mL): 1575    Nutrition Prescription Ordered    Current Diet Order: Cardiac Diet  Nutrition Order Comments: 2.0L FR    Evaluation of Received Nutrient/Fluid Intake    Lipid Calories (kcals):  (Propofol discontinued)  I/O: -330ml since 2/9  Comments: LBM 2/15 (ostomy)  Tolerance: tolerating  % Intake of Estimated Energy Needs: 0 - 25 %  % Meal Intake: 0 - 25 %    Nutrition Risk    Level of Risk/Frequency of Follow-up: moderate - high (f/u 1-2x/week)     Monitor and Evaluation    Food and Nutrient Intake: food and beverage intake  Food and Nutrient Adminstration: diet order  Anthropometric Measurements: height/length, weight, weight change, body mass index  Biochemical Data, Medical Tests and Procedures: electrolyte and renal panel, gastrointestinal profile, glucose/endocrine profile, inflammatory profile, lipid profile  Nutrition-Focused Physical Findings: overall appearance, extremities, muscles and bones, head and eyes, skin     Nutrition Follow-Up    RD Follow-up?: Yes

## 2025-02-16 NOTE — ASSESSMENT & PLAN NOTE
The likely etiology of thrombocytopenia is  splenomegaly . The patients 3 most recent labs are listed below.  Recent Labs     02/16/25  0334 02/17/25  0323 02/18/25  0540   * 128* 142*     Plan  - Will transfuse if platelet count is <50k (if undergoing surgical procedure or have active bleeding).

## 2025-02-16 NOTE — ASSESSMENT & PLAN NOTE
Vascular dialysis catheter in place    Creatine stable for now. BMP reviewed- noted Estimated Creatinine Clearance: 21.6 mL/min (A) (based on SCr of 2.4 mg/dL (H)). according to latest data. Based on current GFR, CKD stage is end stage.  Monitor UOP and serial BMP and adjust therapy as needed. Renally dose meds. Avoid nephrotoxic medications and procedures.    HD per nephro  Right chest tunneled dialysis catheter present. Sterile dressing in place.

## 2025-02-16 NOTE — PROGRESS NOTES
[facility]  Critical Care Medicine  Progress Note    Patient Name: Flakito Mcginnis  MRN: [id number]  Admission Date: 2/9/2025  Hospital Length of Stay: 7 days  Code Status: Full Code  Attending Provider: Chika Simmons MD  Primary Care Provider: Jordin Robbins MD   Principal Problem: Acute on chronic respiratory failure with hypoxia    Subjective:     HPI:  69yoM w/hx of HFrEF (EF 20-25%, 12/2024), NICM, MR, ESRD on HD, chronic respiratory failure (on 3L NC at home), Crohn's disease s/p colostomy, R nephrectomy who presents to the hospital from [facility] from acute onset of SOB. Given the acuity of patient's condition, history was obtained from the chart. Per the ED provider note, patient has had increased sputum production, cough, wheezing, and bilateral lower extremity edema. His last dialysis session was on Friday (2/7/25) but there is concern that he may not have completed a full session as patient reports feeling volume overloaded. He denies chest pain or fever. He was recently admitted to the hospital septic shock secondary to staph capitis bacteremia and endocarditis. Completed 6wk course of IV Cefazolin on 2/2/25.     In the ED, patient was hypoxic with increased WOB. Initially placed on BiPAP without much improvement in hypoxia so was intubated. Afebrile, other VSS. Labs were notable for Hgb 9.6, Hct 32.3, Na 132, K 8.3, Bicarb 19, BUN/Cr 38/4.8, , BNP >4900, HsTrop 44. Flu/COVID negative. RSV positive. VBG 7.46/35.1/33.3/25.5. CXR with ongoing vs recurrent small right pleural effusion. EKG with known 1st degree AV block and T-wave abnormality. Administered calcium gluconate, IV insulin/dextrose, and IV lasix for hyperkalemia. Nephrology consulted. Admitted to the MICU for further management and workup.       Hospital/ICU Course:  Mr. Mcginnis was admitted to the MICU for acute on chronic hypoxemic respiratory failure 2/2 RSV and Klebisella pneumonia on mechanical ventilation.  Required low dose vasopressors to tolerate SLED. D10 infusion for hypoglycemia. Echo with EF 20-25%, similar to previous study. Extubated to NC on 2/10 but required HFNC for ongoing hypoxemia. CRRT for volume removal per nephrology. Completing course of Rocephin for Klebsiella pneumonia. Weaned to home 3L on 2/14. Hospital course complicated by delirium and intermittent hypoglycemia. Delirium improving and hypoglycemia improved with increased PO intake. Patient stable for step down.     Interval History/Significant Events: Mentation much improved. Weaned to home 3L NC. Stable for step down.     Review of Systems   Respiratory:  Positive for cough and shortness of breath.    Gastrointestinal:  Negative for abdominal pain.     Objective:     Vital Signs (Most Recent):  Temp: 98.1 °F (36.7 °C) (02/16/25 1500)  Pulse: 90 (02/16/25 1700)  Resp: (!) 98 (02/16/25 1500)  BP: 128/77 (02/16/25 1700)  SpO2: 99 % (02/16/25 1700) Vital Signs (24h Range):  Temp:  [97.9 °F (36.6 °C)-98.3 °F (36.8 °C)] 98.1 °F (36.7 °C)  Pulse:  [] 90  Resp:  [] 98  SpO2:  [94 %-100 %] 99 %  BP: (113-140)/(57-77) 128/77   Weight: 52.5 kg (115 lb 11.9 oz)  Body mass index is 18.68 kg/m².      Intake/Output Summary (Last 24 hours) at 2/16/2025 1713  Last data filed at 2/16/2025 1709  Gross per 24 hour   Intake 1202 ml   Output 1000 ml   Net 202 ml          Physical Exam  Vitals reviewed.   Constitutional:       General: He is not in acute distress.     Appearance: He is ill-appearing.   HENT:      Mouth/Throat:      Mouth: Mucous membranes are dry.      Pharynx: Oropharynx is clear. No oropharyngeal exudate.   Eyes:      General: No scleral icterus.     Pupils: Pupils are equal, round, and reactive to light.   Cardiovascular:      Rate and Rhythm: Normal rate and regular rhythm.      Pulses: Normal pulses.   Pulmonary:      Effort: Accessory muscle usage present.      Breath sounds: Rales present.      Comments: On 3L NC  Abdominal:       General: Bowel sounds are normal. There is no distension.      Palpations: Abdomen is soft.      Comments: Colostomy in place   Musculoskeletal:      Right lower leg: No edema.      Left lower leg: No edema.   Skin:     General: Skin is warm and dry.      Capillary Refill: Capillary refill takes less than 2 seconds.      Findings: Bruising present.      Comments: Discoloration to bilateral toes   Neurological:      General: No focal deficit present.      Mental Status: He is alert. Mental status is at baseline.            Vents: 3L NC    Lines/Drains/Airways       Central Venous Catheter Line  Duration                  Hemodialysis Catheter 12/31/24 0818 right subclavian 47 days              Drain  Duration                  Colostomy 12/19/24 2225 RLQ 58 days              Peripheral Intravenous Line  Duration                  Peripheral IV - Single Lumen 02/12/25 1120 20 G Anterior;Right Upper Arm 4 days                  Significant Labs:    CBC/Anemia Profile:  Recent Labs   Lab 02/15/25  0429 02/16/25  0334   WBC 3.15* 3.18*   HGB 7.1* 7.0*   HCT 23.0* 23.0*   * 130*   * 99*   RDW 17.6* 17.5*        Chemistries:  Recent Labs   Lab 02/15/25  0014 02/15/25  0429 02/16/25  0334    139  139 141  141   K 3.9 3.9  3.9 4.3  4.3    107  107 108  108   CO2 21* 21*  21* 25  25   BUN 7* 7*  7* 15  15   CREATININE 1.0 1.4  1.4 2.4*  2.4*   CALCIUM 8.9 8.9  8.9 8.9  8.9   ALBUMIN 2.6* 2.6*  2.6* 2.5*  2.5*   PROT  --  5.8* 5.7*   BILITOT  --  0.3 0.2   ALKPHOS  --  161* 170*   ALT  --  7* 7*   AST  --  28 25   MG 1.9 1.9 2.0   PHOS 3.6 3.8  3.8 4.8*  4.8*       All pertinent labs within the past 24 hours have been reviewed.    Significant Imaging:  I have reviewed all pertinent imaging results/findings within the past 24 hours.    ABG  Recent Labs   Lab 02/13/25  1121   PH 7.366   PO2 23*   PCO2 41.9   HCO3 24.0   BE -1     Assessment/Plan:     Neuro  Delirium  - Delirium precautions    - Aspiration precautions   - Fall precautions   - Avoid sedating agents if possible   - Encourage nutrition   - PRN seroquel if Qtc <500    Pulmonary  * Acute on chronic respiratory failure with hypoxia  Patient with Hypoxic Respiratory failure which is Acute on chronic.  he is on home oxygen at 3 LPM. Supplemental oxygen was provided and noted-      Signs/symptoms of respiratory failure include- increased work of breathing and respiratory distress. Contributing diagnoses includes - CHF, Pleural effusion, and Pneumonia Labs and images were reviewed. Patient Has not had a recent ABG. Will treat underlying causes and adjust management of respiratory failure as follows-     Likely secondary to RSV and Klebsiella pneumonia. Also pulmonary edema/volume overload like contributing.     - Extubated to comfort flow on 2/11  - Wean FiO2 for SpO2 > 92%   - Klebsiella in respiratory culture. Treating with ceftriaxone. Stop date in place.   - Volume removal per RRT. Appreciate nephrology assistance  - CT chest pending    Klebsiella pneumonia  Completing with ceftriaxone. Stop date in place. See respiratory failure.     Cardiac/Vascular  Acute on chronic systolic heart failure  Systolic and Diastolic Dysfunction. Most recent TTE (12/10/24) with EF 20-25% with severe global hypokinesis. EKG sinus tachycardia with 1st degree AV Block (same from prior EKG) and T-wave abnormality. BNP >4600. HsTrop 46. CXR with right lung base w/blunting suggesting small pleural effusion    - Will need to discuss GDMT as tolerated with hemodynamics   - Volume removal with HD  - Fluid restriction at 1500 cc with strict I/Os and daily weights    - Maintain on telemetry    Renal/  Vascular dialysis catheter in place  Right chest tunneled dialysis catheter present. Sterile dressing in place.     ESRD on hemodialysis  Creatinine 4.8 on admit. S/p R Nephrectomy (due to retained uretal stent per chart review) and HD MWF    Plan:   Lab Results  "  Component Value Date    CREATININE 2.4 (H) 02/16/2025    CREATININE 2.4 (H) 02/16/2025     - On iHD MWF.   - Nephrology following. Appreciate assistance  - HD per nephro  - Avoid nephrotoxic agents such as NSAIDs, gadolinium and IV radiocontrast.  - Renally dose meds to current GFR.  - Maintain MAP > 65    ID  RSV (respiratory syncytial virus infection)  See respiratory failure. Droplet precautions in place.     Oncology  Anemia of chronic renal failure, stage 5  Admit with hemoglobin 9.6 Baseline ~8-9    Lab Results   Component Value Date    IRON 26 (L) 12/20/2024    TIBC 209 (L) 12/20/2024    FERRITIN 2,808 (H) 12/20/2024     Lab Results   Component Value Date    FOLATE 8.0 12/20/2024     Lab Results   Component Value Date    AAZGVZAP05 770 12/20/2024     No results found for: "RETICCTPCT"    Likely secondary to anemia of chronic disease    Plan:   -   Lab Results   Component Value Date    HGB 7.7 (L) 02/14/2025       - Daily CBC   - Transfuse hgb <7    - Maintain type and screen     Endocrine  Severe protein-calorie malnutrition  Nutrition consulted. Most recent weight and BMI monitored-     Measurements:  Wt Readings from Last 1 Encounters:   02/15/25 50.5 kg (111 lb 5.3 oz)   Body mass index is 17.97 kg/m².    Patient has been screened and assessed by RD. Last seen while intubated. Will re-consult.     Malnutrition Type:  Context:    Level:      Malnutrition Characteristic Summary:       Interventions/Recommendations (treatment strategy):         GI  Colostomy present on admission  Noted.     Palliative Care  ACP (advance care planning)  See ACP note by MICHEAL Salmeron PA-C on 2/15. Palliative care consulted for assistance.       Discussed with Dr. Simmons. Verbal hand off given to Dr. Jaffe.     I have spent 35 min with this patient, with over 50% of this time spent coordinating care and speaking with the family     Elvia Salmeron PA-C  Critical Care Medicine  [facility] [address] - Medical ICU  "

## 2025-02-16 NOTE — PLAN OF CARE
Recommendations     1.) Recommend continuing with Moist and Minced Diet as tolerated, fluid per MD.      2.) Recommend Boost Plus TID to help meet needs.      3.) Recommend SLP swallow eval to determine most appropriate texture.      4.) RD to monitor wt, PO intake, skin, labs.       Goals: To meet % of EEN/EPN by next RD f/u   Nutrition Goal Status: progressing towards goal  Communication of RD Recs: other (comment) (poc)

## 2025-02-16 NOTE — PLAN OF CARE
[facility] Medicine  Stepdown Acceptance Note      Patient Name: Flakito Mcginnis  MRN:  [id number]  Primary Team: Networked reference to record PCT    Date of Admission:  2/9/2025     Length of Stay:  LOS: 7 days   Principal Problem:  Acute on chronic respiratory failure with hypoxia  ICU team stepping patient down: MICU  ICU team member giving verbal handoff: Elvia Salmeron PA-C   Accepting  team: W      HPI:     69yoM w/hx of HFrEF (EF 20-25%, 12/2024), NICM, MR, ESRD on HD, chronic respiratory failure (on 3L NC at home), Crohn's disease s/p colostomy, R nephrectomy who presents to the hospital from [facility] from acute onset of SOB. Given the acuity of patient's condition, history was obtained from the chart. Per the ED provider note, patient has had increased sputum production, cough, wheezing, and bilateral lower extremity edema. His last dialysis session was on Friday (2/7/25) but there is concern that he may not have completed a full session as patient reports feeling volume overloaded. He denies chest pain or fever. He was recently admitted to the hospital septic shock secondary to staph capitis bacteremia and endocarditis. Completed 6wk course of IV Cefazolin on 2/2/25.      In the ED, patient was hypoxic with increased WOB. Initially placed on BiPAP without much improvement in hypoxia so was intubated. Afebrile, other VSS. Labs were notable for Hgb 9.6, Hct 32.3, Na 132, K 8.3, Bicarb 19, BUN/Cr 38/4.8, , BNP >4900, HsTrop 44. Flu/COVID negative. RSV positive. VBG 7.46/35.1/33.3/25.5. CXR with ongoing vs recurrent small right pleural effusion. EKG with known 1st degree AV block and T-wave abnormality. Administered calcium gluconate, IV insulin/dextrose, and IV lasix for hyperkalemia. Nephrology consulted. Admitted to the MICU for further management and workup.         Hospital Course:     Admitted to MICU on 02/09 with acute on chronic hypoxemic respiratory failure requiring  intubation in setting of RSV infection and Klebsiella pneumoniae and acute on chronic systolic heart failure, hyperkalemia, and shock requiring pressor support, particularly to tolerate SLED. 2/9 blood cultures no growth after 5 days. Endotracheal aspirate culture with pansensitive Klebsiella pneumoniae. He received 3 doses of IV azithromycin 500 mg.  He received IV Zosyn 2/09- 2/12; he was deescalated to Rocephin on 02/13.  He briefly required D10 infusion for hypoglycemia (2/10-2/11).  2/09 echo with regional wall motion abnormalities; abnormal septal motion, severely reduced systolic function with EF 20-25%, normal diastolic function, right ventricle moderately reduced systolic function, moderately dilated right atrium, moderate aortic valve sclerosis, moderate severe tricuspid valve regurgitation.  Nephrology was consulted for HD and volume management.  Fentanyl and propofol discontinued 2/11.  Initially extubated to nasal cannula on 2/11, however on 02/12, needing Airvo and deemed high risk for re-intubation.  Nephrology was asked to increase ultrafiltration..  Norepinephrine weaned off 2/12.  Weaned from Airvo to nasal cannula 2/14. Tolerated HD trial 2/14. Intermittent hypoglycemia attributed to poor p.o. intake. Course complicated by delirium.  Deemed stable for step-down on 02/16; on home 3 L via nasal cannula at the time and delirium noted to be improving.          Consultants and Procedures:     Consultants:  Cardiology   Nephrology   Palliative medicine    Procedures:    N/a    Transfer Information:     Diet:  Level 5; SLP eval pending    Physical Activity:  Evaluated by PT/OT 2/13 with recommendations for moderate intensity therapy    To Do / Pending Studies / Follow ups:  -complete antibiotic course for Klebsiella pneumoniae  -continue supportive care for RSV  -delirium precautions  -HD per Nephro  -f/u CT CAP  -follow-up palliative care consult for goals of care  -PT/OT/SLP/nutrition-> dispo      Code  Status:    Code Status: Full Code      Patient has been accepted by Hospital Medicine DANNI TABARES, who will assume care of the patient upon arrival to the floor.  Please contact ICU team with any concerns prior to arrival. Please contact [facility] [phone]  [facility] [phone] with any concerns prior to arrival. Please contact Hospital Medicine at [phone] or [phone] (please do NOT leave a voicemail) when patient arrives to the floor.

## 2025-02-16 NOTE — ASSESSMENT & PLAN NOTE
- Delirium precautions   - Aspiration precautions   - Fall precautions   - Avoid sedating agents if possible   - Encourage nutrition   - PRN seroquel if Qtc <500

## 2025-02-16 NOTE — ASSESSMENT & PLAN NOTE
Systolic and Diastolic Dysfunction. Most recent TTE (12/10/24) with EF 20-25% with severe global hypokinesis. EKG sinus tachycardia with 1st degree AV Block (same from prior EKG) and T-wave abnormality. BNP >4600. HsTrop 46. CXR with right lung base w/blunting suggesting small pleural effusion    - Will need to discuss GDMT as tolerated with hemodynamics   - Volume removal with HD  - Fluid restriction at 1500 cc with strict I/Os and daily weights    - Maintain on telemetry

## 2025-02-17 LAB
ABO + RH BLD: ABNORMAL
ALBUMIN SERPL BCP-MCNC: 2.6 G/DL (ref 3.5–5.2)
ALBUMIN SERPL BCP-MCNC: 2.6 G/DL (ref 3.5–5.2)
ALP SERPL-CCNC: 178 U/L (ref 40–150)
ALT SERPL W/O P-5'-P-CCNC: 7 U/L (ref 10–44)
ANION GAP SERPL CALC-SCNC: 9 MMOL/L (ref 8–16)
ANION GAP SERPL CALC-SCNC: 9 MMOL/L (ref 8–16)
AST SERPL-CCNC: 25 U/L (ref 10–40)
BASOPHILS # BLD AUTO: 0.05 K/UL (ref 0–0.2)
BASOPHILS NFR BLD: 1.6 % (ref 0–1.9)
BILIRUB SERPL-MCNC: 0.2 MG/DL (ref 0.1–1)
BLD GP AB SCN CELLS X3 SERPL QL: ABNORMAL
BLOOD GROUP ANTIBODIES SERPL: NORMAL
BUN SERPL-MCNC: 22 MG/DL (ref 8–23)
BUN SERPL-MCNC: 22 MG/DL (ref 8–23)
CALCIUM SERPL-MCNC: 8.6 MG/DL (ref 8.7–10.5)
CALCIUM SERPL-MCNC: 8.6 MG/DL (ref 8.7–10.5)
CHLORIDE SERPL-SCNC: 107 MMOL/L (ref 95–110)
CHLORIDE SERPL-SCNC: 107 MMOL/L (ref 95–110)
CO2 SERPL-SCNC: 23 MMOL/L (ref 23–29)
CO2 SERPL-SCNC: 23 MMOL/L (ref 23–29)
CREAT SERPL-MCNC: 3.5 MG/DL (ref 0.5–1.4)
CREAT SERPL-MCNC: 3.5 MG/DL (ref 0.5–1.4)
DAT IGG-SP REAG RBC-IMP: NORMAL
DIFFERENTIAL METHOD BLD: ABNORMAL
EOSINOPHIL # BLD AUTO: 0.2 K/UL (ref 0–0.5)
EOSINOPHIL NFR BLD: 7.2 % (ref 0–8)
ERYTHROCYTE [DISTWIDTH] IN BLOOD BY AUTOMATED COUNT: 17.2 % (ref 11.5–14.5)
EST. GFR  (NO RACE VARIABLE): 18.1 ML/MIN/1.73 M^2
EST. GFR  (NO RACE VARIABLE): 18.1 ML/MIN/1.73 M^2
GLUCOSE SERPL-MCNC: 82 MG/DL (ref 70–110)
GLUCOSE SERPL-MCNC: 82 MG/DL (ref 70–110)
HCT VFR BLD AUTO: 23.5 % (ref 40–54)
HGB BLD-MCNC: 7.1 G/DL (ref 14–18)
IMM GRANULOCYTES # BLD AUTO: 0.01 K/UL (ref 0–0.04)
IMM GRANULOCYTES NFR BLD AUTO: 0.3 % (ref 0–0.5)
LYMPHOCYTES # BLD AUTO: 0.5 K/UL (ref 1–4.8)
LYMPHOCYTES NFR BLD: 17.6 % (ref 18–48)
MAGNESIUM SERPL-MCNC: 2.1 MG/DL (ref 1.6–2.6)
MCH RBC QN AUTO: 30.3 PG (ref 27–31)
MCHC RBC AUTO-ENTMCNC: 30.2 G/DL (ref 32–36)
MCV RBC AUTO: 100 FL (ref 82–98)
MONOCYTES # BLD AUTO: 0.2 K/UL (ref 0.3–1)
MONOCYTES NFR BLD: 7.2 % (ref 4–15)
NEUTROPHILS # BLD AUTO: 2 K/UL (ref 1.8–7.7)
NEUTROPHILS NFR BLD: 66.1 % (ref 38–73)
NRBC BLD-RTO: 0 /100 WBC
PHOSPHATE SERPL-MCNC: 4.7 MG/DL (ref 2.7–4.5)
PHOSPHATE SERPL-MCNC: 4.7 MG/DL (ref 2.7–4.5)
PLATELET # BLD AUTO: 128 K/UL (ref 150–450)
PMV BLD AUTO: 10.9 FL (ref 9.2–12.9)
POCT GLUCOSE: 125 MG/DL (ref 70–110)
POCT GLUCOSE: 89 MG/DL (ref 70–110)
POCT GLUCOSE: 90 MG/DL (ref 70–110)
POCT GLUCOSE: 99 MG/DL (ref 70–110)
POTASSIUM SERPL-SCNC: 5 MMOL/L (ref 3.5–5.1)
POTASSIUM SERPL-SCNC: 5 MMOL/L (ref 3.5–5.1)
PROT SERPL-MCNC: 5.9 G/DL (ref 6–8.4)
RBC # BLD AUTO: 2.34 M/UL (ref 4.6–6.2)
SODIUM SERPL-SCNC: 139 MMOL/L (ref 136–145)
SODIUM SERPL-SCNC: 139 MMOL/L (ref 136–145)
SPECIMEN OUTDATE: ABNORMAL
T4 FREE SERPL-MCNC: 1.01 NG/DL (ref 0.71–1.51)
T4 SERPL-MCNC: 5.1 UG/DL (ref 4.5–11.5)
TSH SERPL DL<=0.005 MIU/L-ACNC: 4.24 UIU/ML (ref 0.4–4)
WBC # BLD AUTO: 3.07 K/UL (ref 3.9–12.7)

## 2025-02-17 PROCEDURE — 25000003 PHARM REV CODE 250: Performed by: PHYSICIAN ASSISTANT

## 2025-02-17 PROCEDURE — 86850 RBC ANTIBODY SCREEN: CPT | Performed by: STUDENT IN AN ORGANIZED HEALTH CARE EDUCATION/TRAINING PROGRAM

## 2025-02-17 PROCEDURE — 80100014 HC HEMODIALYSIS 1:1

## 2025-02-17 PROCEDURE — 99232 SBSQ HOSP IP/OBS MODERATE 35: CPT | Mod: ,,, | Performed by: INTERNAL MEDICINE

## 2025-02-17 PROCEDURE — 86880 COOMBS TEST DIRECT: CPT | Performed by: STUDENT IN AN ORGANIZED HEALTH CARE EDUCATION/TRAINING PROGRAM

## 2025-02-17 PROCEDURE — 20600001 HC STEP DOWN PRIVATE ROOM

## 2025-02-17 PROCEDURE — 25000003 PHARM REV CODE 250: Performed by: INTERNAL MEDICINE

## 2025-02-17 PROCEDURE — 63600175 PHARM REV CODE 636 W HCPCS

## 2025-02-17 PROCEDURE — 85025 COMPLETE CBC W/AUTO DIFF WBC: CPT

## 2025-02-17 PROCEDURE — 99233 SBSQ HOSP IP/OBS HIGH 50: CPT | Mod: ,,, | Performed by: PHYSICIAN ASSISTANT

## 2025-02-17 PROCEDURE — 97535 SELF CARE MNGMENT TRAINING: CPT

## 2025-02-17 PROCEDURE — 83735 ASSAY OF MAGNESIUM: CPT | Performed by: STUDENT IN AN ORGANIZED HEALTH CARE EDUCATION/TRAINING PROGRAM

## 2025-02-17 PROCEDURE — 25000003 PHARM REV CODE 250

## 2025-02-17 PROCEDURE — 97530 THERAPEUTIC ACTIVITIES: CPT

## 2025-02-17 PROCEDURE — 97110 THERAPEUTIC EXERCISES: CPT

## 2025-02-17 PROCEDURE — 27000207 HC ISOLATION

## 2025-02-17 PROCEDURE — 84436 ASSAY OF TOTAL THYROXINE: CPT | Performed by: STUDENT IN AN ORGANIZED HEALTH CARE EDUCATION/TRAINING PROGRAM

## 2025-02-17 PROCEDURE — 92610 EVALUATE SWALLOWING FUNCTION: CPT

## 2025-02-17 PROCEDURE — 86905 BLOOD TYPING RBC ANTIGENS: CPT | Performed by: STUDENT IN AN ORGANIZED HEALTH CARE EDUCATION/TRAINING PROGRAM

## 2025-02-17 PROCEDURE — 86870 RBC ANTIBODY IDENTIFICATION: CPT | Performed by: STUDENT IN AN ORGANIZED HEALTH CARE EDUCATION/TRAINING PROGRAM

## 2025-02-17 PROCEDURE — 84443 ASSAY THYROID STIM HORMONE: CPT | Performed by: STUDENT IN AN ORGANIZED HEALTH CARE EDUCATION/TRAINING PROGRAM

## 2025-02-17 PROCEDURE — 84439 ASSAY OF FREE THYROXINE: CPT | Performed by: STUDENT IN AN ORGANIZED HEALTH CARE EDUCATION/TRAINING PROGRAM

## 2025-02-17 PROCEDURE — 84100 ASSAY OF PHOSPHORUS: CPT

## 2025-02-17 PROCEDURE — 80053 COMPREHEN METABOLIC PANEL: CPT

## 2025-02-17 RX ORDER — HEPARIN SODIUM 1000 [USP'U]/ML
1000 INJECTION, SOLUTION INTRAVENOUS; SUBCUTANEOUS ONCE
Status: DISCONTINUED | OUTPATIENT
Start: 2025-02-17 | End: 2025-02-18

## 2025-02-17 RX ORDER — MUPIROCIN 20 MG/G
OINTMENT TOPICAL 2 TIMES DAILY
Status: CANCELLED | OUTPATIENT
Start: 2025-02-17 | End: 2025-02-22

## 2025-02-17 RX ORDER — SODIUM CHLORIDE 9 MG/ML
INJECTION, SOLUTION INTRAVENOUS ONCE
Status: CANCELLED | OUTPATIENT
Start: 2025-02-17 | End: 2025-02-17

## 2025-02-17 RX ORDER — SODIUM CHLORIDE 9 MG/ML
INJECTION, SOLUTION INTRAVENOUS
Status: CANCELLED | OUTPATIENT
Start: 2025-02-17

## 2025-02-17 RX ADMIN — ACETAMINOPHEN 650 MG: 325 TABLET ORAL at 07:02

## 2025-02-17 RX ADMIN — HEPARIN SODIUM 5000 UNITS: 5000 INJECTION INTRAVENOUS; SUBCUTANEOUS at 10:02

## 2025-02-17 RX ADMIN — LEVOTHYROXINE SODIUM 50 MCG: 0.05 TABLET ORAL at 06:02

## 2025-02-17 RX ADMIN — HEPARIN SODIUM 5000 UNITS: 5000 INJECTION INTRAVENOUS; SUBCUTANEOUS at 01:02

## 2025-02-17 RX ADMIN — MIDODRINE HYDROCHLORIDE 10 MG: 5 TABLET ORAL at 04:02

## 2025-02-17 RX ADMIN — HEPARIN SODIUM 5000 UNITS: 5000 INJECTION INTRAVENOUS; SUBCUTANEOUS at 06:02

## 2025-02-17 RX ADMIN — PANTOPRAZOLE SODIUM 40 MG: 40 TABLET, DELAYED RELEASE ORAL at 08:02

## 2025-02-17 NOTE — PLAN OF CARE
Problem: SLP  Goal: SLP Goal  Description: Speech Pathology Goals  To be met by 3/3/25    1. Pt will exhibit a functional swallow for tolerance of a minced and moist diet with thin liquids in order to maintain adequate hydration and nutrition          Outcome: Progressing     Clinical swallow evaluation completed. Recommend a minced and moist diet (IDDSI 5) with thin liquids (IDDSI 0) and meds whole 1 at a time. SLP to continue to follow.

## 2025-02-17 NOTE — PLAN OF CARE
MICU DAILY GOALS     Family/Goals of care/Code Status   Code Status: Full Code    24H Vital Sign Range  Temp:  [97.7 °F (36.5 °C)-98.2 °F (36.8 °C)]   Pulse:  []   Resp:  []   BP: ()/(55-77)   SpO2:  [90 %-100 %]      Shift Events (include procedures and significant events)   Plan for Dialysis today, no acute events overnight    AWAKE RASS: Goal -    Actual - RASS (Sam Agitation-Sedation Scale): very agitated    Restraint necessity: Not necessary   BREATHE SBT: Not intubated    Coordinate A & B, analgesics/sedatives Pain: managed   SAT: Not intubated   Delirium CAM-ICU:     Early(intubated/ Progressive (non-intubated) Mobility MOVE Screen (INTUBATED ONLY): Not intubated    Activity: Activity Management: Arm raise - L1, Rolling - L1   Feeding/Nutrition Diet order: Diet/Nutrition Received: mechanical/dental soft, restrict fluids, Specialty Diet/Nutrition Received: renal diet   Thrombus DVT prophylaxis: VTE Core Measure: Pharmacological prophylaxis initiated/maintained   HOB Elevation Head of Bed (HOB) Positioning: HOB at 30-45 degrees   Ulcer Prophylaxis GI: yes   Glucose control managed Glycemic Management: blood glucose monitored   Skin Skin assessment:     Sacrum intact/not altered? No  Heels intact/not altered? Yes  Surgical wound? No    CHECK ONE!   (no altered skin or altered skin) and sub boxes:  [] No Altered Skin Integrity Present    []Prevention Measures Documented    [x] Altered Skin Integrity Present or Discovered   [x] LDA present in EPIC, daily doc completed              [] LDA added if not in EPIC (describe wound).                    When describing wound, do not stage, use descriptive words only.    [] Wound Image Taken (required on admit,                   transfer/discharge and every Tuesday)    Wound Care Consulted? Yes   Bowel Function no issues    Indwelling Catheter Necessity [REMOVED]      Urethral Catheter 02/09/25 2100 Temperature probe;Straight-tip 16 Fr.-Reason for  Continuing Urinary Catheterization: Critically ill in ICU and requiring hourly monitoring of intake/output         Hemodialysis Catheter 12/31/24 0818 right subclavian-Line Necessity Review: CRRT/HD  yes   De-escalation Antibiotics Yes        VS and assessment per flow sheet, patient progressing towards goals as tolerated, plan of care reviewed with family, all concerns addressed, will continue to monitor.

## 2025-02-17 NOTE — ASSESSMENT & PLAN NOTE
Patient with Hypoxic Respiratory failure which is Acute on chronic.  he is on home oxygen at 3 LPM. Supplemental oxygen was provided and noted-      Signs/symptoms of respiratory failure include- increased work of breathing and respiratory distress. Contributing diagnoses includes - CHF, Pleural effusion, and Pneumonia Labs and images were reviewed. Patient Has not had a recent ABG. Will treat underlying causes and adjust management of respiratory failure as follows-     Likely secondary to RSV and Klebsiella pneumonia. Also pulmonary edema/volume overload like contributing.     - Extubated to comfort flow on 2/11. Weaned to home 3L NC  - Wean FiO2 for SpO2 > 92%   - Klebsiella in respiratory culture. Completed course of abx.   - Volume removal per RRT. Appreciate nephrology assistance

## 2025-02-17 NOTE — NURSING
Patient is in contact  isolation .Dialysis Nurse  arrived at the  bedside .  Both lumens of HD catheter flushes  good . Dialysis treatment started .   Primary nurse  made aware .

## 2025-02-17 NOTE — ASSESSMENT & PLAN NOTE
- Delirium precautions   - Aspiration precautions   - Fall precautions   - Avoid sedating agents if possible   - Encourage nutrition   - PRN seroquel if Qtc <500  - scheduled melatonin - patient requesting administration at 5pm

## 2025-02-17 NOTE — PROGRESS NOTES
[facility] - Medical ICU  Nephrology  Progress Note    Patient Name: Flakito Mcginnis  MRN: [id number]  Admission Date: 2/9/2025  Hospital Length of Stay: 8 days  Attending Provider: Torrey Garcia MD   Primary Care Physician: Jordin Robbins MD  Principal Problem:Acute on chronic respiratory failure with hypoxia    Subjective:     HPI: 69-year-old male past medical history nonischemic cardiomyopathy EF most recently 20-25%, ESRD on HD MWF, chronic respiratory failure on 3 L nasal cannula at home who presents for worsening shortness of breath times 2 days. Patient mentioned to his nursing home staff that he was feeling dyspnic and EMS was called. Upon arrival to the ER he was intubated so history was provided from speaking to his sister on the phone and with the ER team. Infectious work up tested positive for RSV. Nephrology was consulted for management of his hemodialysis and for hyperkalemia of 8.3, his ECG does reveal some peaked T-waves. As per the sister, he has been complaining of increased shortness of breath, fatigue, and decreased urine output for the past two days. The sister does note that several residents of his nursing home have tested positive for respiratory virus recently.     Interval History: Eating an drinking well. NO issue with the breathing. No NV reported.     Review of patient's allergies indicates:   Allergen Reactions    Vancomycin analogues Anaphylaxis, Other (See Comments), Shortness Of Breath and Swelling     Light headed, see's spots      Aspirin Nausea And Vomiting and Other (See Comments)     Has crohn's disease    Other reaction(s): FLARES UP CROHNS      Has crohn's disease     Current Facility-Administered Medications   Medication Frequency    acetaminophen tablet 650 mg Q6H PRN    calcium gluconate 1 g in NS IVPB (premixed) Q10 Min PRN    dextrose 50% injection 12.5 g PRN    dextrose 50% injection 25 g PRN    glucagon (human recombinant) injection 1 mg PRN    glucose chewable  tablet 16 g PRN    glucose chewable tablet 24 g PRN    heparin (porcine) injection 5,000 Units Q8H    hydrOXYzine HCL tablet 25 mg TID PRN    levothyroxine tablet 50 mcg Before breakfast    melatonin tablet 9 mg Nightly    midodrine tablet 10 mg PRN    pantoprazole EC tablet 40 mg Daily    QUEtiapine tablet 25 mg Nightly PRN    sodium chloride 0.9% flush 10 mL PRN       Objective:     Vital Signs (Most Recent):  Temp: 97.2 °F (36.2 °C) (02/17/25 0701)  Pulse: 88 (02/17/25 1000)  Resp: 17 (02/17/25 1000)  BP: 122/61 (02/17/25 1000)  SpO2: (!) 93 % (02/17/25 1000) Vital Signs (24h Range):  Temp:  [97.2 °F (36.2 °C)-98.1 °F (36.7 °C)] 97.2 °F (36.2 °C)  Pulse:  [] 88  Resp:  [16-98] 17  SpO2:  [90 %-100 %] 93 %  BP: ()/(55-89) 122/61     Weight: 55.5 kg (122 lb 5.7 oz) (02/17/25 0400)  Body mass index is 19.75 kg/m².  Body surface area is 1.61 meters squared.    I/O last 3 completed shifts:  In: 1322 [P.O.:1322]  Out: 1700 [Stool:1700]     Physical Exam  HENT:      Head: Normocephalic and atraumatic.      Mouth/Throat:      Mouth: Mucous membranes are moist.   Cardiovascular:      Rate and Rhythm: Normal rate.   Pulmonary:      Effort: Pulmonary effort is normal.   Abdominal:      General: Abdomen is flat.      Palpations: Abdomen is soft.   Musculoskeletal:      Right lower leg: No edema.      Left lower leg: No edema.   Neurological:      Mental Status: He is alert.          Significant Labs:  BMP:   Recent Labs   Lab 02/17/25  0323   GLU 82  82     139   K 5.0  5.0     107   CO2 23  23   BUN 22  22   CREATININE 3.5*  3.5*   CALCIUM 8.6*  8.6*   MG 2.1     CBC:   Recent Labs   Lab 02/17/25  0323   WBC 3.07*   RBC 2.34*   HGB 7.1*   HCT 23.5*   *   *   MCH 30.3   MCHC 30.2*     All labs within the past 24 hours have been reviewed.   Assessment/Plan:     Renal/  ESRD on hemodialysis  Patient is ESRD on HD TTS (as per the OP notes). As per his sister he completed a full  session of HD on Friday but she is uncertain if he was able to obtain his dry weight.    Recommendations  - Dialysis today     Question Answer   Antibiotics on HD? No   Duration of Treatment 3 hours   Dialyzer F160   Dialysate Temperature (C) 36   Target  mL/min   If unable to maintain flow due to inadequate vascular access patency, patient intolerance (i.e. chest pain, access discomfort) or elevated venous pressure, adjust blood flow rate to a minimum of _____mL/min 100    mL/min   K+ Potassium per Protocol   Ca++ Calcium per Protocol   Na+ Sodium per Protocol   Bicarb Bicarbonate per Protocol   Access to be used AVF   Needle gauge 15 gauge   Location Arm   Laterality Left   Target UF 0.5L     - Monitor potassium level  - 1L fluid restriction  - 2g salt. Low potassium diet   - daily RFP, magnesium, and phosphorus levels        Thank you for your consult. I will follow-up with patient. Please contact us if you have any additional questions.    Chadwick Oneill MD  Nephrology  [facility] - Medical ICU

## 2025-02-17 NOTE — SUBJECTIVE & OBJECTIVE
Interval History: Eating an drinking well. NO issue with the breathing. No NV reported.     Review of patient's allergies indicates:   Allergen Reactions    Vancomycin analogues Anaphylaxis, Other (See Comments), Shortness Of Breath and Swelling     Light headed, see's spots      Aspirin Nausea And Vomiting and Other (See Comments)     Has crohn's disease    Other reaction(s): FLARES UP CROHNS      Has crohn's disease     Current Facility-Administered Medications   Medication Frequency    acetaminophen tablet 650 mg Q6H PRN    calcium gluconate 1 g in NS IVPB (premixed) Q10 Min PRN    dextrose 50% injection 12.5 g PRN    dextrose 50% injection 25 g PRN    glucagon (human recombinant) injection 1 mg PRN    glucose chewable tablet 16 g PRN    glucose chewable tablet 24 g PRN    heparin (porcine) injection 5,000 Units Q8H    hydrOXYzine HCL tablet 25 mg TID PRN    levothyroxine tablet 50 mcg Before breakfast    melatonin tablet 9 mg Nightly    midodrine tablet 10 mg PRN    pantoprazole EC tablet 40 mg Daily    QUEtiapine tablet 25 mg Nightly PRN    sodium chloride 0.9% flush 10 mL PRN       Objective:     Vital Signs (Most Recent):  Temp: 97.2 °F (36.2 °C) (02/17/25 0701)  Pulse: 88 (02/17/25 1000)  Resp: 17 (02/17/25 1000)  BP: 122/61 (02/17/25 1000)  SpO2: (!) 93 % (02/17/25 1000) Vital Signs (24h Range):  Temp:  [97.2 °F (36.2 °C)-98.1 °F (36.7 °C)] 97.2 °F (36.2 °C)  Pulse:  [] 88  Resp:  [16-98] 17  SpO2:  [90 %-100 %] 93 %  BP: ()/(55-89) 122/61     Weight: 55.5 kg (122 lb 5.7 oz) (02/17/25 0400)  Body mass index is 19.75 kg/m².  Body surface area is 1.61 meters squared.    I/O last 3 completed shifts:  In: 1322 [P.O.:1322]  Out: 1700 [Stool:1700]     Physical Exam  HENT:      Head: Normocephalic and atraumatic.      Mouth/Throat:      Mouth: Mucous membranes are moist.   Cardiovascular:      Rate and Rhythm: Normal rate.   Pulmonary:      Effort: Pulmonary effort is normal.   Abdominal:      General:  Abdomen is flat.      Palpations: Abdomen is soft.   Musculoskeletal:      Right lower leg: No edema.      Left lower leg: No edema.   Neurological:      Mental Status: He is alert.          Significant Labs:  BMP:   Recent Labs   Lab 02/17/25  0323   GLU 82  82     139   K 5.0  5.0     107   CO2 23  23   BUN 22  22   CREATININE 3.5*  3.5*   CALCIUM 8.6*  8.6*   MG 2.1     CBC:   Recent Labs   Lab 02/17/25  0323   WBC 3.07*   RBC 2.34*   HGB 7.1*   HCT 23.5*   *   *   MCH 30.3   MCHC 30.2*     All labs within the past 24 hours have been reviewed.

## 2025-02-17 NOTE — PT/OT/SLP EVAL
"Speech Language Pathology Evaluation  Bedside Swallow    Patient Name:  Flakito Mcginnis   MRN:  [id number]  Admitting Diagnosis: Acute on chronic respiratory failure with hypoxia    Recommendations:                 General Recommendations:  Dysphagia therapy  Diet recommendations:  Minced & Moist Diet - IDDSI Level 5, Thin liquids - IDDSI Level 0   Aspiration Precautions:   1 bite/sip at a time  Alternating bites/sips  Eliminate distractions  Feed only when awake/alert  HOB to 90 degrees  Meds whole 1 at a time  Small bites/sips   General Precautions: Standard, aspiration, fall, contact  Communication strategies:  none    Assessment:     Flakito Mcginnis is a 69 y.o. male with an SLP diagnosis of baseline Dysphagia. He presents with adequate swallow safety and efficacy for support of a modified diet in the setting of recent instrumental assessment (FEES) completed at home facility prior to admission. .nakfol     History:     Past Medical History:   Diagnosis Date    Crohn's disease 1998    ESRD (end stage renal disease) on dialysis 10/2017    Hypertension     Obstructive uropathy        Past Surgical History:   Procedure Laterality Date    COLOSTOMY  2006    DIALYSIS FISTULA CREATION Left 02/2018    NEPHRECTOMY Right     REMOVAL OF TUNNELED CENTRAL VENOUS CATHETER (CVC) N/A 5/23/2018    Procedure: PERMCATH REMOVAL-TUNNELED CVC REMOVAL;  Surgeon: Parveen Ray MD;  Location: [facility], [place], operated by [facility] CATH LAB;  Service: Nephrology;  Laterality: N/A;  pt coming in @930       Social History: Patient is a NH resident     Prior Intubation HX:  2/9-2/11    Modified Barium Swallow/FEES: Pt endorsed recent completion of FEES at NH prior to admission to hospital. He recall recommendations for "[employer] foods" in reference to minced and moist textures. He recalled safe swallow strategies including use of effortful swallows as needed with liquid wash or additional swallows as needed.     Chest X-Rays: 2/11/25- Please note, patient positioning limits " evaluation. The cardiomediastinal silhouette is prominent, stable noting calcification of the aorta. Right central venous catheter tip projects over the distal SVC.. There is obscuration of the bilateral costophrenic angles suggesting effusions.. The trachea is midline. The lungs are symmetrically expanded bilaterally with coarse interstitial attenuation bilaterally, suggesting edema.. Developing right lower lung zone consolidation not excluded. There is no pneumothorax. The osseous structures are remarkable for degenerative change..     Prior diet: Minced and moist/thin    Occupation/hobbies/homemaking: none stated    Subjective     Spoke with nursing prior to session. Pt found resting in bed upon SLP entry into room. Pt agreeable to participate in all aspects of session.      Patient goals: none stated     Pain/Comfort:  Pain Rating 1: 0/10    Respiratory Status: Nasal cannula, flow 4 L/min    Objective:     Oral Musculature Evaluation  Oral Musculature: WFL  Dentition: upper and lower dentures, edentulous  Secretion Management: adequate  Mucosal Quality: adequate  Oral Labial Strength and Mobility: functional seal, functional coordination  Lingual Strength and Mobility: functional protrusion, functional anterior elevation, functional lateral movement  Voice Prior to PO Intake: clear, adequate intensity    Bedside Swallow Eval:   Consistencies Assessed:  Thin liquids: self-fed by the single and consecutive open cup and straw sips  Puree: self-fed by the tsp of pudding  Semi solid: self-fed conchita cracker pieces coated in applesauce    Oral Phase:   Mildly slowed though functional mastication and bolus clearance with semi solid trials     Pharyngeal Phase:   no overt clinical signs/symptoms of aspiration  no overt clinical signs/symptoms of pharyngeal dysphagia    Compensatory Strategies  None    Treatment: SLP provided education regarding overall impressions, minced and moist diet with thin liquids, overt signs of  airway compromise, safe swallow strategies, and ongoing SLP POC. Pt verbalized understanding and had no additional questions or concerns upon SLP exit. Whiteboard updated. Spoke with RN regarding impressions and recommendations and nursing verbalized understanding.       Goals:   Multidisciplinary Problems       SLP Goals          Problem: SLP    Goal Priority Disciplines Outcome   SLP Goal     SLP Progressing   Description: Speech Pathology Goals  To be met by 3/3/25    1. Pt will exhibit a functional swallow for tolerance of a minced and moist diet with thin liquids in order to maintain adequate hydration and nutrition                               Plan:     Patient to be seen:  3 x/week   Plan of Care expires:  03/19/25  Plan of Care reviewed with:  patient   SLP Follow-Up:  Yes       Discharge recommendations:  Moderate Intensity Therapy   Barriers to Discharge:  None    Time Tracking:     SLP Treatment Date:   02/17/25  Speech Start Time:  1001  Speech Stop Time:  1023     Speech Total Time (min):  22 min    Billable Minutes: Eval Swallow and Oral Function 12 and Self Care/Home Management Training 10      02/17/2025

## 2025-02-17 NOTE — ASSESSMENT & PLAN NOTE
Patient is ESRD on HD TTS (as per the OP notes). As per his sister he completed a full session of HD on Friday but she is uncertain if he was able to obtain his dry weight.    Recommendations  - Dialysis today     Question Answer   Antibiotics on HD? No   Duration of Treatment 3 hours   Dialyzer F160   Dialysate Temperature (C) 36   Target  mL/min   If unable to maintain flow due to inadequate vascular access patency, patient intolerance (i.e. chest pain, access discomfort) or elevated venous pressure, adjust blood flow rate to a minimum of _____mL/min 100    mL/min   K+ Potassium per Protocol   Ca++ Calcium per Protocol   Na+ Sodium per Protocol   Bicarb Bicarbonate per Protocol   Access to be used AVF   Needle gauge 15 gauge   Location Arm   Laterality Left   Target UF 0.5L     - Monitor potassium level  - 1L fluid restriction  - 2g salt. Low potassium diet   - daily RFP, magnesium, and phosphorus levels

## 2025-02-17 NOTE — PROGRESS NOTES
[facility]  Critical Care Medicine  Progress Note    Patient Name: Flakito Mcginnis  MRN: [id number]  Admission Date: 2/9/2025  Hospital Length of Stay: 8 days  Code Status: Full Code  Attending Provider: Torrey Garcia MD  Primary Care Provider: Jordin Robbins MD   Principal Problem: Acute on chronic respiratory failure with hypoxia    Subjective:     HPI:  69yoM w/hx of HFrEF (EF 20-25%, 12/2024), NICM, MR, ESRD on HD, chronic respiratory failure (on 3L NC at home), Crohn's disease s/p colostomy, R nephrectomy who presents to the hospital from [facility] from acute onset of SOB. Given the acuity of patient's condition, history was obtained from the chart. Per the ED provider note, patient has had increased sputum production, cough, wheezing, and bilateral lower extremity edema. His last dialysis session was on Friday (2/7/25) but there is concern that he may not have completed a full session as patient reports feeling volume overloaded. He denies chest pain or fever. He was recently admitted to the hospital septic shock secondary to staph capitis bacteremia and endocarditis. Completed 6wk course of IV Cefazolin on 2/2/25.     In the ED, patient was hypoxic with increased WOB. Initially placed on BiPAP without much improvement in hypoxia so was intubated. Afebrile, other VSS. Labs were notable for Hgb 9.6, Hct 32.3, Na 132, K 8.3, Bicarb 19, BUN/Cr 38/4.8, , BNP >4900, HsTrop 44. Flu/COVID negative. RSV positive. VBG 7.46/35.1/33.3/25.5. CXR with ongoing vs recurrent small right pleural effusion. EKG with known 1st degree AV block and T-wave abnormality. Administered calcium gluconate, IV insulin/dextrose, and IV lasix for hyperkalemia. Nephrology consulted. Admitted to the MICU for further management and workup.       Hospital/ICU Course:  Mr. Mcginnis was admitted to the MICU for acute on chronic hypoxemic respiratory failure 2/2 RSV and Klebisella pneumonia on mechanical  ventilation. Required low dose vasopressors to tolerate SLED. D10 infusion for hypoglycemia. Echo with EF 20-25%, similar to previous study. Extubated to NC on 2/10 but required HFNC for ongoing hypoxemia. CRRT for volume removal per nephrology. Completing course of Rocephin for Klebsiella pneumonia. Weaned to home 3L on 2/14. Hospital course complicated by delirium and intermittent hypoglycemia. Delirium improving and hypoglycemia improved with increased PO intake. Patient stable for step down.     Interval History/Significant Events: Mentation much improved. Currently on 3-4L NC.     Review of Systems   Respiratory:  Positive for cough and shortness of breath.    Gastrointestinal:  Negative for abdominal pain.     Objective:     Vital Signs (Most Recent):  Temp: 97.7 °F (36.5 °C) (02/17/25 0300)  Pulse: 96 (02/17/25 0630)  Resp: 19 (02/17/25 0630)  BP: (!) 147/70 (02/17/25 0600)  SpO2: (!) 94 % (02/17/25 0630) Vital Signs (24h Range):  Temp:  [97.7 °F (36.5 °C)-98.2 °F (36.8 °C)] 97.7 °F (36.5 °C)  Pulse:  [] 96  Resp:  [] 19  SpO2:  [90 %-100 %] 94 %  BP: ()/(55-77) 147/70   Weight: 55.5 kg (122 lb 5.7 oz)  Body mass index is 19.75 kg/m².      Intake/Output Summary (Last 24 hours) at 2/17/2025 0959  Last data filed at 2/17/2025 0315  Gross per 24 hour   Intake 720 ml   Output 1450 ml   Net -730 ml          Physical Exam  Vitals reviewed.   Constitutional:       General: He is not in acute distress.     Appearance: He is ill-appearing.   HENT:      Mouth/Throat:      Mouth: Mucous membranes are dry.      Pharynx: Oropharynx is clear. No oropharyngeal exudate.   Eyes:      General: No scleral icterus.     Pupils: Pupils are equal, round, and reactive to light.   Cardiovascular:      Rate and Rhythm: Normal rate and regular rhythm.      Pulses: Normal pulses.   Pulmonary:      Effort: No accessory muscle usage.      Breath sounds: No wheezing or rales.      Comments: On 3L NC  Abdominal:       General: Bowel sounds are normal. There is no distension.      Palpations: Abdomen is soft.      Comments: Colostomy in place   Musculoskeletal:      Right lower leg: No edema.      Left lower leg: No edema.   Skin:     General: Skin is warm and dry.      Capillary Refill: Capillary refill takes less than 2 seconds.      Findings: Bruising present.      Comments: Discoloration to bilateral toes   Neurological:      General: No focal deficit present.      Mental Status: He is alert. Mental status is at baseline.            Vents: 3L NC    Lines/Drains/Airways       Central Venous Catheter Line  Duration                  Hemodialysis Catheter 12/31/24 0818 right subclavian 48 days              Drain  Duration                  Colostomy 12/19/24 2225 RLQ 59 days              Peripheral Intravenous Line  Duration                  Peripheral IV - Single Lumen 02/12/25 1120 20 G Anterior;Right Upper Arm 4 days                  Significant Labs:    CBC/Anemia Profile:  Recent Labs   Lab 02/16/25  0334 02/17/25  0323   WBC 3.18* 3.07*   HGB 7.0* 7.1*   HCT 23.0* 23.5*   * 128*   MCV 99* 100*   RDW 17.5* 17.2*        Chemistries:  Recent Labs   Lab 02/16/25  0334 02/17/25  0323     141 139  139   K 4.3  4.3 5.0  5.0     108 107  107   CO2 25  25 23  23   BUN 15  15 22  22   CREATININE 2.4*  2.4* 3.5*  3.5*   CALCIUM 8.9  8.9 8.6*  8.6*   ALBUMIN 2.5*  2.5* 2.6*  2.6*   PROT 5.7* 5.9*   BILITOT 0.2 0.2   ALKPHOS 170* 178*   ALT 7* 7*   AST 25 25   MG 2.0 2.1   PHOS 4.8*  4.8* 4.7*  4.7*       All pertinent labs within the past 24 hours have been reviewed.    Significant Imaging:  I have reviewed all pertinent imaging results/findings within the past 24 hours.    ABG  Recent Labs   Lab 02/13/25  1121   PH 7.366   PO2 23*   PCO2 41.9   HCO3 24.0   BE -1     Assessment/Plan:     Neuro  Delirium  - Delirium precautions   - Aspiration precautions   - Fall precautions   - Avoid sedating agents  if possible   - Encourage nutrition   - PRN seroquel if Qtc <500  - scheduled melatonin - patient requesting administration at 5pm    Pulmonary  * Acute on chronic respiratory failure with hypoxia  Patient with Hypoxic Respiratory failure which is Acute on chronic.  he is on home oxygen at 3 LPM. Supplemental oxygen was provided and noted-      Signs/symptoms of respiratory failure include- increased work of breathing and respiratory distress. Contributing diagnoses includes - CHF, Pleural effusion, and Pneumonia Labs and images were reviewed. Patient Has not had a recent ABG. Will treat underlying causes and adjust management of respiratory failure as follows-     Likely secondary to RSV and Klebsiella pneumonia. Also pulmonary edema/volume overload like contributing.     - Extubated to comfort flow on 2/11. Weaned to home 3L NC  - Wean FiO2 for SpO2 > 92%   - Klebsiella in respiratory culture. Completed course of abx.   - Volume removal per RRT. Appreciate nephrology assistance      Klebsiella pneumonia  Completed course of ceftriaxone. See respiratory failure.     Cardiac/Vascular  Acute on chronic systolic heart failure  Systolic and Diastolic Dysfunction. Most recent TTE (12/10/24) with EF 20-25% with severe global hypokinesis. EKG sinus tachycardia with 1st degree AV Block (same from prior EKG) and T-wave abnormality. BNP >4600. HsTrop 46. CXR with right lung base w/blunting suggesting small pleural effusion    - Will need to discuss GDMT as tolerated with hemodynamics   - Volume removal with HD  - Fluid restriction at 1500 cc with strict I/Os and daily weights    - Maintain on telemetry    Renal/  Vascular dialysis catheter in place  Right chest tunneled dialysis catheter present. Sterile dressing in place.     ESRD on hemodialysis  Creatinine 4.8 on admit. S/p R Nephrectomy (due to retained uretal stent per chart review) and HD MWF    Plan:   Lab Results   Component Value Date    CREATININE 2.4 (H)  "02/16/2025    CREATININE 2.4 (H) 02/16/2025     - On iHD MWF.   - Nephrology following. Appreciate assistance  - HD per nephro  - Avoid nephrotoxic agents such as NSAIDs, gadolinium and IV radiocontrast.  - Renally dose meds to current GFR.  - Maintain MAP > 65    ID  RSV (respiratory syncytial virus infection)  See respiratory failure. Droplet precautions in place.     Oncology  Anemia of chronic renal failure, stage 5  Admit with hemoglobin 9.6 Baseline ~8-9    Lab Results   Component Value Date    IRON 26 (L) 12/20/2024    TIBC 209 (L) 12/20/2024    FERRITIN 2,808 (H) 12/20/2024     Lab Results   Component Value Date    FOLATE 8.0 12/20/2024     Lab Results   Component Value Date    XARNBCER57 770 12/20/2024     No results found for: "RETICCTPCT"    Likely secondary to anemia of chronic disease    Plan:   -   Lab Results   Component Value Date    HGB 7.7 (L) 02/14/2025       - Daily CBC   - Transfuse hgb <7    - Maintain type and screen     Endocrine  Severe protein-calorie malnutrition  Nutrition consulted. Most recent weight and BMI monitored-     Measurements:  Wt Readings from Last 1 Encounters:   02/15/25 50.5 kg (111 lb 5.3 oz)   Body mass index is 17.97 kg/m².    Patient has been screened and assessed by RD. Last seen while intubated. Will re-consult.     Malnutrition Type:  Context:    Level:      Malnutrition Characteristic Summary:       Interventions/Recommendations (treatment strategy):         GI  Colostomy present on admission  Noted.     Palliative Care  ACP (advance care planning)  See ACP note by MICHEAL Salmeron PA-C on 2/15. Palliative care consulted for assistance.     Discussed with Dr. Garcia.     I have spent 35 min with this patient, with over 50% of this time spent coordinating care and speaking with the family       Elvia Salmeron PA-C  Critical Care Medicine  [facility]  "

## 2025-02-17 NOTE — NURSING
Patient oriented to room. Questions encouraged and answered. Patient verbalized understanding. Bed in low position and locked. Call light in reach. Bed alarm set. No distress noted. Dialysis nurse at the bedside. Patient's phone labeled and brought to charging station so patient can reach family.Water and apple juice provided.  Will continue to monitor, will continue with plan of care.

## 2025-02-17 NOTE — SUBJECTIVE & OBJECTIVE
Interval History/Significant Events: Mentation much improved. Currently on 3-4L NC.     Review of Systems   Respiratory:  Positive for cough and shortness of breath.    Gastrointestinal:  Negative for abdominal pain.     Objective:     Vital Signs (Most Recent):  Temp: 97.7 °F (36.5 °C) (02/17/25 0300)  Pulse: 96 (02/17/25 0630)  Resp: 19 (02/17/25 0630)  BP: (!) 147/70 (02/17/25 0600)  SpO2: (!) 94 % (02/17/25 0630) Vital Signs (24h Range):  Temp:  [97.7 °F (36.5 °C)-98.2 °F (36.8 °C)] 97.7 °F (36.5 °C)  Pulse:  [] 96  Resp:  [] 19  SpO2:  [90 %-100 %] 94 %  BP: ()/(55-77) 147/70   Weight: 55.5 kg (122 lb 5.7 oz)  Body mass index is 19.75 kg/m².      Intake/Output Summary (Last 24 hours) at 2/17/2025 0959  Last data filed at 2/17/2025 0315  Gross per 24 hour   Intake 720 ml   Output 1450 ml   Net -730 ml          Physical Exam  Vitals reviewed.   Constitutional:       General: He is not in acute distress.     Appearance: He is ill-appearing.   HENT:      Mouth/Throat:      Mouth: Mucous membranes are dry.      Pharynx: Oropharynx is clear. No oropharyngeal exudate.   Eyes:      General: No scleral icterus.     Pupils: Pupils are equal, round, and reactive to light.   Cardiovascular:      Rate and Rhythm: Normal rate and regular rhythm.      Pulses: Normal pulses.   Pulmonary:      Effort: No accessory muscle usage.      Breath sounds: No wheezing or rales.      Comments: On 3L NC  Abdominal:      General: Bowel sounds are normal. There is no distension.      Palpations: Abdomen is soft.      Comments: Colostomy in place   Musculoskeletal:      Right lower leg: No edema.      Left lower leg: No edema.   Skin:     General: Skin is warm and dry.      Capillary Refill: Capillary refill takes less than 2 seconds.      Findings: Bruising present.      Comments: Discoloration to bilateral toes   Neurological:      General: No focal deficit present.      Mental Status: He is alert. Mental status is at  baseline.            Vents: 3L NC    Lines/Drains/Airways       Central Venous Catheter Line  Duration                  Hemodialysis Catheter 12/31/24 0818 right subclavian 48 days              Drain  Duration                  Colostomy 12/19/24 2225 RLQ 59 days              Peripheral Intravenous Line  Duration                  Peripheral IV - Single Lumen 02/12/25 1120 20 G Anterior;Right Upper Arm 4 days                  Significant Labs:    CBC/Anemia Profile:  Recent Labs   Lab 02/16/25  0334 02/17/25  0323   WBC 3.18* 3.07*   HGB 7.0* 7.1*   HCT 23.0* 23.5*   * 128*   MCV 99* 100*   RDW 17.5* 17.2*        Chemistries:  Recent Labs   Lab 02/16/25 0334 02/17/25  0323     141 139  139   K 4.3  4.3 5.0  5.0     108 107  107   CO2 25  25 23  23   BUN 15  15 22  22   CREATININE 2.4*  2.4* 3.5*  3.5*   CALCIUM 8.9  8.9 8.6*  8.6*   ALBUMIN 2.5*  2.5* 2.6*  2.6*   PROT 5.7* 5.9*   BILITOT 0.2 0.2   ALKPHOS 170* 178*   ALT 7* 7*   AST 25 25   MG 2.0 2.1   PHOS 4.8*  4.8* 4.7*  4.7*       All pertinent labs within the past 24 hours have been reviewed.    Significant Imaging:  I have reviewed all pertinent imaging results/findings within the past 24 hours.

## 2025-02-17 NOTE — PLAN OF CARE
[facility]  Discharge Reassessment    Primary Care Provider: Jordin Robbins MD    Expected Discharge Date: 2/18/2025    Reassessment (most recent)       Discharge Reassessment - 02/17/25 1503          Discharge Reassessment    Assessment Type Discharge Planning Reassessment     Did the patient's condition or plan change since previous assessment? Yes     Discharge Plan discussed with: Sibling     Name(s) and Number(s) Sara Da Silva,  ph# [phone]     Communicated LLUVIA with patient/caregiver Yes     Discharge Plan A Return to nursing home     Discharge Plan B Return to Nursing Home     DME Needed Upon Discharge  other (see comments)     Transition of Care Barriers None     Why the patient remains in the hospital Requires continued medical care        Post-Acute Status    Post-Acute Authorization Placement     Post-Acute Placement Status Pending medical clearance/testing     Coverage [employer] Managed Medicare - [employer] Dual DSNP     Discharge Delays None known at this time                     Patient is medically ready to step down to hospital medicine.  He is resident at [facility] and will return; patient is an ESRD and received dialysis at [facility].      Discharge Plan A and Plan B have been determined by review of patient's clinical status, future medical and therapeutic needs, and coverage/benefits for post-acute care in coordination with multidisciplinary team members.    Estelle Retana LMSW  [facility]  [phone]

## 2025-02-17 NOTE — PT/OT/SLP PROGRESS
"Occupational Therapy   Co-Treatment    Name: Flakito Mcginnis  MRN: [id number]  Admitting Diagnosis:  Acute on chronic respiratory failure with hypoxia       Recommendations:     Discharge Recommendations: Moderate Intensity Therapy  Discharge Equipment Recommendations:  bedside commode  Barriers to discharge:  None    Assessment:     Flakito Mcginnis is a 69 y.o. male with a medical diagnosis of Acute on chronic respiratory failure with hypoxia.  He presents with impaired ADL and mobility performance deficits. Pt found upright in bed and agreeable for therapy. Session focused on EOB ADLs and stand pivot to his bedside chair. Pt limited by poor endurance and desaturation in 02 needing increase from 4-6L 02. RN notified. Pt's Sp02 in 80s-90s which improved with increased timed recovery. Patient continues to demonstrate the need for moderate intensity therapy on a daily basis post acute exhibited by decreased independence with self-care and functional mobility   Performance deficits affecting function are weakness, gait instability, decreased upper extremity function, impaired endurance, impaired balance, decreased lower extremity function, impaired self care skills, impaired functional mobility, decreased safety awareness.     Rehab Prognosis:  Good; patient would benefit from acute skilled OT services to address these deficits and reach maximum level of function.       Plan:     Patient to be seen 3 x/week to address the above listed problems via self-care/home management, therapeutic exercises, therapeutic activities, neuromuscular re-education  Plan of Care Expires: 03/01/25  Plan of Care Reviewed with: patient    Subjective     Chief Complaint: weakness and needing to catch breath  Patient/Family Comments/goals: "I just need some time to catch my breath"  Pain/Comfort:  Pain Rating 1: 0/10  Pain Rating Post-Intervention 1: 0/10    Objective:   Co-treatment with PT for maximal pt participation, safety, and activity " tolerance     Communicated with: RN prior to session.  Patient found HOB elevated with blood pressure cuff, pulse ox (continuous), telemetry, colostomy upon OT entry to room.    General Precautions: Standard, fall, contact    Orthopedic Precautions:N/A  Braces: N/A  Respiratory Status: Nasal cannula, flow 4 L/min     Occupational Performance:     Bed Mobility:    Patient completed Rolling/Turning to Right with maximal assistance  Patient completed Scooting/Bridging with maximal assistance  Patient completed Supine to Sit with maximal assistance     Functional Mobility/Transfers:  Patient completed Sit <> Stand Transfer with minimum assistance  with  hand-held assist   Patient completed Bed <> Chair Transfer using Stand Pivot technique with minimum assistance with hand-held assist  Functional Mobility: Pt stood with min A and took several steps with pivot to chair with min A-HHA.    Activities of Daily Living:  Grooming: stand by assistance washing face at EOB   Upper Body Dressing: maximal assistance donning gown around back at EOB   Lower Body Dressing: total assistance donning socks in thea      AMPAC 6 Click ADL: 8    Treatment & Education:  Pt educated on role of occupational therapy, POC, and safety during ADLs and functional mobility. Pt and OT discussed importance of safe, continued mobility to optimize daily living skills. Pt verbalized understanding.     White board updated during session. Pt given instruction to call for medical staff/nurse for assistance.       Patient left up in chair with all lines intact, call button in reach, and RN notified    GOALS:   Multidisciplinary Problems       Occupational Therapy Goals          Problem: Occupational Therapy    Goal Priority Disciplines Outcome Interventions   Occupational Therapy Goal     OT, PT/OT Progressing                        DME Justifications:  No DME recommended requiring DME justifications    Time Tracking:     OT Date of Treatment: 02/17/25  OT  Start Time: 1050  OT Stop Time: 1116  OT Total Time (min): 26 min    Billable Minutes:Self Care/Home Management 14 min  Therapeutic Activity 12 min    OT/HAN: OT          2/17/2025

## 2025-02-17 NOTE — NURSING TRANSFER
Nursing Transfer Note      2/17/2025   0410    Nurse giving handoff:KAYLENE Peters     Reason patient is being transferred: Stepdown orders    Transfer To: [id number] from 7079    Transfer via bed    Transfer with  O2, cardiac monitoring    Transported by KAYLENE Peters and Yrn, PCT    Transfer Vital Signs:  Blood Pressure:130/68  Heart Rate:97  O2:98%  Temperature:98  Respirations:20      Order for Tele Monitor? No    Additional Lines: Oxygen    Medicines sent: yes    Any special needs or follow-up needed: none    Patient belongings transferred with patient: Yes    Chart send with patient: Yes    Notified: sister liz     Upon arrival to floor: cardiac monitor applied, patient oriented to room, call bell in reach, and bed in lowest position

## 2025-02-17 NOTE — PT/OT/SLP PROGRESS
Physical Therapy Co-Treatment    Patient Name:  Flakito Mcginnis   MRN:  [id number]  Admitting Diagnosis:  Acute on chronic respiratory failure with hypoxia   Recent Surgery: * No surgery found *    Admit Date: 2/9/2025  Length of Stay: 8 days    Recommendations:     Discharge Recommendations:  Moderate Intensity Therapy     Discharge Equipment Recommendations: bedside commode   Barriers to discharge: Decreased caregiver support and increased need for skilled assistance    Appropriate transfer level with nursing staff: Safe to transfer to bedside chair/bedside commode: stand pivot with 1 person with/without RW.    Plan:     During this hospitalization, patient to be seen 3 x/week to address the identified rehab impairments via therapeutic activities, therapeutic exercises, neuromuscular re-education, gait training and progress towards the established goals.  Plan of Care Expires:  03/12/25  Plan of Care Reviewed with: patient    Assessment:     Flakito Mcginnis is a 69 y.o. male admitted with a medical diagnosis of Acute on chronic respiratory failure with hypoxia. Pt found supine agreeable to therapy session. Pt demo'd improvements in activity tolerance as he progressed to step transfer to chair today. Pt with SpO2 ranging from 85-92% on 4 L NC prior to session. SpO2 briefly dropped to low 80s after transfer with oxygen titrated up to 6 L NC but pt quickly recovered with oxygen back down to 4 L NC. SpO2 ranging from 88-92% upon PT exit from room with RN notified. Pt functioning below his baseline PLOF and is a fall risk. Patient would benefit from skilled therapy services to maximize safety and independence, increase activity tolerance, decrease fall risk, decrease caregiver burden, improve QOL, improve patient's functional mobility, and decrease risk of contractures and pressure sores. Patient continues to demonstrate the need for moderate intensity therapy on a daily basis post acute exhibited by decreased independence  "with functional mobility    Problem List: impaired endurance, impaired functional mobility, gait instability, impaired balance, impaired cardiopulmonary response to activity, decreased safety awareness, decreased lower extremity function, decreased upper extremity function, weakness.  Rehab Prognosis: Good; patient would benefit from acute skilled PT services to address these deficits and reach maximum level of function.      Goals:   Multidisciplinary Problems       Physical Therapy Goals          Problem: Physical Therapy    Goal Priority Disciplines Outcome Interventions   Physical Therapy Goal     PT, PT/OT Progressing    Description: Goals to be met by: 3/1/2025    Patient will increase functional independence with mobility by performin. Supine to sit with MInimal Assistance  2. Sit to stand transfer with Stand-by Assistance using RW  3. Gait  x 25 feet with Stand-by Assistance using Rolling Walker.                          Subjective     RN notified prior to session. No one present upon PT entrance into room. Patient agreeable to PT treatment session.    Chief Complaint: "I just feel like I need more oxygen" -pt stated while sitting EOB satting 92%  Patient/Family Comments/goals: increase endurance and activity tolerance   Pain/Comfort:  Pain Rating 1: 0/10  Pain Rating Post-Intervention 1: 0/10      Objective:     Additional staff present: OT; OT for cotx due to pt's multiple medical comorbidities and functional deficits req'ing two skilled therapists to appropriately maximize functional potential by progressing pt's musculoskeletal strength and endurance, facilitating neuromuscular postural balance and control ,and accommodating for patient's impaired cardiopulmonary tolerance to activities.     Patient found supine with: blood pressure cuff, telemetry, oxygen, pulse ox (continuous), colostomy   Cognition:   Alert and Cooperative  Patient is oriented to Person, Place, Time, Situation  General " "Precautions: Standard, Cardiac fall, contact   Orthopedic Precautions:N/A   Braces: N/A   Body mass index is 19.75 kg/m².  Oxygen Device: High Flow Nasal Cannula 4L (briefly titrated to 6 L NC after transfer but back down to 4 L NC after ~1-2 minutes)  Vitals: /61 (BP Location: Right arm, Patient Position: Lying)   Pulse 88   Temp 97.2 °F (36.2 °C) (Axillary)   Resp 17   Ht 5' 6" (1.676 m)   Wt 55.5 kg (122 lb 5.7 oz)   SpO2 (!) 93%   BMI 19.75 kg/m²     Outcome Measures:  AM-PAC 6 CLICK MOBILITY  Turning over in bed (including adjusting bedclothes, sheets and blankets)?: 2  Sitting down on and standing up from a chair with arms (e.g., wheelchair, bedside commode, etc.): 3  Moving from lying on back to sitting on the side of the bed?: 2  Moving to and from a bed to a chair (including a wheelchair)?: 3  Need to walk in hospital room?: 3  Climbing 3-5 steps with a railing?: 1  Basic Mobility Total Score: 14     Functional Mobility:    Bed Mobility:   Scooting with maximal assistance  Supine > Sit with maximal assistance    Transfers:   Sit <> Stand Transfer: minimum assistance with no assistive device   Bed <> Chair Transfer: Step Transfer technique with minimum assistance with no assistive device    Balance:  Sitting Balance  Static: supervision  Dynamic: supervision  Pt worked on tolerance to upright positioning while completing ADLs at EOB with OT. Pt also required increased time for recovery while sitting EOB before transfer. SpO2 ranging from 88-94% while sitting EOB with minor dizziness endorsed.   Standing:  Static: minimum assistance  Dynamic: minimum assistance      Gait:  Patient ambulated: 4 steps to chair    Patient required: minimal assist  Patient used:  no assistive device and hand-held assist   Gait Deviation(s): occasional unsteady gait, narrow base of support, flexed posture, and decreased mihcaela  all lines remained intact throughout ambulation trial  Comments: Patient with fwd flexed " posture and mild instability requiring cues for upright stance.     Therapeutic exercise performed in the form of bed mobility, sitting balance, and transfers to increase patient's activity tolerance and dynamic standing balance.     Treatment and Education:    SpO2 ranging from 85-92% prior to any mobility on 4 L NC.   Once sitting EOB, SpO2 ranged from 88-94% though pt required increased time for recovery prior to transfer due to minor SOB reported.   SpO2 briefly dropped to low 80s after transfer with oxygen increased to 6 L NC, but quickly returned back to 4 L NC with pt satting 88-90%.   RN notified of pt's oxygen requirements and functional status.     Time provided for education, counseling and discussion of health disposition in regards to patient's current status  All questions answered within PT scope of practice and to patient's satisfaction  PT role in POC to address current functional deficits  Call nursing/pct to transfer to chair/use bathroom. Pt stated understanding.    Patient left up in chair with all lines intact, call button in reach, and RN notified.    Time Tracking:     PT Received On: 02/17/25  PT Start Time: 1050     PT Stop Time: 1116  PT Total Time (min): 26 min     Billable Minutes:   Therapeutic Activity 13 minutes and Therapeutic Exercise 13 minutes       PT/PTA: PT       2/17/2025

## 2025-02-18 ENCOUNTER — EPISODE CHANGES (OUTPATIENT)
Dept: CARDIOLOGY | Facility: CLINIC | Age: 70
End: 2025-02-18

## 2025-02-18 PROBLEM — D61.818 PANCYTOPENIA: Status: ACTIVE | Noted: 2025-02-18

## 2025-02-18 LAB
ALBUMIN SERPL BCP-MCNC: 2.7 G/DL (ref 3.5–5.2)
ALP SERPL-CCNC: 191 U/L (ref 40–150)
ALT SERPL W/O P-5'-P-CCNC: 8 U/L (ref 10–44)
ANION GAP SERPL CALC-SCNC: 9 MMOL/L (ref 8–16)
AST SERPL-CCNC: 23 U/L (ref 10–40)
BASOPHILS # BLD AUTO: 0.05 K/UL (ref 0–0.2)
BASOPHILS NFR BLD: 1.3 % (ref 0–1.9)
BILIRUB SERPL-MCNC: 0.3 MG/DL (ref 0.1–1)
BUN SERPL-MCNC: 15 MG/DL (ref 8–23)
CALCIUM SERPL-MCNC: 8.8 MG/DL (ref 8.7–10.5)
CHLORIDE SERPL-SCNC: 107 MMOL/L (ref 95–110)
CO2 SERPL-SCNC: 22 MMOL/L (ref 23–29)
CREAT SERPL-MCNC: 2.6 MG/DL (ref 0.5–1.4)
DIFFERENTIAL METHOD BLD: ABNORMAL
EOSINOPHIL # BLD AUTO: 0.3 K/UL (ref 0–0.5)
EOSINOPHIL NFR BLD: 7.9 % (ref 0–8)
ERYTHROCYTE [DISTWIDTH] IN BLOOD BY AUTOMATED COUNT: 17.4 % (ref 11.5–14.5)
EST. GFR  (NO RACE VARIABLE): 25.9 ML/MIN/1.73 M^2
GLUCOSE SERPL-MCNC: 75 MG/DL (ref 70–110)
HCT VFR BLD AUTO: 27.6 % (ref 40–54)
HGB BLD-MCNC: 8.3 G/DL (ref 14–18)
IMM GRANULOCYTES # BLD AUTO: 0.02 K/UL (ref 0–0.04)
IMM GRANULOCYTES NFR BLD AUTO: 0.5 % (ref 0–0.5)
LYMPHOCYTES # BLD AUTO: 0.5 K/UL (ref 1–4.8)
LYMPHOCYTES NFR BLD: 13.5 % (ref 18–48)
MAGNESIUM SERPL-MCNC: 2 MG/DL (ref 1.6–2.6)
MCH RBC QN AUTO: 30.5 PG (ref 27–31)
MCHC RBC AUTO-ENTMCNC: 30.1 G/DL (ref 32–36)
MCV RBC AUTO: 102 FL (ref 82–98)
MONOCYTES # BLD AUTO: 0.4 K/UL (ref 0.3–1)
MONOCYTES NFR BLD: 9.3 % (ref 4–15)
NEUTROPHILS # BLD AUTO: 2.6 K/UL (ref 1.8–7.7)
NEUTROPHILS NFR BLD: 67.5 % (ref 38–73)
NRBC BLD-RTO: 0 /100 WBC
PHOSPHATE SERPL-MCNC: 4.1 MG/DL (ref 2.7–4.5)
PLATELET # BLD AUTO: 142 K/UL (ref 150–450)
PMV BLD AUTO: 10.7 FL (ref 9.2–12.9)
POCT GLUCOSE: 117 MG/DL (ref 70–110)
POCT GLUCOSE: 72 MG/DL (ref 70–110)
POCT GLUCOSE: 87 MG/DL (ref 70–110)
POTASSIUM SERPL-SCNC: 4.7 MMOL/L (ref 3.5–5.1)
PROT SERPL-MCNC: 6.1 G/DL (ref 6–8.4)
RBC # BLD AUTO: 2.72 M/UL (ref 4.6–6.2)
SODIUM SERPL-SCNC: 138 MMOL/L (ref 136–145)
WBC # BLD AUTO: 3.78 K/UL (ref 3.9–12.7)

## 2025-02-18 PROCEDURE — 97110 THERAPEUTIC EXERCISES: CPT

## 2025-02-18 PROCEDURE — 80053 COMPREHEN METABOLIC PANEL: CPT

## 2025-02-18 PROCEDURE — 84100 ASSAY OF PHOSPHORUS: CPT

## 2025-02-18 PROCEDURE — 25000003 PHARM REV CODE 250

## 2025-02-18 PROCEDURE — 27000207 HC ISOLATION

## 2025-02-18 PROCEDURE — 20600001 HC STEP DOWN PRIVATE ROOM

## 2025-02-18 PROCEDURE — 25000003 PHARM REV CODE 250: Performed by: INTERNAL MEDICINE

## 2025-02-18 PROCEDURE — 97116 GAIT TRAINING THERAPY: CPT

## 2025-02-18 PROCEDURE — 97535 SELF CARE MNGMENT TRAINING: CPT

## 2025-02-18 PROCEDURE — 63600175 PHARM REV CODE 636 W HCPCS

## 2025-02-18 PROCEDURE — 25000003 PHARM REV CODE 250: Performed by: PHYSICIAN ASSISTANT

## 2025-02-18 PROCEDURE — 97530 THERAPEUTIC ACTIVITIES: CPT

## 2025-02-18 PROCEDURE — 85025 COMPLETE CBC W/AUTO DIFF WBC: CPT

## 2025-02-18 PROCEDURE — 83735 ASSAY OF MAGNESIUM: CPT | Performed by: PHYSICIAN ASSISTANT

## 2025-02-18 RX ORDER — MUPIROCIN 20 MG/G
OINTMENT TOPICAL 2 TIMES DAILY
Status: CANCELLED | OUTPATIENT
Start: 2025-02-18 | End: 2025-02-23

## 2025-02-18 RX ORDER — SODIUM CHLORIDE 9 MG/ML
INJECTION, SOLUTION INTRAVENOUS
Status: CANCELLED | OUTPATIENT
Start: 2025-02-18

## 2025-02-18 RX ORDER — SODIUM CHLORIDE 9 MG/ML
INJECTION, SOLUTION INTRAVENOUS ONCE
Status: CANCELLED | OUTPATIENT
Start: 2025-02-18 | End: 2025-02-18

## 2025-02-18 RX ORDER — CARVEDILOL 3.12 MG/1
3.12 TABLET ORAL 2 TIMES DAILY
Status: DISCONTINUED | OUTPATIENT
Start: 2025-02-18 | End: 2025-02-18

## 2025-02-18 RX ADMIN — ACETAMINOPHEN 650 MG: 325 TABLET ORAL at 12:02

## 2025-02-18 RX ADMIN — HEPARIN SODIUM 5000 UNITS: 5000 INJECTION INTRAVENOUS; SUBCUTANEOUS at 02:02

## 2025-02-18 RX ADMIN — PANTOPRAZOLE SODIUM 40 MG: 40 TABLET, DELAYED RELEASE ORAL at 09:02

## 2025-02-18 RX ADMIN — LEVOTHYROXINE SODIUM 50 MCG: 0.05 TABLET ORAL at 05:02

## 2025-02-18 RX ADMIN — Medication 9 MG: at 05:02

## 2025-02-18 RX ADMIN — HEPARIN SODIUM 5000 UNITS: 5000 INJECTION INTRAVENOUS; SUBCUTANEOUS at 09:02

## 2025-02-18 RX ADMIN — HEPARIN SODIUM 5000 UNITS: 5000 INJECTION INTRAVENOUS; SUBCUTANEOUS at 05:02

## 2025-02-18 NOTE — PLAN OF CARE
JYOTHI spoke with Wilma [phone] in admissions at [facility].  She is submitting for authorization today for discharge tomorrow.     JYOTHI faxed over updated clinicals to [facility] via [employer].      Kaila Erickson RN     [phone]

## 2025-02-18 NOTE — PROGRESS NOTES
[facility] - Telemetry [facility] Medicine  Progress Note    Patient Name: Flakito Mcginnis  MRN: [id number]  Patient Class: IP- Inpatient   Admission Date: 2/9/2025  Length of Stay: 9 days  Attending Physician: Una Jaffe MD  Primary Care Provider: Jordin Robbins MD        Subjective     Principal Problem:Acute on chronic respiratory failure with hypoxia        HPI:  Per CCM:  69yoM w/hx of HFrEF (EF 20-25%, 12/2024), NICM, MR, ESRD on HD, chronic respiratory failure (on 3L NC at home), Crohn's disease s/p colostomy, R nephrectomy who presents to the hospital from [facility] from acute onset of SOB. Given the acuity of patient's condition, history was obtained from the chart. Per the ED provider note, patient has had increased sputum production, cough, wheezing, and bilateral lower extremity edema. His last dialysis session was on Friday (2/7/25) but there is concern that he may not have completed a full session as patient reports feeling volume overloaded. He denies chest pain or fever. He was recently admitted to the hospital septic shock secondary to staph capitis bacteremia and endocarditis. Completed 6wk course of IV Cefazolin on 2/2/25.      In the ED, patient was hypoxic with increased WOB. Initially placed on BiPAP without much improvement in hypoxia so was intubated. Afebrile, other VSS. Labs were notable for Hgb 9.6, Hct 32.3, Na 132, K 8.3, Bicarb 19, BUN/Cr 38/4.8, , BNP >4900, HsTrop 44. Flu/COVID negative. RSV positive. VBG 7.46/35.1/33.3/25.5. CXR with ongoing vs recurrent small right pleural effusion. EKG with known 1st degree AV block and T-wave abnormality. Administered calcium gluconate, IV insulin/dextrose, and IV lasix for hyperkalemia. Nephrology consulted. Admitted to the MICU for further management and workup.     Overview/Hospital Course:  Admitted to MICU on 02/09 with acute on chronic hypoxemic respiratory failure requiring intubation in setting of RSV infection  and Klebsiella pneumoniae and acute on chronic systolic heart failure, hyperkalemia, and shock requiring pressor support, particularly to tolerate SLED. 2/9 blood cultures no growth after 5 days. Endotracheal aspirate culture with pansensitive Klebsiella pneumoniae. He received 3 doses of IV azithromycin 500 mg.  He received IV Zosyn 2/09- 2/12; he was deescalated to Rocephin on 02/13-2/16 completing a total 7 day course of antibiotics.  C diff testing was negative.  He briefly required D10 infusion for hypoglycemia (2/10-2/11).  2/09 echo with regional wall motion abnormalities; abnormal septal motion, severely reduced systolic function with EF 20-25%, normal diastolic function, right ventricle moderately reduced systolic function, moderately dilated right atrium, moderate aortic valve sclerosis, moderate severe tricuspid valve regurgitation.  Nephrology was consulted for HD and volume management as patient largely anuric.  Fentanyl and propofol discontinued 2/11.  Initially extubated to nasal cannula on 2/11, however on 02/12, needing Airvo and deemed high risk for re-intubation.  Nephrology was asked to increase ultrafiltration..  Norepinephrine weaned off 2/12.  Weaned from Airvo to nasal cannula 2/14. Tolerated HD trial 2/14. Intermittent hypoglycemia attributed to poor p.o. intake. Course complicated by delirium.  Deemed stable for step-down on 02/16; on home 3 L via nasal cannula at the time and delirium noted to be improving.      Stepped down to hospital medicine 2/18.       Interval History:  Patient seen and examined at bedside.    No acute events overnight.  He is oriented to person, place, time.  Feeding himself breakfast.  He notes that his breathing is still a bit labored and he has palpitations occasionally.  He is eager to return to facility for physical therapy; his goals overall remain to get better.  Patient updated regarding care plan.      Review of Systems   Constitutional:  Negative for  diaphoresis and fever.   HENT:  Positive for congestion. Negative for sore throat.    Respiratory:  Positive for cough and shortness of breath. Negative for wheezing.    Gastrointestinal:  Negative for abdominal pain and nausea.   Genitourinary:  Positive for decreased urine volume (Anuric).   Musculoskeletal:  Negative for myalgias.   Skin:  Positive for color change (bruising).   Neurological:  Negative for headaches.   Psychiatric/Behavioral:  Negative for agitation, behavioral problems and decreased concentration.        Objective:    Temp: 98.2 °F (36.8 °C) (02/18/25 0819)  Pulse: 95 (02/18/25 1200)  Resp: (!) 30 (02/18/25 1200)  BP: (!) 141/70 (02/18/25 1200)  SpO2: 100 % (02/18/25 1200)    Weight: 54 kg (119 lb 0.8 oz) (02/18/25 0400)    Body mass index is 19.21 kg/m².      Intake/Output Summary (Last 24 hours) at 2/18/2025 1420  Last data filed at 2/18/2025 0745  Gross per 24 hour   Intake 120 ml   Output 1300 ml   Net -1180 ml       Physical Exam  Vitals and nursing note reviewed.   Constitutional:       General: He is not in acute distress.     Appearance: He is ill-appearing (chronically). He is not toxic-appearing or diaphoretic.   HENT:      Head: Normocephalic and atraumatic.   Eyes:      General: No scleral icterus.  Cardiovascular:      Rate and Rhythm: Normal rate and regular rhythm.      Pulses: Normal pulses.   Pulmonary:      Effort: Tachypnea present. No accessory muscle usage or respiratory distress.      Comments: On nasal cannula  Bibasilar crackles  Abdominal:      General: Bowel sounds are normal.      Palpations: Abdomen is soft.      Tenderness: There is no abdominal tenderness.      Comments: Colostomy in place - brown liquid output  Hernia reducible   Musculoskeletal:      Cervical back: Neck supple.      Right lower leg: No edema.      Left lower leg: No edema.   Skin:     General: Skin is warm and dry.      Capillary Refill: Capillary refill takes less than 2 seconds.      Findings:  Bruising present.      Comments: Discoloration to bilateral toes   Neurological:      Mental Status: He is alert and oriented to person, place, and time.      Motor: Weakness (generalized) present.   Psychiatric:         Behavior: Behavior normal.         Significant Labs: All pertinent labs within the past 24 hours have been reviewed.    Recent Results (from the past 24 hours)   POCT glucose    Collection Time: 02/17/25 10:18 PM   Result Value Ref Range    POCT Glucose 90 70 - 110 mg/dL   Comprehensive metabolic panel    Collection Time: 02/18/25  5:40 AM   Result Value Ref Range    Sodium 138 136 - 145 mmol/L    Potassium 4.7 3.5 - 5.1 mmol/L    Chloride 107 95 - 110 mmol/L    CO2 22 (L) 23 - 29 mmol/L    Glucose 75 70 - 110 mg/dL    BUN 15 8 - 23 mg/dL    Creatinine 2.6 (H) 0.5 - 1.4 mg/dL    Calcium 8.8 8.7 - 10.5 mg/dL    Total Protein 6.1 6.0 - 8.4 g/dL    Albumin 2.7 (L) 3.5 - 5.2 g/dL    Total Bilirubin 0.3 0.1 - 1.0 mg/dL    Alkaline Phosphatase 191 (H) 40 - 150 U/L    AST 23 10 - 40 U/L    ALT 8 (L) 10 - 44 U/L    eGFR 25.9 (A) >60 mL/min/1.73 m^2    Anion Gap 9 8 - 16 mmol/L   Phosphorus    Collection Time: 02/18/25  5:40 AM   Result Value Ref Range    Phosphorus 4.1 2.7 - 4.5 mg/dL   CBC auto differential    Collection Time: 02/18/25  5:40 AM   Result Value Ref Range    WBC 3.78 (L) 3.90 - 12.70 K/uL    RBC 2.72 (L) 4.60 - 6.20 M/uL    Hemoglobin 8.3 (L) 14.0 - 18.0 g/dL    Hematocrit 27.6 (L) 40.0 - 54.0 %     (H) 82 - 98 fL    MCH 30.5 27.0 - 31.0 pg    MCHC 30.1 (L) 32.0 - 36.0 g/dL    RDW 17.4 (H) 11.5 - 14.5 %    Platelets 142 (L) 150 - 450 K/uL    MPV 10.7 9.2 - 12.9 fL    Immature Granulocytes 0.5 0.0 - 0.5 %    Gran # (ANC) 2.6 1.8 - 7.7 K/uL    Immature Grans (Abs) 0.02 0.00 - 0.04 K/uL    Lymph # 0.5 (L) 1.0 - 4.8 K/uL    Mono # 0.4 0.3 - 1.0 K/uL    Eos # 0.3 0.0 - 0.5 K/uL    Baso # 0.05 0.00 - 0.20 K/uL    nRBC 0 0 /100 WBC    Gran % 67.5 38.0 - 73.0 %    Lymph % 13.5 (L) 18.0 - 48.0  %    Mono % 9.3 4.0 - 15.0 %    Eosinophil % 7.9 0.0 - 8.0 %    Basophil % 1.3 0.0 - 1.9 %    Differential Method Automated    Magnesium    Collection Time: 02/18/25  5:40 AM   Result Value Ref Range    Magnesium 2.0 1.6 - 2.6 mg/dL   POCT glucose    Collection Time: 02/18/25 12:01 PM   Result Value Ref Range    POCT Glucose 72 70 - 110 mg/dL       Significant Imaging: I have reviewed all pertinent imaging results/findings within the past 24 hours.    Imaging Results              X-Ray Chest AP Portable (Final result)  Result time 02/09/25 21:59:58      Final result by Thompson Santana MD (02/09/25 21:59:58)                   Impression:      See above comments.  No detrimental change.      Electronically signed by: Thompson Santana  Date:    02/09/2025  Time:    21:59               Narrative:    EXAMINATION:  XR CHEST AP PORTABLE    CLINICAL HISTORY:  ETT check;    TECHNIQUE:  Single frontal view of the chest was performed.    COMPARISON:  02/09/2025    FINDINGS:  Cardiac silhouette is enlarged similar to the prior study.    Right IJ central venous catheter with tip near the cavoatrial junction.    NG tube is present with tip overlying the stomach.    Endotracheal tube present tip above the vito.  Positioning is adequate.    No large effusion.  No evidence of pneumothorax.  No acute osseous abnormality.    Mild hazy density at the right lung base could be associated with minimal infiltrate or small layering effusion.    Remote rib fractures on the left.    No acute osseous abnormality.    Overall mild improvement at the right lung base with no detrimental change.                                       X-Ray Chest AP Portable (Final result)  Result time 02/09/25 20:13:15      Final result by Gualberto Payne MD (02/09/25 20:13:15)                   Impression:      Suspected ongoing versus recurrent small right pleural effusion with right basilar atelectasis/infiltrate.  Improved aeration of the left lung  base.    Grossly stable other chronic findings in the body of the report.      Electronically signed by: Gualberto Payne MD  Date:    02/09/2025  Time:    20:13               Narrative:    EXAMINATION:  XR CHEST AP PORTABLE    CLINICAL HISTORY:  Chest Pain;    TECHNIQUE:  Single frontal view of the chest was performed.    COMPARISON:  Chest radiograph 12/26/2024, report only from outside facility chest CT 09/04/2024    FINDINGS:  Monitoring leads overlie the chest.  Patient is somewhat rotated.  Bilateral lung apices are partially obscured by overlying chin soft tissues.  Resolution is limited by body habitus with underpenetration.    Right IJ CVC tip overlies the upper right atrium.  Cardiomediastinal silhouette is midline and prominent with similar calcification and tortuosity of the aorta and moderately enlarged heart.  Hilar contours are stable.    Grossly similar hazy opacification of the right lung base with blunting of the costophrenic angle suggesting small pleural effusion, with probable right basilar atelectasis/infiltrate.  Improved aeration of the left lung base.  No sizable pleural effusion or consolidation on the left.  Scattered areas of platelike scarring versus atelectasis bilaterally.  No definite pneumothorax.    No acute osseous process seen.  PA and lateral views can be obtained.                                        Assessment and Plan     * Acute on chronic respiratory failure with hypoxia  RSV infection   Klebsiella pneumoniae   Acute on chronic systolic heart failure    Patient with Hypoxic Respiratory failure which is Acute on chronic.  he is on home oxygen at 3 LPM. Supplemental oxygen was provided and noted-      .   Signs/symptoms of respiratory failure include- tachypnea, increased work of breathing, and respiratory distress. Contributing diagnoses includes - CHF, Pneumonia, and RSV  Labs and images were reviewed. Patient Has recent ABG, which has been reviewed. Will treat underlying  causes and adjust management of respiratory failure as follows-     -Complete antimicrobial therapy for Klebsiella pneumoniae  -continue supportive care prn for RSV  -IS, flutter valve prn  -HD per Nephro for volume removal    Pancytopenia  This patient is found to have pancytopenia, the likely etiology is Infection/Sepsis, will monitor CBC Daily. Will transfuse red blood cells if the hemoglobin is <7g/dL (or <8 in the setting of ACS). Will transfuse platelets if platelet count is <50k (if undergoing surgical procedure or have active bleeding). Hold DVT prophylaxis if platelets are <50k. The patient's hemoglobin, white blood cell count, and platelet count results have been reviewed and are listed below.  Recent Labs   Lab 02/18/25  0540   HGB 8.3*   WBC 3.78*   *             Hypothyroidism  Continue home Synthroid      Hyperphosphatemia  Patient's most recent phosphorus results are listed below.   Recent Labs     02/16/25  0334 02/17/25  0323 02/18/25  0540   PHOS 4.8*  4.8* 4.7*  4.7* 4.1     Plan  - Will treat hyperphosphatemia with HD per Nephro  - Patient's hyperphosphatemia is worsening. Will continue current treatment  -resume home sevelamer        Thrombocytopenia  The likely etiology of thrombocytopenia is  splenomegaly . The patients 3 most recent labs are listed below.  Recent Labs     02/16/25  0334 02/17/25  0323 02/18/25  0540   * 128* 142*     Plan  - Will transfuse if platelet count is <50k (if undergoing surgical procedure or have active bleeding).        Severe protein-calorie malnutrition  Nutrition consulted. Most recent weight and BMI monitored-     Measurements:  Wt Readings from Last 1 Encounters:   02/16/25 52.5 kg (115 lb 11.9 oz)   Body mass index is 18.68 kg/m².    Patient has been screened and assessed by RD.    Malnutrition Type:  Context: acute illness or injury  Level: severe    Malnutrition Characteristic Summary:  Weight Loss (Malnutrition): greater than 2% in 1 week  "(-5% x 1-2 weeks)  Subcutaneous Fat (Malnutrition):  (mild to moderate)  Muscle Mass (Malnutrition): severe depletion    Interventions/Recommendations (treatment strategy):  1.)      Delirium  - Delirium precautions   - Aspiration precautions   - Fall precautions   - Avoid sedating agents if possible   - Encourage nutrition   - repeat EKG prn; if QT not prolonged  PRN seroquel as needed    -at baseline mentation 2/18    Anemia of chronic renal failure, stage 5  Anemia is likely due to chronic disease due to ESRD. Most recent hemoglobin and hematocrit are listed below.  Recent Labs     02/16/25  0334 02/17/25  0323 02/18/25  0540   HGB 7.0* 7.1* 8.3*   HCT 23.0* 23.5* 27.6*     Plan  - Monitor serial CBC: Daily  - Transfuse PRBC if patient becomes hemodynamically unstable, symptomatic or H/H drops below 7/21.  - Patient has not received any PRBC transfusions to date  - Patient's anemia is currently stable      Acute on chronic systolic heart failure  Patient has Systolic (HFrEF) heart failure that is Acute on chronic. On presentation their CHF was decompensated. Evidence of decompensated CHF on presentation includes: edema. The etiology of their decompensation is likely multi-factorial . Most recent BNP and echo results are listed below.  No results for input(s): "BNP" in the last 72 hours.  Latest ECHO  Results for orders placed during the hospital encounter of 02/09/25    Echo    Interpretation Summary    Left Ventricle: The left ventricle is mildly dilated. Mildly increased ventricular mass. Normal wall thickness. There is eccentric hypertrophy. Regional wall motion abnormalities present. See diagram for wall motion findings. Septal motion is abnormal. There is severely reduced systolic function with a visually estimated ejection fraction of 20 - 25%. Ejection fraction is approximately 23%. There is normal diastolic function.    Right Ventricle: Mild right ventricular enlargement. Wall thickness is normal. " Systolic function is moderately reduced.    Right Atrium: Right atrium is moderately dilated.    Aortic Valve: There is moderate aortic valve sclerosis. There is annular calcification present. Moderately restricted motion. There is moderate to severe stenosis. Aortic valve area by VTI is 1.1 cm2. Aortic valve peak velocity is 3.2 m/s. Mean gradient is 23 mmHg. The dimensionless index is 0.34. There is mild to moderate aortic regurgitation.    Mitral Valve: There is moderate bileaflet sclerosis. There is moderate mitral annular calcification present. There is mild regurgitation.    Tricuspid Valve: Mildly thickened leaflets. There is moderate to moderate severe regurgitation.    IVC/SVC: Patient is ventilated, cannot use IVC diameter to estimate right atrial pressure.    Pericardium: Left pleural effusion with a large density within the effusion.    Current Heart Failure Medications       Plan  - Monitor strict I&Os and daily weights.    - Place on telemetry  - Low sodium diet  - Place on fluid restriction of 1.5 L.   - Cardiology has not been consulted  - The patient's volume status is improving but not at their baseline as indicated by edema  -GDMT as tolerated with hemodynamics       ESRD on hemodialysis  Vascular dialysis catheter in place    Creatine stable for now. BMP reviewed- noted Estimated Creatinine Clearance: 21.6 mL/min (A) (based on SCr of 2.4 mg/dL (H)). according to latest data. Based on current GFR, CKD stage is end stage.  Monitor UOP and serial BMP and adjust therapy as needed. Renally dose meds. Avoid nephrotoxic medications and procedures.    HD per nephro  Right chest tunneled dialysis catheter present. Sterile dressing in place.           VTE Risk Mitigation (From admission, onward)           Ordered     heparin (porcine) injection 1,000 Units  Once         02/17/25 1800     heparin (porcine) injection 5,000 Units  Every 8 hours         02/09/25 2251     IP VTE HIGH RISK PATIENT  Once          02/09/25 2239     Place sequential compression device  Until discontinued         02/09/25 2239                    Discharge Planning   LLUVIA: 2/19/2025     Code Status: Full Code   Medical Readiness for Discharge Date:   Discharge Plan A: Return to nursing home   Discharge Delays: None known at this time            Please place Justification for DME        Una Jaffe MD  Department of Hospital Medicine   [facility] - Telemetry Stepdown

## 2025-02-18 NOTE — PT/OT/SLP PROGRESS
Occupational Therapy   Treatment    Name: Flakito Mcginnis  MRN: [id number]  Admitting Diagnosis:  Acute on chronic respiratory failure with hypoxia       Recommendations:     Discharge Recommendations: Moderate Intensity Therapy  Discharge Equipment Recommendations:  bedside commode  Barriers to discharge:  None    Assessment:     Flakito Mcginnis is a 69 y.o. male with a medical diagnosis of Acute on chronic respiratory failure with hypoxia.  He presents with deficits in mobility and fatigue noted with mobility on this date. Pt is below PLOF. Patient would benefit from continued OT services to maximize level of safety and independence with self-care tasks.   Performance deficits affecting function are weakness, impaired endurance, impaired self care skills, impaired functional mobility, gait instability, decreased upper extremity function, decreased lower extremity function, impaired cardiopulmonary response to activity.     Rehab Prognosis:  Good; patient would benefit from acute skilled OT services to address these deficits and reach maximum level of function.       Plan:     Patient to be seen 3 x/week to address the above listed problems via self-care/home management, therapeutic activities, therapeutic exercises, neuromuscular re-education  Plan of Care Expires: 03/01/25  Plan of Care Reviewed with: patient    Subjective     Chief Complaint: I feel like just sitting in the chair and being in bed is getting me weaker. I need to be moving more.   Patient/Family Comments/goals: to get back to where he is residing  Pain/Comfort:  Pain Rating 1: 0/10  Pain Rating Post-Intervention 1: 0/10    Objective:     Communicated with: nurse prior to session.  Patient found up in chair with telemetry, pulse ox (continuous), oxygen upon OT entry to room.    General Precautions: Standard, fall, contact  [place]  Orthopedic Precautions:N/A  Braces: N/A  Respiratory Status: Nasal cannula, flow 5 L/min     Occupational Performance:     Bed  Mobility:    Not tested as pt. Was up in chair     Functional Mobility/Transfers:  Patient completed Sit <> Stand Transfer with contact guard assistance  with  rolling walker from bedside chair x 2 trials with RW and with CGA and RW from EOB  Functional Mobility: Pt. Ambulated in room with RW and CGA x ~ 17 feet as well as 02 at 5 Lpm . Pt. Required rest break seated EOB following mobility with min A for balance required    Activities of Daily Living:  Grooming: supervision seated to blow nose and wash hands  Upper Body Dressing: moderate assistance to guide gown around back      AMPAC 6 Click ADL: 17    Treatment & Education:  Pt. Too fatigued to attempt second trial of mobility in room on this date.     Patient left up in chair with all lines intact, call button in reach, and nurse notified    GOALS:   Multidisciplinary Problems       Occupational Therapy Goals          Problem: Occupational Therapy    Goal Priority Disciplines Outcome Interventions   Occupational Therapy Goal     OT, PT/OT Progressing    Description:   Patient will increase functional independence with ADLs by performing:      Goals to be met by 03-04-25  UBD: Min A  Groom in stand: SBA  Transfers : S with RW  Pt. To be Min A with HEP to improve ROM  Pt. To tolerate UIC x 3 hours daily                       DME Justifications:   Flakito requires a commode for home use because he is confined to a single room.    Time Tracking:     OT Date of Treatment: 02/18/25  OT Start Time: 1239  OT Stop Time: 1310  OT Total Time (min): 31 min    Billable Minutes:Self Care/Home Management 16  Therapeutic Activity 15    OT/HAN: OT          2/18/2025

## 2025-02-18 NOTE — CARE UPDATE
Intake/Output Summary (Last 24 hours) at 2/18/2025 0958  Last data filed at 2/18/2025 0653  Gross per 24 hour   Intake 50 ml   Output 1400 ml   Net -1350 ml       Vitals:    02/18/25 0255 02/18/25 0400 02/18/25 0442 02/18/25 0819   BP:  (!) 149/72  (!) 142/72   BP Location:    Right arm   Patient Position:    Sitting   Pulse:  98 88 95   Resp:  19  18   Temp:  98.2 °F (36.8 °C)  98.2 °F (36.8 °C)   TempSrc:  Oral     SpO2: 97% (!) 88%  97%   Weight:  54 kg (119 lb 0.8 oz)     Height:           Recent Labs   Lab 02/16/25  0334 02/17/25  0323 02/18/25  0540     141 139  139 138   K 4.3  4.3 5.0  5.0 4.7     108 107  107 107   CO2 25  25 23  23 22*   BUN 15  15 22  22 15   CREATININE 2.4*  2.4* 3.5*  3.5* 2.6*   CALCIUM 8.9  8.9 8.6*  8.6* 8.8   PHOS 4.8*  4.8* 4.7*  4.7* 4.1         Labs stable. No new recommendations at this time, will plan for HD tomorrow.     No other related issues identified. Please call Nephrology as needed; We will continue to follow.

## 2025-02-18 NOTE — PLAN OF CARE
Pt. AAOx3, easily reorient to time. Clear speech, able to make needs known to staff. HD completed on lastnight 0.5L removed per HD nurse. RLQ colostomy noted, pink and moist, no discoloration, collection bag emptied. Pt. Anuric. 3L of nasal canula, denies shortness of breath or discomfort at this time. Plan of care in place, safety maintained, bed in lowest position.     Problem: Infection  Goal: Absence of Infection Signs and Symptoms  Outcome: Progressing     Problem: Skin Injury Risk Increased  Goal: Skin Health and Integrity  Outcome: Progressing     Problem: Hemodialysis  Goal: Safe, Effective Therapy Delivery  Outcome: Progressing  Goal: Effective Tissue Perfusion  Outcome: Progressing  Goal: Absence of Infection Signs and Symptoms  Outcome: Progressing     Problem: Adult Inpatient Plan of Care  Goal: Plan of Care Review  Outcome: Progressing  Goal: Patient-Specific Goal (Individualized)  Outcome: Progressing  Goal: Absence of Hospital-Acquired Illness or Injury  Outcome: Progressing  Goal: Optimal Comfort and Wellbeing  Outcome: Progressing  Goal: Readiness for Transition of Care  Outcome: Progressing     Problem: Sepsis/Septic Shock  Goal: Optimal Coping  Outcome: Progressing  Goal: Absence of Bleeding  Outcome: Progressing  Goal: Blood Glucose Level Within Targeted Range  Outcome: Progressing  Goal: Absence of Infection Signs and Symptoms  Outcome: Progressing  Goal: Optimal Nutrition Intake  Outcome: Progressing     Problem: CRRT (Continuous Renal Replacement Therapy)  Goal: Safe, Effective Therapy Delivery  Outcome: Progressing  Goal: Hemodynamic Stability  Outcome: Progressing  Goal: Body Temperature Maintained in Desired Range  Outcome: Progressing  Goal: Absence of Infection Signs and Symptoms  Outcome: Progressing     Problem: Fall Injury Risk  Goal: Absence of Fall and Fall-Related Injury  Outcome: Progressing     Problem: Delirium  Goal: Optimal Coping  Outcome: Progressing  Goal: Improved  Behavioral Control  Outcome: Progressing  Goal: Improved Attention and Thought Clarity  Outcome: Progressing  Goal: Improved Sleep  Outcome: Progressing     Problem: Wound  Goal: Optimal Coping  Outcome: Progressing  Goal: Optimal Functional Ability  Outcome: Progressing  Goal: Absence of Infection Signs and Symptoms  Outcome: Progressing  Goal: Improved Oral Intake  Outcome: Progressing  Goal: Optimal Pain Control and Function  Outcome: Progressing  Goal: Skin Health and Integrity  Outcome: Progressing  Goal: Optimal Wound Healing  Outcome: Progressing     Problem: Coping Ineffective  Goal: Effective Coping  Outcome: Progressing

## 2025-02-18 NOTE — SUBJECTIVE & OBJECTIVE
Interval History:  Patient seen and examined at bedside.    No acute events overnight.  He is oriented to person, place, time.  Feeding himself breakfast.  He notes that his breathing is still a bit labored and he has palpitations occasionally.  He is eager to return to facility for physical therapy; his goals overall remain to get better.  Patient updated regarding care plan.      Review of Systems   Constitutional:  Negative for diaphoresis and fever.   HENT:  Positive for congestion. Negative for sore throat.    Respiratory:  Positive for cough and shortness of breath. Negative for wheezing.    Gastrointestinal:  Negative for abdominal pain and nausea.   Genitourinary:  Positive for decreased urine volume (Anuric).   Musculoskeletal:  Negative for myalgias.   Skin:  Positive for color change (bruising).   Neurological:  Negative for headaches.   Psychiatric/Behavioral:  Negative for agitation, behavioral problems and decreased concentration.        Objective:    Temp: 98.2 °F (36.8 °C) (02/18/25 0819)  Pulse: 95 (02/18/25 1200)  Resp: (!) 30 (02/18/25 1200)  BP: (!) 141/70 (02/18/25 1200)  SpO2: 100 % (02/18/25 1200)    Weight: 54 kg (119 lb 0.8 oz) (02/18/25 0400)    Body mass index is 19.21 kg/m².      Intake/Output Summary (Last 24 hours) at 2/18/2025 1420  Last data filed at 2/18/2025 0745  Gross per 24 hour   Intake 120 ml   Output 1300 ml   Net -1180 ml       Physical Exam  Vitals and nursing note reviewed.   Constitutional:       General: He is not in acute distress.     Appearance: He is ill-appearing (chronically). He is not toxic-appearing or diaphoretic.   HENT:      Head: Normocephalic and atraumatic.   Eyes:      General: No scleral icterus.  Cardiovascular:      Rate and Rhythm: Normal rate and regular rhythm.      Pulses: Normal pulses.   Pulmonary:      Effort: Tachypnea present. No accessory muscle usage or respiratory distress.      Comments: On nasal cannula  Bibasilar crackles  Abdominal:       General: Bowel sounds are normal.      Palpations: Abdomen is soft.      Tenderness: There is no abdominal tenderness.      Comments: Colostomy in place - brown liquid output  Hernia reducible   Musculoskeletal:      Cervical back: Neck supple.      Right lower leg: No edema.      Left lower leg: No edema.   Skin:     General: Skin is warm and dry.      Capillary Refill: Capillary refill takes less than 2 seconds.      Findings: Bruising present.      Comments: Discoloration to bilateral toes   Neurological:      Mental Status: He is alert and oriented to person, place, and time.      Motor: Weakness (generalized) present.   Psychiatric:         Behavior: Behavior normal.         Significant Labs: All pertinent labs within the past 24 hours have been reviewed.    Recent Results (from the past 24 hours)   POCT glucose    Collection Time: 02/17/25 10:18 PM   Result Value Ref Range    POCT Glucose 90 70 - 110 mg/dL   Comprehensive metabolic panel    Collection Time: 02/18/25  5:40 AM   Result Value Ref Range    Sodium 138 136 - 145 mmol/L    Potassium 4.7 3.5 - 5.1 mmol/L    Chloride 107 95 - 110 mmol/L    CO2 22 (L) 23 - 29 mmol/L    Glucose 75 70 - 110 mg/dL    BUN 15 8 - 23 mg/dL    Creatinine 2.6 (H) 0.5 - 1.4 mg/dL    Calcium 8.8 8.7 - 10.5 mg/dL    Total Protein 6.1 6.0 - 8.4 g/dL    Albumin 2.7 (L) 3.5 - 5.2 g/dL    Total Bilirubin 0.3 0.1 - 1.0 mg/dL    Alkaline Phosphatase 191 (H) 40 - 150 U/L    AST 23 10 - 40 U/L    ALT 8 (L) 10 - 44 U/L    eGFR 25.9 (A) >60 mL/min/1.73 m^2    Anion Gap 9 8 - 16 mmol/L   Phosphorus    Collection Time: 02/18/25  5:40 AM   Result Value Ref Range    Phosphorus 4.1 2.7 - 4.5 mg/dL   CBC auto differential    Collection Time: 02/18/25  5:40 AM   Result Value Ref Range    WBC 3.78 (L) 3.90 - 12.70 K/uL    RBC 2.72 (L) 4.60 - 6.20 M/uL    Hemoglobin 8.3 (L) 14.0 - 18.0 g/dL    Hematocrit 27.6 (L) 40.0 - 54.0 %     (H) 82 - 98 fL    MCH 30.5 27.0 - 31.0 pg    MCHC 30.1 (L)  32.0 - 36.0 g/dL    RDW 17.4 (H) 11.5 - 14.5 %    Platelets 142 (L) 150 - 450 K/uL    MPV 10.7 9.2 - 12.9 fL    Immature Granulocytes 0.5 0.0 - 0.5 %    Gran # (ANC) 2.6 1.8 - 7.7 K/uL    Immature Grans (Abs) 0.02 0.00 - 0.04 K/uL    Lymph # 0.5 (L) 1.0 - 4.8 K/uL    Mono # 0.4 0.3 - 1.0 K/uL    Eos # 0.3 0.0 - 0.5 K/uL    Baso # 0.05 0.00 - 0.20 K/uL    nRBC 0 0 /100 WBC    Gran % 67.5 38.0 - 73.0 %    Lymph % 13.5 (L) 18.0 - 48.0 %    Mono % 9.3 4.0 - 15.0 %    Eosinophil % 7.9 0.0 - 8.0 %    Basophil % 1.3 0.0 - 1.9 %    Differential Method Automated    Magnesium    Collection Time: 02/18/25  5:40 AM   Result Value Ref Range    Magnesium 2.0 1.6 - 2.6 mg/dL   POCT glucose    Collection Time: 02/18/25 12:01 PM   Result Value Ref Range    POCT Glucose 72 70 - 110 mg/dL       Significant Imaging: I have reviewed all pertinent imaging results/findings within the past 24 hours.    Imaging Results              X-Ray Chest AP Portable (Final result)  Result time 02/09/25 21:59:58      Final result by Thompson Santana MD (02/09/25 21:59:58)                   Impression:      See above comments.  No detrimental change.      Electronically signed by: Thompson Santana  Date:    02/09/2025  Time:    21:59               Narrative:    EXAMINATION:  XR CHEST AP PORTABLE    CLINICAL HISTORY:  ETT check;    TECHNIQUE:  Single frontal view of the chest was performed.    COMPARISON:  02/09/2025    FINDINGS:  Cardiac silhouette is enlarged similar to the prior study.    Right IJ central venous catheter with tip near the cavoatrial junction.    NG tube is present with tip overlying the stomach.    Endotracheal tube present tip above the vito.  Positioning is adequate.    No large effusion.  No evidence of pneumothorax.  No acute osseous abnormality.    Mild hazy density at the right lung base could be associated with minimal infiltrate or small layering effusion.    Remote rib fractures on the left.    No acute osseous  abnormality.    Overall mild improvement at the right lung base with no detrimental change.                                       X-Ray Chest AP Portable (Final result)  Result time 02/09/25 20:13:15      Final result by Gualberto Payne MD (02/09/25 20:13:15)                   Impression:      Suspected ongoing versus recurrent small right pleural effusion with right basilar atelectasis/infiltrate.  Improved aeration of the left lung base.    Grossly stable other chronic findings in the body of the report.      Electronically signed by: Gualberto Payne MD  Date:    02/09/2025  Time:    20:13               Narrative:    EXAMINATION:  XR CHEST AP PORTABLE    CLINICAL HISTORY:  Chest Pain;    TECHNIQUE:  Single frontal view of the chest was performed.    COMPARISON:  Chest radiograph 12/26/2024, report only from outside facility chest CT 09/04/2024    FINDINGS:  Monitoring leads overlie the chest.  Patient is somewhat rotated.  Bilateral lung apices are partially obscured by overlying chin soft tissues.  Resolution is limited by body habitus with underpenetration.    Right IJ CVC tip overlies the upper right atrium.  Cardiomediastinal silhouette is midline and prominent with similar calcification and tortuosity of the aorta and moderately enlarged heart.  Hilar contours are stable.    Grossly similar hazy opacification of the right lung base with blunting of the costophrenic angle suggesting small pleural effusion, with probable right basilar atelectasis/infiltrate.  Improved aeration of the left lung base.  No sizable pleural effusion or consolidation on the left.  Scattered areas of platelike scarring versus atelectasis bilaterally.  No definite pneumothorax.    No acute osseous process seen.  PA and lateral views can be obtained.

## 2025-02-18 NOTE — PLAN OF CARE
CM faxed updated therapy notes to [facility] via [employer].      Kaila Erickson RN     [phone]

## 2025-02-18 NOTE — PLAN OF CARE
CM left message for admissions at [facility] for return of patient.      Kaial Erickson RN     [phone]

## 2025-02-18 NOTE — PT/OT/SLP PROGRESS
Physical Therapy Treatment    Patient Name:  Flakito Mcginnis   MRN:  [id number]  Admitting Diagnosis:  Acute on chronic respiratory failure with hypoxia   Recent Surgery: * No surgery found *    Admit Date: 2/9/2025  Length of Stay: 9 days    Recommendations:     Discharge Recommendations:  Moderate Intensity Therapy  Discharge Equipment Recommendations: bedside commode   Barriers to discharge: Decreased Caregiver support, increased physical assistance needed    Assessment:     Flakito Mcginnis is a 69 y.o. male admitted with a medical diagnosis of Acute on chronic respiratory failure with hypoxia.  He presents with the following impairments/functional limitations:  weakness, impaired endurance, impaired self care skills, impaired functional mobility, gait instability, impaired balance, pain, impaired cardiopulmonary response to activity.     Pt tolerated session well with focus on gait training, standing and LE strengthening. Pt on 5L O2 via NC throughout with SpO2 briefly dropping into 80's while ambulating but quickly recovering with rest. Pt was able to ambulate 10' x 2 with RW and CGA. He required Rebekah for sit<>stand from recliner chair and bed. Pt could benefit from continued skilled PT services to optimize independence upon discharge.     Rehab Prognosis: Good; patient would benefit from acute skilled PT services to address these deficits and reach maximum level of function.    Recent Surgery: * No surgery found *      Treatment Tolerated: Well    Highest level of mobility achieved this visit: Pt ambulates 10' x 2 with RW and CGA.     Activity with RN/PCT: transfer and ambulate with one person assist    Plan:     During this hospitalization, patient to be seen 3 x/week to address the identified rehab impairments via gait training, therapeutic activities, therapeutic exercises, neuromuscular re-education and progress toward the following goals:    Plan of Care Expires:  03/12/25    Subjective     KAYLENE Brady notified  "prior to session.     Chief Complaint: pt reports some pain in L shoulder and back but recently took tylenol  Patient/Family Comments/goals: be able to walk with rollator  Pain/Comfort:  Pain Rating 1: 0/10  Pain Rating Post-Intervention 1: 0/10      Objective:     Patient found up in chair with: telemetry, pulse ox (continuous), oxygen   Cognition:   Alert and Cooperative  Command following: Follows multistep verbal commands  Fluency: clear/fluent  General Precautions: Standard, fall, contact   Orthopedic Precautions:N/A   Braces: N/A   Body mass index is 19.21 kg/m².  Oxygen Device: High Flow Nasal Cannula 5L      Vitals: BP (!) 141/70   Pulse 86   Temp 98.2 °F (36.8 °C)   Resp (!) 23   Ht 5' 6" (1.676 m)   Wt 54 kg (119 lb 0.8 oz)   SpO2 100%   BMI 19.21 kg/m²     Outcome Measures:  AM-PAC 6 CLICK MOBILITY  Turning over in bed (including adjusting bedclothes, sheets and blankets)?: 2  Sitting down on and standing up from a chair with arms (e.g., wheelchair, bedside commode, etc.): 3  Moving from lying on back to sitting on the side of the bed?: 2  Moving to and from a bed to a chair (including a wheelchair)?: 3  Need to walk in hospital room?: 3  Climbing 3-5 steps with a railing?: 1  Basic Mobility Total Score: 14     Functional Mobility:    Bed Mobility:   Not assessed, up in chair upon arrival and remained seated at end of session    Transfers:   Sit <> Stand Transfer: minimum assistance with rolling walker   Twice from recliner chair and once from bed    Standing Balance:  Assistance Level Required: Contact Guard Assistance  Patient used: rolling walker   Comments: pt stands with walker and CGA while RN changes sacral bandage       Gait:  Patient ambulated: 10' x 2   Patient required: contact guard  Patient used:  rolling walker   Gait Pattern observed: reciprocal gait  Gait Deviation(s): flexed posture and decreased michaela  Impairments due to: pain and decreased endurance  Comments: SpO2 briefly " drops into 80's with ambulation but quickly recovers with rest on 5L O2    Therapeutic Exercises:   Seated marches 2x10 reps BLE  LAQs 2x10 reps BLE    Education:  All questions answered within PT scope of practice and to patient's satisfaction    Patient left up in chair with all lines intact and call button in reach.    GOALS:   Multidisciplinary Problems       Physical Therapy Goals          Problem: Physical Therapy    Goal Priority Disciplines Outcome Interventions   Physical Therapy Goal     PT, PT/OT Progressing    Description: Goals to be met by: 3/1/2025    Patient will increase functional independence with mobility by performin. Supine to sit with MInimal Assistance  2. Sit to stand transfer with Stand-by Assistance using RW  3. Gait  x 25 feet with Stand-by Assistance using Rolling Walker.                        Time Tracking:     PT Received On: 25  PT Start Time: 1532     PT Stop Time: 1556  PT Total Time (min): 24 min     Billable Minutes:   Gait Training 12 min and Therapeutic Exercise 12 min    Treatment Type: Treatment  PT/PTA: PT       Kinza Orantes PT, DPT  2025

## 2025-02-18 NOTE — ASSESSMENT & PLAN NOTE
This patient is found to have pancytopenia, the likely etiology is Infection/Sepsis, will monitor CBC Daily. Will transfuse red blood cells if the hemoglobin is <7g/dL (or <8 in the setting of ACS). Will transfuse platelets if platelet count is <50k (if undergoing surgical procedure or have active bleeding). Hold DVT prophylaxis if platelets are <50k. The patient's hemoglobin, white blood cell count, and platelet count results have been reviewed and are listed below.  Recent Labs   Lab 02/18/25  0540   HGB 8.3*   WBC 3.78*   *

## 2025-02-18 NOTE — PROGRESS NOTES
02/17/25 1945   Pre-Hemodialysis Assessment   Treatment Status Completed        Hemodialysis Catheter 12/31/24 0818 right subclavian   Placement Date/Time: 12/31/24 0818   Present Prior to Hospital Arrival?: No  Hand Hygiene: Performed  Barrier Precautions: Performed  Skin Antisepsis: ChloraPrep  Hemodialysis Catheter Type: Tunneled catheter  Location: right subclavian  Catheter Size...   Venous Patency/Care deaccessed;heparin locked   Arterial Patency/Care deaccessed;heparin locked   During Hemodialysis Assessment   Blood Flow Rate (mL/min) 400 mL/min   Dialysate Flow Rate (mL/min) 700 ml/min   Ultrafiltration Rate (mL/Hr) 330 mL/Hr   Arteriovenous Lines Secure Yes   Arterial Pressure (mmHg) 140 mmHg   Venous Pressure (mmHg) 110   Blood Volume Processed (Liters) 62 L   UF Removed (mL) 1000 mL   TMP 30   Venous Line in Air Detector Yes   Intake (mL) 250 mL   Intra-Hemodialysis Comments hd completed   Post-Hemodialysis Assessment   Rinseback Volume (mL) 250 mL   Blood Volume Processed (Liters) 62 L   Dialyzer Clearance Clear   Duration of Treatment 3 minutes   Total UF (mL) 1000 mL   Net Fluid Removal 500   Patient Response to Treatment tolerated     Hd completed, deaccessed, hep locked, report given to primary rn

## 2025-02-19 VITALS
HEIGHT: 66 IN | TEMPERATURE: 98 F | WEIGHT: 123.44 LBS | BODY MASS INDEX: 19.84 KG/M2 | DIASTOLIC BLOOD PRESSURE: 58 MMHG | OXYGEN SATURATION: 95 % | RESPIRATION RATE: 22 BRPM | SYSTOLIC BLOOD PRESSURE: 118 MMHG | HEART RATE: 83 BPM

## 2025-02-19 LAB
ALBUMIN SERPL BCP-MCNC: 2.7 G/DL (ref 3.5–5.2)
ALP SERPL-CCNC: 192 U/L (ref 40–150)
ALT SERPL W/O P-5'-P-CCNC: 9 U/L (ref 10–44)
ANION GAP SERPL CALC-SCNC: 9 MMOL/L (ref 8–16)
AST SERPL-CCNC: 26 U/L (ref 10–40)
BASOPHILS # BLD AUTO: 0.05 K/UL (ref 0–0.2)
BASOPHILS NFR BLD: 1.4 % (ref 0–1.9)
BILIRUB SERPL-MCNC: 0.3 MG/DL (ref 0.1–1)
BUN SERPL-MCNC: 22 MG/DL (ref 8–23)
CALCIUM SERPL-MCNC: 9.3 MG/DL (ref 8.7–10.5)
CHLORIDE SERPL-SCNC: 107 MMOL/L (ref 95–110)
CO2 SERPL-SCNC: 23 MMOL/L (ref 23–29)
CREAT SERPL-MCNC: 3.5 MG/DL (ref 0.5–1.4)
DIFFERENTIAL METHOD BLD: ABNORMAL
EOSINOPHIL # BLD AUTO: 0.2 K/UL (ref 0–0.5)
EOSINOPHIL NFR BLD: 6.6 % (ref 0–8)
ERYTHROCYTE [DISTWIDTH] IN BLOOD BY AUTOMATED COUNT: 17.6 % (ref 11.5–14.5)
EST. GFR  (NO RACE VARIABLE): 18.1 ML/MIN/1.73 M^2
GLUCOSE SERPL-MCNC: 81 MG/DL (ref 70–110)
HCT VFR BLD AUTO: 26 % (ref 40–54)
HGB BLD-MCNC: 7.9 G/DL (ref 14–18)
IMM GRANULOCYTES # BLD AUTO: 0.01 K/UL (ref 0–0.04)
IMM GRANULOCYTES NFR BLD AUTO: 0.3 % (ref 0–0.5)
LYMPHOCYTES # BLD AUTO: 0.5 K/UL (ref 1–4.8)
LYMPHOCYTES NFR BLD: 13.8 % (ref 18–48)
MAGNESIUM SERPL-MCNC: 2.1 MG/DL (ref 1.6–2.6)
MCH RBC QN AUTO: 30.9 PG (ref 27–31)
MCHC RBC AUTO-ENTMCNC: 30.4 G/DL (ref 32–36)
MCV RBC AUTO: 102 FL (ref 82–98)
MONOCYTES # BLD AUTO: 0.3 K/UL (ref 0.3–1)
MONOCYTES NFR BLD: 9.2 % (ref 4–15)
NEUTROPHILS # BLD AUTO: 2.4 K/UL (ref 1.8–7.7)
NEUTROPHILS NFR BLD: 68.7 % (ref 38–73)
NRBC BLD-RTO: 0 /100 WBC
PHOSPHATE SERPL-MCNC: 5.2 MG/DL (ref 2.7–4.5)
PLATELET # BLD AUTO: 166 K/UL (ref 150–450)
PMV BLD AUTO: 11.4 FL (ref 9.2–12.9)
POCT GLUCOSE: 102 MG/DL (ref 70–110)
POCT GLUCOSE: 86 MG/DL (ref 70–110)
POTASSIUM SERPL-SCNC: 5.3 MMOL/L (ref 3.5–5.1)
PROT SERPL-MCNC: 6.2 G/DL (ref 6–8.4)
RBC # BLD AUTO: 2.56 M/UL (ref 4.6–6.2)
SODIUM SERPL-SCNC: 139 MMOL/L (ref 136–145)
WBC # BLD AUTO: 3.47 K/UL (ref 3.9–12.7)

## 2025-02-19 PROCEDURE — 85025 COMPLETE CBC W/AUTO DIFF WBC: CPT

## 2025-02-19 PROCEDURE — 83735 ASSAY OF MAGNESIUM: CPT | Performed by: PHYSICIAN ASSISTANT

## 2025-02-19 PROCEDURE — 25000003 PHARM REV CODE 250

## 2025-02-19 PROCEDURE — 63600175 PHARM REV CODE 636 W HCPCS

## 2025-02-19 PROCEDURE — 36415 COLL VENOUS BLD VENIPUNCTURE: CPT | Performed by: PHYSICIAN ASSISTANT

## 2025-02-19 PROCEDURE — 99900035 HC TECH TIME PER 15 MIN (STAT)

## 2025-02-19 PROCEDURE — 25000242 PHARM REV CODE 250 ALT 637 W/ HCPCS: Performed by: STUDENT IN AN ORGANIZED HEALTH CARE EDUCATION/TRAINING PROGRAM

## 2025-02-19 PROCEDURE — 84100 ASSAY OF PHOSPHORUS: CPT

## 2025-02-19 PROCEDURE — 25000003 PHARM REV CODE 250: Performed by: INTERNAL MEDICINE

## 2025-02-19 PROCEDURE — 25000003 PHARM REV CODE 250: Performed by: PHYSICIAN ASSISTANT

## 2025-02-19 PROCEDURE — 27100171 HC OXYGEN HIGH FLOW UP TO 24 HOURS

## 2025-02-19 PROCEDURE — 80053 COMPREHEN METABOLIC PANEL: CPT

## 2025-02-19 PROCEDURE — 94761 N-INVAS EAR/PLS OXIMETRY MLT: CPT

## 2025-02-19 PROCEDURE — 99233 SBSQ HOSP IP/OBS HIGH 50: CPT | Mod: ,,, | Performed by: INTERNAL MEDICINE

## 2025-02-19 PROCEDURE — 63600175 PHARM REV CODE 636 W HCPCS: Performed by: STUDENT IN AN ORGANIZED HEALTH CARE EDUCATION/TRAINING PROGRAM

## 2025-02-19 PROCEDURE — 80100014 HC HEMODIALYSIS 1:1

## 2025-02-19 RX ORDER — ACETAMINOPHEN 325 MG/1
650 TABLET ORAL EVERY 6 HOURS PRN
Start: 2025-02-19

## 2025-02-19 RX ORDER — QUETIAPINE FUMARATE 25 MG/1
25 TABLET, FILM COATED ORAL NIGHTLY PRN
Start: 2025-02-19 | End: 2026-02-19

## 2025-02-19 RX ORDER — FLUTICASONE PROPIONATE 50 MCG
2 SPRAY, SUSPENSION (ML) NASAL DAILY
Status: DISCONTINUED | OUTPATIENT
Start: 2025-02-19 | End: 2025-02-19 | Stop reason: HOSPADM

## 2025-02-19 RX ORDER — GUAIFENESIN 600 MG/1
600 TABLET, EXTENDED RELEASE ORAL 2 TIMES DAILY
Start: 2025-02-19

## 2025-02-19 RX ORDER — GUAIFENESIN 600 MG/1
600 TABLET, EXTENDED RELEASE ORAL 2 TIMES DAILY
Status: DISCONTINUED | OUTPATIENT
Start: 2025-02-19 | End: 2025-02-19 | Stop reason: HOSPADM

## 2025-02-19 RX ORDER — TALC
9 POWDER (GRAM) TOPICAL NIGHTLY
Start: 2025-02-19

## 2025-02-19 RX ORDER — FLUTICASONE PROPIONATE 50 MCG
2 SPRAY, SUSPENSION (ML) NASAL DAILY
Start: 2025-02-19

## 2025-02-19 RX ORDER — PANTOPRAZOLE SODIUM 40 MG/1
40 TABLET, DELAYED RELEASE ORAL
Start: 2025-02-19 | End: 2026-02-19

## 2025-02-19 RX ORDER — LEVOTHYROXINE SODIUM 50 UG/1
50 TABLET ORAL
Start: 2025-02-20 | End: 2026-02-20

## 2025-02-19 RX ORDER — HEPARIN SODIUM 1000 [USP'U]/ML
1000 INJECTION, SOLUTION INTRAVENOUS; SUBCUTANEOUS
Status: DISCONTINUED | OUTPATIENT
Start: 2025-02-19 | End: 2025-02-19 | Stop reason: HOSPADM

## 2025-02-19 RX ADMIN — PANTOPRAZOLE SODIUM 40 MG: 40 TABLET, DELAYED RELEASE ORAL at 09:02

## 2025-02-19 RX ADMIN — LEVOTHYROXINE SODIUM 50 MCG: 0.05 TABLET ORAL at 06:02

## 2025-02-19 RX ADMIN — PSYLLIUM HUSK 1 PACKET: 3.4 POWDER ORAL at 09:02

## 2025-02-19 RX ADMIN — HEPARIN SODIUM 5000 UNITS: 5000 INJECTION INTRAVENOUS; SUBCUTANEOUS at 06:02

## 2025-02-19 RX ADMIN — MIDODRINE HYDROCHLORIDE 10 MG: 5 TABLET ORAL at 11:02

## 2025-02-19 RX ADMIN — ACETAMINOPHEN 650 MG: 325 TABLET ORAL at 04:02

## 2025-02-19 RX ADMIN — HEPARIN SODIUM 1000 UNITS: 1000 INJECTION, SOLUTION INTRAVENOUS; SUBCUTANEOUS at 02:02

## 2025-02-19 NOTE — RESPIRATORY THERAPY
"RAPID RESPONSE RESPIRATORY THERAPY PROACTIVE ROUNDING NOTE             Time of visit: 957     Code Status: Full Code   : 1955  Bed: 8088/8088 A:   MRN: [id number]  Time spent at the bedside: < 15 min    SITUATION    Evaluated patient for: HFNC Compliance     BACKGROUND    Patient has a past medical history of Crohn's disease, ESRD (end stage renal disease) on dialysis, Hypertension, and Obstructive uropathy.    24 Hours Vitals Range:  Temp:  [97.1 °F (36.2 °C)-98 °F (36.7 °C)]   Pulse:  [78-99]   Resp:  [14-45]   BP: (112-141)/(55-70)   SpO2:  [88 %-100 %]     Labs:    Recent Labs     25  0323 25  0540 25  0511     139 138 139   K 5.0  5.0 4.7 5.3*     107 107 107   CO2 23  23 22* 23   BUN 22  22 15 22   CREATININE 3.5*  3.5* 2.6* 3.5*   GLU 82  82 75 81   PHOS 4.7*  4.7* 4.1 5.2*   MG 2.1 2.0 2.1        No results for input(s): "PH", "PCO2", "PO2", "HCO3", "POCSATURATED", "BE" in the last 72 hours.    ASSESSMENT/INTERVENTIONS    Patient resting comfortably. No respiratory concerns at this time.    Last VS   Temp: 97.1 °F (36.2 °C) (830)  Pulse: 96 (957)  Resp: 22 (957)  BP: 112/55 (957)  SpO2: 100 % (957)    Level of Consciousness: Level of Consciousness (AVPU): alert  Respiratory Effort: Respiratory Effort: Normal, Unlabored Expansion/Accessory Muscle Usage: Expansion/Accessory Muscles/Retractions: expansion symmetric, no use of accessory muscles, no retractions  All Lung Field Breath Sounds: All Lung Fields Breath Sounds: coarse  MYRTLE Breath Sounds: coarse  LLL Breath Sounds: coarse, diminished  RUL Breath Sounds: coarse  RML Breath Sounds: diminished  RLL Breath Sounds: diminished  O2 Device/Concentration: 5L HFNC  Was the O2 device able to be weaned? No  Ambu at bedside: Yes    Active Orders   Respiratory Care    Chest physiotherapy BID     Frequency: BID     Number of Occurrences: 2 Occurrences     Order Questions:      Indications: " COPIOUS SPUTUM PRODUCTION      Indications: ATELECTASIS PROPHYLAXIS    Incentive spirometry     Frequency: Q4H WAKE     Number of Occurrences: Until Specified    Oxygen Continuous     Frequency: Continuous     Number of Occurrences: Until Specified     Order Questions:      Device type: Low flow      Device: Nasal Cannula (1- 5 Liters)      LPM: 1-5      Titrate O2 per Oxygen Titration Protocol: Yes      To maintain SpO2 goal of: >= 90%      Notify MD of: Inability to achieve desired SpO2; Sudden change in patient status and requires 20% increase in FiO2; Patient requires >60% FiO2    Pulse Oximetry Continuous     Frequency: Continuous     Number of Occurrences: Until Specified    SUCTION PRN     Frequency: PRN     Number of Occurrences: Until Specified       RECOMMENDATIONS    We recommend: RRT Recs: Continue POC per primary team.      FOLLOW-UP    Please call back the Rapid Response RTLeah RRT at x [phone] for any questions or concerns.

## 2025-02-19 NOTE — PT/OT/SLP PROGRESS
Speech Language Pathology      Flakito Mcginnis  MRN: [id number]    Patient not seen today secondary to pt in dialysis upon both SLP attempts. Pt remains on track to meet SLP poc. Will follow-up next available service date.

## 2025-02-19 NOTE — PLAN OF CARE
JYOTHI spoke with Wilma with [facility]. Wilma advised that authorization was still pending and will notify CM once received. Wilma submitted for authorization yesterday, and while CM on the phone with her today, she checked and it was still pending. Patient can discharge today once authorization is received.    Kaila Erickson RN     [phone]

## 2025-02-19 NOTE — SUBJECTIVE & OBJECTIVE
Interval History:  Patient seen and examined at bedside.    No acute events overnight.  He is oriented to person, place, time.  Sitting up in chair.  Breathing comfortable at rest, still endorses exertional shortness of breath.  However, he remains eager to return to facility for physical therapy; his goals overall remain to get better.  Patient updated regarding care plan.      Review of Systems   Constitutional:  Negative for diaphoresis and fever.   HENT:  Positive for congestion. Negative for sore throat.    Respiratory:  Positive for cough and shortness of breath. Negative for wheezing.    Gastrointestinal:  Negative for abdominal pain and nausea.   Genitourinary:  Positive for decreased urine volume (Anuric).   Musculoskeletal:  Negative for myalgias.   Skin:  Positive for color change (bruising).   Neurological:  Negative for headaches.   Psychiatric/Behavioral:  Negative for agitation, behavioral problems and decreased concentration.        Objective:    Temp: 97.1 °F (36.2 °C) (02/19/25 0830)  Pulse: 79 (02/19/25 1400)  Resp: (!) 22 (02/19/25 0957)  BP: (!) 107/57 (02/19/25 1400)  SpO2: 100 % (02/19/25 1400)    Weight: 56 kg (123 lb 7.3 oz) (02/19/25 1011)    Body mass index is 19.93 kg/m².      Intake/Output Summary (Last 24 hours) at 2/19/2025 1408  Last data filed at 2/19/2025 0942  Gross per 24 hour   Intake 150 ml   Output 400 ml   Net -250 ml       Physical Exam  Vitals and nursing note reviewed.   Constitutional:       General: He is not in acute distress.     Appearance: He is ill-appearing (chronically). He is not toxic-appearing or diaphoretic.   HENT:      Head: Normocephalic and atraumatic.   Eyes:      General: No scleral icterus.  Cardiovascular:      Rate and Rhythm: Normal rate and regular rhythm.      Pulses: Normal pulses.   Pulmonary:      Effort: Tachypnea (with exertion) present. No accessory muscle usage or respiratory distress.      Comments: On nasal cannula  Fine intermittent,  Bibasilar crackles  Abdominal:      General: Bowel sounds are normal.      Palpations: Abdomen is soft.      Tenderness: There is no abdominal tenderness.      Comments: Colostomy in place - brown liquid output  Hernia reducible   Musculoskeletal:      Cervical back: Neck supple.      Right lower leg: No edema.      Left lower leg: No edema.   Skin:     General: Skin is warm and dry.      Capillary Refill: Capillary refill takes less than 2 seconds.      Findings: Bruising present.      Comments: Discoloration to bilateral toes   Neurological:      Mental Status: He is alert and oriented to person, place, and time.      Motor: Weakness (generalized) present.   Psychiatric:         Behavior: Behavior normal.         Significant Labs: All pertinent labs within the past 24 hours have been reviewed.    Recent Results (from the past 24 hours)   POCT glucose    Collection Time: 02/18/25  5:44 PM   Result Value Ref Range    POCT Glucose 87 70 - 110 mg/dL   POCT glucose    Collection Time: 02/18/25  8:12 PM   Result Value Ref Range    POCT Glucose 117 (H) 70 - 110 mg/dL   Comprehensive metabolic panel    Collection Time: 02/19/25  5:11 AM   Result Value Ref Range    Sodium 139 136 - 145 mmol/L    Potassium 5.3 (H) 3.5 - 5.1 mmol/L    Chloride 107 95 - 110 mmol/L    CO2 23 23 - 29 mmol/L    Glucose 81 70 - 110 mg/dL    BUN 22 8 - 23 mg/dL    Creatinine 3.5 (H) 0.5 - 1.4 mg/dL    Calcium 9.3 8.7 - 10.5 mg/dL    Total Protein 6.2 6.0 - 8.4 g/dL    Albumin 2.7 (L) 3.5 - 5.2 g/dL    Total Bilirubin 0.3 0.1 - 1.0 mg/dL    Alkaline Phosphatase 192 (H) 40 - 150 U/L    AST 26 10 - 40 U/L    ALT 9 (L) 10 - 44 U/L    eGFR 18.1 (A) >60 mL/min/1.73 m^2    Anion Gap 9 8 - 16 mmol/L   Phosphorus    Collection Time: 02/19/25  5:11 AM   Result Value Ref Range    Phosphorus 5.2 (H) 2.7 - 4.5 mg/dL   CBC auto differential    Collection Time: 02/19/25  5:11 AM   Result Value Ref Range    WBC 3.47 (L) 3.90 - 12.70 K/uL    RBC 2.56 (L) 4.60 -  6.20 M/uL    Hemoglobin 7.9 (L) 14.0 - 18.0 g/dL    Hematocrit 26.0 (L) 40.0 - 54.0 %     (H) 82 - 98 fL    MCH 30.9 27.0 - 31.0 pg    MCHC 30.4 (L) 32.0 - 36.0 g/dL    RDW 17.6 (H) 11.5 - 14.5 %    Platelets 166 150 - 450 K/uL    MPV 11.4 9.2 - 12.9 fL    Immature Granulocytes 0.3 0.0 - 0.5 %    Gran # (ANC) 2.4 1.8 - 7.7 K/uL    Immature Grans (Abs) 0.01 0.00 - 0.04 K/uL    Lymph # 0.5 (L) 1.0 - 4.8 K/uL    Mono # 0.3 0.3 - 1.0 K/uL    Eos # 0.2 0.0 - 0.5 K/uL    Baso # 0.05 0.00 - 0.20 K/uL    nRBC 0 0 /100 WBC    Gran % 68.7 38.0 - 73.0 %    Lymph % 13.8 (L) 18.0 - 48.0 %    Mono % 9.2 4.0 - 15.0 %    Eosinophil % 6.6 0.0 - 8.0 %    Basophil % 1.4 0.0 - 1.9 %    Differential Method Automated    Magnesium    Collection Time: 02/19/25  5:11 AM   Result Value Ref Range    Magnesium 2.1 1.6 - 2.6 mg/dL   POCT glucose    Collection Time: 02/19/25  9:02 AM   Result Value Ref Range    POCT Glucose 102 70 - 110 mg/dL       Significant Imaging: I have reviewed all pertinent imaging results/findings within the past 24 hours.    Imaging Results              X-Ray Chest AP Portable (Final result)  Result time 02/09/25 21:59:58      Final result by Thompson Santana MD (02/09/25 21:59:58)                   Impression:      See above comments.  No detrimental change.      Electronically signed by: Thompson Santana  Date:    02/09/2025  Time:    21:59               Narrative:    EXAMINATION:  XR CHEST AP PORTABLE    CLINICAL HISTORY:  ETT check;    TECHNIQUE:  Single frontal view of the chest was performed.    COMPARISON:  02/09/2025    FINDINGS:  Cardiac silhouette is enlarged similar to the prior study.    Right IJ central venous catheter with tip near the cavoatrial junction.    NG tube is present with tip overlying the stomach.    Endotracheal tube present tip above the vito.  Positioning is adequate.    No large effusion.  No evidence of pneumothorax.  No acute osseous abnormality.    Mild hazy density at the right  lung base could be associated with minimal infiltrate or small layering effusion.    Remote rib fractures on the left.    No acute osseous abnormality.    Overall mild improvement at the right lung base with no detrimental change.                                       X-Ray Chest AP Portable (Final result)  Result time 02/09/25 20:13:15      Final result by Gualberto Payne MD (02/09/25 20:13:15)                   Impression:      Suspected ongoing versus recurrent small right pleural effusion with right basilar atelectasis/infiltrate.  Improved aeration of the left lung base.    Grossly stable other chronic findings in the body of the report.      Electronically signed by: Gualberto Payne MD  Date:    02/09/2025  Time:    20:13               Narrative:    EXAMINATION:  XR CHEST AP PORTABLE    CLINICAL HISTORY:  Chest Pain;    TECHNIQUE:  Single frontal view of the chest was performed.    COMPARISON:  Chest radiograph 12/26/2024, report only from outside facility chest CT 09/04/2024    FINDINGS:  Monitoring leads overlie the chest.  Patient is somewhat rotated.  Bilateral lung apices are partially obscured by overlying chin soft tissues.  Resolution is limited by body habitus with underpenetration.    Right IJ CVC tip overlies the upper right atrium.  Cardiomediastinal silhouette is midline and prominent with similar calcification and tortuosity of the aorta and moderately enlarged heart.  Hilar contours are stable.    Grossly similar hazy opacification of the right lung base with blunting of the costophrenic angle suggesting small pleural effusion, with probable right basilar atelectasis/infiltrate.  Improved aeration of the left lung base.  No sizable pleural effusion or consolidation on the left.  Scattered areas of platelike scarring versus atelectasis bilaterally.  No definite pneumothorax.    No acute osseous process seen.  PA and lateral views can be obtained.

## 2025-02-19 NOTE — ASSESSMENT & PLAN NOTE
Patient's most recent phosphorus results are listed below.   Recent Labs     02/17/25  0323 02/18/25  0540 02/19/25  0511   PHOS 4.7*  4.7* 4.1 5.2*       Plan  - Will treat hyperphosphatemia with HD per Nephro  - Patient's hyperphosphatemia is worsening. Will continue current treatment  -resume home sevelamer

## 2025-02-19 NOTE — PLAN OF CARE
Recommendations  1. Continue minced and moist diet 2000ml   2. Add renal restrictions   3. Monitor weight and labs   4. Encourage PO intake   5. Continue Novasource TID    Goals: Pt will consume 75% of meals by RD follow up    Nutrition Goal Status: progressing towards goal  Communication of RD Recs:  (POC)    Assessment and Plan  Endocrine  Severe protein-calorie malnutrition  Malnutrition Type:  Context: acute illness or injury  Level: severe    Related to (etiology):   Inadequate protein- calorie intake    Signs and Symptoms (as evidenced by):   Significant wt loss (-5% x 1-2weeks), and moderate to severe muscle/fat wasting    Malnutrition Characteristic Summary:  Weight Loss (Malnutrition): greater than 2% in 1 week (-5% x 1-2 weeks)  Subcutaneous Fat (Malnutrition):  (mild to moderate)  Muscle Mass (Malnutrition): severe depletion    Intervention:  Collaboration of nutritional care with other providers.     Commercial beverage medical food supplement therapy- Novasource TID   Minced and Moist diet 2000 ml    Nutrition Diagnosis Status:   Continues

## 2025-02-19 NOTE — PLAN OF CARE
Patient AAOx2, reorient to time and situation. 5L of o2 via nasal canula. Safety maintained bed in lowest position. Plan of care in place.        Problem: Infection  Goal: Absence of Infection Signs and Symptoms  Outcome: Progressing     Problem: Skin Injury Risk Increased  Goal: Skin Health and Integrity  Outcome: Progressing     Problem: Hemodialysis  Goal: Safe, Effective Therapy Delivery  Outcome: Progressing  Goal: Effective Tissue Perfusion  Outcome: Progressing  Goal: Absence of Infection Signs and Symptoms  Outcome: Progressing     Problem: Adult Inpatient Plan of Care  Goal: Plan of Care Review  Outcome: Progressing  Goal: Patient-Specific Goal (Individualized)  Outcome: Progressing  Goal: Absence of Hospital-Acquired Illness or Injury  Outcome: Progressing  Goal: Optimal Comfort and Wellbeing  Outcome: Progressing  Goal: Readiness for Transition of Care  Outcome: Progressing     Problem: Sepsis/Septic Shock  Goal: Optimal Coping  Outcome: Progressing  Goal: Absence of Bleeding  Outcome: Progressing  Goal: Blood Glucose Level Within Targeted Range  Outcome: Progressing  Goal: Absence of Infection Signs and Symptoms  Outcome: Progressing  Goal: Optimal Nutrition Intake  Outcome: Progressing     Problem: CRRT (Continuous Renal Replacement Therapy)  Goal: Safe, Effective Therapy Delivery  Outcome: Progressing  Goal: Hemodynamic Stability  Outcome: Progressing  Goal: Body Temperature Maintained in Desired Range  Outcome: Progressing  Goal: Absence of Infection Signs and Symptoms  Outcome: Progressing     Problem: Fall Injury Risk  Goal: Absence of Fall and Fall-Related Injury  Outcome: Progressing     Problem: Delirium  Goal: Optimal Coping  Outcome: Progressing  Goal: Improved Behavioral Control  Outcome: Progressing  Goal: Improved Attention and Thought Clarity  Outcome: Progressing  Goal: Improved Sleep  Outcome: Progressing     Problem: Wound  Goal: Optimal Coping  Outcome: Progressing  Goal: Optimal  Functional Ability  Outcome: Progressing  Goal: Absence of Infection Signs and Symptoms  Outcome: Progressing  Goal: Improved Oral Intake  Outcome: Progressing  Goal: Optimal Pain Control and Function  Outcome: Progressing  Goal: Skin Health and Integrity  Outcome: Progressing  Goal: Optimal Wound Healing  Outcome: Progressing

## 2025-02-19 NOTE — PLAN OF CARE
Problem: Infection  Goal: Absence of Infection Signs and Symptoms  Outcome: Adequate for Care Transition     Problem: Skin Injury Risk Increased  Goal: Skin Health and Integrity  Outcome: Adequate for Care Transition     Problem: Hemodialysis  Goal: Safe, Effective Therapy Delivery  Outcome: Adequate for Care Transition  Goal: Effective Tissue Perfusion  Outcome: Adequate for Care Transition  Goal: Absence of Infection Signs and Symptoms  Outcome: Adequate for Care Transition     Problem: Adult Inpatient Plan of Care  Goal: Plan of Care Review  Outcome: Adequate for Care Transition  Goal: Patient-Specific Goal (Individualized)  Outcome: Adequate for Care Transition  Goal: Absence of Hospital-Acquired Illness or Injury  Outcome: Adequate for Care Transition  Goal: Optimal Comfort and Wellbeing  Outcome: Adequate for Care Transition  Goal: Readiness for Transition of Care  Outcome: Adequate for Care Transition     Problem: Coping Ineffective  Goal: Effective Coping  Outcome: Adequate for Care Transition

## 2025-02-19 NOTE — PROGRESS NOTES
Jason Long - Telemetry [facility] Medicine  Progress Note    Patient Name: Flakito Mcginnis  MRN: [id number]  Patient Class: IP- Inpatient   Admission Date: 2/9/2025  Length of Stay: 10 days  Attending Physician: Una Jaffe MD  Primary Care Provider: Jordin Robbins MD        Subjective     Principal Problem:Acute on chronic respiratory failure with hypoxia        HPI:  Per CCM:  69yoM w/hx of HFrEF (EF 20-25%, 12/2024), NICM, MR, ESRD on HD, chronic respiratory failure (on 3L NC at home), Crohn's disease s/p colostomy, R nephrectomy who presents to the hospital from [facility] from acute onset of SOB. Given the acuity of patient's condition, history was obtained from the chart. Per the ED provider note, patient has had increased sputum production, cough, wheezing, and bilateral lower extremity edema. His last dialysis session was on Friday (2/7/25) but there is concern that he may not have completed a full session as patient reports feeling volume overloaded. He denies chest pain or fever. He was recently admitted to the hospital septic shock secondary to staph capitis bacteremia and endocarditis. Completed 6wk course of IV Cefazolin on 2/2/25.      In the ED, patient was hypoxic with increased WOB. Initially placed on BiPAP without much improvement in hypoxia so was intubated. Afebrile, other VSS. Labs were notable for Hgb 9.6, Hct 32.3, Na 132, K 8.3, Bicarb 19, BUN/Cr 38/4.8, , BNP >4900, HsTrop 44. Flu/COVID negative. RSV positive. VBG 7.46/35.1/33.3/25.5. CXR with ongoing vs recurrent small right pleural effusion. EKG with known 1st degree AV block and T-wave abnormality. Administered calcium gluconate, IV insulin/dextrose, and IV lasix for hyperkalemia. Nephrology consulted. Admitted to the MICU for further management and workup.     Overview/Hospital Course:  Admitted to MICU on 02/09 with acute on chronic hypoxemic respiratory failure requiring intubation in setting of RSV infection  and Klebsiella pneumoniae and acute on chronic systolic heart failure, hyperkalemia, and shock requiring pressor support, particularly to tolerate SLED. 2/9 blood cultures no growth after 5 days. Endotracheal aspirate culture with pansensitive Klebsiella pneumoniae. He received 3 doses of IV azithromycin 500 mg.  He received IV Zosyn 2/09- 2/12; he was deescalated to Rocephin on 02/13-2/16 completing a total 7 day course of antibiotics.  C diff testing was negative.  He briefly required D10 infusion for hypoglycemia (2/10-2/11).  2/09 echo with regional wall motion abnormalities; abnormal septal motion, severely reduced systolic function with EF 20-25%, normal diastolic function, right ventricle moderately reduced systolic function, moderately dilated right atrium, moderate aortic valve sclerosis, moderate severe tricuspid valve regurgitation.  Nephrology was consulted for HD and volume management as patient largely anuric.  Fentanyl and propofol discontinued 2/11.  Initially extubated to nasal cannula on 2/11, however on 02/12, needing Airvo and deemed high risk for re-intubation.  Nephrology was asked to increase ultrafiltration..  Norepinephrine weaned off 2/12.  Weaned from Airvo to nasal cannula 2/14. Tolerated HD trial 2/14. Intermittent hypoglycemia attributed to poor p.o. intake. Course complicated by delirium.  Deemed stable for step-down on 02/16; on home 3 L via nasal cannula at the time and delirium noted to be improving.      Stepped down to hospital medicine 2/18. On home 3L via NC at rest. Delirium resolved, oriented to PPT.  HD continued to improve volume status.  Incentive spirometry initiated.  Pt eager to return to SNF.       Interval History:  Patient seen and examined at bedside.    No acute events overnight.  He is oriented to person, place, time.  Sitting up in chair.  Breathing comfortable at rest, still endorses exertional shortness of breath.  However, he remains eager to return to facility  for physical therapy; his goals overall remain to get better.  Patient updated regarding care plan.      Review of Systems   Constitutional:  Negative for diaphoresis and fever.   HENT:  Positive for congestion. Negative for sore throat.    Respiratory:  Positive for cough and shortness of breath. Negative for wheezing.    Gastrointestinal:  Negative for abdominal pain and nausea.   Genitourinary:  Positive for decreased urine volume (Anuric).   Musculoskeletal:  Negative for myalgias.   Skin:  Positive for color change (bruising).   Neurological:  Negative for headaches.   Psychiatric/Behavioral:  Negative for agitation, behavioral problems and decreased concentration.        Objective:    Temp: 97.1 °F (36.2 °C) (02/19/25 0830)  Pulse: 79 (02/19/25 1400)  Resp: (!) 22 (02/19/25 0957)  BP: (!) 107/57 (02/19/25 1400)  SpO2: 100 % (02/19/25 1400)    Weight: 56 kg (123 lb 7.3 oz) (02/19/25 1011)    Body mass index is 19.93 kg/m².      Intake/Output Summary (Last 24 hours) at 2/19/2025 1408  Last data filed at 2/19/2025 0942  Gross per 24 hour   Intake 150 ml   Output 400 ml   Net -250 ml       Physical Exam  Vitals and nursing note reviewed.   Constitutional:       General: He is not in acute distress.     Appearance: He is ill-appearing (chronically). He is not toxic-appearing or diaphoretic.   HENT:      Head: Normocephalic and atraumatic.   Eyes:      General: No scleral icterus.  Cardiovascular:      Rate and Rhythm: Normal rate and regular rhythm.      Pulses: Normal pulses.   Pulmonary:      Effort: Tachypnea (with exertion) present. No accessory muscle usage or respiratory distress.      Comments: On nasal cannula  Fine intermittent, Bibasilar crackles  Abdominal:      General: Bowel sounds are normal.      Palpations: Abdomen is soft.      Tenderness: There is no abdominal tenderness.      Comments: Colostomy in place - brown liquid output  Hernia reducible   Musculoskeletal:      Cervical back: Neck supple.       Right lower leg: No edema.      Left lower leg: No edema.   Skin:     General: Skin is warm and dry.      Capillary Refill: Capillary refill takes less than 2 seconds.      Findings: Bruising present.      Comments: Discoloration to bilateral toes   Neurological:      Mental Status: He is alert and oriented to person, place, and time.      Motor: Weakness (generalized) present.   Psychiatric:         Behavior: Behavior normal.         Significant Labs: All pertinent labs within the past 24 hours have been reviewed.    Recent Results (from the past 24 hours)   POCT glucose    Collection Time: 02/18/25  5:44 PM   Result Value Ref Range    POCT Glucose 87 70 - 110 mg/dL   POCT glucose    Collection Time: 02/18/25  8:12 PM   Result Value Ref Range    POCT Glucose 117 (H) 70 - 110 mg/dL   Comprehensive metabolic panel    Collection Time: 02/19/25  5:11 AM   Result Value Ref Range    Sodium 139 136 - 145 mmol/L    Potassium 5.3 (H) 3.5 - 5.1 mmol/L    Chloride 107 95 - 110 mmol/L    CO2 23 23 - 29 mmol/L    Glucose 81 70 - 110 mg/dL    BUN 22 8 - 23 mg/dL    Creatinine 3.5 (H) 0.5 - 1.4 mg/dL    Calcium 9.3 8.7 - 10.5 mg/dL    Total Protein 6.2 6.0 - 8.4 g/dL    Albumin 2.7 (L) 3.5 - 5.2 g/dL    Total Bilirubin 0.3 0.1 - 1.0 mg/dL    Alkaline Phosphatase 192 (H) 40 - 150 U/L    AST 26 10 - 40 U/L    ALT 9 (L) 10 - 44 U/L    eGFR 18.1 (A) >60 mL/min/1.73 m^2    Anion Gap 9 8 - 16 mmol/L   Phosphorus    Collection Time: 02/19/25  5:11 AM   Result Value Ref Range    Phosphorus 5.2 (H) 2.7 - 4.5 mg/dL   CBC auto differential    Collection Time: 02/19/25  5:11 AM   Result Value Ref Range    WBC 3.47 (L) 3.90 - 12.70 K/uL    RBC 2.56 (L) 4.60 - 6.20 M/uL    Hemoglobin 7.9 (L) 14.0 - 18.0 g/dL    Hematocrit 26.0 (L) 40.0 - 54.0 %     (H) 82 - 98 fL    MCH 30.9 27.0 - 31.0 pg    MCHC 30.4 (L) 32.0 - 36.0 g/dL    RDW 17.6 (H) 11.5 - 14.5 %    Platelets 166 150 - 450 K/uL    MPV 11.4 9.2 - 12.9 fL    Immature  Granulocytes 0.3 0.0 - 0.5 %    Gran # (ANC) 2.4 1.8 - 7.7 K/uL    Immature Grans (Abs) 0.01 0.00 - 0.04 K/uL    Lymph # 0.5 (L) 1.0 - 4.8 K/uL    Mono # 0.3 0.3 - 1.0 K/uL    Eos # 0.2 0.0 - 0.5 K/uL    Baso # 0.05 0.00 - 0.20 K/uL    nRBC 0 0 /100 WBC    Gran % 68.7 38.0 - 73.0 %    Lymph % 13.8 (L) 18.0 - 48.0 %    Mono % 9.2 4.0 - 15.0 %    Eosinophil % 6.6 0.0 - 8.0 %    Basophil % 1.4 0.0 - 1.9 %    Differential Method Automated    Magnesium    Collection Time: 02/19/25  5:11 AM   Result Value Ref Range    Magnesium 2.1 1.6 - 2.6 mg/dL   POCT glucose    Collection Time: 02/19/25  9:02 AM   Result Value Ref Range    POCT Glucose 102 70 - 110 mg/dL       Significant Imaging: I have reviewed all pertinent imaging results/findings within the past 24 hours.    Imaging Results              X-Ray Chest AP Portable (Final result)  Result time 02/09/25 21:59:58      Final result by Thompson Santana MD (02/09/25 21:59:58)                   Impression:      See above comments.  No detrimental change.      Electronically signed by: Thompson Santana  Date:    02/09/2025  Time:    21:59               Narrative:    EXAMINATION:  XR CHEST AP PORTABLE    CLINICAL HISTORY:  ETT check;    TECHNIQUE:  Single frontal view of the chest was performed.    COMPARISON:  02/09/2025    FINDINGS:  Cardiac silhouette is enlarged similar to the prior study.    Right IJ central venous catheter with tip near the cavoatrial junction.    NG tube is present with tip overlying the stomach.    Endotracheal tube present tip above the vito.  Positioning is adequate.    No large effusion.  No evidence of pneumothorax.  No acute osseous abnormality.    Mild hazy density at the right lung base could be associated with minimal infiltrate or small layering effusion.    Remote rib fractures on the left.    No acute osseous abnormality.    Overall mild improvement at the right lung base with no detrimental change.                                        X-Ray Chest AP Portable (Final result)  Result time 02/09/25 20:13:15      Final result by Gualberto Payne MD (02/09/25 20:13:15)                   Impression:      Suspected ongoing versus recurrent small right pleural effusion with right basilar atelectasis/infiltrate.  Improved aeration of the left lung base.    Grossly stable other chronic findings in the body of the report.      Electronically signed by: Gualberto Payne MD  Date:    02/09/2025  Time:    20:13               Narrative:    EXAMINATION:  XR CHEST AP PORTABLE    CLINICAL HISTORY:  Chest Pain;    TECHNIQUE:  Single frontal view of the chest was performed.    COMPARISON:  Chest radiograph 12/26/2024, report only from outside facility chest CT 09/04/2024    FINDINGS:  Monitoring leads overlie the chest.  Patient is somewhat rotated.  Bilateral lung apices are partially obscured by overlying chin soft tissues.  Resolution is limited by body habitus with underpenetration.    Right IJ CVC tip overlies the upper right atrium.  Cardiomediastinal silhouette is midline and prominent with similar calcification and tortuosity of the aorta and moderately enlarged heart.  Hilar contours are stable.    Grossly similar hazy opacification of the right lung base with blunting of the costophrenic angle suggesting small pleural effusion, with probable right basilar atelectasis/infiltrate.  Improved aeration of the left lung base.  No sizable pleural effusion or consolidation on the left.  Scattered areas of platelike scarring versus atelectasis bilaterally.  No definite pneumothorax.    No acute osseous process seen.  PA and lateral views can be obtained.                                        Assessment and Plan     * Acute on chronic respiratory failure with hypoxia  RSV infection   Klebsiella pneumoniae   Acute on chronic systolic heart failure    Patient with Hypoxic Respiratory failure which is Acute on chronic.  he is on home oxygen at 3 LPM. Supplemental oxygen  was provided and noted-      .   Signs/symptoms of respiratory failure include- tachypnea, increased work of breathing, and respiratory distress. Contributing diagnoses includes - CHF, Pneumonia, and RSV  Labs and images were reviewed. Patient Has recent ABG, which has been reviewed. Will treat underlying causes and adjust management of respiratory failure as follows-     -Completed antimicrobial therapy for Klebsiella pneumoniae  -continue supportive care prn for RSV  -IS, flutter valve prn  -HD per Nephro for volume removal    Pancytopenia  This patient is found to have pancytopenia, the likely etiology is Infection/Sepsis, will monitor CBC Daily. Will transfuse red blood cells if the hemoglobin is <7g/dL (or <8 in the setting of ACS). Will transfuse platelets if platelet count is <50k (if undergoing surgical procedure or have active bleeding). Hold DVT prophylaxis if platelets are <50k. The patient's hemoglobin, white blood cell count, and platelet count results have been reviewed and are listed below.  Recent Labs   Lab 02/19/25  0511   HGB 7.9*   WBC 3.47*            Resolving      Hypothyroidism  Continue home Synthroid      Hyperphosphatemia  Patient's most recent phosphorus results are listed below.   Recent Labs     02/17/25  0323 02/18/25  0540 02/19/25  0511   PHOS 4.7*  4.7* 4.1 5.2*       Plan  - Will treat hyperphosphatemia with HD per Nephro  - Patient's hyperphosphatemia is worsening. Will continue current treatment  -resume home sevelamer        Thrombocytopenia  The likely etiology of thrombocytopenia is  splenomegaly . The patients 3 most recent labs are listed below.  Recent Labs     02/17/25  0323 02/18/25  0540 02/19/25  0511   * 142* 166       Plan  - Will transfuse if platelet count is <50k (if undergoing surgical procedure or have active bleeding).        Severe protein-calorie malnutrition  Nutrition consulted. Most recent weight and BMI monitored-     Measurements:  Wt  "Readings from Last 1 Encounters:   02/16/25 52.5 kg (115 lb 11.9 oz)   Body mass index is 18.68 kg/m².    Patient has been screened and assessed by RD.    Malnutrition Type:  Context: acute illness or injury  Level: severe    Malnutrition Characteristic Summary:  Weight Loss (Malnutrition): greater than 2% in 1 week (-5% x 1-2 weeks)  Subcutaneous Fat (Malnutrition):  (mild to moderate)  Muscle Mass (Malnutrition): severe depletion    Interventions/Recommendations (treatment strategy):  1.)      Delirium  - Delirium precautions   - Aspiration precautions   - Fall precautions   - Avoid sedating agents if possible   - Encourage nutrition   - repeat EKG prn; if QT not prolonged  PRN seroquel as needed    -at baseline mentation 2/18    Anemia of chronic renal failure, stage 5  Anemia is likely due to chronic disease due to ESRD. Most recent hemoglobin and hematocrit are listed below.  Recent Labs     02/17/25  0323 02/18/25  0540 02/19/25  0511   HGB 7.1* 8.3* 7.9*   HCT 23.5* 27.6* 26.0*       Plan  - Monitor serial CBC: Daily  - Transfuse PRBC if patient becomes hemodynamically unstable, symptomatic or H/H drops below 7/21.  - Patient has not received any PRBC transfusions to date  - Patient's anemia is currently stable      Acute on chronic systolic heart failure  Patient has Systolic (HFrEF) heart failure that is Acute on chronic. On presentation their CHF was decompensated. Evidence of decompensated CHF on presentation includes: edema. The etiology of their decompensation is likely multi-factorial . Most recent BNP and echo results are listed below.  No results for input(s): "BNP" in the last 72 hours.  Latest ECHO  Results for orders placed during the hospital encounter of 02/09/25    Echo    Interpretation Summary    Left Ventricle: The left ventricle is mildly dilated. Mildly increased ventricular mass. Normal wall thickness. There is eccentric hypertrophy. Regional wall motion abnormalities present. See diagram " for wall motion findings. Septal motion is abnormal. There is severely reduced systolic function with a visually estimated ejection fraction of 20 - 25%. Ejection fraction is approximately 23%. There is normal diastolic function.    Right Ventricle: Mild right ventricular enlargement. Wall thickness is normal. Systolic function is moderately reduced.    Right Atrium: Right atrium is moderately dilated.    Aortic Valve: There is moderate aortic valve sclerosis. There is annular calcification present. Moderately restricted motion. There is moderate to severe stenosis. Aortic valve area by VTI is 1.1 cm2. Aortic valve peak velocity is 3.2 m/s. Mean gradient is 23 mmHg. The dimensionless index is 0.34. There is mild to moderate aortic regurgitation.    Mitral Valve: There is moderate bileaflet sclerosis. There is moderate mitral annular calcification present. There is mild regurgitation.    Tricuspid Valve: Mildly thickened leaflets. There is moderate to moderate severe regurgitation.    IVC/SVC: Patient is ventilated, cannot use IVC diameter to estimate right atrial pressure.    Pericardium: Left pleural effusion with a large density within the effusion.    Current Heart Failure Medications       Plan  - Monitor strict I&Os and daily weights.    - Place on telemetry  - Low sodium diet  - Place on fluid restriction of 1.5 L.   - Cardiology has not been consulted  - The patient's volume status is improving but not at their baseline as indicated by edema  -GDMT as tolerated with hemodynamics       ESRD on hemodialysis  Vascular dialysis catheter in place    Creatine stable for now. BMP reviewed- noted Estimated Creatinine Clearance: 21.6 mL/min (A) (based on SCr of 2.4 mg/dL (H)). according to latest data. Based on current GFR, CKD stage is end stage.  Monitor UOP and serial BMP and adjust therapy as needed. Renally dose meds. Avoid nephrotoxic medications and procedures.    HD per nephro  Right chest tunneled dialysis  catheter present. Sterile dressing in place.           VTE Risk Mitigation (From admission, onward)           Ordered     heparin (porcine) injection 1,000 Units  As needed (PRN)         02/19/25 1124     heparin (porcine) injection 5,000 Units  Every 8 hours         02/09/25 2251     IP VTE HIGH RISK PATIENT  Once         02/09/25 2239     Place sequential compression device  Until discontinued         02/09/25 2239                    Discharge Planning   LLUVIA: 2/19/2025     Code Status: Full Code   Medical Readiness for Discharge Date: 2/19/2025  Discharge Plan A: Return to nursing home   Discharge Delays: None known at this time            Please place Justification for DME        Una Jaffe MD  Department of Hospital Medicine   [facility] - Telemetry Stepdown

## 2025-02-19 NOTE — PLAN OF CARE
Wilma with [facility] [employer] notified CM she has received authorization from insurance. CM sent orders via Epic.    Kaila Erickson RN     [phone]

## 2025-02-19 NOTE — PLAN OF CARE
NURSING HOME ORDERS    02/19/2025  [facility]  JOSE LUIS ALBA - TELEMETRY STEPDOWN  [address]  [facility] [address]  Dept: [phone]  Loc: [phone]     Admit to Nursing Home:  SNF    Diagnoses:  Active Hospital Problems    Diagnosis  POA    *Acute on chronic respiratory failure with hypoxia [J96.21]  Yes     Priority: 1 - High    RSV (respiratory syncytial virus infection) [B33.8]  Yes     Priority: 1 - High    Klebsiella pneumonia [J15.0]  Yes     Priority: 1 - High    Pancytopenia [D61.818]  No    Thrombocytopenia [D69.6]  Yes    Hyperphosphatemia [E83.39]  Yes    Hypothyroidism [E03.9]  Yes    Colostomy present on admission [Z93.3]  Not Applicable    Severe protein-calorie malnutrition [E43]  Yes    Delirium [R41.0]  No    ACP (advance care planning) [Z71.89]  Not Applicable    Anemia of chronic renal failure, stage 5 [N18.5, D63.1]  Yes    Acute on chronic systolic heart failure [I50.23]  Yes     Echo 2/10      Left Ventricle: The left ventricle is mildly dilated. Mildly increased ventricular mass. Normal wall thickness. There is eccentric hypertrophy. Regional wall motion abnormalities present. See diagram for wall motion findings. Septal motion is abnormal. There is severely reduced systolic function with a visually estimated ejection fraction of 20 - 25%. Ejection fraction is approximately 23%. There is normal diastolic function.    Right Ventricle: Mild right ventricular enlargement. Wall thickness is normal. Systolic function is moderately reduced.    Right Atrium: Right atrium is moderately dilated.    Aortic Valve: There is moderate aortic valve sclerosis. There is annular calcification present. Moderately restricted motion. There is moderate to severe stenosis. Aortic valve area by VTI is 1.1 cm2. Aortic valve peak velocity is 3.2 m/s. Mean gradient is 23 mmHg. The dimensionless index is 0.34. There is mild to moderate aortic regurgitation.    Mitral Valve: There  is moderate bileaflet sclerosis. There is moderate mitral annular calcification present. There is mild regurgitation.    Tricuspid Valve: Mildly thickened leaflets. There is moderate to moderate severe regurgitation.    IVC/SVC: Patient is ventilated, cannot use IVC diameter to estimate right atrial pressure.    Pericardium: Left pleural effusion with a large density within the effusion.      Vascular dialysis catheter in place [Z99.2]  Not Applicable    ESRD on hemodialysis [N18.6, Z99.2]  Not Applicable      Resolved Hospital Problems    Diagnosis Date Resolved POA    Hyperkalemia [E87.5] 02/15/2025 Yes       Patient is homebound due to:  Acute on chronic respiratory failure with hypoxia    Allergies:  Review of patient's allergies indicates:   Allergen Reactions    Vancomycin analogues Anaphylaxis, Other (See Comments), Shortness Of Breath and Swelling     Light headed, see's spots      Aspirin Nausea And Vomiting and Other (See Comments)     Has crohn's disease    Other reaction(s): FLARES UP CROHNS      Has crohn's disease       Vitals:  Routine    Diet:  Cardiac, low-salt, minced and moist, 1800ml fluid restriction     Activities:   Up in a chair each morning as tolerated and Activity as tolerated    Goals of Care Treatment Preferences:  Code Status: Full Code    Health care agent: Sara Da Silva (sister)  Health care agent number: [phone]          What is most important right now is to focus on remaining as independent as possible, improvement in condition but with limits to invasive therapies.  Accordingly, we have decided that the best plan to meet the patient's goals includes continuing with treatment.      Labs:  Per facility prior    Nursing Precautions:  Delirium precautions Aspiration , Fall, and Pressure ulcer prevention    Consults:   PT to evaluate and treat- 5 times a week, OT to evaluate and treat- 5 times a week, ST to evaluate and treat- 5 times a week,     Miscellaneous Care:     Routine  Skin for Bedridden Patients:  Apply moisture barrier cream to all    Wound Care:     Q3 days - sacrum - cleanse gently w/ NS, pat dry and apply mepilex foam dressing.                         Diabetes Care:  Report CBG < 60 or > 350 to physician.      Medications: Discontinue all previous medication orders, if any. See new list below.     Medication List        START taking these medications      acetaminophen 325 MG tablet  Commonly known as: TYLENOL  Take 2 tablets (650 mg total) by mouth every 6 (six) hours as needed for Pain.     fluticasone propionate 50 mcg/actuation nasal spray  Commonly known as: FLONASE  2 sprays (100 mcg total) by Each Nostril route once daily.     guaiFENesin 600 mg 12 hr tablet  Commonly known as: MUCINEX  Take 1 tablet (600 mg total) by mouth 2 (two) times daily.     levothyroxine 50 MCG tablet  Commonly known as: SYNTHROID  Take 1 tablet (50 mcg total) by mouth before breakfast.  Start taking on: February 20, 2025     melatonin 3 mg tablet  Commonly known as: MELATIN  Take 3 tablets (9 mg total) by mouth nightly.     pantoprazole 40 MG tablet  Commonly known as: PROTONIX  Take 1 tablet (40 mg total) by mouth before breakfast.     psyllium husk (aspartame) 3.4 gram Pwpk packet  Commonly known as: METAMUCIL  Take 1 packet by mouth once daily.  Start taking on: February 20, 2025     QUEtiapine 25 MG Tab  Commonly known as: SEROQUEL  Take 1 tablet (25 mg total) by mouth nightly as needed (Non redirectable agitation, sun downing).            CONTINUE taking these medications      atorvastatin 40 MG tablet  Commonly known as: LIPITOR  Take 40 mg by mouth once daily.     miconazole NITRATE 2 % 2 % top powder  Commonly known as: MICOTIN  Apply topically 2 (two) times daily. Apply to underarm as needed     midodrine 10 MG tablet  Commonly known as: PROAMATINE  Take 1 tablet (10 mg total) by mouth as needed (prior to HD).     sevelamer carbonate 800 mg Tab  Commonly known as: RENVELA  Take 800 mg  by mouth 3 (three) times daily with meals.                Immunizations Administered as of 2/19/2025       Name Date Dose VIS Date Route Exp Date    COVID-19, MRNA, LN-S, PF (Pfizer) (Purple Cap) 8/1/2021 12:55 PM 0.3 mL 12/12/2020 Intramuscular 8/30/2021    Site: Right deltoid     Given By: Jose Santos RN     : [employer]     Lot: [id number]     COVID-19, MRNA, LN-S, PF (Pfizer) (Purple Cap) 7/11/2021 12:52 PM 0.3 mL 12/12/2020 Intramuscular 8/31/2021    Site: Right deltoid     Given By: Jose Santos RN     : Pf[employer]     Lot: [id number]             This patient has had both covid vaccinations    Some patients may experience side effects after vaccination.  These may include fever, headache, muscle or joint aches.  Most symptoms resolve with 24-48 hours and do not require urgent medical evaluation unless they persist for more than 72 hours or symptoms are concerning for an unrelated medical condition.          _________________________________  Una Jaffe MD  02/19/2025

## 2025-02-19 NOTE — PROGRESS NOTES
[facility] - Telemetry Stepdown  Adult Nutrition  Progress Note    SUMMARY     Recommendations  1. Continue minced and moist diet 2000ml   2. Add renal restrictions   3. Monitor weight and labs   4. Encourage PO intake   5. Continue Novasource TID    Goals: Pt will consume 75% of meals by RD follow up    Nutrition Goal Status: progressing towards goal  Communication of RD Recs:  (POC)    Assessment and Plan  Endocrine  Severe protein-calorie malnutrition  Malnutrition Type:  Context: acute illness or injury  Level: severe    Related to (etiology):   Inadequate protein- calorie intake    Signs and Symptoms (as evidenced by):   Significant wt loss (-5% x 1-2weeks), and moderate to severe muscle/fat wasting    Malnutrition Characteristic Summary:  Weight Loss (Malnutrition): greater than 2% in 1 week (-5% x 1-2 weeks)  Subcutaneous Fat (Malnutrition):  (mild to moderate)  Muscle Mass (Malnutrition): severe depletion    Intervention:  Collaboration of nutritional care with other providers.     Commercial beverage medical food supplement therapy- Novasource TID   Minced and Moist diet 2000 ml    Nutrition Diagnosis Status:   Continues     Malnutrition Assessment  Malnutrition Context: acute illness or injury  Malnutrition Level: severe  Skin (Micronutrient): none  Nails (Micronutrient): none       Weight Loss (Malnutrition): greater than 2% in 1 week (-5% x 1-2 weeks)  Subcutaneous Fat (Malnutrition):  (mild to moderate)  Muscle Mass (Malnutrition): severe depletion   Orbital Region (Subcutaneous Fat Loss): moderate depletion  Upper Arm Region (Subcutaneous Fat Loss): mild depletion  Thoracic and Lumbar Region: moderate depletion   Orthodoxy Region (Muscle Loss): severe depletion  Clavicle Bone Region (Muscle Loss): severe depletion  Clavicle and Acromion Bone Region (Muscle Loss): severe depletion  Dorsal Hand (Muscle Loss):  (unable to assess)     Reason for Assessment  Reason For Assessment: RD follow-up  Diagnosis:  "pulmonary disease (Acute hypoxic respiratory failure)  General Information Comments: Pt is currently [facility] a minced andmoist [facility]. PMH-Crohns disease, ESRD on HD, HTN. NFPE conducted at previous RD visit. TF cancelled 2/12. Trace edema noted. Lexx-17/buttocks, posterior hand. Colostomy 12/19/24. Noted 25% meal intake. Noted 9 lb weight loss in 4 months.  Nutrition Discharge Planning: Minced and moist diet 2000ml    Nutrition/Diet History  Nutrition Intake History: [facility]  Food Preferences: [facility]  Spiritual, Cultural Beliefs, Yazidi Practices, Values that Affect Care: no  Food Allergies: NKFA    Food Insecurity: No Food Insecurity (2/17/2025)    Hunger Vital Sign     Worried About Running Out of Food in the Last Year: Never true     Ran Out of Food in the Last Year: Never true     Anthropometrics  Height: 5' 6" (167.6 cm)  Height (inches): 66 in  Height Method: Estimated  Weight: 56 kg (123 lb 7.3 oz)  Weight (lb): 123.46 lb  Weight Method: Bed Scale  Ideal Body Weight (IBW), Male: 142 lb  % Ideal Body Weight, Male (lb): 86.94 %  BMI (Calculated): 19.9  BMI Grade: 18.5-24.9 - normal     Lab/Procedures/Meds  Pertinent Labs Reviewed: reviewed  Pertinent Labs Comments: K 5.3, Creatinine 3.5, GFR 8.1  Pertinent Medications Reviewed: reviewed  Pertinent Medications Comments: heparin, levothyroxine, pantoprazole, psyllium husk    Estimated/Assessed Needs  Weight Used For Calorie Calculations: 56 kg (123 lb 7.3 oz)  Energy Calorie Requirements (kcal): 3433-0570 kcals (30-35 kcals/kg)  Energy Need Method: Kcal/kg  Protein Requirements: 56-67g (1-1.2g/kg)  Weight Used For Protein Calculations: 56 kg (123 lb 7.3 oz)  Fluid Requirements (mL): as per MD or RDA  Estimated Fluid Requirement Method: RDA Method  RDA Method (mL): 1680     Nutrition Prescription Ordered  Current Diet Order: Minced and Moist diet 2000 ml  Nutrition Order Comments: 2.0L FR    Evaluation of Received Nutrient/Fluid Intake  Lipid " Calories (kcals):  (Propofol discontinued)  % Kcal Needs: 25%  % Protein Needs: 25%  I/O: 470/200  Energy Calories Required: not meeting needs  Protein Required: not meeting needs  Fluid Required: not meeting needs  Comments: LBM-2/18/25  Tolerance: tolerating  % Intake of Estimated Energy Needs: 25 - 50 %  % Meal Intake: 25 - 50 %    Nutrition Risk  Level of Risk/Frequency of Follow-up: low (1x/week)     Monitor and Evaluation  Food and Nutrient Intake: energy intake, food and beverage intake  Food and Nutrient Adminstration: diet order  Anthropometric Measurements: body mass index, weight change  Biochemical Data, Medical Tests and Procedures: electrolyte and renal panel, gastrointestinal profile, inflammatory profile, lipid profile  Nutrition-Focused Physical Findings: overall appearance     Nutrition Follow-Up  RD Follow-up?: Yes

## 2025-02-19 NOTE — ASSESSMENT & PLAN NOTE
Anemia is likely due to chronic disease due to ESRD. Most recent hemoglobin and hematocrit are listed below.  Recent Labs     02/17/25  0323 02/18/25  0540 02/19/25  0511   HGB 7.1* 8.3* 7.9*   HCT 23.5* 27.6* 26.0*       Plan  - Monitor serial CBC: Daily  - Transfuse PRBC if patient becomes hemodynamically unstable, symptomatic or H/H drops below 7/21.  - Patient has not received any PRBC transfusions to date  - Patient's anemia is currently stable

## 2025-02-19 NOTE — NURSING
Dialysis started to [facility].  Tolerated well.    3.5 hour tx ordered.  Patient agrees to 3 hour tx.  Dr. Cabezas notified.    Contact isolation precautions maintained.

## 2025-02-19 NOTE — ASSESSMENT & PLAN NOTE
RSV infection   Klebsiella pneumoniae   Acute on chronic systolic heart failure    Patient with Hypoxic Respiratory failure which is Acute on chronic.  he is on home oxygen at 3 LPM. Supplemental oxygen was provided and noted-      .   Signs/symptoms of respiratory failure include- tachypnea, increased work of breathing, and respiratory distress. Contributing diagnoses includes - CHF, Pneumonia, and RSV  Labs and images were reviewed. Patient Has recent ABG, which has been reviewed. Will treat underlying causes and adjust management of respiratory failure as follows-     -Completed antimicrobial therapy for Klebsiella pneumoniae  -continue supportive care prn for RSV  -IS, flutter valve prn  -HD per Nephro for volume removal

## 2025-02-19 NOTE — ASSESSMENT & PLAN NOTE
The likely etiology of thrombocytopenia is  splenomegaly . The patients 3 most recent labs are listed below.  Recent Labs     02/17/25  0323 02/18/25  0540 02/19/25  0511   * 142* 166       Plan  - Will transfuse if platelet count is <50k (if undergoing surgical procedure or have active bleeding).

## 2025-02-19 NOTE — NURSING
"Katherine Katherine Villalpando   : 1978                    Primary Care Provider: Sandie Baltazar MD   Therapist: [facility]     Jaqueline Rome[facility]  Report received from KAYLENE Fritz. Patient remains free from falls and injury. NADN. VSS. Questions encouraged and answered. Patient verbalized understanding. Bed locked and in low position; safety maintained. Call light in reach. White board updated, and explained. No complaint of pain, n/v, diarrhea, or SOB.  Will continue to monitor, will continue with plan of care.    "[facility]. [facility] [facility] [place] on Mental Illness (www.licha.org): [phone] or [phone].     Mental Health Association (www.mentalhealth.org): 920.271.8644 or 308-903-3243.    [place] Crisis Text Line: Text [place] to 553735    Suicide LifeLine Chat: suicidepre[place].org/chat         Administrative Billing:   Phone call duration: 30 minutes  Total time spent, including chart review and documentation: 42 minutes    Patient Status:  This is a continuous care patient and medications will be prescribed by the psychiatric provider until further indicated.

## 2025-02-19 NOTE — NURSING
3 hour dialysis complete.  Blood returned.  RIJ PC flushed, heparin locked, capped and taped.    Net UF 2L.  Midodrine given for BP support.    Tolerated well.    Transported from ERNST to room 8088 via stretcher by transporter.

## 2025-02-19 NOTE — PLAN OF CARE
Problem: Infection  Goal: Absence of Infection Signs and Symptoms  2/19/2025 1545 by Cortney Stacy RN  Outcome: Met  2/19/2025 1545 by Cortney Stacy RN  Outcome: Adequate for Care Transition     Problem: Skin Injury Risk Increased  Goal: Skin Health and Integrity  2/19/2025 1545 by Cortney Stacy RN  Outcome: Met  2/19/2025 1545 by Cortney Stacy RN  Outcome: Adequate for Care Transition     Problem: Hemodialysis  Goal: Safe, Effective Therapy Delivery  2/19/2025 1545 by Cortney Stacy RN  Outcome: Met  2/19/2025 1545 by Cortney Stacy RN  Outcome: Adequate for Care Transition  Goal: Effective Tissue Perfusion  2/19/2025 1545 by Cortney Stacy RN  Outcome: Met  2/19/2025 1545 by Cortney Stacy RN  Outcome: Adequate for Care Transition  Goal: Absence of Infection Signs and Symptoms  2/19/2025 1545 by Cortney Stacy RN  Outcome: Met  2/19/2025 1545 by Cortney Stacy RN  Outcome: Adequate for Care Transition     Problem: Adult Inpatient Plan of Care  Goal: Plan of Care Review  2/19/2025 1545 by Cortney Stacy RN  Outcome: Met  2/19/2025 1545 by Cortney Stacy RN  Outcome: Adequate for Care Transition  Goal: Patient-Specific Goal (Individualized)  2/19/2025 1545 by Cortney Stacy RN  Outcome: Met  2/19/2025 1545 by Cortney Stacy RN  Outcome: Adequate for Care Transition  Goal: Absence of Hospital-Acquired Illness or Injury  2/19/2025 1545 by Cortney Stacy RN  Outcome: Met  2/19/2025 1545 by Cortney Stacy RN  Outcome: Adequate for Care Transition  Goal: Optimal Comfort and Wellbeing  2/19/2025 1545 by Cortney Stacy RN  Outcome: Met  2/19/2025 1545 by Cortney Stacy RN  Outcome: Adequate for Care Transition  Goal: Readiness for Transition of Care  2/19/2025 1545 by Cortney Stacy, RN  Outcome: Met  2/19/2025 1545 by Cortney Stacy RN  Outcome: Adequate for Care Transition     Problem: Coping Ineffective  Goal: Effective Coping  2/19/2025 1545 by Regis,  Cortney, RN  Outcome: Met  2/19/2025 1545 by Cortney Stacy, RN  Outcome: Adequate for Care Transition

## 2025-02-19 NOTE — CONSULTS
[facility] - Tele[facility] Stepdown  Wound Care    Patient Name:  Flakito Mcginnis   MRN:  [id number]  Date: 2/19/2025  Diagnosis: Acute on chronic respiratory failure with hypoxia    History:     Past Medical History:   Diagnosis Date    Crohn's disease 1998    ESRD (end stage renal disease) on dialysis 10/2017    Hypertension     Obstructive uropathy        Social History[1]    Precautions:     Allergies as of 02/09/2025 - Reviewed 02/09/2025   Allergen Reaction Noted    Vancomycin analogues Anaphylaxis, Other (See Comments), Shortness Of Breath, and Swelling 10/18/2021    Aspirin Nausea And Vomiting and Other (See Comments) 05/09/2017       WO Assessment Details/Treatment     Wound care consult received for sacrum.  Chart reviewed for this encounter.  See flowsheets for findings.    Pt found sitting up in chair, agreeable to care at this time. Pt assisted into standing position for assessment, pt w/ blanchable redness to sacrum. Mepilex foam dressing reapplied. Waffle mattress and chair cushion in use. Wound care will sign off at this time.    RECOMMENDATIONS:  Q3 days - sacrum - cleanse gently w/ NS, pat dry and apply mepilex foam dressing.     02/19/25 1003   WOCN Assessment   WOCN Total Time (mins) 30   Visit Date 02/19/25   Visit Time 1003   Consult Type New   WOCN Speciality Wound   Intervention assessed;changed;applied;chart review;coordination of care;orders   Teaching on-going          [1]   Social History  Socioeconomic History    Marital status: Single   Tobacco Use    Smoking status: Former     Passive exposure: Never    Smokeless tobacco: Never   Substance and Sexual Activity    Alcohol use: Not Currently    Drug use: Never    Sexual activity: Not Currently     Social Drivers of Health     Financial Resource Strain: Low Risk  (2/17/2025)    Overall Financial Resource Strain (CARDIA)     Difficulty of Paying Living Expenses: Not hard at all   Food Insecurity: No Food Insecurity (2/17/2025)    Hunger Vital Sign      Worried About Running Out of Food in the Last Year: Never true     Ran Out of Food in the Last Year: Never true   Transportation Needs: No Transportation Needs (12/20/2024)    TRANSPORTATION NEEDS     Transportation : No   Physical Activity: Inactive (2/11/2025)    Exercise Vital Sign     Days of Exercise per Week: 0 days     Minutes of Exercise per Session: 0 min   Stress: No Stress Concern Present (2/17/2025)    [employer] - Occupational Stress Questionnaire     Feeling of Stress : Not at all   Recent Concern: Stress - Stress Concern Present (12/20/2024)    [employer] - Occupational Stress Questionnaire     Feeling of Stress : Rather much   Housing Stability: Low Risk  (2/17/2025)    Housing Stability Vital Sign     Unable to Pay for Housing in the Last Year: No     Homeless in the Last Year: No

## 2025-02-19 NOTE — ASSESSMENT & PLAN NOTE
This patient is found to have pancytopenia, the likely etiology is Infection/Sepsis, will monitor CBC Daily. Will transfuse red blood cells if the hemoglobin is <7g/dL (or <8 in the setting of ACS). Will transfuse platelets if platelet count is <50k (if undergoing surgical procedure or have active bleeding). Hold DVT prophylaxis if platelets are <50k. The patient's hemoglobin, white blood cell count, and platelet count results have been reviewed and are listed below.  Recent Labs   Lab 02/19/25  0511   HGB 7.9*   WBC 3.47*            Resolving

## 2025-02-19 NOTE — SUBJECTIVE & OBJECTIVE
Interval History: Seen this morning, no acute distress, reports some dyspnea with exertion but no other complaints.     Review of patient's allergies indicates:   Allergen Reactions    Vancomycin analogues Anaphylaxis, Other (See Comments), Shortness Of Breath and Swelling     Light headed, see's spots      Aspirin Nausea And Vomiting and Other (See Comments)     Has crohn's disease    Other reaction(s): FLARES UP CROHNS      Has crohn's disease     Current Facility-Administered Medications   Medication Frequency    acetaminophen tablet 650 mg Q6H PRN    calcium gluconate 1 g in NS IVPB (premixed) Q10 Min PRN    dextrose 50% injection 12.5 g PRN    dextrose 50% injection 25 g PRN    glucagon (human recombinant) injection 1 mg PRN    glucose chewable tablet 16 g PRN    glucose chewable tablet 24 g PRN    heparin (porcine) injection 5,000 Units Q8H    hydrOXYzine HCL tablet 25 mg TID PRN    levothyroxine tablet 50 mcg Before breakfast    melatonin tablet 9 mg Nightly    midodrine tablet 10 mg PRN    pantoprazole EC tablet 40 mg Daily    psyllium husk (aspartame) 3.4 gram packet 1 packet Daily    QUEtiapine tablet 25 mg Nightly PRN    sodium chloride 0.9% flush 10 mL PRN       Objective:     Vital Signs (Most Recent):  Temp: 97.1 °F (36.2 °C) (02/19/25 0830)  Pulse: 78 (02/19/25 0750)  Resp: 14 (02/19/25 0750)  BP: 135/61 (02/19/25 0400)  SpO2: (!) 88 % (02/19/25 0750) Vital Signs (24h Range):  Temp:  [97.1 °F (36.2 °C)-98 °F (36.7 °C)] 97.1 °F (36.2 °C)  Pulse:  [78-99] 78  Resp:  [14-45] 14  SpO2:  [88 %-100 %] 88 %  BP: (118-141)/(61-70) 135/61     Weight: 56 kg (123 lb 7.3 oz) (02/19/25 1011)  Body mass index is 19.93 kg/m².  Body surface area is 1.61 meters squared.    I/O last 3 completed shifts:  In: 470 [P.O.:470]  Out: 1400 [Other:1000; Stool:400]     Physical Exam  HENT:      Head: Normocephalic and atraumatic.      Mouth/Throat:      Mouth: Mucous membranes are moist.   Cardiovascular:      Rate and Rhythm:  Normal rate.   Pulmonary:      Effort: Pulmonary effort is normal.   Abdominal:      General: Abdomen is flat.      Palpations: Abdomen is soft.   Musculoskeletal:      Right lower leg: No edema.      Left lower leg: No edema.   Neurological:      Mental Status: He is alert.          Significant Labs:  BMP:   Recent Labs   Lab 02/19/25  0511   GLU 81      K 5.3*      CO2 23   BUN 22   CREATININE 3.5*   CALCIUM 9.3   MG 2.1     CBC:   Recent Labs   Lab 02/19/25  0511   WBC 3.47*   RBC 2.56*   HGB 7.9*   HCT 26.0*      *   MCH 30.9   MCHC 30.4*     All labs within the past 24 hours have been reviewed.

## 2025-02-19 NOTE — NURSING
Patient back in room safely. Out of bed to chair. Call light and personal belongings in reach. Wheels locked. Bed alarm set. No distress or questions at this time. WIll continue to monitor.

## 2025-02-19 NOTE — PLAN OF CARE
[facility] set up transport for 4:45 pm via stretcher. Medical Team, patient/family, and bedside nurse notified.    Bedside nurse to call report to [facility] at [phone]. Patient to readmit to room 117.    Travel packet at nurse's station.    Kaila Erickson RN     [phone]

## 2025-02-19 NOTE — PROGRESS NOTES
[facility] - Telemetry Stepdown  Nephrology  Progress Note    Patient Name: Flakito Mcginnis  MRN: [id number]  Admission Date: 2/9/2025  Hospital Length of Stay: 10 days  Attending Provider: Una Jaffe MD   Primary Care Physician: Jordin Robbins MD  Principal Problem:Acute on chronic respiratory failure with hypoxia    Subjective:     HPI: 69-year-old male past medical history nonischemic cardiomyopathy EF most recently 20-25%, ESRD on HD MWF, chronic respiratory failure on 3 L nasal cannula at home who presents for worsening shortness of breath times 2 days. Patient mentioned to his nursing home staff that he was feeling dyspnic and EMS was called. Upon arrival to the ER he was intubated so history was provided from speaking to his sister on the phone and with the ER team. Infectious work up tested positive for RSV. Nephrology was consulted for management of his hemodialysis and for hyperkalemia of 8.3, his ECG does reveal some peaked T-waves. As per the sister, he has been complaining of increased shortness of breath, fatigue, and decreased urine output for the past two days. The sister does note that several residents of his nursing home have tested positive for respiratory virus recently.     Interval History: Seen this morning, no acute distress, reports some dyspnea with exertion but no other complaints.     Review of patient's allergies indicates:   Allergen Reactions    Vancomycin analogues Anaphylaxis, Other (See Comments), Shortness Of Breath and Swelling     Light headed, see's spots      Aspirin Nausea And Vomiting and Other (See Comments)     Has crohn's disease    Other reaction(s): FLARES UP CROHNS      Has crohn's disease     Current Facility-Administered Medications   Medication Frequency    acetaminophen tablet 650 mg Q6H PRN    calcium gluconate 1 g in NS IVPB (premixed) Q10 Min PRN    dextrose 50% injection 12.5 g PRN    dextrose 50% injection 25 g PRN    glucagon (human recombinant) injection 1  mg PRN    glucose chewable tablet 16 g PRN    glucose chewable tablet 24 g PRN    heparin (porcine) injection 5,000 Units Q8H    hydrOXYzine HCL tablet 25 mg TID PRN    levothyroxine tablet 50 mcg Before breakfast    melatonin tablet 9 mg Nightly    midodrine tablet 10 mg PRN    pantoprazole EC tablet 40 mg Daily    psyllium husk (aspartame) 3.4 gram packet 1 packet Daily    QUEtiapine tablet 25 mg Nightly PRN    sodium chloride 0.9% flush 10 mL PRN       Objective:     Vital Signs (Most Recent):  Temp: 97.1 °F (36.2 °C) (02/19/25 0830)  Pulse: 78 (02/19/25 0750)  Resp: 14 (02/19/25 0750)  BP: 135/61 (02/19/25 0400)  SpO2: (!) 88 % (02/19/25 0750) Vital Signs (24h Range):  Temp:  [97.1 °F (36.2 °C)-98 °F (36.7 °C)] 97.1 °F (36.2 °C)  Pulse:  [78-99] 78  Resp:  [14-45] 14  SpO2:  [88 %-100 %] 88 %  BP: (118-141)/(61-70) 135/61     Weight: 56 kg (123 lb 7.3 oz) (02/19/25 1011)  Body mass index is 19.93 kg/m².  Body surface area is 1.61 meters squared.    I/O last 3 completed shifts:  In: 470 [P.O.:470]  Out: 1400 [Other:1000; Stool:400]     Physical Exam  HENT:      Head: Normocephalic and atraumatic.      Mouth/Throat:      Mouth: Mucous membranes are moist.   Cardiovascular:      Rate and Rhythm: Normal rate.   Pulmonary:      Effort: Pulmonary effort is normal.   Abdominal:      General: Abdomen is flat.      Palpations: Abdomen is soft.   Musculoskeletal:      Right lower leg: No edema.      Left lower leg: No edema.   Neurological:      Mental Status: He is alert.          Significant Labs:  BMP:   Recent Labs   Lab 02/19/25  0511   GLU 81      K 5.3*      CO2 23   BUN 22   CREATININE 3.5*   CALCIUM 9.3   MG 2.1     CBC:   Recent Labs   Lab 02/19/25  0511   WBC 3.47*   RBC 2.56*   HGB 7.9*   HCT 26.0*      *   MCH 30.9   MCHC 30.4*     All labs within the past 24 hours have been reviewed.   Assessment/Plan:     Cardiac/Vascular  Acute on chronic systolic heart failure  Management per  primary team.      Renal/  ESRD on hemodialysis  Recommendations  - HD today  - 1L fluid restriction  - 2g salt. Low potassium diet   - daily RFP, magnesium, and phosphorus levels        Thank you for your consult. I will follow-up with patient. Please contact us if you have any additional questions.    Isaac Cabezas MD  Nephrology  [facility] - Telemetry Stepdown

## 2025-02-19 NOTE — PLAN OF CARE
Problem: Wound  Goal: Absence of Infection Signs and Symptoms  Outcome: Progressing  Intervention: Prevent or Manage Infection  Flowsheets (Taken 2/18/2025 1846)  Fever Reduction/Comfort Measures:   lightweight bedding   lightweight clothing  Isolation Precautions:   precautions maintained   contact   droplet   Plan of care review with pt. SHELBYOx4 ; Stable VTs. PRN meds given for pain; Wound care performed at bedside. I&O documented. No acute events throughout shift. Safety measures performed. Continue to monitor

## 2025-02-19 NOTE — ASSESSMENT & PLAN NOTE
Recommendations  - HD today  - 1L fluid restriction  - 2g salt. Low potassium diet   - daily RFP, magnesium, and phosphorus levels

## 2025-02-20 NOTE — PLAN OF CARE
[facility] - Tele[facility] Stepdown  Discharge Final Note    Primary Care Provider: Jordin Robbins MD    Expected Discharge Date: 2/19/2025    Patient discharged 2/19/2025 back to [facility] via stretcher transport.      Final Discharge Note (most recent)       Final Note - 02/20/25 0806          Final Note    Assessment Type Final Discharge Note     Anticipated Discharge Disposition Skilled Nursing Facility     Hospital Resources/Appts/Education Provided Appointments scheduled and added to AVS        Post-Acute Status    Post-Acute Authorization Placement     Post-Acute Placement Status Set-up Complete/Auth obtained     Coverage [employer] MANAGED MEDICARE - [employer] MEDICARE DUAL DSNP     Discharge Delays None known at this time                     Important Message from Medicare  Important Message from Medicare regarding Discharge Appeal Rights: Given to patient/caregiver, Explained to patient/caregiver, Signed/date by patient/caregiver     Date IMM was signed: 02/19/25  Time IMM was signed: 1029    Kaila Erickson RN     [phone]

## 2025-02-20 NOTE — NURSING
Patient discharged. Transported out via stretcher/ambulance with 2 bags of personal belongings and cell phone./

## 2025-02-27 NOTE — DISCHARGE SUMMARY
[facility] - Telemetry [facility]  Discharge Summary      Patient Name: Flakito Mcginnis  MRN: [id number]  ROSMERY: [id number]  Patient Class: IP- Inpatient  Admission Date: 2/9/2025  Hospital Length of Stay: 10 days  Discharge Date and Time: 2/19/2025  6:39 PM  Attending Physician: Lynne att. providers found   Discharging Provider: Una Jaffe MD  Primary Care Provider: Jordin Robbins MD  [facility] Medicine Team: [facility] W Una Jaffe MD  Primary Care Team: [facility] MED W    HPI:   Per CCM:  69yoM w/hx of HFrEF (EF 20-25%, 12/2024), NICM, MR, ESRD on HD, chronic respiratory failure (on 3L NC at home), Crohn's disease s/p colostomy, R nephrectomy who presents to the hospital from [facility] from acute onset of SOB. Given the acuity of patient's condition, history was obtained from the chart. Per the ED provider note, patient has had increased sputum production, cough, wheezing, and bilateral lower extremity edema. His last dialysis session was on Friday (2/7/25) but there is concern that he may not have completed a full session as patient reports feeling volume overloaded. He denies chest pain or fever. He was recently admitted to the hospital septic shock secondary to staph capitis bacteremia and endocarditis. Completed 6wk course of IV Cefazolin on 2/2/25.      In the ED, patient was hypoxic with increased WOB. Initially placed on BiPAP without much improvement in hypoxia so was intubated. Afebrile, other VSS. Labs were notable for Hgb 9.6, Hct 32.3, Na 132, K 8.3, Bicarb 19, BUN/Cr 38/4.8, , BNP >4900, HsTrop 44. Flu/COVID negative. RSV positive. VBG 7.46/35.1/33.3/25.5. CXR with ongoing vs recurrent small right pleural effusion. EKG with known 1st degree AV block and T-wave abnormality. Administered calcium gluconate, IV insulin/dextrose, and IV lasix for hyperkalemia. Nephrology consulted. Admitted to the MICU for further management and workup.     * No surgery found *       Hospital Course:   Admitted to MICU on 02/09 with acute on chronic hypoxemic respiratory failure requiring intubation in setting of RSV infection and Klebsiella pneumoniae and acute on chronic systolic heart failure, hyperkalemia, and shock requiring pressor support, particularly to tolerate SLED. 2/9 blood cultures no growth after 5 days. Endotracheal aspirate culture with pansensitive Klebsiella pneumoniae. He received 3 doses of IV azithromycin 500 mg.  He received IV Zosyn 2/09- 2/12; he was deescalated to Rocephin on 02/13-2/16 completing a total 7 day course of antibiotics.  C diff testing was negative.  He briefly required D10 infusion for hypoglycemia (2/10-2/11).  2/09 echo with regional wall motion abnormalities; abnormal septal motion, severely reduced systolic function with EF 20-25%, normal diastolic function, right ventricle moderately reduced systolic function, moderately dilated right atrium, moderate aortic valve sclerosis, moderate severe tricuspid valve regurgitation.  Nephrology was consulted for HD and volume management as patient largely anuric.  Fentanyl and propofol discontinued 2/11.  Initially extubated to nasal cannula on 2/11, however on 02/12, needing Airvo and deemed high risk for re-intubation.  Nephrology was asked to increase ultrafiltration..  Norepinephrine weaned off 2/12.  Weaned from Airvo to nasal cannula 2/14. Tolerated HD trial 2/14. Intermittent hypoglycemia attributed to poor p.o. intake. Course complicated by delirium.  Deemed stable for step-down on 02/16; on home 3 L via nasal cannula at the time and delirium noted to be improving.      Stepped down to hospital medicine 2/18. On home 3L via NC at rest. Delirium resolved, oriented to PPT.  HD continued to improve volume status.  Incentive spirometry initiated.  Pt eager to return to SNF. Discharged to [facility].       Goals of Care Treatment Preferences:  Code Status: Full Code    Health care agent: Sara  [facility]  [facility] agent number: [phone]          What is most important right now is to focus on remaining as independent as possible, improvement in condition but with limits to invasive therapies.  Accordingly, we have decided that the best plan to meet the patient's goals includes continuing with treatment.      SDOH Screening:  The patient was screened for utility difficulties, food insecurity, transport difficulties, housing insecurity, and interpersonal safety and there were no concerns identified this admission.     Consults:   Consults (From admission, onward)          Status Ordering Provider     Inpatient consult to Registered Dietitian/Nutritionist  Once        Provider:  (Not yet assigned)    Completed JESSIKA TINOCO     Inpatient consult to Palliative Care  Once        Provider:  (Not yet assigned)    Completed JESSIKA TINOCO     Inpatient consult to Midline team  Once        Provider:  (Not yet assigned)    Completed WILMAR GUTIÉRREZ     Inpatient consult to Midline team  Once        Provider:  (Not yet assigned)    Completed KENNETH MARR     Inpatient consult to Registered Dietitian/Nutritionist  Once        Provider:  (Not yet assigned)    Completed MICHELLE CALLAWAY     Inpatient consult to Critical Care Medicine  Once        Provider:  (Not yet assigned)    Completed TJ GUARDADO     Inpatient consult to Nephrology  Once        Provider:  (Not yet assigned)    Completed TJ GUARDADO              Final Active Diagnoses:    Diagnosis Date Noted POA    PRINCIPAL PROBLEM:  Acute on chronic respiratory failure with hypoxia [J96.21] 12/26/2024 Yes    RSV (respiratory syncytial virus infection) [B33.8] 02/15/2025 Yes    Klebsiella pneumonia [J15.0] 02/15/2025 Yes    Pancytopenia [D61.818] 02/18/2025 No    Thrombocytopenia [D69.6] 02/16/2025 Yes    Hyperphosphatemia [E83.39] 02/16/2025 Yes    Hypothyroidism [E03.9] 02/16/2025 Yes    Colostomy present on  "admission [Z93.3] 02/15/2025 Not Applicable    Severe protein-calorie malnutrition [E43] 02/15/2025 Yes    Delirium [R41.0] 02/14/2025 No    ACP (advance care planning) [Z71.89] 12/27/2024 Not Applicable    Anemia of chronic renal failure, stage 5 [N18.5, D63.1] 12/19/2024 Yes    Acute on chronic systolic heart failure [I50.23] 12/11/2024 Yes    Vascular dialysis catheter in place [Z99.2] 12/11/2024 Not Applicable    ESRD on hemodialysis [N18.6, Z99.2] 05/23/2018 Not Applicable      Problems Resolved During this Admission:    Diagnosis Date Noted Date Resolved POA    Hyperkalemia [E87.5] 12/24/2024 02/15/2025 Yes       Discharged Condition: stable    Disposition: Home or Self Care    Follow Up:    Patient Instructions:   No discharge procedures on file.    Significant Diagnostic Studies: Labs: CMP No results for input(s): "NA", "K", "CL", "CO2", "GLU", "BUN", "CREATININE", "CALCIUM", "PROT", "ALBUMIN", "BILITOT", "ALKPHOS", "AST", "ALT", "ANIONGAP", "ESTGFRAFRICA", "EGFRNONAA" in the last 48 hours. and CBC No results for input(s): "WBC", "HGB", "HCT", "PLT" in the last 48 hours.    Pending Diagnostic Studies:       None           Medications:  Reconciled Home Medications:      Medication List        START taking these medications      acetaminophen 325 MG tablet  Commonly known as: TYLENOL  Take 2 tablets (650 mg total) by mouth every 6 (six) hours as needed for Pain.     fluticasone propionate 50 mcg/actuation nasal spray  Commonly known as: FLONASE  2 sprays (100 mcg total) by Each Nostril route once daily.     guaiFENesin 600 mg 12 hr tablet  Commonly known as: MUCINEX  Take 1 tablet (600 mg total) by mouth 2 (two) times daily.     levothyroxine 50 MCG tablet  Commonly known as: SYNTHROID  Take 1 tablet (50 mcg total) by mouth before breakfast.     melatonin 3 mg tablet  Commonly known as: MELATIN  Take 3 tablets (9 mg total) by mouth nightly.     pantoprazole 40 MG tablet  Commonly known as: PROTONIX  Take 1 " tablet (40 mg total) by mouth before breakfast.     psyllium husk (aspartame) 3.4 gram Pwpk packet  Commonly known as: METAMUCIL  Take 1 packet by mouth once daily.     QUEtiapine 25 MG Tab  Commonly known as: SEROQUEL  Take 1 tablet (25 mg total) by mouth nightly as needed (Non redirectable agitation, sun downing).            CONTINUE taking these medications      atorvastatin 40 MG tablet  Commonly known as: LIPITOR  Take 40 mg by mouth once daily.     miconazole NITRATE 2 % 2 % top powder  Commonly known as: MICOTIN  Apply topically 2 (two) times daily. Apply to underarm as needed     midodrine 10 MG tablet  Commonly known as: PROAMATINE  Take 1 tablet (10 mg total) by mouth as needed (prior to HD).     sevelamer carbonate 800 mg Tab  Commonly known as: RENVELA  Take 800 mg by mouth 3 (three) times daily with meals.              Indwelling Lines/Drains at time of discharge:   Lines/Drains/Airways       Central Venous Catheter Line  Duration                  Hemodialysis Catheter 12/31/24 0818 right subclavian 57 days              Drain  Duration                  Colostomy 12/19/24 2225 RLQ 69 days                    Time spent on the discharge of patient: 35 minutes         Una Jaffe MD  Department of Hospital Medicine  [facility] - Telemetry Stepdown

## 2025-03-04 NOTE — PHYSICIAN QUERY
Due to conflicting documentation, please clarify the POA status of septic shock:  Sepsis POA, shock (septic) not POA

## 2025-03-26 ENCOUNTER — HOSPITAL ENCOUNTER (EMERGENCY)
Facility: HOSPITAL | Age: 70
Discharge: HOME | End: 2025-03-26
Attending: EMERGENCY MEDICINE
Payer: MEDICARE

## 2025-03-26 VITALS
RESPIRATION RATE: 17 BRPM | WEIGHT: 123.44 LBS | SYSTOLIC BLOOD PRESSURE: 112 MMHG | HEART RATE: 95 BPM | DIASTOLIC BLOOD PRESSURE: 63 MMHG | BODY MASS INDEX: 19.93 KG/M2 | OXYGEN SATURATION: 95 % | TEMPERATURE: 99 F

## 2025-03-26 DIAGNOSIS — K92.2 GI BLEED: Primary | ICD-10-CM

## 2025-03-26 DIAGNOSIS — R09.02 HYPOXIA: ICD-10-CM

## 2025-03-26 LAB
ABSOLUTE EOSINOPHIL (OHS): 0.11 K/UL
ABSOLUTE MONOCYTE (OHS): 0.58 K/UL (ref 0.3–1)
ABSOLUTE NEUTROPHIL COUNT (OHS): 3.33 K/UL (ref 1.8–7.7)
ALBUMIN SERPL BCP-MCNC: 3 G/DL (ref 3.5–5.2)
ALP SERPL-CCNC: 194 UNIT/L (ref 40–150)
ALT SERPL W/O P-5'-P-CCNC: 14 UNIT/L (ref 10–44)
ANION GAP (OHS): 10 MMOL/L (ref 8–16)
AST SERPL-CCNC: 21 UNIT/L (ref 11–45)
BASOPHILS # BLD AUTO: 0.06 K/UL
BASOPHILS NFR BLD AUTO: 1.3 %
BILIRUB SERPL-MCNC: 0.3 MG/DL (ref 0.1–1)
BNP SERPL-MCNC: >4900 PG/ML (ref 0–99)
BUN SERPL-MCNC: 19 MG/DL (ref 8–23)
CALCIUM SERPL-MCNC: 9 MG/DL (ref 8.7–10.5)
CHLORIDE SERPL-SCNC: 103 MMOL/L (ref 95–110)
CO2 SERPL-SCNC: 28 MMOL/L (ref 23–29)
CREAT SERPL-MCNC: 2.6 MG/DL (ref 0.5–1.4)
ERYTHROCYTE [DISTWIDTH] IN BLOOD BY AUTOMATED COUNT: 17.9 % (ref 11.5–14.5)
GFR SERPLBLD CREATININE-BSD FMLA CKD-EPI: 26 ML/MIN/1.73/M2
GLUCOSE SERPL-MCNC: 84 MG/DL (ref 70–110)
HCT VFR BLD AUTO: 35 % (ref 40–54)
HGB BLD-MCNC: 10.8 GM/DL (ref 14–18)
IMM GRANULOCYTES # BLD AUTO: 0.02 K/UL (ref 0–0.04)
IMM GRANULOCYTES NFR BLD AUTO: 0.4 % (ref 0–0.5)
INDIRECT COOMBS: ABNORMAL
LYMPHOCYTES # BLD AUTO: 0.66 K/UL (ref 1–4.8)
MCH RBC QN AUTO: 32.6 PG (ref 27–50)
MCHC RBC AUTO-ENTMCNC: 30.9 G/DL (ref 32–36)
MCV RBC AUTO: 106 FL (ref 82–98)
NUCLEATED RBC (/100WBC) (OHS): 0 /100 WBC
PLATELET # BLD AUTO: 142 K/UL (ref 150–450)
PMV BLD AUTO: 10.7 FL (ref 9.2–12.9)
POTASSIUM SERPL-SCNC: 4.7 MMOL/L (ref 3.5–5.1)
PROT SERPL-MCNC: 6.6 GM/DL (ref 6–8.4)
RBC # BLD AUTO: 3.31 M/UL (ref 4.6–6.2)
RELATIVE EOSINOPHIL (OHS): 2.3 %
RELATIVE LYMPHOCYTE (OHS): 13.9 % (ref 18–48)
RELATIVE MONOCYTE (OHS): 12.2 % (ref 4–15)
RELATIVE NEUTROPHIL (OHS): 69.9 % (ref 38–73)
RH BLD: ABNORMAL
SODIUM SERPL-SCNC: 141 MMOL/L (ref 136–145)
SPECIMEN OUTDATE: ABNORMAL
TROPONIN I SERPL HS-MCNC: 286 NG/L
WBC # BLD AUTO: 4.76 K/UL (ref 3.9–12.7)

## 2025-03-26 PROCEDURE — 93010 ELECTROCARDIOGRAM REPORT: CPT | Mod: ,,, | Performed by: INTERNAL MEDICINE

## 2025-03-26 PROCEDURE — 84484 ASSAY OF TROPONIN QUANT: CPT | Performed by: EMERGENCY MEDICINE

## 2025-03-26 PROCEDURE — 85025 COMPLETE CBC W/AUTO DIFF WBC: CPT | Performed by: EMERGENCY MEDICINE

## 2025-03-26 PROCEDURE — 83880 ASSAY OF NATRIURETIC PEPTIDE: CPT | Performed by: EMERGENCY MEDICINE

## 2025-03-26 PROCEDURE — 99285 EMERGENCY DEPT VISIT HI MDM: CPT | Mod: 25

## 2025-03-26 PROCEDURE — 86870 RBC ANTIBODY IDENTIFICATION: CPT | Performed by: EMERGENCY MEDICINE

## 2025-03-26 PROCEDURE — 93005 ELECTROCARDIOGRAM TRACING: CPT

## 2025-03-26 PROCEDURE — 86901 BLOOD TYPING SEROLOGIC RH(D): CPT | Performed by: EMERGENCY MEDICINE

## 2025-03-26 PROCEDURE — 80053 COMPREHEN METABOLIC PANEL: CPT | Performed by: EMERGENCY MEDICINE

## 2025-03-26 NOTE — FIRST PROVIDER EVALUATION
Medical screening examination initiated.  I have conducted a focused provider triage encounter, findings are as follows:    Brief history of present illness:  68 y/o m, h/o colostomy and ESRD, c/o bloody output from ostomy site.   Pt feels that bleeding source is ostomy?    Vitals:    03/26/25 1824   BP: 112/63   Pulse: 95   Resp: 17   Temp: 98.5 °F (36.9 °C)   SpO2: (!) 92%       Pertinent physical exam:  On exam, liquid stool is blood-tinged.     Brief workup plan:  GI bleed work-up    Preliminary workup initiated; this workup will be continued and followed by the physician or advanced practice provider that is assigned to the patient when roomed.

## 2025-03-27 LAB
BLOOD GROUP ANTIBODIES SERPL: NORMAL
OHS QRS DURATION: 100 MS
OHS QTC CALCULATION: 479 MS

## 2025-03-27 NOTE — ED TRIAGE NOTES
Patient arrives to ED via EMS for from [facility] for blood in colostomy. Pt stated he noticed blood in his colostomy after dialysis today. Pt denies pain at the site. Pt reports he wear 2L baseline

## 2025-03-27 NOTE — ED NOTES
Bed: [facility]  Expected date:   Expected time:   Means of arrival:   Comments:  Ras

## 2025-03-27 NOTE — ED PROVIDER NOTES
Encounter Date: 3/26/2025       History     Chief Complaint   Patient presents with    Colostomy     Pt arrives via EMS from [facility] with a colostomy bleeding into bag since returning form dialysis     69-year-old male with a history of Crohn's disease, hypertension, and ESRD on dialysis presents with blood in his colostomy.  Started today after dialysis.  Occurred after he changed his colostomy bag.  He got his full run of dialysis today.  Patient denies nausea, vomiting, fever, cough, shortness of breath, chest pain, abdominal pain, or dysuria.  The patients available PMH, PSH, Social History, medications, allergies, and triage vital signs were reviewed just prior to their medical evaluation.          Review of patient's allergies indicates:   Allergen Reactions    Vancomycin analogues Anaphylaxis, Other (See Comments), Shortness Of Breath and Swelling     Light headed, see's spots      Aspirin Nausea And Vomiting and Other (See Comments)     Has crohn's disease    Other reaction(s): FLARES UP CROHNS      Has crohn's disease     Past Medical History:   Diagnosis Date    Crohn's disease 1998    ESRD (end stage renal disease) on dialysis 10/2017    Hypertension     Obstructive uropathy      Past Surgical History:   Procedure Laterality Date    COLOSTOMY  2006    DIALYSIS FISTULA CREATION Left 02/2018    NEPHRECTOMY Right     REMOVAL OF TUNNELED CENTRAL VENOUS CATHETER (CVC) N/A 5/23/2018    Procedure: PERMCATH REMOVAL-TUNNELED CVC REMOVAL;  Surgeon: Parveen Ray MD;  Location: [facility] CATH LAB;  Service: Nephrology;  Laterality: N/A;  pt coming in @930     Family History   Problem Relation Name Age of Onset    Hypertension Mother      Emphysema Father       Social History[1]  Review of Systems   Constitutional:  Negative for fever.   Respiratory:  Negative for cough and shortness of breath.    Cardiovascular:  Negative for chest pain.   Gastrointestinal:  Positive for blood in stool. Negative for abdominal  pain, nausea and vomiting.   Genitourinary:  Negative for dysuria.       Physical Exam     Initial Vitals [03/26/25 1824]   BP Pulse Resp Temp SpO2   112/63 95 17 98.5 °F (36.9 °C) (!) 92 %      MAP       --         Physical Exam    Nursing note and vitals reviewed.  Constitutional: He appears well-developed and well-nourished. He is not diaphoretic. No distress.   Hard of hearing   HENT:   Head: Normocephalic and atraumatic.   Nose: Nose normal.   Eyes: Conjunctivae are normal. Right eye exhibits no discharge. Left eye exhibits no discharge.   Neck: Neck supple.   Normal range of motion.  Cardiovascular:  Normal rate, regular rhythm and normal heart sounds.     Exam reveals no gallop and no friction rub.       No murmur heard.  Pulmonary/Chest: Breath sounds normal. No respiratory distress. He has no wheezes. He has no rhonchi. He has no rales.   Abdominal: Abdomen is soft. He exhibits no distension. There is no abdominal tenderness.   Colostomy to RLQ with bloody stool mix There is no rebound and no guarding.   Musculoskeletal:         General: No tenderness or edema. Normal range of motion.      Cervical back: Normal range of motion and neck supple.     Neurological: He is alert and oriented to person, place, and time. He has normal strength. GCS score is 15. GCS eye subscore is 4. GCS verbal subscore is 5. GCS motor subscore is 6.   Skin: Skin is warm and dry. No rash noted. No erythema.   Psychiatric: He has a normal mood and affect. His behavior is normal. Judgment and thought content normal.         ED Course   Procedures  Labs Reviewed   COMPREHENSIVE METABOLIC PANEL - Abnormal       Result Value    Sodium 141      Potassium 4.7      Chloride 103      CO2 28      Glucose 84      BUN 19      Creatinine 2.6 (*)     Calcium 9.0      Protein Total 6.6      Albumin 3.0 (*)     Bilirubin Total 0.3       (*)     AST 21      ALT 14      Anion Gap 10      eGFR 26 (*)    CBC WITH DIFFERENTIAL - Abnormal    WBC  "4.76      RBC 3.31 (*)     HGB 10.8 (*)     HCT 35.0 (*)      (*)     MCH 32.6      MCHC 30.9 (*)     RDW 17.9 (*)     Platelet Count 142 (*)     MPV 10.7      Nucleated RBC 0      Neut % 69.9      Lymph % 13.9 (*)     Mono % 12.2      Eos % 2.3      Basophil % 1.3      Imm Grans % 0.4      Neut # 3.33      Lymph # 0.66 (*)     Mono # 0.58      Eos # 0.11      Baso # 0.06      Imm Grans # 0.02     B-TYPE NATRIURETIC PEPTIDE - Abnormal    BNP >4,900 (*)    TROPONIN I HIGH SENSITIVITY - Abnormal    Troponin High Sensitive 286 (*)    TYPE & SCREEN - Abnormal    Specimen Outdate 03/29/2025 23:59      Group & Rh O POS      Indirect Jonna POS (*)    CBC W/ AUTO DIFFERENTIAL    Narrative:     The following orders were created for panel order CBC auto differential.  Procedure                               Abnormality         Status                     ---------                               -----------         ------                     CBC with Differential[[id number]]       Abnormal            Final result                 Please view results for these tests on the individual orders.   ANTIBODY IDENTIFICATION     EKG Readings: (Independently Interpreted)   Initial Reading: No STEMI. Rhythm: Sinus Tachycardia. Heart Rate: 102. Ectopy: PVCs. Conduction: Normal.   LAE, poor qrs progression in V3-4, nonspecific T wave changes       Imaging Results              X-Ray Chest PA And Lateral (Final result)  Result time 03/26/25 21:58:33      Final result by Lorenzo Brannon MD (03/26/25 21:58:33)                   Impression:      Cardiomegaly with probable bilateral pulmonary edema and moderate pleural effusions, with worse aeration when compared with 02/18/2025.  Suggest correlation for CHF exacerbation or volume overload.      Electronically signed by: Lorenzo Brannon MD  Date:    03/26/2025  Time:    21:58               Narrative:    EXAMINATION:  XR CHEST PA AND LATERAL    CLINICAL HISTORY:  Provided history is "  " "Hypoxemia".    TECHNIQUE:  Frontal and lateral views of the chest were performed.    COMPARISON:  02/18/2025.    FINDINGS:  Cardiomediastinal silhouette is enlarged and similar to prior study.  Central vascular congestion and coarse interstitial lung markings.  New bilateral interstitial and ground-glass opacities, right side worse than left.  Exam quality limited by suboptimal positioning.  Patient's chin obscures the upper lung apices.  Right-sided central venous catheter overlies the SVC.  Probable moderate bilateral pleural effusions, with passive atelectasis in the lung bases.  No convincing pneumothorax allowing for artifact limitations.                                       Medications - No data to display  Medical Decision Making  69-year-old male presents with blood in colostomy bag.  Vitals with slight hypoxia.  Physical exam as above.  Labs unremarkable.  On baseline O2.  Doubt acute life threat.  Will DC home.  He will call his surgeon to arrange close follow up.  Patient will return to ED for worsening symptoms, inability to eat/drink, fever greater than 100.4, or any other concerns. Did bedside teaching with return precautions.  All questions answered.  The patient acknowledges understanding.  Gave written and verbal discharge instructions.     Amount and/or Complexity of Data Reviewed  External Data Reviewed: labs.  Labs: ordered. Decision-making details documented in ED Course.  Radiology: ordered. Decision-making details documented in ED Course.  ECG/medicine tests: ordered and independent interpretation performed. Decision-making details documented in ED Course.                                      Clinical Impression:  Final diagnoses:  [K92.2] GI bleed (Primary)  [R09.02] Hypoxia          ED Disposition Condition    Discharge Stable          ED Prescriptions    None       Follow-up Information       Follow up With Specialties Details Why Contact Info    Follow up with primary physician as soon as " possible.  Call tomorrow for an appointment.        [facility] - Emergency Dept Emergency Medicine  Return to ED for worsening symptoms, inability to eat/drink, fever greater than 100.4, or any other concerns. [id number]  [facility] [id number] [phone]                 [1]   Social History  Tobacco Use    Smoking status: Former     Passive exposure: Never    Smokeless tobacco: Never   Substance Use Topics    Alcohol use: Not Currently    Drug use: Never        Chinedu Shelton MD  03/26/25 [id number]

## 2025-03-27 NOTE — ED NOTES
Patient reports wearing 2L nasal cannula at baseline. MD Nikita notified that patient was put on 4L nasal cannula

## 2025-03-31 LAB — PATHOLOGIST INTERPRETATION AB/XM: NORMAL

## 2025-06-09 NOTE — ASSESSMENT & PLAN NOTE
Patient's blood pressure range in the last 24 hours was: BP  Min: 90/52  Max: 124/65.  Hold BP meds   [facility] preop handoff tool